# Patient Record
Sex: FEMALE | Race: BLACK OR AFRICAN AMERICAN | NOT HISPANIC OR LATINO | Employment: OTHER | URBAN - METROPOLITAN AREA
[De-identification: names, ages, dates, MRNs, and addresses within clinical notes are randomized per-mention and may not be internally consistent; named-entity substitution may affect disease eponyms.]

---

## 2019-10-14 ENCOUNTER — HOSPITAL ENCOUNTER (OUTPATIENT)
Facility: HOSPITAL | Age: 44
Discharge: HOME | End: 2019-10-16
Attending: EMERGENCY MEDICINE | Admitting: FAMILY MEDICINE
Payer: MEDICAID

## 2019-10-14 DIAGNOSIS — R10.31 RIGHT LOWER QUADRANT ABDOMINAL PAIN: ICD-10-CM

## 2019-10-14 DIAGNOSIS — N39.0 URINARY TRACT INFECTION WITH HEMATURIA, SITE UNSPECIFIED: ICD-10-CM

## 2019-10-14 DIAGNOSIS — R31.9 URINARY TRACT INFECTION WITH HEMATURIA, SITE UNSPECIFIED: ICD-10-CM

## 2019-10-14 DIAGNOSIS — J18.9 PNEUMONIA OF LOWER LOBE DUE TO INFECTIOUS ORGANISM, UNSPECIFIED LATERALITY: ICD-10-CM

## 2019-10-14 DIAGNOSIS — R10.9 ABDOMINAL PAIN, UNSPECIFIED ABDOMINAL LOCATION: Primary | ICD-10-CM

## 2019-10-14 DIAGNOSIS — R00.0 TACHYCARDIA: ICD-10-CM

## 2019-10-14 PROBLEM — R91.8 PULMONARY NODULES: Status: ACTIVE | Noted: 2019-10-14

## 2019-10-14 LAB
ALBUMIN SERPL BCP-MCNC: 2.9 G/DL (ref 3.5–5.2)
ALP SERPL-CCNC: 73 U/L (ref 55–135)
ALT SERPL W/O P-5'-P-CCNC: 25 U/L (ref 10–44)
ANION GAP SERPL CALC-SCNC: 10 MMOL/L (ref 8–16)
AST SERPL-CCNC: 26 U/L (ref 10–40)
B-HCG UR QL: NEGATIVE
BACTERIA #/AREA URNS HPF: NEGATIVE /HPF
BASOPHILS # BLD AUTO: 0.02 K/UL (ref 0–0.2)
BASOPHILS NFR BLD: 0.1 % (ref 0–1.9)
BILIRUB SERPL-MCNC: 0.8 MG/DL (ref 0.1–1)
BILIRUB UR QL STRIP: NEGATIVE
BUN SERPL-MCNC: 10 MG/DL (ref 6–20)
CALCIUM SERPL-MCNC: 8.9 MG/DL (ref 8.7–10.5)
CHLORIDE SERPL-SCNC: 97 MMOL/L (ref 95–110)
CLARITY UR: CLEAR
CO2 SERPL-SCNC: 30 MMOL/L (ref 23–29)
COLOR UR: YELLOW
CREAT SERPL-MCNC: 1 MG/DL (ref 0.5–1.4)
CTP QC/QA: YES
DIFFERENTIAL METHOD: ABNORMAL
EOSINOPHIL # BLD AUTO: 0.1 K/UL (ref 0–0.5)
EOSINOPHIL NFR BLD: 0.7 % (ref 0–8)
ERYTHROCYTE [DISTWIDTH] IN BLOOD BY AUTOMATED COUNT: 18.5 % (ref 11.5–14.5)
EST. GFR  (AFRICAN AMERICAN): >60 ML/MIN/1.73 M^2
EST. GFR  (NON AFRICAN AMERICAN): >60 ML/MIN/1.73 M^2
GLUCOSE SERPL-MCNC: 103 MG/DL (ref 70–110)
GLUCOSE UR QL STRIP: NEGATIVE
HCT VFR BLD AUTO: 36.1 % (ref 37–48.5)
HGB BLD-MCNC: 11.2 G/DL (ref 12–16)
HGB UR QL STRIP: ABNORMAL
HYALINE CASTS #/AREA URNS LPF: 2 /LPF
IMM GRANULOCYTES # BLD AUTO: 0.04 K/UL (ref 0–0.04)
IMM GRANULOCYTES NFR BLD AUTO: 0.3 % (ref 0–0.5)
KETONES UR QL STRIP: NEGATIVE
LEUKOCYTE ESTERASE UR QL STRIP: NEGATIVE
LIPASE SERPL-CCNC: 26 U/L (ref 4–60)
LYMPHOCYTES # BLD AUTO: 1.8 K/UL (ref 1–4.8)
LYMPHOCYTES NFR BLD: 12.8 % (ref 18–48)
MCH RBC QN AUTO: 26.4 PG (ref 27–31)
MCHC RBC AUTO-ENTMCNC: 31 G/DL (ref 32–36)
MCV RBC AUTO: 85 FL (ref 82–98)
MICROSCOPIC COMMENT: ABNORMAL
MONOCYTES # BLD AUTO: 0.9 K/UL (ref 0.3–1)
MONOCYTES NFR BLD: 6.4 % (ref 4–15)
NEUTROPHILS # BLD AUTO: 10.9 K/UL (ref 1.8–7.7)
NEUTROPHILS NFR BLD: 79.7 % (ref 38–73)
NITRITE UR QL STRIP: NEGATIVE
NRBC BLD-RTO: 0 /100 WBC
PH UR STRIP: 6 [PH] (ref 5–8)
PLATELET # BLD AUTO: 319 K/UL (ref 150–350)
PMV BLD AUTO: 9.8 FL (ref 9.2–12.9)
POTASSIUM SERPL-SCNC: 3.7 MMOL/L (ref 3.5–5.1)
PROT SERPL-MCNC: 8 G/DL (ref 6–8.4)
PROT UR QL STRIP: NEGATIVE
RBC # BLD AUTO: 4.25 M/UL (ref 4–5.4)
RBC #/AREA URNS HPF: 9 /HPF (ref 0–4)
SODIUM SERPL-SCNC: 137 MMOL/L (ref 136–145)
SP GR UR STRIP: 1.01 (ref 1–1.03)
SQUAMOUS #/AREA URNS HPF: 5 /HPF
URN SPEC COLLECT METH UR: ABNORMAL
UROBILINOGEN UR STRIP-ACNC: NEGATIVE EU/DL
WBC # BLD AUTO: 13.69 K/UL (ref 3.9–12.7)
WBC #/AREA URNS HPF: 2 /HPF (ref 0–5)

## 2019-10-14 PROCEDURE — 83690 ASSAY OF LIPASE: CPT

## 2019-10-14 PROCEDURE — 93005 ELECTROCARDIOGRAM TRACING: CPT

## 2019-10-14 PROCEDURE — 80053 COMPREHEN METABOLIC PANEL: CPT

## 2019-10-14 PROCEDURE — 25500020 PHARM REV CODE 255: Performed by: EMERGENCY MEDICINE

## 2019-10-14 PROCEDURE — 99285 EMERGENCY DEPT VISIT HI MDM: CPT | Mod: 25

## 2019-10-14 PROCEDURE — G0378 HOSPITAL OBSERVATION PER HR: HCPCS

## 2019-10-14 PROCEDURE — 96367 TX/PROPH/DG ADDL SEQ IV INF: CPT

## 2019-10-14 PROCEDURE — 96365 THER/PROPH/DIAG IV INF INIT: CPT | Mod: 59

## 2019-10-14 PROCEDURE — 81025 URINE PREGNANCY TEST: CPT | Performed by: EMERGENCY MEDICINE

## 2019-10-14 PROCEDURE — 85025 COMPLETE CBC W/AUTO DIFF WBC: CPT

## 2019-10-14 PROCEDURE — 81001 URINALYSIS AUTO W/SCOPE: CPT

## 2019-10-14 PROCEDURE — 96375 TX/PRO/DX INJ NEW DRUG ADDON: CPT

## 2019-10-14 PROCEDURE — 63600175 PHARM REV CODE 636 W HCPCS: Performed by: EMERGENCY MEDICINE

## 2019-10-14 RX ORDER — CARVEDILOL 12.5 MG/1
12.5 TABLET ORAL 2 TIMES DAILY
Status: DISCONTINUED | OUTPATIENT
Start: 2019-10-15 | End: 2019-10-16 | Stop reason: HOSPADM

## 2019-10-14 RX ORDER — SODIUM CHLORIDE 9 MG/ML
INJECTION, SOLUTION INTRAVENOUS CONTINUOUS
Status: DISCONTINUED | OUTPATIENT
Start: 2019-10-15 | End: 2019-10-16 | Stop reason: HOSPADM

## 2019-10-14 RX ORDER — MORPHINE SULFATE 4 MG/ML
4 INJECTION, SOLUTION INTRAMUSCULAR; INTRAVENOUS EVERY 4 HOURS PRN
Status: DISCONTINUED | OUTPATIENT
Start: 2019-10-15 | End: 2019-10-16 | Stop reason: HOSPADM

## 2019-10-14 RX ORDER — ACETAMINOPHEN 10 MG/ML
1000 INJECTION, SOLUTION INTRAVENOUS ONCE
Status: COMPLETED | OUTPATIENT
Start: 2019-10-14 | End: 2019-10-14

## 2019-10-14 RX ORDER — ONDANSETRON 2 MG/ML
4 INJECTION INTRAMUSCULAR; INTRAVENOUS
Status: COMPLETED | OUTPATIENT
Start: 2019-10-14 | End: 2019-10-14

## 2019-10-14 RX ORDER — ALBUTEROL SULFATE 90 UG/1
2 AEROSOL, METERED RESPIRATORY (INHALATION) EVERY 6 HOURS PRN
Status: DISCONTINUED | OUTPATIENT
Start: 2019-10-15 | End: 2019-10-16 | Stop reason: HOSPADM

## 2019-10-14 RX ORDER — LISINOPRIL 20 MG/1
20 TABLET ORAL NIGHTLY
Status: ON HOLD | COMMUNITY
End: 2021-05-04 | Stop reason: SDUPTHER

## 2019-10-14 RX ORDER — SODIUM,POTASSIUM PHOSPHATES 280-250MG
2 POWDER IN PACKET (EA) ORAL
Status: DISCONTINUED | OUTPATIENT
Start: 2019-10-15 | End: 2019-10-16 | Stop reason: HOSPADM

## 2019-10-14 RX ORDER — FLUTICASONE PROPIONATE 50 MCG
2 SPRAY, SUSPENSION (ML) NASAL DAILY PRN
COMMUNITY

## 2019-10-14 RX ORDER — ONDANSETRON 2 MG/ML
4 INJECTION INTRAMUSCULAR; INTRAVENOUS EVERY 8 HOURS PRN
Status: DISCONTINUED | OUTPATIENT
Start: 2019-10-15 | End: 2019-10-16 | Stop reason: HOSPADM

## 2019-10-14 RX ORDER — POTASSIUM CHLORIDE 20 MEQ/15ML
40 SOLUTION ORAL
Status: DISCONTINUED | OUTPATIENT
Start: 2019-10-15 | End: 2019-10-16 | Stop reason: HOSPADM

## 2019-10-14 RX ORDER — SODIUM CHLORIDE 0.9 % (FLUSH) 0.9 %
3 SYRINGE (ML) INJECTION
Status: DISCONTINUED | OUTPATIENT
Start: 2019-10-15 | End: 2019-10-16 | Stop reason: HOSPADM

## 2019-10-14 RX ORDER — METOPROLOL SUCCINATE 25 MG/1
25 TABLET, EXTENDED RELEASE ORAL NIGHTLY
Status: DISCONTINUED | OUTPATIENT
Start: 2019-10-15 | End: 2019-10-16 | Stop reason: HOSPADM

## 2019-10-14 RX ORDER — OMEPRAZOLE 20 MG/1
20 CAPSULE, DELAYED RELEASE ORAL NIGHTLY
COMMUNITY
End: 2020-07-11 | Stop reason: CLARIF

## 2019-10-14 RX ORDER — FERROUS SULFATE 325(65) MG
325 TABLET ORAL NIGHTLY
COMMUNITY
End: 2020-07-11 | Stop reason: CLARIF

## 2019-10-14 RX ORDER — HYDRALAZINE HYDROCHLORIDE 20 MG/ML
10 INJECTION INTRAMUSCULAR; INTRAVENOUS EVERY 6 HOURS PRN
Status: DISCONTINUED | OUTPATIENT
Start: 2019-10-15 | End: 2019-10-16 | Stop reason: HOSPADM

## 2019-10-14 RX ORDER — PANTOPRAZOLE SODIUM 40 MG/1
40 TABLET, DELAYED RELEASE ORAL DAILY
Status: DISCONTINUED | OUTPATIENT
Start: 2019-10-15 | End: 2019-10-16 | Stop reason: HOSPADM

## 2019-10-14 RX ORDER — LANOLIN ALCOHOL/MO/W.PET/CERES
800 CREAM (GRAM) TOPICAL
Status: DISCONTINUED | OUTPATIENT
Start: 2019-10-15 | End: 2019-10-16 | Stop reason: HOSPADM

## 2019-10-14 RX ORDER — FLUTICASONE PROPIONATE 50 MCG
2 SPRAY, SUSPENSION (ML) NASAL DAILY
Status: DISCONTINUED | OUTPATIENT
Start: 2019-10-15 | End: 2019-10-16 | Stop reason: HOSPADM

## 2019-10-14 RX ORDER — ACETAMINOPHEN 10 MG/ML
1000 INJECTION, SOLUTION INTRAVENOUS EVERY 8 HOURS
Status: DISCONTINUED | OUTPATIENT
Start: 2019-10-14 | End: 2019-10-14

## 2019-10-14 RX ORDER — AMOXICILLIN AND CLAVULANATE POTASSIUM 875; 125 MG/1; MG/1
1 TABLET, FILM COATED ORAL 2 TIMES DAILY
Qty: 14 TABLET | Refills: 0 | Status: SHIPPED | OUTPATIENT
Start: 2019-10-14 | End: 2019-10-16 | Stop reason: HOSPADM

## 2019-10-14 RX ORDER — TRAZODONE HYDROCHLORIDE 50 MG/1
100 TABLET ORAL NIGHTLY
Status: DISCONTINUED | OUTPATIENT
Start: 2019-10-15 | End: 2019-10-16 | Stop reason: HOSPADM

## 2019-10-14 RX ORDER — TAMSULOSIN HYDROCHLORIDE 0.4 MG/1
0.4 CAPSULE ORAL NIGHTLY
Status: DISCONTINUED | OUTPATIENT
Start: 2019-10-15 | End: 2019-10-16 | Stop reason: HOSPADM

## 2019-10-14 RX ADMIN — PIPERACILLIN AND TAZOBACTAM 4.5 G: 4; .5 INJECTION, POWDER, LYOPHILIZED, FOR SOLUTION INTRAVENOUS; PARENTERAL at 11:10

## 2019-10-14 RX ADMIN — ONDANSETRON 4 MG: 2 INJECTION INTRAMUSCULAR; INTRAVENOUS at 06:10

## 2019-10-14 RX ADMIN — ACETAMINOPHEN 1000 MG: 10 INJECTION, SOLUTION INTRAVENOUS at 06:10

## 2019-10-14 RX ADMIN — IOHEXOL 100 ML: 350 INJECTION, SOLUTION INTRAVENOUS at 08:10

## 2019-10-14 NOTE — ED NOTES
Bed: [place] [address]  Expected date:   Expected time:   Means of arrival:   Comments:  Alexa MALLOY Abdominal pain

## 2019-10-14 NOTE — ED PROVIDER NOTES
Encounter Date: 10/14/2019       History     Chief Complaint   Patient presents with    Abdominal Pain     Right sided     44-year-old female presents complaining of right upper quadrant abdominal pain patient describes pain as sharp and pleuritic in nature patient rates pain as 6/10 patient has a history of obesity, GERD, cholecystectomy.  Patient denies fever cough or shortness of breath patient has no other acute complaints at this time patient reports pain started today.        Review of patient's allergies indicates:  No Known Allergies  Past Medical History:   Diagnosis Date    Esophageal reflux     Gastroesophageal reflux    Generalized anxiety disorder     Anxiety, Generalized    Herniated disc     Hypertension     Lower extremity weakness     Morbid obesity     Obesity     Upper extremity weakness      Past Surgical History:   Procedure Laterality Date     SECTION      CHOLECYSTECTOMY       No family history on file.  Social History     Tobacco Use    Smoking status: Never Smoker   Substance Use Topics    Alcohol use: Yes     Alcohol/week: 0.8 standard drinks     Types: 1 drink(s) per week     Comment: rarely    Drug use: No     Review of Systems   Constitutional: Negative for fever.   HENT: Negative for congestion, rhinorrhea, sore throat and trouble swallowing.    Eyes: Negative for visual disturbance.   Respiratory: Negative for cough, chest tightness, shortness of breath and wheezing.    Cardiovascular: Negative for chest pain, palpitations and leg swelling.   Gastrointestinal: Positive for abdominal pain. Negative for abdominal distention, constipation, diarrhea, nausea and vomiting.   Genitourinary: Negative for difficulty urinating, dysuria, flank pain and frequency.   Musculoskeletal: Negative for arthralgias, back pain, joint swelling and neck pain.   Skin: Negative for color change and rash.   Neurological: Negative for dizziness, syncope, speech difficulty, weakness,  numbness and headaches.   All other systems reviewed and are negative.      Physical Exam     Initial Vitals [10/14/19 1652]   BP Pulse Resp Temp SpO2   (!) 197/102 (!) 112 18 98.5 °F (36.9 °C) 95 %      MAP       --         Physical Exam    Nursing note and vitals reviewed.  Constitutional: She appears well-developed and well-nourished. She is not diaphoretic. No distress.   HENT:   Head: Normocephalic and atraumatic.   Right Ear: External ear normal.   Left Ear: External ear normal.   Nose: Nose normal.   Mouth/Throat: Oropharynx is clear and moist. No oropharyngeal exudate.   Eyes: Conjunctivae and EOM are normal. Pupils are equal, round, and reactive to light. Right eye exhibits no discharge. Left eye exhibits no discharge. No scleral icterus.   Neck: Normal range of motion. Neck supple. No thyromegaly present. No tracheal deviation present. No JVD present.   Cardiovascular: Normal rate, regular rhythm, normal heart sounds and intact distal pulses. Exam reveals no gallop and no friction rub.    No murmur heard.  Pulmonary/Chest: Breath sounds normal. No stridor. No respiratory distress. She has no wheezes. She has no rhonchi. She has no rales. She exhibits no tenderness.   Abdominal: Soft. Bowel sounds are normal. She exhibits no distension and no mass. There is tenderness. There is no rebound and no guarding.   Patient has tenderness to palpation in the right upper quadrant, no rebound or guarding noted   Musculoskeletal: Normal range of motion. She exhibits no edema or tenderness.   Lymphadenopathy:     She has no cervical adenopathy.   Neurological: She is alert and oriented to person, place, and time. She has normal strength. She displays normal reflexes. No cranial nerve deficit or sensory deficit.   Skin: Skin is warm and dry. No rash and no abscess noted. No erythema. No pallor.         ED Course   Procedures  Labs Reviewed   CBC W/ AUTO DIFFERENTIAL - Abnormal; Notable for the following components:        Result Value    WBC 13.69 (*)     Hemoglobin 11.2 (*)     Hematocrit 36.1 (*)     Mean Corpuscular Hemoglobin 26.4 (*)     Mean Corpuscular Hemoglobin Conc 31.0 (*)     RDW 18.5 (*)     Gran # (ANC) 10.9 (*)     Gran% 79.7 (*)     Lymph% 12.8 (*)     All other components within normal limits   COMPREHENSIVE METABOLIC PANEL - Abnormal; Notable for the following components:    CO2 30 (*)     Albumin 2.9 (*)     All other components within normal limits   URINALYSIS, REFLEX TO URINE CULTURE - Abnormal; Notable for the following components:    Occult Blood UA 2+ (*)     All other components within normal limits    Narrative:     Preferred Collection Type->Urine, Clean Catch  Specimen Source->Urine   URINALYSIS MICROSCOPIC - Abnormal; Notable for the following components:    RBC, UA 9 (*)     Hyaline Casts, UA 2 (*)     All other components within normal limits    Narrative:     Preferred Collection Type->Urine, Clean Catch  Specimen Source->Urine   LIPASE   POCT URINE PREGNANCY        ECG Results          EKG 12-lead (In process)  Result time 10/14/19 18:07:48    In process by Interface, Lab In [facility] (10/14/19 18:07:48)                 Narrative:    Test Reason : R00.0,    Vent. Rate : 109 BPM     Atrial Rate : 109 BPM     P-R Int : 152 ms          QRS Dur : 080 ms      QT Int : 318 ms       P-R-T Axes : 049 022 046 degrees     QTc Int : 428 ms    Sinus tachycardia  Otherwise normal ECG  No previous ECGs available    Referred By: [email or web address]   SELF           Confirmed By:                   In process by Interface, Lab In [facility] (10/14/19 18:07:16)                 Narrative:    Test Reason : R00.0,    Vent. Rate : 109 BPM     Atrial Rate : 109 BPM     P-R Int : 152 ms          QRS Dur : 080 ms      QT Int : 318 ms       P-R-T Axes : 049 022 046 degrees     QTc Int : 428 ms    Sinus tachycardia  Otherwise normal ECG  No previous ECGs available    Referred By: [email or web address]   SELF           Confirmed By:                   In  process by Interface, Lab In [facility] (10/14/19 18:06:44)                 Narrative:    Test Reason : R00.0,    Vent. Rate : 109 BPM     Atrial Rate : 109 BPM     P-R Int : 152 ms          QRS Dur : 080 ms      QT Int : 318 ms       P-R-T Axes : 049 022 046 degrees     QTc Int : 428 ms    Sinus tachycardia  Otherwise normal ECG  When compared with ECG of 14-OCT-2019 17:40,  No significant change was found    Referred By: [email or web address]   SELF           Confirmed By:                             Imaging Results          CTA Chest Non-Coronary - PE Study (Final result)  Result time 10/14/19 20:52:45    Final result by Lg Xavier MD (10/14/19 20:52:45)                 Impression:      1. Significantly limited exam as described, with no evidence of central pulmonary thromboembolism.  2. Scattered opacities in both lungs suggesting subsegmental atelectasis, and perhaps mild small airways inflammation.  3. Multiple bilateral noncalcified pulmonary nodules, nonspecific.  Follow-up noncontrast chest CT in 6 months is recommended.  4. Cardiomegaly.  .      Electronically signed by: Lg Xavier MD  Date:    10/14/2019  Time:    20:52             Narrative:    EXAMINATION:  CTA CHEST NON CORONARY    CLINICAL HISTORY:  Chest pain, acute, PE suspected, high pretest prob; tachycardia.    TECHNIQUE:  CMS MANDATED QUALITY DATA-CT RADIATION DOSE-436    All CT scans at this facility use dose modulation, iterative reconstruction, and or weight-based dosing when appropriate to reduce radiation dose to as low as reasonably achievable.    Thin axial imaging through the chest was performed with 100 mL Omnipaque 350 IV contrast, with sagittal and coronal reformatted images and MIP reconstructions performed, and images stored in the patient's permanent electronic medical record.    FINDINGS:  Comparison to multiple prior exams.  The study is significantly limited by poor timing of the contrast bolus, poor penetration, and motion artifact,  which prevents evaluation of the peripheral pulmonary arterial tree.  There is no large central pulmonary arterial filling defect to suggest pulmonary thromboembolism, with the central pulmonary arteries normal in caliber.    The aorta enhances normally and tapers appropriately, with no aortic dissection or aneurysm.  The heart is enlarged, with no pericardial effusion.  There is no mediastinal or hilar lymph node enlargement.    The lungs are normally expanded, with scattered linear and bandlike opacities in both lungs, with mild mosaic attenuation, suggesting subsegmental atelectasis.  There also multiple noncalcified subcentimeter pulmonary nodules bilaterally, measuring up to 8-9 mm.  No consolidation, pleural effusion, or pneumothorax.  The central airways are patent.  There are no acute osseous abnormalities.                               CT Abdomen Pelvis With Contrast (Final result)  Result time 10/14/19 21:06:41    Final result by Lg Xavier MD (10/14/19 21:06:41)                 Impression:      1. Right perinephric edema, with trace fluid in the right anterior pararenal space and Lees's pouch, nonspecific.  Please correlate with urinalysis, and any clinical suspicion of urinary tract infection and or pyelonephritis.  2. Normal appendix.  3. Additional observations as above.      Electronically signed by: Lg Xavier MD  Date:    10/14/2019  Time:    21:06             Narrative:    EXAMINATION:  CT ABDOMEN PELVIS WITH CONTRAST    CLINICAL HISTORY:  Acute right lower quadrant abdominal pain, appendicitis suspected, tachycardia.    TECHNIQUE:  CMS MANDATED QUALITY DATA-CT RADIATION DOSE-436    All CT scans at this facility use dose modulation, iterative reconstruction, and or weight-based dosing when appropriate to reduce radiation dose to as low as reasonably achievable.    Axial postcontrast imaging was performed with 100 mL Omnipaque 350 IV contrast.  The exam is limited by the patient's large  body habitus and poor penetration.    COMPARISON:  None.    FINDINGS:  CT ABDOMEN: There are cholecystectomy clips, with the liver enhancing normally, with no biliary ductal dilatation.  The spleen is normal in size and enhances normally, with the pancreas, adrenal glands and kidneys enhancing normally.  There is nonspecific right perinephric stranding reflecting edema and fluid, with small amount of fluid in the right anterior pararenal space and in Lees's pouch.  There are no renal or ureteral calculi, with no hydroureteronephrosis.    The abdominal aorta, iliac arteries, and remaining abdominal vasculature enhance normally and are normal in caliber.  No mesenteric or retroperitoneal lymph node enlargement.  There is no bowel wall thickening, inflammation, or bowel obstruction, with no intraperitoneal free air.  The appendix is normal.  No ventral abdominal hernia.    CT PELVIS: Rim calcification in the uterine body posteriorly is nonspecific, potentially calcified leiomyoma.  The uterus, adnexa, sigmoid colon, rectum and urinary bladder enhance normally, with no pelvic free fluid or pelvic mass, and no pelvic or inguinal lymph node enlargement.  No inguinal or femoral hernia.    The extraperitoneal soft tissues enhance normally.  There is degenerative facet arthropathy in the lumbar spine, with no acute osseous abnormality.                                 Medical Decision Making:   History:   Old Medical Records: I decided to obtain old medical records.  Initial Assessment:   Emergent evaluation of a 44-year-old female presenting with abdominal pain which is pleuritic in nature patient noted to be tachycardic, given this I do have a suspicion for possible pulmonary embolus differential also includes biliary duct stone, obstruction, perforation, infection              Attending Attestation:             Attending ED Notes:   Patient's imaging shows pulmonary nodules, patient was informed of these findings and  the need for follow-up, patient also has infiltrate versus inflammation noted on CTA of chest there is no sign of PE at this time.  Patient will be treated for mild pneumonia.  Patient also noted to have inflammation near the bladder, findings concerning for possible UTI.  Patient will be started on a course of antibiotics and is to follow up with PCP in the next 2-3 days for re-evaluation patient cautioned to return immediately to the ER for any worsening or any further concerns.  Patient reports she understands these instructions and will comply she is ambulating without difficulty and tolerating p.o. in the ER.      Addendum:  Although  did be read CT of the abdomen and pelvis as normal appendix, Dr. Black transmitted a radiology read of the same study and feels like acute appendicitis is suggested.  Given this I have called patient to return and will consult General surgery for further evaluation and management.             Clinical Impression:       ICD-10-CM ICD-9-CM   1. Abdominal pain, unspecified abdominal location R10.9 789.00   2. Tachycardia R00.0 785.0   3. Urinary tract infection with hematuria, site unspecified N39.0 599.0    R31.9 599.70   4. Pneumonia of lower lobe due to infectious organism, unspecified laterality J18.1 483.8                                Yoel Patino MD  10/14/19 2200       Yoel Patino MD  10/14/19 [id number]

## 2019-10-14 NOTE — ED TRIAGE NOTES
Admit per Alexa MALLOY, C/o right sided abdominal pain since Friday. Worse today and felt like she had a fever. Took Tylenol before coming to the ER

## 2019-10-15 PROBLEM — K37 APPENDICITIS: Status: RESOLVED | Noted: 2019-10-14 | Resolved: 2019-10-15

## 2019-10-15 PROBLEM — R10.9 RIGHT SIDED ABDOMINAL PAIN: Status: ACTIVE | Noted: 2019-10-15

## 2019-10-15 PROBLEM — K37 APPENDICITIS: Status: ACTIVE | Noted: 2019-10-14

## 2019-10-15 LAB
ANION GAP SERPL CALC-SCNC: 9 MMOL/L (ref 8–16)
BASOPHILS # BLD AUTO: 0.03 K/UL (ref 0–0.2)
BASOPHILS NFR BLD: 0.3 % (ref 0–1.9)
BUN SERPL-MCNC: 8 MG/DL (ref 6–20)
CALCIUM SERPL-MCNC: 8.4 MG/DL (ref 8.7–10.5)
CHLORIDE SERPL-SCNC: 99 MMOL/L (ref 95–110)
CO2 SERPL-SCNC: 29 MMOL/L (ref 23–29)
CREAT SERPL-MCNC: 0.9 MG/DL (ref 0.5–1.4)
DIFFERENTIAL METHOD: ABNORMAL
EOSINOPHIL # BLD AUTO: 0.1 K/UL (ref 0–0.5)
EOSINOPHIL NFR BLD: 1.1 % (ref 0–8)
ERYTHROCYTE [DISTWIDTH] IN BLOOD BY AUTOMATED COUNT: 18.4 % (ref 11.5–14.5)
EST. GFR  (AFRICAN AMERICAN): >60 ML/MIN/1.73 M^2
EST. GFR  (NON AFRICAN AMERICAN): >60 ML/MIN/1.73 M^2
GLUCOSE SERPL-MCNC: 107 MG/DL (ref 70–110)
HCT VFR BLD AUTO: 34 % (ref 37–48.5)
HGB BLD-MCNC: 10.2 G/DL (ref 12–16)
IMM GRANULOCYTES # BLD AUTO: 0.03 K/UL (ref 0–0.04)
IMM GRANULOCYTES NFR BLD AUTO: 0.3 % (ref 0–0.5)
LYMPHOCYTES # BLD AUTO: 2.1 K/UL (ref 1–4.8)
LYMPHOCYTES NFR BLD: 20.9 % (ref 18–48)
MCH RBC QN AUTO: 25.4 PG (ref 27–31)
MCHC RBC AUTO-ENTMCNC: 30 G/DL (ref 32–36)
MCV RBC AUTO: 85 FL (ref 82–98)
MONOCYTES # BLD AUTO: 0.7 K/UL (ref 0.3–1)
MONOCYTES NFR BLD: 7 % (ref 4–15)
NEUTROPHILS # BLD AUTO: 6.9 K/UL (ref 1.8–7.7)
NEUTROPHILS NFR BLD: 70.4 % (ref 38–73)
NRBC BLD-RTO: 0 /100 WBC
PLATELET # BLD AUTO: 298 K/UL (ref 150–350)
PMV BLD AUTO: 10 FL (ref 9.2–12.9)
POTASSIUM SERPL-SCNC: 3.5 MMOL/L (ref 3.5–5.1)
RBC # BLD AUTO: 4.01 M/UL (ref 4–5.4)
SODIUM SERPL-SCNC: 137 MMOL/L (ref 136–145)
WBC # BLD AUTO: 9.83 K/UL (ref 3.9–12.7)

## 2019-10-15 PROCEDURE — 63600175 PHARM REV CODE 636 W HCPCS: Performed by: NURSE PRACTITIONER

## 2019-10-15 PROCEDURE — G0378 HOSPITAL OBSERVATION PER HR: HCPCS

## 2019-10-15 PROCEDURE — 36415 COLL VENOUS BLD VENIPUNCTURE: CPT

## 2019-10-15 PROCEDURE — 25000242 PHARM REV CODE 250 ALT 637 W/ HCPCS: Performed by: NURSE PRACTITIONER

## 2019-10-15 PROCEDURE — 85025 COMPLETE CBC W/AUTO DIFF WBC: CPT

## 2019-10-15 PROCEDURE — 96375 TX/PRO/DX INJ NEW DRUG ADDON: CPT

## 2019-10-15 PROCEDURE — 96361 HYDRATE IV INFUSION ADD-ON: CPT

## 2019-10-15 PROCEDURE — 25000003 PHARM REV CODE 250: Performed by: NURSE PRACTITIONER

## 2019-10-15 PROCEDURE — 99900035 HC TECH TIME PER 15 MIN (STAT)

## 2019-10-15 PROCEDURE — 94761 N-INVAS EAR/PLS OXIMETRY MLT: CPT

## 2019-10-15 PROCEDURE — 96366 THER/PROPH/DIAG IV INF ADDON: CPT

## 2019-10-15 PROCEDURE — 96376 TX/PRO/DX INJ SAME DRUG ADON: CPT

## 2019-10-15 PROCEDURE — 80048 BASIC METABOLIC PNL TOTAL CA: CPT

## 2019-10-15 RX ADMIN — MORPHINE SULFATE 4 MG: 4 INJECTION INTRAVENOUS at 08:10

## 2019-10-15 RX ADMIN — TRAZODONE HYDROCHLORIDE 100 MG: 50 TABLET ORAL at 02:10

## 2019-10-15 RX ADMIN — FLUTICASONE PROPIONATE 100 MCG: 50 SPRAY, METERED NASAL at 08:10

## 2019-10-15 RX ADMIN — PIPERACILLIN SODIUM AND TAZOBACTAM SODIUM 3.38 G: 3; .375 INJECTION, POWDER, LYOPHILIZED, FOR SOLUTION INTRAVENOUS at 08:10

## 2019-10-15 RX ADMIN — PIPERACILLIN SODIUM AND TAZOBACTAM SODIUM 3.38 G: 3; .375 INJECTION, POWDER, LYOPHILIZED, FOR SOLUTION INTRAVENOUS at 04:10

## 2019-10-15 RX ADMIN — CARVEDILOL 12.5 MG: 12.5 TABLET, FILM COATED ORAL at 08:10

## 2019-10-15 RX ADMIN — MORPHINE SULFATE 4 MG: 4 INJECTION INTRAVENOUS at 12:10

## 2019-10-15 RX ADMIN — ONDANSETRON 4 MG: 2 INJECTION INTRAMUSCULAR; INTRAVENOUS at 12:10

## 2019-10-15 RX ADMIN — METOPROLOL SUCCINATE 25 MG: 25 TABLET, FILM COATED, EXTENDED RELEASE ORAL at 08:10

## 2019-10-15 RX ADMIN — METOPROLOL SUCCINATE 25 MG: 25 TABLET, FILM COATED, EXTENDED RELEASE ORAL at 01:10

## 2019-10-15 RX ADMIN — TAMSULOSIN HYDROCHLORIDE 0.4 MG: 0.4 CAPSULE ORAL at 08:10

## 2019-10-15 RX ADMIN — TRAZODONE HYDROCHLORIDE 100 MG: 50 TABLET ORAL at 08:10

## 2019-10-15 RX ADMIN — SODIUM CHLORIDE: 0.9 INJECTION, SOLUTION INTRAVENOUS at 01:10

## 2019-10-15 RX ADMIN — PIPERACILLIN SODIUM AND TAZOBACTAM SODIUM 3.38 G: 3; .375 INJECTION, POWDER, LYOPHILIZED, FOR SOLUTION INTRAVENOUS at 11:10

## 2019-10-15 RX ADMIN — TAMSULOSIN HYDROCHLORIDE 0.4 MG: 0.4 CAPSULE ORAL at 01:10

## 2019-10-15 NOTE — NURSING
Patient admitted with lower right side abdominal pain.  Patient instructed she is NPO, she voiced understanding.  Patient states she is able to control her pain by laying on her left side.

## 2019-10-15 NOTE — ASSESSMENT & PLAN NOTE
Suspect acute appendicitis  Consult surgery  The ED physician had discussed case with Dr. Malloy.  NPO  Empiric antibiotics with IV zosyn  Pain medication and antiemetics

## 2019-10-15 NOTE — H&P
[facility] hospital Medicine  History & Physical  Date of service: 10/14/2019 at 2300    Patient Name: Katiuska Preston  MRN: [id number]  Admission Date: 10/14/2019  Attending Physician: Jena Navarrete MD  Primary Care Provider: Harpreet Rendon MD         Patient information was obtained from patient and ER records.     Subjective:     Principal Problem:Abdominal pain    Chief Complaint:   Chief Complaint   Patient presents with    Abdominal Pain     Right sided        HPI: The patient is a 44-year-old morbidly obese female with history of asthma, hypertension, GERD, cholecystectomy. She presented to the ED with progressive sharp right upper quadrant abdominal pain, worse with deep breathing, associated with nausea and fever for 3-4 days. She states that pain had been severe and persistent today. She had an episode of diarrhea PTA.    She denies shortness of breath, chest pain, or urinary symptoms.    Per ED physician, she is noted to have leukocytosis, 13.69. CT abdomen and pelvis reports are somewhat conflicting, acute appendicitis VS right perinephric edema.        Past Medical History:   Diagnosis Date    Esophageal reflux     Gastroesophageal reflux    Generalized anxiety disorder     Anxiety, Generalized    Herniated disc     Hypertension     Lower extremity weakness     Morbid obesity     Obesity     Upper extremity weakness        Past Surgical History:   Procedure Laterality Date     SECTION      CHOLECYSTECTOMY         Review of patient's allergies indicates:  No Known Allergies    No current facility-administered medications on file prior to encounter.      Current Outpatient Medications on File Prior to Encounter   Medication Sig    albuterol (PROAIR HFA) 90 mcg/actuation inhaler Inhale 2 puffs into the lungs every 6 (six) hours as needed for Wheezing.    alprazolam (XANAX) 1 MG tablet Take 1 tablet (1 mg total) by mouth 3 (three) times daily as needed for Anxiety.     carvedilol (COREG) 12.5 MG tablet TAKE 1 TABLET BY MOUTH TWICE DAILY    cetirizine (ZYRTEC) 10 MG tablet Take 1 tablet (10 mg total) by mouth once daily.    lisinopril 10 MG tablet Take 1 tablet (10 mg total) by mouth every evening.    methocarbamol (ROBAXIN) 500 MG Tab     metoprolol succinate (TOPROL-XL) 25 MG 24 hr tablet     montelukast (SINGULAIR) 10 mg tablet     OMEPRAZOLE ORAL Take 20 mg by mouth once daily.     oxycodone-acetaminophen (PERCOCET)  mg per tablet Take 1 tablet by mouth every 4 to 6 hours as needed for Pain.    tamsulosin (FLOMAX) 0.4 mg Cp24 Take 1 capsule (0.4 mg total) by mouth every evening.    tramadol (ULTRAM) 50 mg tablet     trazodone (DESYREL) 100 MG tablet Take 100 mg by mouth every evening.      triamcinolone acetonide 0.1% (KENALOG) 0.1 % cream Apply to back rash BID until resolved     Family History     None        Tobacco Use    Smoking status: Never Smoker   Substance and Sexual Activity    Alcohol use: Yes     Alcohol/week: 0.8 standard drinks     Types: 1 drink(s) per week     Comment: rarely    Drug use: No    Sexual activity: Never     Birth control/protection: None     Review of Systems   All other systems reviewed and are negative.    Objective:     Vital Signs (Most Recent):  Temp: 98.5 °F (36.9 °C) (10/14/19 2131)  Pulse: 102 (10/14/19 2131)  Resp: 16 (10/14/19 2131)  BP: (!) 178/84 (10/14/19 2131)  SpO2: (!) 94 % (10/14/19 2131) Vital Signs (24h Range):  Temp:  [98.5 °F (36.9 °C)] 98.5 °F (36.9 °C)  Pulse:  [102-112] 102  Resp:  [16-18] 16  SpO2:  [92 %-95 %] 94 %  BP: (144-206)/() 178/84     Weight: (!) 145.2 kg (320 lb)  Body mass index is 56.69 kg/m².    Physical Exam   Constitutional: She is oriented to person, place, and time.   Morbidly obese   Eyes: Conjunctivae and EOM are normal.   Neck: Normal range of motion. Neck supple. No JVD present.   Cardiovascular: Normal rate and regular rhythm.   No murmur heard.  Pulmonary/Chest: Effort  normal and breath sounds normal. She has no wheezes. She has no rales.   Abdominal: Soft. There is tenderness (Right abdomen).   Genitourinary: Vagina normal.   Musculoskeletal: She exhibits no edema or deformity.   Neurological: She is alert and oriented to person, place, and time.   Skin: Skin is warm and dry. Capillary refill takes less than 2 seconds.   Psychiatric: She has a normal mood and affect.   Vitals reviewed.        CRANIAL NERVES     CN III, IV, VI   Extraocular motions are normal.        Significant Labs:   CBC:   Recent Labs   Lab 10/14/19  1817   WBC 13.69*   HGB 11.2*   HCT 36.1*        CMP:   Recent Labs   Lab 10/14/19  1817      K 3.7   CL 97   CO2 30*      BUN 10   CREATININE 1.0   CALCIUM 8.9   PROT 8.0   ALBUMIN 2.9*   BILITOT 0.8   ALKPHOS 73   AST 26   ALT 25   ANIONGAP 10   EGFRNONAA >60.0     Urine Studies:   Recent Labs   Lab 10/14/19  1916   COLORU Yellow   APPEARANCEUA Clear   PHUR 6.0   SPECGRAV 1.010   PROTEINUA Negative   GLUCUA Negative   KETONESU Negative   BILIRUBINUA Negative   OCCULTUA 2+*   NITRITE Negative   UROBILINOGEN Negative   LEUKOCYTESUR Negative   RBCUA 9*   WBCUA 2   BACTERIA Negative   SQUAMEPITHEL 5   HYALINECASTS 2*       Significant Imaging:    Cta Chest Non-coronary - Pe Study    Result Date: 10/14/2019  Exam: CTA OF THE CHEST WITH CONTRAST Clinical data: Acute chest pain. PE suspected. High pretest probability. Technique: Axial CT angiography images through the lungs were acquired with contrast and imaged using soft tissue and lung algorithms. Radiation dose: CTDIvol = 10.60 mGy, DLP = 16.90 mGy x cm. Prior studies: No prior studies submitted. Limitations: suboptimal opacification of pulmonary arteries as images acquired in late arterial phase. Only TWO sequences are made available for interpretation. Findings: Lungs: Mosaic attenuation in bilateral lungs possibly represent air-trapping due to small airway disease. Few thin atelectatic Verses  fibrotic bands in the lower lobe of left lung.No pulmonary infiltrate identified. No pulmonary mass identified. No pleural effusions identified. No pneumothorax. The airway is clear. Soft Tissues: No mediastinal, axillary or supraclavicular adenopathy is identified. Vascular: No filling defect within the pulmonary arteries to the segmental branch level. Unremarkable aorta.  The heart size is upper limits of normal.  No definite abnormality seen on 3D reformatted images. Bony structures: No acute or destructive abnormality.  Mild diffuse osteopenia. Moderate degenerative changes in thoracic spine. Upper Abdomen: Limited visualization of the solid upper abdominal organs is grossly unremarkable. IMPRESSION: 1. Mosaic attenuation in bilateral lungs possibly represent air-trapping due to small airway disease 2.  Within exam limitations, there is no convincing evidence for acute pulmonary embolism. Recommendation:  Follow up as clinically indicated. All CT scans at this facility utilize dose modulation, iterative reconstruction, and/or weight based dosing when appropriate to reduce radiation dose to as low as reasonably achievable. Electronically Signed by GAGAN JOYNER MD at 10/14/2019 11:22:58 PM    1. Significantly limited exam as described, with no evidence of central pulmonary thromboembolism. 2. Scattered opacities in both lungs suggesting subsegmental atelectasis, and perhaps mild small airways inflammation. 3. Multiple bilateral noncalcified pulmonary nodules, nonspecific.  Follow-up noncontrast chest CT in 6 months is recommended. 4. Cardiomegaly. . Electronically signed by: Lg Xavier MD Date:    10/14/2019 Time:    20:52    Ct Abdomen Pelvis With Contrast    Addendum Date: 10/14/2019    CT ABDOMEN PELVIS WITH CONTRAST CLINICAL HISTORY: Acute right lower quadrant abdominal pain, appendicitis suspected, tachycardia. TECHNIQUE: CMS MANDATED QUALITY DATA-CT RADIATION DOSE-436 All CT scans at this facility  use dose modulation, iterative reconstruction, and or weight-based dosing when appropriate to reduce radiation dose to as low as reasonably achievable. Axial postcontrast imaging was performed with 100 mL Omnipaque 350 IV contrast.  The exam is limited by the patient's large body habitus and poor penetration. COMPARISON: None. FINDINGS: CT ABDOMEN: There are cholecystectomy clips, with the liver enhancing normally, with no biliary ductal dilatation.  The spleen is normal in size and enhances normally, with the pancreas, adrenal glands and kidneys enhancing normally. There is nonspecific right perinephric stranding reflecting edema and fluid, with small amount of fluid in the right anterior pararenal space and in Lees's pouch.  There are no renal or ureteral calculi, with no hydroureteronephrosis. The abdominal aorta, iliac arteries, and remaining abdominal vasculature enhance normally and are normal in caliber.  No mesenteric or retroperitoneal lymph node enlargement. There is no bowel wall thickening, inflammation, or bowel obstruction, with no intraperitoneal free air.  The appendix is normal. No ventral abdominal hernia. CT PELVIS: Rim calcification in the uterine body posteriorly is nonspecific, potentially calcified leiomyoma.  The uterus, adnexa, sigmoid colon, rectum and urinary bladder enhance normally, with no pelvic free fluid or pelvic mass, and no pelvic or inguinal lymph node enlargement.  No inguinal or femoral hernia. The extraperitoneal soft tissues enhance normally.  There is degenerative facet arthropathy in the lumbar spine, with no acute osseous abnormality.     Result Date: 10/14/2019  Exam: CT OF THE ABDOMEN/PELVIS WITH IV CONTRAST Clinical data: Right lower quadrant pain, appendicitis suspected. Technique: Direct contiguous axial CT images were acquired through the abdomen and pelvis with intravenous contrast using soft tissue and bone algorithms. Oral contrast was not administered.  Reformatted/MPR images were performed. Radiation dose:  CTDIvol =117.1 mGy, DLP = 2342.8 mGy x cm. Limitations: Lack of oral contrast limits evaluation of the bowel loops. Prior Studies: No prior studies submitted. Findings: Lung bases:  Clear Liver:   Unremarkable size and contour. Normal density. No evidence of mass. No evidence of dilated ducts. Gallbladder: Not visualized, post cholecystectomy status. Spleen:  Grossly unremarkable. Pancreas/adrenal glands:   Grossly unremarkable size, contour and density. Kidneys:   In anatomic position. Grossly unremarkable renal size, contour and density. No renal or ureteral calculi. No evidence of a renal mass or cyst. Perinephric space is unremarkable. Retroperitoneum: No enlarged retroperitoneal lymphadenopathy. The aorta and IVC appear unremarkable. Peritoneal cavity:  No evidence of free air. Gastrointestinal tract: No obstruction. Appendix: Is dilated measuring 6.8 cm in maximum diameter.  Mild free fluid and fat stranding seen in the subhepatic region and along the right lateroconal fascia. Pelvis:  Solid and hollow viscera grossly unremarkable.  Well-defined hypodense lesion with thin calcified wall identified in posterior wall of uterus measuring 2.1 x 1.3 cm in size, suggestive of fibroid. Osseous structures: Degenerative spine disease.No acute or destructive bony process identified. IMPRESSION: Features suggestive of acute appendicitis. Mild free fluid and fat stranding seen in the subhepatic region and along the right lateroconal fascia. Small uterine posterior wall fibroid. Recommendation: Follow up as clinically indicated. All CT scans at this facility utilize dose modulation, iterative reconstruction, and/or weight based dosing when appropriate to reduce radiation dose to as low as reasonably achievable. Electronically Signed by NOEL DEAN MD at 10/14/2019 10:58:34 PM    1. Right perinephric edema, with trace fluid in the right anterior pararenal space  and Lees's pouch, nonspecific. Please correlate with urinalysis, and any clinical suspicion of urinary tract infection and or pyelonephritis. 2. Normal appendix. 3. Additional observations as above. 4. Please note that there was a report for this exam issued by Dr. Black, after my initial report.  This addendum contains my initial report. Electronically signed by: Lg Xavier MD Date:    10/14/2019 Time:    22:58      Assessment/Plan:     * Abdominal pain  Suspect acute appendicitis  Consult surgery  The ED physician had discussed case with Dr. Malloy.  NPO  Empiric antibiotics with IV zosyn  Pain medication and antiemetics      HTN (hypertension)  Monitor  Hydralazine IV PRN for SBP above 170      Pulmonary nodules  Incidental findings on chest CTA  The patient was informed. Instructed to follow up with her PCP.      VTE Risk Mitigation (From admission, onward)         Ordered     Place sequential compression device  Until discontinued      10/14/19 2304     Reason for No Pharmacological VTE Prophylaxis  Once      10/14/19 2304     IP VTE HIGH RISK PATIENT  Once      10/14/19 2304                   RUFINA Terrell  Department of Hospital Medicine   [facility]

## 2019-10-15 NOTE — ASSESSMENT & PLAN NOTE
Incidental findings on chest CTA  The patient was informed. Instructed to follow up with her PCP.

## 2019-10-15 NOTE — SUBJECTIVE & OBJECTIVE
Past Medical History:   Diagnosis Date    Esophageal reflux     Gastroesophageal reflux    Generalized anxiety disorder     Anxiety, Generalized    Herniated disc     Hypertension     Lower extremity weakness     Morbid obesity     Obesity     Upper extremity weakness        Past Surgical History:   Procedure Laterality Date     SECTION      CHOLECYSTECTOMY         Review of patient's allergies indicates:  No Known Allergies    No current facility-administered medications on file prior to encounter.      Current Outpatient Medications on File Prior to Encounter   Medication Sig    albuterol (PROAIR HFA) 90 mcg/actuation inhaler Inhale 2 puffs into the lungs every 6 (six) hours as needed for Wheezing.    alprazolam (XANAX) 1 MG tablet Take 1 tablet (1 mg total) by mouth 3 (three) times daily as needed for Anxiety.    carvedilol (COREG) 12.5 MG tablet TAKE 1 TABLET BY MOUTH TWICE DAILY    cetirizine (ZYRTEC) 10 MG tablet Take 1 tablet (10 mg total) by mouth once daily.    lisinopril 10 MG tablet Take 1 tablet (10 mg total) by mouth every evening.    methocarbamol (ROBAXIN) 500 MG Tab     metoprolol succinate (TOPROL-XL) 25 MG 24 hr tablet     montelukast (SINGULAIR) 10 mg tablet     OMEPRAZOLE ORAL Take 20 mg by mouth once daily.     oxycodone-acetaminophen (PERCOCET)  mg per tablet Take 1 tablet by mouth every 4 to 6 hours as needed for Pain.    tamsulosin (FLOMAX) 0.4 mg Cp24 Take 1 capsule (0.4 mg total) by mouth every evening.    tramadol (ULTRAM) 50 mg tablet     trazodone (DESYREL) 100 MG tablet Take 100 mg by mouth every evening.      triamcinolone acetonide 0.1% (KENALOG) 0.1 % cream Apply to back rash BID until resolved     Family History     None        Tobacco Use    Smoking status: Never Smoker   Substance and Sexual Activity    Alcohol use: Yes     Alcohol/week: 0.8 standard drinks     Types: 1 drink(s) per week     Comment: rarely    Drug use: No    Sexual  activity: Never     Birth control/protection: None     Review of Systems   All other systems reviewed and are negative.    Objective:     Vital Signs (Most Recent):  Temp: 98.5 °F (36.9 °C) (10/14/19 2131)  Pulse: 102 (10/14/19 2131)  Resp: 16 (10/14/19 2131)  BP: (!) 178/84 (10/14/19 2131)  SpO2: (!) 94 % (10/14/19 2131) Vital Signs (24h Range):  Temp:  [98.5 °F (36.9 °C)] 98.5 °F (36.9 °C)  Pulse:  [102-112] 102  Resp:  [16-18] 16  SpO2:  [92 %-95 %] 94 %  BP: (144-206)/() 178/84     Weight: (!) 145.2 kg (320 lb)  Body mass index is 56.69 kg/m².    Physical Exam   Constitutional: She is oriented to person, place, and time.   Morbidly obese   Eyes: Conjunctivae and EOM are normal.   Neck: Normal range of motion. Neck supple. No JVD present.   Cardiovascular: Normal rate and regular rhythm.   No murmur heard.  Pulmonary/Chest: Effort normal and breath sounds normal. She has no wheezes. She has no rales.   Abdominal: Soft. There is tenderness (Right abdomen).   Genitourinary: Vagina normal.   Musculoskeletal: She exhibits no edema or deformity.   Neurological: She is alert and oriented to person, place, and time.   Skin: Skin is warm and dry. Capillary refill takes less than 2 seconds.   Psychiatric: She has a normal mood and affect.   Vitals reviewed.        CRANIAL NERVES     CN III, IV, VI   Extraocular motions are normal.        Significant Labs:   CBC:   Recent Labs   Lab 10/14/19  1817   WBC 13.69*   HGB 11.2*   HCT 36.1*        CMP:   Recent Labs   Lab 10/14/19  1817      K 3.7   CL 97   CO2 30*      BUN 10   CREATININE 1.0   CALCIUM 8.9   PROT 8.0   ALBUMIN 2.9*   BILITOT 0.8   ALKPHOS 73   AST 26   ALT 25   ANIONGAP 10   EGFRNONAA >60.0     Urine Studies:   Recent Labs   Lab 10/14/19  1916   COLORU Yellow   APPEARANCEUA Clear   PHUR 6.0   SPECGRAV 1.010   PROTEINUA Negative   GLUCUA Negative   KETONESU Negative   BILIRUBINUA Negative   OCCULTUA 2+*   NITRITE Negative   UROBILINOGEN  Negative   LEUKOCYTESUR Negative   RBCUA 9*   WBCUA 2   BACTERIA Negative   SQUAMEPITHEL 5   HYALINECASTS 2*       Significant Imaging:    Cta Chest Non-coronary - Pe Study    Result Date: 10/14/2019  Exam: CTA OF THE CHEST WITH CONTRAST Clinical data: Acute chest pain. PE suspected. High pretest probability. Technique: Axial CT angiography images through the lungs were acquired with contrast and imaged using soft tissue and lung algorithms. Radiation dose: CTDIvol = 10.60 mGy, DLP = 16.90 mGy x cm. Prior studies: No prior studies submitted. Limitations: suboptimal opacification of pulmonary arteries as images acquired in late arterial phase. Only TWO sequences are made available for interpretation. Findings: Lungs: Mosaic attenuation in bilateral lungs possibly represent air-trapping due to small airway disease. Few thin atelectatic Verses fibrotic bands in the lower lobe of left lung.No pulmonary infiltrate identified. No pulmonary mass identified. No pleural effusions identified. No pneumothorax. The airway is clear. Soft Tissues: No mediastinal, axillary or supraclavicular adenopathy is identified. Vascular: No filling defect within the pulmonary arteries to the segmental branch level. Unremarkable aorta.  The heart size is upper limits of normal.  No definite abnormality seen on 3D reformatted images. Bony structures: No acute or destructive abnormality.  Mild diffuse osteopenia. Moderate degenerative changes in thoracic spine. Upper Abdomen: Limited visualization of the solid upper abdominal organs is grossly unremarkable. IMPRESSION: 1. Mosaic attenuation in bilateral lungs possibly represent air-trapping due to small airway disease 2.  Within exam limitations, there is no convincing evidence for acute pulmonary embolism. Recommendation:  Follow up as clinically indicated. All CT scans at this facility utilize dose modulation, iterative reconstruction, and/or weight based dosing when appropriate to reduce  radiation dose to as low as reasonably achievable. Electronically Signed by GAGAN JOYNER MD at 10/14/2019 11:22:58 PM    1. Significantly limited exam as described, with no evidence of central pulmonary thromboembolism. 2. Scattered opacities in both lungs suggesting subsegmental atelectasis, and perhaps mild small airways inflammation. 3. Multiple bilateral noncalcified pulmonary nodules, nonspecific.  Follow-up noncontrast chest CT in 6 months is recommended. 4. Cardiomegaly. . Electronically signed by: Lg Xavier MD Date:    10/14/2019 Time:    20:52    Ct Abdomen Pelvis With Contrast    Addendum Date: 10/14/2019    CT ABDOMEN PELVIS WITH CONTRAST CLINICAL HISTORY: Acute right lower quadrant abdominal pain, appendicitis suspected, tachycardia. TECHNIQUE: CMS MANDATED QUALITY DATA-CT RADIATION DOSE-436 All CT scans at this facility use dose modulation, iterative reconstruction, and or weight-based dosing when appropriate to reduce radiation dose to as low as reasonably achievable. Axial postcontrast imaging was performed with 100 mL Omnipaque 350 IV contrast.  The exam is limited by the patient's large body habitus and poor penetration. COMPARISON: None. FINDINGS: CT ABDOMEN: There are cholecystectomy clips, with the liver enhancing normally, with no biliary ductal dilatation.  The spleen is normal in size and enhances normally, with the pancreas, adrenal glands and kidneys enhancing normally. There is nonspecific right perinephric stranding reflecting edema and fluid, with small amount of fluid in the right anterior pararenal space and in Lees's pouch.  There are no renal or ureteral calculi, with no hydroureteronephrosis. The abdominal aorta, iliac arteries, and remaining abdominal vasculature enhance normally and are normal in caliber.  No mesenteric or retroperitoneal lymph node enlargement. There is no bowel wall thickening, inflammation, or bowel obstruction, with no intraperitoneal free air.   The appendix is normal. No ventral abdominal hernia. CT PELVIS: Rim calcification in the uterine body posteriorly is nonspecific, potentially calcified leiomyoma.  The uterus, adnexa, sigmoid colon, rectum and urinary bladder enhance normally, with no pelvic free fluid or pelvic mass, and no pelvic or inguinal lymph node enlargement.  No inguinal or femoral hernia. The extraperitoneal soft tissues enhance normally.  There is degenerative facet arthropathy in the lumbar spine, with no acute osseous abnormality.     Result Date: 10/14/2019  Exam: CT OF THE ABDOMEN/PELVIS WITH IV CONTRAST Clinical data: Right lower quadrant pain, appendicitis suspected. Technique: Direct contiguous axial CT images were acquired through the abdomen and pelvis with intravenous contrast using soft tissue and bone algorithms. Oral contrast was not administered. Reformatted/MPR images were performed. Radiation dose:  CTDIvol =117.1 mGy, DLP = 2342.8 mGy x cm. Limitations: Lack of oral contrast limits evaluation of the bowel loops. Prior Studies: No prior studies submitted. Findings: Lung bases:  Clear Liver:   Unremarkable size and contour. Normal density. No evidence of mass. No evidence of dilated ducts. Gallbladder: Not visualized, post cholecystectomy status. Spleen:  Grossly unremarkable. Pancreas/adrenal glands:   Grossly unremarkable size, contour and density. Kidneys:   In anatomic position. Grossly unremarkable renal size, contour and density. No renal or ureteral calculi. No evidence of a renal mass or cyst. Perinephric space is unremarkable. Retroperitoneum: No enlarged retroperitoneal lymphadenopathy. The aorta and IVC appear unremarkable. Peritoneal cavity:  No evidence of free air. Gastrointestinal tract: No obstruction. Appendix: Is dilated measuring 6.8 cm in maximum diameter.  Mild free fluid and fat stranding seen in the subhepatic region and along the right lateroconal fascia. Pelvis:  Solid and hollow viscera grossly  unremarkable.  Well-defined hypodense lesion with thin calcified wall identified in posterior wall of uterus measuring 2.1 x 1.3 cm in size, suggestive of fibroid. Osseous structures: Degenerative spine disease.No acute or destructive bony process identified. IMPRESSION: Features suggestive of acute appendicitis. Mild free fluid and fat stranding seen in the subhepatic region and along the right lateroconal fascia. Small uterine posterior wall fibroid. Recommendation: Follow up as clinically indicated. All CT scans at this facility utilize dose modulation, iterative reconstruction, and/or weight based dosing when appropriate to reduce radiation dose to as low as reasonably achievable. Electronically Signed by NOEL DEAN MD at 10/14/2019 10:58:34 PM    1. Right perinephric edema, with trace fluid in the right anterior pararenal space and Lees's pouch, nonspecific. Please correlate with urinalysis, and any clinical suspicion of urinary tract infection and or pyelonephritis. 2. Normal appendix. 3. Additional observations as above. 4. Please note that there was a report for this exam issued by Dr. Dean, after my initial report.  This addendum contains my initial report. Electronically signed by: Lg Xavier MD Date:    10/14/2019 Time:    22:58

## 2019-10-15 NOTE — HPI
The patient is a 44-year-old morbidly obese female with history of asthma, hypertension, GERD, cholecystectomy. She presented to the ED with progressive sharp right upper quadrant abdominal pain, worse with deep breathing, associated with nausea and fever for 3-4 days. She states that pain had been severe and persistent today. She had an episode of diarrhea PTA.    She denies shortness of breath, chest pain, or urinary symptoms.    Per ED physician, she is noted to have leukocytosis, 13.69. CT abdomen and pelvis reports are somewhat conflicting, acute appendicitis VS right perinephric edema.

## 2019-10-16 VITALS
WEIGHT: 293 LBS | SYSTOLIC BLOOD PRESSURE: 172 MMHG | HEART RATE: 71 BPM | TEMPERATURE: 98 F | OXYGEN SATURATION: 96 % | BODY MASS INDEX: 51.91 KG/M2 | HEIGHT: 63 IN | RESPIRATION RATE: 19 BRPM | DIASTOLIC BLOOD PRESSURE: 85 MMHG

## 2019-10-16 LAB
ANION GAP SERPL CALC-SCNC: 8 MMOL/L (ref 8–16)
BASOPHILS # BLD AUTO: 0.01 K/UL (ref 0–0.2)
BASOPHILS NFR BLD: 0.1 % (ref 0–1.9)
BUN SERPL-MCNC: 7 MG/DL (ref 6–20)
CALCIUM SERPL-MCNC: 8.4 MG/DL (ref 8.7–10.5)
CHLORIDE SERPL-SCNC: 99 MMOL/L (ref 95–110)
CO2 SERPL-SCNC: 30 MMOL/L (ref 23–29)
CREAT SERPL-MCNC: 1 MG/DL (ref 0.5–1.4)
DIFFERENTIAL METHOD: ABNORMAL
EOSINOPHIL # BLD AUTO: 0.1 K/UL (ref 0–0.5)
EOSINOPHIL NFR BLD: 1.1 % (ref 0–8)
ERYTHROCYTE [DISTWIDTH] IN BLOOD BY AUTOMATED COUNT: 17.9 % (ref 11.5–14.5)
EST. GFR  (AFRICAN AMERICAN): >60 ML/MIN/1.73 M^2
EST. GFR  (NON AFRICAN AMERICAN): >60 ML/MIN/1.73 M^2
GLUCOSE SERPL-MCNC: 163 MG/DL (ref 70–110)
HCT VFR BLD AUTO: 33.4 % (ref 37–48.5)
HGB BLD-MCNC: 10.1 G/DL (ref 12–16)
IMM GRANULOCYTES # BLD AUTO: 0.04 K/UL (ref 0–0.04)
IMM GRANULOCYTES NFR BLD AUTO: 0.5 % (ref 0–0.5)
LYMPHOCYTES # BLD AUTO: 1.5 K/UL (ref 1–4.8)
LYMPHOCYTES NFR BLD: 16.6 % (ref 18–48)
MAGNESIUM SERPL-MCNC: 1.6 MG/DL (ref 1.6–2.6)
MCH RBC QN AUTO: 26 PG (ref 27–31)
MCHC RBC AUTO-ENTMCNC: 30.2 G/DL (ref 32–36)
MCV RBC AUTO: 86 FL (ref 82–98)
MONOCYTES # BLD AUTO: 0.4 K/UL (ref 0.3–1)
MONOCYTES NFR BLD: 4.9 % (ref 4–15)
NEUTROPHILS # BLD AUTO: 6.8 K/UL (ref 1.8–7.7)
NEUTROPHILS NFR BLD: 76.8 % (ref 38–73)
NRBC BLD-RTO: 0 /100 WBC
PLATELET # BLD AUTO: 291 K/UL (ref 150–350)
PMV BLD AUTO: 10.2 FL (ref 9.2–12.9)
POTASSIUM SERPL-SCNC: 3.6 MMOL/L (ref 3.5–5.1)
RBC # BLD AUTO: 3.88 M/UL (ref 4–5.4)
SODIUM SERPL-SCNC: 137 MMOL/L (ref 136–145)
WBC # BLD AUTO: 8.86 K/UL (ref 3.9–12.7)

## 2019-10-16 PROCEDURE — 94761 N-INVAS EAR/PLS OXIMETRY MLT: CPT

## 2019-10-16 PROCEDURE — 25000003 PHARM REV CODE 250: Performed by: NURSE PRACTITIONER

## 2019-10-16 PROCEDURE — G0378 HOSPITAL OBSERVATION PER HR: HCPCS

## 2019-10-16 PROCEDURE — 80048 BASIC METABOLIC PNL TOTAL CA: CPT

## 2019-10-16 PROCEDURE — 83735 ASSAY OF MAGNESIUM: CPT

## 2019-10-16 PROCEDURE — 25000003 PHARM REV CODE 250: Performed by: INTERNAL MEDICINE

## 2019-10-16 PROCEDURE — 96376 TX/PRO/DX INJ SAME DRUG ADON: CPT

## 2019-10-16 PROCEDURE — 85025 COMPLETE CBC W/AUTO DIFF WBC: CPT

## 2019-10-16 PROCEDURE — 63600175 PHARM REV CODE 636 W HCPCS: Performed by: NURSE PRACTITIONER

## 2019-10-16 PROCEDURE — 36415 COLL VENOUS BLD VENIPUNCTURE: CPT

## 2019-10-16 PROCEDURE — 99900035 HC TECH TIME PER 15 MIN (STAT)

## 2019-10-16 RX ORDER — ACETAMINOPHEN 325 MG/1
650 TABLET ORAL EVERY 6 HOURS PRN
Status: DISCONTINUED | OUTPATIENT
Start: 2019-10-16 | End: 2019-10-16 | Stop reason: HOSPADM

## 2019-10-16 RX ORDER — LISINOPRIL 20 MG/1
20 TABLET ORAL DAILY
Status: DISCONTINUED | OUTPATIENT
Start: 2019-10-16 | End: 2019-10-16 | Stop reason: HOSPADM

## 2019-10-16 RX ADMIN — PANTOPRAZOLE SODIUM 40 MG: 40 TABLET, DELAYED RELEASE ORAL at 05:10

## 2019-10-16 RX ADMIN — FLUTICASONE PROPIONATE 100 MCG: 50 SPRAY, METERED NASAL at 09:10

## 2019-10-16 RX ADMIN — PIPERACILLIN SODIUM AND TAZOBACTAM SODIUM 3.38 G: 3; .375 INJECTION, POWDER, LYOPHILIZED, FOR SOLUTION INTRAVENOUS at 08:10

## 2019-10-16 RX ADMIN — PIPERACILLIN SODIUM AND TAZOBACTAM SODIUM 3.38 G: 3; .375 INJECTION, POWDER, LYOPHILIZED, FOR SOLUTION INTRAVENOUS at 04:10

## 2019-10-16 RX ADMIN — ACETAMINOPHEN 650 MG: 325 TABLET ORAL at 08:10

## 2019-10-16 RX ADMIN — CARVEDILOL 12.5 MG: 12.5 TABLET, FILM COATED ORAL at 08:10

## 2019-10-16 RX ADMIN — LISINOPRIL 20 MG: 20 TABLET ORAL at 04:10

## 2019-10-16 NOTE — CONSULTS
[facility]  General Surgery  Consult Note    Patient Name: Katiuska Preston  MRN: [id number]  Code Status: Full Code  Admission Date: 10/14/2019  Hospital Length of Stay: 0 days  Attending Physician: Cecilio Ring MD  Primary Care Provider: Harpreet Rendon MD    Patient information was obtained from patient and ER records.     Consults  Subjective:     Principal Problem: Appendicitis    History of Present Illness: Patient is a 44-year-old female who began having right-sided abdominal pain Friday night.  This was all along her right side radiating around to her back.  She reports associated nausea but no vomiting.  She was able to eat over the weekend.  She reports a fever up to 100.1.  She denies any dysuria symptoms.  Over the weekend her pain continued to escalate and yesterday she had a sharp stabbing pain on her right side and came to the emergency room. Initial CT scan reading was no evidence of appendicitis but some perinephric stranding.  After the patient was sent home a second radiology report was received by the night time reading that said the patient had appendicitis.  She was called back to the emergency room and admitted for observation and surgery consultation.  Overall patient feels better but is still having some abdominal pain.    No current facility-administered medications on file prior to encounter.      Current Outpatient Medications on File Prior to Encounter   Medication Sig    albuterol (PROAIR HFA) 90 mcg/actuation inhaler Inhale 2 puffs into the lungs every 6 (six) hours as needed for Wheezing.    carvedilol (COREG) 12.5 MG tablet TAKE 1 TABLET BY MOUTH TWICE DAILY (Patient taking differently: Take 12.5 mg by mouth 2 (two) times daily. )    ferrous sulfate (FEOSOL) 325 mg (65 mg iron) Tab tablet Take 325 mg by mouth nightly.    fluticasone propionate (FLONASE) 50 mcg/actuation nasal spray 2 sprays by Each Nostril route daily as needed for Rhinitis.    lisinopril  (PRINIVIL,ZESTRIL) 20 MG tablet Take 20 mg by mouth nightly.    metoprolol succinate (TOPROL-XL) 25 MG 24 hr tablet Take 25 mg by mouth nightly.     omeprazole (PRILOSEC) 20 MG capsule Take 20 mg by mouth nightly.    tamsulosin (FLOMAX) 0.4 mg Cp24 Take 1 capsule (0.4 mg total) by mouth every evening.    trazodone (DESYREL) 100 MG tablet Take 100 mg by mouth every evening.         Review of patient's allergies indicates:  No Known Allergies    Past Medical History:   Diagnosis Date    Esophageal reflux     Gastroesophageal reflux    Generalized anxiety disorder     Anxiety, Generalized    Herniated disc     Hypertension     Lower extremity weakness     Morbid obesity     Obesity     Upper extremity weakness      Past Surgical History:   Procedure Laterality Date     SECTION      CHOLECYSTECTOMY       Family History     Family history is unknown by patient.        Tobacco Use    Smoking status: Never Smoker   Substance and Sexual Activity    Alcohol use: Yes     Alcohol/week: 0.8 standard drinks     Types: 1 Standard drinks or equivalent per week     Comment: rarely    Drug use: No    Sexual activity: Never     Birth control/protection: None     Review of Systems   Constitutional: Positive for fever. Negative for appetite change, chills and unexpected weight change.   HENT: Negative for hearing loss, rhinorrhea, sore throat and voice change.    Eyes: Negative for photophobia and visual disturbance.   Respiratory: Negative for cough, choking and shortness of breath.    Cardiovascular: Negative for chest pain, palpitations and leg swelling.   Gastrointestinal: Positive for abdominal pain and nausea. Negative for blood in stool, constipation, diarrhea and vomiting.   Endocrine: Negative for cold intolerance, heat intolerance, polydipsia and polyuria.   Musculoskeletal: Negative for arthralgias, back pain, joint swelling and neck stiffness.   Skin: Negative for color change, pallor and rash.    Neurological: Negative for dizziness, seizures, syncope and headaches.   Hematological: Negative for adenopathy. Does not bruise/bleed easily.   Psychiatric/Behavioral: Negative for agitation, behavioral problems and confusion.     Objective:     Vital Signs (Most Recent):  Temp: 98.1 °F (36.7 °C) (10/15/19 1715)  Pulse: 88 (10/15/19 1715)  Resp: 18 (10/15/19 1715)  BP: (!) 153/84 (10/15/19 1715)  SpO2: (!) 93 % (10/15/19 1715) Vital Signs (24h Range):  Temp:  [97.6 °F (36.4 °C)-98.6 °F (37 °C)] 98.1 °F (36.7 °C)  Pulse:  [] 88  Resp:  [16-20] 18  SpO2:  [8 %-100 %] 93 %  BP: (137-178)/(65-91) 153/84     Weight: (!) 151.6 kg (334 lb 3.5 oz)  Body mass index is 59.2 kg/m².    Physical Exam   Constitutional: She appears well-developed and well-nourished.  Non-toxic appearance. No distress.   HENT:   Head: Normocephalic and atraumatic. Head is without abrasion and without laceration.   Right Ear: External ear normal.   Left Ear: External ear normal.   Nose: Nose normal.   Mouth/Throat: Oropharynx is clear and moist.   Eyes: Pupils are equal, round, and reactive to light. EOM are normal.   Neck: Trachea normal. No tracheal deviation and normal range of motion present. No thyroid mass and no thyromegaly present.   Cardiovascular: Normal rate and regular rhythm.   Pulmonary/Chest: Effort normal. No accessory muscle usage. No tachypnea. No respiratory distress.   Abdominal: Soft. Normal appearance and bowel sounds are normal. She exhibits no distension and no mass. There is no hepatosplenomegaly. There is tenderness in the right upper quadrant and right lower quadrant. There is no rebound, no guarding, no tenderness at McBurney's point and negative Cm's sign. No hernia.       Right sided abd pain and flank pain, NTTP over McBurney's point   Lymphadenopathy:     She has no cervical adenopathy.     She has no axillary adenopathy.        Right: No inguinal adenopathy present.        Left: No inguinal adenopathy  present.   Neurological: She is alert. Coordination and gait normal.   Skin: Skin is warm and intact.   Psychiatric: She has a normal mood and affect. Her speech is normal and behavior is normal.       Significant Labs:  CBC:   Recent Labs   Lab 10/15/19  0448   WBC 9.83   RBC 4.01   HGB 10.2*   HCT 34.0*      MCV 85   MCH 25.4*   MCHC 30.0*     CMP:   Recent Labs   Lab 10/14/19  1817 10/15/19  0448    107   CALCIUM 8.9 8.4*   ALBUMIN 2.9*  --    PROT 8.0  --     137   K 3.7 3.5   CO2 30* 29   CL 97 99   BUN 10 8   CREATININE 1.0 0.9   ALKPHOS 73  --    ALT 25  --    AST 26  --    BILITOT 0.8  --        Significant Diagnostics:  I have reviewed all pertinent imaging results/findings within the past 24 hours.       CT A/P -   Impression       1. Right perinephric edema, with trace fluid in the right anterior pararenal space and Lees's pouch, nonspecific. Please correlate with urinalysis, and any clinical suspicion of urinary tract infection and or pyelonephritis.  2. Normal appendix.  3. Additional observations as above.  4. Please note that there was a report for this exam issued by Dr. Black, after my initial report.  This addendum contains my initial report.       I reviewed the CT scan with a different [facility] radiologist.  He agrees that the appendix is completely normal and there is no evidence of acute appendicitis.    Assessment/Plan:     Right sided abdominal pain  Patient does not have evidence of appendicitis by CT scan or by clinical exam.  I reviewed the CT scan with a different [facility] radiologist.  He agrees that the appendix is completely normal.    Okay to start diet and discharge home when medically cleared.  No surgical plans.        VTE Risk Mitigation (From admission, onward)         Ordered     Place sequential compression device  Until discontinued      10/14/19 2304     Reason for No Pharmacological VTE Prophylaxis  Once      10/14/19 2304     IP VTE HIGH RISK PATIENT  Once       10/14/19 1712                Thank you for your consult. I will sign off. Please contact us if you have any additional questions.    Temitope Ramirez MD  General Surgery  [facility]

## 2019-10-16 NOTE — PROGRESS NOTES
[facility] Medicine  Progress Note    Patient Name: Katiuska Preston  MRN: [id number]  Admission Date: 10/14/2019   Date of service:  10/15/2019  Attending Physician: Dr Ring  Primary Care Provider: Harpreet Rendon MD  Late entry-patient was personally seen and examined on 10/15/2019 approximately 1:30pm     Subjective:     Principal Problem:Appendicitis    Chief Complaint:   Chief Complaint   Patient presents with    Abdominal Pain     Right sided        HPI/hospital course: The patient is a 44-year-old morbidly obese female with history of asthma, hypertension, GERD, cholecystectomy. She presented to the ED with progressive sharp right upper quadrant abdominal pain, worse with deep breathing, associated with nausea and fever for 3-4 days. She states that pain had been severe and persistent today. She had an episode of diarrhea PTA. She denies shortness of breath, chest pain, or urinary symptoms. Per ED physician, she is noted to have leukocytosis, 13.69. CT abdomen and pelvis reports are somewhat conflicting, acute appendicitis VS right perinephric edema. Patient was admitted to the hospital for workup and treatment including surgical evaluation.    Interval history:  Patient reports persistent right-sided lower chest/upper abdominal pain, constant timing, mild to moderate intensity, worse with deep inspiration or cough.  No nausea or vomiting.  No fever or chills.  No shortness of breath.  No diarrhea or bleeding.    Physical exam:  Vital signs reviewed  General:  Comfortable appearing, nontoxic, morbidly obese  ENT:  Moist mucous membranes  Pulmonary:  Comfortable work of breathing  Cardiac:  2+ radial pulses, extremities are warm and well perfused  GI:  Abdomen soft and nontender    Laboratory data:  Hematocrit 34    Assessment/Plan:     Will discuss plan with surgery.  The patient's physical exam and symptoms are not completely consistent with acute appendicitis.  I suspect the patient  has musculoskeletal pain of chest wall.  The patient may be able to be discharged in next 24 hr with pain control regimen.  She will need outpatient follow-up for pulmonary nodules.  This patient is moderate risk secondary to prescription drug management    Pulmonary nodules  Incidental findings on chest CTA  The patient was informed. Instructed to follow up with her PCP.      HTN (hypertension)  Monitor  Hydralazine IV PRN for SBP above 170      VTE Risk Mitigation (From admission, onward)         Ordered     Place sequential compression device  Until discontinued      10/14/19 2304     Reason for No Pharmacological VTE Prophylaxis  Once      10/14/19 2304     IP VTE HIGH RISK PATIENT  Once      10/14/19 2304                Cecilio Ring MD  Department of Hospital Medicine   [facility]

## 2019-10-16 NOTE — HPI
Patient is a 44-year-old female who began having right-sided abdominal pain Friday night.  This was all along her right side radiating around to her back.  She reports associated nausea but no vomiting.  She was able to eat over the weekend.  She reports a fever up to 100.1.  She denies any dysuria symptoms.  Over the weekend her pain continued to escalate and yesterday she had a sharp stabbing pain on her right side and came to the emergency room. Initial CT scan reading was no evidence of appendicitis but some perinephric stranding.  After the patient was sent home a second radiology report was received by the night time reading that said the patient had appendicitis.  She was called back to the emergency room and admitted for observation and surgery consultation.  Overall patient feels better but is still having some abdominal pain.

## 2019-10-16 NOTE — NURSING
Discharged to home as per MD orders. DC orders and meds explained to & given to patient. Patient called [facility] transport for ride home. Ambulated to front of hospital with nurse accompanying and released to vehicle without incident.

## 2019-10-16 NOTE — ASSESSMENT & PLAN NOTE
Patient does not have evidence of appendicitis by CT scan or by clinical exam.  I reviewed the CT scan with a different [facility] radiologist.  He agrees that the appendix is completely normal.    Okay to start diet and discharge home when medically cleared.  No surgical plans.

## 2019-10-16 NOTE — SUBJECTIVE & OBJECTIVE
No current facility-administered medications on file prior to encounter.      Current Outpatient Medications on File Prior to Encounter   Medication Sig    albuterol (PROAIR HFA) 90 mcg/actuation inhaler Inhale 2 puffs into the lungs every 6 (six) hours as needed for Wheezing.    carvedilol (COREG) 12.5 MG tablet TAKE 1 TABLET BY MOUTH TWICE DAILY (Patient taking differently: Take 12.5 mg by mouth 2 (two) times daily. )    ferrous sulfate (FEOSOL) 325 mg (65 mg iron) Tab tablet Take 325 mg by mouth nightly.    fluticasone propionate (FLONASE) 50 mcg/actuation nasal spray 2 sprays by Each Nostril route daily as needed for Rhinitis.    lisinopril (PRINIVIL,ZESTRIL) 20 MG tablet Take 20 mg by mouth nightly.    metoprolol succinate (TOPROL-XL) 25 MG 24 hr tablet Take 25 mg by mouth nightly.     omeprazole (PRILOSEC) 20 MG capsule Take 20 mg by mouth nightly.    tamsulosin (FLOMAX) 0.4 mg Cp24 Take 1 capsule (0.4 mg total) by mouth every evening.    trazodone (DESYREL) 100 MG tablet Take 100 mg by mouth every evening.         Review of patient's allergies indicates:  No Known Allergies    Past Medical History:   Diagnosis Date    Esophageal reflux     Gastroesophageal reflux    Generalized anxiety disorder     Anxiety, Generalized    Herniated disc     Hypertension     Lower extremity weakness     Morbid obesity     Obesity     Upper extremity weakness      Past Surgical History:   Procedure Laterality Date     SECTION      CHOLECYSTECTOMY       Family History     Family history is unknown by patient.        Tobacco Use    Smoking status: Never Smoker   Substance and Sexual Activity    Alcohol use: Yes     Alcohol/week: 0.8 standard drinks     Types: 1 Standard drinks or equivalent per week     Comment: rarely    Drug use: No    Sexual activity: Never     Birth control/protection: None     Review of Systems   Constitutional: Positive for fever. Negative for appetite change, chills and  unexpected weight change.   HENT: Negative for hearing loss, rhinorrhea, sore throat and voice change.    Eyes: Negative for photophobia and visual disturbance.   Respiratory: Negative for cough, choking and shortness of breath.    Cardiovascular: Negative for chest pain, palpitations and leg swelling.   Gastrointestinal: Positive for abdominal pain and nausea. Negative for blood in stool, constipation, diarrhea and vomiting.   Endocrine: Negative for cold intolerance, heat intolerance, polydipsia and polyuria.   Musculoskeletal: Negative for arthralgias, back pain, joint swelling and neck stiffness.   Skin: Negative for color change, pallor and rash.   Neurological: Negative for dizziness, seizures, syncope and headaches.   Hematological: Negative for adenopathy. Does not bruise/bleed easily.   Psychiatric/Behavioral: Negative for agitation, behavioral problems and confusion.     Objective:     Vital Signs (Most Recent):  Temp: 98.1 °F (36.7 °C) (10/15/19 1715)  Pulse: 88 (10/15/19 1715)  Resp: 18 (10/15/19 1715)  BP: (!) 153/84 (10/15/19 1715)  SpO2: (!) 93 % (10/15/19 1715) Vital Signs (24h Range):  Temp:  [97.6 °F (36.4 °C)-98.6 °F (37 °C)] 98.1 °F (36.7 °C)  Pulse:  [] 88  Resp:  [16-20] 18  SpO2:  [8 %-100 %] 93 %  BP: (137-178)/(65-91) 153/84     Weight: (!) 151.6 kg (334 lb 3.5 oz)  Body mass index is 59.2 kg/m².    Physical Exam   Constitutional: She appears well-developed and well-nourished.  Non-toxic appearance. No distress.   HENT:   Head: Normocephalic and atraumatic. Head is without abrasion and without laceration.   Right Ear: External ear normal.   Left Ear: External ear normal.   Nose: Nose normal.   Mouth/Throat: Oropharynx is clear and moist.   Eyes: Pupils are equal, round, and reactive to light. EOM are normal.   Neck: Trachea normal. No tracheal deviation and normal range of motion present. No thyroid mass and no thyromegaly present.   Cardiovascular: Normal rate and regular rhythm.    Pulmonary/Chest: Effort normal. No accessory muscle usage. No tachypnea. No respiratory distress.   Abdominal: Soft. Normal appearance and bowel sounds are normal. She exhibits no distension and no mass. There is no hepatosplenomegaly. There is tenderness in the right upper quadrant and right lower quadrant. There is no rebound, no guarding, no tenderness at McBurney's point and negative Cm's sign. No hernia.       Right sided abd pain and flank pain, NTTP over McBurney's point   Lymphadenopathy:     She has no cervical adenopathy.     She has no axillary adenopathy.        Right: No inguinal adenopathy present.        Left: No inguinal adenopathy present.   Neurological: She is alert. Coordination and gait normal.   Skin: Skin is warm and intact.   Psychiatric: She has a normal mood and affect. Her speech is normal and behavior is normal.       Significant Labs:  CBC:   Recent Labs   Lab 10/15/19  0448   WBC 9.83   RBC 4.01   HGB 10.2*   HCT 34.0*      MCV 85   MCH 25.4*   MCHC 30.0*     CMP:   Recent Labs   Lab 10/14/19  1817 10/15/19  0448    107   CALCIUM 8.9 8.4*   ALBUMIN 2.9*  --    PROT 8.0  --     137   K 3.7 3.5   CO2 30* 29   CL 97 99   BUN 10 8   CREATININE 1.0 0.9   ALKPHOS 73  --    ALT 25  --    AST 26  --    BILITOT 0.8  --        Significant Diagnostics:  I have reviewed all pertinent imaging results/findings within the past 24 hours.       CT A/P -   Impression       1. Right perinephric edema, with trace fluid in the right anterior pararenal space and Lees's pouch, nonspecific. Please correlate with urinalysis, and any clinical suspicion of urinary tract infection and or pyelonephritis.  2. Normal appendix.  3. Additional observations as above.  4. Please note that there was a report for this exam issued by Dr. Black, after my initial report.  This addendum contains my initial report.       I reviewed the CT scan with a different [facility] radiologist.  He agrees that the  appendix is completely normal and there is no evidence of acute appendicitis.

## 2019-10-16 NOTE — PLAN OF CARE
Plan of care discussed with patient, demonstrates understanding. Blood pressure elevated but meds scheduled for hypertension. Voices no complaints. Patient only upset she didn't get to go home like she was told. Abx given as scheduled. Showered.

## 2019-10-16 NOTE — PLAN OF CARE
10/16/19 0751   PRE-TX-O2   O2 Device (Oxygen Therapy) room air   SpO2 95 %   Pulse 88   Resp 20   Temp 98.1 °F (36.7 °C)   BP (!) 161/89

## 2019-10-16 NOTE — PLAN OF CARE
No falls or injuries this hospital stay. Denies any pain or SOB. In good spirits. Ready to be discharged to home.

## 2019-10-16 NOTE — PLAN OF CARE
10/15/19 2130   Patient Assessment/Suction   Level of Consciousness (AVPU) alert   Respiratory Effort Unlabored   Expansion/Accessory Muscles/Retractions no use of accessory muscles   All Lung Fields Breath Sounds clear   Rhythm/Pattern, Respiratory unlabored   Cough Frequency infrequent   Cough Type no productive sputum   PRE-TX-O2   O2 Device (Oxygen Therapy) room air   SpO2 97 %   Pulse 86   Resp 18   Aerosol Therapy   $ Aerosol Therapy Charges PRN treatment not required   Respiratory Treatment Status (SVN) PRN treatment not required

## 2019-10-19 NOTE — DISCHARGE SUMMARY
Counts include 234 beds at the [facility] Medicine  Discharge Summary      Patient Name: Katiuska Preston  MRN: [id number]  Admission Date: 10/14/2019  Hospital Length of Stay: 0 days  Discharge Date and Time: 10/16/2019  5:45 PM  Discharging Provider: Cecilio Ring MD  Primary Care Provider: Harpreet Rendon MD         HPI/Hospital Course: The patient is a 44-year-old morbidly obese female with history of asthma, hypertension, GERD, cholecystectomy. She presented to the ED with progressive sharp right upper quadrant abdominal pain, worse with deep breathing, associated with nausea and fever for 3-4 days. She states that pain had been severe and persistent today. She had an episode of diarrhea PTA. She denies shortness of breath, chest pain, or urinary symptoms. Per ED physician, she is noted to have leukocytosis, 13.69. CT abdomen and pelvis reports are somewhat conflicting, acute appendicitis VS right perinephric edema. Patient was admitted to the hospital for workup and treatment including surgical evaluation.  She received supportive care including antiemetics and pain control.  She is evaluated by General surgery who ruled her out for acute appendicitis.  Therefore the patient was discharged home with short course of treatment for musculoskeletal pain.  The patient was in understanding and agreement with discharge plan. She will follow up with PCP as outpatient in next 1-2 weeks. She will need outpatient follow-up for pulmonary nodules.    Discharge physical exam:  General:  Comfortable appearing, nontoxic, morbidly obese  ENT:  Moist mucous membranes  Pulmonary:  Comfortable work of breathing  Cardiac:  2+ radial pulses, extremities are warm and well perfused  GI:  Abdomen soft and nontender     Discharge diagnoses:  Musculoskeletal chest wall pain  Pulmonary nodules  HTN (hypertension)  Obesity  BPH  GERD  Chronic Allergic rhinitis    Consults: Surgery Dr Ramirez    Final Active Diagnoses:    Diagnosis Date Noted  POA    Right sided abdominal pain [R10.9] 10/15/2019 Yes    Pulmonary nodules [R91.8] 10/14/2019 Yes    HTN (hypertension) [I10] 11/25/2014 Yes     Chronic      Problems Resolved During this Admission:    Diagnosis Date Noted Date Resolved POA    PRINCIPAL PROBLEM:  Appendicitis [K37] 10/14/2019 10/15/2019 Yes       Discharged Condition: good    Disposition: Home or Self Care    Follow Up:  Follow-up Information     Harpreet Rendon MD. Schedule an appointment as soon as possible for a visit in 2 days.    Specialty:  Internal Medicine  Contact information:  [address] [place] [address] [address] [id number]  [phone]             Harpreet Rendon MD In 1 week.    Specialty:  Internal Medicine  Contact information:  [address] [place] [address] [address] [id number]  [phone]                 Patient Instructions:      Diet Cardiac     Notify your health care provider if you experience any of the following:  severe uncontrolled pain     Notify your health care provider if you experience any of the following:  persistent nausea and vomiting or diarrhea     Activity as tolerated       Pending Diagnostic Studies:     None         Medications:  Reconciled Home Medications:      Medication List      CONTINUE taking these medications    carvedilol 12.5 MG tablet  Commonly known as:  COREG  TAKE 1 TABLET BY MOUTH TWICE DAILY     ferrous sulfate 325 mg (65 mg iron) Tab tablet  Commonly known as:  FEOSOL  Take 325 mg by mouth nightly.     fluticasone propionate 50 mcg/actuation nasal spray  Commonly known as:  FLONASE  2 sprays by Each Nostril route daily as needed for Rhinitis.     lisinopril 20 MG tablet  Commonly known as:  PRINIVIL,ZESTRIL  Take 20 mg by mouth nightly.     metoprolol succinate 25 MG 24 hr tablet  Commonly known as:  TOPROL-XL  Take 25 mg by mouth nightly.     omeprazole 20 MG capsule  Commonly known as:  PRILOSEC  Take 20 mg by mouth nightly.     PROAIR HFA 90 mcg/actuation inhaler  Generic drug:   albuterol  Inhale 2 puffs into the lungs every 6 (six) hours as needed for Wheezing.     tamsulosin 0.4 mg Cap  Commonly known as:  FLOMAX  Take 1 capsule (0.4 mg total) by mouth every evening.     traZODone 100 MG tablet  Commonly known as:  DESYREL  Take 100 mg by mouth every evening.            Indwelling Lines/Drains at time of discharge:   Lines/Drains/Airways     None                 Time spent on the discharge of patient: 32 minutes  Patient was seen and examined on the date of discharge and determined to be suitable for discharge.         Cecilio Ring MD  Department of Hospital Medicine  [facility]

## 2020-07-11 ENCOUNTER — HOSPITAL ENCOUNTER (EMERGENCY)
Facility: HOSPITAL | Age: 45
Discharge: HOME | End: 2020-07-11
Attending: EMERGENCY MEDICINE
Payer: MEDICAID

## 2020-07-11 VITALS
HEIGHT: 63 IN | OXYGEN SATURATION: 100 % | DIASTOLIC BLOOD PRESSURE: 78 MMHG | BODY MASS INDEX: 51.91 KG/M2 | HEART RATE: 99 BPM | WEIGHT: 293 LBS | SYSTOLIC BLOOD PRESSURE: 156 MMHG | TEMPERATURE: 98 F | RESPIRATION RATE: 22 BRPM

## 2020-07-11 DIAGNOSIS — R06.00 DYSPNEA: ICD-10-CM

## 2020-07-11 DIAGNOSIS — J45.909 ASTHMA, UNSPECIFIED ASTHMA SEVERITY, UNSPECIFIED WHETHER COMPLICATED, UNSPECIFIED WHETHER PERSISTENT: ICD-10-CM

## 2020-07-11 DIAGNOSIS — R05.9 COUGH: Primary | ICD-10-CM

## 2020-07-11 DIAGNOSIS — I10 UNCONTROLLED HYPERTENSION: ICD-10-CM

## 2020-07-11 LAB
ALBUMIN SERPL BCP-MCNC: 3.1 G/DL (ref 3.5–5.2)
ALP SERPL-CCNC: 93 U/L (ref 55–135)
ALT SERPL W/O P-5'-P-CCNC: 35 U/L (ref 10–44)
ANION GAP SERPL CALC-SCNC: 11 MMOL/L (ref 8–16)
AST SERPL-CCNC: 42 U/L (ref 10–40)
BASOPHILS # BLD AUTO: 0.04 K/UL (ref 0–0.2)
BASOPHILS NFR BLD: 0.4 % (ref 0–1.9)
BILIRUB SERPL-MCNC: 0.4 MG/DL (ref 0.1–1)
BNP SERPL-MCNC: 62 PG/ML (ref 0–99)
BUN SERPL-MCNC: 13 MG/DL (ref 6–20)
CALCIUM SERPL-MCNC: 9.1 MG/DL (ref 8.7–10.5)
CHLORIDE SERPL-SCNC: 98 MMOL/L (ref 95–110)
CO2 SERPL-SCNC: 30 MMOL/L (ref 23–29)
CREAT SERPL-MCNC: 1 MG/DL (ref 0.5–1.4)
DIFFERENTIAL METHOD: ABNORMAL
EOSINOPHIL # BLD AUTO: 0.2 K/UL (ref 0–0.5)
EOSINOPHIL NFR BLD: 1.8 % (ref 0–8)
ERYTHROCYTE [DISTWIDTH] IN BLOOD BY AUTOMATED COUNT: 14.9 % (ref 11.5–14.5)
EST. GFR  (AFRICAN AMERICAN): >60 ML/MIN/1.73 M^2
EST. GFR  (NON AFRICAN AMERICAN): >60 ML/MIN/1.73 M^2
GLUCOSE SERPL-MCNC: 108 MG/DL (ref 70–110)
HCT VFR BLD AUTO: 34.5 % (ref 37–48.5)
HGB BLD-MCNC: 10.3 G/DL (ref 12–16)
IMM GRANULOCYTES # BLD AUTO: 0.04 K/UL (ref 0–0.04)
IMM GRANULOCYTES NFR BLD AUTO: 0.4 % (ref 0–0.5)
LYMPHOCYTES # BLD AUTO: 2.2 K/UL (ref 1–4.8)
LYMPHOCYTES NFR BLD: 20.6 % (ref 18–48)
MCH RBC QN AUTO: 24.5 PG (ref 27–31)
MCHC RBC AUTO-ENTMCNC: 29.9 G/DL (ref 32–36)
MCV RBC AUTO: 82 FL (ref 82–98)
MONOCYTES # BLD AUTO: 0.7 K/UL (ref 0.3–1)
MONOCYTES NFR BLD: 6 % (ref 4–15)
NEUTROPHILS # BLD AUTO: 7.7 K/UL (ref 1.8–7.7)
NEUTROPHILS NFR BLD: 70.8 % (ref 38–73)
NRBC BLD-RTO: 0 /100 WBC
PLATELET # BLD AUTO: 419 K/UL (ref 150–350)
PMV BLD AUTO: 10.2 FL (ref 9.2–12.9)
POTASSIUM SERPL-SCNC: 3.3 MMOL/L (ref 3.5–5.1)
PROT SERPL-MCNC: 8.2 G/DL (ref 6–8.4)
RBC # BLD AUTO: 4.2 M/UL (ref 4–5.4)
SARS-COV-2 RDRP RESP QL NAA+PROBE: NEGATIVE
SODIUM SERPL-SCNC: 139 MMOL/L (ref 136–145)
WBC # BLD AUTO: 10.82 K/UL (ref 3.9–12.7)

## 2020-07-11 PROCEDURE — 83880 ASSAY OF NATRIURETIC PEPTIDE: CPT

## 2020-07-11 PROCEDURE — 96374 THER/PROPH/DIAG INJ IV PUSH: CPT

## 2020-07-11 PROCEDURE — 93005 ELECTROCARDIOGRAM TRACING: CPT | Performed by: INTERNAL MEDICINE

## 2020-07-11 PROCEDURE — 63600175 PHARM REV CODE 636 W HCPCS: Performed by: EMERGENCY MEDICINE

## 2020-07-11 PROCEDURE — 94640 AIRWAY INHALATION TREATMENT: CPT

## 2020-07-11 PROCEDURE — 99900035 HC TECH TIME PER 15 MIN (STAT)

## 2020-07-11 PROCEDURE — 99285 EMERGENCY DEPT VISIT HI MDM: CPT | Mod: 25

## 2020-07-11 PROCEDURE — 80053 COMPREHEN METABOLIC PANEL: CPT

## 2020-07-11 PROCEDURE — U0002 COVID-19 LAB TEST NON-CDC: HCPCS

## 2020-07-11 PROCEDURE — 25000242 PHARM REV CODE 250 ALT 637 W/ HCPCS: Performed by: EMERGENCY MEDICINE

## 2020-07-11 PROCEDURE — 25000003 PHARM REV CODE 250: Performed by: EMERGENCY MEDICINE

## 2020-07-11 PROCEDURE — 85025 COMPLETE CBC W/AUTO DIFF WBC: CPT

## 2020-07-11 RX ORDER — ALBUTEROL SULFATE 90 UG/1
2 AEROSOL, METERED RESPIRATORY (INHALATION) EVERY 6 HOURS PRN
Status: DISCONTINUED | OUTPATIENT
Start: 2020-07-11 | End: 2020-07-11 | Stop reason: HOSPADM

## 2020-07-11 RX ORDER — HYDRALAZINE HYDROCHLORIDE 20 MG/ML
10 INJECTION INTRAMUSCULAR; INTRAVENOUS
Status: COMPLETED | OUTPATIENT
Start: 2020-07-11 | End: 2020-07-11

## 2020-07-11 RX ORDER — CARVEDILOL 12.5 MG/1
12.5 TABLET ORAL 2 TIMES DAILY WITH MEALS
Status: ON HOLD | COMMUNITY
End: 2021-05-04 | Stop reason: SDUPTHER

## 2020-07-11 RX ORDER — METOPROLOL SUCCINATE 25 MG/1
25 TABLET, EXTENDED RELEASE ORAL 2 TIMES DAILY
Status: ON HOLD | COMMUNITY
End: 2021-05-04 | Stop reason: HOSPADM

## 2020-07-11 RX ORDER — DOXYCYCLINE 100 MG/1
100 CAPSULE ORAL 2 TIMES DAILY
Qty: 20 CAPSULE | Refills: 0 | Status: SHIPPED | OUTPATIENT
Start: 2020-07-11 | End: 2020-07-21

## 2020-07-11 RX ORDER — DOXYCYCLINE 100 MG/1
100 CAPSULE ORAL 2 TIMES DAILY
Qty: 20 CAPSULE | Refills: 0 | Status: SHIPPED | OUTPATIENT
Start: 2020-07-11 | End: 2020-07-11 | Stop reason: SDUPTHER

## 2020-07-11 RX ORDER — FAMOTIDINE 20 MG/1
20 TABLET, FILM COATED ORAL 2 TIMES DAILY
COMMUNITY

## 2020-07-11 RX ORDER — POTASSIUM CHLORIDE 20 MEQ/1
40 TABLET, EXTENDED RELEASE ORAL ONCE
Status: COMPLETED | OUTPATIENT
Start: 2020-07-11 | End: 2020-07-11

## 2020-07-11 RX ADMIN — ALBUTEROL SULFATE 2 PUFF: 90 AEROSOL, METERED RESPIRATORY (INHALATION) at 01:07

## 2020-07-11 RX ADMIN — POTASSIUM CHLORIDE 40 MEQ: 1500 TABLET, EXTENDED RELEASE ORAL at 03:07

## 2020-07-11 RX ADMIN — HYDRALAZINE HYDROCHLORIDE 10 MG: 20 INJECTION INTRAMUSCULAR; INTRAVENOUS at 12:07

## 2020-07-11 NOTE — ED NOTES
Pt c/o cough/sob x 2 days.  Cough is non-productive.  States she had pneumonia in October and this feels similar. Denies fever/chills.  HTN noted, pt states she did not take her BP meds this am.  No signs of distress noted.

## 2020-07-11 NOTE — DISCHARGE INSTRUCTIONS
Adhere to a low-sodium diet.  Encourage oral fluids.  Continue blood pressure medication today.  Also continue your asthma medications today.  Take antibiotics as directed.  Follow-up with your primary care physician within the next 2-3 days.  Return to the ER as needed.

## 2020-07-11 NOTE — ED PROVIDER NOTES
Encounter Date: 2020       History     Chief Complaint   Patient presents with    Cough     45-year-old female who has a history of hypertension, obesity, presents emergency room with complaints of having shortness of breath of couple days duration.  The patient had not taken her antihypertensive medication today and presented with a systolic pressure over 200.  She denies any definite fever.  No hemoptysis.  She admits to wheezing.  She does not smoke.  No history of CHF.  No complaints of any back pain or abdominal pain.  Patient is not aware of any COVID exposure.        Review of patient's allergies indicates:  No Known Allergies  Past Medical History:   Diagnosis Date    Esophageal reflux     Gastroesophageal reflux    Generalized anxiety disorder     Anxiety, Generalized    Herniated disc     Hypertension     Lower extremity weakness     Morbid obesity     Obesity     Upper extremity weakness      Past Surgical History:   Procedure Laterality Date     SECTION      CHOLECYSTECTOMY       Family History   Family history unknown: Yes     Social History     Tobacco Use    Smoking status: Never Smoker   Substance Use Topics    Alcohol use: Yes     Alcohol/week: 0.8 standard drinks     Types: 1 Standard drinks or equivalent per week     Comment: rarely    Drug use: No     Review of Systems   Constitutional: Negative for activity change, appetite change, chills and fever.   HENT: Positive for congestion. Negative for sore throat.    Respiratory: Positive for shortness of breath and wheezing.    Gastrointestinal: Negative for abdominal pain, nausea and vomiting.   Genitourinary: Negative for difficulty urinating.   Musculoskeletal: Negative for back pain.   Skin: Negative for rash.   Neurological: Negative for weakness.   Hematological: Does not bruise/bleed easily.   All other systems reviewed and are negative.      Physical Exam     Initial Vitals [20 1143]   BP Pulse Resp Temp SpO2    -- 104 (!) 22 98.3 °F (36.8 °C) 96 %      MAP       --         Physical Exam    Constitutional: She appears well-developed and well-nourished. She is not diaphoretic. No distress.   HENT:   Head: Normocephalic and atraumatic.   Nose: Nose normal.   Mouth/Throat: Oropharynx is clear and moist. No oropharyngeal exudate.   Eyes: Conjunctivae are normal. Pupils are equal, round, and reactive to light. Right eye exhibits no discharge. Left eye exhibits no discharge.   Neck: Normal range of motion. Neck supple. No JVD present.   Cardiovascular: Normal rate, regular rhythm, normal heart sounds and intact distal pulses. Exam reveals no gallop and no friction rub.    No murmur heard.  Pulmonary/Chest: No respiratory distress. She has wheezes. She has no rhonchi. She has no rales.   Abdominal: Soft. Bowel sounds are normal. She exhibits no distension and no mass. There is no abdominal tenderness. There is no rebound and no guarding.   Obese   Musculoskeletal: Normal range of motion. No tenderness or edema.   Lymphadenopathy:     She has no cervical adenopathy.   Neurological: She is alert and oriented to person, place, and time. She has normal strength. GCS score is 15. GCS eye subscore is 4. GCS verbal subscore is 5. GCS motor subscore is 6.   Skin: Skin is warm and dry. Capillary refill takes less than 2 seconds. No rash noted. No erythema. No pallor.   Psychiatric: She has a normal mood and affect. Her behavior is normal. Judgment and thought content normal.         ED Course   Procedures  Labs Reviewed   CBC W/ AUTO DIFFERENTIAL   COMPREHENSIVE METABOLIC PANEL   SARS-COV-2 RNA AMPLIFICATION, QUAL   B-TYPE NATRIURETIC PEPTIDE          Imaging Results    None                       Attending Attestation:             Attending ED Notes:   This 45-year-old female who has a history of asthma who had presented to emergency room with complaints of having some cough and shortness of breath, but no fever.  She has good oxygen  saturation 100% on room air.  The patient was noted to have had a significantly elevated blood pressure with a systolic over 200 for she had not taken her routine medication this morning.  The patient's chest was clear to auscultation.  Chest x-ray was normal.  During the ED course she did receive albuterol and Atrovent and peak flows were 200 prior to treatment and 220 afterwards.  The patient was initially given hydralazine 10 mg IV and later labetalol 10 mg IV with improvement in her blood pressure.  The systolic dropped to 160s and diastolic in the 80s.  COVID screen was negative.    At this time the patient does not warrant hospitalization.  She will be able to be discharged and is to continue her maintenance blood pressure medication.  She is to also continue her asthma medications.  At this point the patient did not appear to be enough respiratory distress to warrant any steroids.  She will though be covered with antibiotics prophylactically.                        Clinical Impression:       ICD-10-CM ICD-9-CM   1. Cough  R05 786.2   2. Dyspnea  R06.00 786.09   3. Asthma, unspecified asthma severity, unspecified whether complicated, unspecified whether persistent  J45.909 493.90   4. Uncontrolled hypertension  I10 401.9                                Dilan Duffy Jr., MD  07/12/20 1514

## 2021-04-16 ENCOUNTER — IMMUNIZATION (OUTPATIENT)
Dept: PRIMARY CARE CLINIC | Facility: CLINIC | Age: 46
End: 2021-04-16
Payer: MEDICAID

## 2021-04-16 ENCOUNTER — TELEPHONE (OUTPATIENT)
Dept: ORTHOPEDICS | Facility: CLINIC | Age: 46
End: 2021-04-16

## 2021-04-16 DIAGNOSIS — Z23 NEED FOR VACCINATION: Primary | ICD-10-CM

## 2021-04-16 DIAGNOSIS — M25.561 RIGHT KNEE PAIN, UNSPECIFIED CHRONICITY: Primary | ICD-10-CM

## 2021-04-16 PROCEDURE — 0001A COVID-19, MRNA, LNP-S, PF, 30 MCG/0.3 ML DOSE VACCINE: CPT | Mod: CV19,S$GLB,, | Performed by: FAMILY MEDICINE

## 2021-04-16 PROCEDURE — 91300 COVID-19, MRNA, LNP-S, PF, 30 MCG/0.3 ML DOSE VACCINE: CPT | Mod: S$GLB,,, | Performed by: FAMILY MEDICINE

## 2021-04-16 PROCEDURE — 0001A COVID-19, MRNA, LNP-S, PF, 30 MCG/0.3 ML DOSE VACCINE: ICD-10-PCS | Mod: CV19,S$GLB,, | Performed by: FAMILY MEDICINE

## 2021-04-16 PROCEDURE — 91300 COVID-19, MRNA, LNP-S, PF, 30 MCG/0.3 ML DOSE VACCINE: ICD-10-PCS | Mod: S$GLB,,, | Performed by: FAMILY MEDICINE

## 2021-04-26 ENCOUNTER — TELEPHONE (OUTPATIENT)
Dept: ORTHOPEDICS | Facility: CLINIC | Age: 46
End: 2021-04-26

## 2021-05-01 ENCOUNTER — HOSPITAL ENCOUNTER (INPATIENT)
Facility: HOSPITAL | Age: 46
LOS: 3 days | Discharge: HOME | DRG: 175 | End: 2021-05-04
Attending: EMERGENCY MEDICINE | Admitting: INTERNAL MEDICINE
Payer: MEDICAID

## 2021-05-01 DIAGNOSIS — I10 ESSENTIAL HYPERTENSION: ICD-10-CM

## 2021-05-01 DIAGNOSIS — R06.00 DYSPNEA: ICD-10-CM

## 2021-05-01 DIAGNOSIS — E66.2 OBESITY HYPOVENTILATION SYNDROME: ICD-10-CM

## 2021-05-01 DIAGNOSIS — J96.02 ACUTE HYPERCAPNIC RESPIRATORY FAILURE: ICD-10-CM

## 2021-05-01 DIAGNOSIS — I26.99 PULMONARY EMBOLISM, UNSPECIFIED CHRONICITY, UNSPECIFIED PULMONARY EMBOLISM TYPE, UNSPECIFIED WHETHER ACUTE COR PULMONALE PRESENT: Primary | ICD-10-CM

## 2021-05-01 DIAGNOSIS — I26.99 ACUTE PULMONARY EMBOLISM, UNSPECIFIED PULMONARY EMBOLISM TYPE, UNSPECIFIED WHETHER ACUTE COR PULMONALE PRESENT: ICD-10-CM

## 2021-05-01 DIAGNOSIS — R06.00 DYSPNEA, UNSPECIFIED TYPE: ICD-10-CM

## 2021-05-01 DIAGNOSIS — R06.02 SOB (SHORTNESS OF BREATH) ON EXERTION: ICD-10-CM

## 2021-05-01 DIAGNOSIS — D64.9 ACUTE ON CHRONIC ANEMIA: ICD-10-CM

## 2021-05-01 DIAGNOSIS — I26.09 ACUTE PULMONARY EMBOLISM WITH ACUTE COR PULMONALE, UNSPECIFIED PULMONARY EMBOLISM TYPE: ICD-10-CM

## 2021-05-01 LAB
ABO + RH BLD: NORMAL
ALBUMIN SERPL BCP-MCNC: 2.9 G/DL (ref 3.5–5.2)
ALLENS TEST: ABNORMAL
ALP SERPL-CCNC: 75 U/L (ref 55–135)
ALT SERPL W/O P-5'-P-CCNC: 28 U/L (ref 10–44)
ANION GAP SERPL CALC-SCNC: 5 MMOL/L (ref 8–16)
AST SERPL-CCNC: 31 U/L (ref 10–40)
BASOPHILS # BLD AUTO: 0.02 K/UL (ref 0–0.2)
BASOPHILS NFR BLD: 0.2 % (ref 0–1.9)
BILIRUB SERPL-MCNC: 0.7 MG/DL (ref 0.1–1)
BLD GP AB SCN CELLS X3 SERPL QL: NORMAL
BLD PROD TYP BPU: NORMAL
BLD PROD TYP BPU: NORMAL
BLOOD UNIT EXPIRATION DATE: NORMAL
BLOOD UNIT EXPIRATION DATE: NORMAL
BLOOD UNIT TYPE CODE: 6200
BLOOD UNIT TYPE CODE: 6200
BLOOD UNIT TYPE: NORMAL
BLOOD UNIT TYPE: NORMAL
BNP SERPL-MCNC: 109 PG/ML (ref 0–99)
BUN SERPL-MCNC: 13 MG/DL (ref 6–20)
CALCIUM SERPL-MCNC: 8.4 MG/DL (ref 8.7–10.5)
CHLORIDE SERPL-SCNC: 100 MMOL/L (ref 95–110)
CO2 SERPL-SCNC: 31 MMOL/L (ref 23–29)
CODING SYSTEM: NORMAL
CODING SYSTEM: NORMAL
CREAT SERPL-MCNC: 1 MG/DL (ref 0.5–1.4)
D DIMER PPP IA.FEU-MCNC: 1.25 UG/ML FEU
DELSYS: ABNORMAL
DIFFERENTIAL METHOD: ABNORMAL
DISPENSE STATUS: NORMAL
DISPENSE STATUS: NORMAL
EOSINOPHIL # BLD AUTO: 0.1 K/UL (ref 0–0.5)
EOSINOPHIL NFR BLD: 1.3 % (ref 0–8)
ERYTHROCYTE [DISTWIDTH] IN BLOOD BY AUTOMATED COUNT: 19.5 % (ref 11.5–14.5)
EST. GFR  (AFRICAN AMERICAN): >60 ML/MIN/1.73 M^2
EST. GFR  (NON AFRICAN AMERICAN): >60 ML/MIN/1.73 M^2
FLOW: 3
GLUCOSE SERPL-MCNC: 126 MG/DL (ref 70–110)
HCO3 UR-SCNC: 34 MMOL/L (ref 24–28)
HCT VFR BLD AUTO: 27.3 % (ref 37–48.5)
HGB BLD-MCNC: 7.2 G/DL (ref 12–16)
IMM GRANULOCYTES # BLD AUTO: 0.06 K/UL (ref 0–0.04)
IMM GRANULOCYTES NFR BLD AUTO: 0.6 % (ref 0–0.5)
LYMPHOCYTES # BLD AUTO: 1.4 K/UL (ref 1–4.8)
LYMPHOCYTES NFR BLD: 14.4 % (ref 18–48)
MCH RBC QN AUTO: 17.9 PG (ref 27–31)
MCHC RBC AUTO-ENTMCNC: 26.4 G/DL (ref 32–36)
MCV RBC AUTO: 68 FL (ref 82–98)
MODE: ABNORMAL
MONOCYTES # BLD AUTO: 0.4 K/UL (ref 0.3–1)
MONOCYTES NFR BLD: 3.8 % (ref 4–15)
NEUTROPHILS # BLD AUTO: 7.7 K/UL (ref 1.8–7.7)
NEUTROPHILS NFR BLD: 79.7 % (ref 38–73)
NRBC BLD-RTO: 1 /100 WBC
NUM UNITS TRANS PACKED RBC: NORMAL
NUM UNITS TRANS PACKED RBC: NORMAL
PCO2 BLDA: 65.4 MMHG (ref 35–45)
PH SMN: 7.32 [PH] (ref 7.35–7.45)
PLATELET # BLD AUTO: 419 K/UL (ref 150–450)
PMV BLD AUTO: 9.7 FL (ref 9.2–12.9)
PO2 BLDA: 215 MMHG (ref 80–100)
POC BE: 8 MMOL/L
POC SATURATED O2: 100 % (ref 95–100)
POC TCO2: 36 MMOL/L (ref 23–27)
POTASSIUM SERPL-SCNC: 4.4 MMOL/L (ref 3.5–5.1)
PROT SERPL-MCNC: 7.8 G/DL (ref 6–8.4)
RBC # BLD AUTO: 4.02 M/UL (ref 4–5.4)
SAMPLE: ABNORMAL
SARS-COV-2 RDRP RESP QL NAA+PROBE: NEGATIVE
SITE: ABNORMAL
SODIUM SERPL-SCNC: 136 MMOL/L (ref 136–145)
TROPONIN I SERPL DL<=0.01 NG/ML-MCNC: <0.03 NG/ML
TSH SERPL DL<=0.005 MIU/L-ACNC: 0.45 UIU/ML (ref 0.34–5.6)
WBC # BLD AUTO: 9.59 K/UL (ref 3.9–12.7)

## 2021-05-01 PROCEDURE — 25000242 PHARM REV CODE 250 ALT 637 W/ HCPCS: Performed by: INTERNAL MEDICINE

## 2021-05-01 PROCEDURE — 36415 COLL VENOUS BLD VENIPUNCTURE: CPT | Performed by: INTERNAL MEDICINE

## 2021-05-01 PROCEDURE — 25000003 PHARM REV CODE 250: Performed by: INTERNAL MEDICINE

## 2021-05-01 PROCEDURE — 85025 COMPLETE CBC W/AUTO DIFF WBC: CPT | Performed by: EMERGENCY MEDICINE

## 2021-05-01 PROCEDURE — 86920 COMPATIBILITY TEST SPIN: CPT | Performed by: INTERNAL MEDICINE

## 2021-05-01 PROCEDURE — 94761 N-INVAS EAR/PLS OXIMETRY MLT: CPT

## 2021-05-01 PROCEDURE — 63600175 PHARM REV CODE 636 W HCPCS: Performed by: INTERNAL MEDICINE

## 2021-05-01 PROCEDURE — 63600175 PHARM REV CODE 636 W HCPCS: Performed by: EMERGENCY MEDICINE

## 2021-05-01 PROCEDURE — 96374 THER/PROPH/DIAG INJ IV PUSH: CPT

## 2021-05-01 PROCEDURE — 25500020 PHARM REV CODE 255: Performed by: EMERGENCY MEDICINE

## 2021-05-01 PROCEDURE — 21400001 HC TELEMETRY ROOM

## 2021-05-01 PROCEDURE — P9016 RBC LEUKOCYTES REDUCED: HCPCS | Performed by: INTERNAL MEDICINE

## 2021-05-01 PROCEDURE — 85379 FIBRIN DEGRADATION QUANT: CPT | Performed by: EMERGENCY MEDICINE

## 2021-05-01 PROCEDURE — 84443 ASSAY THYROID STIM HORMONE: CPT | Performed by: EMERGENCY MEDICINE

## 2021-05-01 PROCEDURE — 94640 AIRWAY INHALATION TREATMENT: CPT

## 2021-05-01 PROCEDURE — 96375 TX/PRO/DX INJ NEW DRUG ADDON: CPT

## 2021-05-01 PROCEDURE — 36600 WITHDRAWAL OF ARTERIAL BLOOD: CPT

## 2021-05-01 PROCEDURE — 27000221 HC OXYGEN, UP TO 24 HOURS

## 2021-05-01 PROCEDURE — 93005 ELECTROCARDIOGRAM TRACING: CPT | Performed by: GENERAL PRACTICE

## 2021-05-01 PROCEDURE — 83880 ASSAY OF NATRIURETIC PEPTIDE: CPT | Performed by: EMERGENCY MEDICINE

## 2021-05-01 PROCEDURE — 99900035 HC TECH TIME PER 15 MIN (STAT)

## 2021-05-01 PROCEDURE — 86900 BLOOD TYPING SEROLOGIC ABO: CPT | Performed by: INTERNAL MEDICINE

## 2021-05-01 PROCEDURE — 99900031 HC PATIENT EDUCATION (STAT)

## 2021-05-01 PROCEDURE — 82803 BLOOD GASES ANY COMBINATION: CPT

## 2021-05-01 PROCEDURE — 99285 EMERGENCY DEPT VISIT HI MDM: CPT | Mod: 25

## 2021-05-01 PROCEDURE — 80053 COMPREHEN METABOLIC PANEL: CPT | Performed by: EMERGENCY MEDICINE

## 2021-05-01 PROCEDURE — 36430 TRANSFUSION BLD/BLD COMPNT: CPT

## 2021-05-01 PROCEDURE — U0002 COVID-19 LAB TEST NON-CDC: HCPCS | Performed by: INTERNAL MEDICINE

## 2021-05-01 PROCEDURE — 84484 ASSAY OF TROPONIN QUANT: CPT | Performed by: EMERGENCY MEDICINE

## 2021-05-01 RX ORDER — HYDRALAZINE HYDROCHLORIDE 20 MG/ML
10 INJECTION INTRAMUSCULAR; INTRAVENOUS
Status: COMPLETED | OUTPATIENT
Start: 2021-05-01 | End: 2021-05-01

## 2021-05-01 RX ORDER — NALBUPHINE HYDROCHLORIDE 10 MG/ML
10 INJECTION, SOLUTION INTRAMUSCULAR; INTRAVENOUS; SUBCUTANEOUS ONCE
Status: COMPLETED | OUTPATIENT
Start: 2021-05-01 | End: 2021-05-01

## 2021-05-01 RX ORDER — LABETALOL HYDROCHLORIDE 5 MG/ML
10 INJECTION, SOLUTION INTRAVENOUS EVERY 4 HOURS PRN
Status: DISCONTINUED | OUTPATIENT
Start: 2021-05-01 | End: 2021-05-04 | Stop reason: HOSPADM

## 2021-05-01 RX ORDER — POTASSIUM CHLORIDE 7.45 MG/ML
20 INJECTION INTRAVENOUS
Status: DISCONTINUED | OUTPATIENT
Start: 2021-05-01 | End: 2021-05-04 | Stop reason: HOSPADM

## 2021-05-01 RX ORDER — ONDANSETRON 2 MG/ML
4 INJECTION INTRAMUSCULAR; INTRAVENOUS EVERY 6 HOURS PRN
Status: DISCONTINUED | OUTPATIENT
Start: 2021-05-01 | End: 2021-05-04 | Stop reason: HOSPADM

## 2021-05-01 RX ORDER — MAGNESIUM SULFATE 1 G/100ML
1 INJECTION INTRAVENOUS
Status: DISCONTINUED | OUTPATIENT
Start: 2021-05-01 | End: 2021-05-04 | Stop reason: HOSPADM

## 2021-05-01 RX ORDER — LANOLIN ALCOHOL/MO/W.PET/CERES
800 CREAM (GRAM) TOPICAL
Status: DISCONTINUED | OUTPATIENT
Start: 2021-05-01 | End: 2021-05-04 | Stop reason: HOSPADM

## 2021-05-01 RX ORDER — FUROSEMIDE 10 MG/ML
40 INJECTION INTRAMUSCULAR; INTRAVENOUS
Status: DISCONTINUED | OUTPATIENT
Start: 2021-05-01 | End: 2021-05-02

## 2021-05-01 RX ORDER — MAGNESIUM SULFATE HEPTAHYDRATE 40 MG/ML
4 INJECTION, SOLUTION INTRAVENOUS
Status: DISCONTINUED | OUTPATIENT
Start: 2021-05-01 | End: 2021-05-04 | Stop reason: HOSPADM

## 2021-05-01 RX ORDER — POTASSIUM CHLORIDE 7.45 MG/ML
40 INJECTION INTRAVENOUS
Status: DISCONTINUED | OUTPATIENT
Start: 2021-05-01 | End: 2021-05-04 | Stop reason: HOSPADM

## 2021-05-01 RX ORDER — ENOXAPARIN SODIUM 300 MG/3ML
1 INJECTION INTRAVENOUS; SUBCUTANEOUS
Status: DISCONTINUED | OUTPATIENT
Start: 2021-05-01 | End: 2021-05-01

## 2021-05-01 RX ORDER — HYDRALAZINE HYDROCHLORIDE 20 MG/ML
10 INJECTION INTRAMUSCULAR; INTRAVENOUS EVERY 6 HOURS PRN
Status: DISCONTINUED | OUTPATIENT
Start: 2021-05-01 | End: 2021-05-04 | Stop reason: HOSPADM

## 2021-05-01 RX ORDER — ENOXAPARIN SODIUM 100 MG/ML
100 INJECTION SUBCUTANEOUS
Status: DISCONTINUED | OUTPATIENT
Start: 2021-05-01 | End: 2021-05-01

## 2021-05-01 RX ORDER — FAMOTIDINE 20 MG/1
20 TABLET, FILM COATED ORAL 2 TIMES DAILY
Status: DISCONTINUED | OUTPATIENT
Start: 2021-05-01 | End: 2021-05-04 | Stop reason: HOSPADM

## 2021-05-01 RX ORDER — POTASSIUM CHLORIDE 20 MEQ/1
20 TABLET, EXTENDED RELEASE ORAL
Status: DISCONTINUED | OUTPATIENT
Start: 2021-05-01 | End: 2021-05-04 | Stop reason: HOSPADM

## 2021-05-01 RX ORDER — IPRATROPIUM BROMIDE 0.5 MG/2.5ML
0.5 SOLUTION RESPIRATORY (INHALATION) EVERY 8 HOURS PRN
Status: DISCONTINUED | OUTPATIENT
Start: 2021-05-01 | End: 2021-05-04 | Stop reason: HOSPADM

## 2021-05-01 RX ORDER — FUROSEMIDE 10 MG/ML
40 INJECTION INTRAMUSCULAR; INTRAVENOUS ONCE
Status: DISCONTINUED | OUTPATIENT
Start: 2021-05-01 | End: 2021-05-01

## 2021-05-01 RX ORDER — ENOXAPARIN SODIUM 100 MG/ML
1 INJECTION SUBCUTANEOUS
Status: DISCONTINUED | OUTPATIENT
Start: 2021-05-01 | End: 2021-05-03

## 2021-05-01 RX ORDER — MAGNESIUM SULFATE HEPTAHYDRATE 40 MG/ML
2 INJECTION, SOLUTION INTRAVENOUS
Status: DISCONTINUED | OUTPATIENT
Start: 2021-05-01 | End: 2021-05-04 | Stop reason: HOSPADM

## 2021-05-01 RX ORDER — ACETAMINOPHEN 325 MG/1
650 TABLET ORAL EVERY 4 HOURS PRN
Status: DISCONTINUED | OUTPATIENT
Start: 2021-05-01 | End: 2021-05-04 | Stop reason: HOSPADM

## 2021-05-01 RX ORDER — POTASSIUM CHLORIDE 20 MEQ/1
40 TABLET, EXTENDED RELEASE ORAL
Status: DISCONTINUED | OUTPATIENT
Start: 2021-05-01 | End: 2021-05-04 | Stop reason: HOSPADM

## 2021-05-01 RX ORDER — AMLODIPINE BESYLATE 5 MG/1
10 TABLET ORAL DAILY
Status: DISCONTINUED | OUTPATIENT
Start: 2021-05-01 | End: 2021-05-02

## 2021-05-01 RX ORDER — ALPRAZOLAM 0.5 MG/1
0.5 TABLET ORAL 2 TIMES DAILY PRN
Status: DISCONTINUED | OUTPATIENT
Start: 2021-05-01 | End: 2021-05-04 | Stop reason: HOSPADM

## 2021-05-01 RX ORDER — CARVEDILOL 12.5 MG/1
12.5 TABLET ORAL 2 TIMES DAILY WITH MEALS
Status: DISCONTINUED | OUTPATIENT
Start: 2021-05-01 | End: 2021-05-04 | Stop reason: HOSPADM

## 2021-05-01 RX ORDER — HYDROCODONE BITARTRATE AND ACETAMINOPHEN 500; 5 MG/1; MG/1
TABLET ORAL
Status: DISCONTINUED | OUTPATIENT
Start: 2021-05-01 | End: 2021-05-02

## 2021-05-01 RX ORDER — LEVALBUTEROL 1.25 MG/.5ML
1.25 SOLUTION, CONCENTRATE RESPIRATORY (INHALATION) 3 TIMES DAILY PRN
Status: DISCONTINUED | OUTPATIENT
Start: 2021-05-01 | End: 2021-05-04 | Stop reason: HOSPADM

## 2021-05-01 RX ADMIN — ENOXAPARIN SODIUM 150 MG: 100 INJECTION SUBCUTANEOUS at 03:05

## 2021-05-01 RX ADMIN — AMLODIPINE BESYLATE 10 MG: 5 TABLET ORAL at 03:05

## 2021-05-01 RX ADMIN — FUROSEMIDE 40 MG: 10 INJECTION, SOLUTION INTRAVENOUS at 08:05

## 2021-05-01 RX ADMIN — NALBUPHINE HYDROCHLORIDE 10 MG: 10 INJECTION, SOLUTION INTRAMUSCULAR; INTRAVENOUS; SUBCUTANEOUS at 12:05

## 2021-05-01 RX ADMIN — IPRATROPIUM BROMIDE 0.5 MG: 0.5 SOLUTION RESPIRATORY (INHALATION) at 05:05

## 2021-05-01 RX ADMIN — LEVALBUTEROL HYDROCHLORIDE 1.25 MG: 1.25 SOLUTION, CONCENTRATE RESPIRATORY (INHALATION) at 05:05

## 2021-05-01 RX ADMIN — FAMOTIDINE 20 MG: 20 TABLET ORAL at 08:05

## 2021-05-01 RX ADMIN — ACETAMINOPHEN 650 MG: 325 TABLET ORAL at 10:05

## 2021-05-01 RX ADMIN — ALPRAZOLAM 0.5 MG: 0.5 TABLET ORAL at 10:05

## 2021-05-01 RX ADMIN — IOHEXOL 100 ML: 350 INJECTION, SOLUTION INTRAVENOUS at 12:05

## 2021-05-01 RX ADMIN — HYDRALAZINE HYDROCHLORIDE 10 MG: 20 INJECTION INTRAMUSCULAR; INTRAVENOUS at 12:05

## 2021-05-02 PROBLEM — J96.12 CHRONIC HYPERCAPNIC RESPIRATORY FAILURE: Status: ACTIVE | Noted: 2021-05-02

## 2021-05-02 PROBLEM — N92.0 MENORRHAGIA: Chronic | Status: ACTIVE | Noted: 2021-05-02

## 2021-05-02 PROBLEM — I50.9 CHF (CONGESTIVE HEART FAILURE): Status: ACTIVE | Noted: 2021-05-02

## 2021-05-02 PROBLEM — E66.2 OBESITY HYPOVENTILATION SYNDROME: Status: ACTIVE | Noted: 2021-05-02

## 2021-05-02 LAB
ALBUMIN SERPL BCP-MCNC: 2.7 G/DL (ref 3.5–5.2)
ALP SERPL-CCNC: 74 U/L (ref 55–135)
ALT SERPL W/O P-5'-P-CCNC: 30 U/L (ref 10–44)
ANION GAP SERPL CALC-SCNC: 8 MMOL/L (ref 8–16)
AST SERPL-CCNC: 34 U/L (ref 10–40)
AT III ACT/NOR PPP CHRO: 54 % (ref 88–132)
BASOPHILS # BLD AUTO: 0.03 K/UL (ref 0–0.2)
BASOPHILS NFR BLD: 0.3 % (ref 0–1.9)
BILIRUB SERPL-MCNC: 0.7 MG/DL (ref 0.1–1)
BUN SERPL-MCNC: 16 MG/DL (ref 6–20)
CALCIUM SERPL-MCNC: 8.4 MG/DL (ref 8.7–10.5)
CHLORIDE SERPL-SCNC: 98 MMOL/L (ref 95–110)
CO2 SERPL-SCNC: 32 MMOL/L (ref 23–29)
CREAT SERPL-MCNC: 1.1 MG/DL (ref 0.5–1.4)
DIFFERENTIAL METHOD: ABNORMAL
EOSINOPHIL # BLD AUTO: 0.1 K/UL (ref 0–0.5)
EOSINOPHIL NFR BLD: 1.2 % (ref 0–8)
ERYTHROCYTE [DISTWIDTH] IN BLOOD BY AUTOMATED COUNT: 21.1 % (ref 11.5–14.5)
EST. GFR  (AFRICAN AMERICAN): >60 ML/MIN/1.73 M^2
EST. GFR  (NON AFRICAN AMERICAN): 59.9 ML/MIN/1.73 M^2
FERRITIN SERPL-MCNC: 10 NG/ML (ref 20–300)
FOLATE SERPL-MCNC: 5.9 NG/ML (ref 4–24)
GLUCOSE SERPL-MCNC: 163 MG/DL (ref 70–110)
HCT VFR BLD AUTO: 30.1 % (ref 37–48.5)
HGB BLD-MCNC: 8.2 G/DL (ref 12–16)
IMM GRANULOCYTES # BLD AUTO: 0.04 K/UL (ref 0–0.04)
IMM GRANULOCYTES NFR BLD AUTO: 0.4 % (ref 0–0.5)
IRON SERPL-MCNC: 16 UG/DL (ref 30–160)
LYMPHOCYTES # BLD AUTO: 1.4 K/UL (ref 1–4.8)
LYMPHOCYTES NFR BLD: 13.7 % (ref 18–48)
MAGNESIUM SERPL-MCNC: 1.9 MG/DL (ref 1.6–2.6)
MCH RBC QN AUTO: 19.7 PG (ref 27–31)
MCHC RBC AUTO-ENTMCNC: 27.2 G/DL (ref 32–36)
MCV RBC AUTO: 72 FL (ref 82–98)
MONOCYTES # BLD AUTO: 0.4 K/UL (ref 0.3–1)
MONOCYTES NFR BLD: 4.3 % (ref 4–15)
NEUTROPHILS # BLD AUTO: 8.1 K/UL (ref 1.8–7.7)
NEUTROPHILS NFR BLD: 80.1 % (ref 38–73)
NRBC BLD-RTO: 1 /100 WBC
PLATELET # BLD AUTO: 384 K/UL (ref 150–450)
PMV BLD AUTO: 9.9 FL (ref 9.2–12.9)
POTASSIUM SERPL-SCNC: 4 MMOL/L (ref 3.5–5.1)
PROCALCITONIN SERPL IA-MCNC: 0.06 NG/ML (ref 0–0.5)
PROT SERPL-MCNC: 7.2 G/DL (ref 6–8.4)
RBC # BLD AUTO: 4.17 M/UL (ref 4–5.4)
RETICS/RBC NFR AUTO: 2 % (ref 0.5–2.5)
SATURATED IRON: 3 % (ref 20–50)
SODIUM SERPL-SCNC: 138 MMOL/L (ref 136–145)
TOTAL IRON BINDING CAPACITY: 472 UG/DL (ref 250–450)
TRANSFERRIN SERPL-MCNC: 337 MG/DL (ref 200–375)
VIT B12 SERPL-MCNC: 452 PG/ML (ref 210–950)
WBC # BLD AUTO: 10.16 K/UL (ref 3.9–12.7)

## 2021-05-02 PROCEDURE — 27000221 HC OXYGEN, UP TO 24 HOURS

## 2021-05-02 PROCEDURE — 21400001 HC TELEMETRY ROOM

## 2021-05-02 PROCEDURE — 86147 CARDIOLIPIN ANTIBODY EA IG: CPT | Mod: 59 | Performed by: INTERNAL MEDICINE

## 2021-05-02 PROCEDURE — 82607 VITAMIN B-12: CPT | Performed by: INTERNAL MEDICINE

## 2021-05-02 PROCEDURE — 36415 COLL VENOUS BLD VENIPUNCTURE: CPT | Performed by: INTERNAL MEDICINE

## 2021-05-02 PROCEDURE — 85045 AUTOMATED RETICULOCYTE COUNT: CPT | Performed by: INTERNAL MEDICINE

## 2021-05-02 PROCEDURE — 82728 ASSAY OF FERRITIN: CPT | Performed by: INTERNAL MEDICINE

## 2021-05-02 PROCEDURE — 25000242 PHARM REV CODE 250 ALT 637 W/ HCPCS: Performed by: INTERNAL MEDICINE

## 2021-05-02 PROCEDURE — 94761 N-INVAS EAR/PLS OXIMETRY MLT: CPT

## 2021-05-02 PROCEDURE — 85025 COMPLETE CBC W/AUTO DIFF WBC: CPT | Performed by: INTERNAL MEDICINE

## 2021-05-02 PROCEDURE — 85303 CLOT INHIBIT PROT C ACTIVITY: CPT | Performed by: INTERNAL MEDICINE

## 2021-05-02 PROCEDURE — 83735 ASSAY OF MAGNESIUM: CPT | Performed by: INTERNAL MEDICINE

## 2021-05-02 PROCEDURE — 85250 CLOT FACTOR IX PTC/CHRSTMAS: CPT | Performed by: INTERNAL MEDICINE

## 2021-05-02 PROCEDURE — 94640 AIRWAY INHALATION TREATMENT: CPT

## 2021-05-02 PROCEDURE — 99900031 HC PATIENT EDUCATION (STAT)

## 2021-05-02 PROCEDURE — 63600175 PHARM REV CODE 636 W HCPCS: Performed by: INTERNAL MEDICINE

## 2021-05-02 PROCEDURE — 86146 BETA-2 GLYCOPROTEIN ANTIBODY: CPT | Performed by: INTERNAL MEDICINE

## 2021-05-02 PROCEDURE — 85230 CLOT FACTOR VII PROCONVERTIN: CPT | Performed by: INTERNAL MEDICINE

## 2021-05-02 PROCEDURE — 84145 PROCALCITONIN (PCT): CPT | Performed by: INTERNAL MEDICINE

## 2021-05-02 PROCEDURE — 25000003 PHARM REV CODE 250: Performed by: INTERNAL MEDICINE

## 2021-05-02 PROCEDURE — 81291 MTHFR GENE: CPT | Performed by: INTERNAL MEDICINE

## 2021-05-02 PROCEDURE — 85280 CLOT FACTOR XII HAGEMAN: CPT | Performed by: INTERNAL MEDICINE

## 2021-05-02 PROCEDURE — 85260 CLOT FACTOR X STUART-POWER: CPT | Performed by: INTERNAL MEDICINE

## 2021-05-02 PROCEDURE — 85240 CLOT FACTOR VIII AHG 1 STAGE: CPT | Performed by: INTERNAL MEDICINE

## 2021-05-02 PROCEDURE — 81240 F2 GENE: CPT | Performed by: INTERNAL MEDICINE

## 2021-05-02 PROCEDURE — 85613 RUSSELL VIPER VENOM DILUTED: CPT | Mod: 91 | Performed by: INTERNAL MEDICINE

## 2021-05-02 PROCEDURE — 94618 PULMONARY STRESS TESTING: CPT

## 2021-05-02 PROCEDURE — 82746 ASSAY OF FOLIC ACID SERUM: CPT | Performed by: INTERNAL MEDICINE

## 2021-05-02 PROCEDURE — 99900035 HC TECH TIME PER 15 MIN (STAT)

## 2021-05-02 PROCEDURE — 85306 CLOT INHIBIT PROT S FREE: CPT | Performed by: INTERNAL MEDICINE

## 2021-05-02 PROCEDURE — 83090 ASSAY OF HOMOCYSTEINE: CPT | Performed by: INTERNAL MEDICINE

## 2021-05-02 PROCEDURE — 85300 ANTITHROMBIN III ACTIVITY: CPT | Performed by: INTERNAL MEDICINE

## 2021-05-02 PROCEDURE — 81241 F5 GENE: CPT | Performed by: INTERNAL MEDICINE

## 2021-05-02 PROCEDURE — 83540 ASSAY OF IRON: CPT | Performed by: INTERNAL MEDICINE

## 2021-05-02 PROCEDURE — 99223 1ST HOSP IP/OBS HIGH 75: CPT | Mod: ,,, | Performed by: INTERNAL MEDICINE

## 2021-05-02 PROCEDURE — 99223 PR INITIAL HOSPITAL CARE,LEVL III: ICD-10-PCS | Mod: ,,, | Performed by: INTERNAL MEDICINE

## 2021-05-02 PROCEDURE — 80053 COMPREHEN METABOLIC PANEL: CPT | Performed by: INTERNAL MEDICINE

## 2021-05-02 RX ORDER — MICONAZOLE NITRATE 2 %
POWDER (GRAM) TOPICAL 2 TIMES DAILY
Status: DISCONTINUED | OUTPATIENT
Start: 2021-05-02 | End: 2021-05-04 | Stop reason: HOSPADM

## 2021-05-02 RX ORDER — LISINOPRIL 20 MG/1
20 TABLET ORAL DAILY
Status: DISCONTINUED | OUTPATIENT
Start: 2021-05-02 | End: 2021-05-03

## 2021-05-02 RX ORDER — CETIRIZINE HYDROCHLORIDE 10 MG/1
10 TABLET ORAL NIGHTLY
Status: DISCONTINUED | OUTPATIENT
Start: 2021-05-02 | End: 2021-05-04 | Stop reason: HOSPADM

## 2021-05-02 RX ORDER — FUROSEMIDE 10 MG/ML
40 INJECTION INTRAMUSCULAR; INTRAVENOUS
Status: DISCONTINUED | OUTPATIENT
Start: 2021-05-02 | End: 2021-05-03

## 2021-05-02 RX ADMIN — FAMOTIDINE 20 MG: 20 TABLET ORAL at 08:05

## 2021-05-02 RX ADMIN — LEVALBUTEROL HYDROCHLORIDE 1.25 MG: 1.25 SOLUTION, CONCENTRATE RESPIRATORY (INHALATION) at 09:05

## 2021-05-02 RX ADMIN — ACETAMINOPHEN 650 MG: 325 TABLET ORAL at 08:05

## 2021-05-02 RX ADMIN — ENOXAPARIN SODIUM 150 MG: 100 INJECTION SUBCUTANEOUS at 05:05

## 2021-05-02 RX ADMIN — FUROSEMIDE 40 MG: 10 INJECTION, SOLUTION INTRAVENOUS at 08:05

## 2021-05-02 RX ADMIN — CARVEDILOL 12.5 MG: 12.5 TABLET, FILM COATED ORAL at 08:05

## 2021-05-02 RX ADMIN — CARVEDILOL 12.5 MG: 12.5 TABLET, FILM COATED ORAL at 04:05

## 2021-05-02 RX ADMIN — LISINOPRIL 20 MG: 20 TABLET ORAL at 11:05

## 2021-05-02 RX ADMIN — CETIRIZINE HYDROCHLORIDE 10 MG: 10 TABLET, FILM COATED ORAL at 08:05

## 2021-05-02 RX ADMIN — ACETAMINOPHEN 650 MG: 325 TABLET ORAL at 03:05

## 2021-05-02 RX ADMIN — MICONAZOLE NITRATE 2 % TOPICAL POWDER: at 11:05

## 2021-05-02 RX ADMIN — AMLODIPINE BESYLATE 10 MG: 5 TABLET ORAL at 08:05

## 2021-05-02 RX ADMIN — IPRATROPIUM BROMIDE 0.5 MG: 0.5 SOLUTION RESPIRATORY (INHALATION) at 08:05

## 2021-05-02 RX ADMIN — MICONAZOLE NITRATE 2 % TOPICAL POWDER: at 08:05

## 2021-05-02 RX ADMIN — LEVALBUTEROL HYDROCHLORIDE 1.25 MG: 1.25 SOLUTION, CONCENTRATE RESPIRATORY (INHALATION) at 08:05

## 2021-05-02 RX ADMIN — ENOXAPARIN SODIUM 150 MG: 100 INJECTION SUBCUTANEOUS at 04:05

## 2021-05-02 RX ADMIN — FUROSEMIDE 40 MG: 10 INJECTION, SOLUTION INTRAMUSCULAR; INTRAVENOUS at 09:05

## 2021-05-02 RX ADMIN — ACETAMINOPHEN 650 MG: 325 TABLET ORAL at 04:05

## 2021-05-02 RX ADMIN — SODIUM CHLORIDE 125 MG: 9 INJECTION, SOLUTION INTRAVENOUS at 06:05

## 2021-05-02 RX ADMIN — ALPRAZOLAM 0.5 MG: 0.5 TABLET ORAL at 08:05

## 2021-05-03 ENCOUNTER — CLINICAL SUPPORT (OUTPATIENT)
Dept: CARDIOLOGY | Facility: HOSPITAL | Age: 46
DRG: 175 | End: 2021-05-03
Attending: INTERNAL MEDICINE
Payer: MEDICAID

## 2021-05-03 VITALS — BODY MASS INDEX: 51.91 KG/M2 | WEIGHT: 293 LBS | HEIGHT: 63 IN

## 2021-05-03 PROBLEM — D50.9 IDA (IRON DEFICIENCY ANEMIA): Chronic | Status: ACTIVE | Noted: 2021-05-03

## 2021-05-03 LAB
ALBUMIN SERPL BCP-MCNC: 2.8 G/DL (ref 3.5–5.2)
ALP SERPL-CCNC: 81 U/L (ref 55–135)
ALT SERPL W/O P-5'-P-CCNC: 27 U/L (ref 10–44)
ANION GAP SERPL CALC-SCNC: 9 MMOL/L (ref 8–16)
AST SERPL-CCNC: 27 U/L (ref 10–40)
AV INDEX (PROSTH): 0.56
AV MEAN GRADIENT: 6 MMHG
AV PEAK GRADIENT: 10 MMHG
AV VELOCITY RATIO: 56.72
BASOPHILS # BLD AUTO: 0.04 K/UL (ref 0–0.2)
BASOPHILS NFR BLD: 0.4 % (ref 0–1.9)
BILIRUB SERPL-MCNC: 0.9 MG/DL (ref 0.1–1)
BSA FOR ECHO PROCEDURE: 2.68 M2
BUN SERPL-MCNC: 15 MG/DL (ref 6–20)
CALCIUM SERPL-MCNC: 8.4 MG/DL (ref 8.7–10.5)
CHLORIDE SERPL-SCNC: 94 MMOL/L (ref 95–110)
CO2 SERPL-SCNC: 34 MMOL/L (ref 23–29)
CREAT SERPL-MCNC: 1 MG/DL (ref 0.5–1.4)
DIFFERENTIAL METHOD: ABNORMAL
DOP CALC AO PEAK VEL: 1.56 M/S
DOP CALC AO VTI: 26.05 CM
DOP CALC LVOT PEAK VEL: 88.49 M/S
DOP CALCLVOT PEAK VEL VTI: 14.66 CM
E WAVE DECELERATION TIME: 207.77 MSEC
E/A RATIO: 1.61
EJECTION FRACTION: 60 %
EOSINOPHIL # BLD AUTO: 0.1 K/UL (ref 0–0.5)
EOSINOPHIL NFR BLD: 1.1 % (ref 0–8)
ERYTHROCYTE [DISTWIDTH] IN BLOOD BY AUTOMATED COUNT: 21.7 % (ref 11.5–14.5)
EST. GFR  (AFRICAN AMERICAN): >60 ML/MIN/1.73 M^2
EST. GFR  (NON AFRICAN AMERICAN): >60 ML/MIN/1.73 M^2
GLUCOSE SERPL-MCNC: 118 MG/DL (ref 70–110)
HCT VFR BLD AUTO: 31.9 % (ref 37–48.5)
HGB BLD-MCNC: 8.6 G/DL (ref 12–16)
IMM GRANULOCYTES # BLD AUTO: 0.06 K/UL (ref 0–0.04)
IMM GRANULOCYTES NFR BLD AUTO: 0.6 % (ref 0–0.5)
LYMPHOCYTES # BLD AUTO: 1.6 K/UL (ref 1–4.8)
LYMPHOCYTES NFR BLD: 14.8 % (ref 18–48)
MAGNESIUM SERPL-MCNC: 1.9 MG/DL (ref 1.6–2.6)
MCH RBC QN AUTO: 19.5 PG (ref 27–31)
MCHC RBC AUTO-ENTMCNC: 27 G/DL (ref 32–36)
MCV RBC AUTO: 73 FL (ref 82–98)
MONOCYTES # BLD AUTO: 0.7 K/UL (ref 0.3–1)
MONOCYTES NFR BLD: 6.2 % (ref 4–15)
MV PEAK A VEL: 0.8 M/S
MV PEAK E VEL: 1.29 M/S
NEUTROPHILS # BLD AUTO: 8.4 K/UL (ref 1.8–7.7)
NEUTROPHILS NFR BLD: 76.9 % (ref 38–73)
NRBC BLD-RTO: 1 /100 WBC
PISA TR MAX VEL: 3.09 M/S
PLATELET # BLD AUTO: 426 K/UL (ref 150–450)
PMV BLD AUTO: 10.2 FL (ref 9.2–12.9)
POTASSIUM SERPL-SCNC: 3.8 MMOL/L (ref 3.5–5.1)
PROT SERPL-MCNC: 7.4 G/DL (ref 6–8.4)
RBC # BLD AUTO: 4.4 M/UL (ref 4–5.4)
SODIUM SERPL-SCNC: 137 MMOL/L (ref 136–145)
TR MAX PG: 38 MMHG
WBC # BLD AUTO: 10.89 K/UL (ref 3.9–12.7)

## 2021-05-03 PROCEDURE — 83735 ASSAY OF MAGNESIUM: CPT | Performed by: INTERNAL MEDICINE

## 2021-05-03 PROCEDURE — 27000221 HC OXYGEN, UP TO 24 HOURS

## 2021-05-03 PROCEDURE — 63700000 PHARM REV CODE 250 ALT 637 W/O HCPCS: Performed by: INTERNAL MEDICINE

## 2021-05-03 PROCEDURE — 93306 ECHO (CUPID ONLY): ICD-10-PCS | Mod: 26,,, | Performed by: INTERNAL MEDICINE

## 2021-05-03 PROCEDURE — 80053 COMPREHEN METABOLIC PANEL: CPT | Performed by: INTERNAL MEDICINE

## 2021-05-03 PROCEDURE — 99900031 HC PATIENT EDUCATION (STAT)

## 2021-05-03 PROCEDURE — 25000003 PHARM REV CODE 250: Performed by: INTERNAL MEDICINE

## 2021-05-03 PROCEDURE — 94761 N-INVAS EAR/PLS OXIMETRY MLT: CPT

## 2021-05-03 PROCEDURE — 63600175 PHARM REV CODE 636 W HCPCS: Performed by: INTERNAL MEDICINE

## 2021-05-03 PROCEDURE — 99900035 HC TECH TIME PER 15 MIN (STAT)

## 2021-05-03 PROCEDURE — 25500020 PHARM REV CODE 255: Performed by: INTERNAL MEDICINE

## 2021-05-03 PROCEDURE — 21400001 HC TELEMETRY ROOM

## 2021-05-03 PROCEDURE — 36415 COLL VENOUS BLD VENIPUNCTURE: CPT | Performed by: INTERNAL MEDICINE

## 2021-05-03 PROCEDURE — 85025 COMPLETE CBC W/AUTO DIFF WBC: CPT | Performed by: INTERNAL MEDICINE

## 2021-05-03 PROCEDURE — 96374 THER/PROPH/DIAG INJ IV PUSH: CPT

## 2021-05-03 PROCEDURE — 93306 TTE W/DOPPLER COMPLETE: CPT | Mod: 26,,, | Performed by: INTERNAL MEDICINE

## 2021-05-03 RX ORDER — AZITHROMYCIN 250 MG/1
500 TABLET, FILM COATED ORAL DAILY
Status: DISCONTINUED | OUTPATIENT
Start: 2021-05-03 | End: 2021-05-04 | Stop reason: HOSPADM

## 2021-05-03 RX ORDER — FUROSEMIDE 10 MG/ML
20 INJECTION INTRAMUSCULAR; INTRAVENOUS
Status: DISCONTINUED | OUTPATIENT
Start: 2021-05-03 | End: 2021-05-04 | Stop reason: HOSPADM

## 2021-05-03 RX ORDER — AMOXICILLIN 250 MG
2 CAPSULE ORAL 2 TIMES DAILY PRN
Status: DISCONTINUED | OUTPATIENT
Start: 2021-05-03 | End: 2021-05-03

## 2021-05-03 RX ORDER — AMOXICILLIN 250 MG
2 CAPSULE ORAL DAILY
Status: DISCONTINUED | OUTPATIENT
Start: 2021-05-03 | End: 2021-05-04 | Stop reason: HOSPADM

## 2021-05-03 RX ORDER — LISINOPRIL 20 MG/1
40 TABLET ORAL DAILY
Status: DISCONTINUED | OUTPATIENT
Start: 2021-05-03 | End: 2021-05-03

## 2021-05-03 RX ORDER — LISINOPRIL 20 MG/1
20 TABLET ORAL ONCE
Status: COMPLETED | OUTPATIENT
Start: 2021-05-03 | End: 2021-05-03

## 2021-05-03 RX ORDER — FERROUS SULFATE 325(65) MG
325 TABLET ORAL 2 TIMES DAILY
Status: DISCONTINUED | OUTPATIENT
Start: 2021-05-03 | End: 2021-05-04 | Stop reason: HOSPADM

## 2021-05-03 RX ORDER — LISINOPRIL 20 MG/1
40 TABLET ORAL DAILY
Status: DISCONTINUED | OUTPATIENT
Start: 2021-05-04 | End: 2021-05-04 | Stop reason: HOSPADM

## 2021-05-03 RX ADMIN — CARVEDILOL 12.5 MG: 12.5 TABLET, FILM COATED ORAL at 05:05

## 2021-05-03 RX ADMIN — ACETAMINOPHEN 650 MG: 325 TABLET ORAL at 05:05

## 2021-05-03 RX ADMIN — PERFLUTREN 0.2 ML: 6.52 INJECTION, SUSPENSION INTRAVENOUS at 01:05

## 2021-05-03 RX ADMIN — LISINOPRIL 20 MG: 20 TABLET ORAL at 10:05

## 2021-05-03 RX ADMIN — AZITHROMYCIN MONOHYDRATE 500 MG: 250 TABLET ORAL at 05:05

## 2021-05-03 RX ADMIN — MICONAZOLE NITRATE 2 % TOPICAL POWDER: at 08:05

## 2021-05-03 RX ADMIN — CETIRIZINE HYDROCHLORIDE 10 MG: 10 TABLET, FILM COATED ORAL at 08:05

## 2021-05-03 RX ADMIN — FERROUS SULFATE TAB 325 MG (65 MG ELEMENTAL FE) 325 MG: 325 (65 FE) TAB at 08:05

## 2021-05-03 RX ADMIN — FUROSEMIDE 20 MG: 10 INJECTION, SOLUTION INTRAMUSCULAR; INTRAVENOUS at 05:05

## 2021-05-03 RX ADMIN — LISINOPRIL 20 MG: 20 TABLET ORAL at 09:05

## 2021-05-03 RX ADMIN — APIXABAN 10 MG: 5 TABLET, FILM COATED ORAL at 08:05

## 2021-05-03 RX ADMIN — SODIUM CHLORIDE 125 MG: 9 INJECTION, SOLUTION INTRAVENOUS at 09:05

## 2021-05-03 RX ADMIN — ACETAMINOPHEN 650 MG: 325 TABLET ORAL at 09:05

## 2021-05-03 RX ADMIN — FAMOTIDINE 20 MG: 20 TABLET ORAL at 09:05

## 2021-05-03 RX ADMIN — ALPRAZOLAM 0.5 MG: 0.5 TABLET ORAL at 10:05

## 2021-05-03 RX ADMIN — ENOXAPARIN SODIUM 150 MG: 100 INJECTION SUBCUTANEOUS at 04:05

## 2021-05-03 RX ADMIN — CARVEDILOL 12.5 MG: 12.5 TABLET, FILM COATED ORAL at 09:05

## 2021-05-03 RX ADMIN — ACETAMINOPHEN 650 MG: 325 TABLET ORAL at 04:05

## 2021-05-03 RX ADMIN — MICONAZOLE NITRATE 2 % TOPICAL POWDER: at 09:05

## 2021-05-03 RX ADMIN — FAMOTIDINE 20 MG: 20 TABLET ORAL at 08:05

## 2021-05-04 VITALS
RESPIRATION RATE: 20 BRPM | HEIGHT: 63 IN | DIASTOLIC BLOOD PRESSURE: 68 MMHG | OXYGEN SATURATION: 94 % | WEIGHT: 293 LBS | HEART RATE: 80 BPM | TEMPERATURE: 99 F | BODY MASS INDEX: 51.91 KG/M2 | SYSTOLIC BLOOD PRESSURE: 149 MMHG

## 2021-05-04 PROBLEM — Z91.89 SEDENTARY LIFESTYLE: Chronic | Status: ACTIVE | Noted: 2021-05-04

## 2021-05-04 PROBLEM — J96.12 CHRONIC HYPERCAPNIC RESPIRATORY FAILURE: Chronic | Status: ACTIVE | Noted: 2021-05-02

## 2021-05-04 LAB
ALBUMIN SERPL BCP-MCNC: 2.9 G/DL (ref 3.5–5.2)
ALP SERPL-CCNC: 76 U/L (ref 55–135)
ALT SERPL W/O P-5'-P-CCNC: 32 U/L (ref 10–44)
ANION GAP SERPL CALC-SCNC: 6 MMOL/L (ref 8–16)
AST SERPL-CCNC: 35 U/L (ref 10–40)
BASOPHILS # BLD AUTO: 0.04 K/UL (ref 0–0.2)
BASOPHILS NFR BLD: 0.4 % (ref 0–1.9)
BILIRUB SERPL-MCNC: 0.5 MG/DL (ref 0.1–1)
BUN SERPL-MCNC: 12 MG/DL (ref 6–20)
CALCIUM SERPL-MCNC: 8.6 MG/DL (ref 8.7–10.5)
CHLORIDE SERPL-SCNC: 96 MMOL/L (ref 95–110)
CO2 SERPL-SCNC: 36 MMOL/L (ref 23–29)
CREAT SERPL-MCNC: 0.9 MG/DL (ref 0.5–1.4)
DIFFERENTIAL METHOD: ABNORMAL
EOSINOPHIL # BLD AUTO: 0.1 K/UL (ref 0–0.5)
EOSINOPHIL NFR BLD: 1.1 % (ref 0–8)
ERYTHROCYTE [DISTWIDTH] IN BLOOD BY AUTOMATED COUNT: 22.4 % (ref 11.5–14.5)
EST. GFR  (AFRICAN AMERICAN): >60 ML/MIN/1.73 M^2
EST. GFR  (NON AFRICAN AMERICAN): >60 ML/MIN/1.73 M^2
GLUCOSE SERPL-MCNC: 145 MG/DL (ref 70–110)
HCT VFR BLD AUTO: 30.7 % (ref 37–48.5)
HCYS SERPL-SCNC: 8.8 UMOL/L (ref 0–14.5)
HGB BLD-MCNC: 8.4 G/DL (ref 12–16)
IMM GRANULOCYTES # BLD AUTO: 0.1 K/UL (ref 0–0.04)
IMM GRANULOCYTES NFR BLD AUTO: 0.9 % (ref 0–0.5)
LYMPHOCYTES # BLD AUTO: 1.8 K/UL (ref 1–4.8)
LYMPHOCYTES NFR BLD: 16.5 % (ref 18–48)
MAGNESIUM SERPL-MCNC: 2 MG/DL (ref 1.6–2.6)
MCH RBC QN AUTO: 19.9 PG (ref 27–31)
MCHC RBC AUTO-ENTMCNC: 27.4 G/DL (ref 32–36)
MCV RBC AUTO: 73 FL (ref 82–98)
MONOCYTES # BLD AUTO: 0.8 K/UL (ref 0.3–1)
MONOCYTES NFR BLD: 7.2 % (ref 4–15)
NEUTROPHILS # BLD AUTO: 8.1 K/UL (ref 1.8–7.7)
NEUTROPHILS NFR BLD: 73.9 % (ref 38–73)
NRBC BLD-RTO: 1 /100 WBC
PLATELET # BLD AUTO: 402 K/UL (ref 150–450)
PMV BLD AUTO: 10 FL (ref 9.2–12.9)
POTASSIUM SERPL-SCNC: 4 MMOL/L (ref 3.5–5.1)
PROT SERPL-MCNC: 7.2 G/DL (ref 6–8.4)
RBC # BLD AUTO: 4.22 M/UL (ref 4–5.4)
SODIUM SERPL-SCNC: 138 MMOL/L (ref 136–145)
WBC # BLD AUTO: 11 K/UL (ref 3.9–12.7)

## 2021-05-04 PROCEDURE — 85025 COMPLETE CBC W/AUTO DIFF WBC: CPT | Performed by: INTERNAL MEDICINE

## 2021-05-04 PROCEDURE — 63600175 PHARM REV CODE 636 W HCPCS: Performed by: INTERNAL MEDICINE

## 2021-05-04 PROCEDURE — 36415 COLL VENOUS BLD VENIPUNCTURE: CPT | Performed by: INTERNAL MEDICINE

## 2021-05-04 PROCEDURE — 25000003 PHARM REV CODE 250: Performed by: INTERNAL MEDICINE

## 2021-05-04 PROCEDURE — 99900035 HC TECH TIME PER 15 MIN (STAT)

## 2021-05-04 PROCEDURE — 27000221 HC OXYGEN, UP TO 24 HOURS

## 2021-05-04 PROCEDURE — 63700000 PHARM REV CODE 250 ALT 637 W/O HCPCS: Performed by: INTERNAL MEDICINE

## 2021-05-04 PROCEDURE — 83735 ASSAY OF MAGNESIUM: CPT | Performed by: INTERNAL MEDICINE

## 2021-05-04 PROCEDURE — 94761 N-INVAS EAR/PLS OXIMETRY MLT: CPT

## 2021-05-04 PROCEDURE — 80053 COMPREHEN METABOLIC PANEL: CPT | Performed by: INTERNAL MEDICINE

## 2021-05-04 RX ORDER — FERROUS SULFATE 325(65) MG
325 TABLET ORAL DAILY
Qty: 30 TABLET | Refills: 0 | Status: SHIPPED | OUTPATIENT
Start: 2021-05-04 | End: 2021-05-31 | Stop reason: SDUPTHER

## 2021-05-04 RX ORDER — SENNOSIDES 8.6 MG/1
2 CAPSULE, GELATIN COATED ORAL DAILY PRN
Qty: 30 EACH | Refills: 0 | Status: SHIPPED | OUTPATIENT
Start: 2021-05-04 | End: 2021-06-03

## 2021-05-04 RX ORDER — CARVEDILOL 12.5 MG/1
12.5 TABLET ORAL 2 TIMES DAILY WITH MEALS
Qty: 60 TABLET | Refills: 0 | Status: SHIPPED | OUTPATIENT
Start: 2021-05-04 | End: 2022-11-08

## 2021-05-04 RX ORDER — LISINOPRIL 20 MG/1
20 TABLET ORAL DAILY
Qty: 40 TABLET | Refills: 0 | Status: ON HOLD | OUTPATIENT
Start: 2021-05-04 | End: 2022-12-28 | Stop reason: HOSPADM

## 2021-05-04 RX ORDER — FUROSEMIDE 20 MG/1
20 TABLET ORAL DAILY
Qty: 30 TABLET | Refills: 0 | Status: SHIPPED | OUTPATIENT
Start: 2021-05-04 | End: 2021-05-31 | Stop reason: SDUPTHER

## 2021-05-04 RX ADMIN — MICONAZOLE NITRATE 2 % TOPICAL POWDER: at 09:05

## 2021-05-04 RX ADMIN — FERROUS SULFATE TAB 325 MG (65 MG ELEMENTAL FE) 325 MG: 325 (65 FE) TAB at 08:05

## 2021-05-04 RX ADMIN — FUROSEMIDE 20 MG: 10 INJECTION, SOLUTION INTRAMUSCULAR; INTRAVENOUS at 04:05

## 2021-05-04 RX ADMIN — CARVEDILOL 12.5 MG: 12.5 TABLET, FILM COATED ORAL at 08:05

## 2021-05-04 RX ADMIN — AZITHROMYCIN MONOHYDRATE 500 MG: 250 TABLET ORAL at 08:05

## 2021-05-04 RX ADMIN — ACETAMINOPHEN 650 MG: 325 TABLET ORAL at 09:05

## 2021-05-04 RX ADMIN — LISINOPRIL 40 MG: 20 TABLET ORAL at 08:05

## 2021-05-04 RX ADMIN — ACETAMINOPHEN 650 MG: 325 TABLET ORAL at 04:05

## 2021-05-04 RX ADMIN — FAMOTIDINE 20 MG: 20 TABLET ORAL at 08:05

## 2021-05-04 RX ADMIN — APIXABAN 10 MG: 5 TABLET, FILM COATED ORAL at 08:05

## 2021-05-05 LAB
CARDIOLIPIN IGA SER IA-ACNC: <9 MPL U/ML (ref 0–12)
CARDIOLIPIN IGG SER IA-ACNC: <9 GPL U/ML (ref 0–14)
CARDIOLIPIN IGM SER IA-ACNC: <9 APL U/ML (ref 0–11)

## 2021-05-06 LAB
DRVVT CONFIRM: 1.1 RATIO (ref 0.8–1.2)
F2 GENE MUT ANL BLD/T: NORMAL
FACT IX ACT/NOR PPP: 133 % (ref 60–177)
FACT VII ACT/NOR PPP: 100 % (ref 51–186)
FACT VIII ACT/NOR PPP: 327 % (ref 56–140)
FACT X ACT/NOR PPP: 96 % (ref 76–183)
FACT XII ACT/NOR PPP: 118 % (ref 50–150)
LA 2 SCREEN W REFLEX-IMP: ABNORMAL
PROT C ACT/NOR PPP: 94 % (ref 73–180)
PROT S ACT/NOR PPP: 52 % (ref 63–140)
SCREEN APTT: 45.2 SEC (ref 0–51.9)
SCREEN DRVVT: 42.8 SEC (ref 0–40.4)
SCREEN DRVVT: 53.8 SEC (ref 0–47)

## 2021-05-07 LAB — F5 GENE MUT ANL BLD/T: NORMAL

## 2021-05-10 ENCOUNTER — TELEPHONE (OUTPATIENT)
Dept: HEMATOLOGY/ONCOLOGY | Facility: CLINIC | Age: 46
End: 2021-05-10

## 2021-05-10 LAB — MTHFR GENE MUT ANL BLD/T: NORMAL

## 2021-05-14 ENCOUNTER — IMMUNIZATION (OUTPATIENT)
Dept: PRIMARY CARE CLINIC | Facility: CLINIC | Age: 46
End: 2021-05-14
Payer: MEDICAID

## 2021-05-14 DIAGNOSIS — Z23 NEED FOR VACCINATION: Primary | ICD-10-CM

## 2021-05-14 PROCEDURE — 91300 COVID-19, MRNA, LNP-S, PF, 30 MCG/0.3 ML DOSE VACCINE: CPT | Mod: S$GLB,,, | Performed by: FAMILY MEDICINE

## 2021-05-14 PROCEDURE — 0002A COVID-19, MRNA, LNP-S, PF, 30 MCG/0.3 ML DOSE VACCINE: ICD-10-PCS | Mod: CV19,S$GLB,, | Performed by: FAMILY MEDICINE

## 2021-05-14 PROCEDURE — 91300 COVID-19, MRNA, LNP-S, PF, 30 MCG/0.3 ML DOSE VACCINE: ICD-10-PCS | Mod: S$GLB,,, | Performed by: FAMILY MEDICINE

## 2021-05-14 PROCEDURE — 0002A COVID-19, MRNA, LNP-S, PF, 30 MCG/0.3 ML DOSE VACCINE: CPT | Mod: CV19,S$GLB,, | Performed by: FAMILY MEDICINE

## 2021-05-15 LAB
APTT HEX PL PPP: 0 SEC
APTT IMM NP PPP: ABNORMAL SEC
APTT PPP 1:1 SALINE: ABNORMAL SEC
APTT PPP: 30.3 SEC
B2 GLYCOPROT1 IGA SER-ACNC: <10 SAU
B2 GLYCOPROT1 IGG SER-ACNC: 11 SMU
B2 GLYCOPROT1 IGG SER-ACNC: <10 SGU
CARDIOLIPIN IGA SER IA-ACNC: <10 APL
CARDIOLIPIN IGG SER IA-ACNC: <10 GPL
CARDIOLIPIN IGM SER IA-ACNC: <10 MPL
CONFIRM APTT: 1 SEC
CONFIRM DRVVT: ABNORMAL SEC
DRVVT SCREEN TO CONFIRM RATIO: ABNORMAL RATIO
LABORATORY COMMENT REPORT: ABNORMAL
PROTHROM IGG SERPL-ACNC: 11 G UNITS
PS IGG SER IA-ACNC: 3 GPS
PS IGM SER IA-ACNC: 0 MPS
SCREEN DRVVT: 44.7 SEC

## 2021-05-31 ENCOUNTER — LAB VISIT (OUTPATIENT)
Dept: LAB | Facility: HOSPITAL | Age: 46
End: 2021-05-31
Attending: INTERNAL MEDICINE
Payer: MEDICAID

## 2021-05-31 ENCOUNTER — OFFICE VISIT (OUTPATIENT)
Dept: HEMATOLOGY/ONCOLOGY | Facility: CLINIC | Age: 46
End: 2021-05-31
Payer: MEDICAID

## 2021-05-31 ENCOUNTER — TELEPHONE (OUTPATIENT)
Dept: PULMONOLOGY | Facility: CLINIC | Age: 46
End: 2021-05-31

## 2021-05-31 VITALS
TEMPERATURE: 99 F | RESPIRATION RATE: 20 BRPM | DIASTOLIC BLOOD PRESSURE: 133 MMHG | WEIGHT: 293 LBS | SYSTOLIC BLOOD PRESSURE: 211 MMHG | BODY MASS INDEX: 61.96 KG/M2 | HEART RATE: 98 BPM

## 2021-05-31 DIAGNOSIS — D68.59 ANTITHROMBIN III DEFICIENCY: ICD-10-CM

## 2021-05-31 DIAGNOSIS — D50.9 IRON DEFICIENCY ANEMIA, UNSPECIFIED IRON DEFICIENCY ANEMIA TYPE: Chronic | ICD-10-CM

## 2021-05-31 DIAGNOSIS — D50.9 IRON DEFICIENCY ANEMIA, UNSPECIFIED IRON DEFICIENCY ANEMIA TYPE: Primary | Chronic | ICD-10-CM

## 2021-05-31 DIAGNOSIS — D68.59 PROTEIN S DEFICIENCY: ICD-10-CM

## 2021-05-31 DIAGNOSIS — I26.99 ACUTE PULMONARY EMBOLISM, UNSPECIFIED PULMONARY EMBOLISM TYPE, UNSPECIFIED WHETHER ACUTE COR PULMONALE PRESENT: ICD-10-CM

## 2021-05-31 DIAGNOSIS — R79.1 ELEVATED FACTOR VIII LEVEL: ICD-10-CM

## 2021-05-31 LAB
ALBUMIN SERPL BCP-MCNC: 3.1 G/DL (ref 3.5–5.2)
ALP SERPL-CCNC: 79 U/L (ref 55–135)
ALT SERPL W/O P-5'-P-CCNC: 27 U/L (ref 10–44)
ANION GAP SERPL CALC-SCNC: 6 MMOL/L (ref 8–16)
ANISOCYTOSIS BLD QL SMEAR: ABNORMAL
AST SERPL-CCNC: 29 U/L (ref 10–40)
BASOPHILS # BLD AUTO: 0.03 K/UL (ref 0–0.2)
BASOPHILS NFR BLD: 0.4 % (ref 0–1.9)
BILIRUB SERPL-MCNC: 0.5 MG/DL (ref 0.1–1)
BUN SERPL-MCNC: 12 MG/DL (ref 6–20)
CALCIUM SERPL-MCNC: 9.1 MG/DL (ref 8.7–10.5)
CHLORIDE SERPL-SCNC: 96 MMOL/L (ref 95–110)
CO2 SERPL-SCNC: 35 MMOL/L (ref 23–29)
CREAT SERPL-MCNC: 0.9 MG/DL (ref 0.5–1.4)
DACRYOCYTES BLD QL SMEAR: ABNORMAL
DIFFERENTIAL METHOD: ABNORMAL
EOSINOPHIL # BLD AUTO: 0.4 K/UL (ref 0–0.5)
EOSINOPHIL NFR BLD: 4.5 % (ref 0–8)
ERYTHROCYTE [DISTWIDTH] IN BLOOD BY AUTOMATED COUNT: ABNORMAL % (ref 11.5–14.5)
EST. GFR  (AFRICAN AMERICAN): >60 ML/MIN/1.73 M^2
EST. GFR  (NON AFRICAN AMERICAN): >60 ML/MIN/1.73 M^2
FERRITIN SERPL-MCNC: 38 NG/ML (ref 20–300)
GLUCOSE SERPL-MCNC: 161 MG/DL (ref 70–110)
HCT VFR BLD AUTO: 39.8 % (ref 37–48.5)
HGB BLD-MCNC: 11.7 G/DL (ref 12–16)
IMM GRANULOCYTES # BLD AUTO: 0.02 K/UL (ref 0–0.04)
IMM GRANULOCYTES NFR BLD AUTO: 0.3 % (ref 0–0.5)
IRON SERPL-MCNC: 79 UG/DL (ref 30–160)
LYMPHOCYTES # BLD AUTO: 1.3 K/UL (ref 1–4.8)
LYMPHOCYTES NFR BLD: 16 % (ref 18–48)
MCH RBC QN AUTO: 23.5 PG (ref 27–31)
MCHC RBC AUTO-ENTMCNC: 29.4 G/DL (ref 32–36)
MCV RBC AUTO: 80 FL (ref 82–98)
MONOCYTES # BLD AUTO: 0.4 K/UL (ref 0.3–1)
MONOCYTES NFR BLD: 5.1 % (ref 4–15)
NEUTROPHILS # BLD AUTO: 5.8 K/UL (ref 1.8–7.7)
NEUTROPHILS NFR BLD: 73.7 % (ref 38–73)
NRBC BLD-RTO: 0 /100 WBC
OVALOCYTES BLD QL SMEAR: ABNORMAL
PLATELET # BLD AUTO: 341 K/UL (ref 150–450)
PLATELET BLD QL SMEAR: ABNORMAL
PMV BLD AUTO: 9.6 FL (ref 9.2–12.9)
POIKILOCYTOSIS BLD QL SMEAR: ABNORMAL
POTASSIUM SERPL-SCNC: 3.7 MMOL/L (ref 3.5–5.1)
PROT SERPL-MCNC: 8.4 G/DL (ref 6–8.4)
RBC # BLD AUTO: 4.97 M/UL (ref 4–5.4)
SATURATED IRON: 18 % (ref 20–50)
SODIUM SERPL-SCNC: 137 MMOL/L (ref 136–145)
STOMATOCYTES BLD QL SMEAR: PRESENT
TARGETS BLD QL SMEAR: ABNORMAL
TOTAL IRON BINDING CAPACITY: 442 UG/DL (ref 250–450)
TRANSFERRIN SERPL-MCNC: 316 MG/DL (ref 200–375)
WBC # BLD AUTO: 7.8 K/UL (ref 3.9–12.7)

## 2021-05-31 PROCEDURE — 80053 COMPREHEN METABOLIC PANEL: CPT | Performed by: INTERNAL MEDICINE

## 2021-05-31 PROCEDURE — 36415 COLL VENOUS BLD VENIPUNCTURE: CPT | Performed by: INTERNAL MEDICINE

## 2021-05-31 PROCEDURE — 99215 PR OFFICE/OUTPT VISIT, EST, LEVL V, 40-54 MIN: ICD-10-PCS | Mod: S$GLB,,, | Performed by: INTERNAL MEDICINE

## 2021-05-31 PROCEDURE — 85025 COMPLETE CBC W/AUTO DIFF WBC: CPT | Performed by: INTERNAL MEDICINE

## 2021-05-31 PROCEDURE — 99215 OFFICE O/P EST HI 40 MIN: CPT | Mod: S$GLB,,, | Performed by: INTERNAL MEDICINE

## 2021-05-31 PROCEDURE — 82728 ASSAY OF FERRITIN: CPT | Performed by: INTERNAL MEDICINE

## 2021-05-31 PROCEDURE — 83540 ASSAY OF IRON: CPT | Performed by: INTERNAL MEDICINE

## 2021-05-31 RX ORDER — FUROSEMIDE 20 MG/1
20 TABLET ORAL DAILY
Qty: 30 TABLET | Refills: 0 | Status: SHIPPED | OUTPATIENT
Start: 2021-05-31 | End: 2023-07-15

## 2021-05-31 RX ORDER — FERROUS SULFATE 325(65) MG
325 TABLET ORAL DAILY
Qty: 30 TABLET | Refills: 6 | Status: SHIPPED | OUTPATIENT
Start: 2021-05-31 | End: 2021-06-30

## 2021-06-09 RX ORDER — LOSARTAN POTASSIUM 25 MG/1
25 TABLET ORAL DAILY
Status: ON HOLD | COMMUNITY
Start: 2021-02-11 | End: 2022-12-28 | Stop reason: SDUPTHER

## 2021-06-09 RX ORDER — AZITHROMYCIN 250 MG/1
250 TABLET, FILM COATED ORAL
COMMUNITY
Start: 2021-03-26 | End: 2021-10-27

## 2021-06-09 RX ORDER — OMEPRAZOLE 20 MG/1
20 CAPSULE, DELAYED RELEASE ORAL DAILY
COMMUNITY
Start: 2021-02-22

## 2021-06-09 RX ORDER — ESOMEPRAZOLE MAGNESIUM 40 MG/1
40 CAPSULE, DELAYED RELEASE ORAL DAILY
COMMUNITY
Start: 2020-12-23

## 2021-06-09 RX ORDER — METOPROLOL SUCCINATE 25 MG/1
25 TABLET, EXTENDED RELEASE ORAL DAILY
Status: ON HOLD | COMMUNITY
Start: 2021-05-13 | End: 2022-12-28 | Stop reason: HOSPADM

## 2021-06-09 RX ORDER — METHYLPREDNISOLONE 4 MG/1
TABLET ORAL
COMMUNITY
Start: 2021-03-26 | End: 2021-06-10

## 2021-06-10 ENCOUNTER — OFFICE VISIT (OUTPATIENT)
Dept: PULMONOLOGY | Facility: CLINIC | Age: 46
End: 2021-06-10
Payer: MEDICAID

## 2021-06-10 VITALS
SYSTOLIC BLOOD PRESSURE: 135 MMHG | OXYGEN SATURATION: 97 % | BODY MASS INDEX: 61.29 KG/M2 | WEIGHT: 293 LBS | HEART RATE: 56 BPM | DIASTOLIC BLOOD PRESSURE: 83 MMHG

## 2021-06-10 DIAGNOSIS — D68.59 ANTITHROMBIN III DEFICIENCY: ICD-10-CM

## 2021-06-10 DIAGNOSIS — I50.32 CHRONIC DIASTOLIC CONGESTIVE HEART FAILURE: ICD-10-CM

## 2021-06-10 DIAGNOSIS — E66.01 MORBID OBESITY: ICD-10-CM

## 2021-06-10 DIAGNOSIS — E66.2 OBESITY HYPOVENTILATION SYNDROME: Primary | ICD-10-CM

## 2021-06-10 DIAGNOSIS — I27.82 CHRONIC PULMONARY EMBOLISM WITHOUT ACUTE COR PULMONALE, UNSPECIFIED PULMONARY EMBOLISM TYPE: ICD-10-CM

## 2021-06-10 DIAGNOSIS — Z79.01 ON ANTICOAGULANT THERAPY: ICD-10-CM

## 2021-06-10 DIAGNOSIS — Z99.81 ON HOME OXYGEN THERAPY: ICD-10-CM

## 2021-06-10 PROCEDURE — 99214 OFFICE O/P EST MOD 30 MIN: CPT | Mod: S$GLB,,, | Performed by: INTERNAL MEDICINE

## 2021-06-10 PROCEDURE — 99214 PR OFFICE/OUTPT VISIT, EST, LEVL IV, 30-39 MIN: ICD-10-PCS | Mod: S$GLB,,, | Performed by: INTERNAL MEDICINE

## 2021-06-10 RX ORDER — FLUTICASONE PROPIONATE 110 UG/1
2 AEROSOL, METERED RESPIRATORY (INHALATION) 2 TIMES DAILY
Qty: 12 G | Refills: 3 | Status: SHIPPED | OUTPATIENT
Start: 2021-06-10 | End: 2021-11-02 | Stop reason: SDUPTHER

## 2021-06-10 RX ORDER — ALBUTEROL SULFATE 90 UG/1
2 AEROSOL, METERED RESPIRATORY (INHALATION) EVERY 6 HOURS PRN
Qty: 18 G | Refills: 3 | Status: SHIPPED | OUTPATIENT
Start: 2021-06-10 | End: 2021-10-19

## 2021-06-28 ENCOUNTER — OFFICE VISIT (OUTPATIENT)
Dept: HEMATOLOGY/ONCOLOGY | Facility: CLINIC | Age: 46
End: 2021-06-28
Payer: MEDICAID

## 2021-06-28 ENCOUNTER — LAB VISIT (OUTPATIENT)
Dept: LAB | Facility: HOSPITAL | Age: 46
End: 2021-06-28
Attending: INTERNAL MEDICINE
Payer: MEDICAID

## 2021-06-28 VITALS — BODY MASS INDEX: 51.91 KG/M2 | RESPIRATION RATE: 16 BRPM | HEIGHT: 63 IN | WEIGHT: 293 LBS

## 2021-06-28 DIAGNOSIS — I26.99 ACUTE PULMONARY EMBOLISM, UNSPECIFIED PULMONARY EMBOLISM TYPE, UNSPECIFIED WHETHER ACUTE COR PULMONALE PRESENT: ICD-10-CM

## 2021-06-28 DIAGNOSIS — D68.59 ANTITHROMBIN III DEFICIENCY: ICD-10-CM

## 2021-06-28 DIAGNOSIS — R79.1 ELEVATED FACTOR VIII LEVEL: ICD-10-CM

## 2021-06-28 DIAGNOSIS — D50.9 IRON DEFICIENCY ANEMIA, UNSPECIFIED IRON DEFICIENCY ANEMIA TYPE: Primary | Chronic | ICD-10-CM

## 2021-06-28 DIAGNOSIS — D68.59 PROTEIN S DEFICIENCY: ICD-10-CM

## 2021-06-28 DIAGNOSIS — D50.9 IRON DEFICIENCY ANEMIA, UNSPECIFIED IRON DEFICIENCY ANEMIA TYPE: Chronic | ICD-10-CM

## 2021-06-28 LAB
ALBUMIN SERPL BCP-MCNC: 3.1 G/DL (ref 3.5–5.2)
ALP SERPL-CCNC: 78 U/L (ref 55–135)
ALT SERPL W/O P-5'-P-CCNC: 23 U/L (ref 10–44)
ANION GAP SERPL CALC-SCNC: 10 MMOL/L (ref 8–16)
ANISOCYTOSIS BLD QL SMEAR: ABNORMAL
AST SERPL-CCNC: 25 U/L (ref 10–40)
BASOPHILS # BLD AUTO: 0.03 K/UL (ref 0–0.2)
BASOPHILS NFR BLD: 0.3 % (ref 0–1.9)
BILIRUB SERPL-MCNC: 0.6 MG/DL (ref 0.1–1)
BUN SERPL-MCNC: 12 MG/DL (ref 6–20)
CALCIUM SERPL-MCNC: 9.3 MG/DL (ref 8.7–10.5)
CHLORIDE SERPL-SCNC: 95 MMOL/L (ref 95–110)
CO2 SERPL-SCNC: 30 MMOL/L (ref 23–29)
CREAT SERPL-MCNC: 1 MG/DL (ref 0.5–1.4)
DACRYOCYTES BLD QL SMEAR: ABNORMAL
DIFFERENTIAL METHOD: ABNORMAL
EOSINOPHIL # BLD AUTO: 0.4 K/UL (ref 0–0.5)
EOSINOPHIL NFR BLD: 4.2 % (ref 0–8)
ERYTHROCYTE [DISTWIDTH] IN BLOOD BY AUTOMATED COUNT: 22.2 % (ref 11.5–14.5)
EST. GFR  (AFRICAN AMERICAN): >60 ML/MIN/1.73 M^2
EST. GFR  (NON AFRICAN AMERICAN): >60 ML/MIN/1.73 M^2
FERRITIN SERPL-MCNC: 30 NG/ML (ref 20–300)
GLUCOSE SERPL-MCNC: 121 MG/DL (ref 70–110)
HCT VFR BLD AUTO: 38.7 % (ref 37–48.5)
HGB BLD-MCNC: 11.8 G/DL (ref 12–16)
IMM GRANULOCYTES # BLD AUTO: 0.03 K/UL (ref 0–0.04)
IMM GRANULOCYTES NFR BLD AUTO: 0.3 % (ref 0–0.5)
IRON SERPL-MCNC: 76 UG/DL (ref 30–160)
LYMPHOCYTES # BLD AUTO: 1.6 K/UL (ref 1–4.8)
LYMPHOCYTES NFR BLD: 16.8 % (ref 18–48)
MCH RBC QN AUTO: 25.5 PG (ref 27–31)
MCHC RBC AUTO-ENTMCNC: 30.5 G/DL (ref 32–36)
MCV RBC AUTO: 84 FL (ref 82–98)
MONOCYTES # BLD AUTO: 0.4 K/UL (ref 0.3–1)
MONOCYTES NFR BLD: 3.8 % (ref 4–15)
NEUTROPHILS # BLD AUTO: 6.9 K/UL (ref 1.8–7.7)
NEUTROPHILS NFR BLD: 74.6 % (ref 38–73)
NRBC BLD-RTO: 0 /100 WBC
OVALOCYTES BLD QL SMEAR: ABNORMAL
PLATELET # BLD AUTO: 341 K/UL (ref 150–450)
PLATELET BLD QL SMEAR: ABNORMAL
PMV BLD AUTO: 9.2 FL (ref 9.2–12.9)
POIKILOCYTOSIS BLD QL SMEAR: ABNORMAL
POTASSIUM SERPL-SCNC: 3.6 MMOL/L (ref 3.5–5.1)
PROT SERPL-MCNC: 8.4 G/DL (ref 6–8.4)
RBC # BLD AUTO: 4.62 M/UL (ref 4–5.4)
SATURATED IRON: 18 % (ref 20–50)
SODIUM SERPL-SCNC: 135 MMOL/L (ref 136–145)
TOTAL IRON BINDING CAPACITY: 417 UG/DL (ref 250–450)
TRANSFERRIN SERPL-MCNC: 298 MG/DL (ref 200–375)
WBC # BLD AUTO: 9.22 K/UL (ref 3.9–12.7)

## 2021-06-28 PROCEDURE — 82728 ASSAY OF FERRITIN: CPT | Performed by: INTERNAL MEDICINE

## 2021-06-28 PROCEDURE — 80053 COMPREHEN METABOLIC PANEL: CPT | Performed by: INTERNAL MEDICINE

## 2021-06-28 PROCEDURE — 99213 OFFICE O/P EST LOW 20 MIN: CPT | Mod: S$GLB,,, | Performed by: INTERNAL MEDICINE

## 2021-06-28 PROCEDURE — 36415 COLL VENOUS BLD VENIPUNCTURE: CPT | Performed by: INTERNAL MEDICINE

## 2021-06-28 PROCEDURE — 85025 COMPLETE CBC W/AUTO DIFF WBC: CPT | Performed by: INTERNAL MEDICINE

## 2021-06-28 PROCEDURE — 99213 PR OFFICE/OUTPT VISIT, EST, LEVL III, 20-29 MIN: ICD-10-PCS | Mod: S$GLB,,, | Performed by: INTERNAL MEDICINE

## 2021-06-28 PROCEDURE — 83540 ASSAY OF IRON: CPT | Performed by: INTERNAL MEDICINE

## 2021-07-01 ENCOUNTER — TELEPHONE (OUTPATIENT)
Dept: PULMONOLOGY | Facility: HOSPITAL | Age: 46
End: 2021-07-01

## 2021-07-01 ENCOUNTER — HOSPITAL ENCOUNTER (OUTPATIENT)
Dept: PULMONOLOGY | Facility: HOSPITAL | Age: 46
Discharge: HOME | End: 2021-07-01
Attending: INTERNAL MEDICINE
Payer: MEDICAID

## 2021-07-01 VITALS — BODY MASS INDEX: 51.91 KG/M2 | WEIGHT: 293 LBS | HEIGHT: 63 IN

## 2021-07-01 VITALS — OXYGEN SATURATION: 96 % | HEART RATE: 121 BPM | RESPIRATION RATE: 21 BRPM

## 2021-07-01 DIAGNOSIS — E66.2 OBESITY HYPOVENTILATION SYNDROME: ICD-10-CM

## 2021-07-01 DIAGNOSIS — I50.32 CHRONIC DIASTOLIC CONGESTIVE HEART FAILURE: Primary | ICD-10-CM

## 2021-07-01 PROCEDURE — 94010 BREATHING CAPACITY TEST: CPT | Mod: 59

## 2021-07-01 PROCEDURE — 94618 PULMONARY STRESS TESTING: CPT | Mod: 59

## 2021-07-01 PROCEDURE — 94070 EVALUATION OF WHEEZING: CPT

## 2021-07-01 RX ORDER — METHACHOLINE CHLORIDE 0.75/3ML
3 VIAL, NEBULIZER (ML) INHALATION ONCE
Status: DISCONTINUED | OUTPATIENT
Start: 2021-07-01 | End: 2021-07-02 | Stop reason: HOSPADM

## 2021-07-01 RX ORDER — METHACHOLINE CHLORIDE 0.1875/3ML
3 VIAL, NEBULIZER (ML) INHALATION ONCE
Status: COMPLETED | OUTPATIENT
Start: 2021-07-01 | End: 2021-07-01

## 2021-07-01 RX ORDER — METHACHOLINE CHLORIDE 0 MG/3 ML
3 VIAL, NEBULIZER (ML) INHALATION ONCE
Status: DISCONTINUED | OUTPATIENT
Start: 2021-07-01 | End: 2021-07-02 | Stop reason: HOSPADM

## 2021-07-01 RX ORDER — METHACHOLINE CHLORIDE 3 MG/3 ML
3 VIAL, NEBULIZER (ML) INHALATION ONCE
Status: DISCONTINUED | OUTPATIENT
Start: 2021-07-01 | End: 2021-07-02 | Stop reason: HOSPADM

## 2021-07-01 RX ORDER — METHACHOLINE CHLORIDE 12 MG/3 ML
3 VIAL, NEBULIZER (ML) INHALATION ONCE
Status: DISCONTINUED | OUTPATIENT
Start: 2021-07-01 | End: 2021-07-02 | Stop reason: HOSPADM

## 2021-07-01 RX ORDER — METHACHOLINE CHLORIDE 48 MG/3 ML
3 VIAL, NEBULIZER (ML) INHALATION ONCE
Status: DISCONTINUED | OUTPATIENT
Start: 2021-07-01 | End: 2021-07-02 | Stop reason: HOSPADM

## 2021-07-01 RX ADMIN — Medication 0.19 MG: at 08:07

## 2021-07-15 ENCOUNTER — TELEPHONE (OUTPATIENT)
Dept: PULMONOLOGY | Facility: CLINIC | Age: 46
End: 2021-07-15

## 2021-07-19 ENCOUNTER — OFFICE VISIT (OUTPATIENT)
Dept: PULMONOLOGY | Facility: CLINIC | Age: 46
End: 2021-07-19
Payer: MEDICAID

## 2021-07-19 VITALS
HEART RATE: 87 BPM | SYSTOLIC BLOOD PRESSURE: 143 MMHG | BODY MASS INDEX: 61.29 KG/M2 | WEIGHT: 293 LBS | DIASTOLIC BLOOD PRESSURE: 82 MMHG | OXYGEN SATURATION: 98 %

## 2021-07-19 DIAGNOSIS — E66.2 OBESITY HYPOVENTILATION SYNDROME: ICD-10-CM

## 2021-07-19 DIAGNOSIS — E66.01 MORBID OBESITY: ICD-10-CM

## 2021-07-19 DIAGNOSIS — I27.82 CHRONIC PULMONARY EMBOLISM WITHOUT ACUTE COR PULMONALE, UNSPECIFIED PULMONARY EMBOLISM TYPE: ICD-10-CM

## 2021-07-19 DIAGNOSIS — J45.40 MODERATE PERSISTENT ASTHMA WITHOUT COMPLICATION: ICD-10-CM

## 2021-07-19 DIAGNOSIS — Z79.01 ON ANTICOAGULANT THERAPY: ICD-10-CM

## 2021-07-19 DIAGNOSIS — Z99.81 ON HOME OXYGEN THERAPY: ICD-10-CM

## 2021-07-19 DIAGNOSIS — I50.32 CHRONIC DIASTOLIC CONGESTIVE HEART FAILURE: Primary | ICD-10-CM

## 2021-07-19 PROCEDURE — 99214 OFFICE O/P EST MOD 30 MIN: CPT | Mod: S$GLB,,, | Performed by: INTERNAL MEDICINE

## 2021-07-19 PROCEDURE — 99214 PR OFFICE/OUTPT VISIT, EST, LEVL IV, 30-39 MIN: ICD-10-PCS | Mod: S$GLB,,, | Performed by: INTERNAL MEDICINE

## 2021-07-19 RX ORDER — MELOXICAM 7.5 MG/1
7.5 TABLET ORAL DAILY
COMMUNITY
Start: 2021-06-30 | End: 2024-03-14

## 2021-07-24 DIAGNOSIS — M25.561 RIGHT KNEE PAIN, UNSPECIFIED CHRONICITY: Primary | ICD-10-CM

## 2021-07-26 ENCOUNTER — LAB VISIT (OUTPATIENT)
Dept: LAB | Facility: HOSPITAL | Age: 46
End: 2021-07-26
Attending: INTERNAL MEDICINE
Payer: MEDICAID

## 2021-07-26 DIAGNOSIS — D50.9 IRON DEFICIENCY ANEMIA, UNSPECIFIED IRON DEFICIENCY ANEMIA TYPE: Chronic | ICD-10-CM

## 2021-07-26 LAB
ALBUMIN SERPL BCP-MCNC: 3.1 G/DL (ref 3.5–5.2)
ALP SERPL-CCNC: 71 U/L (ref 55–135)
ALT SERPL W/O P-5'-P-CCNC: 34 U/L (ref 10–44)
ANION GAP SERPL CALC-SCNC: 9 MMOL/L (ref 8–16)
AST SERPL-CCNC: 44 U/L (ref 10–40)
BASOPHILS # BLD AUTO: 0.02 K/UL (ref 0–0.2)
BASOPHILS NFR BLD: 0.2 % (ref 0–1.9)
BILIRUB SERPL-MCNC: 0.5 MG/DL (ref 0.1–1)
BUN SERPL-MCNC: 10 MG/DL (ref 6–20)
CALCIUM SERPL-MCNC: 9.3 MG/DL (ref 8.7–10.5)
CHLORIDE SERPL-SCNC: 99 MMOL/L (ref 95–110)
CO2 SERPL-SCNC: 29 MMOL/L (ref 23–29)
CREAT SERPL-MCNC: 1 MG/DL (ref 0.5–1.4)
DIFFERENTIAL METHOD: ABNORMAL
EOSINOPHIL # BLD AUTO: 0.3 K/UL (ref 0–0.5)
EOSINOPHIL NFR BLD: 3.1 % (ref 0–8)
ERYTHROCYTE [DISTWIDTH] IN BLOOD BY AUTOMATED COUNT: 15.6 % (ref 11.5–14.5)
EST. GFR  (AFRICAN AMERICAN): >60 ML/MIN/1.73 M^2
EST. GFR  (NON AFRICAN AMERICAN): >60 ML/MIN/1.73 M^2
FERRITIN SERPL-MCNC: 35 NG/ML (ref 20–300)
GLUCOSE SERPL-MCNC: 140 MG/DL (ref 70–110)
HCT VFR BLD AUTO: 36.8 % (ref 37–48.5)
HGB BLD-MCNC: 11.4 G/DL (ref 12–16)
IMM GRANULOCYTES # BLD AUTO: 0.04 K/UL (ref 0–0.04)
IMM GRANULOCYTES NFR BLD AUTO: 0.4 % (ref 0–0.5)
IRON SERPL-MCNC: 29 UG/DL (ref 30–160)
LYMPHOCYTES # BLD AUTO: 1.3 K/UL (ref 1–4.8)
LYMPHOCYTES NFR BLD: 13.6 % (ref 18–48)
MCH RBC QN AUTO: 27.5 PG (ref 27–31)
MCHC RBC AUTO-ENTMCNC: 31 G/DL (ref 32–36)
MCV RBC AUTO: 89 FL (ref 82–98)
MONOCYTES # BLD AUTO: 0.4 K/UL (ref 0.3–1)
MONOCYTES NFR BLD: 4.1 % (ref 4–15)
NEUTROPHILS # BLD AUTO: 7.4 K/UL (ref 1.8–7.7)
NEUTROPHILS NFR BLD: 78.6 % (ref 38–73)
NRBC BLD-RTO: 0 /100 WBC
PLATELET # BLD AUTO: 327 K/UL (ref 150–450)
PMV BLD AUTO: 9.3 FL (ref 9.2–12.9)
POTASSIUM SERPL-SCNC: 4.1 MMOL/L (ref 3.5–5.1)
PROT SERPL-MCNC: 8.1 G/DL (ref 6–8.4)
RBC # BLD AUTO: 4.15 M/UL (ref 4–5.4)
SATURATED IRON: 7 % (ref 20–50)
SODIUM SERPL-SCNC: 137 MMOL/L (ref 136–145)
TOTAL IRON BINDING CAPACITY: 433 UG/DL (ref 250–450)
TRANSFERRIN SERPL-MCNC: 309 MG/DL (ref 200–375)
WBC # BLD AUTO: 9.44 K/UL (ref 3.9–12.7)

## 2021-07-26 PROCEDURE — 82728 ASSAY OF FERRITIN: CPT | Performed by: INTERNAL MEDICINE

## 2021-07-26 PROCEDURE — 83540 ASSAY OF IRON: CPT | Performed by: INTERNAL MEDICINE

## 2021-07-26 PROCEDURE — 85025 COMPLETE CBC W/AUTO DIFF WBC: CPT | Performed by: INTERNAL MEDICINE

## 2021-07-26 PROCEDURE — 80053 COMPREHEN METABOLIC PANEL: CPT | Performed by: INTERNAL MEDICINE

## 2021-07-26 PROCEDURE — 36415 COLL VENOUS BLD VENIPUNCTURE: CPT | Performed by: INTERNAL MEDICINE

## 2021-07-27 ENCOUNTER — OFFICE VISIT (OUTPATIENT)
Dept: ORTHOPEDICS | Facility: CLINIC | Age: 46
End: 2021-07-27
Payer: MEDICAID

## 2021-07-27 ENCOUNTER — HOSPITAL ENCOUNTER (OUTPATIENT)
Dept: RADIOLOGY | Facility: HOSPITAL | Age: 46
Discharge: HOME | End: 2021-07-27
Attending: ORTHOPAEDIC SURGERY
Payer: MEDICAID

## 2021-07-27 VITALS — HEIGHT: 63 IN | BODY MASS INDEX: 51.91 KG/M2 | WEIGHT: 293 LBS

## 2021-07-27 DIAGNOSIS — M17.12 PRIMARY OSTEOARTHRITIS OF LEFT KNEE: ICD-10-CM

## 2021-07-27 DIAGNOSIS — M17.11 PRIMARY OSTEOARTHRITIS OF RIGHT KNEE: Primary | ICD-10-CM

## 2021-07-27 DIAGNOSIS — M25.561 RIGHT KNEE PAIN, UNSPECIFIED CHRONICITY: ICD-10-CM

## 2021-07-27 PROCEDURE — 73564 X-RAY EXAM KNEE 4 OR MORE: CPT | Mod: TC,PN,RT

## 2021-07-27 PROCEDURE — 99999 PR PBB SHADOW E&M-EST. PATIENT-LVL III: ICD-10-PCS | Mod: PBBFAC,,, | Performed by: ORTHOPAEDIC SURGERY

## 2021-07-27 PROCEDURE — 99999 PR PBB SHADOW E&M-EST. PATIENT-LVL III: CPT | Mod: PBBFAC,,, | Performed by: ORTHOPAEDIC SURGERY

## 2021-07-27 PROCEDURE — 73562 XR KNEE ORTHO RIGHT WITH FLEXION: ICD-10-PCS | Mod: 26,LT,, | Performed by: RADIOLOGY

## 2021-07-27 PROCEDURE — 73562 X-RAY EXAM OF KNEE 3: CPT | Mod: 26,LT,, | Performed by: RADIOLOGY

## 2021-07-27 PROCEDURE — 73564 XR KNEE ORTHO RIGHT WITH FLEXION: ICD-10-PCS | Mod: 26,RT,, | Performed by: RADIOLOGY

## 2021-07-27 PROCEDURE — 99213 OFFICE O/P EST LOW 20 MIN: CPT | Mod: PBBFAC,PN | Performed by: ORTHOPAEDIC SURGERY

## 2021-07-27 PROCEDURE — 99203 OFFICE O/P NEW LOW 30 MIN: CPT | Mod: S$PBB,,, | Performed by: ORTHOPAEDIC SURGERY

## 2021-07-27 PROCEDURE — 99203 PR OFFICE/OUTPT VISIT, NEW, LEVL III, 30-44 MIN: ICD-10-PCS | Mod: S$PBB,,, | Performed by: ORTHOPAEDIC SURGERY

## 2021-07-27 PROCEDURE — 73564 X-RAY EXAM KNEE 4 OR MORE: CPT | Mod: 26,RT,, | Performed by: RADIOLOGY

## 2021-08-23 ENCOUNTER — LAB VISIT (OUTPATIENT)
Dept: LAB | Facility: HOSPITAL | Age: 46
End: 2021-08-23
Attending: INTERNAL MEDICINE
Payer: MEDICAID

## 2021-08-23 DIAGNOSIS — D50.9 IRON DEFICIENCY ANEMIA, UNSPECIFIED IRON DEFICIENCY ANEMIA TYPE: Chronic | ICD-10-CM

## 2021-08-23 LAB
ALBUMIN SERPL BCP-MCNC: 3 G/DL (ref 3.5–5.2)
ALP SERPL-CCNC: 69 U/L (ref 55–135)
ALT SERPL W/O P-5'-P-CCNC: 27 U/L (ref 10–44)
ANION GAP SERPL CALC-SCNC: 7 MMOL/L (ref 8–16)
AST SERPL-CCNC: 27 U/L (ref 10–40)
BASOPHILS # BLD AUTO: 0.02 K/UL (ref 0–0.2)
BASOPHILS NFR BLD: 0.2 % (ref 0–1.9)
BILIRUB SERPL-MCNC: 0.5 MG/DL (ref 0.1–1)
BUN SERPL-MCNC: 10 MG/DL (ref 6–20)
CALCIUM SERPL-MCNC: 8.9 MG/DL (ref 8.7–10.5)
CHLORIDE SERPL-SCNC: 101 MMOL/L (ref 95–110)
CO2 SERPL-SCNC: 30 MMOL/L (ref 23–29)
CREAT SERPL-MCNC: 1.1 MG/DL (ref 0.5–1.4)
DIFFERENTIAL METHOD: ABNORMAL
EOSINOPHIL # BLD AUTO: 0.5 K/UL (ref 0–0.5)
EOSINOPHIL NFR BLD: 5.5 % (ref 0–8)
ERYTHROCYTE [DISTWIDTH] IN BLOOD BY AUTOMATED COUNT: 14.4 % (ref 11.5–14.5)
EST. GFR  (AFRICAN AMERICAN): >60 ML/MIN/1.73 M^2
EST. GFR  (NON AFRICAN AMERICAN): 59.9 ML/MIN/1.73 M^2
FERRITIN SERPL-MCNC: 38 NG/ML (ref 20–300)
GLUCOSE SERPL-MCNC: 134 MG/DL (ref 70–110)
HCT VFR BLD AUTO: 28.6 % (ref 37–48.5)
HGB BLD-MCNC: 8.8 G/DL (ref 12–16)
IMM GRANULOCYTES # BLD AUTO: 0.03 K/UL (ref 0–0.04)
IMM GRANULOCYTES NFR BLD AUTO: 0.4 % (ref 0–0.5)
IRON SERPL-MCNC: 26 UG/DL (ref 30–160)
LYMPHOCYTES # BLD AUTO: 1.6 K/UL (ref 1–4.8)
LYMPHOCYTES NFR BLD: 18.5 % (ref 18–48)
MCH RBC QN AUTO: 28.4 PG (ref 27–31)
MCHC RBC AUTO-ENTMCNC: 30.8 G/DL (ref 32–36)
MCV RBC AUTO: 92 FL (ref 82–98)
MONOCYTES # BLD AUTO: 0.5 K/UL (ref 0.3–1)
MONOCYTES NFR BLD: 5.6 % (ref 4–15)
NEUTROPHILS # BLD AUTO: 5.9 K/UL (ref 1.8–7.7)
NEUTROPHILS NFR BLD: 69.8 % (ref 38–73)
NRBC BLD-RTO: 0 /100 WBC
PLATELET # BLD AUTO: 365 K/UL (ref 150–450)
PMV BLD AUTO: 9.2 FL (ref 9.2–12.9)
POTASSIUM SERPL-SCNC: 4 MMOL/L (ref 3.5–5.1)
PROT SERPL-MCNC: 7.6 G/DL (ref 6–8.4)
RBC # BLD AUTO: 3.1 M/UL (ref 4–5.4)
SATURATED IRON: 6 % (ref 20–50)
SODIUM SERPL-SCNC: 138 MMOL/L (ref 136–145)
TOTAL IRON BINDING CAPACITY: 416 UG/DL (ref 250–450)
TRANSFERRIN SERPL-MCNC: 297 MG/DL (ref 200–375)
WBC # BLD AUTO: 8.43 K/UL (ref 3.9–12.7)

## 2021-08-23 PROCEDURE — 85025 COMPLETE CBC W/AUTO DIFF WBC: CPT | Performed by: INTERNAL MEDICINE

## 2021-08-23 PROCEDURE — 36415 COLL VENOUS BLD VENIPUNCTURE: CPT | Performed by: INTERNAL MEDICINE

## 2021-08-23 PROCEDURE — 82728 ASSAY OF FERRITIN: CPT | Performed by: INTERNAL MEDICINE

## 2021-08-23 PROCEDURE — 80053 COMPREHEN METABOLIC PANEL: CPT | Performed by: INTERNAL MEDICINE

## 2021-08-23 PROCEDURE — 83540 ASSAY OF IRON: CPT | Performed by: INTERNAL MEDICINE

## 2021-09-23 ENCOUNTER — LAB VISIT (OUTPATIENT)
Dept: LAB | Facility: HOSPITAL | Age: 46
End: 2021-09-23
Attending: INTERNAL MEDICINE
Payer: MEDICAID

## 2021-09-23 DIAGNOSIS — D50.9 IRON DEFICIENCY ANEMIA, UNSPECIFIED IRON DEFICIENCY ANEMIA TYPE: Chronic | ICD-10-CM

## 2021-09-23 LAB
ALBUMIN SERPL BCP-MCNC: 3.2 G/DL (ref 3.5–5.2)
ALP SERPL-CCNC: 83 U/L (ref 55–135)
ALT SERPL W/O P-5'-P-CCNC: 31 U/L (ref 10–44)
ANION GAP SERPL CALC-SCNC: 9 MMOL/L (ref 8–16)
AST SERPL-CCNC: 33 U/L (ref 10–40)
BASOPHILS # BLD AUTO: 0.02 K/UL (ref 0–0.2)
BASOPHILS NFR BLD: 0.3 % (ref 0–1.9)
BILIRUB SERPL-MCNC: 0.6 MG/DL (ref 0.1–1)
BUN SERPL-MCNC: 11 MG/DL (ref 6–20)
CALCIUM SERPL-MCNC: 9.4 MG/DL (ref 8.7–10.5)
CHLORIDE SERPL-SCNC: 102 MMOL/L (ref 95–110)
CO2 SERPL-SCNC: 30 MMOL/L (ref 23–29)
CREAT SERPL-MCNC: 1 MG/DL (ref 0.5–1.4)
DIFFERENTIAL METHOD: ABNORMAL
EOSINOPHIL # BLD AUTO: 0.4 K/UL (ref 0–0.5)
EOSINOPHIL NFR BLD: 6 % (ref 0–8)
ERYTHROCYTE [DISTWIDTH] IN BLOOD BY AUTOMATED COUNT: 18.3 % (ref 11.5–14.5)
EST. GFR  (AFRICAN AMERICAN): >60 ML/MIN/1.73 M^2
EST. GFR  (NON AFRICAN AMERICAN): >60 ML/MIN/1.73 M^2
FERRITIN SERPL-MCNC: 69 NG/ML (ref 20–300)
GLUCOSE SERPL-MCNC: 218 MG/DL (ref 70–110)
HCT VFR BLD AUTO: 36.4 % (ref 37–48.5)
HGB BLD-MCNC: 10.2 G/DL (ref 12–16)
IMM GRANULOCYTES # BLD AUTO: 0.02 K/UL (ref 0–0.04)
IMM GRANULOCYTES NFR BLD AUTO: 0.3 % (ref 0–0.5)
IRON SERPL-MCNC: 52 UG/DL (ref 30–160)
LYMPHOCYTES # BLD AUTO: 1.6 K/UL (ref 1–4.8)
LYMPHOCYTES NFR BLD: 22 % (ref 18–48)
MCH RBC QN AUTO: 24.5 PG (ref 27–31)
MCHC RBC AUTO-ENTMCNC: 28 G/DL (ref 32–36)
MCV RBC AUTO: 88 FL (ref 82–98)
MONOCYTES # BLD AUTO: 0.3 K/UL (ref 0.3–1)
MONOCYTES NFR BLD: 4.1 % (ref 4–15)
NEUTROPHILS # BLD AUTO: 5 K/UL (ref 1.8–7.7)
NEUTROPHILS NFR BLD: 67.3 % (ref 38–73)
NRBC BLD-RTO: 0 /100 WBC
PLATELET # BLD AUTO: 397 K/UL (ref 150–450)
PMV BLD AUTO: 9.8 FL (ref 9.2–12.9)
POTASSIUM SERPL-SCNC: 4 MMOL/L (ref 3.5–5.1)
PROT SERPL-MCNC: 8.3 G/DL (ref 6–8.4)
RBC # BLD AUTO: 4.16 M/UL (ref 4–5.4)
SATURATED IRON: 11 % (ref 20–50)
SODIUM SERPL-SCNC: 141 MMOL/L (ref 136–145)
TOTAL IRON BINDING CAPACITY: 473 UG/DL (ref 250–450)
TRANSFERRIN SERPL-MCNC: 338 MG/DL (ref 200–375)
WBC # BLD AUTO: 7.35 K/UL (ref 3.9–12.7)

## 2021-09-23 PROCEDURE — 84466 ASSAY OF TRANSFERRIN: CPT | Performed by: INTERNAL MEDICINE

## 2021-09-23 PROCEDURE — 85025 COMPLETE CBC W/AUTO DIFF WBC: CPT | Performed by: INTERNAL MEDICINE

## 2021-09-23 PROCEDURE — 36415 COLL VENOUS BLD VENIPUNCTURE: CPT | Performed by: INTERNAL MEDICINE

## 2021-09-23 PROCEDURE — 80053 COMPREHEN METABOLIC PANEL: CPT | Performed by: INTERNAL MEDICINE

## 2021-09-23 PROCEDURE — 82728 ASSAY OF FERRITIN: CPT | Performed by: INTERNAL MEDICINE

## 2021-10-21 ENCOUNTER — LAB VISIT (OUTPATIENT)
Dept: LAB | Facility: HOSPITAL | Age: 46
End: 2021-10-21
Attending: INTERNAL MEDICINE
Payer: MEDICAID

## 2021-10-21 ENCOUNTER — TELEPHONE (OUTPATIENT)
Dept: HEMATOLOGY/ONCOLOGY | Facility: CLINIC | Age: 46
End: 2021-10-21

## 2021-10-21 DIAGNOSIS — D50.9 IRON DEFICIENCY ANEMIA, UNSPECIFIED IRON DEFICIENCY ANEMIA TYPE: Chronic | ICD-10-CM

## 2021-10-21 DIAGNOSIS — D68.59 ANTITHROMBIN III DEFICIENCY: ICD-10-CM

## 2021-10-21 DIAGNOSIS — D50.9 IRON DEFICIENCY ANEMIA, UNSPECIFIED IRON DEFICIENCY ANEMIA TYPE: Primary | ICD-10-CM

## 2021-10-21 LAB
ALBUMIN SERPL BCP-MCNC: 3.3 G/DL (ref 3.5–5.2)
ALP SERPL-CCNC: 71 U/L (ref 55–135)
ALT SERPL W/O P-5'-P-CCNC: 33 U/L (ref 10–44)
ANION GAP SERPL CALC-SCNC: 12 MMOL/L (ref 8–16)
AST SERPL-CCNC: 30 U/L (ref 10–40)
BASOPHILS # BLD AUTO: 0.04 K/UL (ref 0–0.2)
BASOPHILS NFR BLD: 0.4 % (ref 0–1.9)
BILIRUB SERPL-MCNC: 0.5 MG/DL (ref 0.1–1)
BUN SERPL-MCNC: 12 MG/DL (ref 6–20)
CALCIUM SERPL-MCNC: 9.6 MG/DL (ref 8.7–10.5)
CHLORIDE SERPL-SCNC: 106 MMOL/L (ref 95–110)
CO2 SERPL-SCNC: 26 MMOL/L (ref 23–29)
CREAT SERPL-MCNC: 0.8 MG/DL (ref 0.5–1.4)
DIFFERENTIAL METHOD: ABNORMAL
EOSINOPHIL # BLD AUTO: 0.4 K/UL (ref 0–0.5)
EOSINOPHIL NFR BLD: 4 % (ref 0–8)
ERYTHROCYTE [DISTWIDTH] IN BLOOD BY AUTOMATED COUNT: 18.6 % (ref 11.5–14.5)
EST. GFR  (AFRICAN AMERICAN): >60 ML/MIN/1.73 M^2
EST. GFR  (NON AFRICAN AMERICAN): >60 ML/MIN/1.73 M^2
FERRITIN SERPL-MCNC: 30 NG/ML (ref 20–300)
GLUCOSE SERPL-MCNC: 138 MG/DL (ref 70–110)
HCT VFR BLD AUTO: 35.1 % (ref 37–48.5)
HGB BLD-MCNC: 10.6 G/DL (ref 12–16)
IMM GRANULOCYTES # BLD AUTO: 0.03 K/UL (ref 0–0.04)
IMM GRANULOCYTES NFR BLD AUTO: 0.3 % (ref 0–0.5)
IRON SERPL-MCNC: 29 UG/DL (ref 30–160)
LYMPHOCYTES # BLD AUTO: 1.7 K/UL (ref 1–4.8)
LYMPHOCYTES NFR BLD: 17 % (ref 18–48)
MCH RBC QN AUTO: 25.1 PG (ref 27–31)
MCHC RBC AUTO-ENTMCNC: 30.2 G/DL (ref 32–36)
MCV RBC AUTO: 83 FL (ref 82–98)
MONOCYTES # BLD AUTO: 0.4 K/UL (ref 0.3–1)
MONOCYTES NFR BLD: 3.9 % (ref 4–15)
NEUTROPHILS # BLD AUTO: 7.4 K/UL (ref 1.8–7.7)
NEUTROPHILS NFR BLD: 74.4 % (ref 38–73)
NRBC BLD-RTO: 0 /100 WBC
PLATELET # BLD AUTO: 415 K/UL (ref 150–450)
PMV BLD AUTO: 9.6 FL (ref 9.2–12.9)
POTASSIUM SERPL-SCNC: 3.9 MMOL/L (ref 3.5–5.1)
PROT SERPL-MCNC: 8 G/DL (ref 6–8.4)
RBC # BLD AUTO: 4.23 M/UL (ref 4–5.4)
SATURATED IRON: 7 % (ref 20–50)
SODIUM SERPL-SCNC: 144 MMOL/L (ref 136–145)
TOTAL IRON BINDING CAPACITY: 427 UG/DL (ref 250–450)
TRANSFERRIN SERPL-MCNC: 305 MG/DL (ref 200–375)
WBC # BLD AUTO: 9.98 K/UL (ref 3.9–12.7)

## 2021-10-21 PROCEDURE — 36415 COLL VENOUS BLD VENIPUNCTURE: CPT | Performed by: INTERNAL MEDICINE

## 2021-10-21 PROCEDURE — 80053 COMPREHEN METABOLIC PANEL: CPT | Performed by: INTERNAL MEDICINE

## 2021-10-21 PROCEDURE — 84466 ASSAY OF TRANSFERRIN: CPT | Performed by: INTERNAL MEDICINE

## 2021-10-21 PROCEDURE — 85025 COMPLETE CBC W/AUTO DIFF WBC: CPT | Performed by: INTERNAL MEDICINE

## 2021-10-21 PROCEDURE — 82728 ASSAY OF FERRITIN: CPT | Performed by: INTERNAL MEDICINE

## 2021-10-27 ENCOUNTER — OFFICE VISIT (OUTPATIENT)
Dept: HEMATOLOGY/ONCOLOGY | Facility: CLINIC | Age: 46
End: 2021-10-27
Payer: MEDICAID

## 2021-10-27 VITALS
SYSTOLIC BLOOD PRESSURE: 177 MMHG | DIASTOLIC BLOOD PRESSURE: 115 MMHG | HEIGHT: 63 IN | RESPIRATION RATE: 20 BRPM | BODY MASS INDEX: 51.91 KG/M2 | WEIGHT: 293 LBS | HEART RATE: 112 BPM

## 2021-10-27 DIAGNOSIS — D68.59 ANTITHROMBIN III DEFICIENCY: Primary | ICD-10-CM

## 2021-10-27 DIAGNOSIS — I26.99 ACUTE PULMONARY EMBOLISM, UNSPECIFIED PULMONARY EMBOLISM TYPE, UNSPECIFIED WHETHER ACUTE COR PULMONALE PRESENT: ICD-10-CM

## 2021-10-27 DIAGNOSIS — D50.0 IRON DEFICIENCY ANEMIA DUE TO CHRONIC BLOOD LOSS: ICD-10-CM

## 2021-10-27 DIAGNOSIS — D68.59 PROTEIN S DEFICIENCY: ICD-10-CM

## 2021-10-27 DIAGNOSIS — D50.9 IRON DEFICIENCY ANEMIA, UNSPECIFIED IRON DEFICIENCY ANEMIA TYPE: Chronic | ICD-10-CM

## 2021-10-27 DIAGNOSIS — R79.1 ELEVATED FACTOR VIII LEVEL: ICD-10-CM

## 2021-10-27 PROCEDURE — 99213 OFFICE O/P EST LOW 20 MIN: CPT | Mod: S$GLB,,, | Performed by: INTERNAL MEDICINE

## 2021-10-27 PROCEDURE — 99213 PR OFFICE/OUTPT VISIT, EST, LEVL III, 20-29 MIN: ICD-10-PCS | Mod: S$GLB,,, | Performed by: INTERNAL MEDICINE

## 2021-10-27 RX ORDER — EPINEPHRINE 0.3 MG/.3ML
0.3 INJECTION SUBCUTANEOUS ONCE AS NEEDED
Status: CANCELLED | OUTPATIENT
Start: 2021-11-03

## 2021-10-27 RX ORDER — DIPHENHYDRAMINE HYDROCHLORIDE 50 MG/ML
25 INJECTION INTRAMUSCULAR; INTRAVENOUS
Status: CANCELLED
Start: 2021-11-03

## 2021-10-27 RX ORDER — SODIUM CHLORIDE 9 MG/ML
INJECTION, SOLUTION INTRAVENOUS CONTINUOUS
Status: CANCELLED | OUTPATIENT
Start: 2021-11-03

## 2021-10-27 RX ORDER — METHYLPREDNISOLONE SOD SUCC 125 MG
125 VIAL (EA) INJECTION ONCE AS NEEDED
Status: CANCELLED | OUTPATIENT
Start: 2021-11-03

## 2021-10-27 RX ORDER — HEPARIN 100 UNIT/ML
5 SYRINGE INTRAVENOUS
Status: CANCELLED | OUTPATIENT
Start: 2021-11-03

## 2021-10-27 RX ORDER — DIPHENHYDRAMINE HYDROCHLORIDE 50 MG/ML
50 INJECTION INTRAMUSCULAR; INTRAVENOUS ONCE AS NEEDED
Status: CANCELLED | OUTPATIENT
Start: 2021-11-03

## 2021-10-27 RX ORDER — SODIUM CHLORIDE 0.9 % (FLUSH) 0.9 %
10 SYRINGE (ML) INJECTION
Status: CANCELLED | OUTPATIENT
Start: 2021-11-03

## 2021-11-02 ENCOUNTER — OFFICE VISIT (OUTPATIENT)
Dept: PULMONOLOGY | Facility: CLINIC | Age: 46
End: 2021-11-02
Payer: MEDICAID

## 2021-11-02 VITALS
DIASTOLIC BLOOD PRESSURE: 80 MMHG | SYSTOLIC BLOOD PRESSURE: 132 MMHG | BODY MASS INDEX: 61.11 KG/M2 | HEART RATE: 91 BPM | WEIGHT: 293 LBS | OXYGEN SATURATION: 99 %

## 2021-11-02 DIAGNOSIS — E66.2 OBESITY HYPOVENTILATION SYNDROME: ICD-10-CM

## 2021-11-02 DIAGNOSIS — D68.59 ANTITHROMBIN III DEFICIENCY: ICD-10-CM

## 2021-11-02 DIAGNOSIS — E66.01 MORBID OBESITY: ICD-10-CM

## 2021-11-02 DIAGNOSIS — Z99.81 ON HOME OXYGEN THERAPY: ICD-10-CM

## 2021-11-02 DIAGNOSIS — Z79.01 ON ANTICOAGULANT THERAPY: ICD-10-CM

## 2021-11-02 DIAGNOSIS — I27.82 CHRONIC PULMONARY EMBOLISM WITHOUT ACUTE COR PULMONALE, UNSPECIFIED PULMONARY EMBOLISM TYPE: ICD-10-CM

## 2021-11-02 DIAGNOSIS — I50.32 CHRONIC DIASTOLIC CONGESTIVE HEART FAILURE: Primary | ICD-10-CM

## 2021-11-02 DIAGNOSIS — J45.40 MODERATE PERSISTENT ASTHMA WITHOUT COMPLICATION: ICD-10-CM

## 2021-11-02 DIAGNOSIS — G47.33 OSA (OBSTRUCTIVE SLEEP APNEA): ICD-10-CM

## 2021-11-02 PROCEDURE — 99214 OFFICE O/P EST MOD 30 MIN: CPT | Mod: S$GLB,,, | Performed by: INTERNAL MEDICINE

## 2021-11-02 PROCEDURE — 99214 PR OFFICE/OUTPT VISIT, EST, LEVL IV, 30-39 MIN: ICD-10-PCS | Mod: S$GLB,,, | Performed by: INTERNAL MEDICINE

## 2021-11-02 RX ORDER — FLUTICASONE PROPIONATE 110 UG/1
2 AEROSOL, METERED RESPIRATORY (INHALATION) 2 TIMES DAILY
Qty: 12 G | Refills: 5 | Status: SHIPPED | OUTPATIENT
Start: 2021-11-02 | End: 2022-05-02 | Stop reason: SDUPTHER

## 2021-11-02 RX ORDER — ALBUTEROL SULFATE 90 UG/1
2 AEROSOL, METERED RESPIRATORY (INHALATION) EVERY 6 HOURS PRN
Qty: 18 G | Refills: 5 | Status: SHIPPED | OUTPATIENT
Start: 2021-11-02 | End: 2022-08-18 | Stop reason: SDUPTHER

## 2021-11-02 RX ORDER — FERROUS SULFATE TAB 325 MG (65 MG ELEMENTAL FE) 325 (65 FE) MG
1 TAB ORAL DAILY
COMMUNITY
Start: 2021-10-18 | End: 2021-12-29

## 2021-11-06 ENCOUNTER — HOSPITAL ENCOUNTER (EMERGENCY)
Facility: HOSPITAL | Age: 46
Discharge: HOME | End: 2021-11-06
Attending: EMERGENCY MEDICINE | Admitting: HOSPITALIST
Payer: MEDICAID

## 2021-11-06 VITALS
OXYGEN SATURATION: 97 % | HEIGHT: 63 IN | BODY MASS INDEX: 51.91 KG/M2 | RESPIRATION RATE: 18 BRPM | SYSTOLIC BLOOD PRESSURE: 151 MMHG | TEMPERATURE: 98 F | HEART RATE: 99 BPM | WEIGHT: 293 LBS | DIASTOLIC BLOOD PRESSURE: 77 MMHG

## 2021-11-06 DIAGNOSIS — M54.12 CERVICAL RADICULOPATHY: Primary | ICD-10-CM

## 2021-11-06 DIAGNOSIS — I10 POORLY-CONTROLLED HYPERTENSION: ICD-10-CM

## 2021-11-06 DIAGNOSIS — M79.601 RIGHT ARM PAIN: ICD-10-CM

## 2021-11-06 LAB
ALBUMIN SERPL BCP-MCNC: 3.1 G/DL (ref 3.5–5.2)
ALP SERPL-CCNC: 71 U/L (ref 55–135)
ALT SERPL W/O P-5'-P-CCNC: 34 U/L (ref 10–44)
ANION GAP SERPL CALC-SCNC: 10 MMOL/L (ref 8–16)
AST SERPL-CCNC: 35 U/L (ref 10–40)
BASOPHILS # BLD AUTO: 0.04 K/UL (ref 0–0.2)
BASOPHILS NFR BLD: 0.4 % (ref 0–1.9)
BILIRUB SERPL-MCNC: 0.6 MG/DL (ref 0.1–1)
BNP SERPL-MCNC: 59 PG/ML (ref 0–99)
BUN SERPL-MCNC: 13 MG/DL (ref 6–20)
CALCIUM SERPL-MCNC: 9 MG/DL (ref 8.7–10.5)
CHLORIDE SERPL-SCNC: 100 MMOL/L (ref 95–110)
CO2 SERPL-SCNC: 26 MMOL/L (ref 23–29)
CREAT SERPL-MCNC: 1 MG/DL (ref 0.5–1.4)
DIFFERENTIAL METHOD: ABNORMAL
EOSINOPHIL # BLD AUTO: 0.4 K/UL (ref 0–0.5)
EOSINOPHIL NFR BLD: 4.3 % (ref 0–8)
ERYTHROCYTE [DISTWIDTH] IN BLOOD BY AUTOMATED COUNT: 20.4 % (ref 11.5–14.5)
EST. GFR  (AFRICAN AMERICAN): >60 ML/MIN/1.73 M^2
EST. GFR  (NON AFRICAN AMERICAN): >60 ML/MIN/1.73 M^2
GLUCOSE SERPL-MCNC: 160 MG/DL (ref 70–110)
HCT VFR BLD AUTO: 36.2 % (ref 37–48.5)
HGB BLD-MCNC: 10.9 G/DL (ref 12–16)
IMM GRANULOCYTES # BLD AUTO: 0.02 K/UL (ref 0–0.04)
IMM GRANULOCYTES NFR BLD AUTO: 0.2 % (ref 0–0.5)
LYMPHOCYTES # BLD AUTO: 1.7 K/UL (ref 1–4.8)
LYMPHOCYTES NFR BLD: 18.8 % (ref 18–48)
MCH RBC QN AUTO: 25.7 PG (ref 27–31)
MCHC RBC AUTO-ENTMCNC: 30.1 G/DL (ref 32–36)
MCV RBC AUTO: 85 FL (ref 82–98)
MONOCYTES # BLD AUTO: 0.5 K/UL (ref 0.3–1)
MONOCYTES NFR BLD: 5.6 % (ref 4–15)
NEUTROPHILS # BLD AUTO: 6.4 K/UL (ref 1.8–7.7)
NEUTROPHILS NFR BLD: 70.7 % (ref 38–73)
NRBC BLD-RTO: 0 /100 WBC
PLATELET # BLD AUTO: 359 K/UL (ref 150–450)
PMV BLD AUTO: 10.1 FL (ref 9.2–12.9)
POTASSIUM SERPL-SCNC: 3.7 MMOL/L (ref 3.5–5.1)
PROT SERPL-MCNC: 8 G/DL (ref 6–8.4)
RBC # BLD AUTO: 4.24 M/UL (ref 4–5.4)
SODIUM SERPL-SCNC: 136 MMOL/L (ref 136–145)
TROPONIN I SERPL DL<=0.01 NG/ML-MCNC: <0.03 NG/ML
WBC # BLD AUTO: 9.06 K/UL (ref 3.9–12.7)

## 2021-11-06 PROCEDURE — 93010 EKG 12-LEAD: ICD-10-PCS | Mod: ,,, | Performed by: INTERNAL MEDICINE

## 2021-11-06 PROCEDURE — 93010 ELECTROCARDIOGRAM REPORT: CPT | Mod: ,,, | Performed by: INTERNAL MEDICINE

## 2021-11-06 PROCEDURE — 93005 ELECTROCARDIOGRAM TRACING: CPT | Performed by: INTERNAL MEDICINE

## 2021-11-06 PROCEDURE — 96374 THER/PROPH/DIAG INJ IV PUSH: CPT

## 2021-11-06 PROCEDURE — 80053 COMPREHEN METABOLIC PANEL: CPT | Performed by: EMERGENCY MEDICINE

## 2021-11-06 PROCEDURE — 85025 COMPLETE CBC W/AUTO DIFF WBC: CPT | Performed by: EMERGENCY MEDICINE

## 2021-11-06 PROCEDURE — 83880 ASSAY OF NATRIURETIC PEPTIDE: CPT | Performed by: EMERGENCY MEDICINE

## 2021-11-06 PROCEDURE — 99284 EMERGENCY DEPT VISIT MOD MDM: CPT | Mod: 25

## 2021-11-06 PROCEDURE — 84484 ASSAY OF TROPONIN QUANT: CPT | Performed by: EMERGENCY MEDICINE

## 2021-11-06 PROCEDURE — 63600175 PHARM REV CODE 636 W HCPCS: Performed by: EMERGENCY MEDICINE

## 2021-11-06 PROCEDURE — 96375 TX/PRO/DX INJ NEW DRUG ADDON: CPT

## 2021-11-06 RX ORDER — HYDROCODONE BITARTRATE AND ACETAMINOPHEN 5; 325 MG/1; MG/1
1 TABLET ORAL EVERY 4 HOURS PRN
Qty: 12 TABLET | Refills: 0 | Status: SHIPPED | OUTPATIENT
Start: 2021-11-06 | End: 2021-11-09

## 2021-11-06 RX ORDER — HYDRALAZINE HYDROCHLORIDE 20 MG/ML
10 INJECTION INTRAMUSCULAR; INTRAVENOUS
Status: DISCONTINUED | OUTPATIENT
Start: 2021-11-06 | End: 2021-11-06 | Stop reason: HOSPADM

## 2021-11-06 RX ORDER — ONDANSETRON 2 MG/ML
4 INJECTION INTRAMUSCULAR; INTRAVENOUS
Status: COMPLETED | OUTPATIENT
Start: 2021-11-06 | End: 2021-11-06

## 2021-11-06 RX ORDER — MORPHINE SULFATE 2 MG/ML
6 INJECTION, SOLUTION INTRAMUSCULAR; INTRAVENOUS
Status: COMPLETED | OUTPATIENT
Start: 2021-11-06 | End: 2021-11-06

## 2021-11-06 RX ADMIN — MORPHINE SULFATE 6 MG: 2 INJECTION, SOLUTION INTRAMUSCULAR; INTRAVENOUS at 12:11

## 2021-11-06 RX ADMIN — ONDANSETRON 4 MG: 2 INJECTION INTRAMUSCULAR; INTRAVENOUS at 12:11

## 2021-11-09 ENCOUNTER — TELEPHONE (OUTPATIENT)
Dept: HEMATOLOGY/ONCOLOGY | Facility: CLINIC | Age: 46
End: 2021-11-09
Payer: MEDICAID

## 2021-11-16 ENCOUNTER — INFUSION (OUTPATIENT)
Dept: INFUSION THERAPY | Facility: HOSPITAL | Age: 46
End: 2021-11-16
Attending: INTERNAL MEDICINE
Payer: MEDICAID

## 2021-11-16 VITALS
BODY MASS INDEX: 51.91 KG/M2 | HEIGHT: 63 IN | SYSTOLIC BLOOD PRESSURE: 216 MMHG | WEIGHT: 293 LBS | DIASTOLIC BLOOD PRESSURE: 102 MMHG | HEART RATE: 106 BPM | TEMPERATURE: 97 F | RESPIRATION RATE: 18 BRPM | OXYGEN SATURATION: 96 %

## 2021-11-16 DIAGNOSIS — D50.0 IRON DEFICIENCY ANEMIA DUE TO CHRONIC BLOOD LOSS: Primary | ICD-10-CM

## 2021-11-16 DIAGNOSIS — D50.9 IRON DEFICIENCY ANEMIA, UNSPECIFIED IRON DEFICIENCY ANEMIA TYPE: ICD-10-CM

## 2021-11-16 PROCEDURE — G0463 HOSPITAL OUTPT CLINIC VISIT: HCPCS

## 2021-11-16 RX ORDER — SODIUM CHLORIDE 0.9 % (FLUSH) 0.9 %
10 SYRINGE (ML) INJECTION
Status: CANCELLED | OUTPATIENT
Start: 2021-11-23

## 2021-11-16 RX ORDER — SODIUM CHLORIDE 0.9 % (FLUSH) 0.9 %
10 SYRINGE (ML) INJECTION
Status: DISCONTINUED | OUTPATIENT
Start: 2021-11-16 | End: 2021-11-16

## 2021-11-16 RX ORDER — HEPARIN 100 UNIT/ML
5 SYRINGE INTRAVENOUS
Status: CANCELLED | OUTPATIENT
Start: 2021-11-23

## 2021-11-16 RX ORDER — EPINEPHRINE 0.3 MG/.3ML
0.3 INJECTION SUBCUTANEOUS ONCE AS NEEDED
Status: CANCELLED | OUTPATIENT
Start: 2021-11-23

## 2021-11-16 RX ORDER — DIPHENHYDRAMINE HYDROCHLORIDE 50 MG/ML
50 INJECTION INTRAMUSCULAR; INTRAVENOUS ONCE AS NEEDED
Status: CANCELLED | OUTPATIENT
Start: 2021-11-23

## 2021-11-16 RX ORDER — METHYLPREDNISOLONE SOD SUCC 125 MG
125 VIAL (EA) INJECTION ONCE AS NEEDED
Status: CANCELLED | OUTPATIENT
Start: 2021-11-23

## 2021-11-16 RX ORDER — SODIUM CHLORIDE 9 MG/ML
INJECTION, SOLUTION INTRAVENOUS CONTINUOUS
Status: DISCONTINUED | OUTPATIENT
Start: 2021-11-16 | End: 2021-11-16

## 2021-11-16 RX ORDER — SODIUM CHLORIDE 9 MG/ML
INJECTION, SOLUTION INTRAVENOUS CONTINUOUS
Status: CANCELLED | OUTPATIENT
Start: 2021-11-23

## 2021-11-16 RX ORDER — DIPHENHYDRAMINE HYDROCHLORIDE 50 MG/ML
25 INJECTION INTRAMUSCULAR; INTRAVENOUS
Status: CANCELLED
Start: 2021-11-23

## 2021-11-18 ENCOUNTER — LAB VISIT (OUTPATIENT)
Dept: LAB | Facility: HOSPITAL | Age: 46
End: 2021-11-18
Attending: INTERNAL MEDICINE
Payer: MEDICAID

## 2021-11-18 DIAGNOSIS — R79.1 ELEVATED FACTOR VIII LEVEL: ICD-10-CM

## 2021-11-18 DIAGNOSIS — I26.99 ACUTE PULMONARY EMBOLISM, UNSPECIFIED PULMONARY EMBOLISM TYPE, UNSPECIFIED WHETHER ACUTE COR PULMONALE PRESENT: ICD-10-CM

## 2021-11-18 DIAGNOSIS — D68.59 ANTITHROMBIN III DEFICIENCY: ICD-10-CM

## 2021-11-18 DIAGNOSIS — D50.9 IRON DEFICIENCY ANEMIA, UNSPECIFIED IRON DEFICIENCY ANEMIA TYPE: ICD-10-CM

## 2021-11-18 DIAGNOSIS — D68.59 PROTEIN S DEFICIENCY: ICD-10-CM

## 2021-11-18 LAB
ALBUMIN SERPL BCP-MCNC: 3 G/DL (ref 3.5–5.2)
ALP SERPL-CCNC: 68 U/L (ref 55–135)
ALT SERPL W/O P-5'-P-CCNC: 37 U/L (ref 10–44)
ANION GAP SERPL CALC-SCNC: 6 MMOL/L (ref 8–16)
AST SERPL-CCNC: 39 U/L (ref 10–40)
BASOPHILS # BLD AUTO: 0.04 K/UL (ref 0–0.2)
BASOPHILS NFR BLD: 0.6 % (ref 0–1.9)
BILIRUB SERPL-MCNC: 0.2 MG/DL (ref 0.1–1)
BUN SERPL-MCNC: 13 MG/DL (ref 6–20)
CALCIUM SERPL-MCNC: 9 MG/DL (ref 8.7–10.5)
CHLORIDE SERPL-SCNC: 103 MMOL/L (ref 95–110)
CO2 SERPL-SCNC: 30 MMOL/L (ref 23–29)
CREAT SERPL-MCNC: 1 MG/DL (ref 0.5–1.4)
DIFFERENTIAL METHOD: ABNORMAL
EOSINOPHIL # BLD AUTO: 0.3 K/UL (ref 0–0.5)
EOSINOPHIL NFR BLD: 5 % (ref 0–8)
ERYTHROCYTE [DISTWIDTH] IN BLOOD BY AUTOMATED COUNT: 18.9 % (ref 11.5–14.5)
EST. GFR  (AFRICAN AMERICAN): >60 ML/MIN/1.73 M^2
EST. GFR  (NON AFRICAN AMERICAN): >60 ML/MIN/1.73 M^2
FERRITIN SERPL-MCNC: 29 NG/ML (ref 20–300)
GLUCOSE SERPL-MCNC: 163 MG/DL (ref 70–110)
HCT VFR BLD AUTO: 31.9 % (ref 37–48.5)
HGB BLD-MCNC: 9.4 G/DL (ref 12–16)
IMM GRANULOCYTES # BLD AUTO: 0.02 K/UL (ref 0–0.04)
IMM GRANULOCYTES NFR BLD AUTO: 0.3 % (ref 0–0.5)
IRON SERPL-MCNC: 17 UG/DL (ref 30–160)
LYMPHOCYTES # BLD AUTO: 1.9 K/UL (ref 1–4.8)
LYMPHOCYTES NFR BLD: 28.5 % (ref 18–48)
MCH RBC QN AUTO: 25.3 PG (ref 27–31)
MCHC RBC AUTO-ENTMCNC: 29.5 G/DL (ref 32–36)
MCV RBC AUTO: 86 FL (ref 82–98)
MONOCYTES # BLD AUTO: 0.5 K/UL (ref 0.3–1)
MONOCYTES NFR BLD: 7.3 % (ref 4–15)
NEUTROPHILS # BLD AUTO: 3.8 K/UL (ref 1.8–7.7)
NEUTROPHILS NFR BLD: 58.3 % (ref 38–73)
NRBC BLD-RTO: 0 /100 WBC
PLATELET # BLD AUTO: 432 K/UL (ref 150–450)
PMV BLD AUTO: 9.5 FL (ref 9.2–12.9)
POTASSIUM SERPL-SCNC: 4.4 MMOL/L (ref 3.5–5.1)
PROT SERPL-MCNC: 8.1 G/DL (ref 6–8.4)
RBC # BLD AUTO: 3.71 M/UL (ref 4–5.4)
SATURATED IRON: 4 % (ref 20–50)
SODIUM SERPL-SCNC: 139 MMOL/L (ref 136–145)
TOTAL IRON BINDING CAPACITY: 456 UG/DL (ref 250–450)
TRANSFERRIN SERPL-MCNC: 326 MG/DL (ref 200–375)
WBC # BLD AUTO: 6.59 K/UL (ref 3.9–12.7)

## 2021-11-18 PROCEDURE — 85306 CLOT INHIBIT PROT S FREE: CPT | Performed by: INTERNAL MEDICINE

## 2021-11-18 PROCEDURE — 84466 ASSAY OF TRANSFERRIN: CPT | Performed by: INTERNAL MEDICINE

## 2021-11-18 PROCEDURE — 80053 COMPREHEN METABOLIC PANEL: CPT | Performed by: INTERNAL MEDICINE

## 2021-11-18 PROCEDURE — 85300 ANTITHROMBIN III ACTIVITY: CPT | Performed by: INTERNAL MEDICINE

## 2021-11-18 PROCEDURE — 82728 ASSAY OF FERRITIN: CPT | Performed by: INTERNAL MEDICINE

## 2021-11-18 PROCEDURE — 85025 COMPLETE CBC W/AUTO DIFF WBC: CPT | Performed by: INTERNAL MEDICINE

## 2021-11-18 PROCEDURE — 36415 COLL VENOUS BLD VENIPUNCTURE: CPT | Performed by: INTERNAL MEDICINE

## 2021-11-18 PROCEDURE — 85240 CLOT FACTOR VIII AHG 1 STAGE: CPT | Performed by: INTERNAL MEDICINE

## 2021-11-21 LAB
AT III ACT/NOR PPP CHRO: 109 % (ref 75–135)
FACT VIII ACT/NOR PPP: 281 % (ref 56–140)
PROT S ACT/NOR PPP: 63 % (ref 63–140)

## 2021-12-08 ENCOUNTER — OFFICE VISIT (OUTPATIENT)
Dept: HEMATOLOGY/ONCOLOGY | Facility: CLINIC | Age: 46
End: 2021-12-08
Payer: MEDICAID

## 2021-12-08 VITALS
DIASTOLIC BLOOD PRESSURE: 97 MMHG | SYSTOLIC BLOOD PRESSURE: 178 MMHG | BODY MASS INDEX: 51.91 KG/M2 | RESPIRATION RATE: 20 BRPM | HEART RATE: 86 BPM | HEIGHT: 63 IN | WEIGHT: 293 LBS

## 2021-12-08 DIAGNOSIS — D50.0 IRON DEFICIENCY ANEMIA DUE TO CHRONIC BLOOD LOSS: ICD-10-CM

## 2021-12-08 DIAGNOSIS — D68.59 ANTITHROMBIN III DEFICIENCY: ICD-10-CM

## 2021-12-08 DIAGNOSIS — D68.59 PROTEIN S DEFICIENCY: ICD-10-CM

## 2021-12-08 DIAGNOSIS — R79.1 ELEVATED FACTOR VIII LEVEL: ICD-10-CM

## 2021-12-08 DIAGNOSIS — D50.9 IRON DEFICIENCY ANEMIA, UNSPECIFIED IRON DEFICIENCY ANEMIA TYPE: Chronic | ICD-10-CM

## 2021-12-08 DIAGNOSIS — I26.99 ACUTE PULMONARY EMBOLISM, UNSPECIFIED PULMONARY EMBOLISM TYPE, UNSPECIFIED WHETHER ACUTE COR PULMONALE PRESENT: Primary | ICD-10-CM

## 2021-12-08 DIAGNOSIS — N92.0 MENORRHAGIA WITH REGULAR CYCLE: Chronic | ICD-10-CM

## 2021-12-08 PROCEDURE — 4010F ACE/ARB THERAPY RXD/TAKEN: CPT | Mod: S$GLB,,, | Performed by: INTERNAL MEDICINE

## 2021-12-08 PROCEDURE — 4010F PR ACE/ARB THEARPY RXD/TAKEN: ICD-10-PCS | Mod: S$GLB,,, | Performed by: INTERNAL MEDICINE

## 2021-12-08 PROCEDURE — 99213 OFFICE O/P EST LOW 20 MIN: CPT | Mod: S$GLB,,, | Performed by: INTERNAL MEDICINE

## 2021-12-08 PROCEDURE — 99213 PR OFFICE/OUTPT VISIT, EST, LEVL III, 20-29 MIN: ICD-10-PCS | Mod: S$GLB,,, | Performed by: INTERNAL MEDICINE

## 2021-12-16 ENCOUNTER — LAB VISIT (OUTPATIENT)
Dept: LAB | Facility: HOSPITAL | Age: 46
End: 2021-12-16
Attending: INTERNAL MEDICINE
Payer: MEDICAID

## 2021-12-16 ENCOUNTER — TELEPHONE (OUTPATIENT)
Dept: HEMATOLOGY/ONCOLOGY | Facility: CLINIC | Age: 46
End: 2021-12-16
Payer: MEDICAID

## 2021-12-16 DIAGNOSIS — R79.1 ELEVATED FACTOR VIII LEVEL: ICD-10-CM

## 2021-12-16 DIAGNOSIS — I26.99 ACUTE PULMONARY EMBOLISM, UNSPECIFIED PULMONARY EMBOLISM TYPE, UNSPECIFIED WHETHER ACUTE COR PULMONALE PRESENT: ICD-10-CM

## 2021-12-16 DIAGNOSIS — D68.59 PROTEIN S DEFICIENCY: ICD-10-CM

## 2021-12-16 DIAGNOSIS — D50.9 IRON DEFICIENCY ANEMIA, UNSPECIFIED IRON DEFICIENCY ANEMIA TYPE: Chronic | ICD-10-CM

## 2021-12-16 DIAGNOSIS — D68.59 ANTITHROMBIN III DEFICIENCY: ICD-10-CM

## 2021-12-16 LAB
ALBUMIN SERPL BCP-MCNC: 3.4 G/DL (ref 3.5–5.2)
ALP SERPL-CCNC: 92 U/L (ref 55–135)
ALT SERPL W/O P-5'-P-CCNC: 40 U/L (ref 10–44)
ANION GAP SERPL CALC-SCNC: 8 MMOL/L (ref 8–16)
AST SERPL-CCNC: 42 U/L (ref 10–40)
BASOPHILS # BLD AUTO: 0.03 K/UL (ref 0–0.2)
BASOPHILS NFR BLD: 0.4 % (ref 0–1.9)
BILIRUB SERPL-MCNC: 0.5 MG/DL (ref 0.1–1)
BUN SERPL-MCNC: 9 MG/DL (ref 6–20)
CALCIUM SERPL-MCNC: 9.1 MG/DL (ref 8.7–10.5)
CHLORIDE SERPL-SCNC: 103 MMOL/L (ref 95–110)
CO2 SERPL-SCNC: 26 MMOL/L (ref 23–29)
CREAT SERPL-MCNC: 0.9 MG/DL (ref 0.5–1.4)
DIFFERENTIAL METHOD: ABNORMAL
EOSINOPHIL # BLD AUTO: 0.2 K/UL (ref 0–0.5)
EOSINOPHIL NFR BLD: 2.5 % (ref 0–8)
ERYTHROCYTE [DISTWIDTH] IN BLOOD BY AUTOMATED COUNT: 17.3 % (ref 11.5–14.5)
EST. GFR  (AFRICAN AMERICAN): >60 ML/MIN/1.73 M^2
EST. GFR  (NON AFRICAN AMERICAN): >60 ML/MIN/1.73 M^2
FERRITIN SERPL-MCNC: 45 NG/ML (ref 20–300)
GLUCOSE SERPL-MCNC: 138 MG/DL (ref 70–110)
HCT VFR BLD AUTO: 39.2 % (ref 37–48.5)
HGB BLD-MCNC: 11.6 G/DL (ref 12–16)
IMM GRANULOCYTES # BLD AUTO: 0.01 K/UL (ref 0–0.04)
IMM GRANULOCYTES NFR BLD AUTO: 0.1 % (ref 0–0.5)
IRON SERPL-MCNC: 71 UG/DL (ref 30–160)
LYMPHOCYTES # BLD AUTO: 1.5 K/UL (ref 1–4.8)
LYMPHOCYTES NFR BLD: 19.2 % (ref 18–48)
MCH RBC QN AUTO: 26 PG (ref 27–31)
MCHC RBC AUTO-ENTMCNC: 29.6 G/DL (ref 32–36)
MCV RBC AUTO: 88 FL (ref 82–98)
MONOCYTES # BLD AUTO: 0.5 K/UL (ref 0.3–1)
MONOCYTES NFR BLD: 6.8 % (ref 4–15)
NEUTROPHILS # BLD AUTO: 5.4 K/UL (ref 1.8–7.7)
NEUTROPHILS NFR BLD: 71 % (ref 38–73)
NRBC BLD-RTO: 0 /100 WBC
PLATELET # BLD AUTO: 443 K/UL (ref 150–450)
PMV BLD AUTO: 10.2 FL (ref 9.2–12.9)
POTASSIUM SERPL-SCNC: 3.7 MMOL/L (ref 3.5–5.1)
PROT SERPL-MCNC: 8.7 G/DL (ref 6–8.4)
RBC # BLD AUTO: 4.46 M/UL (ref 4–5.4)
SATURATED IRON: 16 % (ref 20–50)
SODIUM SERPL-SCNC: 137 MMOL/L (ref 136–145)
TOTAL IRON BINDING CAPACITY: 458 UG/DL (ref 250–450)
TRANSFERRIN SERPL-MCNC: 327 MG/DL (ref 200–375)
WBC # BLD AUTO: 7.54 K/UL (ref 3.9–12.7)

## 2021-12-16 PROCEDURE — 84466 ASSAY OF TRANSFERRIN: CPT | Performed by: INTERNAL MEDICINE

## 2021-12-16 PROCEDURE — 80053 COMPREHEN METABOLIC PANEL: CPT | Performed by: INTERNAL MEDICINE

## 2021-12-16 PROCEDURE — 36415 COLL VENOUS BLD VENIPUNCTURE: CPT | Performed by: INTERNAL MEDICINE

## 2021-12-16 PROCEDURE — 85025 COMPLETE CBC W/AUTO DIFF WBC: CPT | Performed by: INTERNAL MEDICINE

## 2021-12-16 PROCEDURE — 82728 ASSAY OF FERRITIN: CPT | Performed by: INTERNAL MEDICINE

## 2021-12-17 ENCOUNTER — INFUSION (OUTPATIENT)
Dept: INFUSION THERAPY | Facility: HOSPITAL | Age: 46
End: 2021-12-17
Attending: INTERNAL MEDICINE
Payer: MEDICAID

## 2021-12-17 VITALS
SYSTOLIC BLOOD PRESSURE: 141 MMHG | HEART RATE: 81 BPM | WEIGHT: 293 LBS | OXYGEN SATURATION: 98 % | TEMPERATURE: 98 F | DIASTOLIC BLOOD PRESSURE: 68 MMHG | HEIGHT: 63 IN | RESPIRATION RATE: 18 BRPM | BODY MASS INDEX: 51.91 KG/M2

## 2021-12-17 DIAGNOSIS — D50.9 IRON DEFICIENCY ANEMIA, UNSPECIFIED IRON DEFICIENCY ANEMIA TYPE: ICD-10-CM

## 2021-12-17 DIAGNOSIS — D50.0 IRON DEFICIENCY ANEMIA DUE TO CHRONIC BLOOD LOSS: Primary | ICD-10-CM

## 2021-12-17 PROCEDURE — 25000003 PHARM REV CODE 250: Performed by: INTERNAL MEDICINE

## 2021-12-17 PROCEDURE — 96365 THER/PROPH/DIAG IV INF INIT: CPT

## 2021-12-17 PROCEDURE — 96367 TX/PROPH/DG ADDL SEQ IV INF: CPT

## 2021-12-17 PROCEDURE — 63600175 PHARM REV CODE 636 W HCPCS: Mod: JG | Performed by: INTERNAL MEDICINE

## 2021-12-17 RX ORDER — SODIUM CHLORIDE 9 MG/ML
INJECTION, SOLUTION INTRAVENOUS CONTINUOUS
Status: CANCELLED | OUTPATIENT
Start: 2021-12-24

## 2021-12-17 RX ORDER — HEPARIN 100 UNIT/ML
5 SYRINGE INTRAVENOUS
Status: DISCONTINUED | OUTPATIENT
Start: 2021-12-17 | End: 2021-12-17

## 2021-12-17 RX ORDER — EPINEPHRINE 0.3 MG/.3ML
0.3 INJECTION SUBCUTANEOUS ONCE AS NEEDED
Status: CANCELLED | OUTPATIENT
Start: 2021-12-24

## 2021-12-17 RX ORDER — SODIUM CHLORIDE 0.9 % (FLUSH) 0.9 %
10 SYRINGE (ML) INJECTION
Status: CANCELLED | OUTPATIENT
Start: 2021-12-24

## 2021-12-17 RX ORDER — DIPHENHYDRAMINE HYDROCHLORIDE 50 MG/ML
50 INJECTION INTRAMUSCULAR; INTRAVENOUS ONCE AS NEEDED
Status: CANCELLED | OUTPATIENT
Start: 2021-12-24

## 2021-12-17 RX ORDER — METHYLPREDNISOLONE SOD SUCC 125 MG
125 VIAL (EA) INJECTION ONCE AS NEEDED
Status: CANCELLED | OUTPATIENT
Start: 2021-12-24

## 2021-12-17 RX ORDER — SODIUM CHLORIDE 9 MG/ML
INJECTION, SOLUTION INTRAVENOUS CONTINUOUS
Status: DISCONTINUED | OUTPATIENT
Start: 2021-12-17 | End: 2021-12-17

## 2021-12-17 RX ORDER — SODIUM CHLORIDE 0.9 % (FLUSH) 0.9 %
10 SYRINGE (ML) INJECTION
Status: DISCONTINUED | OUTPATIENT
Start: 2021-12-17 | End: 2021-12-17

## 2021-12-17 RX ORDER — HEPARIN 100 UNIT/ML
5 SYRINGE INTRAVENOUS
Status: CANCELLED | OUTPATIENT
Start: 2021-12-24

## 2021-12-17 RX ORDER — DIPHENHYDRAMINE HYDROCHLORIDE 50 MG/ML
25 INJECTION INTRAMUSCULAR; INTRAVENOUS
Status: CANCELLED
Start: 2021-12-24

## 2021-12-17 RX ADMIN — FERRIC CARBOXYMALTOSE INJECTION 750 MG: 50 INJECTION, SOLUTION INTRAVENOUS at 03:12

## 2021-12-17 RX ADMIN — SODIUM CHLORIDE: 9 INJECTION, SOLUTION INTRAVENOUS at 02:12

## 2021-12-17 RX ADMIN — DIPHENHYDRAMINE HYDROCHLORIDE 25 MG: 50 INJECTION INTRAMUSCULAR; INTRAVENOUS at 02:12

## 2021-12-27 ENCOUNTER — INFUSION (OUTPATIENT)
Dept: INFUSION THERAPY | Facility: HOSPITAL | Age: 46
End: 2021-12-27
Attending: INTERNAL MEDICINE
Payer: MEDICAID

## 2021-12-27 VITALS
OXYGEN SATURATION: 98 % | SYSTOLIC BLOOD PRESSURE: 185 MMHG | TEMPERATURE: 98 F | HEART RATE: 87 BPM | BODY MASS INDEX: 51.91 KG/M2 | RESPIRATION RATE: 17 BRPM | HEIGHT: 63 IN | DIASTOLIC BLOOD PRESSURE: 93 MMHG | WEIGHT: 293 LBS

## 2021-12-27 PROCEDURE — G0463 HOSPITAL OUTPT CLINIC VISIT: HCPCS

## 2022-01-14 NOTE — PROGRESS NOTES
Patient Iron infusion held on 12/27 due to hypertension. The risks of hypertension crisis with iron infusions and uncontrolled blood pressure remain elevated.

## 2022-01-20 ENCOUNTER — LAB VISIT (OUTPATIENT)
Dept: LAB | Facility: HOSPITAL | Age: 47
End: 2022-01-20
Attending: INTERNAL MEDICINE
Payer: MEDICAID

## 2022-01-20 DIAGNOSIS — D50.9 IRON DEFICIENCY ANEMIA, UNSPECIFIED IRON DEFICIENCY ANEMIA TYPE: Chronic | ICD-10-CM

## 2022-01-20 DIAGNOSIS — D68.59 ANTITHROMBIN III DEFICIENCY: ICD-10-CM

## 2022-01-20 DIAGNOSIS — R79.1 ELEVATED FACTOR VIII LEVEL: ICD-10-CM

## 2022-01-20 DIAGNOSIS — I26.99 ACUTE PULMONARY EMBOLISM, UNSPECIFIED PULMONARY EMBOLISM TYPE, UNSPECIFIED WHETHER ACUTE COR PULMONALE PRESENT: ICD-10-CM

## 2022-01-20 DIAGNOSIS — D68.59 PROTEIN S DEFICIENCY: ICD-10-CM

## 2022-01-20 LAB
ALBUMIN SERPL BCP-MCNC: 3.5 G/DL (ref 3.5–5.2)
ALP SERPL-CCNC: 95 U/L (ref 55–135)
ALT SERPL W/O P-5'-P-CCNC: 44 U/L (ref 10–44)
ANION GAP SERPL CALC-SCNC: 7 MMOL/L (ref 8–16)
AST SERPL-CCNC: 38 U/L (ref 10–40)
BASOPHILS # BLD AUTO: 0.02 K/UL (ref 0–0.2)
BASOPHILS NFR BLD: 0.2 % (ref 0–1.9)
BILIRUB SERPL-MCNC: 0.3 MG/DL (ref 0.1–1)
BUN SERPL-MCNC: 16 MG/DL (ref 6–20)
CALCIUM SERPL-MCNC: 9.1 MG/DL (ref 8.7–10.5)
CHLORIDE SERPL-SCNC: 101 MMOL/L (ref 95–110)
CO2 SERPL-SCNC: 29 MMOL/L (ref 23–29)
CREAT SERPL-MCNC: 1 MG/DL (ref 0.5–1.4)
DIFFERENTIAL METHOD: ABNORMAL
EOSINOPHIL # BLD AUTO: 0.3 K/UL (ref 0–0.5)
EOSINOPHIL NFR BLD: 4.2 % (ref 0–8)
ERYTHROCYTE [DISTWIDTH] IN BLOOD BY AUTOMATED COUNT: 17.2 % (ref 11.5–14.5)
EST. GFR  (AFRICAN AMERICAN): >60 ML/MIN/1.73 M^2
EST. GFR  (NON AFRICAN AMERICAN): >60 ML/MIN/1.73 M^2
FERRITIN SERPL-MCNC: 152 NG/ML (ref 20–300)
GLUCOSE SERPL-MCNC: 161 MG/DL (ref 70–110)
HCT VFR BLD AUTO: 42.5 % (ref 37–48.5)
HGB BLD-MCNC: 13.1 G/DL (ref 12–16)
IMM GRANULOCYTES # BLD AUTO: 0.02 K/UL (ref 0–0.04)
IMM GRANULOCYTES NFR BLD AUTO: 0.2 % (ref 0–0.5)
IRON SERPL-MCNC: 48 UG/DL (ref 30–160)
LYMPHOCYTES # BLD AUTO: 1.4 K/UL (ref 1–4.8)
LYMPHOCYTES NFR BLD: 17 % (ref 18–48)
MCH RBC QN AUTO: 28 PG (ref 27–31)
MCHC RBC AUTO-ENTMCNC: 30.8 G/DL (ref 32–36)
MCV RBC AUTO: 91 FL (ref 82–98)
MONOCYTES # BLD AUTO: 0.4 K/UL (ref 0.3–1)
MONOCYTES NFR BLD: 5.3 % (ref 4–15)
NEUTROPHILS # BLD AUTO: 6 K/UL (ref 1.8–7.7)
NEUTROPHILS NFR BLD: 73.1 % (ref 38–73)
NRBC BLD-RTO: 0 /100 WBC
PLATELET # BLD AUTO: 379 K/UL (ref 150–450)
PMV BLD AUTO: 10.1 FL (ref 9.2–12.9)
POTASSIUM SERPL-SCNC: 3.8 MMOL/L (ref 3.5–5.1)
PROT SERPL-MCNC: 8.9 G/DL (ref 6–8.4)
RBC # BLD AUTO: 4.68 M/UL (ref 4–5.4)
SATURATED IRON: 13 % (ref 20–50)
SODIUM SERPL-SCNC: 137 MMOL/L (ref 136–145)
TOTAL IRON BINDING CAPACITY: 378 UG/DL (ref 250–450)
TRANSFERRIN SERPL-MCNC: 270 MG/DL (ref 200–375)
WBC # BLD AUTO: 8.14 K/UL (ref 3.9–12.7)

## 2022-01-20 PROCEDURE — 84466 ASSAY OF TRANSFERRIN: CPT | Performed by: INTERNAL MEDICINE

## 2022-01-20 PROCEDURE — 82728 ASSAY OF FERRITIN: CPT | Performed by: INTERNAL MEDICINE

## 2022-01-20 PROCEDURE — 36415 COLL VENOUS BLD VENIPUNCTURE: CPT | Performed by: INTERNAL MEDICINE

## 2022-01-20 PROCEDURE — 85025 COMPLETE CBC W/AUTO DIFF WBC: CPT | Performed by: INTERNAL MEDICINE

## 2022-01-20 PROCEDURE — 80053 COMPREHEN METABOLIC PANEL: CPT | Performed by: INTERNAL MEDICINE

## 2022-02-14 NOTE — PROGRESS NOTES
[facility] Hematology/Oncology  Progress Note -   Follow-up Visit    Subjective:      Patient ID:   NAME: Katiuska Preston : 1975     47 y.o. female    Referring Doc: Justice  Other Physicians: Eduardo    Chief Complaint: pulm emboli f/u      HPI:    The patient returns for a regularly scheduled follow-up visit today to go over results of recently ordered tests, studies and/or labs. She is here by herself.     She has O2 at home and inhalers  but is not on her portable today.   Breathing has been stable but does have WINN and some wheezing. She is seeing pulmonary again next month    She is on blood thinners and denies any excessive bleeding or bruising      No CP, HA's or N/V.       She is currently on Eliquis. No excessive bleeding or brusiing but has continued heavy periods. She saw GYN and is planning to see someone for a hysterectomy    She has some chronic right knee issues and pain. She is not seeing orthopedics. She reports that she hit her knee with a door couple of times.      Discussed covid19 precautions - she had her vaccinations             ROS:   GEN: normal without any fever, night sweats or weight loss  HEENT: normal with no HA's, sore throat, stiff neck, changes in vision  CV: normal with no CP, some WINN/Wheeze  PULM: normal with no SOB, cough, hemoptysis, sputum or pleuritic pain  GI: normal with no abdominal pain, nausea, vomiting, constipation, diarrhea, melanotic stools, BRBPR, or hematemesis  : normal with no hematuria, dysuria  BREAST: normal with no mass, discharge, pain  SKIN: normal with no rash, erythema, bruising, or swelling     Past Medical/Surgical History:  Past Medical History:   Diagnosis Date    Antithrombin III deficiency 2021    Asthma     Claustrophobia     Elevated factor VIII level 2021    Esophageal reflux     Gastroesophageal reflux    Generalized anxiety disorder     Anxiety, Generalized    Herniated disc     Hypertension     Iron deficiency anemia due to  chronic blood loss 10/27/2021    Lower extremity weakness     Morbid obesity     Obesity     Protein S deficiency 2021    Pulmonary embolism     Upper extremity weakness      Past Surgical History:   Procedure Laterality Date     SECTION      CHOLECYSTECTOMY      TONSILLECTOMY           Allergies:  Review of patient's allergies indicates:  No Known Allergies    Social/Family History:  Social History     Socioeconomic History    Marital status:    Tobacco Use    Smoking status: Former Smoker     Packs/day: 0.50     Types: Cigarettes     Quit date: 2019     Years since quitting: 3.1   Substance and Sexual Activity    Alcohol use: Yes     Alcohol/week: 0.8 standard drinks     Types: 1 Standard drinks or equivalent per week     Comment: rarely    Drug use: No    Sexual activity: Never     Birth control/protection: None     History reviewed. No pertinent family history.      Medications:    Current Outpatient Medications:     albuterol (PROVENTIL/VENTOLIN HFA) 90 mcg/actuation inhaler, Inhale 2 puffs into the lungs every 6 (six) hours as needed for Wheezing. Rescue, Disp: 18 g, Rfl: 5    carvediloL (COREG) 12.5 MG tablet, Take 1 tablet (12.5 mg total) by mouth 2 (two) times daily with meals., Disp: 60 tablet, Rfl: 0    ELIQUIS 5 mg Tab, TAKE 1 TABLET(5 MG) BY MOUTH TWICE DAILY, Disp: 60 tablet, Rfl: 3    famotidine (PEPCID) 20 MG tablet, Take 20 mg by mouth 2 (two) times daily., Disp: , Rfl:     FEROSUL 325 mg (65 mg iron) Tab tablet, TAKE 1 TABLET BY MOUTH ONCE DAILY, Disp: 30 tablet, Rfl: 3    fluticasone propionate (FLONASE) 50 mcg/actuation nasal spray, 2 sprays by Each Nostril route daily as needed for Rhinitis., Disp: , Rfl:     fluticasone propionate (FLOVENT HFA) 110 mcg/actuation inhaler, Inhale 2 puffs into the lungs 2 (two) times daily. Controller Rinse after you use it, Disp: 12 g, Rfl: 5    furosemide (LASIX) 20 MG tablet, Take 1 tablet (20 mg total) by mouth once  daily., Disp: 30 tablet, Rfl: 0    lisinopriL (PRINIVIL,ZESTRIL) 20 MG tablet, Take 1 tablet (20 mg total) by mouth once daily., Disp: 40 tablet, Rfl: 0    losartan (COZAAR) 25 MG tablet, Take 25 mg by mouth once daily., Disp: , Rfl:     meloxicam (MOBIC) 7.5 MG tablet, Take 7.5 mg by mouth once daily., Disp: , Rfl:     metoprolol succinate (TOPROL-XL) 25 MG 24 hr tablet, Take 25 mg by mouth once daily., Disp: , Rfl:     NEXIUM 40 mg capsule, Take 40 mg by mouth once daily., Disp: , Rfl:     omeprazole (PRILOSEC) 20 MG capsule, Take 20 mg by mouth once daily., Disp: , Rfl:     trazodone (DESYREL) 100 MG tablet, Take 100 mg by mouth every evening., Disp: , Rfl:       Pathology:  Cancer Staging  No matching staging information was found for the patient.        Objective:   Vitals:  Blood pressure (!) 176/102, temperature 97.5 °F (36.4 °C), weight (!) 154.7 kg (341 lb).    Physical Examination:   GEN: no apparent distress, comfortable; AAOx3; morbidly overweight   HEAD: atraumatic and normocephalic  EYES: no pallor, no icterus, PERRLA  ENT: OMM, no pharyngeal erythema, external ears WNL; no nasal discharge; no thrush  NECK: no masses, thyroid normal, trachea midline, no LAD/LN's, supple  CV: RRR with no murmur; normal pulse; normal S1 and S2; no pedal edema  CHEST: Normal respiratory effort; CTAB; normal breath sounds; no wheeze or crackles; not currently portable O2  ABDOM: nontender and nondistended; soft; normal bowel sounds; no rebound/guarding  MUSC/Skeletal: LROM and crepitus right knee; no deformities or arthropathy  EXTREM: no clubbing, cyanosis, inflammation or swelling  SKIN: no rashes, lesions, ulcers, petechiae or subcutaneous nodules  : no diaz  NEURO: grossly intact; motor/sensory WNL; AAOx3; no tremors  PSYCH: normal mood, affect and behavior  LYMPH: normal cervical, supraclavicular, and groin LN's; axillary LN's since resolved    Labs:   Lab Results   Component Value Date    WBC 8.14  01/20/2022    HGB 13.1 01/20/2022    HCT 42.5 01/20/2022    MCV 91 01/20/2022     01/20/2022    CMP  Sodium   Date Value Ref Range Status   01/20/2022 137 136 - 145 mmol/L Final     Potassium   Date Value Ref Range Status   01/20/2022 3.8 3.5 - 5.1 mmol/L Final     Chloride   Date Value Ref Range Status   01/20/2022 101 95 - 110 mmol/L Final     CO2   Date Value Ref Range Status   01/20/2022 29 23 - 29 mmol/L Final     Glucose   Date Value Ref Range Status   01/20/2022 161 (H) 70 - 110 mg/dL Final     BUN   Date Value Ref Range Status   01/20/2022 16 6 - 20 mg/dL Final     Creatinine   Date Value Ref Range Status   01/20/2022 1.0 0.5 - 1.4 mg/dL Final     Calcium   Date Value Ref Range Status   01/20/2022 9.1 8.7 - 10.5 mg/dL Final     Total Protein   Date Value Ref Range Status   01/20/2022 8.9 (H) 6.0 - 8.4 g/dL Final     Albumin   Date Value Ref Range Status   01/20/2022 3.5 3.5 - 5.2 g/dL Final     Total Bilirubin   Date Value Ref Range Status   01/20/2022 0.3 0.1 - 1.0 mg/dL Final     Comment:     For infants and newborns, interpretation of results should be based  on gestational age, weight and in agreement with clinical  observations.    Premature Infant recommended reference ranges:  Up to 24 hours.............<8.0 mg/dL  Up to 48 hours............<12.0 mg/dL  3-5 days..................<15.0 mg/dL  6-29 days.................<15.0 mg/dL       Alkaline Phosphatase   Date Value Ref Range Status   01/20/2022 95 55 - 135 U/L Final     AST   Date Value Ref Range Status   01/20/2022 38 10 - 40 U/L Final     ALT   Date Value Ref Range Status   01/20/2022 44 10 - 44 U/L Final     Anion Gap   Date Value Ref Range Status   01/20/2022 7 (L) 8 - 16 mmol/L Final     eGFR if    Date Value Ref Range Status   01/20/2022 >60.0 >60 mL/min/1.73 m^2 Final     eGFR if non    Date Value Ref Range Status   01/20/2022 >60.0 >60 mL/min/1.73 m^2 Final     Comment:     Calculation used to obtain  the estimated glomerular filtration  rate (eGFR) is the CKD-EPI equation.                  Lab Results   Component Value Date    IRON 48 01/20/2022    TIBC 378 01/20/2022    FERRITIN 152 01/20/2022     Lab Results   Component Value Date    KONNHLLK30 452 05/02/2021     Lab Results   Component Value Date    FOLATE 5.9 05/02/2021          Factor VIII Activity 56 - 140 % 281      Antithrombin III 75 - 135 % 109      Protein S Activity 63 - 140 % 63            I have reviewed all available lab results and radiology reports.    Radiology/Diagnostic Studies:    US Lower Extremity Veins Bilateral [[id number]] Collected: 05/01/21 1348   Order Status: Completed Updated: 05/01/21 1432   Narrative:     REASON: DVT     FINDINGS:     Grayscale, color and spectral Doppler analysis of the bilateral lower   extremity deep venous system was performed.     There is normal compressibility, color and spectral Doppler analysis,   and augmentation in the bilateral lower extremity deep venous system.     IMPRESSION:     1.  No DVT of the bilateral lower extremity veins.   2.  Bilateral distal superficial femoral veins were unable to be   assessed due to body habitus       CTA Chest Non-Coronary - PE Study [[id number]] Collected: 05/01/21 1143   Order Status: Completed Updated: 05/01/21 1225   Narrative:     CMS MANDATED QUALITY DATA - CT RADIATION - 436     All CT scans at this facility utilize dose modulation, iterative   reconstruction, and/or weight based dosing when appropriate to reduce   radiation dose to as low as reasonably achievable.         REASON: PE suspected, intermediate prob, positive D-dimer     TECHNIQUE: CT angiography of thorax with 100 mL Omnipaque 350.   Maximum intensity projection coronal reformations were created at a   separate workstation and stored in the patient's permanent medical   record.     COMPARISON: CTA chest October 14, 2019.     FINDINGS:     Limited evaluation due to body habitus and inadequate bolus  timing.   Filling defects identified in segmental and subsegmental artery   supplying the left lower lobe, consistent with pulmonary emboli.   Questionable filling defects versus artifact in the right mainstem   pulmonary artery and a few segmental arteries supplying the right   lower lobe may reflect pulmonary emboli. Heart size is at the upper   limits of normal. No mediastinal lymphadenopathy or mass.     The central tracheobronchial tree is patent. There is diffuse   groundglass lung opacities in the bilateral lungs with peripheral   wedge-shaped opacities. No pleural effusions.     The visualized abdominal viscera are unremarkable. No acute osseous   abnormality..     IMPRESSION:     1.  Limited evaluation due to body habitus and inadequate bolus   timing. Filling defects identified in segmental and subsegmental   artery supplying the left lower lobe, consistent with pulmonary   emboli. There are questionable pulmonary emboli in the right mainstem   pulmonary artery and segmental artery supplying the right lower lobe.   Findings reported to Dr.Lloyd Duffy at 5/1/2021.   2.  Bilateral diffuse groundglass lung opacities with peripheral   wedge-shaped opacities may reflect changes of poor inspiration and   atelectasis. Atypical infectious process could also have a similar   appearance the proper clinical setting.                 All lab results and imaging results have been reviewed and discussed with the patient    Assessment:   (1) 47 y.o. female with diagnosis of pulmonary emboli who was seen as a consult at [facility]  - patient with diagnosis of asthma who presented to the ED at [facility] on 5/1 with progressive SOB, postnasal drip and acute hypercapnia. CTA was a limited study due to body habitus but radiology reported presence of filling defects identified in segmental and subsegmental artery supplying the left lower lobe, consistent with pulmonary emboli. They also reported questionable pulmonary emboli in the  right mainstem pulmonary artery and segmental artery supplying the right lower lobe. Patient has been admitted to hospitalist service and is on Lovenox. She has no prior personal or family history of clots.      - doppler studies are negative     5/3/2021:  - patient was seen by Dr Clark with pulm yesterday; appreciate his evaluation  - options of anticoagulation include: Coumadin, Eliquis , Xarelto, or Pradaxa  - in patients with morbid obesity, one can have difficulties with therapeutic dosing with practically any type of oral anticoagulant     5/31/2021:  - She has O2 at home and is on portable today.   - She has not followed up with pulmonary since discharge.   - She is breathing fair but does have WINN.   - She has some sinus issues. She is currently on Eliquis.   - She saw Dr Rendon since discharge  - low ATIII and protein S levels which could be the etiology, but they will need to be repeated at later date to confirm  - Elevated factor VIII which also will need to be repeated to confirm    6/28/2021:  - she saw Dr Clark with pulmonary since last visit and has PFT scheduled for this coming July 1st  - she remains on portable O2    12/8/2021:  - she saw Dr Clark again on 11/2/2021  - she remains on O2 at home  - she remains on eliquis  - Repeat AT3 was normal, repeat Protein S was WNL  - repeat factor VIII was still a little elevated at 281 but better    2/15/2022:  - she sees pulmonary NP again next month       (2) Anemia with microcytic indices with current history of chronic menorrhagia - most likely has underlying iron deficiency due to chronic heavy menstrual cycles  - s/p two units of blood  - prior hgb at 8.2  - total bilirubin is WNL, so I do not suspect any hemolysis at this time     5/3/2021:  - hgb at 8.6 today  - iron and ferritin are both low with elevated TIBC  - she received dose of IV iron today    5/31/2021:  - she needs repeat labs incl iron panel  - will consider additional IV iron as needed  -  she is on oral iron  - she needs repeat labs incl. Iron panel    6/28/2021:  - hgb currently 11.7  - iron panel is adequate at this time    10/26/2021:  - latest hgb at 10.6  - iron at 29  - she is on oral iron since May 2021  - she has heavy menstrual cycles  - discussed consideration for IV iron and she is agreeable; discussed general side-effects of iron infusions    12/8/2021:  - she has been on oral iron with little improvement in the labs  - she is starting IV iron this coming Friday    2/15/2022:  - latest labs from Jan 2022 with no anemia and iron panel was adequate  - she had IV iron x 1 only    (3) Overweight/Obesity     (4) HTN     (5) GERD     (6) WBC and platelets are WNL     (7) Hx/of covid vaccination on 4/16 - she received Pfizer vaccine (not the J&J one)     (8) General anxiety disorder     (9) Nonsmoker    VISIT DIAGNOSES:      1. Iron deficiency anemia, unspecified iron deficiency anemia type    2. Iron deficiency anemia due to chronic blood loss    3. Acute pulmonary embolism, unspecified pulmonary embolism type, unspecified whether acute cor pulmonale present    4. Antithrombin III deficiency    5. Elevated factor VIII level    6. Protein S deficiency    7. Menorrhagia with regular cycle            Plan:     PLAN:  1. F/u with pulmonary as directed  2. F/u with PCP  3. Check labs monthly as ordered incl iron panel - due this coming Thursday  4. F/u with GYN  6. Refill eliquis as needed  7. Set up referral to ortho  8. Proceed with GYN plans and evaluation for hysterectomy - she sees them next month    RTC in 3 months    Fax note to Eduardo Rendon Hixson              Iron deficiency anemia, unspecified iron deficiency anemia type    Iron deficiency anemia due to chronic blood loss    Acute pulmonary embolism, unspecified pulmonary embolism type, unspecified whether acute cor pulmonale present    Antithrombin III deficiency    Elevated factor VIII level    Protein S deficiency    Menorrhagia with  regular cycle      No follow-ups on file.    COVID-19 Discussion:    I had long discussion with patient and any applicable family about the COVID-19 coronavirus epidemic and the recommended precautions with regard to cancer and/or hematology patients. I have re-iterated the CDC recommendations for adequate hand washing, use of hand -like products, and coughing into elbow, etc. In addition, especially for our patients who are on chemotherapy and/or our otherwise immunocompromised patients, I have recommended avoidance of crowds, including movie theaters, restaurants, churches, etc. I have recommended avoidance of any sick or symptomatic family members and/or friends. I have also recommended avoidance of any raw and unwashed food products, and general avoidance of food items that have not been prepared by themselves. The patient has been asked to call us immediately with any symptom developments, issues, questions or other general concerns.       Anticoagulation Discussion:    Discussed with patient and any applicable family members about the benefit and/or need for anticoagulation. I communicated about the risks of bleeding while on any anticoagulation, which could be serious and/or life-threatening, and which can occur at any time, regardless of degree of the level of anticoagulation. I expressed the need for compliance with any anticoagulation regimen and that failure to do so could potential lead to excessive bleeding, and risk to health and/or life. In particular, with patients on coumadin therapy, compliance with requested blood work is absolutely essential, as coumadin levels can vary from time to time, and failure to do so could potentially place the patient at risk for bleeding and/or clotting events which could be fatal. Patients on coumadin are encouraged to call the day after they have their levels drawn, as to obtain the appropriate instructions from my staff. Patients are aware that  self-regulating or self-dosing of their medications is strictly prohibited.       I have explained and the patient understands all of  the current recommendation(s). I have answered all of their questions to the best of my ability and to their complete satisfaction.             Thank you for allowing me to participate in this pleasant patient's care. Please call with any questions or concerns.      Electronically signed Cisco Boston MD

## 2022-02-15 ENCOUNTER — OFFICE VISIT (OUTPATIENT)
Dept: HEMATOLOGY/ONCOLOGY | Facility: CLINIC | Age: 47
End: 2022-02-15
Payer: MEDICAID

## 2022-02-15 VITALS
TEMPERATURE: 98 F | SYSTOLIC BLOOD PRESSURE: 176 MMHG | DIASTOLIC BLOOD PRESSURE: 102 MMHG | WEIGHT: 293 LBS | BODY MASS INDEX: 60.41 KG/M2

## 2022-02-15 DIAGNOSIS — M25.569 KNEE PAIN, UNSPECIFIED CHRONICITY, UNSPECIFIED LATERALITY: ICD-10-CM

## 2022-02-15 DIAGNOSIS — D68.59 PROTEIN S DEFICIENCY: ICD-10-CM

## 2022-02-15 DIAGNOSIS — D50.0 IRON DEFICIENCY ANEMIA DUE TO CHRONIC BLOOD LOSS: ICD-10-CM

## 2022-02-15 DIAGNOSIS — R79.1 ELEVATED FACTOR VIII LEVEL: ICD-10-CM

## 2022-02-15 DIAGNOSIS — D50.9 IRON DEFICIENCY ANEMIA, UNSPECIFIED IRON DEFICIENCY ANEMIA TYPE: Primary | Chronic | ICD-10-CM

## 2022-02-15 DIAGNOSIS — D68.59 ANTITHROMBIN III DEFICIENCY: ICD-10-CM

## 2022-02-15 DIAGNOSIS — I26.99 ACUTE PULMONARY EMBOLISM, UNSPECIFIED PULMONARY EMBOLISM TYPE, UNSPECIFIED WHETHER ACUTE COR PULMONALE PRESENT: ICD-10-CM

## 2022-02-15 DIAGNOSIS — N92.0 MENORRHAGIA WITH REGULAR CYCLE: Chronic | ICD-10-CM

## 2022-02-15 PROCEDURE — 3008F PR BODY MASS INDEX (BMI) DOCUMENTED: ICD-10-PCS | Mod: S$GLB,,, | Performed by: INTERNAL MEDICINE

## 2022-02-15 PROCEDURE — 1160F RVW MEDS BY RX/DR IN RCRD: CPT | Mod: S$GLB,,, | Performed by: INTERNAL MEDICINE

## 2022-02-15 PROCEDURE — 99213 PR OFFICE/OUTPT VISIT, EST, LEVL III, 20-29 MIN: ICD-10-PCS | Mod: S$GLB,,, | Performed by: INTERNAL MEDICINE

## 2022-02-15 PROCEDURE — 3080F DIAST BP >= 90 MM HG: CPT | Mod: S$GLB,,, | Performed by: INTERNAL MEDICINE

## 2022-02-15 PROCEDURE — 99213 OFFICE O/P EST LOW 20 MIN: CPT | Mod: S$GLB,,, | Performed by: INTERNAL MEDICINE

## 2022-02-15 PROCEDURE — 3008F BODY MASS INDEX DOCD: CPT | Mod: S$GLB,,, | Performed by: INTERNAL MEDICINE

## 2022-02-15 PROCEDURE — 4010F ACE/ARB THERAPY RXD/TAKEN: CPT | Mod: S$GLB,,, | Performed by: INTERNAL MEDICINE

## 2022-02-15 PROCEDURE — 1160F PR REVIEW ALL MEDS BY PRESCRIBER/CLIN PHARMACIST DOCUMENTED: ICD-10-PCS | Mod: S$GLB,,, | Performed by: INTERNAL MEDICINE

## 2022-02-15 PROCEDURE — 3077F SYST BP >= 140 MM HG: CPT | Mod: S$GLB,,, | Performed by: INTERNAL MEDICINE

## 2022-02-15 PROCEDURE — 4010F PR ACE/ARB THEARPY RXD/TAKEN: ICD-10-PCS | Mod: S$GLB,,, | Performed by: INTERNAL MEDICINE

## 2022-02-15 PROCEDURE — 3077F PR MOST RECENT SYSTOLIC BLOOD PRESSURE >= 140 MM HG: ICD-10-PCS | Mod: S$GLB,,, | Performed by: INTERNAL MEDICINE

## 2022-02-15 PROCEDURE — 3080F PR MOST RECENT DIASTOLIC BLOOD PRESSURE >= 90 MM HG: ICD-10-PCS | Mod: S$GLB,,, | Performed by: INTERNAL MEDICINE

## 2022-02-21 ENCOUNTER — LAB VISIT (OUTPATIENT)
Dept: LAB | Facility: HOSPITAL | Age: 47
End: 2022-02-21
Attending: INTERNAL MEDICINE
Payer: MEDICAID

## 2022-02-21 DIAGNOSIS — I26.99 ACUTE PULMONARY EMBOLISM, UNSPECIFIED PULMONARY EMBOLISM TYPE, UNSPECIFIED WHETHER ACUTE COR PULMONALE PRESENT: ICD-10-CM

## 2022-02-21 DIAGNOSIS — D68.59 ANTITHROMBIN III DEFICIENCY: ICD-10-CM

## 2022-02-21 DIAGNOSIS — R79.1 ELEVATED FACTOR VIII LEVEL: ICD-10-CM

## 2022-02-21 DIAGNOSIS — D68.59 PROTEIN S DEFICIENCY: ICD-10-CM

## 2022-02-21 DIAGNOSIS — D50.9 IRON DEFICIENCY ANEMIA, UNSPECIFIED IRON DEFICIENCY ANEMIA TYPE: Chronic | ICD-10-CM

## 2022-02-21 LAB
ALBUMIN SERPL BCP-MCNC: 3.2 G/DL (ref 3.5–5.2)
ALP SERPL-CCNC: 85 U/L (ref 55–135)
ALT SERPL W/O P-5'-P-CCNC: 35 U/L (ref 10–44)
ANION GAP SERPL CALC-SCNC: 9 MMOL/L (ref 8–16)
AST SERPL-CCNC: 36 U/L (ref 10–40)
BASOPHILS # BLD AUTO: 0.03 K/UL (ref 0–0.2)
BASOPHILS NFR BLD: 0.4 % (ref 0–1.9)
BILIRUB SERPL-MCNC: 0.4 MG/DL (ref 0.1–1)
BUN SERPL-MCNC: 11 MG/DL (ref 6–20)
CALCIUM SERPL-MCNC: 8.9 MG/DL (ref 8.7–10.5)
CHLORIDE SERPL-SCNC: 95 MMOL/L (ref 95–110)
CO2 SERPL-SCNC: 29 MMOL/L (ref 23–29)
CREAT SERPL-MCNC: 0.9 MG/DL (ref 0.5–1.4)
DIFFERENTIAL METHOD: ABNORMAL
EOSINOPHIL # BLD AUTO: 0.3 K/UL (ref 0–0.5)
EOSINOPHIL NFR BLD: 3.4 % (ref 0–8)
ERYTHROCYTE [DISTWIDTH] IN BLOOD BY AUTOMATED COUNT: 15.7 % (ref 11.5–14.5)
EST. GFR  (AFRICAN AMERICAN): >60 ML/MIN/1.73 M^2
EST. GFR  (NON AFRICAN AMERICAN): >60 ML/MIN/1.73 M^2
FERRITIN SERPL-MCNC: 73 NG/ML (ref 20–300)
GLUCOSE SERPL-MCNC: 120 MG/DL (ref 70–110)
HCT VFR BLD AUTO: 40.7 % (ref 37–48.5)
HGB BLD-MCNC: 12.8 G/DL (ref 12–16)
IMM GRANULOCYTES # BLD AUTO: 0.02 K/UL (ref 0–0.04)
IMM GRANULOCYTES NFR BLD AUTO: 0.2 % (ref 0–0.5)
IRON SERPL-MCNC: 48 UG/DL (ref 30–160)
LYMPHOCYTES # BLD AUTO: 1.7 K/UL (ref 1–4.8)
LYMPHOCYTES NFR BLD: 20 % (ref 18–48)
MCH RBC QN AUTO: 28.6 PG (ref 27–31)
MCHC RBC AUTO-ENTMCNC: 31.4 G/DL (ref 32–36)
MCV RBC AUTO: 91 FL (ref 82–98)
MONOCYTES # BLD AUTO: 0.4 K/UL (ref 0.3–1)
MONOCYTES NFR BLD: 5 % (ref 4–15)
NEUTROPHILS # BLD AUTO: 6 K/UL (ref 1.8–7.7)
NEUTROPHILS NFR BLD: 71 % (ref 38–73)
NRBC BLD-RTO: 0 /100 WBC
PLATELET # BLD AUTO: 357 K/UL (ref 150–450)
PMV BLD AUTO: 9.9 FL (ref 9.2–12.9)
POTASSIUM SERPL-SCNC: 3.9 MMOL/L (ref 3.5–5.1)
PROT SERPL-MCNC: 8.2 G/DL (ref 6–8.4)
RBC # BLD AUTO: 4.47 M/UL (ref 4–5.4)
SATURATED IRON: 13 % (ref 20–50)
SODIUM SERPL-SCNC: 133 MMOL/L (ref 136–145)
TOTAL IRON BINDING CAPACITY: 371 UG/DL (ref 250–450)
TRANSFERRIN SERPL-MCNC: 265 MG/DL (ref 200–375)
WBC # BLD AUTO: 8.48 K/UL (ref 3.9–12.7)

## 2022-02-21 PROCEDURE — 85025 COMPLETE CBC W/AUTO DIFF WBC: CPT | Performed by: INTERNAL MEDICINE

## 2022-02-21 PROCEDURE — 80053 COMPREHEN METABOLIC PANEL: CPT | Performed by: INTERNAL MEDICINE

## 2022-02-21 PROCEDURE — 82728 ASSAY OF FERRITIN: CPT | Performed by: INTERNAL MEDICINE

## 2022-02-21 PROCEDURE — 36415 COLL VENOUS BLD VENIPUNCTURE: CPT | Performed by: INTERNAL MEDICINE

## 2022-02-21 PROCEDURE — 84466 ASSAY OF TRANSFERRIN: CPT | Performed by: INTERNAL MEDICINE

## 2022-02-22 ENCOUNTER — OFFICE VISIT (OUTPATIENT)
Dept: ORTHOPEDICS | Facility: CLINIC | Age: 47
End: 2022-02-22
Payer: MEDICAID

## 2022-02-22 VITALS — WEIGHT: 293 LBS | BODY MASS INDEX: 51.91 KG/M2 | HEIGHT: 63 IN

## 2022-02-22 DIAGNOSIS — M17.12 PRIMARY OSTEOARTHRITIS OF LEFT KNEE: ICD-10-CM

## 2022-02-22 DIAGNOSIS — M17.11 PRIMARY OSTEOARTHRITIS OF RIGHT KNEE: Primary | ICD-10-CM

## 2022-02-22 PROCEDURE — 1160F PR REVIEW ALL MEDS BY PRESCRIBER/CLIN PHARMACIST DOCUMENTED: ICD-10-PCS | Mod: S$GLB,,, | Performed by: ORTHOPAEDIC SURGERY

## 2022-02-22 PROCEDURE — 3008F PR BODY MASS INDEX (BMI) DOCUMENTED: ICD-10-PCS | Mod: S$GLB,,, | Performed by: ORTHOPAEDIC SURGERY

## 2022-02-22 PROCEDURE — 99203 PR OFFICE/OUTPT VISIT, NEW, LEVL III, 30-44 MIN: ICD-10-PCS | Mod: 25,S$GLB,, | Performed by: ORTHOPAEDIC SURGERY

## 2022-02-22 PROCEDURE — 4010F PR ACE/ARB THEARPY RXD/TAKEN: ICD-10-PCS | Mod: S$GLB,,, | Performed by: ORTHOPAEDIC SURGERY

## 2022-02-22 PROCEDURE — 20610 LARGE JOINT ASPIRATION/INJECTION: BILATERAL KNEE: ICD-10-PCS | Mod: 50,S$GLB,, | Performed by: ORTHOPAEDIC SURGERY

## 2022-02-22 PROCEDURE — 3008F BODY MASS INDEX DOCD: CPT | Mod: S$GLB,,, | Performed by: ORTHOPAEDIC SURGERY

## 2022-02-22 PROCEDURE — 1160F RVW MEDS BY RX/DR IN RCRD: CPT | Mod: S$GLB,,, | Performed by: ORTHOPAEDIC SURGERY

## 2022-02-22 PROCEDURE — 20610 DRAIN/INJ JOINT/BURSA W/O US: CPT | Mod: 50,S$GLB,, | Performed by: ORTHOPAEDIC SURGERY

## 2022-02-22 PROCEDURE — 99203 OFFICE O/P NEW LOW 30 MIN: CPT | Mod: 25,S$GLB,, | Performed by: ORTHOPAEDIC SURGERY

## 2022-02-22 PROCEDURE — 4010F ACE/ARB THERAPY RXD/TAKEN: CPT | Mod: S$GLB,,, | Performed by: ORTHOPAEDIC SURGERY

## 2022-02-22 RX ORDER — TRIAMCINOLONE ACETONIDE 40 MG/ML
40 INJECTION, SUSPENSION INTRA-ARTICULAR; INTRAMUSCULAR
Status: DISCONTINUED | OUTPATIENT
Start: 2022-02-22 | End: 2022-02-22 | Stop reason: HOSPADM

## 2022-02-22 RX ADMIN — TRIAMCINOLONE ACETONIDE 40 MG: 40 INJECTION, SUSPENSION INTRA-ARTICULAR; INTRAMUSCULAR at 08:02

## 2022-02-22 NOTE — PROCEDURES
Large Joint Aspiration/Injection: bilateral knee    Date/Time: 2/22/2022 8:45 AM  Performed by: Brayan Valerio MD  Authorized by: Brayan Valerio MD     Consent Done?:  Yes (Verbal)  Indications:  Pain  Site marked: the procedure site was marked    Timeout: prior to procedure the correct patient, procedure, and site was verified    Prep: patient was prepped and draped in usual sterile fashion      Local anesthesia used?: Yes    Local anesthetic:  Lidocaine 1% without epinephrine    Details:  Needle Size:  25 G  Ultrasonic Guidance for needle placement?: No    Location:  Knee  Laterality:  Bilateral  Site:  Bilateral knee  Medications (Right):  40 mg triamcinolone acetonide 40 mg/mL  Medications (Left):  40 mg triamcinolone acetonide 40 mg/mL  Patient tolerance:  Patient tolerated the procedure well with no immediate complications

## 2022-02-22 NOTE — PROGRESS NOTES
[facility]    Subjective:     Chief Complaint:   Chief Complaint   Patient presents with    Right Knee - Pain     Patient is having b/l knee pain, the pain started about 5 years, the right knee is worse than the left, constant swelling, trouble walking and bending.    Left Knee - Pain       Past Medical History:   Diagnosis Date    Antithrombin III deficiency 2021    Asthma     Claustrophobia     Elevated factor VIII level 2021    Esophageal reflux     Gastroesophageal reflux    Generalized anxiety disorder     Anxiety, Generalized    Herniated disc     Hypertension     Iron deficiency anemia due to chronic blood loss 10/27/2021    Lower extremity weakness     Morbid obesity     Obesity     Protein S deficiency 2021    Pulmonary embolism     Upper extremity weakness        Past Surgical History:   Procedure Laterality Date     SECTION      CHOLECYSTECTOMY      TONSILLECTOMY         Current Outpatient Medications   Medication Sig    albuterol (PROVENTIL/VENTOLIN HFA) 90 mcg/actuation inhaler Inhale 2 puffs into the lungs every 6 (six) hours as needed for Wheezing. Rescue    carvediloL (COREG) 12.5 MG tablet Take 1 tablet (12.5 mg total) by mouth 2 (two) times daily with meals.    ELIQUIS 5 mg Tab TAKE 1 TABLET(5 MG) BY MOUTH TWICE DAILY    famotidine (PEPCID) 20 MG tablet Take 20 mg by mouth 2 (two) times daily.    FEROSUL 325 mg (65 mg iron) Tab tablet TAKE 1 TABLET BY MOUTH ONCE DAILY    fluticasone propionate (FLONASE) 50 mcg/actuation nasal spray 2 sprays by Each Nostril route daily as needed for Rhinitis.    fluticasone propionate (FLOVENT HFA) 110 mcg/actuation inhaler Inhale 2 puffs into the lungs 2 (two) times daily. Controller  Rinse after you use it    furosemide (LASIX) 20 MG tablet Take 1 tablet (20 mg total) by mouth once daily.    lisinopriL (PRINIVIL,ZESTRIL) 20 MG tablet Take 1 tablet (20 mg total) by mouth once daily.    losartan  (COZAAR) 25 MG tablet Take 25 mg by mouth once daily.    meloxicam (MOBIC) 7.5 MG tablet Take 7.5 mg by mouth once daily.    metoprolol succinate (TOPROL-XL) 25 MG 24 hr tablet Take 25 mg by mouth once daily.    NEXIUM 40 mg capsule Take 40 mg by mouth once daily.    omeprazole (PRILOSEC) 20 MG capsule Take 20 mg by mouth once daily.    trazodone (DESYREL) 100 MG tablet Take 100 mg by mouth every evening.     No current facility-administered medications for this visit.       Review of patient's allergies indicates:  No Known Allergies    No family history on file.    Social History     Socioeconomic History    Marital status:    Tobacco Use    Smoking status: Never Smoker    Smokeless tobacco: Never Used   Substance and Sexual Activity    Alcohol use: Yes     Alcohol/week: 0.8 standard drinks     Types: 1 Standard drinks or equivalent per week     Comment: rarely    Drug use: No    Sexual activity: Not Currently       History of present illness:  Patient comes in today for her bilateral knees.  The right is worse than the left but both bother her.  This has been going on for many years.  She denies any trauma.  She is morbidly obese.      Review of Systems:    Constitution: Negative for chills, fever, and sweats.  Negative for unexplained weight loss.    HENT:  Negative for headaches and blurry vision.    Cardiovascular:Negative for chest pain or irregular heart beat. Negative for hypertension.    Respiratory:  Negative for cough and shortness of breath.    Gastrointestinal: Negative for abdominal pain, heartburn, melena, nausea, and vomitting.    Genitourinary:  Negative bladder incontinence and dysuria.    Musculoskeletal:  See HPI for details.     Neurological: Negative for numbness.    Psychiatric/Behavioral: Negative for depression.  The patient is not nervous/anxious.      Endocrine: Negative for polyuria    Hematologic/Lymphatic: Negative for bleeding problem.  Does not bruise/bleed  easily.    Skin: Negative for poor would healing and rash    Objective:      Physical Examination:    Vital Signs:  There were no vitals filed for this visit.    Body mass index is 60.41 kg/m².    This a well-developed, well nourished patient in no acute distress.  They are alert and oriented and cooperative to examination.        Patient appears to be stated age.  She is morbidly obese.  She has range of motion 0-90 degrees bilaterally she has crepitus bilaterally.  She has varus deformities bilaterally.  Negative straight leg raises.  EHL is intact.  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  Because the patient is morbidly obese I spent a lot of time discussing weight loss with her.  She had some point was actually looking for bariatric surgery but she had transportation issues.  I encouraged her to continue to pursue that route.  We also spoke about diet and exercise.  I injected both knees today with Kenalog and lidocaine.  She will follow-up p.r.n.      This note was created using Dragon voice recognition software that occasionally misinterpreted phrases or words.

## 2022-02-24 ENCOUNTER — TELEPHONE (OUTPATIENT)
Dept: HEMATOLOGY/ONCOLOGY | Facility: CLINIC | Age: 47
End: 2022-02-24
Payer: MEDICAID

## 2022-02-24 NOTE — TELEPHONE ENCOUNTER
Spoke to patient and informed her that Dr. Boston was able to review her recent labs and states there is nothing acute, and that her iron looks good at this time, cont oral iron. Verbalized understanding.

## 2022-02-24 NOTE — TELEPHONE ENCOUNTER
----- Message from Katiuska Saini sent at 2/23/2022  2:52 PM CST -----  The patient would like a call back with the results of her labs done on Monday. # [phone]

## 2022-03-17 ENCOUNTER — LAB VISIT (OUTPATIENT)
Dept: LAB | Facility: HOSPITAL | Age: 47
End: 2022-03-17
Attending: INTERNAL MEDICINE
Payer: MEDICAID

## 2022-03-17 DIAGNOSIS — D68.59 ANTITHROMBIN III DEFICIENCY: ICD-10-CM

## 2022-03-17 DIAGNOSIS — D68.59 PROTEIN S DEFICIENCY: ICD-10-CM

## 2022-03-17 DIAGNOSIS — D50.9 IRON DEFICIENCY ANEMIA, UNSPECIFIED IRON DEFICIENCY ANEMIA TYPE: Chronic | ICD-10-CM

## 2022-03-17 DIAGNOSIS — R79.1 ELEVATED FACTOR VIII LEVEL: ICD-10-CM

## 2022-03-17 DIAGNOSIS — I26.99 ACUTE PULMONARY EMBOLISM, UNSPECIFIED PULMONARY EMBOLISM TYPE, UNSPECIFIED WHETHER ACUTE COR PULMONALE PRESENT: ICD-10-CM

## 2022-03-17 LAB
ALBUMIN SERPL BCP-MCNC: 2.9 G/DL (ref 3.5–5.2)
ALP SERPL-CCNC: 74 U/L (ref 55–135)
ALT SERPL W/O P-5'-P-CCNC: 27 U/L (ref 10–44)
ANION GAP SERPL CALC-SCNC: 8 MMOL/L (ref 8–16)
AST SERPL-CCNC: 24 U/L (ref 10–40)
BASOPHILS # BLD AUTO: 0.03 K/UL (ref 0–0.2)
BASOPHILS NFR BLD: 0.3 % (ref 0–1.9)
BILIRUB SERPL-MCNC: 0.4 MG/DL (ref 0.1–1)
BUN SERPL-MCNC: 18 MG/DL (ref 6–20)
CALCIUM SERPL-MCNC: 8.8 MG/DL (ref 8.7–10.5)
CHLORIDE SERPL-SCNC: 99 MMOL/L (ref 95–110)
CO2 SERPL-SCNC: 30 MMOL/L (ref 23–29)
CREAT SERPL-MCNC: 1.2 MG/DL (ref 0.5–1.4)
DIFFERENTIAL METHOD: ABNORMAL
EOSINOPHIL # BLD AUTO: 0.1 K/UL (ref 0–0.5)
EOSINOPHIL NFR BLD: 1.2 % (ref 0–8)
ERYTHROCYTE [DISTWIDTH] IN BLOOD BY AUTOMATED COUNT: 14.7 % (ref 11.5–14.5)
EST. GFR  (AFRICAN AMERICAN): >60 ML/MIN/1.73 M^2
EST. GFR  (NON AFRICAN AMERICAN): 53.6 ML/MIN/1.73 M^2
FERRITIN SERPL-MCNC: 66 NG/ML (ref 20–300)
GLUCOSE SERPL-MCNC: 140 MG/DL (ref 70–110)
HCT VFR BLD AUTO: 36.4 % (ref 37–48.5)
HGB BLD-MCNC: 11.4 G/DL (ref 12–16)
IMM GRANULOCYTES # BLD AUTO: 0.03 K/UL (ref 0–0.04)
IMM GRANULOCYTES NFR BLD AUTO: 0.3 % (ref 0–0.5)
IRON SERPL-MCNC: 31 UG/DL (ref 30–160)
LYMPHOCYTES # BLD AUTO: 1.5 K/UL (ref 1–4.8)
LYMPHOCYTES NFR BLD: 15.5 % (ref 18–48)
MCH RBC QN AUTO: 30.1 PG (ref 27–31)
MCHC RBC AUTO-ENTMCNC: 31.3 G/DL (ref 32–36)
MCV RBC AUTO: 96 FL (ref 82–98)
MONOCYTES # BLD AUTO: 0.6 K/UL (ref 0.3–1)
MONOCYTES NFR BLD: 6.1 % (ref 4–15)
NEUTROPHILS # BLD AUTO: 7.5 K/UL (ref 1.8–7.7)
NEUTROPHILS NFR BLD: 76.6 % (ref 38–73)
NRBC BLD-RTO: 0 /100 WBC
PLATELET # BLD AUTO: 278 K/UL (ref 150–450)
PMV BLD AUTO: 9.8 FL (ref 9.2–12.9)
POTASSIUM SERPL-SCNC: 3.8 MMOL/L (ref 3.5–5.1)
PROT SERPL-MCNC: 7.3 G/DL (ref 6–8.4)
RBC # BLD AUTO: 3.79 M/UL (ref 4–5.4)
SATURATED IRON: 10 % (ref 20–50)
SODIUM SERPL-SCNC: 137 MMOL/L (ref 136–145)
TOTAL IRON BINDING CAPACITY: 319 UG/DL (ref 250–450)
TRANSFERRIN SERPL-MCNC: 228 MG/DL (ref 200–375)
WBC # BLD AUTO: 9.79 K/UL (ref 3.9–12.7)

## 2022-03-17 PROCEDURE — 85025 COMPLETE CBC W/AUTO DIFF WBC: CPT | Performed by: INTERNAL MEDICINE

## 2022-03-17 PROCEDURE — 36415 COLL VENOUS BLD VENIPUNCTURE: CPT | Performed by: INTERNAL MEDICINE

## 2022-03-17 PROCEDURE — 84466 ASSAY OF TRANSFERRIN: CPT | Performed by: INTERNAL MEDICINE

## 2022-03-17 PROCEDURE — 80053 COMPREHEN METABOLIC PANEL: CPT | Performed by: INTERNAL MEDICINE

## 2022-03-17 PROCEDURE — 82728 ASSAY OF FERRITIN: CPT | Performed by: INTERNAL MEDICINE

## 2022-03-24 ENCOUNTER — HOSPITAL ENCOUNTER (EMERGENCY)
Facility: HOSPITAL | Age: 47
Discharge: HOME | End: 2022-03-24
Attending: EMERGENCY MEDICINE
Payer: MEDICAID

## 2022-03-24 VITALS
WEIGHT: 293 LBS | SYSTOLIC BLOOD PRESSURE: 135 MMHG | TEMPERATURE: 99 F | DIASTOLIC BLOOD PRESSURE: 80 MMHG | HEIGHT: 63 IN | BODY MASS INDEX: 51.91 KG/M2 | OXYGEN SATURATION: 99 % | RESPIRATION RATE: 18 BRPM | HEART RATE: 99 BPM

## 2022-03-24 DIAGNOSIS — N88.8 CERVICAL MASS: ICD-10-CM

## 2022-03-24 DIAGNOSIS — N93.9 VAGINAL BLEEDING: Primary | ICD-10-CM

## 2022-03-24 LAB
ALBUMIN SERPL BCP-MCNC: 3 G/DL (ref 3.5–5.2)
ALP SERPL-CCNC: 72 U/L (ref 55–135)
ALT SERPL W/O P-5'-P-CCNC: 28 U/L (ref 10–44)
ANION GAP SERPL CALC-SCNC: 6 MMOL/L (ref 8–16)
AST SERPL-CCNC: 23 U/L (ref 10–40)
BASOPHILS # BLD AUTO: 0.03 K/UL (ref 0–0.2)
BASOPHILS NFR BLD: 0.3 % (ref 0–1.9)
BILIRUB SERPL-MCNC: 0.6 MG/DL (ref 0.1–1)
BILIRUB UR QL STRIP: NEGATIVE
BUN SERPL-MCNC: 12 MG/DL (ref 6–20)
CALCIUM SERPL-MCNC: 8.8 MG/DL (ref 8.7–10.5)
CHLORIDE SERPL-SCNC: 98 MMOL/L (ref 95–110)
CLARITY UR: CLEAR
CO2 SERPL-SCNC: 32 MMOL/L (ref 23–29)
COLOR UR: YELLOW
CREAT SERPL-MCNC: 1 MG/DL (ref 0.5–1.4)
DIFFERENTIAL METHOD: ABNORMAL
EOSINOPHIL # BLD AUTO: 0.1 K/UL (ref 0–0.5)
EOSINOPHIL NFR BLD: 1.2 % (ref 0–8)
ERYTHROCYTE [DISTWIDTH] IN BLOOD BY AUTOMATED COUNT: 14.6 % (ref 11.5–14.5)
EST. GFR  (AFRICAN AMERICAN): >60 ML/MIN/1.73 M^2
EST. GFR  (NON AFRICAN AMERICAN): >60 ML/MIN/1.73 M^2
GLUCOSE SERPL-MCNC: 192 MG/DL (ref 70–110)
GLUCOSE UR QL STRIP: NEGATIVE
HCT VFR BLD AUTO: 35.4 % (ref 37–48.5)
HGB BLD-MCNC: 11.2 G/DL (ref 12–16)
HGB UR QL STRIP: ABNORMAL
IMM GRANULOCYTES # BLD AUTO: 0.05 K/UL (ref 0–0.04)
IMM GRANULOCYTES NFR BLD AUTO: 0.5 % (ref 0–0.5)
KETONES UR QL STRIP: NEGATIVE
LEUKOCYTE ESTERASE UR QL STRIP: NEGATIVE
LIPASE SERPL-CCNC: 39 U/L (ref 4–60)
LYMPHOCYTES # BLD AUTO: 1.3 K/UL (ref 1–4.8)
LYMPHOCYTES NFR BLD: 12.2 % (ref 18–48)
MCH RBC QN AUTO: 30 PG (ref 27–31)
MCHC RBC AUTO-ENTMCNC: 31.6 G/DL (ref 32–36)
MCV RBC AUTO: 95 FL (ref 82–98)
MONOCYTES # BLD AUTO: 0.5 K/UL (ref 0.3–1)
MONOCYTES NFR BLD: 5.1 % (ref 4–15)
NEUTROPHILS # BLD AUTO: 8.4 K/UL (ref 1.8–7.7)
NEUTROPHILS NFR BLD: 80.7 % (ref 38–73)
NITRITE UR QL STRIP: NEGATIVE
NRBC BLD-RTO: 0 /100 WBC
PH UR STRIP: 6 [PH] (ref 5–8)
PLATELET # BLD AUTO: 359 K/UL (ref 150–450)
PMV BLD AUTO: 9.7 FL (ref 9.2–12.9)
POTASSIUM SERPL-SCNC: 3.6 MMOL/L (ref 3.5–5.1)
PROT SERPL-MCNC: 7.5 G/DL (ref 6–8.4)
PROT UR QL STRIP: ABNORMAL
RBC # BLD AUTO: 3.73 M/UL (ref 4–5.4)
SODIUM SERPL-SCNC: 136 MMOL/L (ref 136–145)
SP GR UR STRIP: 1.02 (ref 1–1.03)
URN SPEC COLLECT METH UR: ABNORMAL
UROBILINOGEN UR STRIP-ACNC: NEGATIVE EU/DL
WBC # BLD AUTO: 10.35 K/UL (ref 3.9–12.7)

## 2022-03-24 PROCEDURE — 96374 THER/PROPH/DIAG INJ IV PUSH: CPT | Mod: 59

## 2022-03-24 PROCEDURE — 96375 TX/PRO/DX INJ NEW DRUG ADDON: CPT

## 2022-03-24 PROCEDURE — 85025 COMPLETE CBC W/AUTO DIFF WBC: CPT | Performed by: EMERGENCY MEDICINE

## 2022-03-24 PROCEDURE — 25500020 PHARM REV CODE 255: Performed by: EMERGENCY MEDICINE

## 2022-03-24 PROCEDURE — 83690 ASSAY OF LIPASE: CPT | Performed by: EMERGENCY MEDICINE

## 2022-03-24 PROCEDURE — 63600175 PHARM REV CODE 636 W HCPCS: Performed by: EMERGENCY MEDICINE

## 2022-03-24 PROCEDURE — 99285 EMERGENCY DEPT VISIT HI MDM: CPT | Mod: 25

## 2022-03-24 PROCEDURE — 80053 COMPREHEN METABOLIC PANEL: CPT | Performed by: EMERGENCY MEDICINE

## 2022-03-24 PROCEDURE — 81003 URINALYSIS AUTO W/O SCOPE: CPT | Performed by: EMERGENCY MEDICINE

## 2022-03-24 RX ORDER — ONDANSETRON 2 MG/ML
4 INJECTION INTRAMUSCULAR; INTRAVENOUS
Status: COMPLETED | OUTPATIENT
Start: 2022-03-24 | End: 2022-03-24

## 2022-03-24 RX ORDER — FLUCONAZOLE 200 MG/1
200 TABLET ORAL DAILY
Qty: 7 TABLET | Refills: 0 | Status: SHIPPED | OUTPATIENT
Start: 2022-03-24 | End: 2022-03-26

## 2022-03-24 RX ORDER — MORPHINE SULFATE 4 MG/ML
4 INJECTION, SOLUTION INTRAMUSCULAR; INTRAVENOUS
Status: COMPLETED | OUTPATIENT
Start: 2022-03-24 | End: 2022-03-24

## 2022-03-24 RX ORDER — HYDROCODONE BITARTRATE AND ACETAMINOPHEN 5; 325 MG/1; MG/1
1 TABLET ORAL EVERY 4 HOURS PRN
Qty: 12 TABLET | Refills: 0 | Status: SHIPPED | OUTPATIENT
Start: 2022-03-24 | End: 2022-06-20

## 2022-03-24 RX ORDER — DOXYCYCLINE 100 MG/1
100 CAPSULE ORAL 2 TIMES DAILY
Qty: 20 CAPSULE | Refills: 0 | Status: SHIPPED | OUTPATIENT
Start: 2022-03-24 | End: 2022-04-03

## 2022-03-24 RX ADMIN — IOHEXOL 100 ML: 350 INJECTION, SOLUTION INTRAVENOUS at 12:03

## 2022-03-24 RX ADMIN — MORPHINE SULFATE 4 MG: 4 INJECTION, SOLUTION INTRAMUSCULAR; INTRAVENOUS at 11:03

## 2022-03-24 RX ADMIN — ONDANSETRON 4 MG: 2 INJECTION INTRAMUSCULAR; INTRAVENOUS at 10:03

## 2022-03-24 NOTE — ED PROVIDER NOTES
Encounter Date: 3/24/2022       History     Chief Complaint   Patient presents with    Vaginal Bleeding     Pt stated started period 3/8 still bleeding past few days increased pain inside vagina and heavier flow x 2 days.      Patient presents complaining of vaginal bleeding.  Patient has been having bleeding for about 2 and half weeks.  Patient additionally has had heavy periods that last about 2 weeks at a time it this cycle has been lasting longer with associated additional cramping.  At the worst symptoms are moderate.  She has not had this before.  Nothing really makes it better or worse.  Patient does take Eliquis therapy.        Review of patient's allergies indicates:  No Known Allergies  Past Medical History:   Diagnosis Date    Antithrombin III deficiency 2021    Asthma     Claustrophobia     Elevated factor VIII level 2021    Esophageal reflux     Gastroesophageal reflux    Generalized anxiety disorder     Anxiety, Generalized    Herniated disc     Hypertension     Iron deficiency anemia due to chronic blood loss 10/27/2021    Lower extremity weakness     Morbid obesity     Obesity     Protein S deficiency 2021    Pulmonary embolism     Upper extremity weakness      Past Surgical History:   Procedure Laterality Date     SECTION      CHOLECYSTECTOMY      TONSILLECTOMY       No family history on file.  Social History     Tobacco Use    Smoking status: Never Smoker    Smokeless tobacco: Never Used   Substance Use Topics    Alcohol use: Yes     Alcohol/week: 0.8 standard drinks     Types: 1 Standard drinks or equivalent per week     Comment: rarely    Drug use: No     Review of Systems   All other systems reviewed and are negative.      Physical Exam     Initial Vitals   BP Pulse Resp Temp SpO2   22 0935 22 0935 22 0935 22 0945 22 0935   132/85 103 (!) 21 98.2 °F (36.8 °C) 96 %      MAP       --                Physical Exam    Nursing  note and vitals reviewed.  Constitutional: She appears well-developed and well-nourished.   Pleasant, polite   HENT:   Head: Normocephalic and atraumatic.   Eyes: EOM are normal.   Neck: Neck supple.   Normal range of motion.  Pulmonary/Chest: No respiratory distress.   Genitourinary:    Genitourinary Comments: Pelvic exam reveals a small slight oozing of blood.  There is no heavy bleeding or clots.  The cervix is partially visualized but is somewhat obscured by patient's body habitus and blood but there is no definitive abnormality found however the CT scan does reveal concern for mass in this area.     Musculoskeletal:      Cervical back: Normal range of motion and neck supple.     Neurological: She is alert and oriented to person, place, and time.   Skin: Skin is warm and dry. Capillary refill takes less than 2 seconds.   Psychiatric: She has a normal mood and affect. Her behavior is normal. Judgment and thought content normal.         ED Course   Procedures  Labs Reviewed   CBC W/ AUTO DIFFERENTIAL - Abnormal; Notable for the following components:       Result Value    RBC 3.73 (*)     Hemoglobin 11.2 (*)     Hematocrit 35.4 (*)     MCHC 31.6 (*)     RDW 14.6 (*)     Gran # (ANC) 8.4 (*)     Immature Grans (Abs) 0.05 (*)     Gran % 80.7 (*)     Lymph % 12.2 (*)     All other components within normal limits   COMPREHENSIVE METABOLIC PANEL - Abnormal; Notable for the following components:    CO2 32 (*)     Glucose 192 (*)     Albumin 3.0 (*)     Anion Gap 6 (*)     All other components within normal limits   URINALYSIS, REFLEX TO URINE CULTURE - Abnormal; Notable for the following components:    Protein, UA Trace (*)     Occult Blood UA Trace (*)     All other components within normal limits    Narrative:     Specimen Source->Urine   LIPASE          Imaging Results          CT Abdomen Pelvis With Contrast (Final result)  Result time 03/24/22 12:21:16    Final result by Gabriel Bennett MD (03/24/22 12:21:16)                  Narrative:    CMS MANDATED QUALITY DATA - CT RADIATION  436    All CT scans at this facility utilize dose modulation, iterative reconstruction, and/or weight based dosing when appropriate to reduce radiation dose to as low as reasonably achievable.    CLINICAL HISTORY:  47 years (1975) Female Abdominal abscess/infection suspected; lower abd pain and vag bleed    TECHNIQUE:  CT ABDOMEN PELVIS WITH IV CONTRAST. 297 images obtained. Axial CT images of the abdomen and pelvis were obtained from the dome of the diaphragm to the proximal thigh.    CONTRAST:  100 mL of IV Omni 350 was administered uneventfully by IV    COMPARISON:  CT most recently from October 14, 2019.    FINDINGS:.  Lower Thorax:  The heart is mildly enlarged in size. There are scattered pulmonary nodules at the lung bases as outlined below:  -6 mm juxtapleural pulmonary nodule in the left lower lobe (image 17)  -2 mm juxtapleural pulmonary nodule in the left lower lobe (image 22)  -4 mm and adjacent 3 mm pulmonary nodule in the posterior right lower lobe (image 30)  -2 mm pulmonary nodule in the juxtapleural left lower lobe (image 31)  -6 monitor pulmonary nodule in the posterior right lower lobe (image 41)  -5 mm pulmonary nodule in the posterior right lower lobe (image 33)  -3 mm pulmonary nodule in the right lower lobe (image 22)  -Additional smaller nodules may be present.    CT Abdomen:  Liver: Relative diffuse low-attenuation liver consistent with hepatic steatosis. The liver is enlarged measuring 19 cm sagittal right lobe.  Gallbladder: The gallbladder is surgically absent.  Biliary Tree: No intra or extrahepatic ductal dilation.  Spleen: Within normal limits.  Pancreas: The pancreas is normal.  Adrenal Glands: The adrenal glands appear within normal limits.  Kidneys: The kidneys are normal in imaging appearance without hydronephrosis or hydroureter.  Vasculature: The aorta is normal in course and caliber.  Lymph nodes: No  abdominal lymphadenopathy is seen.  Intraperitoneal structures: There is no ascites.  Bowel: The partially filled bowel is within normal limits with a nonobstructive bowel gas pattern. The appendix is normal (image 153)  Abdominal wall: The abdominal wall and musculature are normal.  Musculoskeletal: No acute osseous abnormality is identified .    CT Pelvis:  Bladder: The urinary bladder is within normal limits.  Reproductive Organs: Heterogeneous appearance of the uterus with ill-defined masslike thickening of the cervix. There is a 3 cm cyst in the right ovary there is a 12 mm diameter rim calcified likely fibroid in uterine fundus.  Pelvic Lymph nodes: No pelvic lymphadenopathy or pelvic mass is identified.    IMPRESSION:  1. Heterogeneous appearing uterus with ill-defined masslike thickening of the cervix, new from the previous exam. Consider correlation with physical exam as underlying malignancy is not excluded.  2. Simple cyst in the right ovary.  3. Hepatomegaly and findings of diffuse steatosis.  4. Scattered soft tissue attenuation pulmonary nodules the lung bases.                  CMS MANDATED QUALITY DATA - FOLLOW-UP IMAGING FOR INCIDENTALLY DETECTED PULMONARY NODULES ACCORDING TO RECOMMENDED GUIDELINES - MEASURE [facility]        [facility] Best Practice Guidelines for Incidental Lung Nodules on CT    SINGLE SOLID  Low Risk  < 6 mm: No follow up  > or = 6 mm: 6-12 months, then optional 18-24 months  > or = 8 mm: 3 months vs PET vs Bx    High Risk  < 6 mm: 12 months if upper lobe or suspicious morphology, otherwise no follow up  > or = 6 mm: 6-12 months, then 18-24 months  > or = 8 mm: 3 months vs PET vs Bx    MULTIPLE SOLID  Low Risk  < 6 mm: No follow up  > or = 6 mm: 3-6 months, then optional 18-24 months    High Risk  < 6 mm: 12 months if upper lobe or suspicious morphology, otherwise no follow up  > or = 6 mm: 3-6 months, then 18-24 months    GROUND GLASS  < 6 mm: 2 and 4 years if suspicious  morphology, otherwise no follow up  > or = 6 mm: 6-12 months, then Q 2 years for 5 years if stable. If it grows, resect it.    MIXED  < 6 mm: 2 and 4 years if suspicious morphology, otherwise no follow up  > or = 6 mm: 3-6 months, then Q 1 year for 5 years if stable AND not suspicious. If suspicious (solid component > or = 6 mm, spiculated, cystic, lobulated, interval growth), PET vs Bx vs resection.    MULTIPLE GG OR MIXED  < 6 mm: 3-6 months, then 2 and 4 years  > or = 6 mm: 3-6 months, then manage per the most suspicious nodule as above    NODULES DETECTED ON ABD/PELVIS CT  < 6 mm: No follow up  > or = 6 mm: Follow up chest CT per recommendations above  > or = 8 mm: Prompt chest CT      NOTE:    These criteria do not apply to pts < 35 years old, immunocompromised, or known primary malignancy.    Low risk patients include individuals with minimal or absent history of smoking and w/o other known risk factors.    High risk patients include those with history of smoking, emphysema, pulmonary fibrosis, family history of lung cancer, and/or other know risk factors.    Reference:  Radiology. 2017 Jul; 241(1):228-243, [place] Guidelines for Management of Incidental Pulmonary Nodules on CT        [facility] Best Practice Guidelines:  Benign-appearing simple adnexal cysts on CT with IV contrast and MR: (1)(2)      Pre-menopause(3) (<= 50 years if LMP unknown):  <=5 cm: No follow-up imaging recommended  >5 cm - <=7 cm:  US f/u 6-12 weeks  >7 cm: Consider MR w/IVC or surgical evaluation    Early post-menopause (<=5 years from LMP; > 50 years to <= 55 years if LMP unknown):  <=3 cm: No follow-up imaging recommended (4)  >3 cm - <=5 cm: US f/u 6-12 months (4)  >5 cm - <=7 cm: US f/u promptly  >7cm: Consider MR w/IVC or surgical evaluation    Late post-menopause (>5 years from LMP; > 55 years if LMP unknown):  <=3 cm: No follow-up imaging recommended  >3 cm - <=7 cm: US f/u promptly  >7 cm: Consider MR w/IVC or  surgical evaluation      Recommendations for Probably benign adnexal cysts  on CT and MR:(1)(2)  (benign-appearing cysts on non IV-contrast  CT or with one or more of the following complicating factors:  angulated margins, not round or oval, poorly visualized such  as obscured by artifact or low S/N.)    Pre-menopause (<= 50 years if LMP unknown):  <=3 cm: No follow-up imaging recommended  >3 cm - <=5 cm: US f/u 6-12 weeks  >5 cm - <=7 cm: US f/u promptly  >7 cm: Consider MR w/IVC or surgical evaluation    Early post-menopause (<=5 years from LMP; > 50 years to <= 55 years if LMP unknown):  <=3 cm: No follow-up imaging recommended  >3 cm - <=7 cm: US f/u promptly  >7 cm: Consider MR w/IVC or surgical evaluation    Late post-menopause (>5 years from LMP; > 55 years if LMP unknown):  <=1 cm: No follow-up imaging recommended  >1 cm - <=7 cm: US f/u promptly  >7 cm: Consider MR w/IVC or surgical evaluation    ____________________________________________________________________________________  (1)Recommendations based on the 2013 ACR White Paper for Managing Incidental Adnexal Findings on Abdominal and Pelvic CT and MRI: J Am Osmar Radiol 2013;10:675-681  (2)Excludes normal/benign findings such as ovarian calcifications w/o associated non-calcified mass, corpus luteum cyst, previously characterized cyst and cyst with documented stability in size and appearance for >2 years  (3)Includes cysts with layering hemorrhage  (4)If internal hemorrhage suspected in cyst, US f/u 6-12 weeks      RESULT NOTIFICATION: These observations were flagged for review, to be communicated to the ordering provider RACHEL GORDON at 3/24/2022 12:21 PM CDT.    Electronically signed by:  Gabriel Bennett MD  3/24/2022 12:21 PM CDT Workstation: [id number]                               Medications   morphine injection 4 mg (4 mg Intravenous Given 3/24/22 1106)   ondansetron injection 4 mg (4 mg Intravenous Given 3/24/22 1045)   iohexoL (OMNIPAQUE  350) injection 100 mL (100 mLs Intravenous Given 3/24/22 1201)     Medical Decision Making:   Initial Assessment:   Patient appears in pain  Differential Diagnosis:   Considerations include urinary tract infection, endometritis, cervicitis, pelvic inflammatory disease, cervical mass, fibroid, uterine mass  Clinical Tests:   Lab Tests: Reviewed and Ordered  Radiological Study: Ordered and Reviewed  Medical Tests: Reviewed and Ordered  Patient's blood work shows no evidence of any severe anemia.  Blood work otherwise within normal limits.  CT scan shows concern for cervical mass.  Patient was advised of this finding and the need for urgent follow-up.  She was given referral from the emergency department gynecology Oncology and also was revised to follow-up with her primary gynecologist Dr. Mroris.  She was also given prescription for pain medicine as well as prescription for antibiotic as her exam was limited.  Patient was advised detailed return precautions including increasing pain, nausea, vomiting, fever, bleeding she should return.  She was also advised to hold Eliquis today and tomorrow to try to help the bleeding to subside.  Patient be discharged in stable condition.  Detailed return precautions discussed.                      Clinical Impression:   Final diagnoses:  [N93.9] Vaginal bleeding (Primary)  [N88.8] Cervical mass          ED Disposition Condition    Discharge Stable        ED Prescriptions     Medication Sig Dispense Start Date End Date Auth. Provider    HYDROcodone-acetaminophen (NORCO) 5-325 mg per tablet Take 1 tablet by mouth every 4 (four) hours as needed. 12 tablet 3/24/2022  Yoel Tran MD    fluconazole (DIFLUCAN) 200 MG Tab Take 1 tablet (200 mg total) by mouth once daily. for 2 days 7 tablet 3/24/2022 3/26/2022 Yoel Tran MD    doxycycline (VIBRAMYCIN) 100 MG Cap Take 1 capsule (100 mg total) by mouth 2 (two) times daily. for 10 days 20 capsule 3/24/2022 4/3/2022 Yoel Tran  MD        Follow-up Information     Follow up With Specialties Details Why Contact Info    Ivelisse Pro MD Gynecologic Oncology In 1 week  [address] [place] [address] [phone]             Yoel Tran MD  03/24/22 0338

## 2022-03-28 ENCOUNTER — TELEPHONE (OUTPATIENT)
Dept: HEMATOLOGY/ONCOLOGY | Facility: CLINIC | Age: 47
End: 2022-03-28
Payer: MEDICAID

## 2022-03-30 NOTE — PROGRESS NOTES
Called Dr. Bal to discuss patient.   D and C under anesthesia   Nothing externally seen on Pap Smear.   She has been off of the Eliquis since March 24th.   Orders to stay off of Eliquis and go for cervical and uterine biopsy.

## 2022-03-31 ENCOUNTER — OFFICE VISIT (OUTPATIENT)
Dept: HEMATOLOGY/ONCOLOGY | Facility: CLINIC | Age: 47
End: 2022-03-31
Payer: MEDICAID

## 2022-03-31 ENCOUNTER — TELEPHONE (OUTPATIENT)
Dept: HEMATOLOGY/ONCOLOGY | Facility: CLINIC | Age: 47
End: 2022-03-31
Payer: MEDICAID

## 2022-03-31 ENCOUNTER — PATIENT MESSAGE (OUTPATIENT)
Dept: HEMATOLOGY/ONCOLOGY | Facility: CLINIC | Age: 47
End: 2022-03-31
Payer: MEDICAID

## 2022-03-31 ENCOUNTER — LAB VISIT (OUTPATIENT)
Dept: LAB | Facility: HOSPITAL | Age: 47
End: 2022-03-31
Attending: NURSE PRACTITIONER
Payer: MEDICAID

## 2022-03-31 DIAGNOSIS — N93.9 VAGINAL BLEEDING: ICD-10-CM

## 2022-03-31 DIAGNOSIS — D68.59 ANTITHROMBIN III DEFICIENCY: Primary | ICD-10-CM

## 2022-03-31 DIAGNOSIS — D50.0 IRON DEFICIENCY ANEMIA DUE TO CHRONIC BLOOD LOSS: ICD-10-CM

## 2022-03-31 DIAGNOSIS — I26.99 ACUTE PULMONARY EMBOLISM, UNSPECIFIED PULMONARY EMBOLISM TYPE, UNSPECIFIED WHETHER ACUTE COR PULMONALE PRESENT: ICD-10-CM

## 2022-03-31 LAB
ALBUMIN SERPL BCP-MCNC: 3.2 G/DL (ref 3.5–5.2)
ALP SERPL-CCNC: 74 U/L (ref 55–135)
ALT SERPL W/O P-5'-P-CCNC: 27 U/L (ref 10–44)
ANION GAP SERPL CALC-SCNC: 9 MMOL/L (ref 8–16)
AST SERPL-CCNC: 29 U/L (ref 10–40)
BASOPHILS # BLD AUTO: 0.04 K/UL (ref 0–0.2)
BASOPHILS NFR BLD: 0.4 % (ref 0–1.9)
BILIRUB SERPL-MCNC: 0.4 MG/DL (ref 0.1–1)
BUN SERPL-MCNC: 13 MG/DL (ref 6–20)
CALCIUM SERPL-MCNC: 9.1 MG/DL (ref 8.7–10.5)
CHLORIDE SERPL-SCNC: 100 MMOL/L (ref 95–110)
CO2 SERPL-SCNC: 31 MMOL/L (ref 23–29)
CREAT SERPL-MCNC: 1.3 MG/DL (ref 0.5–1.4)
DIFFERENTIAL METHOD: ABNORMAL
EOSINOPHIL # BLD AUTO: 0.1 K/UL (ref 0–0.5)
EOSINOPHIL NFR BLD: 1 % (ref 0–8)
ERYTHROCYTE [DISTWIDTH] IN BLOOD BY AUTOMATED COUNT: 14.6 % (ref 11.5–14.5)
EST. GFR  (AFRICAN AMERICAN): 56.1 ML/MIN/1.73 M^2
EST. GFR  (NON AFRICAN AMERICAN): 48.6 ML/MIN/1.73 M^2
FERRITIN SERPL-MCNC: 24 NG/ML (ref 20–300)
GLUCOSE SERPL-MCNC: 143 MG/DL (ref 70–110)
HCT VFR BLD AUTO: 36.1 % (ref 37–48.5)
HGB BLD-MCNC: 11 G/DL (ref 12–16)
IMM GRANULOCYTES # BLD AUTO: 0.03 K/UL (ref 0–0.04)
IMM GRANULOCYTES NFR BLD AUTO: 0.3 % (ref 0–0.5)
IRON SERPL-MCNC: 24 UG/DL (ref 30–160)
LYMPHOCYTES # BLD AUTO: 1.6 K/UL (ref 1–4.8)
LYMPHOCYTES NFR BLD: 17.2 % (ref 18–48)
MCH RBC QN AUTO: 30 PG (ref 27–31)
MCHC RBC AUTO-ENTMCNC: 30.5 G/DL (ref 32–36)
MCV RBC AUTO: 98 FL (ref 82–98)
MONOCYTES # BLD AUTO: 0.6 K/UL (ref 0.3–1)
MONOCYTES NFR BLD: 5.9 % (ref 4–15)
NEUTROPHILS # BLD AUTO: 7.1 K/UL (ref 1.8–7.7)
NEUTROPHILS NFR BLD: 75.2 % (ref 38–73)
NRBC BLD-RTO: 0 /100 WBC
PLATELET # BLD AUTO: 390 K/UL (ref 150–450)
PMV BLD AUTO: 9 FL (ref 9.2–12.9)
POTASSIUM SERPL-SCNC: 3.8 MMOL/L (ref 3.5–5.1)
PROT SERPL-MCNC: 7.7 G/DL (ref 6–8.4)
RBC # BLD AUTO: 3.67 M/UL (ref 4–5.4)
SATURATED IRON: 6 % (ref 20–50)
SODIUM SERPL-SCNC: 140 MMOL/L (ref 136–145)
TOTAL IRON BINDING CAPACITY: 420 UG/DL (ref 250–450)
TRANSFERRIN SERPL-MCNC: 300 MG/DL (ref 200–375)
WBC # BLD AUTO: 9.4 K/UL (ref 3.9–12.7)

## 2022-03-31 PROCEDURE — 80053 COMPREHEN METABOLIC PANEL: CPT | Performed by: NURSE PRACTITIONER

## 2022-03-31 PROCEDURE — 3077F PR MOST RECENT SYSTOLIC BLOOD PRESSURE >= 140 MM HG: ICD-10-PCS | Mod: S$GLB,,, | Performed by: NURSE PRACTITIONER

## 2022-03-31 PROCEDURE — 3008F PR BODY MASS INDEX (BMI) DOCUMENTED: ICD-10-PCS | Mod: S$GLB,,, | Performed by: NURSE PRACTITIONER

## 2022-03-31 PROCEDURE — 99214 PR OFFICE/OUTPT VISIT, EST, LEVL IV, 30-39 MIN: ICD-10-PCS | Mod: S$GLB,,, | Performed by: NURSE PRACTITIONER

## 2022-03-31 PROCEDURE — 3080F PR MOST RECENT DIASTOLIC BLOOD PRESSURE >= 90 MM HG: ICD-10-PCS | Mod: S$GLB,,, | Performed by: NURSE PRACTITIONER

## 2022-03-31 PROCEDURE — 3077F SYST BP >= 140 MM HG: CPT | Mod: S$GLB,,, | Performed by: NURSE PRACTITIONER

## 2022-03-31 PROCEDURE — 4010F ACE/ARB THERAPY RXD/TAKEN: CPT | Mod: S$GLB,,, | Performed by: NURSE PRACTITIONER

## 2022-03-31 PROCEDURE — 82728 ASSAY OF FERRITIN: CPT | Performed by: NURSE PRACTITIONER

## 2022-03-31 PROCEDURE — 3080F DIAST BP >= 90 MM HG: CPT | Mod: S$GLB,,, | Performed by: NURSE PRACTITIONER

## 2022-03-31 PROCEDURE — 84466 ASSAY OF TRANSFERRIN: CPT | Performed by: NURSE PRACTITIONER

## 2022-03-31 PROCEDURE — 99214 OFFICE O/P EST MOD 30 MIN: CPT | Mod: S$GLB,,, | Performed by: NURSE PRACTITIONER

## 2022-03-31 PROCEDURE — 85025 COMPLETE CBC W/AUTO DIFF WBC: CPT | Performed by: NURSE PRACTITIONER

## 2022-03-31 PROCEDURE — 36415 COLL VENOUS BLD VENIPUNCTURE: CPT | Performed by: NURSE PRACTITIONER

## 2022-03-31 PROCEDURE — 4010F PR ACE/ARB THEARPY RXD/TAKEN: ICD-10-PCS | Mod: S$GLB,,, | Performed by: NURSE PRACTITIONER

## 2022-03-31 PROCEDURE — 3008F BODY MASS INDEX DOCD: CPT | Mod: S$GLB,,, | Performed by: NURSE PRACTITIONER

## 2022-03-31 NOTE — PROGRESS NOTES
[facility] Hematology/Oncology  Progress Note -   Follow-up Visit    Subjective:      Patient ID:   NAME: Katiuska Preston : 1975     47 y.o. female    Referring Doc: Justice  Other Physicians: Eduardo    Chief Complaint: pulm emboli f/u - vaginal bleeding       HPI:   The patient returns for an add on visit. She has been on Eliquis since May of 2021 for pulmonary emboli. She went to the ER on 3/24/2022 for vaginal bleeding and pain. Per CT was found to have a cervical abnormality.     She has been off of eliquis since 3/24/22 and states the bleeding has improved but is still present. She saw GYN - Joanne and she is due to see Dr. Pro next week for potential hysterectomy and internal biopsy.       Discussed covid19 precautions - she had her vaccinations       ROS:   GEN: normal without any fever, night sweats or weight loss   HEENT: normal with no HA's, sore throat, stiff neck, changes in vision  CV: normal with no CP, some WINN/Wheeze  PULM: normal with no SOB, cough, hemoptysis, sputum or pleuritic pain  GI: normal with no abdominal pain, nausea, vomiting, constipation, diarrhea, melanotic stools, BRBPR, or hematemesis  GYN: Vaginal bleeding and pain   BREAST: normal with no mass, discharge, pain  SKIN: normal with no rash, erythema, bruising, or swelling     Past Medical/Surgical History:  Past Medical History:   Diagnosis Date    Antithrombin III deficiency 2021    Asthma     Claustrophobia     Elevated factor VIII level 2021    Esophageal reflux     Gastroesophageal reflux    Generalized anxiety disorder     Anxiety, Generalized    Herniated disc     Hypertension     Iron deficiency anemia due to chronic blood loss 10/27/2021    Lower extremity weakness     Morbid obesity     Obesity     Protein S deficiency 2021    Pulmonary embolism     Upper extremity weakness      Past Surgical History:   Procedure Laterality Date     SECTION      CHOLECYSTECTOMY      TONSILLECTOMY            Allergies:  Review of patient's allergies indicates:  No Known Allergies    Social/Family History:  Social History     Socioeconomic History    Marital status:    Tobacco Use    Smoking status: Never Smoker    Smokeless tobacco: Never Used   Substance and Sexual Activity    Alcohol use: Yes     Alcohol/week: 0.8 standard drinks     Types: 1 Standard drinks or equivalent per week     Comment: rarely    Drug use: No    Sexual activity: Not Currently     No family history on file.      Medications:    Current Outpatient Medications:     albuterol (PROVENTIL/VENTOLIN HFA) 90 mcg/actuation inhaler, Inhale 2 puffs into the lungs every 6 (six) hours as needed for Wheezing. Rescue, Disp: 18 g, Rfl: 5    doxycycline (VIBRAMYCIN) 100 MG Cap, Take 1 capsule (100 mg total) by mouth 2 (two) times daily. for 10 days, Disp: 20 capsule, Rfl: 0    famotidine (PEPCID) 20 MG tablet, Take 20 mg by mouth 2 (two) times daily., Disp: , Rfl:     FEROSUL 325 mg (65 mg iron) Tab tablet, TAKE 1 TABLET BY MOUTH ONCE DAILY, Disp: 30 tablet, Rfl: 3    fluticasone propionate (FLONASE) 50 mcg/actuation nasal spray, 2 sprays by Each Nostril route daily as needed for Rhinitis., Disp: , Rfl:     fluticasone propionate (FLOVENT HFA) 110 mcg/actuation inhaler, Inhale 2 puffs into the lungs 2 (two) times daily. Controller Rinse after you use it, Disp: 12 g, Rfl: 5    HYDROcodone-acetaminophen (NORCO) 5-325 mg per tablet, Take 1 tablet by mouth every 4 (four) hours as needed., Disp: 12 tablet, Rfl: 0    losartan (COZAAR) 25 MG tablet, Take 25 mg by mouth once daily., Disp: , Rfl:     meloxicam (MOBIC) 7.5 MG tablet, Take 7.5 mg by mouth once daily., Disp: , Rfl:     metoprolol succinate (TOPROL-XL) 25 MG 24 hr tablet, Take 25 mg by mouth once daily., Disp: , Rfl:     NEXIUM 40 mg capsule, Take 40 mg by mouth once daily., Disp: , Rfl:     omeprazole (PRILOSEC) 20 MG capsule, Take 20 mg by mouth once daily., Disp: , Rfl:  "    trazodone (DESYREL) 100 MG tablet, Take 100 mg by mouth every evening., Disp: , Rfl:     carvediloL (COREG) 12.5 MG tablet, Take 1 tablet (12.5 mg total) by mouth 2 (two) times daily with meals., Disp: 60 tablet, Rfl: 0    ELIQUIS 5 mg Tab, TAKE 1 TABLET(5 MG) BY MOUTH TWICE DAILY (Patient not taking: Reported on 3/31/2022), Disp: 60 tablet, Rfl: 3    furosemide (LASIX) 20 MG tablet, Take 1 tablet (20 mg total) by mouth once daily., Disp: 30 tablet, Rfl: 0    lisinopriL (PRINIVIL,ZESTRIL) 20 MG tablet, Take 1 tablet (20 mg total) by mouth once daily., Disp: 40 tablet, Rfl: 0      Pathology:  Cancer Staging  No matching staging information was found for the patient.        Objective:   Vitals:  Blood pressure (Abnormal) 190/96, pulse 89, temperature 97.9 °F (36.6 °C), resp. rate 18, height 5' 3" (1.6 m), weight (Abnormal) 151 kg (333 lb).  Patient states she has not taken her BP today   Physical Examination:   GEN: no apparent distress, comfortable; AAOx3; morbidly overweight   HEAD: atraumatic and normocephalic  EYES: no pallor, no icterus, PERRLA  ENT: OMM, no pharyngeal erythema, external ears WNL; no nasal discharge; no thrush  NECK: no masses, thyroid normal, trachea midline, no LAD/LN's, supple  CV: RRR with no murmur; normal pulse; normal S1 and S2; no pedal edema  CHEST: Normal respiratory effort; CTAB; normal breath sounds; no wheeze or crackles;   ABDOM: nontender and nondistended; soft; normal bowel sounds; no rebound/guarding  MUSC/Skeletal: LROM and crepitus right knee; no deformities or arthropathy  EXTREM: no clubbing, cyanosis, inflammation or swelling  SKIN: no rashes, lesions, ulcers, petechiae or subcutaneous nodules  : no diaz  NEURO: grossly intact; motor/sensory WNL; AAOx3; no tremors  PSYCH: normal mood, affect and behavior  LYMPH: normal cervical, supraclavicular, and groin LN's;    Labs:   Lab Results   Component Value Date    WBC 9.40 03/31/2022    HGB 11.0 (L) 03/31/2022    HCT " 36.1 (L) 03/31/2022    MCV 98 03/31/2022     03/31/2022    CMP  Sodium   Date Value Ref Range Status   03/31/2022 140 136 - 145 mmol/L Final     Potassium   Date Value Ref Range Status   03/31/2022 3.8 3.5 - 5.1 mmol/L Final     Chloride   Date Value Ref Range Status   03/31/2022 100 95 - 110 mmol/L Final     CO2   Date Value Ref Range Status   03/31/2022 31 (H) 23 - 29 mmol/L Final     Glucose   Date Value Ref Range Status   03/31/2022 143 (H) 70 - 110 mg/dL Final     BUN   Date Value Ref Range Status   03/31/2022 13 6 - 20 mg/dL Final     Creatinine   Date Value Ref Range Status   03/31/2022 1.3 0.5 - 1.4 mg/dL Final     Calcium   Date Value Ref Range Status   03/31/2022 9.1 8.7 - 10.5 mg/dL Final     Total Protein   Date Value Ref Range Status   03/31/2022 7.7 6.0 - 8.4 g/dL Final     Albumin   Date Value Ref Range Status   03/31/2022 3.2 (L) 3.5 - 5.2 g/dL Final     Total Bilirubin   Date Value Ref Range Status   03/31/2022 0.4 0.1 - 1.0 mg/dL Final     Comment:     For infants and newborns, interpretation of results should be based  on gestational age, weight and in agreement with clinical  observations.    Premature Infant recommended reference ranges:  Up to 24 hours.............<8.0 mg/dL  Up to 48 hours............<12.0 mg/dL  3-5 days..................<15.0 mg/dL  6-29 days.................<15.0 mg/dL       Alkaline Phosphatase   Date Value Ref Range Status   03/31/2022 74 55 - 135 U/L Final     AST   Date Value Ref Range Status   03/31/2022 29 10 - 40 U/L Final     ALT   Date Value Ref Range Status   03/31/2022 27 10 - 44 U/L Final     Anion Gap   Date Value Ref Range Status   03/31/2022 9 8 - 16 mmol/L Final     eGFR if    Date Value Ref Range Status   03/31/2022 56.1 (A) >60 mL/min/1.73 m^2 Final     eGFR if non    Date Value Ref Range Status   03/31/2022 48.6 (A) >60 mL/min/1.73 m^2 Final     Comment:     Calculation used to obtain the estimated glomerular  filtration  rate (eGFR) is the CKD-EPI equation.                  Lab Results   Component Value Date    IRON 24 (L) 03/31/2022    TIBC 420 03/31/2022    FERRITIN 24 03/31/2022     Lab Results   Component Value Date    LERQKJPW98 452 05/02/2021     Lab Results   Component Value Date    FOLATE 5.9 05/02/2021          Factor VIII Activity 56 - 140 % 281      Antithrombin III 75 - 135 % 109      Protein S Activity 63 - 140 % 63            I have reviewed all available lab results and radiology reports.    Radiology/Diagnostic Studies:    US Lower Extremity Veins Bilateral [[id number]] Collected: 05/01/21 1348   Order Status: Completed Updated: 05/01/21 1432   Narrative:     REASON: DVT     FINDINGS:     Grayscale, color and spectral Doppler analysis of the bilateral lower   extremity deep venous system was performed.     There is normal compressibility, color and spectral Doppler analysis,   and augmentation in the bilateral lower extremity deep venous system.     IMPRESSION:     1.  No DVT of the bilateral lower extremity veins.   2.  Bilateral distal superficial femoral veins were unable to be   assessed due to body habitus       CTA Chest Non-Coronary - PE Study [[id number]] Collected: 05/01/21 1143   Order Status: Completed Updated: 05/01/21 1225   Narrative:     CMS MANDATED QUALITY DATA - CT RADIATION - 436     All CT scans at this facility utilize dose modulation, iterative   reconstruction, and/or weight based dosing when appropriate to reduce   radiation dose to as low as reasonably achievable.         REASON: PE suspected, intermediate prob, positive D-dimer     TECHNIQUE: CT angiography of thorax with 100 mL Omnipaque 350.   Maximum intensity projection coronal reformations were created at a   separate workstation and stored in the patient's permanent medical   record.     COMPARISON: CTA chest October 14, 2019.     FINDINGS:     Limited evaluation due to body habitus and inadequate bolus timing.   Filling  defects identified in segmental and subsegmental artery   supplying the left lower lobe, consistent with pulmonary emboli.   Questionable filling defects versus artifact in the right mainstem   pulmonary artery and a few segmental arteries supplying the right   lower lobe may reflect pulmonary emboli. Heart size is at the upper   limits of normal. No mediastinal lymphadenopathy or mass.     The central tracheobronchial tree is patent. There is diffuse   groundglass lung opacities in the bilateral lungs with peripheral   wedge-shaped opacities. No pleural effusions.     The visualized abdominal viscera are unremarkable. No acute osseous   abnormality..     IMPRESSION:     1.  Limited evaluation due to body habitus and inadequate bolus   timing. Filling defects identified in segmental and subsegmental   artery supplying the left lower lobe, consistent with pulmonary   emboli. There are questionable pulmonary emboli in the right mainstem   pulmonary artery and segmental artery supplying the right lower lobe.   Findings reported to Dr.Lloyd Duffy at 5/1/2021.   2.  Bilateral diffuse groundglass lung opacities with peripheral   wedge-shaped opacities may reflect changes of poor inspiration and   atelectasis. Atypical infectious process could also have a similar   appearance the proper clinical setting.                 All lab results and imaging results have been reviewed and discussed with the patient    Assessment:   (1) 47 y.o. female with diagnosis of pulmonary emboli who was seen as a consult at [facility]  - patient with diagnosis of asthma who presented to the ED at [facility] on 5/1 with progressive SOB, postnasal drip and acute hypercapnia. CTA was a limited study due to body habitus but radiology reported presence of filling defects identified in segmental and subsegmental artery supplying the left lower lobe, consistent with pulmonary emboli. They also reported questionable pulmonary emboli in the right mainstem  pulmonary artery and segmental artery supplying the right lower lobe. Patient has been admitted to hospitalist service and is on Lovenox. She has no prior personal or family history of clots.      - doppler studies are negative     5/3/2021:  - patient was seen by Dr Clark with pulm yesterday; appreciate his evaluation  - options of anticoagulation include: Coumadin, Eliquis , Xarelto, or Pradaxa  - in patients with morbid obesity, one can have difficulties with therapeutic dosing with practically any type of oral anticoagulant     5/31/2021:  - She has O2 at home and is on portable today.   - She has not followed up with pulmonary since discharge.   - She is breathing fair but does have WINN.   - She has some sinus issues. She is currently on Eliquis.   - She saw Dr Rendon since discharge  - low ATIII and protein S levels which could be the etiology, but they will need to be repeated at later date to confirm  - Elevated factor VIII which also will need to be repeated to confirm    6/28/2021:  - she saw Dr Clark with pulmonary since last visit and has PFT scheduled for this coming July 1st  - she remains on portable O2    12/8/2021:  - she saw Dr Clark again on 11/2/2021  - she remains on O2 at home  - she remains on eliquis  - Repeat AT3 was normal, repeat Protein S was WNL  - repeat factor VIII was still a little elevated at 281 but better    2/15/2022:  - she sees pulmonary NP again next month       (2) Anemia with microcytic indices with current history of chronic menorrhagia - most likely has underlying iron deficiency due to chronic heavy menstrual cycles  - s/p two units of blood  - prior hgb at 8.2  - total bilirubin is WNL, so I do not suspect any hemolysis at this time     5/3/2021:  - hgb at 8.6 today  - iron and ferritin are both low with elevated TIBC  - she received dose of IV iron today    5/31/2021:  - she needs repeat labs incl iron panel  - will consider additional IV iron as needed  - she is on oral  iron  - she needs repeat labs incl. Iron panel    6/28/2021:  - hgb currently 11.7  - iron panel is adequate at this time    10/26/2021:  - latest hgb at 10.6  - iron at 29  - she is on oral iron since May 2021  - she has heavy menstrual cycles  - discussed consideration for IV iron and she is agreeable; discussed general side-effects of iron infusions    12/8/2021:  - she has been on oral iron with little improvement in the labs  - she is starting IV iron this coming Friday    2/15/2022:  - latest labs from Jan 2022 with no anemia and iron panel was adequate  - she had IV iron x 1 only    3/31/22:   - labs today to ensure no need for blood transfusion   - continue oral iron for now   - may need IV iron, however surgery to stop the cause of the bleeding is of the utmost importance.   - will arrange for IV iron infusions after surgery if planned.     (3) Overweight/Obesity     (4) HTN     (5) GERD     (6) WBC and platelets are WNL     (7) Hx/of covid vaccination on 4/16 - she received Pfizer vaccine (not the J&J one)     (8) General anxiety disorder     (9) Vaginal Bleeding with cervical abnormality     3/31/22:   CT scans show cervical abnormality   -follow up with GYN, showed no obvious outer abnormalities   -appt with [facility] set for 4/6/22   - labs stable no need for blood transfusion at this time   -continue oral iron     VISIT DIAGNOSES:      1. Antithrombin III deficiency    2. Vaginal bleeding    3. Iron deficiency anemia due to chronic blood loss    4. Acute pulmonary embolism, unspecified pulmonary embolism type, unspecified whether acute cor pulmonale present            Plan:     PLAN:  1. Labs today CBC - Iron   -stop eliquis and remain off while bleeding  2. GYN sent patient yesterday to a physician at [facility]   3. She has an appt with Dr. Pro  Wednesday 4/6 at 1pm.   4. Patient is having a lot of uterine and vaginal pain - taking pain medication, patient needs a procedure to stop the bleeding.   - high  risk for clotting so progesterone or lysteda are not recommended   5. Get notes from Dr. Rubio   6. Will need biopsy     RTC 3 weeks with me and 6 with Dr. Boston     Fax note to [facility] Rubio Calabresi               Antithrombin III deficiency    Vaginal bleeding  -     CBC Auto Differential; Future; Expected date: 03/31/2022  -     CMP; Future; Expected date: 03/31/2022  -     Iron and TIBC; Future; Expected date: 03/31/2022  -     Ferritin; Future; Expected date: 03/31/2022    Iron deficiency anemia due to chronic blood loss  -     CBC Auto Differential; Future; Expected date: 03/31/2022  -     CMP; Future; Expected date: 03/31/2022  -     Iron and TIBC; Future; Expected date: 03/31/2022  -     Ferritin; Future; Expected date: 03/31/2022    Acute pulmonary embolism, unspecified pulmonary embolism type, unspecified whether acute cor pulmonale present      No follow-ups on file.    COVID-19 Discussion:    I had long discussion with patient and any applicable family about the COVID-19 coronavirus epidemic and the recommended precautions with regard to cancer and/or hematology patients. I have re-iterated the CDC recommendations for adequate hand washing, use of hand -like products, and coughing into elbow, etc. In addition, especially for our patients who are on chemotherapy and/or our otherwise immunocompromised patients, I have recommended avoidance of crowds, including movie theaters, restaurants, churches, etc. I have recommended avoidance of any sick or symptomatic family members and/or friends. I have also recommended avoidance of any raw and unwashed food products, and general avoidance of food items that have not been prepared by themselves. The patient has been asked to call us immediately with any symptom developments, issues, questions or other general concerns.       Anticoagulation Discussion:    Discussed with patient and any applicable family members about the benefit and/or need for  anticoagulation. I communicated about the risks of bleeding while on any anticoagulation, which could be serious and/or life-threatening, and which can occur at any time, regardless of degree of the level of anticoagulation. I expressed the need for compliance with any anticoagulation regimen and that failure to do so could potential lead to excessive bleeding, and risk to health and/or life. In particular, with patients on coumadin therapy, compliance with requested blood work is absolutely essential, as coumadin levels can vary from time to time, and failure to do so could potentially place the patient at risk for bleeding and/or clotting events which could be fatal. Patients on coumadin are encouraged to call the day after they have their levels drawn, as to obtain the appropriate instructions from my staff. Patients are aware that self-regulating or self-dosing of their medications is strictly prohibited.       I have explained and the patient understands all of  the current recommendation(s). I have answered all of their questions to the best of my ability and to their complete satisfaction.             Thank you for allowing me to participate in this pleasant patient's care. Please call with any questions or concerns.      Electronically signed Samantha Stauffer NP            Answers for HPI/ROS submitted by the patient on 3/31/2022  appetite change : No  unexpected weight change: No  mouth sores: No  visual disturbance: No  cough: No  shortness of breath: No  chest pain: No  abdominal pain: Yes  diarrhea: No  frequency: No  back pain: No  rash: No  headaches: No  adenopathy: No  nervous/ anxious: No

## 2022-03-31 NOTE — NURSING
Spoke to pt.  Scheduled appt for 4/6/22 at 1pm.  Location and contact information given via portal and phone.

## 2022-04-01 VITALS
HEIGHT: 63 IN | SYSTOLIC BLOOD PRESSURE: 190 MMHG | WEIGHT: 293 LBS | RESPIRATION RATE: 18 BRPM | TEMPERATURE: 98 F | HEART RATE: 89 BPM | DIASTOLIC BLOOD PRESSURE: 96 MMHG | BODY MASS INDEX: 51.91 KG/M2

## 2022-04-20 ENCOUNTER — OFFICE VISIT (OUTPATIENT)
Dept: GYNECOLOGIC ONCOLOGY | Facility: CLINIC | Age: 47
End: 2022-04-20
Payer: MEDICAID

## 2022-04-20 ENCOUNTER — TELEPHONE (OUTPATIENT)
Dept: HEMATOLOGY/ONCOLOGY | Facility: CLINIC | Age: 47
End: 2022-04-20
Payer: MEDICAID

## 2022-04-20 ENCOUNTER — DOCUMENTATION ONLY (OUTPATIENT)
Dept: HEMATOLOGY/ONCOLOGY | Facility: CLINIC | Age: 47
End: 2022-04-20
Payer: MEDICAID

## 2022-04-20 VITALS
SYSTOLIC BLOOD PRESSURE: 208 MMHG | OXYGEN SATURATION: 96 % | DIASTOLIC BLOOD PRESSURE: 100 MMHG | RESPIRATION RATE: 17 BRPM | BODY MASS INDEX: 51.91 KG/M2 | HEART RATE: 82 BPM | WEIGHT: 293 LBS | HEIGHT: 63 IN

## 2022-04-20 DIAGNOSIS — N88.8 CERVICAL MASS: ICD-10-CM

## 2022-04-20 DIAGNOSIS — N93.9 VAGINAL BLEEDING: ICD-10-CM

## 2022-04-20 PROCEDURE — 3080F PR MOST RECENT DIASTOLIC BLOOD PRESSURE >= 90 MM HG: ICD-10-PCS | Mod: CPTII,,, | Performed by: OBSTETRICS & GYNECOLOGY

## 2022-04-20 PROCEDURE — 99204 PR OFFICE/OUTPT VISIT, NEW, LEVL IV, 45-59 MIN: ICD-10-PCS | Mod: S$PBB,,, | Performed by: OBSTETRICS & GYNECOLOGY

## 2022-04-20 PROCEDURE — 99215 OFFICE O/P EST HI 40 MIN: CPT | Mod: PBBFAC,PN | Performed by: OBSTETRICS & GYNECOLOGY

## 2022-04-20 PROCEDURE — 3077F PR MOST RECENT SYSTOLIC BLOOD PRESSURE >= 140 MM HG: ICD-10-PCS | Mod: CPTII,,, | Performed by: OBSTETRICS & GYNECOLOGY

## 2022-04-20 PROCEDURE — 4010F PR ACE/ARB THEARPY RXD/TAKEN: ICD-10-PCS | Mod: CPTII,,, | Performed by: OBSTETRICS & GYNECOLOGY

## 2022-04-20 PROCEDURE — 3008F BODY MASS INDEX DOCD: CPT | Mod: CPTII,,, | Performed by: OBSTETRICS & GYNECOLOGY

## 2022-04-20 PROCEDURE — 99204 OFFICE O/P NEW MOD 45 MIN: CPT | Mod: S$PBB,,, | Performed by: OBSTETRICS & GYNECOLOGY

## 2022-04-20 PROCEDURE — 3080F DIAST BP >= 90 MM HG: CPT | Mod: CPTII,,, | Performed by: OBSTETRICS & GYNECOLOGY

## 2022-04-20 PROCEDURE — 99999 PR PBB SHADOW E&M-EST. PATIENT-LVL V: CPT | Mod: PBBFAC,,, | Performed by: OBSTETRICS & GYNECOLOGY

## 2022-04-20 PROCEDURE — 1159F PR MEDICATION LIST DOCUMENTED IN MEDICAL RECORD: ICD-10-PCS | Mod: CPTII,,, | Performed by: OBSTETRICS & GYNECOLOGY

## 2022-04-20 PROCEDURE — 1159F MED LIST DOCD IN RCRD: CPT | Mod: CPTII,,, | Performed by: OBSTETRICS & GYNECOLOGY

## 2022-04-20 PROCEDURE — 3008F PR BODY MASS INDEX (BMI) DOCUMENTED: ICD-10-PCS | Mod: CPTII,,, | Performed by: OBSTETRICS & GYNECOLOGY

## 2022-04-20 PROCEDURE — 4010F ACE/ARB THERAPY RXD/TAKEN: CPT | Mod: CPTII,,, | Performed by: OBSTETRICS & GYNECOLOGY

## 2022-04-20 PROCEDURE — 99999 PR PBB SHADOW E&M-EST. PATIENT-LVL V: ICD-10-PCS | Mod: PBBFAC,,, | Performed by: OBSTETRICS & GYNECOLOGY

## 2022-04-20 PROCEDURE — 3077F SYST BP >= 140 MM HG: CPT | Mod: CPTII,,, | Performed by: OBSTETRICS & GYNECOLOGY

## 2022-04-20 NOTE — PROGRESS NOTES
Subjective:      Chief Complaint: Consult (Cervical mass///// heavy bleeding starting last month with stabbing pains, pain today like last month.)       Patient ID: Katiuska Prestno is a 47 y.o. female [id number] referred for evaluation of abnormal bleeding and a possible cervical mass on a recent CT scan.  The patient states that she has a long history of irregular menses and menorrhagia with anemia but her recent Hgb is 11.2.  She states that she had a recent exam with Dr. Rubio and a pelvic sono was done at [facility].  She denies other gyn problems, including abnormal Pap smears.  She states that she is here because she is tired of bleeding and wants a hysterectomy.  We discussed that a hysterectomy would be technically difficult and risky because of her recent PE, severe asthma and BMI of 60.        Past Medical History:   Diagnosis Date    Antithrombin III deficiency 2021    Asthma     Claustrophobia     Elevated factor VIII level 2021    Esophageal reflux     Gastroesophageal reflux    Generalized anxiety disorder     Anxiety, Generalized    Herniated disc     Hypertension     Iron deficiency anemia due to chronic blood loss 10/27/2021    Lower extremity weakness     Morbid obesity     Obesity     Protein S deficiency 2021    Pulmonary embolism     Upper extremity weakness       Past Surgical History:   Procedure Laterality Date     SECTION      CHOLECYSTECTOMY      TONSILLECTOMY        No family history on file.   Social History     Tobacco Use    Smoking status: Never Smoker    Smokeless tobacco: Never Used   Substance Use Topics    Alcohol use: Yes     Alcohol/week: 0.8 standard drinks     Types: 1 Standard drinks or equivalent per week     Comment: rarely    Drug use: No              Objective:        1. General: Well appearing, no apparent distress, alert and oriented.  2. Lymph: Neck symmetric without cervical or supraclavicular adenopathy or mass.  3. Heart:  4.  Lungs: Normal respiratory rate, no accessory muscle use.  5. Psych: Normal affect.  6. Abdomen:    7. Skin: Warm, dry, no rashes or lesions.   8. Extremities: Bilateral lower extremities without edema or tenderness.  9. Genitourinary               Pelvic Examination including:                   Assessment/Plan     Menorrhagia and pelvic pain.  No evidence of cervical mass or significant gyn pathology on a limited pelvic exam.  The patient is very high risk for elective surgery because of her recent pulmonary embolus, significant asthma and BMI of 60.    The results of her recent pelvic ultrasound at [facility] will be obtained and a pelvic MRI will be ordered.  A Pap with HPV was done today.  The patient will be asked to return for discussion regarding her options pending these results.  If there is no evidence of malignancy, the patient will be referred back to General Gynecology for possible endometrial ablation or other medical management of her menorrhagia.            Vaginal bleeding  -     Ambulatory referral/consult to Gynecologic Oncology    Cervical mass  -     Ambulatory referral/consult to Gynecologic Oncology  -     Ambulatory referral/consult to Oncology Social Work; Future; Expected date: 04/27/2022

## 2022-04-20 NOTE — NURSING
Met with patient in multi disciplinary clinic today.  Explained my role as navigator.  Reviewed plan of care and answered questions.  Will obtain her last U/S from [facility] and schedule her for a MRI.     Pt's /114 upon arrival.  Denies symptoms.  Pt said she was upset with her transportation and anxious about her appt today.   /100 (manual) upon discharge.  She said she took her medicine (Toprol) before she left her house.  She said she usually takes it at night because it makes her sleepy, but she knew it would be high today.  Asked if she had a machine to check it at home and she said no.  Dr. Pro notified.  Ok to d/c with instructions to call 911 or go to ED for unusual dizziness or weakness, blurred vision, slurred speech, headache, etc.  Also encouraged her to order a wrist cuff soon and to notify her PCP if BP continues to stay high.  Pt verbalized an understanding.     My contact information was provided.  Instructed pt I would call her tomorrow with the MRI appt.  Pt verbalized an understanding.

## 2022-04-21 ENCOUNTER — OFFICE VISIT (OUTPATIENT)
Dept: HEMATOLOGY/ONCOLOGY | Facility: CLINIC | Age: 47
End: 2022-04-21
Payer: MEDICAID

## 2022-04-21 ENCOUNTER — LAB VISIT (OUTPATIENT)
Dept: LAB | Facility: HOSPITAL | Age: 47
End: 2022-04-21
Attending: NURSE PRACTITIONER
Payer: MEDICAID

## 2022-04-21 ENCOUNTER — TELEPHONE (OUTPATIENT)
Dept: HEMATOLOGY/ONCOLOGY | Facility: CLINIC | Age: 47
End: 2022-04-21
Payer: MEDICAID

## 2022-04-21 VITALS
HEART RATE: 81 BPM | RESPIRATION RATE: 18 BRPM | HEIGHT: 63 IN | BODY MASS INDEX: 51.91 KG/M2 | WEIGHT: 293 LBS | TEMPERATURE: 98 F | SYSTOLIC BLOOD PRESSURE: 178 MMHG | DIASTOLIC BLOOD PRESSURE: 106 MMHG

## 2022-04-21 DIAGNOSIS — I26.99 ACUTE PULMONARY EMBOLISM, UNSPECIFIED PULMONARY EMBOLISM TYPE, UNSPECIFIED WHETHER ACUTE COR PULMONALE PRESENT: ICD-10-CM

## 2022-04-21 DIAGNOSIS — N93.9 VAGINAL BLEEDING: Primary | ICD-10-CM

## 2022-04-21 DIAGNOSIS — D50.0 IRON DEFICIENCY ANEMIA DUE TO CHRONIC BLOOD LOSS: Primary | ICD-10-CM

## 2022-04-21 DIAGNOSIS — N92.0 MENORRHAGIA WITH REGULAR CYCLE: ICD-10-CM

## 2022-04-21 DIAGNOSIS — D50.0 IRON DEFICIENCY ANEMIA DUE TO CHRONIC BLOOD LOSS: ICD-10-CM

## 2022-04-21 DIAGNOSIS — N93.9 VAGINAL BLEEDING: ICD-10-CM

## 2022-04-21 DIAGNOSIS — D68.59 ANTITHROMBIN III DEFICIENCY: ICD-10-CM

## 2022-04-21 LAB
BASOPHILS # BLD AUTO: 0.02 K/UL (ref 0–0.2)
BASOPHILS NFR BLD: 0.2 % (ref 0–1.9)
DIFFERENTIAL METHOD: ABNORMAL
EOSINOPHIL # BLD AUTO: 0.1 K/UL (ref 0–0.5)
EOSINOPHIL NFR BLD: 1.7 % (ref 0–8)
ERYTHROCYTE [DISTWIDTH] IN BLOOD BY AUTOMATED COUNT: 12.9 % (ref 11.5–14.5)
FERRITIN SERPL-MCNC: 28 NG/ML (ref 20–300)
HCT VFR BLD AUTO: 37.9 % (ref 37–48.5)
HGB BLD-MCNC: 11.7 G/DL (ref 12–16)
IMM GRANULOCYTES # BLD AUTO: 0.02 K/UL (ref 0–0.04)
IMM GRANULOCYTES NFR BLD AUTO: 0.2 % (ref 0–0.5)
IRON SERPL-MCNC: 24 UG/DL (ref 30–160)
LYMPHOCYTES # BLD AUTO: 1.6 K/UL (ref 1–4.8)
LYMPHOCYTES NFR BLD: 18.6 % (ref 18–48)
MCH RBC QN AUTO: 29.8 PG (ref 27–31)
MCHC RBC AUTO-ENTMCNC: 30.9 G/DL (ref 32–36)
MCV RBC AUTO: 97 FL (ref 82–98)
MONOCYTES # BLD AUTO: 0.4 K/UL (ref 0.3–1)
MONOCYTES NFR BLD: 4.9 % (ref 4–15)
NEUTROPHILS # BLD AUTO: 6.3 K/UL (ref 1.8–7.7)
NEUTROPHILS NFR BLD: 74.4 % (ref 38–73)
NRBC BLD-RTO: 0 /100 WBC
PLATELET # BLD AUTO: 359 K/UL (ref 150–450)
PMV BLD AUTO: 9.3 FL (ref 9.2–12.9)
RBC # BLD AUTO: 3.92 M/UL (ref 4–5.4)
SATURATED IRON: 6 % (ref 20–50)
TOTAL IRON BINDING CAPACITY: 388 UG/DL (ref 250–450)
TRANSFERRIN SERPL-MCNC: 277 MG/DL (ref 200–375)
WBC # BLD AUTO: 8.45 K/UL (ref 3.9–12.7)

## 2022-04-21 PROCEDURE — 3080F DIAST BP >= 90 MM HG: CPT | Mod: S$GLB,,, | Performed by: NURSE PRACTITIONER

## 2022-04-21 PROCEDURE — 4010F PR ACE/ARB THEARPY RXD/TAKEN: ICD-10-PCS | Mod: S$GLB,,, | Performed by: NURSE PRACTITIONER

## 2022-04-21 PROCEDURE — 4010F ACE/ARB THERAPY RXD/TAKEN: CPT | Mod: S$GLB,,, | Performed by: NURSE PRACTITIONER

## 2022-04-21 PROCEDURE — 82728 ASSAY OF FERRITIN: CPT | Performed by: NURSE PRACTITIONER

## 2022-04-21 PROCEDURE — 3077F PR MOST RECENT SYSTOLIC BLOOD PRESSURE >= 140 MM HG: ICD-10-PCS | Mod: S$GLB,,, | Performed by: NURSE PRACTITIONER

## 2022-04-21 PROCEDURE — 3008F BODY MASS INDEX DOCD: CPT | Mod: S$GLB,,, | Performed by: NURSE PRACTITIONER

## 2022-04-21 PROCEDURE — 99214 PR OFFICE/OUTPT VISIT, EST, LEVL IV, 30-39 MIN: ICD-10-PCS | Mod: S$GLB,,, | Performed by: NURSE PRACTITIONER

## 2022-04-21 PROCEDURE — 3008F PR BODY MASS INDEX (BMI) DOCUMENTED: ICD-10-PCS | Mod: S$GLB,,, | Performed by: NURSE PRACTITIONER

## 2022-04-21 PROCEDURE — 99214 OFFICE O/P EST MOD 30 MIN: CPT | Mod: S$GLB,,, | Performed by: NURSE PRACTITIONER

## 2022-04-21 PROCEDURE — 84466 ASSAY OF TRANSFERRIN: CPT | Performed by: NURSE PRACTITIONER

## 2022-04-21 PROCEDURE — 3077F SYST BP >= 140 MM HG: CPT | Mod: S$GLB,,, | Performed by: NURSE PRACTITIONER

## 2022-04-21 PROCEDURE — 36415 COLL VENOUS BLD VENIPUNCTURE: CPT | Performed by: NURSE PRACTITIONER

## 2022-04-21 PROCEDURE — 3080F PR MOST RECENT DIASTOLIC BLOOD PRESSURE >= 90 MM HG: ICD-10-PCS | Mod: S$GLB,,, | Performed by: NURSE PRACTITIONER

## 2022-04-21 PROCEDURE — 85025 COMPLETE CBC W/AUTO DIFF WBC: CPT | Performed by: NURSE PRACTITIONER

## 2022-04-21 NOTE — NURSING
"There is some concern about whether or not they can do the MRI due to pt's weight.  I spoke with Sindhu and she said they would try and do the best they can.  When I called the pt back she informed me she gets claustrophobic in machines.   She said they usually give her "something" by mouth and it helps "calm" her down.   I told her I would send a message to Dr. Pro regarding some type of anti anxiety med and let her know. Pt verbalized an understanding.     "

## 2022-04-21 NOTE — PROGRESS NOTES
[facility] Hematology/Oncology  Progress Note -   Follow-up Visit    Subjective:      Patient ID:   NAME: Katiuska Preston : 1975     47 y.o. female    Referring Doc: Justice  Other Physicians: Eduardo    Chief Complaint: pulm emboli f/u - vaginal bleeding       HPI:   The patient returns for a regularly scheduled follow up visit. She has been on Eliquis since May of 2021 for pulmonary emboli. She went to the ER on 3/24/2022 for vaginal bleeding and pain. Per CT was found to have a cervical abnormality.     She has been off of eliquis since 3/24/22 and states the bleeding has improved but is still present. She saw GYN - Joanne and saw Dr. Pro yesterday. Per both MDs, there was no obvious mass on each examination. She is scheduled to have a pelvic MRI with Dr. Pro. She is at high risk for complications if she opts for a hysterectomy, however if no cancer is found, Dr. Pro recommends an ablation with her primary GYN.     The patient states she stopped bleeding around .   She has been taking oral iron since our last visit, she states she does not want IV iron at this time.     Discussed covid19 precautions - she had her vaccinations       ROS:   GEN: normal without any fever, night sweats or weight loss, + fatigue   HEENT: normal with no HA's, sore throat, stiff neck, changes in vision  CV: normal with no CP, some WINN  PULM: normal with no SOB, cough, hemoptysis, sputum or pleuritic pain  GI: normal with no abdominal pain, nausea, vomiting, constipation, diarrhea, melanotic stools, BRBPR, or hematemesis  GYN: Vaginal pain, no bleeding right now  BREAST: normal with no mass, discharge, pain  SKIN: normal with no rash, erythema, bruising, or swelling     Past Medical/Surgical History:  Past Medical History:   Diagnosis Date    Antithrombin III deficiency 2021    Asthma     Claustrophobia     Elevated factor VIII level 2021    Esophageal reflux     Gastroesophageal reflux    Generalized  anxiety disorder     Anxiety, Generalized    Herniated disc     Hypertension     Iron deficiency anemia due to chronic blood loss 10/27/2021    Lower extremity weakness     Morbid obesity     Obesity     Protein S deficiency 2021    Pulmonary embolism     Upper extremity weakness      Past Surgical History:   Procedure Laterality Date     SECTION      CHOLECYSTECTOMY      TONSILLECTOMY           Allergies:  Review of patient's allergies indicates:  No Known Allergies    Social/Family History:  Social History     Socioeconomic History    Marital status:    Tobacco Use    Smoking status: Never Smoker    Smokeless tobacco: Never Used   Substance and Sexual Activity    Alcohol use: Yes     Alcohol/week: 0.8 standard drinks     Types: 1 Standard drinks or equivalent per week     Comment: rarely    Drug use: No    Sexual activity: Not Currently     No family history on file.      Medications:    Current Outpatient Medications:     albuterol (PROVENTIL/VENTOLIN HFA) 90 mcg/actuation inhaler, Inhale 2 puffs into the lungs every 6 (six) hours as needed for Wheezing. Rescue, Disp: 18 g, Rfl: 5    carvediloL (COREG) 12.5 MG tablet, Take 1 tablet (12.5 mg total) by mouth 2 (two) times daily with meals., Disp: 60 tablet, Rfl: 0    ELIQUIS 5 mg Tab, TAKE 1 TABLET(5 MG) BY MOUTH TWICE DAILY, Disp: 60 tablet, Rfl: 3    famotidine (PEPCID) 20 MG tablet, Take 20 mg by mouth 2 (two) times daily., Disp: , Rfl:     FEROSUL 325 mg (65 mg iron) Tab tablet, TAKE 1 TABLET BY MOUTH ONCE DAILY, Disp: 30 tablet, Rfl: 3    fluticasone propionate (FLONASE) 50 mcg/actuation nasal spray, 2 sprays by Each Nostril route daily as needed for Rhinitis., Disp: , Rfl:     fluticasone propionate (FLOVENT HFA) 110 mcg/actuation inhaler, Inhale 2 puffs into the lungs 2 (two) times daily. Controller Rinse after you use it, Disp: 12 g, Rfl: 5    furosemide (LASIX) 20 MG tablet, Take 1 tablet (20 mg total) by  "mouth once daily., Disp: 30 tablet, Rfl: 0    HYDROcodone-acetaminophen (NORCO) 5-325 mg per tablet, Take 1 tablet by mouth every 4 (four) hours as needed., Disp: 12 tablet, Rfl: 0    lisinopriL (PRINIVIL,ZESTRIL) 20 MG tablet, Take 1 tablet (20 mg total) by mouth once daily., Disp: 40 tablet, Rfl: 0    losartan (COZAAR) 25 MG tablet, Take 25 mg by mouth once daily., Disp: , Rfl:     meloxicam (MOBIC) 7.5 MG tablet, Take 7.5 mg by mouth once daily., Disp: , Rfl:     metoprolol succinate (TOPROL-XL) 25 MG 24 hr tablet, Take 25 mg by mouth once daily., Disp: , Rfl:     NEXIUM 40 mg capsule, Take 40 mg by mouth once daily., Disp: , Rfl:     omeprazole (PRILOSEC) 20 MG capsule, Take 20 mg by mouth once daily., Disp: , Rfl:     trazodone (DESYREL) 100 MG tablet, Take 100 mg by mouth every evening., Disp: , Rfl:       Pathology:  Cancer Staging  No matching staging information was found for the patient.        Objective:   Vitals:  Blood pressure (Abnormal) 178/106, pulse 81, temperature 98.3 °F (36.8 °C), resp. rate 18, height 5' 3" (1.6 m), weight (Abnormal) 155.1 kg (342 lb).  Patient states she has not taken her BP today   Physical Examination:   GEN: no apparent distress, comfortable; AAOx3; morbidly obese   HEAD: atraumatic and normocephalic  EYES: no pallor, no icterus, PERRLA  ENT: OMM, no pharyngeal erythema, external ears WNL; no nasal discharge; no thrush  NECK: no masses, thyroid normal, trachea midline, no LAD/LN's, supple  CV: RRR with no murmur; normal pulse; normal S1 and S2; no pedal edema  CHEST: Normal respiratory effort; CTAB; normal breath sounds; no wheeze or crackles;   ABDOM: nontender and nondistended; soft; normal bowel sounds; no rebound/guarding  MUSC/Skeletal: LROM and crepitus right knee; no deformities or arthropathy  EXTREM: no clubbing, cyanosis, inflammation or swelling  SKIN: no rashes, lesions, ulcers, petechiae or subcutaneous nodules  : no diaz  NEURO: grossly intact; " motor/sensory WNL; AAOx3; no tremors  PSYCH: normal mood, affect and behavior  LYMPH: normal cervical, supraclavicular, and groin LN's;    Labs:   Lab Results   Component Value Date    WBC 9.40 03/31/2022    HGB 11.0 (L) 03/31/2022    HCT 36.1 (L) 03/31/2022    MCV 98 03/31/2022     03/31/2022    CMP  Sodium   Date Value Ref Range Status   03/31/2022 140 136 - 145 mmol/L Final     Potassium   Date Value Ref Range Status   03/31/2022 3.8 3.5 - 5.1 mmol/L Final     Chloride   Date Value Ref Range Status   03/31/2022 100 95 - 110 mmol/L Final     CO2   Date Value Ref Range Status   03/31/2022 31 (H) 23 - 29 mmol/L Final     Glucose   Date Value Ref Range Status   03/31/2022 143 (H) 70 - 110 mg/dL Final     BUN   Date Value Ref Range Status   03/31/2022 13 6 - 20 mg/dL Final     Creatinine   Date Value Ref Range Status   03/31/2022 1.3 0.5 - 1.4 mg/dL Final     Calcium   Date Value Ref Range Status   03/31/2022 9.1 8.7 - 10.5 mg/dL Final     Total Protein   Date Value Ref Range Status   03/31/2022 7.7 6.0 - 8.4 g/dL Final     Albumin   Date Value Ref Range Status   03/31/2022 3.2 (L) 3.5 - 5.2 g/dL Final     Total Bilirubin   Date Value Ref Range Status   03/31/2022 0.4 0.1 - 1.0 mg/dL Final     Comment:     For infants and newborns, interpretation of results should be based  on gestational age, weight and in agreement with clinical  observations.    Premature Infant recommended reference ranges:  Up to 24 hours.............<8.0 mg/dL  Up to 48 hours............<12.0 mg/dL  3-5 days..................<15.0 mg/dL  6-29 days.................<15.0 mg/dL       Alkaline Phosphatase   Date Value Ref Range Status   03/31/2022 74 55 - 135 U/L Final     AST   Date Value Ref Range Status   03/31/2022 29 10 - 40 U/L Final     ALT   Date Value Ref Range Status   03/31/2022 27 10 - 44 U/L Final     Anion Gap   Date Value Ref Range Status   03/31/2022 9 8 - 16 mmol/L Final     eGFR if    Date Value Ref Range  Status   03/31/2022 56.1 (A) >60 mL/min/1.73 m^2 Final     eGFR if non    Date Value Ref Range Status   03/31/2022 48.6 (A) >60 mL/min/1.73 m^2 Final     Comment:     Calculation used to obtain the estimated glomerular filtration  rate (eGFR) is the CKD-EPI equation.                  Lab Results   Component Value Date    IRON 24 (L) 03/31/2022    TIBC 420 03/31/2022    FERRITIN 24 03/31/2022     Lab Results   Component Value Date    NUASMFDR85 452 05/02/2021     Lab Results   Component Value Date    FOLATE 5.9 05/02/2021          Factor VIII Activity 56 - 140 % 281      Antithrombin III 75 - 135 % 109      Protein S Activity 63 - 140 % 63            I have reviewed all available lab results and radiology reports.    Radiology/Diagnostic Studies:    US Lower Extremity Veins Bilateral [[id number]] Collected: 05/01/21 1348   Order Status: Completed Updated: 05/01/21 1432   Narrative:     REASON: DVT     FINDINGS:     Grayscale, color and spectral Doppler analysis of the bilateral lower   extremity deep venous system was performed.     There is normal compressibility, color and spectral Doppler analysis,   and augmentation in the bilateral lower extremity deep venous system.     IMPRESSION:     1.  No DVT of the bilateral lower extremity veins.   2.  Bilateral distal superficial femoral veins were unable to be   assessed due to body habitus       CTA Chest Non-Coronary - PE Study [[id number]] Collected: 05/01/21 1143   Order Status: Completed Updated: 05/01/21 1225   Narrative:     CMS MANDATED QUALITY DATA - CT RADIATION - 436     All CT scans at this facility utilize dose modulation, iterative   reconstruction, and/or weight based dosing when appropriate to reduce   radiation dose to as low as reasonably achievable.         REASON: PE suspected, intermediate prob, positive D-dimer     TECHNIQUE: CT angiography of thorax with 100 mL Omnipaque 350.   Maximum intensity projection coronal reformations were  created at a   separate workstation and stored in the patient's permanent medical   record.     COMPARISON: CTA chest October 14, 2019.     FINDINGS:     Limited evaluation due to body habitus and inadequate bolus timing.   Filling defects identified in segmental and subsegmental artery   supplying the left lower lobe, consistent with pulmonary emboli.   Questionable filling defects versus artifact in the right mainstem   pulmonary artery and a few segmental arteries supplying the right   lower lobe may reflect pulmonary emboli. Heart size is at the upper   limits of normal. No mediastinal lymphadenopathy or mass.     The central tracheobronchial tree is patent. There is diffuse   groundglass lung opacities in the bilateral lungs with peripheral   wedge-shaped opacities. No pleural effusions.     The visualized abdominal viscera are unremarkable. No acute osseous   abnormality..     IMPRESSION:     1.  Limited evaluation due to body habitus and inadequate bolus   timing. Filling defects identified in segmental and subsegmental   artery supplying the left lower lobe, consistent with pulmonary   emboli. There are questionable pulmonary emboli in the right mainstem   pulmonary artery and segmental artery supplying the right lower lobe.   Findings reported to Dr.Lloyd Duffy at 5/1/2021.   2.  Bilateral diffuse groundglass lung opacities with peripheral   wedge-shaped opacities may reflect changes of poor inspiration and   atelectasis. Atypical infectious process could also have a similar   appearance the proper clinical setting.                 All lab results and imaging results have been reviewed and discussed with the patient    Assessment:   (1) 47 y.o. female with diagnosis of pulmonary emboli who was seen as a consult at [facility]  - patient with diagnosis of asthma who presented to the ED at [facility] on 5/1 with progressive SOB, postnasal drip and acute hypercapnia. CTA was a limited study due to body habitus but  radiology reported presence of filling defects identified in segmental and subsegmental artery supplying the left lower lobe, consistent with pulmonary emboli. They also reported questionable pulmonary emboli in the right mainstem pulmonary artery and segmental artery supplying the right lower lobe. Patient has been admitted to hospitalist service and is on Lovenox. She has no prior personal or family history of clots.      - doppler studies are negative     5/3/2021:  - patient was seen by Dr Clark with pulm yesterday; appreciate his evaluation  - options of anticoagulation include: Coumadin, Eliquis , Xarelto, or Pradaxa  - in patients with morbid obesity, one can have difficulties with therapeutic dosing with practically any type of oral anticoagulant     5/31/2021:  - She has O2 at home and is on portable today.   - She has not followed up with pulmonary since discharge.   - She is breathing fair but does have WINN.   - She has some sinus issues. She is currently on Eliquis.   - She saw Dr Rendon since discharge  - low ATIII and protein S levels which could be the etiology, but they will need to be repeated at later date to confirm  - Elevated factor VIII which also will need to be repeated to confirm    6/28/2021:  - she saw Dr Clark with pulmonary since last visit and has PFT scheduled for this coming July 1st  - she remains on portable O2    12/8/2021:  - she saw Dr Clark again on 11/2/2021  - she remains on O2 at home  - she remains on eliquis  - Repeat AT3 was normal, repeat Protein S was WNL  - repeat factor VIII was still a little elevated at 281 but better    2/15/2022:  - she sees pulmonary NP again next month    4/21/22:   - off Eliquis due to increased Vaginal bleeding   - she is doing okay, breathing better today       (2) Anemia with microcytic indices with current history of chronic menorrhagia - most likely has underlying iron deficiency due to chronic heavy menstrual cycles  - s/p two units of  blood  - prior hgb at 8.2  - total bilirubin is WNL, so I do not suspect any hemolysis at this time     5/3/2021:  - hgb at 8.6 today  - iron and ferritin are both low with elevated TIBC  - she received dose of IV iron today    5/31/2021:  - she needs repeat labs incl iron panel  - will consider additional IV iron as needed  - she is on oral iron  - she needs repeat labs incl. Iron panel    6/28/2021:  - hgb currently 11.7  - iron panel is adequate at this time    10/26/2021:  - latest hgb at 10.6  - iron at 29  - she is on oral iron since May 2021  - she has heavy menstrual cycles  - discussed consideration for IV iron and she is agreeable; discussed general side-effects of iron infusions    12/8/2021:  - she has been on oral iron with little improvement in the labs  - she is starting IV iron this coming Friday    2/15/2022:  - latest labs from Jan 2022 with no anemia and iron panel was adequate  - she had IV iron x 1 only    3/31/22:   - labs today to ensure no need for blood transfusion   - continue oral iron for now   - may need IV iron, however surgery to stop the cause of the bleeding is of the utmost importance.   - will arrange for IV iron infusions after surgery if planned.     4/21/22:   - last hemoglobin from 3/31/22 was 11.0  - on oral iron, she does not want IV iron at this time.   - vaginal bleeding has subsided     (3) Overweight/Obesity     (4) HTN     (5) GERD     (6) WBC and platelets are WNL     (7) Hx/of covid vaccination on 4/16 - she received Pfizer vaccine (not the J&J one)     (8) General anxiety disorder     (9) Vaginal Bleeding with cervical abnormality     3/31/22:   CT scans show cervical abnormality   -follow up with GYN, showed no obvious outer abnormalities   -appt with [facility] set for 4/6/22   - labs stable no need for blood transfusion at this time   -continue oral iron     4/21/22:   - had pelvic exam from Dr. Pro, who states there were no obvious abnormalities   - pelvic MRI  ordered from Dr. Pro   - patient is not a candidate for a hysterectomy, she may require ablation if no cancer is seen on scan       VISIT DIAGNOSES:      1. Iron deficiency anemia due to chronic blood loss    2. Antithrombin III deficiency    3. Vaginal bleeding    4. Acute pulmonary embolism, unspecified pulmonary embolism type, unspecified whether acute cor pulmonale present    5. Menorrhagia with regular cycle            Plan:     PLAN:  1. Labs today CBC - Iron/ repeat   -stop eliquis due to vaginal bleeding   2. Patient saw Dr. Pro ysterday, she has a Pelvic MRI scheduled   4. Patient is having a lot of uterine and vaginal pain - taking pain medication, potential need for ablation  - high risk for clotting so progesterone or lysteda are not recommended   5. Oral iron for now, patient declines IV iron at this time     RTC 3-4 weeks with Dr. Boston     Fax note to Dr. Pro and Ludy          COVID-19 Discussion:    I had long discussion with patient and any applicable family about the COVID-19 coronavirus epidemic and the recommended precautions with regard to cancer and/or hematology patients. I have re-iterated the CDC recommendations for adequate hand washing, use of hand -like products, and coughing into elbow, etc. In addition, especially for our patients who are on chemotherapy and/or our otherwise immunocompromised patients, I have recommended avoidance of crowds, including movie theaters, restaurants, churches, etc. I have recommended avoidance of any sick or symptomatic family members and/or friends. I have also recommended avoidance of any raw and unwashed food products, and general avoidance of food items that have not been prepared by themselves. The patient has been asked to call us immediately with any symptom developments, issues, questions or other general concerns.       Anticoagulation Discussion:    Discussed with patient and any applicable family members about the benefit  and/or need for anticoagulation. I communicated about the risks of bleeding while on any anticoagulation, which could be serious and/or life-threatening, and which can occur at any time, regardless of degree of the level of anticoagulation. I expressed the need for compliance with any anticoagulation regimen and that failure to do so could potential lead to excessive bleeding, and risk to health and/or life. In particular, with patients on coumadin therapy, compliance with requested blood work is absolutely essential, as coumadin levels can vary from time to time, and failure to do so could potentially place the patient at risk for bleeding and/or clotting events which could be fatal. Patients on coumadin are encouraged to call the day after they have their levels drawn, as to obtain the appropriate instructions from my staff. Patients are aware that self-regulating or self-dosing of their medications is strictly prohibited.       I have explained and the patient understands all of  the current recommendation(s). I have answered all of their questions to the best of my ability and to their complete satisfaction.             Thank you for allowing me to participate in this pleasant patient's care. Please call with any questions or concerns.      Electronically signed Samantha Stauffer, MSN,APRN,AGNP-C

## 2022-04-27 ENCOUNTER — PATIENT OUTREACH (OUTPATIENT)
Dept: EMERGENCY MEDICINE | Facility: HOSPITAL | Age: 47
End: 2022-04-27

## 2022-04-27 ENCOUNTER — HOSPITAL ENCOUNTER (EMERGENCY)
Facility: HOSPITAL | Age: 47
Discharge: HOME | End: 2022-04-27
Attending: EMERGENCY MEDICINE
Payer: MEDICAID

## 2022-04-27 VITALS
OXYGEN SATURATION: 99 % | TEMPERATURE: 98 F | BODY MASS INDEX: 51.91 KG/M2 | WEIGHT: 293 LBS | HEART RATE: 83 BPM | RESPIRATION RATE: 20 BRPM | DIASTOLIC BLOOD PRESSURE: 92 MMHG | SYSTOLIC BLOOD PRESSURE: 168 MMHG | HEIGHT: 63 IN

## 2022-04-27 DIAGNOSIS — D25.9 UTERINE LEIOMYOMA, UNSPECIFIED LOCATION: ICD-10-CM

## 2022-04-27 DIAGNOSIS — R31.9 URINARY TRACT INFECTION WITH HEMATURIA, SITE UNSPECIFIED: Primary | ICD-10-CM

## 2022-04-27 DIAGNOSIS — R10.9 ABDOMINAL PAIN, UNSPECIFIED ABDOMINAL LOCATION: ICD-10-CM

## 2022-04-27 DIAGNOSIS — N39.0 URINARY TRACT INFECTION WITH HEMATURIA, SITE UNSPECIFIED: Primary | ICD-10-CM

## 2022-04-27 LAB
ALBUMIN SERPL BCP-MCNC: 2.9 G/DL (ref 3.5–5.2)
ALP SERPL-CCNC: 70 U/L (ref 55–135)
ALT SERPL W/O P-5'-P-CCNC: 26 U/L (ref 10–44)
ANION GAP SERPL CALC-SCNC: 6 MMOL/L (ref 8–16)
AST SERPL-CCNC: 23 U/L (ref 10–40)
B-HCG UR QL: NEGATIVE
BACTERIA #/AREA URNS HPF: NEGATIVE /HPF
BASOPHILS # BLD AUTO: 0.02 K/UL (ref 0–0.2)
BASOPHILS NFR BLD: 0.2 % (ref 0–1.9)
BILIRUB SERPL-MCNC: 0.4 MG/DL (ref 0.1–1)
BILIRUB UR QL STRIP: ABNORMAL
BUN SERPL-MCNC: 12 MG/DL (ref 6–20)
CALCIUM SERPL-MCNC: 8.8 MG/DL (ref 8.7–10.5)
CHLORIDE SERPL-SCNC: 101 MMOL/L (ref 95–110)
CLARITY UR: ABNORMAL
CO2 SERPL-SCNC: 32 MMOL/L (ref 23–29)
COLOR UR: ABNORMAL
CREAT SERPL-MCNC: 1 MG/DL (ref 0.5–1.4)
CTP QC/QA: YES
DIFFERENTIAL METHOD: ABNORMAL
EOSINOPHIL # BLD AUTO: 0.1 K/UL (ref 0–0.5)
EOSINOPHIL NFR BLD: 1.5 % (ref 0–8)
ERYTHROCYTE [DISTWIDTH] IN BLOOD BY AUTOMATED COUNT: 13.1 % (ref 11.5–14.5)
EST. GFR  (AFRICAN AMERICAN): >60 ML/MIN/1.73 M^2
EST. GFR  (NON AFRICAN AMERICAN): >60 ML/MIN/1.73 M^2
GLUCOSE SERPL-MCNC: 135 MG/DL (ref 70–110)
GLUCOSE UR QL STRIP: NEGATIVE
HCT VFR BLD AUTO: 36.9 % (ref 37–48.5)
HGB BLD-MCNC: 11.2 G/DL (ref 12–16)
HGB UR QL STRIP: ABNORMAL
HYALINE CASTS #/AREA URNS LPF: 23 /LPF
IMM GRANULOCYTES # BLD AUTO: 0.01 K/UL (ref 0–0.04)
IMM GRANULOCYTES NFR BLD AUTO: 0.1 % (ref 0–0.5)
INR PPP: 1.1
KETONES UR QL STRIP: ABNORMAL
LEUKOCYTE ESTERASE UR QL STRIP: NEGATIVE
LYMPHOCYTES # BLD AUTO: 1.3 K/UL (ref 1–4.8)
LYMPHOCYTES NFR BLD: 14.9 % (ref 18–48)
MCH RBC QN AUTO: 28.7 PG (ref 27–31)
MCHC RBC AUTO-ENTMCNC: 30.4 G/DL (ref 32–36)
MCV RBC AUTO: 95 FL (ref 82–98)
MICROSCOPIC COMMENT: ABNORMAL
MONOCYTES # BLD AUTO: 0.4 K/UL (ref 0.3–1)
MONOCYTES NFR BLD: 4.1 % (ref 4–15)
NEUTROPHILS # BLD AUTO: 6.9 K/UL (ref 1.8–7.7)
NEUTROPHILS NFR BLD: 79.2 % (ref 38–73)
NITRITE UR QL STRIP: POSITIVE
NRBC BLD-RTO: 0 /100 WBC
PH UR STRIP: 6 [PH] (ref 5–8)
PLATELET # BLD AUTO: 362 K/UL (ref 150–450)
PMV BLD AUTO: 9.9 FL (ref 9.2–12.9)
POTASSIUM SERPL-SCNC: 3.9 MMOL/L (ref 3.5–5.1)
PROT SERPL-MCNC: 7.7 G/DL (ref 6–8.4)
PROT UR QL STRIP: ABNORMAL
PROTHROMBIN TIME: 13.6 SEC (ref 11.4–13.7)
RBC # BLD AUTO: 3.9 M/UL (ref 4–5.4)
RBC #/AREA URNS HPF: >100 /HPF (ref 0–4)
SODIUM SERPL-SCNC: 139 MMOL/L (ref 136–145)
SP GR UR STRIP: 1.02 (ref 1–1.03)
SQUAMOUS #/AREA URNS HPF: 5 /HPF
URN SPEC COLLECT METH UR: ABNORMAL
UROBILINOGEN UR STRIP-ACNC: 1 EU/DL
WBC # BLD AUTO: 8.7 K/UL (ref 3.9–12.7)
WBC #/AREA URNS HPF: 15 /HPF (ref 0–5)

## 2022-04-27 PROCEDURE — 85610 PROTHROMBIN TIME: CPT | Performed by: NURSE PRACTITIONER

## 2022-04-27 PROCEDURE — 96375 TX/PRO/DX INJ NEW DRUG ADDON: CPT

## 2022-04-27 PROCEDURE — 85025 COMPLETE CBC W/AUTO DIFF WBC: CPT | Performed by: EMERGENCY MEDICINE

## 2022-04-27 PROCEDURE — 25000003 PHARM REV CODE 250: Performed by: NURSE PRACTITIONER

## 2022-04-27 PROCEDURE — 81025 URINE PREGNANCY TEST: CPT | Performed by: EMERGENCY MEDICINE

## 2022-04-27 PROCEDURE — 96365 THER/PROPH/DIAG IV INF INIT: CPT

## 2022-04-27 PROCEDURE — 87086 URINE CULTURE/COLONY COUNT: CPT | Performed by: EMERGENCY MEDICINE

## 2022-04-27 PROCEDURE — 99284 EMERGENCY DEPT VISIT MOD MDM: CPT | Mod: 25

## 2022-04-27 PROCEDURE — 63600175 PHARM REV CODE 636 W HCPCS: Performed by: NURSE PRACTITIONER

## 2022-04-27 PROCEDURE — 81001 URINALYSIS AUTO W/SCOPE: CPT | Performed by: EMERGENCY MEDICINE

## 2022-04-27 PROCEDURE — 80053 COMPREHEN METABOLIC PANEL: CPT | Performed by: EMERGENCY MEDICINE

## 2022-04-27 RX ORDER — ONDANSETRON 2 MG/ML
4 INJECTION INTRAMUSCULAR; INTRAVENOUS
Status: COMPLETED | OUTPATIENT
Start: 2022-04-27 | End: 2022-04-27

## 2022-04-27 RX ORDER — HYDROMORPHONE HYDROCHLORIDE 1 MG/ML
1 INJECTION, SOLUTION INTRAMUSCULAR; INTRAVENOUS; SUBCUTANEOUS
Status: COMPLETED | OUTPATIENT
Start: 2022-04-27 | End: 2022-04-27

## 2022-04-27 RX ORDER — CLONIDINE HYDROCHLORIDE 0.1 MG/1
0.1 TABLET ORAL
Status: COMPLETED | OUTPATIENT
Start: 2022-04-27 | End: 2022-04-27

## 2022-04-27 RX ORDER — CEFUROXIME AXETIL 500 MG/1
500 TABLET ORAL EVERY 12 HOURS
Qty: 20 TABLET | Refills: 0 | Status: SHIPPED | OUTPATIENT
Start: 2022-04-27 | End: 2022-05-07

## 2022-04-27 RX ORDER — DICLOFENAC SODIUM 50 MG/1
50 TABLET, DELAYED RELEASE ORAL 3 TIMES DAILY PRN
Qty: 20 TABLET | Refills: 2 | Status: SHIPPED | OUTPATIENT
Start: 2022-04-27 | End: 2022-06-20

## 2022-04-27 RX ADMIN — HYDROMORPHONE HYDROCHLORIDE 1 MG: 1 INJECTION, SOLUTION INTRAMUSCULAR; INTRAVENOUS; SUBCUTANEOUS at 10:04

## 2022-04-27 RX ADMIN — ONDANSETRON 4 MG: 2 INJECTION INTRAMUSCULAR; INTRAVENOUS at 10:04

## 2022-04-27 RX ADMIN — CLONIDINE HYDROCHLORIDE 0.1 MG: 0.1 TABLET ORAL at 12:04

## 2022-04-27 RX ADMIN — CEFTRIAXONE 1 G: 1 INJECTION, SOLUTION INTRAVENOUS at 12:04

## 2022-04-27 NOTE — ED PROVIDER NOTES
"Encounter Date: 4/27/2022       History     Chief Complaint   Patient presents with    Abdominal Pain     Seen at her OB for a period that lasted longer than her usual and they did a scan on the lower abd and found a spot on one of her ovaries and was told she needed to be off her cycle for the follow up to the incidental finding. Per EMS the pt plans to call the primary and inform them she is in the ED for the pain to facilitate the process.     Presents with request for pain management pt reports abdominal pain  Onset Friday  Pt reports she is seeing an OBGYN and they need to do a procedure on her She is cannot tell me what procedure they are going to do but was told she had to have 2 OBGYN's agree that she needed the procedure before they could do the procedure  If you see the nurses note she told EMS that she came here so that she could call the OBGYN's office and let them know she was in the ED and that way they would do the procedure " sooner"  According to her records she had a " shadow" on one of her ovaries and they wanted her to see and OBGYN oncologist She has done so. 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  Social History     Tobacco Use    Smoking status: Never Smoker    Smokeless tobacco: Never Used   Substance Use Topics    Alcohol use: Yes     Alcohol/week: 0.8 standard drinks     Types: 1 Standard drinks or equivalent per week     Comment: rarely    Drug use: No     Review of Systems   Constitutional: Negative for appetite change, chills and fever.   Respiratory: Negative for cough, shortness of breath and wheezing.    Cardiovascular: Negative for chest pain, palpitations and leg swelling.   Gastrointestinal: Positive for abdominal pain. Negative for diarrhea, nausea and vomiting.   Genitourinary: Negative for difficulty urinating, dysuria, frequency, hematuria and pelvic pain.   Musculoskeletal: Negative for back pain.   Skin: Negative for rash.   Neurological: Negative for dizziness and weakness.       Physical Exam     Initial Vitals   BP Pulse Resp Temp SpO2   22 0918 22 0915 22 0915 22 0915 22 0915   (!) 249/124 84 19 99 °F (37.2 °C) (!) 91 %      MAP       --                Physical Exam    Constitutional: She appears well-developed and well-nourished.   HENT:   Head: Normocephalic and atraumatic.   Mouth/Throat: Oropharynx is clear and moist.   Eyes: Conjunctivae are normal.   Neck: Neck supple.   Normal range of motion.  Cardiovascular: Normal rate, regular rhythm and normal heart sounds.   Pulmonary/Chest: Breath sounds normal. No respiratory distress. She has no wheezes. She has no rhonchi.   Abdominal: Abdomen is soft. Bowel sounds are normal. She exhibits no distension. There is abdominal tenderness.   Tenderness to the lower abdomen with palpation  There is no rebound and no guarding.   Musculoskeletal:         General: Normal range of motion.      Cervical back: Normal range of motion and neck supple.      Comments: Pt is morbidly obese      Neurological: She is  alert and oriented to person, place, and time. GCS score is 15. GCS eye subscore is 4. GCS verbal subscore is 5. GCS motor subscore is 6.   Skin: Skin is warm and dry. Capillary refill takes less than 2 seconds.   Psychiatric: She has a normal mood and affect. Thought content normal.         ED Course   Procedures  Labs Reviewed   URINALYSIS, REFLEX TO URINE CULTURE - Abnormal; Notable for the following components:       Result Value    Color, UA Red (*)     Appearance, UA Cloudy (*)     Protein, UA 2+ (*)     Ketones, UA Trace (*)     Bilirubin (UA) 1+ (*)     Occult Blood UA 3+ (*)     Nitrite, UA Positive (*)     All other components within normal limits    Narrative:     Specimen Source->Urine   CBC W/ AUTO DIFFERENTIAL - Abnormal; Notable for the following components:    RBC 3.90 (*)     Hemoglobin 11.2 (*)     Hematocrit 36.9 (*)     MCHC 30.4 (*)     Gran % 79.2 (*)     Lymph % 14.9 (*)     All other components within normal limits   COMPREHENSIVE METABOLIC PANEL - Abnormal; Notable for the following components:    CO2 32 (*)     Glucose 135 (*)     Albumin 2.9 (*)     Anion Gap 6 (*)     All other components within normal limits   URINALYSIS MICROSCOPIC - Abnormal; Notable for the following components:    RBC, UA >100 (*)     WBC, UA 15 (*)     Hyaline Casts, UA 23 (*)     All other components within normal limits    Narrative:     Specimen Source->Urine   POCT URINE PREGNANCY - Normal   CULTURE, URINE   PROTIME-INR          Imaging Results          US Transvaginal Non OB (Final result)  Result time 04/27/22 11:28:59   Procedure changed from US Pelvis Complete w/ Transvag for IUD (xpd)     Final result by Yaneth Thomason MD (04/27/22 11:28:59)                 Narrative:    Pelvic ultrasound    Clinical history is left lower quadrant pain    COMPARISON: CT dated 3/24/2022    Transvaginal ultrasound was performed. The uterus measures 10.7 x 6.3 x 5.9 cm. There is a 16mm calcified uterine fibroid. The  "endometrium measures 9 mm.    Neither ovary is visualized. There is no free fluid.    IMPRESSION: 16mm calcified uterine fibroid    Nonvisualization of the ovaries    Tiny nabothian cyst    Electronically signed by:  Yaneth Thomason MD  4/27/2022 11:28 AM CDT Workstation: [id number]                               Medications   HYDROmorphone injection 1 mg (1 mg Intravenous Given 4/27/22 1044)   ondansetron injection 4 mg (4 mg Intravenous Given 4/27/22 1043)   cefTRIAXone (ROCEPHIN) 1 g/50 mL D5W IVPB (0 g Intravenous Stopped 4/27/22 1300)   cloNIDine tablet 0.1 mg (0.1 mg Oral Given 4/27/22 1218)     Medical Decision Making:   Initial Assessment:   Lower abdominal pain She reports pain as a pressure  Denies urinary symptoms Denies NVD or fever pt also reports she has been on her menses on and off for one month She is being see by a OBGYN oncologist as she had a " shadow" on one of her ovaries on an US.    Clinical Tests:   Lab Tests: Reviewed       <> Summary of Lab: H&H was low but in the same range as her previous labs.    She has a UTI with positive Nitrites   Radiological Study: Reviewed  ED Management:  US results a large fiborid.  Her UA reports a UTI she was given Rocephin IV while here and Ceftin 500 mg BID for 10 days for home use.  She was given one mg of Dilaudid IV and zofran 4 mg IV for pain with good results.  Pt was given Diclofenac for pain for home use and instructed to return to the ED if symptoms worsen and to follow up with her OBGN oncologist At discharge this pt appeared comfortable and in NAD   Have discussed this pt with Dr. White                       Clinical Impression:   Final diagnoses:  [N39.0, R31.9] Urinary tract infection with hematuria, site unspecified (Primary)  [D25.9] Uterine leiomyoma, unspecified location  [R10.9] Abdominal pain, unspecified abdominal location          ED Disposition Condition    Discharge Stable        ED Prescriptions     Medication Sig Dispense Start Date " End Date Auth. Provider    cefUROXime (CEFTIN) 500 MG tablet Take 1 tablet (500 mg total) by mouth every 12 (twelve) hours. for 10 days 20 tablet 4/27/2022 5/7/2022 Stacia Herrera NP    diclofenac (VOLTAREN) 50 MG EC tablet Take 1 tablet (50 mg total) by mouth 3 (three) times daily as needed. 20 tablet 4/27/2022  Stacia Herrera NP        Follow-up Information     Follow up With Specialties Details Why Contact Info    Phoenix Mayo III, MD Hematology and Oncology In 2 days  [address] [facility] [address]  [phone]             Stacia Herrera NP  04/27/22 6133

## 2022-04-28 ENCOUNTER — DOCUMENTATION ONLY (OUTPATIENT)
Dept: INFUSION THERAPY | Facility: HOSPITAL | Age: 47
End: 2022-04-28
Payer: MEDICAID

## 2022-04-28 ENCOUNTER — TELEPHONE (OUTPATIENT)
Dept: INFUSION THERAPY | Facility: HOSPITAL | Age: 47
End: 2022-04-28
Payer: MEDICAID

## 2022-04-28 ENCOUNTER — TELEPHONE (OUTPATIENT)
Dept: HEMATOLOGY/ONCOLOGY | Facility: CLINIC | Age: 47
End: 2022-04-28

## 2022-04-28 DIAGNOSIS — M79.89 LEFT ARM SWELLING: Primary | ICD-10-CM

## 2022-04-28 NOTE — TELEPHONE ENCOUNTER
Patient called stating that she went to the ER, had IV that was removed at DC but when she got home she noticed a lump on her left arm that she states is purple in color, sensitive, warm to touch. Denied swelling or pain while IV was inserted with fluids infusing in ER. Informed Samantha and per her verbal order, I placed orders for STAT US left arm and instructed patient that orders were placed and she should go have US done at imaging center, appt not required. Verbalized understanding.

## 2022-04-28 NOTE — TELEPHONE ENCOUNTER
SW called pt to follow up on referral from Dr. Pro in workqueue. To assess needs and provide support. Pt reports being a single mom and expressed some facial concerns. This LCSW discussed options for potential financial resources. SW instructed pt to contact this [facility] to [facility] process. Pt is using medicaid transportation to come to her appointments.     SW to follow.     Zoila Thomson, [facility]

## 2022-04-29 LAB
BACTERIA UR CULT: NORMAL
BACTERIA UR CULT: NORMAL

## 2022-05-02 ENCOUNTER — OFFICE VISIT (OUTPATIENT)
Dept: PULMONOLOGY | Facility: CLINIC | Age: 47
End: 2022-05-02
Payer: MEDICAID

## 2022-05-02 DIAGNOSIS — E66.01 MORBIDLY OBESE: ICD-10-CM

## 2022-05-02 DIAGNOSIS — E66.2 OBESITY HYPOVENTILATION SYNDROME: Primary | ICD-10-CM

## 2022-05-02 DIAGNOSIS — G47.33 OSA (OBSTRUCTIVE SLEEP APNEA): ICD-10-CM

## 2022-05-02 DIAGNOSIS — J45.909 ASTHMA, UNSPECIFIED ASTHMA SEVERITY, UNSPECIFIED WHETHER COMPLICATED, UNSPECIFIED WHETHER PERSISTENT: ICD-10-CM

## 2022-05-02 DIAGNOSIS — J96.11 CHRONIC HYPOXEMIC RESPIRATORY FAILURE: ICD-10-CM

## 2022-05-02 PROCEDURE — 1160F PR REVIEW ALL MEDS BY PRESCRIBER/CLIN PHARMACIST DOCUMENTED: ICD-10-PCS | Mod: S$GLB,,, | Performed by: NURSE PRACTITIONER

## 2022-05-02 PROCEDURE — 3077F SYST BP >= 140 MM HG: CPT | Mod: S$GLB,,, | Performed by: NURSE PRACTITIONER

## 2022-05-02 PROCEDURE — 1160F RVW MEDS BY RX/DR IN RCRD: CPT | Mod: S$GLB,,, | Performed by: NURSE PRACTITIONER

## 2022-05-02 PROCEDURE — 4010F PR ACE/ARB THEARPY RXD/TAKEN: ICD-10-PCS | Mod: S$GLB,,, | Performed by: NURSE PRACTITIONER

## 2022-05-02 PROCEDURE — 3077F PR MOST RECENT SYSTOLIC BLOOD PRESSURE >= 140 MM HG: ICD-10-PCS | Mod: S$GLB,,, | Performed by: NURSE PRACTITIONER

## 2022-05-02 PROCEDURE — 3080F PR MOST RECENT DIASTOLIC BLOOD PRESSURE >= 90 MM HG: ICD-10-PCS | Mod: S$GLB,,, | Performed by: NURSE PRACTITIONER

## 2022-05-02 PROCEDURE — 4010F ACE/ARB THERAPY RXD/TAKEN: CPT | Mod: S$GLB,,, | Performed by: NURSE PRACTITIONER

## 2022-05-02 PROCEDURE — 3080F DIAST BP >= 90 MM HG: CPT | Mod: S$GLB,,, | Performed by: NURSE PRACTITIONER

## 2022-05-02 PROCEDURE — 3008F BODY MASS INDEX DOCD: CPT | Mod: S$GLB,,, | Performed by: NURSE PRACTITIONER

## 2022-05-02 PROCEDURE — 99214 PR OFFICE/OUTPT VISIT, EST, LEVL IV, 30-39 MIN: ICD-10-PCS | Mod: S$GLB,,, | Performed by: NURSE PRACTITIONER

## 2022-05-02 PROCEDURE — 99214 OFFICE O/P EST MOD 30 MIN: CPT | Mod: S$GLB,,, | Performed by: NURSE PRACTITIONER

## 2022-05-02 PROCEDURE — 3008F PR BODY MASS INDEX (BMI) DOCUMENTED: ICD-10-PCS | Mod: S$GLB,,, | Performed by: NURSE PRACTITIONER

## 2022-05-02 RX ORDER — AMOXICILLIN AND CLAVULANATE POTASSIUM 875; 125 MG/1; MG/1
1 TABLET, FILM COATED ORAL 2 TIMES DAILY
COMMUNITY
Start: 2022-03-07 | End: 2022-07-25

## 2022-05-02 RX ORDER — SULFAMETHOXAZOLE AND TRIMETHOPRIM 800; 160 MG/1; MG/1
1 TABLET ORAL 2 TIMES DAILY
COMMUNITY
Start: 2022-03-29 | End: 2022-06-20

## 2022-05-02 RX ORDER — ERGOCALCIFEROL 1.25 MG/1
50000 CAPSULE ORAL
COMMUNITY
Start: 2022-04-23

## 2022-05-02 RX ORDER — OXYCODONE AND ACETAMINOPHEN 5; 325 MG/1; MG/1
1 TABLET ORAL
COMMUNITY
Start: 2022-03-28 | End: 2022-06-20

## 2022-05-02 RX ORDER — FLUTICASONE PROPIONATE 110 UG/1
2 AEROSOL, METERED RESPIRATORY (INHALATION) 2 TIMES DAILY
Qty: 12 G | Refills: 5 | Status: SHIPPED | OUTPATIENT
Start: 2022-05-02 | End: 2022-09-08

## 2022-05-02 RX ORDER — ATORVASTATIN CALCIUM 10 MG/1
10 TABLET, FILM COATED ORAL NIGHTLY
COMMUNITY
Start: 2022-03-29

## 2022-05-02 NOTE — PATIENT INSTRUCTIONS
You have to lose weight  Continue the Flovent 2 puffs twice a day, rinse after you use it  6 minute walk to see how much oxygen you need  Wear your oxygen   In lab sleep study   Refill flovent   Continue to follow with Dr. Boston blood thinners.

## 2022-05-02 NOTE — PROGRESS NOTES
SUBJECTIVE:    Patient ID: Katiuska Preston is a 47 y.o. female.    Chief Complaint: Establish Care, Shortness of Breath, and Congestive Heart Failure    HPI   Patient here today to establish care. She was a patient of Dr. Loya. She has a history of asthma (positive methacholine in ).  She is using Flovent twice a day for her asthma.  She uses her rescue inhaler 1-2 times a week. She is here without oxygen on and sats in the 80's. She states she has oxygen at home but was rushing out her home this morning a did not have time to put it on. The patient is morbidly obese. She has obesity hypoventilation syndrome.  She sleeps on oxygen, has never had sleep study. She had an unprovoked PE last year and is on chronic anticoagulation, managed by hematology.  She is being followed by GYN for menometrorrhagia and plans to have hysterectomy.    She has history of HTN and CHF. She snores, wakes up frequently to urinate.    Past Medical History:   Diagnosis Date    Antithrombin III deficiency 2021    Asthma     Claustrophobia     Elevated factor VIII level 2021    Esophageal reflux     Gastroesophageal reflux    Generalized anxiety disorder     Anxiety, Generalized    Herniated disc     Hypertension     Iron deficiency anemia due to chronic blood loss 10/27/2021    Lower extremity weakness     Morbid obesity     Obesity     Protein S deficiency 2021    Pulmonary embolism     Upper extremity weakness      Past Surgical History:   Procedure Laterality Date     SECTION      CHOLECYSTECTOMY      TONSILLECTOMY       No family history on file.     Social History:   Marital Status:   Occupation: Data Unavailable  Alcohol History:  reports current alcohol use of about 0.8 standard drinks of alcohol per week.  Tobacco History:  reports that she quit smoking about 3 years ago. Her smoking use included cigarettes and cigars. She has never used smokeless tobacco.  Drug History:   reports no history of drug use.    Review of patient's allergies indicates:  No Known Allergies    Current Outpatient Medications   Medication Sig Dispense Refill    albuterol (PROVENTIL/VENTOLIN HFA) 90 mcg/actuation inhaler Inhale 2 puffs into the lungs every 6 (six) hours as needed for Wheezing. Rescue 18 g 5    atorvastatin (LIPITOR) 10 MG tablet TAKE 1 TABLET BY MOUTH EVERY DAY TO LOWER LDL CHOLESTEROL      cefUROXime (CEFTIN) 500 MG tablet Take 1 tablet (500 mg total) by mouth every 12 (twelve) hours. for 10 days 20 tablet 0    ELIQUIS 5 mg Tab TAKE 1 TABLET(5 MG) BY MOUTH TWICE DAILY 60 tablet 3    ergocalciferol (ERGOCALCIFEROL) 50,000 unit Cap Take 50,000 Units by mouth twice a week.      FEROSUL 325 mg (65 mg iron) Tab tablet TAKE 1 TABLET BY MOUTH ONCE DAILY 30 tablet 3    fluticasone propionate (FLONASE) 50 mcg/actuation nasal spray 2 sprays by Each Nostril route daily as needed for Rhinitis.      fluticasone propionate (FLOVENT HFA) 110 mcg/actuation inhaler Inhale 2 puffs into the lungs 2 (two) times daily. Controller  Rinse after you use it 12 g 5    furosemide (LASIX) 20 MG tablet Take 1 tablet (20 mg total) by mouth once daily. 30 tablet 0    lisinopriL (PRINIVIL,ZESTRIL) 20 MG tablet Take 1 tablet (20 mg total) by mouth once daily. 40 tablet 0    losartan (COZAAR) 25 MG tablet Take 25 mg by mouth once daily.      meloxicam (MOBIC) 7.5 MG tablet Take 7.5 mg by mouth once daily.      metoprolol succinate (TOPROL-XL) 25 MG 24 hr tablet Take 25 mg by mouth once daily.      omeprazole (PRILOSEC) 20 MG capsule Take 20 mg by mouth once daily.      oxyCODONE-acetaminophen (PERCOCET) 5-325 mg per tablet Take 1 tablet by mouth every 4 to 6 hours as needed.      sulfamethoxazole-trimethoprim 800-160mg (BACTRIM DS) 800-160 mg Tab Take 1 tablet by mouth 2 (two) times daily.      trazodone (DESYREL) 100 MG tablet Take 100 mg by mouth every evening.      amoxicillin-clavulanate 875-125mg  (AUGMENTIN) 875-125 mg per tablet Take 1 tablet by mouth 2 (two) times daily.      carvediloL (COREG) 12.5 MG tablet Take 1 tablet (12.5 mg total) by mouth 2 (two) times daily with meals. 60 tablet 0    diclofenac (VOLTAREN) 50 MG EC tablet Take 1 tablet (50 mg total) by mouth 3 (three) times daily as needed. (Patient not taking: No sig reported) 20 tablet 2    famotidine (PEPCID) 20 MG tablet Take 20 mg by mouth 2 (two) times daily.      HYDROcodone-acetaminophen (NORCO) 5-325 mg per tablet Take 1 tablet by mouth every 4 (four) hours as needed. 12 tablet 0    NEXIUM 40 mg capsule Take 40 mg by mouth once daily.       No current facility-administered medications for this visit.       Last CTA:05/2021  IMPRESSION:     1.  Limited evaluation due to body habitus and inadequate bolus  timing. Filling defects identified in segmental and subsegmental  artery supplying the left lower lobe, consistent with pulmonary  emboli. There are questionable pulmonary emboli in the right mainstem  pulmonary artery and segmental artery supplying the right lower lobe.  Findings reported to Dr.Lloyd Duffy at 5/1/2021.  2.  Bilateral diffuse groundglass lung opacities with peripheral  wedge-shaped opacities may reflect changes of poor inspiration and  atelectasis. Atypical infectious process could also have a similar  appearance the proper clinical setting    Review of Systems  General: Feeling Well. Snores, chronic fatigue   Eyes: Vision is good.  ENT:  No sinusitis or pharyngitis.   Heart:: No chest pain or palpitation.  Lungs: breathing is stable   GI: No Nausea, vomiting, constipation, diarrhea, or reflux.  : frequent night time urination, recent UTI   Musculoskeletal: No joint pain or myalgias.  Skin: No lesions or rashes.  Neuro: No headaches or neuropathy.  Lymph: swelling to legs   Psych: No anxiety or depression.  Endo: No weight change.    OBJECTIVE:      BP (!) 170/90 (BP Location: Left arm, Patient Position:  "Sitting, BP Method: Large (Manual))   Pulse 89   Ht 5' 3" (1.6 m)   Wt (!) 159.2 kg (351 lb)   SpO2 (!) 85% Comment: room air, placed on 2 liters of oxygen up to 94%  BMI 62.18 kg/m²     Physical Exam  GENERAL: middle aged patient in no distress.  HEENT: Pupils equal and reactive. Extraocular movements intact. Nose intact.      Pharynx moist. malampatti 3  NECK: Supple. 18 inches  HEART: Regular rate and rhythm. No murmur or gallop auscultated.  LUNGS: decreased breath sounds secondary to body habitus  ABDOMEN:morbidly obese  Bowel sounds present. Non-tender, no masses palpated.  EXTREMITIES: Normal muscle tone and joint movement, no cyanosis or clubbing.   LYMPHATICS: No adenopathy palpated, 1-2+ edema to legs   SKIN: Dry, intact, no lesions.   NEURO: Cranial nerves II-XII intact. Motor strength 5/5 bilaterally, upper and lower extremities.  PSYCH: Appropriate affect.    Assessment:       1. Obesity hypoventilation syndrome    2. Asthma, unspecified asthma severity, unspecified whether complicated, unspecified whether persistent    3. Chronic hypoxemic respiratory failure    4. Morbidly obese    5. SINGH (obstructive sleep apnea)          Plan:       Obesity hypoventilation syndrome  -     Polysomnogram (CPAP will be added if patient meets diagnostic criteria.); Future; Expected date: 05/02/2022    Asthma, unspecified asthma severity, unspecified whether complicated, unspecified whether persistent  -     Cancel: Complete PFT with bronchodilator; Future    Chronic hypoxemic respiratory failure  -     Polysomnogram (CPAP will be added if patient meets diagnostic criteria.); Future; Expected date: 05/02/2022  -     Stress test, pulmonary; Future    Morbidly obese  -     Polysomnogram (CPAP will be added if patient meets diagnostic criteria.); Future; Expected date: 05/02/2022    SINGH (obstructive sleep apnea)  -     Polysomnogram (CPAP will be added if patient meets diagnostic criteria.); Future; Expected date: " 05/02/2022    Other orders  -     fluticasone propionate (FLOVENT HFA) 110 mcg/actuation inhaler; Inhale 2 puffs into the lungs 2 (two) times daily. Controller  Rinse after you use it  Dispense: 12 g; Refill: 5         Follow up in about 3 months (around 8/2/2022).    You have to lose weight  Continue the Flovent 2 puffs twice a day, rinse after you use it  6 minute walk to see how much oxygen you need  Wear your oxygen   In lab sleep study   Refill flovent   Continue to follow with Dr. Boston blood thinners.

## 2022-05-03 ENCOUNTER — HOSPITAL ENCOUNTER (OUTPATIENT)
Dept: RADIOLOGY | Facility: HOSPITAL | Age: 47
Discharge: HOME | End: 2022-05-03
Attending: NURSE PRACTITIONER
Payer: MEDICAID

## 2022-05-03 VITALS
BODY MASS INDEX: 51.91 KG/M2 | HEART RATE: 89 BPM | WEIGHT: 293 LBS | HEIGHT: 63 IN | DIASTOLIC BLOOD PRESSURE: 90 MMHG | SYSTOLIC BLOOD PRESSURE: 170 MMHG | OXYGEN SATURATION: 85 %

## 2022-05-03 DIAGNOSIS — M79.89 LEFT ARM SWELLING: ICD-10-CM

## 2022-05-03 PROCEDURE — 93971 EXTREMITY STUDY: CPT | Mod: TC,LT

## 2022-05-12 DIAGNOSIS — N93.9 VAGINAL BLEEDING: Primary | ICD-10-CM

## 2022-05-18 ENCOUNTER — PATIENT MESSAGE (OUTPATIENT)
Dept: PULMONOLOGY | Facility: CLINIC | Age: 47
End: 2022-05-18

## 2022-05-18 ENCOUNTER — TELEPHONE (OUTPATIENT)
Dept: PULMONOLOGY | Facility: CLINIC | Age: 47
End: 2022-05-18

## 2022-05-18 ENCOUNTER — HOSPITAL ENCOUNTER (OUTPATIENT)
Dept: PULMONOLOGY | Facility: HOSPITAL | Age: 47
Discharge: HOME | End: 2022-05-18
Attending: NURSE PRACTITIONER
Payer: MEDICAID

## 2022-05-18 VITALS — WEIGHT: 293 LBS | HEIGHT: 63 IN | BODY MASS INDEX: 51.91 KG/M2

## 2022-05-18 DIAGNOSIS — J96.11 CHRONIC HYPOXEMIC RESPIRATORY FAILURE: ICD-10-CM

## 2022-05-18 PROCEDURE — 94618 PULMONARY STRESS TESTING: CPT

## 2022-05-18 NOTE — CARE UPDATE
"   05/18/22 1149   6MW Test   Ordering Provider MARY ELLEN Cooper   Diagnosis Shortness of Breath;Qualify for Oxygen   Height 5' 3" (1.6 m)   Weight (!) 159.2 kg (351 lb)   BMI (Calculated) 62.2   Predicted Distance 210.03   Patient Race    6MWT Status completed without stopping   Patient Reported Leg pain   Was O2 used? Yes   Delivery Method Cannula;Pull Tank;Continuous Flow   6MW Distance walked (feet) 700 feet   Distance walked (meters) 213.36 meters   Did patient stop? No   Type of assistive device(s) used? no assistive devices   Is extra documentation required for this patient? Yes   Pre-Exercise   Oxygen Saturation 86 %   Supplemental Oxygen Room Air   Heart Rate 79 bpm   Alf Dyspnea Rating  moderate   Exercise 1 Minute   Oxygen Saturation 83 %   Supplemental Oxygen Room Air   Heart Rate 78 bpm   Exercise 2 Minutes   Oxygen Saturation 88 %   Supplemental Oxygen 2 L/M   Heart Rate 79 bpm   Exercise 3 Minutes   Oxygen Saturation 92 %   Supplemental Oxygen 2 L/M   Heart Rate 78 bpm   Exercise 4 Minutes   Oxygen Saturation 95 %   Supplemental Oxygen 2 L/M   Heart Rate 74 bpm   Exercise 5 Minutes   Oxygen Saturation 96 %   Heart Rate 77 bpm   Post Exercise   Oxygen Saturation 95 %   Supplemental Oxygen 2 L/M   Heart Rate 72 bpm   Alf Dyspnea Rating  somewhat heavy   Recovery   Oxygen Saturation 96 %   Supplemental Oxygen 2 L/M   Heart Rate 77 bpm   Alf Dyspnea Rating  somewhat heavy   Interpretation   Is procedure ready for interpretation? Yes   Did the patient stop or pause? No   Total Laps Walked 3.5   Final Partial Lap Distance (feet) 0 feet   Total Distance Feet (Calculated) 700 feet   Total Distance Meters (Calculated) 213.36 meters   Predicted Distance Meters (Calculated) 362.56 meters   Percentage of Predicted (Calculated) 58.85   Peak VO2 (Calculated) 10.38   Mets 2.97   Comments This is a hypoxemic 6 min. walk. Patient requires 2 liters of oxygen to adequately oxygenate with " ambulation.   Oxygen Qualification   Oxygen Qualification? Yes

## 2022-05-18 NOTE — PROGRESS NOTES
[facility] Hematology/Oncology  Progress Note -   Follow-up Visit    Subjective:      Patient ID:   NAME: Katiuska Preston : 1975     47 y.o. female    Referring Doc: Justice  Other Physicians: Eduardo    Chief Complaint: pulm emboli f/u      HPI:    The patient returns for a regularly scheduled follow-up visit today to go over results of recently ordered tests, studies and/or labs. She is here by herself.     She has O2 at home and inhalers and has her portable today.   Breathing has been stable but does have WINN and some wheezing. She has been seeing pulmonary      No CP, HA's or N/V.       She is currently on Eliquis. No excessive bleeding or brusiing but has continued heavy periods. She saw GYN and is planning to see someone for a hysterectomy    She has some chronic right knee issues and pain.    She had recent abnormal mammogram 2022 with cluster of calcifications 1 0'clock posittion on the left breast.       Discussed covid19 precautions - she had her vaccinations             ROS:   GEN: normal without any fever, night sweats or weight loss  HEENT: normal with no HA's, sore throat, stiff neck, changes in vision  CV: normal with no CP, some WINN/Wheeze  PULM: normal with no SOB, cough, hemoptysis, sputum or pleuritic pain  GI: normal with no abdominal pain, nausea, vomiting, constipation, diarrhea, melanotic stools, BRBPR, or hematemesis  : normal with no hematuria, dysuria  BREAST: normal with no mass, discharge, pain  SKIN: normal with no rash, erythema, bruising, or swelling     Past Medical/Surgical History:  Past Medical History:   Diagnosis Date    Antithrombin III deficiency 2021    Asthma     Claustrophobia     Elevated factor VIII level 2021    Esophageal reflux     Gastroesophageal reflux    Generalized anxiety disorder     Anxiety, Generalized    Herniated disc     Hypertension     Iron deficiency anemia due to chronic blood loss 10/27/2021    Lower extremity weakness      Morbid obesity     Obesity     Protein S deficiency 2021    Pulmonary embolism     Upper extremity weakness      Past Surgical History:   Procedure Laterality Date     SECTION      CHOLECYSTECTOMY      TONSILLECTOMY           Allergies:  Review of patient's allergies indicates:  No Known Allergies    Social/Family History:  Social History     Socioeconomic History    Marital status:    Tobacco Use    Smoking status: Former Smoker     Types: Cigarettes, Cigars     Quit date: 2019     Years since quitting: 3.3    Smokeless tobacco: Never Used   Substance and Sexual Activity    Alcohol use: Yes     Alcohol/week: 0.8 standard drinks     Types: 1 Standard drinks or equivalent per week     Comment: rarely    Drug use: No    Sexual activity: Not Currently     History reviewed. No pertinent family history.      Medications:    Current Outpatient Medications:     albuterol (PROVENTIL/VENTOLIN HFA) 90 mcg/actuation inhaler, Inhale 2 puffs into the lungs every 6 (six) hours as needed for Wheezing. Rescue, Disp: 18 g, Rfl: 5    amoxicillin-clavulanate 875-125mg (AUGMENTIN) 875-125 mg per tablet, Take 1 tablet by mouth 2 (two) times daily., Disp: , Rfl:     atorvastatin (LIPITOR) 10 MG tablet, TAKE 1 TABLET BY MOUTH EVERY DAY TO LOWER LDL CHOLESTEROL, Disp: , Rfl:     carvediloL (COREG) 12.5 MG tablet, Take 1 tablet (12.5 mg total) by mouth 2 (two) times daily with meals., Disp: 60 tablet, Rfl: 0    diclofenac (VOLTAREN) 50 MG EC tablet, Take 1 tablet (50 mg total) by mouth 3 (three) times daily as needed. (Patient not taking: No sig reported), Disp: 20 tablet, Rfl: 2    ELIQUIS 5 mg Tab, TAKE 1 TABLET(5 MG) BY MOUTH TWICE DAILY, Disp: 60 tablet, Rfl: 3    ergocalciferol (ERGOCALCIFEROL) 50,000 unit Cap, Take 50,000 Units by mouth twice a week., Disp: , Rfl:     famotidine (PEPCID) 20 MG tablet, Take 20 mg by mouth 2 (two) times daily., Disp: , Rfl:     FEROSUL 325 mg (65 mg iron) Tab  "tablet, TAKE 1 TABLET BY MOUTH EVERY DAY, Disp: 30 tablet, Rfl: 3    fluticasone propionate (FLONASE) 50 mcg/actuation nasal spray, 2 sprays by Each Nostril route daily as needed for Rhinitis., Disp: , Rfl:     fluticasone propionate (FLOVENT HFA) 110 mcg/actuation inhaler, Inhale 2 puffs into the lungs 2 (two) times daily. Controller Rinse after you use it, Disp: 12 g, Rfl: 5    furosemide (LASIX) 20 MG tablet, Take 1 tablet (20 mg total) by mouth once daily., Disp: 30 tablet, Rfl: 0    HYDROcodone-acetaminophen (NORCO) 5-325 mg per tablet, Take 1 tablet by mouth every 4 (four) hours as needed., Disp: 12 tablet, Rfl: 0    lisinopriL (PRINIVIL,ZESTRIL) 20 MG tablet, Take 1 tablet (20 mg total) by mouth once daily., Disp: 40 tablet, Rfl: 0    losartan (COZAAR) 25 MG tablet, Take 25 mg by mouth once daily., Disp: , Rfl:     meloxicam (MOBIC) 7.5 MG tablet, Take 7.5 mg by mouth once daily., Disp: , Rfl:     metoprolol succinate (TOPROL-XL) 25 MG 24 hr tablet, Take 25 mg by mouth once daily., Disp: , Rfl:     NEXIUM 40 mg capsule, Take 40 mg by mouth once daily., Disp: , Rfl:     omeprazole (PRILOSEC) 20 MG capsule, Take 20 mg by mouth once daily., Disp: , Rfl:     oxyCODONE-acetaminophen (PERCOCET) 5-325 mg per tablet, Take 1 tablet by mouth every 4 to 6 hours as needed., Disp: , Rfl:     sulfamethoxazole-trimethoprim 800-160mg (BACTRIM DS) 800-160 mg Tab, Take 1 tablet by mouth 2 (two) times daily., Disp: , Rfl:     trazodone (DESYREL) 100 MG tablet, Take 100 mg by mouth every evening., Disp: , Rfl:       Pathology:  Cancer Staging  No matching staging information was found for the patient.        Objective:   Vitals:  Pulse 101, resp. rate (!) 22, height 5' 8" (1.727 m), weight (!) 156.5 kg (345 lb), SpO2 (!) 89 %.    Physical Examination:   GEN: no apparent distress, comfortable; AAOx3; morbidly overweight   HEAD: atraumatic and normocephalic  EYES: no pallor, no icterus, PERRLA  ENT: OMM, no pharyngeal " erythema, external ears WNL; no nasal discharge; no thrush  NECK: no masses, thyroid normal, trachea midline, no LAD/LN's, supple  CV: RRR with no murmur; normal pulse; normal S1 and S2; no pedal edema  CHEST: Normal respiratory effort; CTAB; normal breath sounds; no wheeze or crackles; currently on portable O2  ABDOM: nontender and nondistended; soft; normal bowel sounds; no rebound/guarding  MUSC/Skeletal: LROM and crepitus right knee; no deformities or arthropathy  EXTREM: no clubbing, cyanosis, inflammation or swelling  SKIN: no rashes, lesions, ulcers, petechiae or subcutaneous nodules  : no diaz  NEURO: grossly intact; motor/sensory WNL; AAOx3; no tremors  PSYCH: normal mood, affect and behavior  LYMPH: normal cervical, supraclavicular, and groin LN's;       Labs:   Lab Results   Component Value Date    WBC 8.70 04/27/2022    HGB 11.2 (L) 04/27/2022    HCT 36.9 (L) 04/27/2022    MCV 95 04/27/2022     04/27/2022    CMP  Sodium   Date Value Ref Range Status   04/27/2022 139 136 - 145 mmol/L Final     Potassium   Date Value Ref Range Status   04/27/2022 3.9 3.5 - 5.1 mmol/L Final     Chloride   Date Value Ref Range Status   04/27/2022 101 95 - 110 mmol/L Final     CO2   Date Value Ref Range Status   04/27/2022 32 (H) 23 - 29 mmol/L Final     Glucose   Date Value Ref Range Status   04/27/2022 135 (H) 70 - 110 mg/dL Final     BUN   Date Value Ref Range Status   04/27/2022 12 6 - 20 mg/dL Final     Creatinine   Date Value Ref Range Status   04/27/2022 1.0 0.5 - 1.4 mg/dL Final     Calcium   Date Value Ref Range Status   04/27/2022 8.8 8.7 - 10.5 mg/dL Final     Total Protein   Date Value Ref Range Status   04/27/2022 7.7 6.0 - 8.4 g/dL Final     Albumin   Date Value Ref Range Status   04/27/2022 2.9 (L) 3.5 - 5.2 g/dL Final     Total Bilirubin   Date Value Ref Range Status   04/27/2022 0.4 0.1 - 1.0 mg/dL Final     Comment:     For infants and newborns, interpretation of results should be based  on  gestational age, weight and in agreement with clinical  observations.    Premature Infant recommended reference ranges:  Up to 24 hours.............<8.0 mg/dL  Up to 48 hours............<12.0 mg/dL  3-5 days..................<15.0 mg/dL  6-29 days.................<15.0 mg/dL       Alkaline Phosphatase   Date Value Ref Range Status   04/27/2022 70 55 - 135 U/L Final     AST   Date Value Ref Range Status   04/27/2022 23 10 - 40 U/L Final     ALT   Date Value Ref Range Status   04/27/2022 26 10 - 44 U/L Final     Anion Gap   Date Value Ref Range Status   04/27/2022 6 (L) 8 - 16 mmol/L Final     eGFR if    Date Value Ref Range Status   04/27/2022 >60.0 >60 mL/min/1.73 m^2 Final     eGFR if non    Date Value Ref Range Status   04/27/2022 >60.0 >60 mL/min/1.73 m^2 Final     Comment:     Calculation used to obtain the estimated glomerular filtration  rate (eGFR) is the CKD-EPI equation.                  Lab Results   Component Value Date    IRON 24 (L) 04/21/2022    TIBC 388 04/21/2022    FERRITIN 28 04/21/2022     Lab Results   Component Value Date    HZTFXMUY07 452 05/02/2021     Lab Results   Component Value Date    FOLATE 5.9 05/02/2021          Factor VIII Activity 56 - 140 % 281      Antithrombin III 75 - 135 % 109      Protein S Activity 63 - 140 % 63            I have reviewed all available lab results and radiology reports.    Radiology/Diagnostic Studies:    US Lower Extremity Veins Bilateral [[id number]] Collected: 05/01/21 1348   Order Status: Completed Updated: 05/01/21 1432   Narrative:     REASON: DVT     FINDINGS:     Grayscale, color and spectral Doppler analysis of the bilateral lower   extremity deep venous system was performed.     There is normal compressibility, color and spectral Doppler analysis,   and augmentation in the bilateral lower extremity deep venous system.     IMPRESSION:     1.  No DVT of the bilateral lower extremity veins.   2.  Bilateral distal  superficial femoral veins were unable to be   assessed due to body habitus       CTA Chest Non-Coronary - PE Study [[id number]] Collected: 05/01/21 1143   Order Status: Completed Updated: 05/01/21 1225   Narrative:     CMS MANDATED QUALITY DATA - CT RADIATION - 436     All CT scans at this facility utilize dose modulation, iterative   reconstruction, and/or weight based dosing when appropriate to reduce   radiation dose to as low as reasonably achievable.         REASON: PE suspected, intermediate prob, positive D-dimer     TECHNIQUE: CT angiography of thorax with 100 mL Omnipaque 350.   Maximum intensity projection coronal reformations were created at a   separate workstation and stored in the patient's permanent medical   record.     COMPARISON: CTA chest October 14, 2019.     FINDINGS:     Limited evaluation due to body habitus and inadequate bolus timing.   Filling defects identified in segmental and subsegmental artery   supplying the left lower lobe, consistent with pulmonary emboli.   Questionable filling defects versus artifact in the right mainstem   pulmonary artery and a few segmental arteries supplying the right   lower lobe may reflect pulmonary emboli. Heart size is at the upper   limits of normal. No mediastinal lymphadenopathy or mass.     The central tracheobronchial tree is patent. There is diffuse   groundglass lung opacities in the bilateral lungs with peripheral   wedge-shaped opacities. No pleural effusions.     The visualized abdominal viscera are unremarkable. No acute osseous   abnormality..     IMPRESSION:     1.  Limited evaluation due to body habitus and inadequate bolus   timing. Filling defects identified in segmental and subsegmental   artery supplying the left lower lobe, consistent with pulmonary   emboli. There are questionable pulmonary emboli in the right mainstem   pulmonary artery and segmental artery supplying the right lower lobe.   Findings reported to Dr.Lloyd Duffy at  5/1/2021.   2.  Bilateral diffuse groundglass lung opacities with peripheral   wedge-shaped opacities may reflect changes of poor inspiration and   atelectasis. Atypical infectious process could also have a similar   appearance the proper clinical setting.                 All lab results and imaging results have been reviewed and discussed with the patient    Assessment:   (1) 47 y.o. female with diagnosis of pulmonary emboli who was seen as a consult at [facility]  - patient with diagnosis of asthma who presented to the ED at [facility] on 5/1 with progressive SOB, postnasal drip and acute hypercapnia. CTA was a limited study due to body habitus but radiology reported presence of filling defects identified in segmental and subsegmental artery supplying the left lower lobe, consistent with pulmonary emboli. They also reported questionable pulmonary emboli in the right mainstem pulmonary artery and segmental artery supplying the right lower lobe. Patient has been admitted to hospitalist service and is on Lovenox. She has no prior personal or family history of clots.      - doppler studies are negative     5/3/2021:  - patient was seen by Dr Clark with pulm yesterday; appreciate his evaluation  - options of anticoagulation include: Coumadin, Eliquis , Xarelto, or Pradaxa  - in patients with morbid obesity, one can have difficulties with therapeutic dosing with practically any type of oral anticoagulant     5/31/2021:  - She has O2 at home and is on portable today.   - She has not followed up with pulmonary since discharge.   - She is breathing fair but does have WINN.   - She has some sinus issues. She is currently on Eliquis.   - She saw Dr Rendon since discharge  - low ATIII and protein S levels which could be the etiology, but they will need to be repeated at later date to confirm  - Elevated factor VIII which also will need to be repeated to confirm    6/28/2021:  - she saw Dr Clark with pulmonary since last visit and has PFT  scheduled for this coming July 1st  - she remains on portable O2    12/8/2021:  - she saw Dr Clark again on 11/2/2021  - she remains on O2 at home  - she remains on eliquis  - Repeat AT3 was normal, repeat Protein S was WNL  - repeat factor VIII was still a little elevated at 281 but better    2/15/2022:  - she sees pulmonary NP again next month    5/19/2022:  - she saw Idania Cason NP on 5/2/2022 with pulmonary       (2) Anemia with microcytic indices with current history of chronic menorrhagia - most likely has underlying iron deficiency due to chronic heavy menstrual cycles  - s/p two units of blood  - prior hgb at 8.2  - total bilirubin is WNL, so I do not suspect any hemolysis at this time     5/3/2021:  - hgb at 8.6 today  - iron and ferritin are both low with elevated TIBC  - she received dose of IV iron today    5/31/2021:  - she needs repeat labs incl iron panel  - will consider additional IV iron as needed  - she is on oral iron  - she needs repeat labs incl. Iron panel    6/28/2021:  - hgb currently 11.7  - iron panel is adequate at this time    10/26/2021:  - latest hgb at 10.6  - iron at 29  - she is on oral iron since May 2021  - she has heavy menstrual cycles  - discussed consideration for IV iron and she is agreeable; discussed general side-effects of iron infusions    12/8/2021:  - she has been on oral iron with little improvement in the labs  - she is starting IV iron this coming Friday    2/15/2022:  - latest labs from Jan 2022 with no anemia and iron panel was adequate  - she had IV iron x 1 only    5/19/2022:  - latest hgb at 11.2  - ferritin was 28  - iron back down to 24  - continued GYN bleeding issues  - seen by Dr Pro with GYn-Onc on 4/20/2022      (3) Abnormal mammogram:    5/19/2022:  - She had recent abnormal mammogram 5/6/2022 with cluster of calcifications 1 0'clock posittion on the left breast.   - she is scheduling US guided biopsy in near future  - discussed referral to [facility]  for evaluation      (3) Overweight/Obesity     (4) HTN     (5) GERD     (6) WBC and platelets are WNL     (7) Hx/of covid vaccination on 4/16 - she received Pfizer vaccine (not the J&J one)     (8) General anxiety disorder     (9) Nonsmoker    VISIT DIAGNOSES:      1. Antithrombin III deficiency    2. Acute pulmonary embolism, unspecified pulmonary embolism type, unspecified whether acute cor pulmonale present    3. Elevated factor VIII level    4. Protein S deficiency    5. Iron deficiency anemia due to chronic blood loss    6. Iron deficiency anemia, unspecified iron deficiency anemia type    7. Abnormal mammogram            Plan:     PLAN:  1. F/u with pulmonary as directed  2. F/u with PCP  3. Check labs monthly as ordered incl iron panel - set up up IV iron x2  4. F/u with GYN  6. Refill eliquis as needed  7. Set up referral to Dr Puentes with GenSurg and proceed with US guided breast biopsy     RTC in 3-4 weeks    Fax note to Yoav Rendon [facility]; [place]; [place]              Antithrombin III deficiency    Acute pulmonary embolism, unspecified pulmonary embolism type, unspecified whether acute cor pulmonale present    Elevated factor VIII level    Protein S deficiency    Iron deficiency anemia due to chronic blood loss    Iron deficiency anemia, unspecified iron deficiency anemia type    Abnormal mammogram  -     Ambulatory referral/consult to General Surgery; Future; Expected date: 05/26/2022      Follow up in about 4 weeks (around 6/16/2022).    COVID-19 Discussion:    I had long discussion with patient and any applicable family about the COVID-19 coronavirus epidemic and the recommended precautions with regard to cancer and/or hematology patients. I have re-iterated the CDC recommendations for adequate hand washing, use of hand -like products, and coughing into elbow, etc. In addition, especially for our patients who are on chemotherapy and/or our otherwise immunocompromised patients, I have  recommended avoidance of crowds, including movie theaters, restaurants, churches, etc. I have recommended avoidance of any sick or symptomatic family members and/or friends. I have also recommended avoidance of any raw and unwashed food products, and general avoidance of food items that have not been prepared by themselves. The patient has been asked to call us immediately with any symptom developments, issues, questions or other general concerns.       Anticoagulation Discussion:    Discussed with patient and any applicable family members about the benefit and/or need for anticoagulation. I communicated about the risks of bleeding while on any anticoagulation, which could be serious and/or life-threatening, and which can occur at any time, regardless of degree of the level of anticoagulation. I expressed the need for compliance with any anticoagulation regimen and that failure to do so could potential lead to excessive bleeding, and risk to health and/or life. In particular, with patients on coumadin therapy, compliance with requested blood work is absolutely essential, as coumadin levels can vary from time to time, and failure to do so could potentially place the patient at risk for bleeding and/or clotting events which could be fatal. Patients on coumadin are encouraged to call the day after they have their levels drawn, as to obtain the appropriate instructions from my staff. Patients are aware that self-regulating or self-dosing of their medications is strictly prohibited.       I have explained and the patient understands all of  the current recommendation(s). I have answered all of their questions to the best of my ability and to their complete satisfaction.             Thank you for allowing me to participate in this pleasant patient's care. Please call with any questions or concerns.      Electronically signed Cisco Boston MD              Answers for HPI/ROS submitted by the patient on  5/18/2022  appetite change : No  unexpected weight change: No  mouth sores: No  visual disturbance: No  cough: No  shortness of breath: No  chest pain: No  abdominal pain: No  diarrhea: No  frequency: No  back pain: Yes  rash: No  headaches: Yes  adenopathy: No  nervous/ anxious: No

## 2022-05-19 ENCOUNTER — LAB VISIT (OUTPATIENT)
Dept: LAB | Facility: HOSPITAL | Age: 47
End: 2022-05-19
Attending: INTERNAL MEDICINE
Payer: MEDICAID

## 2022-05-19 ENCOUNTER — OFFICE VISIT (OUTPATIENT)
Dept: HEMATOLOGY/ONCOLOGY | Facility: CLINIC | Age: 47
End: 2022-05-19
Payer: MEDICAID

## 2022-05-19 ENCOUNTER — TELEPHONE (OUTPATIENT)
Dept: HEMATOLOGY/ONCOLOGY | Facility: CLINIC | Age: 47
End: 2022-05-19

## 2022-05-19 ENCOUNTER — TELEPHONE (OUTPATIENT)
Dept: INFUSION THERAPY | Facility: HOSPITAL | Age: 47
End: 2022-05-19
Payer: MEDICAID

## 2022-05-19 ENCOUNTER — TELEPHONE (OUTPATIENT)
Dept: HEMATOLOGY/ONCOLOGY | Facility: CLINIC | Age: 47
End: 2022-05-19
Payer: MEDICAID

## 2022-05-19 VITALS
HEART RATE: 101 BPM | RESPIRATION RATE: 22 BRPM | HEIGHT: 68 IN | OXYGEN SATURATION: 89 % | WEIGHT: 293 LBS | BODY MASS INDEX: 44.41 KG/M2

## 2022-05-19 DIAGNOSIS — D68.59 PROTEIN S DEFICIENCY: ICD-10-CM

## 2022-05-19 DIAGNOSIS — I26.99 ACUTE PULMONARY EMBOLISM, UNSPECIFIED PULMONARY EMBOLISM TYPE, UNSPECIFIED WHETHER ACUTE COR PULMONALE PRESENT: ICD-10-CM

## 2022-05-19 DIAGNOSIS — R92.8 ABNORMAL MAMMOGRAM: ICD-10-CM

## 2022-05-19 DIAGNOSIS — R92.8 ABNORMAL MAMMOGRAM: Primary | ICD-10-CM

## 2022-05-19 DIAGNOSIS — R79.1 ELEVATED FACTOR VIII LEVEL: ICD-10-CM

## 2022-05-19 DIAGNOSIS — D50.9 IRON DEFICIENCY ANEMIA, UNSPECIFIED IRON DEFICIENCY ANEMIA TYPE: Chronic | ICD-10-CM

## 2022-05-19 DIAGNOSIS — D68.59 ANTITHROMBIN III DEFICIENCY: ICD-10-CM

## 2022-05-19 DIAGNOSIS — D68.59 ANTITHROMBIN III DEFICIENCY: Primary | ICD-10-CM

## 2022-05-19 DIAGNOSIS — D50.0 IRON DEFICIENCY ANEMIA DUE TO CHRONIC BLOOD LOSS: ICD-10-CM

## 2022-05-19 LAB
ALBUMIN SERPL BCP-MCNC: 3.3 G/DL (ref 3.5–5.2)
ALP SERPL-CCNC: 76 U/L (ref 55–135)
ALT SERPL W/O P-5'-P-CCNC: 31 U/L (ref 10–44)
ANION GAP SERPL CALC-SCNC: 12 MMOL/L (ref 8–16)
AST SERPL-CCNC: 35 U/L (ref 10–40)
BASOPHILS # BLD AUTO: 0.04 K/UL (ref 0–0.2)
BASOPHILS NFR BLD: 0.4 % (ref 0–1.9)
BILIRUB SERPL-MCNC: 0.4 MG/DL (ref 0.1–1)
BUN SERPL-MCNC: 14 MG/DL (ref 6–20)
CALCIUM SERPL-MCNC: 9.2 MG/DL (ref 8.7–10.5)
CHLORIDE SERPL-SCNC: 96 MMOL/L (ref 95–110)
CO2 SERPL-SCNC: 29 MMOL/L (ref 23–29)
CREAT SERPL-MCNC: 1 MG/DL (ref 0.5–1.4)
DIFFERENTIAL METHOD: ABNORMAL
EOSINOPHIL # BLD AUTO: 0.2 K/UL (ref 0–0.5)
EOSINOPHIL NFR BLD: 2.3 % (ref 0–8)
ERYTHROCYTE [DISTWIDTH] IN BLOOD BY AUTOMATED COUNT: 13.7 % (ref 11.5–14.5)
EST. GFR  (AFRICAN AMERICAN): >60 ML/MIN/1.73 M^2
EST. GFR  (NON AFRICAN AMERICAN): >60 ML/MIN/1.73 M^2
FERRITIN SERPL-MCNC: 39 NG/ML (ref 20–300)
GLUCOSE SERPL-MCNC: 142 MG/DL (ref 70–110)
HCT VFR BLD AUTO: 37.4 % (ref 37–48.5)
HGB BLD-MCNC: 11.3 G/DL (ref 12–16)
IMM GRANULOCYTES # BLD AUTO: 0.03 K/UL (ref 0–0.04)
IMM GRANULOCYTES NFR BLD AUTO: 0.3 % (ref 0–0.5)
IRON SERPL-MCNC: 26 UG/DL (ref 30–160)
LYMPHOCYTES # BLD AUTO: 1.5 K/UL (ref 1–4.8)
LYMPHOCYTES NFR BLD: 15.4 % (ref 18–48)
MCH RBC QN AUTO: 27.5 PG (ref 27–31)
MCHC RBC AUTO-ENTMCNC: 30.2 G/DL (ref 32–36)
MCV RBC AUTO: 91 FL (ref 82–98)
MONOCYTES # BLD AUTO: 0.6 K/UL (ref 0.3–1)
MONOCYTES NFR BLD: 6 % (ref 4–15)
NEUTROPHILS # BLD AUTO: 7.3 K/UL (ref 1.8–7.7)
NEUTROPHILS NFR BLD: 75.6 % (ref 38–73)
NRBC BLD-RTO: 0 /100 WBC
PLATELET # BLD AUTO: 368 K/UL (ref 150–450)
PMV BLD AUTO: 9.7 FL (ref 9.2–12.9)
POTASSIUM SERPL-SCNC: 3.7 MMOL/L (ref 3.5–5.1)
PROT SERPL-MCNC: 8.3 G/DL (ref 6–8.4)
RBC # BLD AUTO: 4.11 M/UL (ref 4–5.4)
SATURATED IRON: 6 % (ref 20–50)
SODIUM SERPL-SCNC: 137 MMOL/L (ref 136–145)
TOTAL IRON BINDING CAPACITY: 462 UG/DL (ref 250–450)
TRANSFERRIN SERPL-MCNC: 330 MG/DL (ref 200–375)
WBC # BLD AUTO: 9.7 K/UL (ref 3.9–12.7)

## 2022-05-19 PROCEDURE — 1159F MED LIST DOCD IN RCRD: CPT | Mod: CPTII,S$GLB,, | Performed by: INTERNAL MEDICINE

## 2022-05-19 PROCEDURE — 85025 COMPLETE CBC W/AUTO DIFF WBC: CPT | Performed by: INTERNAL MEDICINE

## 2022-05-19 PROCEDURE — 80053 COMPREHEN METABOLIC PANEL: CPT | Performed by: INTERNAL MEDICINE

## 2022-05-19 PROCEDURE — 36415 COLL VENOUS BLD VENIPUNCTURE: CPT | Performed by: INTERNAL MEDICINE

## 2022-05-19 PROCEDURE — 4010F ACE/ARB THERAPY RXD/TAKEN: CPT | Mod: CPTII,S$GLB,, | Performed by: INTERNAL MEDICINE

## 2022-05-19 PROCEDURE — 99213 OFFICE O/P EST LOW 20 MIN: CPT | Mod: S$GLB,,, | Performed by: INTERNAL MEDICINE

## 2022-05-19 PROCEDURE — 99213 PR OFFICE/OUTPT VISIT, EST, LEVL III, 20-29 MIN: ICD-10-PCS | Mod: S$GLB,,, | Performed by: INTERNAL MEDICINE

## 2022-05-19 PROCEDURE — 82728 ASSAY OF FERRITIN: CPT | Performed by: INTERNAL MEDICINE

## 2022-05-19 PROCEDURE — 4010F PR ACE/ARB THEARPY RXD/TAKEN: ICD-10-PCS | Mod: CPTII,S$GLB,, | Performed by: INTERNAL MEDICINE

## 2022-05-19 PROCEDURE — 84466 ASSAY OF TRANSFERRIN: CPT | Performed by: INTERNAL MEDICINE

## 2022-05-19 PROCEDURE — 1160F RVW MEDS BY RX/DR IN RCRD: CPT | Mod: CPTII,S$GLB,, | Performed by: INTERNAL MEDICINE

## 2022-05-19 PROCEDURE — 1160F PR REVIEW ALL MEDS BY PRESCRIBER/CLIN PHARMACIST DOCUMENTED: ICD-10-PCS | Mod: CPTII,S$GLB,, | Performed by: INTERNAL MEDICINE

## 2022-05-19 PROCEDURE — 1159F PR MEDICATION LIST DOCUMENTED IN MEDICAL RECORD: ICD-10-PCS | Mod: CPTII,S$GLB,, | Performed by: INTERNAL MEDICINE

## 2022-05-19 PROCEDURE — 3008F BODY MASS INDEX DOCD: CPT | Mod: CPTII,S$GLB,, | Performed by: INTERNAL MEDICINE

## 2022-05-19 PROCEDURE — 3008F PR BODY MASS INDEX (BMI) DOCUMENTED: ICD-10-PCS | Mod: CPTII,S$GLB,, | Performed by: INTERNAL MEDICINE

## 2022-05-19 RX ORDER — DIPHENHYDRAMINE HYDROCHLORIDE 50 MG/ML
50 INJECTION INTRAMUSCULAR; INTRAVENOUS ONCE AS NEEDED
Status: CANCELLED | OUTPATIENT
Start: 2022-05-19

## 2022-05-19 RX ORDER — SODIUM CHLORIDE 0.9 % (FLUSH) 0.9 %
10 SYRINGE (ML) INJECTION
Status: CANCELLED | OUTPATIENT
Start: 2022-05-19

## 2022-05-19 RX ORDER — METHYLPREDNISOLONE SOD SUCC 125 MG
125 VIAL (EA) INJECTION ONCE AS NEEDED
Status: CANCELLED | OUTPATIENT
Start: 2022-05-19

## 2022-05-19 RX ORDER — DIPHENHYDRAMINE HYDROCHLORIDE 50 MG/ML
25 INJECTION INTRAMUSCULAR; INTRAVENOUS
Status: CANCELLED
Start: 2022-05-19

## 2022-05-19 RX ORDER — SODIUM CHLORIDE 9 MG/ML
INJECTION, SOLUTION INTRAVENOUS CONTINUOUS
Status: CANCELLED | OUTPATIENT
Start: 2022-05-19

## 2022-05-19 RX ORDER — HEPARIN 100 UNIT/ML
5 SYRINGE INTRAVENOUS
Status: CANCELLED | OUTPATIENT
Start: 2022-05-19

## 2022-05-19 RX ORDER — EPINEPHRINE 0.3 MG/.3ML
0.3 INJECTION SUBCUTANEOUS ONCE AS NEEDED
Status: CANCELLED | OUTPATIENT
Start: 2022-05-19

## 2022-05-19 NOTE — NURSING
"Spoke to pt.  She is set up for a MRI on June 1st.  She says she is a "hard stick", so MRI has asked if I could get Infusion to start her IV for the contrast.  I will get that set up.  Instructed pt she has to be here early to make sure we have time to get the IV.  She will set up transportation.  I will talk to Dr. Pro's office about a rx for Xanax (she is claustrophobic) and get back to her.  Pt lost my card, gave her location and contact information again and encouraged her to call with any questions or concerns.  Pt verbalized an understanding.  "

## 2022-05-19 NOTE — TELEPHONE ENCOUNTER
SW received a message to call pt. When SW called pt she had questions about her care plan. She requested SW have someone call her regarding treatment plan.     SW called [facility] nurse navigation and she will call pt to discuss plans. SW informed pt will get a return call.     SW to follow.    Zoila Thomson, ELIW

## 2022-05-31 ENCOUNTER — TELEPHONE (OUTPATIENT)
Dept: HEMATOLOGY/ONCOLOGY | Facility: CLINIC | Age: 47
End: 2022-05-31
Payer: MEDICAID

## 2022-05-31 NOTE — NURSING
MRI is not authorized for tomorrow.  Spoke to pt.  May have to reschedule her.  Also she will need anxiety medicine called in at least 3 days ahead of time so they can mail it to her.  She does not drive.   Will get back to pt.

## 2022-05-31 NOTE — NURSING
MRI still not authorized.  Pt rescheduled to 6/15/22.  Will get her rx called in so it can be delivered to her before then.  Encouraged pt to call with any questions or concerns.  Pt verbalized an understanding.

## 2022-06-08 ENCOUNTER — INFUSION (OUTPATIENT)
Dept: INFUSION THERAPY | Facility: HOSPITAL | Age: 47
End: 2022-06-08
Attending: INTERNAL MEDICINE
Payer: MEDICAID

## 2022-06-08 VITALS
OXYGEN SATURATION: 96 % | RESPIRATION RATE: 22 BRPM | SYSTOLIC BLOOD PRESSURE: 190 MMHG | DIASTOLIC BLOOD PRESSURE: 110 MMHG | BODY MASS INDEX: 51.91 KG/M2 | HEIGHT: 63 IN | TEMPERATURE: 98 F | HEART RATE: 86 BPM | WEIGHT: 293 LBS

## 2022-06-08 NOTE — PLAN OF CARE
Notifed Samantha NP of holding iron 2/2 to increased bp-  pt denies any s&s of increased  bp-  no ha no dizziness - pt and daughter instructed call PCP today to see about getting appointment asap for bp mgt.  Also instructed to call ems or go to ED if has any symptoms such as headache dizziess numbness tingling or weakness in extremities.  Verb understanding

## 2022-06-14 ENCOUNTER — TELEPHONE (OUTPATIENT)
Dept: HEMATOLOGY/ONCOLOGY | Facility: CLINIC | Age: 47
End: 2022-06-14
Payer: MEDICAID

## 2022-06-14 NOTE — NURSING
Spoke to pt to confirm appt.  She said she won't know for sure about her transportation until tomorrow morning.  Instructed her to be here at the cancer center for 9am to get her IV and then we would take her over to [address] for the MRI.  Pt verbalized an understanding. She received and will bring the Xanax.

## 2022-06-14 NOTE — NURSING
Patient called to confirm if MRI was authorized.  MRI authorized and patient informed.  Patient verbalized understanding.

## 2022-06-15 ENCOUNTER — HOSPITAL ENCOUNTER (OUTPATIENT)
Dept: RADIOLOGY | Facility: HOSPITAL | Age: 47
Discharge: HOME | End: 2022-06-15
Attending: OBSTETRICS & GYNECOLOGY
Payer: MEDICAID

## 2022-06-15 ENCOUNTER — TELEPHONE (OUTPATIENT)
Dept: INFUSION THERAPY | Facility: HOSPITAL | Age: 47
End: 2022-06-15

## 2022-06-15 DIAGNOSIS — N93.9 VAGINAL BLEEDING: ICD-10-CM

## 2022-06-15 PROCEDURE — 25500020 PHARM REV CODE 255: Mod: PO | Performed by: OBSTETRICS & GYNECOLOGY

## 2022-06-15 PROCEDURE — 72197 MRI PELVIS W/O & W/DYE: CPT | Mod: 26,,, | Performed by: RADIOLOGY

## 2022-06-15 PROCEDURE — 72197 MRI PELVIS W/O & W/DYE: CPT | Mod: TC,PO

## 2022-06-15 PROCEDURE — A9585 GADOBUTROL INJECTION: HCPCS | Mod: PO | Performed by: OBSTETRICS & GYNECOLOGY

## 2022-06-15 PROCEDURE — 72197 MRI FEMALE PELVIS W/WO CONTRAST GYN ONC CERVIX: ICD-10-PCS | Mod: 26,,, | Performed by: RADIOLOGY

## 2022-06-15 RX ORDER — GADOBUTROL 604.72 MG/ML
10 INJECTION INTRAVENOUS
Status: COMPLETED | OUTPATIENT
Start: 2022-06-15 | End: 2022-06-15

## 2022-06-15 RX ADMIN — GADOBUTROL 10 ML: 604.72 INJECTION INTRAVENOUS at 11:06

## 2022-06-15 NOTE — TELEPHONE ENCOUNTER
"Spoke with patient regarding missed infusion appointment. Patient states she called and left a message on 6/14/2022 to reschedule. Patient states she "had an MRI today and is worn out." Patient requests to reschedule. Scheduling notified.   "

## 2022-06-17 ENCOUNTER — PROCEDURE VISIT (OUTPATIENT)
Dept: SLEEP MEDICINE | Facility: HOSPITAL | Age: 47
End: 2022-06-17
Attending: NURSE PRACTITIONER
Payer: MEDICAID

## 2022-06-17 DIAGNOSIS — G47.33 OSA (OBSTRUCTIVE SLEEP APNEA): ICD-10-CM

## 2022-06-17 DIAGNOSIS — J96.11 CHRONIC HYPOXEMIC RESPIRATORY FAILURE: ICD-10-CM

## 2022-06-17 DIAGNOSIS — E66.01 MORBIDLY OBESE: ICD-10-CM

## 2022-06-17 DIAGNOSIS — E66.2 OBESITY HYPOVENTILATION SYNDROME: ICD-10-CM

## 2022-06-17 PROCEDURE — 95811 POLYSOM 6/>YRS CPAP 4/> PARM: CPT

## 2022-06-19 NOTE — PROGRESS NOTES
[facility] Hematology/Oncology  Progress Note -   Follow-up Visit    Subjective:      Patient ID:   NAME: Katiuska Preston : 1975     47 y.o. female    Referring Doc: Justice  Other Physicians: Eduardo    Chief Complaint: pulm emboli f/u      HPI:    The patient returns for a regularly scheduled follow-up visit today to go over results of recently ordered tests, studies and/or labs. She is here by herself.     She has been scheduled to resume IV iron, but her BP is too high. She is seeing Dr Rendon today    No CP, HA's or N/V.       She is currently on Eliquis. No excessive bleeding or brusiing but has continued heavy periods.     She had recent pelvic MRI and is seeing Dr Pro      She has some chronic right knee issues and pain.    She had recent abnormal mammogram 2022 with cluster of calcifications 1 0'clock posittion on the left breast. She had biopsy on 2022      Discussed covid19 precautions - she had her vaccinations             ROS:   GEN: normal without any fever, night sweats or weight loss  HEENT: normal with no HA's, sore throat, stiff neck, changes in vision  CV: normal with no CP, some WINN   PULM: normal with no SOB, cough, hemoptysis, sputum or pleuritic pain  GI: normal with no abdominal pain, nausea, vomiting, constipation, diarrhea, melanotic stools, BRBPR, or hematemesis  : normal with no hematuria, dysuria  BREAST: normal with no mass, discharge, pain  SKIN: normal with no rash, erythema, bruising, or swelling     Past Medical/Surgical History:  Past Medical History:   Diagnosis Date    Antithrombin III deficiency 2021    Asthma     Claustrophobia     Elevated factor VIII level 2021    Esophageal reflux     Gastroesophageal reflux    Generalized anxiety disorder     Anxiety, Generalized    Herniated disc     Hypertension     Iron deficiency anemia due to chronic blood loss 10/27/2021    Lower extremity weakness     Morbid obesity     Obesity     Protein S  deficiency 2021    Pulmonary embolism     Upper extremity weakness      Past Surgical History:   Procedure Laterality Date     SECTION      CHOLECYSTECTOMY      TONSILLECTOMY           Allergies:  Review of patient's allergies indicates:  No Known Allergies    Social/Family History:  Social History     Socioeconomic History    Marital status:    Tobacco Use    Smoking status: Former Smoker     Types: Cigarettes, Cigars     Quit date: 2019     Years since quitting: 3.4    Smokeless tobacco: Never Used   Substance and Sexual Activity    Alcohol use: Yes     Alcohol/week: 0.8 standard drinks     Types: 1 Standard drinks or equivalent per week     Comment: rarely    Drug use: No    Sexual activity: Not Currently     History reviewed. No pertinent family history.      Medications:    Current Outpatient Medications:     albuterol (PROVENTIL/VENTOLIN HFA) 90 mcg/actuation inhaler, Inhale 2 puffs into the lungs every 6 (six) hours as needed for Wheezing. Rescue, Disp: 18 g, Rfl: 5    amoxicillin-clavulanate 875-125mg (AUGMENTIN) 875-125 mg per tablet, Take 1 tablet by mouth 2 (two) times daily., Disp: , Rfl:     atorvastatin (LIPITOR) 10 MG tablet, TAKE 1 TABLET BY MOUTH EVERY DAY TO LOWER LDL CHOLESTEROL, Disp: , Rfl:     carvediloL (COREG) 12.5 MG tablet, Take 1 tablet (12.5 mg total) by mouth 2 (two) times daily with meals., Disp: 60 tablet, Rfl: 0    ergocalciferol (ERGOCALCIFEROL) 50,000 unit Cap, Take 50,000 Units by mouth twice a week., Disp: , Rfl:     famotidine (PEPCID) 20 MG tablet, Take 20 mg by mouth 2 (two) times daily., Disp: , Rfl:     FEROSUL 325 mg (65 mg iron) Tab tablet, TAKE 1 TABLET BY MOUTH EVERY DAY, Disp: 30 tablet, Rfl: 3    fluticasone propionate (FLONASE) 50 mcg/actuation nasal spray, 2 sprays by Each Nostril route daily as needed for Rhinitis., Disp: , Rfl:     fluticasone propionate (FLOVENT HFA) 110 mcg/actuation inhaler, Inhale 2 puffs into the lungs 2  "(two) times daily. Controller Rinse after you use it, Disp: 12 g, Rfl: 5    furosemide (LASIX) 20 MG tablet, Take 1 tablet (20 mg total) by mouth once daily., Disp: 30 tablet, Rfl: 0    lisinopriL (PRINIVIL,ZESTRIL) 20 MG tablet, Take 1 tablet (20 mg total) by mouth once daily., Disp: 40 tablet, Rfl: 0    losartan (COZAAR) 25 MG tablet, Take 25 mg by mouth once daily., Disp: , Rfl:     meloxicam (MOBIC) 7.5 MG tablet, Take 7.5 mg by mouth once daily., Disp: , Rfl:     metoprolol succinate (TOPROL-XL) 25 MG 24 hr tablet, Take 25 mg by mouth once daily., Disp: , Rfl:     NEXIUM 40 mg capsule, Take 40 mg by mouth once daily., Disp: , Rfl:     omeprazole (PRILOSEC) 20 MG capsule, Take 20 mg by mouth once daily., Disp: , Rfl:     trazodone (DESYREL) 100 MG tablet, Take 100 mg by mouth every evening., Disp: , Rfl:       Pathology:  Cancer Staging  No matching staging information was found for the patient.      Breast biopsy 5/30/2022:    1. BREAST, LEFT, UPPER OUTER MID CALCIFICATIONS, STEREOTACTIC GUIDED    CORE BIOPSY    - BENIGN BREAST PARENCHYMA AND BLOOD VESSELS     2. BREAST, LEFT, UPPER OUTER MID CALCIFICATIONS, STEREOTACTIC GUIDED    CORE BIOPSY   - MICROCALCIFICATIONS ASSOCIATED WITH FIBROADENOMATOID CHANGE AND    BENIGN BREAST PARENCHYMA    - USUAL DUCTAL HYPERPLASIA AND COLUMNAR CELL CHANGE      Objective:   Vitals:  Blood pressure (!) 167/125, pulse 73, resp. rate 20, height 5' 8" (1.727 m), weight (!) 155.1 kg (342 lb).    Physical Examination:   GEN: no apparent distress, comfortable; AAOx3; morbidly overweight   HEAD: atraumatic and normocephalic  EYES: no pallor, no icterus, PERRLA  ENT: OMM, no pharyngeal erythema, external ears WNL; no nasal discharge; no thrush  NECK: no masses, thyroid normal, trachea midline, no LAD/LN's, supple  CV: RRR with no murmur; normal pulse; normal S1 and S2; no pedal edema  CHEST: Normal respiratory effort; CTAB; normal breath sounds; no wheeze or crackles;    ABDOM: " nontender and nondistended; soft; normal bowel sounds; no rebound/guarding  MUSC/Skeletal: LROM and crepitus right knee; no deformities or arthropathy  EXTREM: no clubbing, cyanosis, inflammation or swelling  SKIN: no rashes, lesions, ulcers, petechiae or subcutaneous nodules  : no diaz  NEURO: grossly intact; motor/sensory WNL; AAOx3; no tremors  PSYCH: normal mood, affect and behavior  LYMPH: normal cervical, supraclavicular, and groin LN's;       Labs:   Lab Results   Component Value Date    WBC 9.70 05/19/2022    HGB 11.3 (L) 05/19/2022    HCT 37.4 05/19/2022    MCV 91 05/19/2022     05/19/2022    CMP  Sodium   Date Value Ref Range Status   05/19/2022 137 136 - 145 mmol/L Final     Potassium   Date Value Ref Range Status   05/19/2022 3.7 3.5 - 5.1 mmol/L Final     Chloride   Date Value Ref Range Status   05/19/2022 96 95 - 110 mmol/L Final     CO2   Date Value Ref Range Status   05/19/2022 29 23 - 29 mmol/L Final     Glucose   Date Value Ref Range Status   05/19/2022 142 (H) 70 - 110 mg/dL Final     BUN   Date Value Ref Range Status   05/19/2022 14 6 - 20 mg/dL Final     Creatinine   Date Value Ref Range Status   05/19/2022 1.0 0.5 - 1.4 mg/dL Final     Calcium   Date Value Ref Range Status   05/19/2022 9.2 8.7 - 10.5 mg/dL Final     Total Protein   Date Value Ref Range Status   05/19/2022 8.3 6.0 - 8.4 g/dL Final     Albumin   Date Value Ref Range Status   05/19/2022 3.3 (L) 3.5 - 5.2 g/dL Final     Total Bilirubin   Date Value Ref Range Status   05/19/2022 0.4 0.1 - 1.0 mg/dL Final     Comment:     For infants and newborns, interpretation of results should be based  on gestational age, weight and in agreement with clinical  observations.    Premature Infant recommended reference ranges:  Up to 24 hours.............<8.0 mg/dL  Up to 48 hours............<12.0 mg/dL  3-5 days..................<15.0 mg/dL  6-29 days.................<15.0 mg/dL       Alkaline Phosphatase   Date Value Ref Range Status    05/19/2022 76 55 - 135 U/L Final     AST   Date Value Ref Range Status   05/19/2022 35 10 - 40 U/L Final     ALT   Date Value Ref Range Status   05/19/2022 31 10 - 44 U/L Final     Anion Gap   Date Value Ref Range Status   05/19/2022 12 8 - 16 mmol/L Final     eGFR if    Date Value Ref Range Status   05/19/2022 >60.0 >60 mL/min/1.73 m^2 Final     eGFR if non    Date Value Ref Range Status   05/19/2022 >60.0 >60 mL/min/1.73 m^2 Final     Comment:     Calculation used to obtain the estimated glomerular filtration  rate (eGFR) is the CKD-EPI equation.                  Lab Results   Component Value Date    IRON 26 (L) 05/19/2022    TIBC 462 (H) 05/19/2022    FERRITIN 39 05/19/2022     Lab Results   Component Value Date    TSGVOGDP94 452 05/02/2021     Lab Results   Component Value Date    FOLATE 5.9 05/02/2021          Factor VIII Activity 56 - 140 % 281      Antithrombin III 75 - 135 % 109      Protein S Activity 63 - 140 % 63            I have reviewed all available lab results and radiology reports.    Radiology/Diagnostic Studies:      MRI Pelvis 6/15/2022:    Impression:     1. Normal thickness of the endometrium.  2. Query adenomyosis.  3. Nonviable uterine fibroid.  4. Involuting follicle or cyst in the left ovary.              US Lower Extremity Veins Bilateral [[id number]] Collected: 05/01/21 1348   Order Status: Completed Updated: 05/01/21 1432   Narrative:     REASON: DVT     FINDINGS:     Grayscale, color and spectral Doppler analysis of the bilateral lower   extremity deep venous system was performed.     There is normal compressibility, color and spectral Doppler analysis,   and augmentation in the bilateral lower extremity deep venous system.     IMPRESSION:     1.  No DVT of the bilateral lower extremity veins.   2.  Bilateral distal superficial femoral veins were unable to be   assessed due to body habitus       CTA Chest Non-Coronary - PE Study [[id number]] Collected:  05/01/21 1143   Order Status: Completed Updated: 05/01/21 1225   Narrative:     CMS MANDATED QUALITY DATA - CT RADIATION - 436     All CT scans at this facility utilize dose modulation, iterative   reconstruction, and/or weight based dosing when appropriate to reduce   radiation dose to as low as reasonably achievable.         REASON: PE suspected, intermediate prob, positive D-dimer     TECHNIQUE: CT angiography of thorax with 100 mL Omnipaque 350.   Maximum intensity projection coronal reformations were created at a   separate workstation and stored in the patient's permanent medical   record.     COMPARISON: CTA chest October 14, 2019.     FINDINGS:     Limited evaluation due to body habitus and inadequate bolus timing.   Filling defects identified in segmental and subsegmental artery   supplying the left lower lobe, consistent with pulmonary emboli.   Questionable filling defects versus artifact in the right mainstem   pulmonary artery and a few segmental arteries supplying the right   lower lobe may reflect pulmonary emboli. Heart size is at the upper   limits of normal. No mediastinal lymphadenopathy or mass.     The central tracheobronchial tree is patent. There is diffuse   groundglass lung opacities in the bilateral lungs with peripheral   wedge-shaped opacities. No pleural effusions.     The visualized abdominal viscera are unremarkable. No acute osseous   abnormality..     IMPRESSION:     1.  Limited evaluation due to body habitus and inadequate bolus   timing. Filling defects identified in segmental and subsegmental   artery supplying the left lower lobe, consistent with pulmonary   emboli. There are questionable pulmonary emboli in the right mainstem   pulmonary artery and segmental artery supplying the right lower lobe.   Findings reported to Dr.Lloyd Duffy at 5/1/2021.   2.  Bilateral diffuse groundglass lung opacities with peripheral   wedge-shaped opacities may reflect changes of poor  inspiration and   atelectasis. Atypical infectious process could also have a similar   appearance the proper clinical setting.                 All lab results and imaging results have been reviewed and discussed with the patient    Assessment:   (1) 47 y.o. female with diagnosis of pulmonary emboli who was seen as a consult at [facility]  - patient with diagnosis of asthma who presented to the ED at [facility] on 5/1 with progressive SOB, postnasal drip and acute hypercapnia. CTA was a limited study due to body habitus but radiology reported presence of filling defects identified in segmental and subsegmental artery supplying the left lower lobe, consistent with pulmonary emboli. They also reported questionable pulmonary emboli in the right mainstem pulmonary artery and segmental artery supplying the right lower lobe. Patient has been admitted to hospitalist service and is on Lovenox. She has no prior personal or family history of clots.      - doppler studies are negative     5/3/2021:  - patient was seen by Dr Clark with pulm yesterday; appreciate his evaluation  - options of anticoagulation include: Coumadin, Eliquis , Xarelto, or Pradaxa  - in patients with morbid obesity, one can have difficulties with therapeutic dosing with practically any type of oral anticoagulant     5/31/2021:  - She has O2 at home and is on portable today.   - She has not followed up with pulmonary since discharge.   - She is breathing fair but does have WINN.   - She has some sinus issues. She is currently on Eliquis.   - She saw Dr Rendon since discharge  - low ATIII and protein S levels which could be the etiology, but they will need to be repeated at later date to confirm  - Elevated factor VIII which also will need to be repeated to confirm    6/28/2021:  - she saw Dr Clark with pulmonary since last visit and has PFT scheduled for this coming July 1st  - she remains on portable O2    12/8/2021:  - she saw Dr Clark again on 11/2/2021  - she remains  on O2 at home  - she remains on eliquis  - Repeat AT3 was normal, repeat Protein S was WNL  - repeat factor VIII was still a little elevated at 281 but better    2/15/2022:  - she sees pulmonary NP again next month    5/19/2022:  - she saw Idania Cason NP on 5/2/2022 with pulmonary    6/20/2022:  - remains on eliquis  - currently with no excessive bleeding or bruising       (2) Anemia with microcytic indices with current history of chronic menorrhagia - most likely has underlying iron deficiency due to chronic heavy menstrual cycles  - s/p two units of blood  - prior hgb at 8.2  - total bilirubin is WNL, so I do not suspect any hemolysis at this time     5/3/2021:  - hgb at 8.6 today  - iron and ferritin are both low with elevated TIBC  - she received dose of IV iron today    5/31/2021:  - she needs repeat labs incl iron panel  - will consider additional IV iron as needed  - she is on oral iron  - she needs repeat labs incl. Iron panel    6/28/2021:  - hgb currently 11.7  - iron panel is adequate at this time    10/26/2021:  - latest hgb at 10.6  - iron at 29  - she is on oral iron since May 2021  - she has heavy menstrual cycles  - discussed consideration for IV iron and she is agreeable; discussed general side-effects of iron infusions    12/8/2021:  - she has been on oral iron with little improvement in the labs  - she is starting IV iron this coming Friday    2/15/2022:  - latest labs from Jan 2022 with no anemia and iron panel was adequate  - she had IV iron x 1 only    5/19/2022:  - latest hgb at 11.2  - ferritin was 28  - iron back down to 24  - continued GYN bleeding issues  - seen by Dr Pro with [facility] on 4/20/2022 6/20/2022:  - awaiting better BP control inorder to resume IV iron  - seeing Dr Rendon later today for her HBP  - labs pending for today      (3) Abnormal mammogram:    5/19/2022:  - She had recent abnormal mammogram 5/6/2022 with cluster of calcifications 1 0'clock posittion on the  left breast.   - she is scheduling US guided biopsy in near future  - discussed referral to [facility] for evaluation    6/20/2022:  - s/p breast biopsy on 5/30/2022 with pathology report coming back benign  - recommend repeat mammogram in at least 3 months      (3) Overweight/Obesity     (4) HTN     (5) GERD     (6) WBC and platelets are WNL     (7) Hx/of covid vaccination on 4/16 - she received Pfizer vaccine (not the J&J one)     (8) General anxiety disorder     (9) Nonsmoker    VISIT DIAGNOSES:      1. Protein S deficiency    2. Elevated factor VIII level    3. Antithrombin III deficiency    4. Acute pulmonary embolism, unspecified pulmonary embolism type, unspecified whether acute cor pulmonale present    5. Iron deficiency anemia, unspecified iron deficiency anemia type    6. Iron deficiency anemia due to chronic blood loss    7. Menorrhagia with regular cycle            Plan:     PLAN:  1. F/u with pulmonary as directed  2. F/u with PCP - seeing Dr Rendon today about the HBP  3. Check labs monthly as ordered incl iron panel - set up up IV iron x2 (awaiting for HBP to be addressed)  4. F/u with GYN  6. Refill eliquis as needed  7. Recommend that she proceed with evaluation with Dr Puentes     RTC in 4 weeks    Fax note to Yoav Rendon Braly; Vipul Puentes              Protein S deficiency    Elevated factor VIII level    Antithrombin III deficiency    Acute pulmonary embolism, unspecified pulmonary embolism type, unspecified whether acute cor pulmonale present    Iron deficiency anemia, unspecified iron deficiency anemia type    Iron deficiency anemia due to chronic blood loss    Menorrhagia with regular cycle      No follow-ups on file.    COVID-19 Discussion:    I had long discussion with patient and any applicable family about the COVID-19 coronavirus epidemic and the recommended precautions with regard to cancer and/or hematology patients. I have re-iterated the CDC recommendations for adequate hand  washing, use of hand -like products, and coughing into elbow, etc. In addition, especially for our patients who are on chemotherapy and/or our otherwise immunocompromised patients, I have recommended avoidance of crowds, including movie theaters, restaurants, churches, etc. I have recommended avoidance of any sick or symptomatic family members and/or friends. I have also recommended avoidance of any raw and unwashed food products, and general avoidance of food items that have not been prepared by themselves. The patient has been asked to call us immediately with any symptom developments, issues, questions or other general concerns.       Anticoagulation Discussion:    Discussed with patient and any applicable family members about the benefit and/or need for anticoagulation. I communicated about the risks of bleeding while on any anticoagulation, which could be serious and/or life-threatening, and which can occur at any time, regardless of degree of the level of anticoagulation. I expressed the need for compliance with any anticoagulation regimen and that failure to do so could potential lead to excessive bleeding, and risk to health and/or life. In particular, with patients on coumadin therapy, compliance with requested blood work is absolutely essential, as coumadin levels can vary from time to time, and failure to do so could potentially place the patient at risk for bleeding and/or clotting events which could be fatal. Patients on coumadin are encouraged to call the day after they have their levels drawn, as to obtain the appropriate instructions from my staff. Patients are aware that self-regulating or self-dosing of their medications is strictly prohibited.       I have explained and the patient understands all of  the current recommendation(s). I have answered all of their questions to the best of my ability and to their complete satisfaction.             Thank you for allowing me to participate in  this pleasant patient's care. Please call with any questions or concerns.      Electronically signed Cisco Boston MD

## 2022-06-20 ENCOUNTER — OFFICE VISIT (OUTPATIENT)
Dept: HEMATOLOGY/ONCOLOGY | Facility: CLINIC | Age: 47
End: 2022-06-20
Payer: MEDICAID

## 2022-06-20 ENCOUNTER — LAB VISIT (OUTPATIENT)
Dept: LAB | Facility: HOSPITAL | Age: 47
End: 2022-06-20
Attending: INTERNAL MEDICINE
Payer: MEDICAID

## 2022-06-20 VITALS
SYSTOLIC BLOOD PRESSURE: 167 MMHG | BODY MASS INDEX: 44.41 KG/M2 | HEIGHT: 68 IN | WEIGHT: 293 LBS | RESPIRATION RATE: 20 BRPM | DIASTOLIC BLOOD PRESSURE: 125 MMHG | HEART RATE: 73 BPM

## 2022-06-20 DIAGNOSIS — R79.1 ELEVATED FACTOR VIII LEVEL: ICD-10-CM

## 2022-06-20 DIAGNOSIS — D68.59 PROTEIN S DEFICIENCY: Primary | ICD-10-CM

## 2022-06-20 DIAGNOSIS — I26.99 ACUTE PULMONARY EMBOLISM, UNSPECIFIED PULMONARY EMBOLISM TYPE, UNSPECIFIED WHETHER ACUTE COR PULMONALE PRESENT: ICD-10-CM

## 2022-06-20 DIAGNOSIS — D68.59 ANTITHROMBIN III DEFICIENCY: ICD-10-CM

## 2022-06-20 DIAGNOSIS — D68.59 PROTEIN S DEFICIENCY: ICD-10-CM

## 2022-06-20 DIAGNOSIS — N92.0 MENORRHAGIA WITH REGULAR CYCLE: Chronic | ICD-10-CM

## 2022-06-20 DIAGNOSIS — D50.0 IRON DEFICIENCY ANEMIA DUE TO CHRONIC BLOOD LOSS: ICD-10-CM

## 2022-06-20 DIAGNOSIS — D50.9 IRON DEFICIENCY ANEMIA, UNSPECIFIED IRON DEFICIENCY ANEMIA TYPE: Chronic | ICD-10-CM

## 2022-06-20 LAB
ALBUMIN SERPL BCP-MCNC: 3.2 G/DL (ref 3.5–5.2)
ALP SERPL-CCNC: 79 U/L (ref 55–135)
ALT SERPL W/O P-5'-P-CCNC: 33 U/L (ref 10–44)
ANION GAP SERPL CALC-SCNC: 5 MMOL/L (ref 8–16)
AST SERPL-CCNC: 32 U/L (ref 10–40)
BASOPHILS # BLD AUTO: 0.02 K/UL (ref 0–0.2)
BASOPHILS NFR BLD: 0.3 % (ref 0–1.9)
BILIRUB SERPL-MCNC: 0.2 MG/DL (ref 0.1–1)
BUN SERPL-MCNC: 11 MG/DL (ref 6–20)
CALCIUM SERPL-MCNC: 9 MG/DL (ref 8.7–10.5)
CHLORIDE SERPL-SCNC: 99 MMOL/L (ref 95–110)
CO2 SERPL-SCNC: 34 MMOL/L (ref 23–29)
CREAT SERPL-MCNC: 0.9 MG/DL (ref 0.5–1.4)
DIFFERENTIAL METHOD: ABNORMAL
EOSINOPHIL # BLD AUTO: 0.2 K/UL (ref 0–0.5)
EOSINOPHIL NFR BLD: 3.1 % (ref 0–8)
ERYTHROCYTE [DISTWIDTH] IN BLOOD BY AUTOMATED COUNT: 15.3 % (ref 11.5–14.5)
EST. GFR  (AFRICAN AMERICAN): >60 ML/MIN/1.73 M^2
EST. GFR  (NON AFRICAN AMERICAN): >60 ML/MIN/1.73 M^2
FERRITIN SERPL-MCNC: 39 NG/ML (ref 20–300)
GLUCOSE SERPL-MCNC: 154 MG/DL (ref 70–110)
HCT VFR BLD AUTO: 41.9 % (ref 37–48.5)
HGB BLD-MCNC: 12.4 G/DL (ref 12–16)
IMM GRANULOCYTES # BLD AUTO: 0.03 K/UL (ref 0–0.04)
IMM GRANULOCYTES NFR BLD AUTO: 0.4 % (ref 0–0.5)
IRON SERPL-MCNC: 135 UG/DL (ref 30–160)
LYMPHOCYTES # BLD AUTO: 1.4 K/UL (ref 1–4.8)
LYMPHOCYTES NFR BLD: 18.2 % (ref 18–48)
MCH RBC QN AUTO: 26.2 PG (ref 27–31)
MCHC RBC AUTO-ENTMCNC: 29.6 G/DL (ref 32–36)
MCV RBC AUTO: 89 FL (ref 82–98)
MONOCYTES # BLD AUTO: 0.5 K/UL (ref 0.3–1)
MONOCYTES NFR BLD: 6.7 % (ref 4–15)
NEUTROPHILS # BLD AUTO: 5.4 K/UL (ref 1.8–7.7)
NEUTROPHILS NFR BLD: 71.3 % (ref 38–73)
NRBC BLD-RTO: 0 /100 WBC
PLATELET # BLD AUTO: 349 K/UL (ref 150–450)
PMV BLD AUTO: 10.1 FL (ref 9.2–12.9)
POTASSIUM SERPL-SCNC: 4 MMOL/L (ref 3.5–5.1)
PROT SERPL-MCNC: 7.9 G/DL (ref 6–8.4)
RBC # BLD AUTO: 4.73 M/UL (ref 4–5.4)
SATURATED IRON: 32 % (ref 20–50)
SODIUM SERPL-SCNC: 138 MMOL/L (ref 136–145)
TOTAL IRON BINDING CAPACITY: 416 UG/DL (ref 250–450)
TRANSFERRIN SERPL-MCNC: 297 MG/DL (ref 200–375)
WBC # BLD AUTO: 7.51 K/UL (ref 3.9–12.7)

## 2022-06-20 PROCEDURE — 3077F SYST BP >= 140 MM HG: CPT | Mod: CPTII,S$GLB,, | Performed by: INTERNAL MEDICINE

## 2022-06-20 PROCEDURE — 99213 OFFICE O/P EST LOW 20 MIN: CPT | Mod: S$GLB,,, | Performed by: INTERNAL MEDICINE

## 2022-06-20 PROCEDURE — 3080F PR MOST RECENT DIASTOLIC BLOOD PRESSURE >= 90 MM HG: ICD-10-PCS | Mod: CPTII,S$GLB,, | Performed by: INTERNAL MEDICINE

## 2022-06-20 PROCEDURE — 82728 ASSAY OF FERRITIN: CPT | Performed by: INTERNAL MEDICINE

## 2022-06-20 PROCEDURE — 99213 PR OFFICE/OUTPT VISIT, EST, LEVL III, 20-29 MIN: ICD-10-PCS | Mod: S$GLB,,, | Performed by: INTERNAL MEDICINE

## 2022-06-20 PROCEDURE — 3008F BODY MASS INDEX DOCD: CPT | Mod: CPTII,S$GLB,, | Performed by: INTERNAL MEDICINE

## 2022-06-20 PROCEDURE — 84466 ASSAY OF TRANSFERRIN: CPT | Performed by: INTERNAL MEDICINE

## 2022-06-20 PROCEDURE — 1159F PR MEDICATION LIST DOCUMENTED IN MEDICAL RECORD: ICD-10-PCS | Mod: CPTII,S$GLB,, | Performed by: INTERNAL MEDICINE

## 2022-06-20 PROCEDURE — 3080F DIAST BP >= 90 MM HG: CPT | Mod: CPTII,S$GLB,, | Performed by: INTERNAL MEDICINE

## 2022-06-20 PROCEDURE — 36415 COLL VENOUS BLD VENIPUNCTURE: CPT | Performed by: INTERNAL MEDICINE

## 2022-06-20 PROCEDURE — 4010F PR ACE/ARB THEARPY RXD/TAKEN: ICD-10-PCS | Mod: CPTII,S$GLB,, | Performed by: INTERNAL MEDICINE

## 2022-06-20 PROCEDURE — 1159F MED LIST DOCD IN RCRD: CPT | Mod: CPTII,S$GLB,, | Performed by: INTERNAL MEDICINE

## 2022-06-20 PROCEDURE — 85025 COMPLETE CBC W/AUTO DIFF WBC: CPT | Performed by: INTERNAL MEDICINE

## 2022-06-20 PROCEDURE — 3077F PR MOST RECENT SYSTOLIC BLOOD PRESSURE >= 140 MM HG: ICD-10-PCS | Mod: CPTII,S$GLB,, | Performed by: INTERNAL MEDICINE

## 2022-06-20 PROCEDURE — 1160F PR REVIEW ALL MEDS BY PRESCRIBER/CLIN PHARMACIST DOCUMENTED: ICD-10-PCS | Mod: CPTII,S$GLB,, | Performed by: INTERNAL MEDICINE

## 2022-06-20 PROCEDURE — 80053 COMPREHEN METABOLIC PANEL: CPT | Performed by: INTERNAL MEDICINE

## 2022-06-20 PROCEDURE — 4010F ACE/ARB THERAPY RXD/TAKEN: CPT | Mod: CPTII,S$GLB,, | Performed by: INTERNAL MEDICINE

## 2022-06-20 PROCEDURE — 3008F PR BODY MASS INDEX (BMI) DOCUMENTED: ICD-10-PCS | Mod: CPTII,S$GLB,, | Performed by: INTERNAL MEDICINE

## 2022-06-20 PROCEDURE — 1160F RVW MEDS BY RX/DR IN RCRD: CPT | Mod: CPTII,S$GLB,, | Performed by: INTERNAL MEDICINE

## 2022-06-22 ENCOUNTER — DOCUMENTATION ONLY (OUTPATIENT)
Dept: INFUSION THERAPY | Facility: HOSPITAL | Age: 47
End: 2022-06-22

## 2022-06-22 ENCOUNTER — INFUSION (OUTPATIENT)
Dept: INFUSION THERAPY | Facility: HOSPITAL | Age: 47
End: 2022-06-22
Attending: INTERNAL MEDICINE
Payer: MEDICAID

## 2022-06-22 ENCOUNTER — TELEPHONE (OUTPATIENT)
Dept: PULMONOLOGY | Facility: CLINIC | Age: 47
End: 2022-06-22

## 2022-06-22 VITALS
HEART RATE: 84 BPM | WEIGHT: 293 LBS | HEIGHT: 63 IN | RESPIRATION RATE: 20 BRPM | TEMPERATURE: 98 F | OXYGEN SATURATION: 94 % | SYSTOLIC BLOOD PRESSURE: 192 MMHG | DIASTOLIC BLOOD PRESSURE: 108 MMHG | BODY MASS INDEX: 51.91 KG/M2

## 2022-06-22 DIAGNOSIS — G47.33 OSA (OBSTRUCTIVE SLEEP APNEA): Primary | ICD-10-CM

## 2022-06-22 PROCEDURE — G0463 HOSPITAL OUTPT CLINIC VISIT: HCPCS

## 2022-06-22 NOTE — NURSING
Per Samantha NP- Hold injectafer for today.  R/S in 2 weeks.  Explained to pt that it may take some time for bp med changes to take affect.  Pt reports that she does not have a bp cff at home.  Her insurance  is trying to obtain that and portable o2 for her.

## 2022-06-22 NOTE — NURSING
Spoke with MARCELINO Stauffer NP regarding patient's /111 today with injectafer scheduled. Informed that patient has been here for about 45minutes resting to see if the BP would come down, but it has not. Blood pressure medications were changed per Dr. Rendon recently. Instructed to hold off on iron infusion for 2 weeks until BP becomes more stable and for patient to check her BP daily and call her PCP if not improved. Following instructions given to pt's nurse LO Hanson RN

## 2022-06-22 NOTE — TELEPHONE ENCOUNTER
The patient's split night sleep study shows very severe obstructive sleep apnea with an AHI of a 112.  Her CPAP titration was very helpful bring her AHI down to 1.7.  She was titrated to 17 cm of pressure using a small ResMed med F 20 mask.  Told her.

## 2022-06-22 NOTE — PLAN OF CARE
Problem: Hypertension Comorbidity  Goal: Blood Pressure in Desired Range  Outcome: Ongoing, Not Progressing  Intervention: Maintain Blood Pressure Management  Flowsheets (Taken 6/22/2022 125)  Medication Review/Management: (injectafer held 2/2 to high bp)   medications reviewed   provider consulted   infusion held   other (see comments)

## 2022-07-08 ENCOUNTER — INFUSION (OUTPATIENT)
Dept: INFUSION THERAPY | Facility: HOSPITAL | Age: 47
End: 2022-07-08
Attending: INTERNAL MEDICINE
Payer: MEDICAID

## 2022-07-08 VITALS
TEMPERATURE: 98 F | HEIGHT: 63 IN | RESPIRATION RATE: 18 BRPM | DIASTOLIC BLOOD PRESSURE: 88 MMHG | BODY MASS INDEX: 51.91 KG/M2 | OXYGEN SATURATION: 93 % | WEIGHT: 293 LBS | HEART RATE: 81 BPM | SYSTOLIC BLOOD PRESSURE: 156 MMHG

## 2022-07-08 DIAGNOSIS — D50.0 IRON DEFICIENCY ANEMIA DUE TO CHRONIC BLOOD LOSS: ICD-10-CM

## 2022-07-08 DIAGNOSIS — I10 PRIMARY HYPERTENSION: Primary | ICD-10-CM

## 2022-07-08 DIAGNOSIS — D50.9 IRON DEFICIENCY ANEMIA, UNSPECIFIED IRON DEFICIENCY ANEMIA TYPE: ICD-10-CM

## 2022-07-08 PROCEDURE — 25000003 PHARM REV CODE 250: Performed by: NURSE PRACTITIONER

## 2022-07-08 PROCEDURE — 96367 TX/PROPH/DG ADDL SEQ IV INF: CPT

## 2022-07-08 PROCEDURE — 63600175 PHARM REV CODE 636 W HCPCS: Mod: JG | Performed by: NURSE PRACTITIONER

## 2022-07-08 PROCEDURE — 96365 THER/PROPH/DIAG IV INF INIT: CPT

## 2022-07-08 RX ORDER — EPINEPHRINE 0.3 MG/.3ML
0.3 INJECTION SUBCUTANEOUS ONCE AS NEEDED
Status: DISCONTINUED | OUTPATIENT
Start: 2022-07-08 | End: 2022-07-08 | Stop reason: HOSPADM

## 2022-07-08 RX ORDER — HEPARIN 100 UNIT/ML
5 SYRINGE INTRAVENOUS
Status: DISCONTINUED | OUTPATIENT
Start: 2022-07-08 | End: 2022-07-08 | Stop reason: HOSPADM

## 2022-07-08 RX ORDER — EPINEPHRINE 0.3 MG/.3ML
0.3 INJECTION SUBCUTANEOUS ONCE AS NEEDED
Status: CANCELLED | OUTPATIENT
Start: 2022-07-15

## 2022-07-08 RX ORDER — METHYLPREDNISOLONE SOD SUCC 125 MG
125 VIAL (EA) INJECTION ONCE AS NEEDED
Status: DISCONTINUED | OUTPATIENT
Start: 2022-07-08 | End: 2022-07-08 | Stop reason: HOSPADM

## 2022-07-08 RX ORDER — CLONIDINE HYDROCHLORIDE 0.1 MG/1
0.2 TABLET ORAL ONCE
Status: CANCELLED
Start: 2022-07-15 | End: 2022-07-15

## 2022-07-08 RX ORDER — CLONIDINE HYDROCHLORIDE 0.1 MG/1
0.2 TABLET ORAL ONCE
Status: CANCELLED
Start: 2022-07-08 | End: 2022-07-08

## 2022-07-08 RX ORDER — DIPHENHYDRAMINE HYDROCHLORIDE 50 MG/ML
50 INJECTION INTRAMUSCULAR; INTRAVENOUS ONCE AS NEEDED
Status: CANCELLED | OUTPATIENT
Start: 2022-07-15

## 2022-07-08 RX ORDER — CLONIDINE HYDROCHLORIDE 0.1 MG/1
0.2 TABLET ORAL ONCE
Status: COMPLETED | OUTPATIENT
Start: 2022-07-08 | End: 2022-07-08

## 2022-07-08 RX ORDER — SODIUM CHLORIDE 9 MG/ML
INJECTION, SOLUTION INTRAVENOUS CONTINUOUS
Status: CANCELLED | OUTPATIENT
Start: 2022-07-15

## 2022-07-08 RX ORDER — SODIUM CHLORIDE 9 MG/ML
INJECTION, SOLUTION INTRAVENOUS CONTINUOUS
Status: DISCONTINUED | OUTPATIENT
Start: 2022-07-08 | End: 2022-07-08 | Stop reason: HOSPADM

## 2022-07-08 RX ORDER — HEPARIN 100 UNIT/ML
5 SYRINGE INTRAVENOUS
Status: CANCELLED | OUTPATIENT
Start: 2022-07-15

## 2022-07-08 RX ORDER — METHYLPREDNISOLONE SOD SUCC 125 MG
125 VIAL (EA) INJECTION ONCE AS NEEDED
Status: CANCELLED | OUTPATIENT
Start: 2022-07-15

## 2022-07-08 RX ORDER — SODIUM CHLORIDE 0.9 % (FLUSH) 0.9 %
10 SYRINGE (ML) INJECTION
Status: CANCELLED | OUTPATIENT
Start: 2022-07-15

## 2022-07-08 RX ORDER — SODIUM CHLORIDE 0.9 % (FLUSH) 0.9 %
10 SYRINGE (ML) INJECTION
Status: DISCONTINUED | OUTPATIENT
Start: 2022-07-08 | End: 2022-07-08 | Stop reason: HOSPADM

## 2022-07-08 RX ORDER — DIPHENHYDRAMINE HYDROCHLORIDE 50 MG/ML
25 INJECTION INTRAMUSCULAR; INTRAVENOUS
Status: CANCELLED
Start: 2022-07-15

## 2022-07-08 RX ADMIN — CLONIDINE HYDROCHLORIDE 0.2 MG: 0.1 TABLET ORAL at 11:07

## 2022-07-08 RX ADMIN — DIPHENHYDRAMINE HYDROCHLORIDE 25 MG: 50 INJECTION, SOLUTION INTRAMUSCULAR; INTRAVENOUS at 12:07

## 2022-07-08 RX ADMIN — SODIUM CHLORIDE: 0.9 INJECTION, SOLUTION INTRAVENOUS at 12:07

## 2022-07-08 RX ADMIN — FERRIC CARBOXYMALTOSE INJECTION 750 MG: 50 INJECTION, SOLUTION INTRAVENOUS at 01:07

## 2022-07-08 NOTE — PLAN OF CARE
Problem: Anemia  Goal: Anemia Symptom Improvement  Intervention: Monitor and Manage Anemia  Flowsheets (Taken 7/8/2022 6870)  Fatigue Management:   activity schedule adjusted   frequent rest breaks encouraged   fatigue-related activity identified

## 2022-07-14 ENCOUNTER — TELEPHONE (OUTPATIENT)
Dept: HEMATOLOGY/ONCOLOGY | Facility: CLINIC | Age: 47
End: 2022-07-14

## 2022-07-14 NOTE — TELEPHONE ENCOUNTER
----- Message from Shana Robins sent at 7/14/2022 10:22 AM CDT -----  Morning, Patient's injectafer referral went back to pending status from her ordered in October. I just wanted to clarify if she will be starting this again or if I need to close this referral?

## 2022-07-14 NOTE — TELEPHONE ENCOUNTER
Printer message for Samantha to address when she return Monday. I sent Shana a message relaying.  Damian

## 2022-07-15 ENCOUNTER — INFUSION (OUTPATIENT)
Dept: INFUSION THERAPY | Facility: HOSPITAL | Age: 47
End: 2022-07-15
Attending: INTERNAL MEDICINE
Payer: MEDICAID

## 2022-07-15 VITALS
DIASTOLIC BLOOD PRESSURE: 82 MMHG | HEART RATE: 78 BPM | BODY MASS INDEX: 51.91 KG/M2 | TEMPERATURE: 98 F | WEIGHT: 293 LBS | RESPIRATION RATE: 18 BRPM | HEIGHT: 63 IN | SYSTOLIC BLOOD PRESSURE: 144 MMHG | OXYGEN SATURATION: 94 %

## 2022-07-15 DIAGNOSIS — D50.0 IRON DEFICIENCY ANEMIA DUE TO CHRONIC BLOOD LOSS: Primary | ICD-10-CM

## 2022-07-15 PROCEDURE — 96365 THER/PROPH/DIAG IV INF INIT: CPT

## 2022-07-15 PROCEDURE — 25000003 PHARM REV CODE 250: Performed by: INTERNAL MEDICINE

## 2022-07-15 PROCEDURE — 96367 TX/PROPH/DG ADDL SEQ IV INF: CPT

## 2022-07-15 PROCEDURE — 63600175 PHARM REV CODE 636 W HCPCS: Performed by: INTERNAL MEDICINE

## 2022-07-15 RX ORDER — SODIUM CHLORIDE 0.9 % (FLUSH) 0.9 %
10 SYRINGE (ML) INJECTION
Status: DISCONTINUED | OUTPATIENT
Start: 2022-07-15 | End: 2022-07-15 | Stop reason: HOSPADM

## 2022-07-15 RX ORDER — EPINEPHRINE 0.3 MG/.3ML
0.3 INJECTION SUBCUTANEOUS ONCE AS NEEDED
OUTPATIENT
Start: 2022-07-15

## 2022-07-15 RX ORDER — EPINEPHRINE 0.3 MG/.3ML
0.3 INJECTION SUBCUTANEOUS ONCE AS NEEDED
OUTPATIENT
Start: 2022-07-22

## 2022-07-15 RX ORDER — HEPARIN 100 UNIT/ML
5 SYRINGE INTRAVENOUS
OUTPATIENT
Start: 2022-07-15

## 2022-07-15 RX ORDER — SODIUM CHLORIDE 9 MG/ML
INJECTION, SOLUTION INTRAVENOUS CONTINUOUS
Status: DISCONTINUED | OUTPATIENT
Start: 2022-07-15 | End: 2022-07-15 | Stop reason: HOSPADM

## 2022-07-15 RX ORDER — ONDANSETRON HCL IN 0.9 % NACL 8 MG/50 ML
8 INTRAVENOUS SOLUTION, PIGGYBACK (ML) INTRAVENOUS
Start: 2022-07-22

## 2022-07-15 RX ORDER — SODIUM CHLORIDE 0.9 % (FLUSH) 0.9 %
10 SYRINGE (ML) INJECTION
OUTPATIENT
Start: 2022-07-22

## 2022-07-15 RX ORDER — SODIUM CHLORIDE 0.9 % (FLUSH) 0.9 %
10 SYRINGE (ML) INJECTION
OUTPATIENT
Start: 2022-07-15

## 2022-07-15 RX ORDER — METHYLPREDNISOLONE SOD SUCC 125 MG
125 VIAL (EA) INJECTION ONCE AS NEEDED
OUTPATIENT
Start: 2022-07-15

## 2022-07-15 RX ORDER — SODIUM CHLORIDE 9 MG/ML
INJECTION, SOLUTION INTRAVENOUS CONTINUOUS
OUTPATIENT
Start: 2022-07-22

## 2022-07-15 RX ORDER — ONDANSETRON HCL IN 0.9 % NACL 8 MG/50 ML
8 INTRAVENOUS SOLUTION, PIGGYBACK (ML) INTRAVENOUS
Status: COMPLETED | OUTPATIENT
Start: 2022-07-15 | End: 2022-07-15

## 2022-07-15 RX ORDER — METHYLPREDNISOLONE SOD SUCC 125 MG
125 VIAL (EA) INJECTION ONCE AS NEEDED
OUTPATIENT
Start: 2022-07-22

## 2022-07-15 RX ORDER — ONDANSETRON HCL IN 0.9 % NACL 8 MG/50 ML
8 INTRAVENOUS SOLUTION, PIGGYBACK (ML) INTRAVENOUS
Status: CANCELLED
Start: 2022-07-15

## 2022-07-15 RX ORDER — DIPHENHYDRAMINE HYDROCHLORIDE 50 MG/ML
50 INJECTION INTRAMUSCULAR; INTRAVENOUS ONCE AS NEEDED
OUTPATIENT
Start: 2022-07-15

## 2022-07-15 RX ORDER — HEPARIN 100 UNIT/ML
5 SYRINGE INTRAVENOUS
Status: DISCONTINUED | OUTPATIENT
Start: 2022-07-15 | End: 2022-07-15 | Stop reason: HOSPADM

## 2022-07-15 RX ORDER — DIPHENHYDRAMINE HYDROCHLORIDE 50 MG/ML
25 INJECTION INTRAMUSCULAR; INTRAVENOUS
Start: 2022-07-22

## 2022-07-15 RX ORDER — HEPARIN 100 UNIT/ML
5 SYRINGE INTRAVENOUS
OUTPATIENT
Start: 2022-07-22

## 2022-07-15 RX ORDER — SODIUM CHLORIDE 9 MG/ML
INJECTION, SOLUTION INTRAVENOUS CONTINUOUS
OUTPATIENT
Start: 2022-07-15

## 2022-07-15 RX ORDER — DIPHENHYDRAMINE HYDROCHLORIDE 50 MG/ML
25 INJECTION INTRAMUSCULAR; INTRAVENOUS
Start: 2022-07-15

## 2022-07-15 RX ORDER — DIPHENHYDRAMINE HYDROCHLORIDE 50 MG/ML
50 INJECTION INTRAMUSCULAR; INTRAVENOUS ONCE AS NEEDED
OUTPATIENT
Start: 2022-07-22

## 2022-07-15 RX ADMIN — ONDANSETRON 8 MG: 2 INJECTION INTRAMUSCULAR; INTRAVENOUS at 02:07

## 2022-07-15 RX ADMIN — DIPHENHYDRAMINE HYDROCHLORIDE 25 MG: 50 INJECTION, SOLUTION INTRAMUSCULAR; INTRAVENOUS at 12:07

## 2022-07-15 RX ADMIN — SODIUM CHLORIDE: 0.9 INJECTION, SOLUTION INTRAVENOUS at 12:07

## 2022-07-15 RX ADMIN — FERRIC CARBOXYMALTOSE INJECTION 750 MG: 50 INJECTION, SOLUTION INTRAVENOUS at 01:07

## 2022-07-15 NOTE — NURSING
1419 Patient completed Injectafer infusion as well as 30 min wait period. Patient states she is nauseated. Vital signs obtained and WNL. Charge nurse DONNA Garza RN notified and notified MD Boston. New orders received to administer Zofran 8 mg IVPB.    1436 Zofran IVPB infusing.       1500 Zofran infusion complete. Patient states nausea resolved. New vital signs obtained, PIV removed and patient discharged. Patient instructed to follow up with MD Boston.

## 2022-07-15 NOTE — PLAN OF CARE
Problem: Fatigue  Goal: Improved Activity Tolerance  Outcome: Ongoing, Progressing  Intervention: Promote Improved Energy  Flowsheets (Taken 7/15/2022 1220)  Fatigue Management: frequent rest breaks encouraged  Sleep/Rest Enhancement:   regular sleep/rest pattern promoted   relaxation techniques promoted  Activity Management: Ambulated -L4

## 2022-07-18 ENCOUNTER — LAB VISIT (OUTPATIENT)
Dept: LAB | Facility: HOSPITAL | Age: 47
End: 2022-07-18
Attending: INTERNAL MEDICINE
Payer: MEDICAID

## 2022-07-18 DIAGNOSIS — D68.59 ANTITHROMBIN III DEFICIENCY: ICD-10-CM

## 2022-07-18 DIAGNOSIS — D50.9 IRON DEFICIENCY ANEMIA, UNSPECIFIED IRON DEFICIENCY ANEMIA TYPE: ICD-10-CM

## 2022-07-18 LAB
ALBUMIN SERPL BCP-MCNC: 3.3 G/DL (ref 3.5–5.2)
ALP SERPL-CCNC: 78 U/L (ref 55–135)
ALT SERPL W/O P-5'-P-CCNC: 31 U/L (ref 10–44)
ANION GAP SERPL CALC-SCNC: 8 MMOL/L (ref 8–16)
AST SERPL-CCNC: 34 U/L (ref 10–40)
BASOPHILS # BLD AUTO: 0.03 K/UL (ref 0–0.2)
BASOPHILS NFR BLD: 0.3 % (ref 0–1.9)
BILIRUB SERPL-MCNC: 0.5 MG/DL (ref 0.1–1)
BUN SERPL-MCNC: 10 MG/DL (ref 6–20)
CALCIUM SERPL-MCNC: 9.1 MG/DL (ref 8.7–10.5)
CHLORIDE SERPL-SCNC: 97 MMOL/L (ref 95–110)
CO2 SERPL-SCNC: 30 MMOL/L (ref 23–29)
CREAT SERPL-MCNC: 1 MG/DL (ref 0.5–1.4)
DIFFERENTIAL METHOD: ABNORMAL
EOSINOPHIL # BLD AUTO: 0.2 K/UL (ref 0–0.5)
EOSINOPHIL NFR BLD: 2.3 % (ref 0–8)
ERYTHROCYTE [DISTWIDTH] IN BLOOD BY AUTOMATED COUNT: 18.3 % (ref 11.5–14.5)
EST. GFR  (AFRICAN AMERICAN): >60 ML/MIN/1.73 M^2
EST. GFR  (NON AFRICAN AMERICAN): >60 ML/MIN/1.73 M^2
FERRITIN SERPL-MCNC: 832 NG/ML (ref 20–300)
GLUCOSE SERPL-MCNC: 128 MG/DL (ref 70–110)
HCT VFR BLD AUTO: 41.8 % (ref 37–48.5)
HGB BLD-MCNC: 12.9 G/DL (ref 12–16)
IMM GRANULOCYTES # BLD AUTO: 0.02 K/UL (ref 0–0.04)
IMM GRANULOCYTES NFR BLD AUTO: 0.2 % (ref 0–0.5)
IRON SERPL-MCNC: 125 UG/DL (ref 30–160)
LYMPHOCYTES # BLD AUTO: 1.5 K/UL (ref 1–4.8)
LYMPHOCYTES NFR BLD: 17.6 % (ref 18–48)
MCH RBC QN AUTO: 27.3 PG (ref 27–31)
MCHC RBC AUTO-ENTMCNC: 30.9 G/DL (ref 32–36)
MCV RBC AUTO: 88 FL (ref 82–98)
MONOCYTES # BLD AUTO: 0.4 K/UL (ref 0.3–1)
MONOCYTES NFR BLD: 4.7 % (ref 4–15)
NEUTROPHILS # BLD AUTO: 6.5 K/UL (ref 1.8–7.7)
NEUTROPHILS NFR BLD: 74.9 % (ref 38–73)
NRBC BLD-RTO: 0 /100 WBC
PLATELET # BLD AUTO: 307 K/UL (ref 150–450)
PMV BLD AUTO: 9.7 FL (ref 9.2–12.9)
POTASSIUM SERPL-SCNC: 3.8 MMOL/L (ref 3.5–5.1)
PROT SERPL-MCNC: 8.1 G/DL (ref 6–8.4)
RBC # BLD AUTO: 4.73 M/UL (ref 4–5.4)
SATURATED IRON: 38 % (ref 20–50)
SODIUM SERPL-SCNC: 135 MMOL/L (ref 136–145)
TOTAL IRON BINDING CAPACITY: 328 UG/DL (ref 250–450)
TRANSFERRIN SERPL-MCNC: 234 MG/DL (ref 200–375)
WBC # BLD AUTO: 8.74 K/UL (ref 3.9–12.7)

## 2022-07-18 PROCEDURE — 85025 COMPLETE CBC W/AUTO DIFF WBC: CPT | Performed by: INTERNAL MEDICINE

## 2022-07-18 PROCEDURE — 36415 COLL VENOUS BLD VENIPUNCTURE: CPT | Performed by: INTERNAL MEDICINE

## 2022-07-18 PROCEDURE — 82728 ASSAY OF FERRITIN: CPT | Performed by: INTERNAL MEDICINE

## 2022-07-18 PROCEDURE — 80053 COMPREHEN METABOLIC PANEL: CPT | Performed by: INTERNAL MEDICINE

## 2022-07-18 PROCEDURE — 84466 ASSAY OF TRANSFERRIN: CPT | Performed by: INTERNAL MEDICINE

## 2022-07-20 ENCOUNTER — TELEPHONE (OUTPATIENT)
Dept: HEMATOLOGY/ONCOLOGY | Facility: CLINIC | Age: 47
End: 2022-07-20

## 2022-07-20 DIAGNOSIS — M25.569 KNEE PAIN, UNSPECIFIED CHRONICITY, UNSPECIFIED LATERALITY: Primary | ICD-10-CM

## 2022-07-20 NOTE — TELEPHONE ENCOUNTER
----- Message from Mahogany Hardy sent at 7/20/2022  3:56 PM CDT -----  PT. CALLED SAID DR. GRAY REFERRED HER TO DR. JORGE AND NEEDS NEW REFERRAL SENT B/C ONLY LAST 90 DAYS AND IS SUPPOSED TO GO BACK AND SEE HIM.     #[phone]

## 2022-07-21 NOTE — PROGRESS NOTES
[facility] Hematology/Oncology  Progress Note -   Follow-up Visit    Subjective:      Patient ID:   NAME: Katiuska Preston : 1975     47 y.o. female    Referring Doc: Justice  Other Physicians: Eduardo    Chief Complaint: pulm emboli f/u      HPI:    The patient returns for a regularly scheduled follow-up visit today to go over results of recently ordered tests, studies and/or labs. She is here by herself.     She was scheduled to resume IV iron and she has received two more cycles.     No CP, HA's or N/V.       She is currently on Eliquis. No excessive bleeding or brusiing . No periods since 2022. She is followed by Dr Pro with Gyn-Onc      She has some chronic right knee issues and pain.    She had recent abnormal mammogram 2022 with cluster of calcifications 1 0'clock posittion on the left breast. She had biopsy on 2022 with no carcinoma per pathology report. Dr Rendon has ordered a repeat mammogram for Aug 2022    Discussed covid19 precautions - she had her vaccinations             ROS:   GEN: normal without any fever, night sweats or weight loss  HEENT: normal with no HA's, sore throat, stiff neck, changes in vision  CV: normal with no CP, some WINN   PULM: normal with no SOB, cough, hemoptysis, sputum or pleuritic pain  GI: normal with no abdominal pain, nausea, vomiting, constipation, diarrhea, melanotic stools, BRBPR, or hematemesis  : normal with no hematuria, dysuria  BREAST: normal with no mass, discharge, pain  SKIN: normal with no rash, erythema, bruising, or swelling     Past Medical/Surgical History:  Past Medical History:   Diagnosis Date    Antithrombin III deficiency 2021    Asthma     Claustrophobia     Elevated factor VIII level 2021    Esophageal reflux     Gastroesophageal reflux    Generalized anxiety disorder     Anxiety, Generalized    Herniated disc     Hypertension     Iron deficiency anemia due to chronic blood loss 10/27/2021    Lower extremity weakness      Morbid obesity     Obesity     Protein S deficiency 2021    Pulmonary embolism     Upper extremity weakness      Past Surgical History:   Procedure Laterality Date     SECTION      CHOLECYSTECTOMY      TONSILLECTOMY           Allergies:  Review of patient's allergies indicates:  No Known Allergies    Social/Family History:  Social History     Socioeconomic History    Marital status:    Tobacco Use    Smoking status: Former Smoker     Types: Cigarettes, Cigars     Quit date: 2019     Years since quitting: 3.5    Smokeless tobacco: Never Used   Substance and Sexual Activity    Alcohol use: Yes     Alcohol/week: 0.8 standard drinks     Types: 1 Standard drinks or equivalent per week     Comment: rarely    Drug use: No    Sexual activity: Not Currently     History reviewed. No pertinent family history.      Medications:    Current Outpatient Medications:     albuterol (PROVENTIL/VENTOLIN HFA) 90 mcg/actuation inhaler, Inhale 2 puffs into the lungs every 6 (six) hours as needed for Wheezing. Rescue, Disp: 18 g, Rfl: 5    atorvastatin (LIPITOR) 10 MG tablet, TAKE 1 TABLET BY MOUTH EVERY DAY TO LOWER LDL CHOLESTEROL, Disp: , Rfl:     ergocalciferol (ERGOCALCIFEROL) 50,000 unit Cap, Take 50,000 Units by mouth twice a week., Disp: , Rfl:     famotidine (PEPCID) 20 MG tablet, Take 20 mg by mouth 2 (two) times daily., Disp: , Rfl:     FEROSUL 325 mg (65 mg iron) Tab tablet, TAKE 1 TABLET BY MOUTH EVERY DAY, Disp: 30 tablet, Rfl: 3    fluticasone propionate (FLONASE) 50 mcg/actuation nasal spray, 2 sprays by Each Nostril route daily as needed for Rhinitis., Disp: , Rfl:     fluticasone propionate (FLOVENT HFA) 110 mcg/actuation inhaler, Inhale 2 puffs into the lungs 2 (two) times daily. Controller Rinse after you use it, Disp: 12 g, Rfl: 5    losartan (COZAAR) 25 MG tablet, Take 25 mg by mouth once daily., Disp: , Rfl:     meloxicam (MOBIC) 7.5 MG tablet, Take 7.5 mg by mouth once  "daily., Disp: , Rfl:     metoprolol succinate (TOPROL-XL) 25 MG 24 hr tablet, Take 25 mg by mouth once daily., Disp: , Rfl:     NEXIUM 40 mg capsule, Take 40 mg by mouth once daily., Disp: , Rfl:     omeprazole (PRILOSEC) 20 MG capsule, Take 20 mg by mouth once daily., Disp: , Rfl:     trazodone (DESYREL) 100 MG tablet, Take 100 mg by mouth every evening., Disp: , Rfl:     carvediloL (COREG) 12.5 MG tablet, Take 1 tablet (12.5 mg total) by mouth 2 (two) times daily with meals., Disp: 60 tablet, Rfl: 0    furosemide (LASIX) 20 MG tablet, Take 1 tablet (20 mg total) by mouth once daily., Disp: 30 tablet, Rfl: 0    lisinopriL (PRINIVIL,ZESTRIL) 20 MG tablet, Take 1 tablet (20 mg total) by mouth once daily., Disp: 40 tablet, Rfl: 0      Pathology:  Cancer Staging  No matching staging information was found for the patient.      Breast biopsy 5/30/2022:    1. BREAST, LEFT, UPPER OUTER MID CALCIFICATIONS, STEREOTACTIC GUIDED    CORE BIOPSY    - BENIGN BREAST PARENCHYMA AND BLOOD VESSELS     2. BREAST, LEFT, UPPER OUTER MID CALCIFICATIONS, STEREOTACTIC GUIDED    CORE BIOPSY   - MICROCALCIFICATIONS ASSOCIATED WITH FIBROADENOMATOID CHANGE AND    BENIGN BREAST PARENCHYMA    - USUAL DUCTAL HYPERPLASIA AND COLUMNAR CELL CHANGE      Objective:   Vitals:  Blood pressure (!) 189/122, pulse 97, resp. rate 20, height 5' 3" (1.6 m), weight (!) 152.4 kg (336 lb).    Physical Examination:   GEN: no apparent distress, comfortable; AAOx3; morbidly overweight   HEAD: atraumatic and normocephalic  EYES: no pallor, no icterus, PERRLA  ENT: OMM, no pharyngeal erythema, external ears WNL; no nasal discharge; no thrush  NECK: no masses, thyroid normal, trachea midline, no LAD/LN's, supple  CV: RRR with no murmur; normal pulse; normal S1 and S2; no pedal edema  CHEST: Normal respiratory effort; CTAB; normal breath sounds; no wheeze or crackles;    ABDOM: nontender and nondistended; soft; normal bowel sounds; no " rebound/guarding  MUSC/Skeletal: LROM and crepitus right knee; no deformities or arthropathy  EXTREM: no clubbing, cyanosis, inflammation or swelling  SKIN: no rashes, lesions, ulcers, petechiae or subcutaneous nodules  : no diaz  NEURO: grossly intact; motor/sensory WNL; AAOx3; no tremors  PSYCH: normal mood, affect and behavior  LYMPH: normal cervical, supraclavicular, and groin LN's;       Labs:   Lab Results   Component Value Date    WBC 8.74 07/18/2022    HGB 12.9 07/18/2022    HCT 41.8 07/18/2022    MCV 88 07/18/2022     07/18/2022    CMP  Sodium   Date Value Ref Range Status   07/18/2022 135 (L) 136 - 145 mmol/L Final     Potassium   Date Value Ref Range Status   07/18/2022 3.8 3.5 - 5.1 mmol/L Final     Chloride   Date Value Ref Range Status   07/18/2022 97 95 - 110 mmol/L Final     CO2   Date Value Ref Range Status   07/18/2022 30 (H) 23 - 29 mmol/L Final     Glucose   Date Value Ref Range Status   07/18/2022 128 (H) 70 - 110 mg/dL Final     BUN   Date Value Ref Range Status   07/18/2022 10 6 - 20 mg/dL Final     Creatinine   Date Value Ref Range Status   07/18/2022 1.0 0.5 - 1.4 mg/dL Final     Calcium   Date Value Ref Range Status   07/18/2022 9.1 8.7 - 10.5 mg/dL Final     Total Protein   Date Value Ref Range Status   07/18/2022 8.1 6.0 - 8.4 g/dL Final     Albumin   Date Value Ref Range Status   07/18/2022 3.3 (L) 3.5 - 5.2 g/dL Final     Total Bilirubin   Date Value Ref Range Status   07/18/2022 0.5 0.1 - 1.0 mg/dL Final     Comment:     For infants and newborns, interpretation of results should be based  on gestational age, weight and in agreement with clinical  observations.    Premature Infant recommended reference ranges:  Up to 24 hours.............<8.0 mg/dL  Up to 48 hours............<12.0 mg/dL  3-5 days..................<15.0 mg/dL  6-29 days.................<15.0 mg/dL       Alkaline Phosphatase   Date Value Ref Range Status   07/18/2022 78 55 - 135 U/L Final     AST   Date Value  Ref Range Status   07/18/2022 34 10 - 40 U/L Final     ALT   Date Value Ref Range Status   07/18/2022 31 10 - 44 U/L Final     Anion Gap   Date Value Ref Range Status   07/18/2022 8 8 - 16 mmol/L Final     eGFR if    Date Value Ref Range Status   07/18/2022 >60.0 >60 mL/min/1.73 m^2 Final     eGFR if non    Date Value Ref Range Status   07/18/2022 >60.0 >60 mL/min/1.73 m^2 Final     Comment:     Calculation used to obtain the estimated glomerular filtration  rate (eGFR) is the CKD-EPI equation.                  Lab Results   Component Value Date    IRON 125 07/18/2022    TIBC 328 07/18/2022    FERRITIN 832 (H) 07/18/2022     Lab Results   Component Value Date    [id number] 452 05/02/2021     Lab Results   Component Value Date    FOLATE 5.9 05/02/2021          Factor VIII Activity 56 - 140 % 281      Antithrombin III 75 - 135 % 109      Protein S Activity 63 - 140 % 63            I have reviewed all available lab results and radiology reports.    Radiology/Diagnostic Studies:      MRI Pelvis 6/15/2022:    Impression:     1. Normal thickness of the endometrium.  2. Query adenomyosis.  3. Nonviable uterine fibroid.  4. Involuting follicle or cyst in the left ovary.              US Lower Extremity Veins Bilateral [[id number]] Collected: 05/01/21 1348   Order Status: Completed Updated: 05/01/21 1432   Narrative:     REASON: DVT     FINDINGS:     Grayscale, color and spectral Doppler analysis of the bilateral lower   extremity deep venous system was performed.     There is normal compressibility, color and spectral Doppler analysis,   and augmentation in the bilateral lower extremity deep venous system.     IMPRESSION:     1.  No DVT of the bilateral lower extremity veins.   2.  Bilateral distal superficial femoral veins were unable to be   assessed due to body habitus       CTA Chest Non-Coronary - PE Study [[id number]] Collected: 05/01/21 1143   Order Status: Completed Updated: 05/01/21  1225   Narrative:     CMS MANDATED QUALITY DATA - CT RADIATION - 436     All CT scans at this facility utilize dose modulation, iterative   reconstruction, and/or weight based dosing when appropriate to reduce   radiation dose to as low as reasonably achievable.         REASON: PE suspected, intermediate prob, positive D-dimer     TECHNIQUE: CT angiography of thorax with 100 mL Omnipaque 350.   Maximum intensity projection coronal reformations were created at a   separate workstation and stored in the patient's permanent medical   record.     COMPARISON: CTA chest October 14, 2019.     FINDINGS:     Limited evaluation due to body habitus and inadequate bolus timing.   Filling defects identified in segmental and subsegmental artery   supplying the left lower lobe, consistent with pulmonary emboli.   Questionable filling defects versus artifact in the right mainstem   pulmonary artery and a few segmental arteries supplying the right   lower lobe may reflect pulmonary emboli. Heart size is at the upper   limits of normal. No mediastinal lymphadenopathy or mass.     The central tracheobronchial tree is patent. There is diffuse   groundglass lung opacities in the bilateral lungs with peripheral   wedge-shaped opacities. No pleural effusions.     The visualized abdominal viscera are unremarkable. No acute osseous   abnormality..     IMPRESSION:     1.  Limited evaluation due to body habitus and inadequate bolus   timing. Filling defects identified in segmental and subsegmental   artery supplying the left lower lobe, consistent with pulmonary   emboli. There are questionable pulmonary emboli in the right mainstem   pulmonary artery and segmental artery supplying the right lower lobe.   Findings reported to Dr.Lloyd Duffy at 5/1/2021.   2.  Bilateral diffuse groundglass lung opacities with peripheral   wedge-shaped opacities may reflect changes of poor inspiration and   atelectasis. Atypical infectious process could  also have a similar   appearance the proper clinical setting.                 All lab results and imaging results have been reviewed and discussed with the patient    Assessment:   (1) 47 y.o. female with diagnosis of pulmonary emboli who was seen as a consult at [facility]  - patient with diagnosis of asthma who presented to the ED at [facility] on 5/1 with progressive SOB, postnasal drip and acute hypercapnia. CTA was a limited study due to body habitus but radiology reported presence of filling defects identified in segmental and subsegmental artery supplying the left lower lobe, consistent with pulmonary emboli. They also reported questionable pulmonary emboli in the right mainstem pulmonary artery and segmental artery supplying the right lower lobe. Patient has been admitted to hospitalist service and is on Lovenox. She has no prior personal or family history of clots.      - doppler studies are negative     5/3/2021:  - patient was seen by Dr Clark with pulm yesterday; appreciate his evaluation  - options of anticoagulation include: Coumadin, Eliquis , Xarelto, or Pradaxa  - in patients with morbid obesity, one can have difficulties with therapeutic dosing with practically any type of oral anticoagulant     5/31/2021:  - She has O2 at home and is on portable today.   - She has not followed up with pulmonary since discharge.   - She is breathing fair but does have WINN.   - She has some sinus issues. She is currently on Eliquis.   - She saw Dr Rendon since discharge  - low ATIII and protein S levels which could be the etiology, but they will need to be repeated at later date to confirm  - Elevated factor VIII which also will need to be repeated to confirm    6/28/2021:  - she saw Dr Clark with pulmonary since last visit and has PFT scheduled for this coming July 1st  - she remains on portable O2    12/8/2021:  - she saw Dr Clark again on 11/2/2021  - she remains on O2 at home  - she remains on eliquis  - Repeat AT3 was normal,  repeat Protein S was WNL  - repeat factor VIII was still a little elevated at 281 but better    2/15/2022:  - she sees pulmonary NP again next month    5/19/2022:  - she saw Idania Cason NP on 5/2/2022 with pulmonary    6/20/2022:  - remains on eliquis  - currently with no excessive bleeding or bruising       (2) Anemia with microcytic indices with current history of chronic menorrhagia - most likely has underlying iron deficiency due to chronic heavy menstrual cycles  - s/p two units of blood  - prior hgb at 8.2  - total bilirubin is WNL, so I do not suspect any hemolysis at this time     5/3/2021:  - hgb at 8.6 today  - iron and ferritin are both low with elevated TIBC  - she received dose of IV iron today    5/31/2021:  - she needs repeat labs incl iron panel  - will consider additional IV iron as needed  - she is on oral iron  - she needs repeat labs incl. Iron panel    6/28/2021:  - hgb currently 11.7  - iron panel is adequate at this time    10/26/2021:  - latest hgb at 10.6  - iron at 29  - she is on oral iron since May 2021  - she has heavy menstrual cycles  - discussed consideration for IV iron and she is agreeable; discussed general side-effects of iron infusions    12/8/2021:  - she has been on oral iron with little improvement in the labs  - she is starting IV iron this coming Friday    2/15/2022:  - latest labs from Jan 2022 with no anemia and iron panel was adequate  - she had IV iron x 1 only    5/19/2022:  - latest hgb at 11.2  - ferritin was 28  - iron back down to 24  - continued GYN bleeding issues  - seen by Dr Pro with GYn-Onc on 4/20/2022 6/20/2022:  - awaiting better BP control inorder to resume IV iron  - seeing Dr Rendon later today for her HBP  - labs pending for today    7/25/2022:  - s/p two cycles of IV iron  - latest iron panel adequate and hgb WNL      (3) Abnormal mammogram:    5/19/2022:  - She had recent abnormal mammogram 5/6/2022 with cluster of calcifications 1 0'clock  posittion on the left breast.   - she is scheduling US guided biopsy in near future  - discussed referral to [facility] for evaluation    6/20/2022:  - s/p breast biopsy on 5/30/2022 with pathology report coming back benign  - recommend repeat mammogram in at least 3 months    7/25/2022:  - refer to Dr Puentes for her breast issues      (3) Overweight/Obesity     (4) HTN     (5) GERD     (6) WBC and platelets are WNL     (7) Hx/of covid vaccination on 4/16 - she received Pfizer vaccine (not the J&J one)     (8) General anxiety disorder     (9) Nonsmoker    VISIT DIAGNOSES:      1. Iron deficiency anemia, unspecified iron deficiency anemia type    2. Elevated factor VIII level    3. Antithrombin III deficiency    4. Acute pulmonary embolism, unspecified pulmonary embolism type, unspecified whether acute cor pulmonale present    5. Iron deficiency anemia due to chronic blood loss    6. Protein S deficiency            Plan:     PLAN:  1. F/u with pulmonary as directed  2. F/u with PCP    3. Check labs monthly as ordered incl iron panel - set up up IV iron as needed    4. F/u with GYN and Dr Muir  6. Refill eliquis as needed  7. F/u with Dr Puentes     RTC in 3 months    Fax note to Yoav Rendon [facility]; [place]; [place]              Iron deficiency anemia, unspecified iron deficiency anemia type    Elevated factor VIII level    Antithrombin III deficiency    Acute pulmonary embolism, unspecified pulmonary embolism type, unspecified whether acute cor pulmonale present    Iron deficiency anemia due to chronic blood loss    Protein S deficiency      No follow-ups on file.    COVID-19 Discussion:    I had long discussion with patient and any applicable family about the COVID-19 coronavirus epidemic and the recommended precautions with regard to cancer and/or hematology patients. I have re-iterated the CDC recommendations for adequate hand washing, use of hand -like products, and coughing into elbow, etc. In  addition, especially for our patients who are on chemotherapy and/or our otherwise immunocompromised patients, I have recommended avoidance of crowds, including movie theaters, restaurants, churches, etc. I have recommended avoidance of any sick or symptomatic family members and/or friends. I have also recommended avoidance of any raw and unwashed food products, and general avoidance of food items that have not been prepared by themselves. The patient has been asked to call us immediately with any symptom developments, issues, questions or other general concerns.       Anticoagulation Discussion:    Discussed with patient and any applicable family members about the benefit and/or need for anticoagulation. I communicated about the risks of bleeding while on any anticoagulation, which could be serious and/or life-threatening, and which can occur at any time, regardless of degree of the level of anticoagulation. I expressed the need for compliance with any anticoagulation regimen and that failure to do so could potential lead to excessive bleeding, and risk to health and/or life. In particular, with patients on coumadin therapy, compliance with requested blood work is absolutely essential, as coumadin levels can vary from time to time, and failure to do so could potentially place the patient at risk for bleeding and/or clotting events which could be fatal. Patients on coumadin are encouraged to call the day after they have their levels drawn, as to obtain the appropriate instructions from my staff. Patients are aware that self-regulating or self-dosing of their medications is strictly prohibited.       I have explained and the patient understands all of  the current recommendation(s). I have answered all of their questions to the best of my ability and to their complete satisfaction.             Thank you for allowing me to participate in this pleasant patient's care. Please call with any questions or  concerns.      Electronically signed Cisco Boston MD

## 2022-07-25 ENCOUNTER — OFFICE VISIT (OUTPATIENT)
Dept: HEMATOLOGY/ONCOLOGY | Facility: CLINIC | Age: 47
End: 2022-07-25
Payer: MEDICAID

## 2022-07-25 VITALS
HEART RATE: 97 BPM | BODY MASS INDEX: 51.91 KG/M2 | SYSTOLIC BLOOD PRESSURE: 189 MMHG | WEIGHT: 293 LBS | DIASTOLIC BLOOD PRESSURE: 122 MMHG | HEIGHT: 63 IN | RESPIRATION RATE: 20 BRPM

## 2022-07-25 DIAGNOSIS — I26.99 ACUTE PULMONARY EMBOLISM, UNSPECIFIED PULMONARY EMBOLISM TYPE, UNSPECIFIED WHETHER ACUTE COR PULMONALE PRESENT: ICD-10-CM

## 2022-07-25 DIAGNOSIS — D68.59 PROTEIN S DEFICIENCY: ICD-10-CM

## 2022-07-25 DIAGNOSIS — D50.9 IRON DEFICIENCY ANEMIA, UNSPECIFIED IRON DEFICIENCY ANEMIA TYPE: Primary | Chronic | ICD-10-CM

## 2022-07-25 DIAGNOSIS — D68.59 ANTITHROMBIN III DEFICIENCY: ICD-10-CM

## 2022-07-25 DIAGNOSIS — D50.0 IRON DEFICIENCY ANEMIA DUE TO CHRONIC BLOOD LOSS: ICD-10-CM

## 2022-07-25 DIAGNOSIS — R79.1 ELEVATED FACTOR VIII LEVEL: ICD-10-CM

## 2022-07-25 PROCEDURE — 3080F DIAST BP >= 90 MM HG: CPT | Mod: CPTII,S$GLB,, | Performed by: INTERNAL MEDICINE

## 2022-07-25 PROCEDURE — 99213 OFFICE O/P EST LOW 20 MIN: CPT | Mod: S$GLB,,, | Performed by: INTERNAL MEDICINE

## 2022-07-25 PROCEDURE — 4010F PR ACE/ARB THEARPY RXD/TAKEN: ICD-10-PCS | Mod: CPTII,S$GLB,, | Performed by: INTERNAL MEDICINE

## 2022-07-25 PROCEDURE — 1159F PR MEDICATION LIST DOCUMENTED IN MEDICAL RECORD: ICD-10-PCS | Mod: CPTII,S$GLB,, | Performed by: INTERNAL MEDICINE

## 2022-07-25 PROCEDURE — 4010F ACE/ARB THERAPY RXD/TAKEN: CPT | Mod: CPTII,S$GLB,, | Performed by: INTERNAL MEDICINE

## 2022-07-25 PROCEDURE — 3077F PR MOST RECENT SYSTOLIC BLOOD PRESSURE >= 140 MM HG: ICD-10-PCS | Mod: CPTII,S$GLB,, | Performed by: INTERNAL MEDICINE

## 2022-07-25 PROCEDURE — 1160F RVW MEDS BY RX/DR IN RCRD: CPT | Mod: CPTII,S$GLB,, | Performed by: INTERNAL MEDICINE

## 2022-07-25 PROCEDURE — 3080F PR MOST RECENT DIASTOLIC BLOOD PRESSURE >= 90 MM HG: ICD-10-PCS | Mod: CPTII,S$GLB,, | Performed by: INTERNAL MEDICINE

## 2022-07-25 PROCEDURE — 1160F PR REVIEW ALL MEDS BY PRESCRIBER/CLIN PHARMACIST DOCUMENTED: ICD-10-PCS | Mod: CPTII,S$GLB,, | Performed by: INTERNAL MEDICINE

## 2022-07-25 PROCEDURE — 99213 PR OFFICE/OUTPT VISIT, EST, LEVL III, 20-29 MIN: ICD-10-PCS | Mod: S$GLB,,, | Performed by: INTERNAL MEDICINE

## 2022-07-25 PROCEDURE — 3008F PR BODY MASS INDEX (BMI) DOCUMENTED: ICD-10-PCS | Mod: CPTII,S$GLB,, | Performed by: INTERNAL MEDICINE

## 2022-07-25 PROCEDURE — 1159F MED LIST DOCD IN RCRD: CPT | Mod: CPTII,S$GLB,, | Performed by: INTERNAL MEDICINE

## 2022-07-25 PROCEDURE — 3008F BODY MASS INDEX DOCD: CPT | Mod: CPTII,S$GLB,, | Performed by: INTERNAL MEDICINE

## 2022-07-25 PROCEDURE — 3077F SYST BP >= 140 MM HG: CPT | Mod: CPTII,S$GLB,, | Performed by: INTERNAL MEDICINE

## 2022-08-04 ENCOUNTER — OFFICE VISIT (OUTPATIENT)
Dept: ORTHOPEDICS | Facility: CLINIC | Age: 47
End: 2022-08-04
Payer: MEDICAID

## 2022-08-04 VITALS — BODY MASS INDEX: 51.91 KG/M2 | WEIGHT: 293 LBS | HEIGHT: 63 IN

## 2022-08-04 DIAGNOSIS — M17.11 PRIMARY OSTEOARTHRITIS OF RIGHT KNEE: ICD-10-CM

## 2022-08-04 DIAGNOSIS — M17.12 PRIMARY OSTEOARTHRITIS OF LEFT KNEE: Primary | ICD-10-CM

## 2022-08-04 PROCEDURE — 1159F PR MEDICATION LIST DOCUMENTED IN MEDICAL RECORD: ICD-10-PCS | Mod: CPTII,S$GLB,, | Performed by: ORTHOPAEDIC SURGERY

## 2022-08-04 PROCEDURE — 1159F MED LIST DOCD IN RCRD: CPT | Mod: CPTII,S$GLB,, | Performed by: ORTHOPAEDIC SURGERY

## 2022-08-04 PROCEDURE — 3008F BODY MASS INDEX DOCD: CPT | Mod: CPTII,S$GLB,, | Performed by: ORTHOPAEDIC SURGERY

## 2022-08-04 PROCEDURE — 4010F PR ACE/ARB THEARPY RXD/TAKEN: ICD-10-PCS | Mod: CPTII,S$GLB,, | Performed by: ORTHOPAEDIC SURGERY

## 2022-08-04 PROCEDURE — 20610 LARGE JOINT ASPIRATION/INJECTION: R KNEE: ICD-10-PCS | Mod: 50,S$GLB,, | Performed by: ORTHOPAEDIC SURGERY

## 2022-08-04 PROCEDURE — 99213 OFFICE O/P EST LOW 20 MIN: CPT | Mod: 25,S$GLB,, | Performed by: ORTHOPAEDIC SURGERY

## 2022-08-04 PROCEDURE — 99213 PR OFFICE/OUTPT VISIT, EST, LEVL III, 20-29 MIN: ICD-10-PCS | Mod: 25,S$GLB,, | Performed by: ORTHOPAEDIC SURGERY

## 2022-08-04 PROCEDURE — 1160F PR REVIEW ALL MEDS BY PRESCRIBER/CLIN PHARMACIST DOCUMENTED: ICD-10-PCS | Mod: CPTII,S$GLB,, | Performed by: ORTHOPAEDIC SURGERY

## 2022-08-04 PROCEDURE — 3008F PR BODY MASS INDEX (BMI) DOCUMENTED: ICD-10-PCS | Mod: CPTII,S$GLB,, | Performed by: ORTHOPAEDIC SURGERY

## 2022-08-04 PROCEDURE — 20610 DRAIN/INJ JOINT/BURSA W/O US: CPT | Mod: 50,S$GLB,, | Performed by: ORTHOPAEDIC SURGERY

## 2022-08-04 PROCEDURE — 4010F ACE/ARB THERAPY RXD/TAKEN: CPT | Mod: CPTII,S$GLB,, | Performed by: ORTHOPAEDIC SURGERY

## 2022-08-04 PROCEDURE — 1160F RVW MEDS BY RX/DR IN RCRD: CPT | Mod: CPTII,S$GLB,, | Performed by: ORTHOPAEDIC SURGERY

## 2022-08-04 RX ORDER — TRIAMCINOLONE ACETONIDE 40 MG/ML
40 INJECTION, SUSPENSION INTRA-ARTICULAR; INTRAMUSCULAR
Status: DISCONTINUED | OUTPATIENT
Start: 2022-08-04 | End: 2022-08-04 | Stop reason: HOSPADM

## 2022-08-04 RX ADMIN — TRIAMCINOLONE ACETONIDE 40 MG: 40 INJECTION, SUSPENSION INTRA-ARTICULAR; INTRAMUSCULAR at 10:08

## 2022-08-04 NOTE — PROCEDURES
Large Joint Aspiration/Injection: R knee    Date/Time: 8/4/2022 10:45 AM  Performed by: Brayan Valerio MD  Authorized by: Brayan Valerio MD     Consent Done?:  Yes (Verbal)  Indications:  Pain  Site marked: the procedure site was marked    Timeout: prior to procedure the correct patient, procedure, and site was verified    Prep: patient was prepped and draped in usual sterile fashion      Local anesthesia used?: Yes    Local anesthetic:  Lidocaine 1% without epinephrine  Ultrasonic Guidance for needle placement?: No    Location:  Knee  Site:  R knee  Medications:  40 mg triamcinolone acetonide 40 mg/mL  Patient tolerance:  Patient tolerated the procedure well with no immediate complications  Large Joint Aspiration/Injection: L knee    Date/Time: 8/4/2022 10:45 AM  Performed by: Brayan Valerio MD  Authorized by: Brayan Valerio MD     Consent Done?:  Yes (Verbal)  Indications:  Pain  Site marked: the procedure site was marked    Timeout: prior to procedure the correct patient, procedure, and site was verified    Prep: patient was prepped and draped in usual sterile fashion      Local anesthesia used?: Yes    Local anesthetic:  Lidocaine 1% without epinephrine  Ultrasonic Guidance for needle placement?: No    Location:  Knee  Site:  L knee  Medications:  40 mg triamcinolone acetonide 40 mg/mL  Patient tolerance:  Patient tolerated the procedure well with no immediate complications

## 2022-08-04 NOTE — PROGRESS NOTES
[facility]    Subjective:     Chief Complaint:   Chief Complaint   Patient presents with    Left Knee - Pain, Swelling     Pt is here with complaints of bilateral knee pain last injection 22, Would like injections today. Left worse than right       Past Medical History:   Diagnosis Date    Antithrombin III deficiency 2021    Asthma     Claustrophobia     Elevated factor VIII level 2021    Esophageal reflux     Gastroesophageal reflux    Generalized anxiety disorder     Anxiety, Generalized    Herniated disc     Hypertension     Iron deficiency anemia due to chronic blood loss 10/27/2021    Lower extremity weakness     Morbid obesity     Obesity     Protein S deficiency 2021    Pulmonary embolism     Upper extremity weakness        Past Surgical History:   Procedure Laterality Date     SECTION      CHOLECYSTECTOMY      TONSILLECTOMY         Current Outpatient Medications   Medication Sig    albuterol (PROVENTIL/VENTOLIN HFA) 90 mcg/actuation inhaler Inhale 2 puffs into the lungs every 6 (six) hours as needed for Wheezing. Rescue    atorvastatin (LIPITOR) 10 MG tablet TAKE 1 TABLET BY MOUTH EVERY DAY TO LOWER LDL CHOLESTEROL    carvediloL (COREG) 12.5 MG tablet Take 1 tablet (12.5 mg total) by mouth 2 (two) times daily with meals.    ergocalciferol (ERGOCALCIFEROL) 50,000 unit Cap Take 50,000 Units by mouth twice a week.    famotidine (PEPCID) 20 MG tablet Take 20 mg by mouth 2 (two) times daily.    FEROSUL 325 mg (65 mg iron) Tab tablet TAKE 1 TABLET BY MOUTH EVERY DAY    fluticasone propionate (FLONASE) 50 mcg/actuation nasal spray 2 sprays by Each Nostril route daily as needed for Rhinitis.    fluticasone propionate (FLOVENT HFA) 110 mcg/actuation inhaler Inhale 2 puffs into the lungs 2 (two) times daily. Controller  Rinse after you use it    furosemide (LASIX) 20 MG tablet Take 1 tablet (20 mg total) by mouth once daily.    lisinopriL  (PRINIVIL,ZESTRIL) 20 MG tablet Take 1 tablet (20 mg total) by mouth once daily.    losartan (COZAAR) 25 MG tablet Take 25 mg by mouth once daily.    meloxicam (MOBIC) 7.5 MG tablet Take 7.5 mg by mouth once daily.    metoprolol succinate (TOPROL-XL) 25 MG 24 hr tablet Take 25 mg by mouth once daily.    NEXIUM 40 mg capsule Take 40 mg by mouth once daily.    omeprazole (PRILOSEC) 20 MG capsule Take 20 mg by mouth once daily.    trazodone (DESYREL) 100 MG tablet Take 100 mg by mouth every evening.     No current facility-administered medications for this visit.       Review of patient's allergies indicates:  No Known Allergies    No family history on file.    Social History     Socioeconomic History    Marital status:    Tobacco Use    Smoking status: Former Smoker     Types: Cigarettes, Cigars     Quit date: 2019     Years since quitting: 3.5    Smokeless tobacco: Never Used   Substance and Sexual Activity    Alcohol use: Yes     Alcohol/week: 0.8 standard drinks     Types: 1 Standard drinks or equivalent per week     Comment: rarely    Drug use: No    Sexual activity: Not Currently       History of present illness:  Katiuska comes in today for follow-up for her bilateral knee osteoarthritis.  She was last seen and injected approximately 5 months ago with good relief of her symptoms.  Unfortunately her pain has returned over the past several weeks.  She denies any new injury or trauma.  She is here today requesting repeat corticosteroid injections bilaterally.  She has known osteoarthritis in her bilateral knees, medial compartment.    Review of Systems:    Constitution: Negative for chills, fever, and sweats.  Negative for unexplained weight loss.    HENT:  Negative for headaches and blurry vision.    Cardiovascular:Negative for chest pain or irregular heart beat. Negative for hypertension.    Respiratory:  Negative for cough and shortness of breath.    Gastrointestinal: Negative for abdominal  pain, heartburn, melena, nausea, and vomitting.    Genitourinary:  Negative bladder incontinence and dysuria.    Musculoskeletal:  See HPI for details.     Neurological: Negative for numbness.    Psychiatric/Behavioral: Negative for depression.  The patient is not nervous/anxious.      Endocrine: Negative for polyuria    Hematologic/Lymphatic: Negative for bleeding problem.  Does not bruise/bleed easily.    Skin: Negative for poor would healing and rash    Objective:      Physical Examination:    Vital Signs:  There were no vitals filed for this visit.    Body mass index is 59.52 kg/m².    This a well-developed, well nourished patient in no acute distress.  They are alert and oriented and cooperative to examination.        Bilateral knee exam:  Skin to bilateral knees is clean dry and intact.  There is no erythema or ecchymosis.  There are no signs or symptoms of infection.  Patient is neurovascularly intact throughout bilateral lower extremities.  Bilateral calves are soft and nontender.  Bilateral knee active range of motion is approximately 0-90 degrees.  Both knees are stable to varus and valgus stresses while held in extension.  She has a varus deformity to her bilateral lower extremities.  She has a negative straight leg raise bilaterally.  She is morbidly obese.    Pertinent New Results:    XRAY Report / Interpretation:   No new radiographs were taken on today's clinic visit.    Assessment/Plan:      1.  Bilateral knee osteoarthritis.    I injected her bilateral knees today via an anterior lateral approach with 40 mg of Kenalog and lidocaine respectively.  She tolerated this well.  She can follow up with us in 3 months for repeat injections if she has recurrence of symptoms.  Otherwise if she continues to do well, she can simply cancel and follow up whenever she has a recurrence of symptoms.  More likely than not, she will need total knee arthroplasty at some point in the future.  However, would be encouraged  "to attempt to work towards losing weight between now and then to help improve her postoperative outcome and course.    Franklin Kevin, Physician Assistant, served in the capacity as a "scribe" for this patient encounter.  A "face-to-face" encounter occurred with Dr. Brayan Valerio on this date.  The treatment plan and medical decision-making is outlined above. Patient was seen and examined with a chaperone.       This note was created using Dragon voice recognition software that occasionally misinterpreted phrases or words.          "

## 2022-08-07 ENCOUNTER — NURSE TRIAGE (OUTPATIENT)
Dept: ADMINISTRATIVE | Facility: CLINIC | Age: 47
End: 2022-08-07
Payer: MEDICAID

## 2022-08-07 ENCOUNTER — PATIENT MESSAGE (OUTPATIENT)
Dept: ADMINISTRATIVE | Facility: CLINIC | Age: 47
End: 2022-08-07
Payer: MEDICAID

## 2022-08-07 ENCOUNTER — HOSPITAL ENCOUNTER (EMERGENCY)
Facility: HOSPITAL | Age: 47
Discharge: HOME | End: 2022-08-07
Attending: EMERGENCY MEDICINE
Payer: MEDICAID

## 2022-08-07 VITALS
DIASTOLIC BLOOD PRESSURE: 93 MMHG | SYSTOLIC BLOOD PRESSURE: 136 MMHG | HEART RATE: 102 BPM | OXYGEN SATURATION: 99 % | TEMPERATURE: 99 F | BODY MASS INDEX: 59.44 KG/M2 | WEIGHT: 293 LBS | RESPIRATION RATE: 22 BRPM

## 2022-08-07 DIAGNOSIS — U07.1 COVID: Primary | ICD-10-CM

## 2022-08-07 DIAGNOSIS — R06.02 SOB (SHORTNESS OF BREATH): ICD-10-CM

## 2022-08-07 LAB
ALBUMIN SERPL BCP-MCNC: 3.1 G/DL (ref 3.5–5.2)
ALP SERPL-CCNC: 95 U/L (ref 55–135)
ALT SERPL W/O P-5'-P-CCNC: 84 U/L (ref 10–44)
ANION GAP SERPL CALC-SCNC: 8 MMOL/L (ref 8–16)
AST SERPL-CCNC: 87 U/L (ref 10–40)
BASOPHILS # BLD AUTO: 0.01 K/UL (ref 0–0.2)
BASOPHILS NFR BLD: 0.2 % (ref 0–1.9)
BILIRUB SERPL-MCNC: 0.2 MG/DL (ref 0.1–1)
BNP SERPL-MCNC: 41 PG/ML (ref 0–99)
BUN SERPL-MCNC: 10 MG/DL (ref 6–20)
CALCIUM SERPL-MCNC: 8.9 MG/DL (ref 8.7–10.5)
CHLORIDE SERPL-SCNC: 105 MMOL/L (ref 95–110)
CO2 SERPL-SCNC: 28 MMOL/L (ref 23–29)
CREAT SERPL-MCNC: 1.1 MG/DL (ref 0.5–1.4)
D DIMER PPP IA.FEU-MCNC: 1.61 MG/L FEU
DIFFERENTIAL METHOD: ABNORMAL
EOSINOPHIL # BLD AUTO: 0 K/UL (ref 0–0.5)
EOSINOPHIL NFR BLD: 0.2 % (ref 0–8)
ERYTHROCYTE [DISTWIDTH] IN BLOOD BY AUTOMATED COUNT: 20.1 % (ref 11.5–14.5)
EST. GFR  (NO RACE VARIABLE): >60 ML/MIN/1.73 M^2
GLUCOSE SERPL-MCNC: 172 MG/DL (ref 70–110)
HCT VFR BLD AUTO: 44.2 % (ref 37–48.5)
HGB BLD-MCNC: 14.2 G/DL (ref 12–16)
IMM GRANULOCYTES # BLD AUTO: 0.01 K/UL (ref 0–0.04)
IMM GRANULOCYTES NFR BLD AUTO: 0.2 % (ref 0–0.5)
LYMPHOCYTES # BLD AUTO: 0.6 K/UL (ref 1–4.8)
LYMPHOCYTES NFR BLD: 12 % (ref 18–48)
MCH RBC QN AUTO: 29.5 PG (ref 27–31)
MCHC RBC AUTO-ENTMCNC: 32.1 G/DL (ref 32–36)
MCV RBC AUTO: 92 FL (ref 82–98)
MONOCYTES # BLD AUTO: 0.5 K/UL (ref 0.3–1)
MONOCYTES NFR BLD: 10.8 % (ref 4–15)
NEUTROPHILS # BLD AUTO: 3.8 K/UL (ref 1.8–7.7)
NEUTROPHILS NFR BLD: 76.6 % (ref 38–73)
NRBC BLD-RTO: 0 /100 WBC
PLATELET # BLD AUTO: 216 K/UL (ref 150–450)
PMV BLD AUTO: 11 FL (ref 9.2–12.9)
POTASSIUM SERPL-SCNC: 4.1 MMOL/L (ref 3.5–5.1)
PROT SERPL-MCNC: 8.6 G/DL (ref 6–8.4)
RBC # BLD AUTO: 4.82 M/UL (ref 4–5.4)
SARS-COV-2 RDRP RESP QL NAA+PROBE: POSITIVE
SODIUM SERPL-SCNC: 141 MMOL/L (ref 136–145)
WBC # BLD AUTO: 4.98 K/UL (ref 3.9–12.7)

## 2022-08-07 PROCEDURE — 93010 ELECTROCARDIOGRAM REPORT: CPT | Mod: ,,, | Performed by: INTERNAL MEDICINE

## 2022-08-07 PROCEDURE — 25000242 PHARM REV CODE 250 ALT 637 W/ HCPCS: Performed by: EMERGENCY MEDICINE

## 2022-08-07 PROCEDURE — 83880 ASSAY OF NATRIURETIC PEPTIDE: CPT | Performed by: EMERGENCY MEDICINE

## 2022-08-07 PROCEDURE — 80053 COMPREHEN METABOLIC PANEL: CPT | Performed by: EMERGENCY MEDICINE

## 2022-08-07 PROCEDURE — 85025 COMPLETE CBC W/AUTO DIFF WBC: CPT | Performed by: EMERGENCY MEDICINE

## 2022-08-07 PROCEDURE — 93005 ELECTROCARDIOGRAM TRACING: CPT

## 2022-08-07 PROCEDURE — 36415 COLL VENOUS BLD VENIPUNCTURE: CPT | Performed by: EMERGENCY MEDICINE

## 2022-08-07 PROCEDURE — 27000221 HC OXYGEN, UP TO 24 HOURS

## 2022-08-07 PROCEDURE — 25500020 PHARM REV CODE 255

## 2022-08-07 PROCEDURE — 94640 AIRWAY INHALATION TREATMENT: CPT

## 2022-08-07 PROCEDURE — 93010 EKG 12-LEAD: ICD-10-PCS | Mod: ,,, | Performed by: INTERNAL MEDICINE

## 2022-08-07 PROCEDURE — 85379 FIBRIN DEGRADATION QUANT: CPT | Performed by: EMERGENCY MEDICINE

## 2022-08-07 PROCEDURE — U0002 COVID-19 LAB TEST NON-CDC: HCPCS | Performed by: EMERGENCY MEDICINE

## 2022-08-07 PROCEDURE — 99285 EMERGENCY DEPT VISIT HI MDM: CPT | Mod: 25

## 2022-08-07 RX ORDER — IPRATROPIUM BROMIDE AND ALBUTEROL SULFATE 2.5; .5 MG/3ML; MG/3ML
3 SOLUTION RESPIRATORY (INHALATION)
Status: COMPLETED | OUTPATIENT
Start: 2022-08-07 | End: 2022-08-07

## 2022-08-07 RX ADMIN — IOHEXOL 100 ML: 350 INJECTION, SOLUTION INTRAVENOUS at 01:08

## 2022-08-07 RX ADMIN — IPRATROPIUM BROMIDE AND ALBUTEROL SULFATE 3 ML: .5; 3 SOLUTION RESPIRATORY (INHALATION) at 12:08

## 2022-08-07 NOTE — CARE UPDATE
Pt states she wears 3NC at home, pt currently on 3NC. Pt is SOB pre and post aero tx while resting in the bed.      08/07/22 0044   Patient Assessment/Suction   Level of Consciousness (AVPU) alert   Respiratory Effort Labored;Short of breath   Expansion/Accessory Muscles/Retractions abdominal muscle use;accessory muscle use   All Lung Fields Breath Sounds diminished   CARLIN Breath Sounds diminished;wheezes, expiratory   LLL Breath Sounds diminished   RUL Breath Sounds diminished   RML Breath Sounds diminished   RLL Breath Sounds diminished   Rhythm/Pattern, Respiratory shortness of breath   PRE-TX-O2   O2 Device (Oxygen Therapy) nasal cannula   $ Is the patient on Low Flow Oxygen? Yes   Flow (L/min) 3   SpO2 97 %   Pulse Oximetry Type Continuous   Pulse 109   Resp (!) 24   Aerosol Therapy   $ Aerosol Therapy Charges Aerosol Treatment   Respiratory Treatment Status (SVN) given   Treatment Route (SVN) mask;oxygen   Patient Position (SVN) HOB elevated   Post Treatment Assessment (SVN) breath sounds unchanged   Signs of Intolerance (SVN) none   Breath Sounds Post-Respiratory Treatment   Throughout All Fields Post-Treatment All Fields   Throughout All Fields Post-Treatment aeration increased;diminished   Post-treatment Heart Rate (beats/min) 103   Post-treatment Resp Rate (breaths/min) 24

## 2022-08-07 NOTE — TELEPHONE ENCOUNTER
1st enrollment call attempted with contact made. Pt declined to participate in COVID-19 Surveillance Program. Tasks will be removed. Will enroll into [facility] program    Reason for Disposition   Health Information question, no triage required and triager able to answer question    Protocols used: INFORMATION ONLY CALL - NO TRIAGE-A-

## 2022-08-07 NOTE — ED PROVIDER NOTES
Encounter Date: 2022       History     Chief Complaint   Patient presents with    Shortness of Breath     History of PE and feels like may have again     Chief complaint:  Shortness of breath    HPI:  47-year-old female who is oxygen dependent presents via ambulance with complaints of worsening shortness of breath over the past 1 day.  She admits to an infrequent nonproductive cough.  She has had no fever.  She describes retrosternal chest tightness with breathing.  She has a history of pulmonary emboli and is currently not on anticoagulants.  She has had no lower extremity swelling.  Past medical history is significant for obesity, antithrombin 3 deficiency, anxiety disorder, disc disease, hypertension, protein S deficiency and iron deficiency anemia.        Review of patient's allergies indicates:  No Known Allergies  Past Medical History:   Diagnosis Date    Antithrombin III deficiency 2021    Asthma     Claustrophobia     Elevated factor VIII level 2021    Esophageal reflux     Gastroesophageal reflux    Generalized anxiety disorder     Anxiety, Generalized    Herniated disc     Hypertension     Iron deficiency anemia due to chronic blood loss 10/27/2021    Lower extremity weakness     Morbid obesity     Obesity     Protein S deficiency 2021    Pulmonary embolism     Upper extremity weakness      Past Surgical History:   Procedure Laterality Date     SECTION      CHOLECYSTECTOMY      TONSILLECTOMY       History reviewed. No pertinent family history.  Social History     Tobacco Use    Smoking status: Former Smoker     Types: Cigarettes, Cigars     Quit date: 2019     Years since quitting: 3.6    Smokeless tobacco: Never Used   Substance Use Topics    Alcohol use: Yes     Alcohol/week: 0.8 standard drinks     Types: 1 Standard drinks or equivalent per week     Comment: rarely    Drug use: No     Review of Systems   Constitutional: Negative for activity change,  appetite change, chills, fatigue and fever.   Eyes: Negative for visual disturbance.   Respiratory: Positive for cough and shortness of breath. Negative for apnea.    Cardiovascular: Negative for chest pain, palpitations and leg swelling.   Gastrointestinal: Negative for abdominal distention and abdominal pain.   Genitourinary: Negative for difficulty urinating.   Musculoskeletal: Negative for neck pain.   Skin: Negative for pallor and rash.   Neurological: Negative for headaches.   Hematological: Does not bruise/bleed easily.   Psychiatric/Behavioral: Negative for agitation.       Physical Exam     Initial Vitals [08/07/22 0004]   BP Pulse Resp Temp SpO2   (!) 136/93 104 (!) 28 98.9 °F (37.2 °C) (!) 93 %      MAP       --         Physical Exam    Nursing note and vitals reviewed.  Constitutional: She appears well-developed and well-nourished.   HENT:   Head: Normocephalic and atraumatic.   Eyes: Conjunctivae are normal.   Neck: Neck supple.   Normal range of motion.  Cardiovascular: Regular rhythm and normal heart sounds. Exam reveals no gallop and no friction rub.    No murmur heard.  Tachycardic rate   Pulmonary/Chest: Effort normal and breath sounds normal. No respiratory distress. She has no wheezes. She has no rhonchi. She has no rales.   Abdominal: Abdomen is soft. She exhibits no distension. There is no abdominal tenderness.   Musculoskeletal:         General: Normal range of motion.      Cervical back: Normal range of motion and neck supple.     Neurological: She is alert and oriented to person, place, and time. GCS score is 15. GCS eye subscore is 4. GCS verbal subscore is 5. GCS motor subscore is 6.   Skin: Skin is warm and dry. No erythema.   Psychiatric: She has a normal mood and affect.         ED Course   Procedures  Labs Reviewed   CBC W/ AUTO DIFFERENTIAL - Abnormal; Notable for the following components:       Result Value    RDW 20.1 (*)     Lymph # 0.6 (*)     Gran % 76.6 (*)     Lymph % 12.0 (*)      All other components within normal limits   COMPREHENSIVE METABOLIC PANEL - Abnormal; Notable for the following components:    Glucose 172 (*)     Total Protein 8.6 (*)     Albumin 3.1 (*)     AST 87 (*)     ALT 84 (*)     All other components within normal limits   SARS-COV-2 RNA AMPLIFICATION, QUAL - Abnormal; Notable for the following components:    SARS-CoV-2 RNA, Amplification, Qual Positive (*)     All other components within normal limits   D DIMER, QUANTITATIVE - Abnormal; Notable for the following components:    D-Dimer 1.61 (*)     All other components within normal limits   B-TYPE NATRIURETIC PEPTIDE     EKG Readings: (Independently Interpreted)   Rhythm: Sinus Arrhythmia. Heart Rate: 98. Ectopy: No Ectopy. Conduction: Normal. ST Segments: Normal ST Segments. T Waves: Normal. Axis: Normal. Clinical Impression: Sinus Arrhythmia       Imaging Results          CTA Chest Non-Coronary(PE Studies) (In process)                X-Ray Chest PA And Lateral (In process)  Result time 08/07/22 00:56:44                 Medications   albuterol-ipratropium 2.5 mg-0.5 mg/3 mL nebulizer solution 3 mL (3 mLs Nebulization Given 8/7/22 0044)   iohexoL (OMNIPAQUE 350) 350 mg iodine/mL injection (100 mLs Intravenous Given 8/7/22 0130)     Medical Decision Making:   ED Management:  47-year-old female with a history of oxygen-dependent asthma presents via ambulance with fever.  She is accompanied by her daughter who is COVID positive and is found to have COVID as well.  She has a history of pulmonary embolism; therefore, CT is obtained to exclude same with no evidence of PE.                      Clinical Impression:   Final diagnoses:  [R06.02] SOB (shortness of breath)  [U07.1] COVID (Primary)          ED Disposition Condition    Discharge Stable        ED Prescriptions     None        Follow-up Information     Follow up With Specialties Details Why Contact Info    Harpreet Rendon MD Internal Medicine In 1 week  [address]  [facility] [address]  [phone]             Carrillo Cartagena III, MD  08/07/22 0623

## 2022-08-09 ENCOUNTER — TELEPHONE (OUTPATIENT)
Dept: INFUSION THERAPY | Facility: HOSPITAL | Age: 47
End: 2022-08-09
Payer: MEDICAID

## 2022-08-09 NOTE — TELEPHONE ENCOUNTER
----- Message from Josy Rod sent at 8/9/2022  2:13 PM CDT -----  Type:  Patient Returning Call    Who Called:  Patient   Who Left Message for Patient:  Michaela  Does the patient know what this is regarding?:  yes  Best Call Back Number:  [phone]  Additional Information:  Patient is returning missed call

## 2022-08-09 NOTE — TELEPHONE ENCOUNTER
Pt does not have transportation, setting up transport for Friday at 130 for EUA infusion however it was thoroughly discussed with her that I could not guarantee that this would be available due to staffing and drug supply, but this was likely the best option if she needed the 2 days to set up transportation.    Will be in touch in the coming days to determine if we are able to treat her on Friday.

## 2022-08-11 RX ORDER — ONDANSETRON 4 MG/1
4 TABLET, ORALLY DISINTEGRATING ORAL
Status: ACTIVE | OUTPATIENT
Start: 2022-08-11 | End: 2022-08-12

## 2022-08-11 RX ORDER — ALBUTEROL SULFATE 90 UG/1
2 AEROSOL, METERED RESPIRATORY (INHALATION)
Status: ACTIVE | OUTPATIENT
Start: 2022-08-11 | End: 2022-08-14

## 2022-08-11 RX ORDER — DIPHENHYDRAMINE HYDROCHLORIDE 50 MG/ML
25 INJECTION INTRAMUSCULAR; INTRAVENOUS
Status: ACTIVE | OUTPATIENT
Start: 2022-08-11 | End: 2022-08-12

## 2022-08-11 RX ORDER — BEBTELOVIMAB 87.5 MG/ML
175 INJECTION, SOLUTION INTRAVENOUS
Status: ACTIVE | OUTPATIENT
Start: 2022-08-11

## 2022-08-11 RX ORDER — EPINEPHRINE 0.3 MG/.3ML
0.3 INJECTION SUBCUTANEOUS
Status: ACTIVE | OUTPATIENT
Start: 2022-08-11 | End: 2022-08-14

## 2022-08-11 RX ORDER — ACETAMINOPHEN 325 MG/1
650 TABLET ORAL
Status: ACTIVE | OUTPATIENT
Start: 2022-08-11 | End: 2022-08-12

## 2022-08-12 ENCOUNTER — INFUSION (OUTPATIENT)
Dept: INFECTIOUS DISEASES | Facility: HOSPITAL | Age: 47
End: 2022-08-12
Attending: EMERGENCY MEDICINE
Payer: MEDICAID

## 2022-08-12 VITALS — OXYGEN SATURATION: 93 % | HEART RATE: 100 BPM | RESPIRATION RATE: 16 BRPM

## 2022-08-12 DIAGNOSIS — U07.1 COVID: ICD-10-CM

## 2022-08-12 NOTE — PROGRESS NOTES
Pt arrived at [facility] infusion site. Pt sat in chair x 10 min, /96. Pt sat for another 30 min, manual /100. Informed pt we are not able to treat pt d/t being hypertensive. Instructed pt she need to go the ER for evaluation. Pt d/c via transportation.

## 2022-08-18 DIAGNOSIS — J96.11 CHRONIC HYPOXEMIC RESPIRATORY FAILURE: ICD-10-CM

## 2022-08-18 DIAGNOSIS — J45.909 ASTHMA, UNSPECIFIED ASTHMA SEVERITY, UNSPECIFIED WHETHER COMPLICATED, UNSPECIFIED WHETHER PERSISTENT: Primary | ICD-10-CM

## 2022-08-18 RX ORDER — ALBUTEROL SULFATE 90 UG/1
2 AEROSOL, METERED RESPIRATORY (INHALATION) EVERY 6 HOURS PRN
Qty: 18 G | Refills: 5 | Status: SHIPPED | OUTPATIENT
Start: 2022-08-18 | End: 2023-03-21 | Stop reason: SDUPTHER

## 2022-08-19 ENCOUNTER — TELEPHONE (OUTPATIENT)
Dept: INFUSION THERAPY | Facility: HOSPITAL | Age: 47
End: 2022-08-19
Payer: MEDICAID

## 2022-08-19 ENCOUNTER — TELEPHONE (OUTPATIENT)
Dept: HEMATOLOGY/ONCOLOGY | Facility: CLINIC | Age: 47
End: 2022-08-19
Payer: MEDICAID

## 2022-08-19 NOTE — TELEPHONE ENCOUNTER
Katiuska called and requested number to [facility] [employer]. SW provided number and days it is opened. Pt asked to speak to someone regarding results of her scan. MAXIME contacted Cecilia LONDONO nurse navigator who will call pt to answer questions for pt.     Pt good with plan.    Zoila Thomson, ELIW

## 2022-08-19 NOTE — NURSING
Received message from MAXIME Cummings, that pt was inquiring about her MRI results and next steps.  Spoke to pt.  Per Dr. Pro - results were normal.  She is not a surgical candidate and there is no evidence of cancer.  She is to f/u with Dr. Rubio.  Pt said she has not had any bleeding or pain in several weeks no.  Reviewed contact information again and encouraged her to call with any questions or concerns.  Pt thanked me and verbalized understanding.

## 2022-08-22 ENCOUNTER — DOCUMENTATION ONLY (OUTPATIENT)
Dept: INFUSION THERAPY | Facility: HOSPITAL | Age: 47
End: 2022-08-22
Payer: MEDICAID

## 2022-08-22 NOTE — PROGRESS NOTES
4:31 PM    This LCSW called this pt after receiving a message to call this pt due to a personal matter.  The pt was inquiring about getting food delivered to her home.  This pt expressed understanding that she is not a cancer patient, but wanted to know if we know of someone that can deliver food to her home since she has no transportation.    This LCSW informed the pt of how the [employer] works and provided her with the [facility] [employer] contact information and informed the pt about [employer] [facility] in [place].    The pt expressed understanding and thanked me for the information.    The pt reported no other needs at this time.

## 2022-09-08 ENCOUNTER — OFFICE VISIT (OUTPATIENT)
Dept: PULMONOLOGY | Facility: CLINIC | Age: 47
End: 2022-09-08
Payer: MEDICAID

## 2022-09-08 VITALS
HEIGHT: 63 IN | BODY MASS INDEX: 51.91 KG/M2 | DIASTOLIC BLOOD PRESSURE: 99 MMHG | SYSTOLIC BLOOD PRESSURE: 170 MMHG | WEIGHT: 293 LBS

## 2022-09-08 DIAGNOSIS — E66.2 OBESITY HYPOVENTILATION SYNDROME: ICD-10-CM

## 2022-09-08 DIAGNOSIS — J45.40 MODERATE PERSISTENT ASTHMA, UNSPECIFIED WHETHER COMPLICATED: ICD-10-CM

## 2022-09-08 DIAGNOSIS — J96.11 CHRONIC HYPOXEMIC RESPIRATORY FAILURE: ICD-10-CM

## 2022-09-08 DIAGNOSIS — G47.33 OSA (OBSTRUCTIVE SLEEP APNEA): Primary | ICD-10-CM

## 2022-09-08 PROCEDURE — 4010F ACE/ARB THERAPY RXD/TAKEN: CPT | Mod: CPTII,S$GLB,, | Performed by: NURSE PRACTITIONER

## 2022-09-08 PROCEDURE — 3077F PR MOST RECENT SYSTOLIC BLOOD PRESSURE >= 140 MM HG: ICD-10-PCS | Mod: CPTII,S$GLB,, | Performed by: NURSE PRACTITIONER

## 2022-09-08 PROCEDURE — 3008F BODY MASS INDEX DOCD: CPT | Mod: CPTII,S$GLB,, | Performed by: NURSE PRACTITIONER

## 2022-09-08 PROCEDURE — 3080F PR MOST RECENT DIASTOLIC BLOOD PRESSURE >= 90 MM HG: ICD-10-PCS | Mod: CPTII,S$GLB,, | Performed by: NURSE PRACTITIONER

## 2022-09-08 PROCEDURE — 4010F PR ACE/ARB THEARPY RXD/TAKEN: ICD-10-PCS | Mod: CPTII,S$GLB,, | Performed by: NURSE PRACTITIONER

## 2022-09-08 PROCEDURE — 1159F PR MEDICATION LIST DOCUMENTED IN MEDICAL RECORD: ICD-10-PCS | Mod: CPTII,S$GLB,, | Performed by: NURSE PRACTITIONER

## 2022-09-08 PROCEDURE — 3077F SYST BP >= 140 MM HG: CPT | Mod: CPTII,S$GLB,, | Performed by: NURSE PRACTITIONER

## 2022-09-08 PROCEDURE — 3080F DIAST BP >= 90 MM HG: CPT | Mod: CPTII,S$GLB,, | Performed by: NURSE PRACTITIONER

## 2022-09-08 PROCEDURE — 99214 OFFICE O/P EST MOD 30 MIN: CPT | Mod: S$GLB,,, | Performed by: NURSE PRACTITIONER

## 2022-09-08 PROCEDURE — 99214 PR OFFICE/OUTPT VISIT, EST, LEVL IV, 30-39 MIN: ICD-10-PCS | Mod: S$GLB,,, | Performed by: NURSE PRACTITIONER

## 2022-09-08 PROCEDURE — 3008F PR BODY MASS INDEX (BMI) DOCUMENTED: ICD-10-PCS | Mod: CPTII,S$GLB,, | Performed by: NURSE PRACTITIONER

## 2022-09-08 PROCEDURE — 1159F MED LIST DOCD IN RCRD: CPT | Mod: CPTII,S$GLB,, | Performed by: NURSE PRACTITIONER

## 2022-09-08 RX ORDER — FLUTICASONE PROPIONATE AND SALMETEROL XINAFOATE 230; 21 UG/1; UG/1
2 AEROSOL, METERED RESPIRATORY (INHALATION) 2 TIMES DAILY
Qty: 12 G | Refills: 6 | Status: SHIPPED | OUTPATIENT
Start: 2022-09-08 | End: 2023-03-21 | Stop reason: SDUPTHER

## 2022-09-08 NOTE — PATIENT INSTRUCTIONS
You need to sleep on CPAP every night  Stop the Flovent  Start Advair HFA 2 puffs twice a day   You need to see ENT about chronic sinus infections   Wear your oxygen

## 2022-09-08 NOTE — PROGRESS NOTES
SUBJECTIVE:    Patient ID: Katiuska Preston is a 47 y.o. female.    Chief Complaint: No chief complaint on file.    HPI     Patient here today feeling alright.  She had Covid in August and has been suffering with sinus congestion since. She states she has always suffered with her sinuses since she was 9.  She is using the Flovent twice a day but has been feeling like she needs to use her rescue inhaler more.  She did get her CPAP and has been having issues getting used to it due to Sinus infection and Covid, but she is trying. She is aware of how severe her SINGH is. Her walk was hypoxemic showed that she needs to be on 2 liters of oxygen.      Past Medical History:   Diagnosis Date    Antithrombin III deficiency 2021    Asthma     Claustrophobia     Elevated factor VIII level 2021    Esophageal reflux     Gastroesophageal reflux    Generalized anxiety disorder     Anxiety, Generalized    Herniated disc     Hypertension     Iron deficiency anemia due to chronic blood loss 10/27/2021    Lower extremity weakness     Morbid obesity     Obesity     Protein S deficiency 2021    Pulmonary embolism     Upper extremity weakness      Past Surgical History:   Procedure Laterality Date     SECTION      CHOLECYSTECTOMY      TONSILLECTOMY       History reviewed. No pertinent family history.     Social History:   Marital Status:   Occupation: Data Unavailable  Alcohol History:  reports current alcohol use of about 0.8 standard drinks per week.  Tobacco History:  reports that she quit smoking about 3 years ago. Her smoking use included cigarettes and cigars. She has never used smokeless tobacco.  Drug History:  reports no history of drug use.    Review of patient's allergies indicates:  No Known Allergies    Current Outpatient Medications   Medication Sig Dispense Refill    albuterol (PROVENTIL/VENTOLIN HFA) 90 mcg/actuation inhaler Inhale 2 puffs into the lungs every 6 (six) hours as needed for  Wheezing. Rescue 18 g 5    atorvastatin (LIPITOR) 10 MG tablet TAKE 1 TABLET BY MOUTH EVERY DAY TO LOWER LDL CHOLESTEROL      ELIQUIS 5 mg Tab TAKE 1 TABLET(5 MG) BY MOUTH TWICE DAILY 60 tablet 3    ergocalciferol (ERGOCALCIFEROL) 50,000 unit Cap Take 50,000 Units by mouth twice a week.      famotidine (PEPCID) 20 MG tablet Take 20 mg by mouth 2 (two) times daily.      FEROSUL 325 mg (65 mg iron) Tab tablet TAKE 1 TABLET BY MOUTH EVERY DAY 30 tablet 3    fluticasone propionate (FLONASE) 50 mcg/actuation nasal spray 2 sprays by Each Nostril route daily as needed for Rhinitis.      losartan (COZAAR) 25 MG tablet Take 25 mg by mouth once daily.      meloxicam (MOBIC) 7.5 MG tablet Take 7.5 mg by mouth once daily.      metoprolol succinate (TOPROL-XL) 25 MG 24 hr tablet Take 25 mg by mouth once daily.      NEXIUM 40 mg capsule Take 40 mg by mouth once daily.      omeprazole (PRILOSEC) 20 MG capsule Take 20 mg by mouth once daily.      trazodone (DESYREL) 100 MG tablet Take 100 mg by mouth every evening.      carvediloL (COREG) 12.5 MG tablet Take 1 tablet (12.5 mg total) by mouth 2 (two) times daily with meals. 60 tablet 0    fluticasone-salmeterol 230-21 mcg/dose (ADVAIR HFA) 230-21 mcg/actuation HFAA inhaler Inhale 2 puffs into the lungs 2 (two) times daily. Controller 12 g 6    furosemide (LASIX) 20 MG tablet Take 1 tablet (20 mg total) by mouth once daily. 30 tablet 0    lisinopriL (PRINIVIL,ZESTRIL) 20 MG tablet Take 1 tablet (20 mg total) by mouth once daily. 40 tablet 0     Current Facility-Administered Medications   Medication Dose Route Frequency Provider Last Rate Last Admin    bebtelovimab (EUA) 175 mg/2 mL (87.5 mg/mL) injection 175 mg  175 mg Intravenous 1 time in Clinic/HOD Sherrie Parker MD         CTA 08/2022  Impression:     No evidence of central pulmonary embolus.     Cardiomegaly.     Diffuse mosaic attenuation as may be seen in the setting pulmonary hypertension, small airways disease or  "pulmonary edema.    Last CTA:05/2021  IMPRESSION:     1.  Limited evaluation due to body habitus and inadequate bolus  timing. Filling defects identified in segmental and subsegmental  artery supplying the left lower lobe, consistent with pulmonary  emboli. There are questionable pulmonary emboli in the right mainstem  pulmonary artery and segmental artery supplying the right lower lobe.  Findings reported to Dr.Lloyd Duffy at 5/1/2021.  2.  Bilateral diffuse groundglass lung opacities with peripheral  wedge-shaped opacities may reflect changes of poor inspiration and  atelectasis. Atypical infectious process could also have a similar  appearance the proper clinical setting    Review of Systems  General: Feeling Well. Snores, chronic fatigue   Eyes: Vision is good.  ENT:  recurrent sinus infections   Heart:: No chest pain or palpitation.  Lungs: using her rescue inhaler more   GI: No Nausea, vomiting, constipation, diarrhea, or reflux.  : frequent night time urination,   Musculoskeletal: No joint pain or myalgias.  Skin: No lesions or rashes.  Neuro: No headaches or neuropathy.  Lymph: swelling to legs   Psych: anxiety .  Endo: weight loss    OBJECTIVE:      BP (!) 170/99   Ht 5' 3" (1.6 m)   Wt (!) 152.2 kg (335 lb 8.6 oz)   BMI 59.44 kg/m²   Heart rate 78   pulse ox 93%  Physical Exam  GENERAL: middle aged patient in no distress.  HEENT: Pupils equal and reactive. Extraocular movements intact. Nose intact.      Pharynx moist.   NECK: Supple.   HEART: Regular rate and rhythm. No murmur or gallop auscultated.  LUNGS: decreased breath sounds secondary to body habitus  ABDOMEN:morbidly obese  Bowel sounds present. Non-tender, no masses palpated.  EXTREMITIES: Normal muscle tone and joint movement, no cyanosis or clubbing.   LYMPHATICS: No adenopathy palpated, 1-2+ edema to legs   SKIN: Dry, intact, no lesions.   NEURO: Cranial nerves II-XII intact. Motor strength 5/5 bilaterally, upper and lower " extremities.  PSYCH: Appropriate affect.    Assessment:       1. SINGH (obstructive sleep apnea)    2. Obesity hypoventilation syndrome    3. Chronic hypoxemic respiratory failure    4. Moderate persistent asthma, unspecified whether complicated            Plan:       SINGH (obstructive sleep apnea)    Obesity hypoventilation syndrome    Chronic hypoxemic respiratory failure    Moderate persistent asthma, unspecified whether complicated    Other orders  -     fluticasone-salmeterol 230-21 mcg/dose (ADVAIR HFA) 230-21 mcg/actuation HFAA inhaler; Inhale 2 puffs into the lungs 2 (two) times daily. Controller  Dispense: 12 g; Refill: 6        You need to sleep on CPAP every night  Stop the Flovent  Start Advair HFA 2 puffs twice a day   You need to see ENT about chronic sinus infections   Wear your oxygen     Follow up in about 6 months (around 3/8/2023).

## 2022-10-15 ENCOUNTER — HOSPITAL ENCOUNTER (EMERGENCY)
Facility: HOSPITAL | Age: 47
Discharge: LEFT BEFORE DISCHARGE | End: 2022-10-15
Attending: EMERGENCY MEDICINE
Payer: MEDICAID

## 2022-10-15 VITALS
HEART RATE: 95 BPM | RESPIRATION RATE: 29 BRPM | OXYGEN SATURATION: 94 % | DIASTOLIC BLOOD PRESSURE: 122 MMHG | WEIGHT: 293 LBS | BODY MASS INDEX: 51.91 KG/M2 | SYSTOLIC BLOOD PRESSURE: 184 MMHG | TEMPERATURE: 98 F | HEIGHT: 63 IN

## 2022-10-15 DIAGNOSIS — R06.02 SHORTNESS OF BREATH: ICD-10-CM

## 2022-10-15 DIAGNOSIS — I10 HYPERTENSION, UNSPECIFIED TYPE: Primary | ICD-10-CM

## 2022-10-15 DIAGNOSIS — J45.901 EXACERBATION OF ASTHMA, UNSPECIFIED ASTHMA SEVERITY, UNSPECIFIED WHETHER PERSISTENT: ICD-10-CM

## 2022-10-15 LAB
ALBUMIN SERPL BCP-MCNC: 3.2 G/DL (ref 3.5–5.2)
ALP SERPL-CCNC: 85 U/L (ref 55–135)
ALT SERPL W/O P-5'-P-CCNC: 41 U/L (ref 10–44)
ANION GAP SERPL CALC-SCNC: 11 MMOL/L (ref 8–16)
AST SERPL-CCNC: 44 U/L (ref 10–40)
B-HCG UR QL: NEGATIVE
BACTERIA #/AREA URNS HPF: NEGATIVE /HPF
BASOPHILS # BLD AUTO: 0.02 K/UL (ref 0–0.2)
BASOPHILS NFR BLD: 0.3 % (ref 0–1.9)
BILIRUB SERPL-MCNC: 0.5 MG/DL (ref 0.1–1)
BILIRUB UR QL STRIP: NEGATIVE
BNP SERPL-MCNC: 19 PG/ML (ref 0–99)
BUN SERPL-MCNC: 11 MG/DL (ref 6–20)
CALCIUM SERPL-MCNC: 8.9 MG/DL (ref 8.7–10.5)
CHLORIDE SERPL-SCNC: 102 MMOL/L (ref 95–110)
CLARITY UR: CLEAR
CO2 SERPL-SCNC: 26 MMOL/L (ref 23–29)
COLOR UR: YELLOW
CREAT SERPL-MCNC: 1.2 MG/DL (ref 0.5–1.4)
CTP QC/QA: YES
D DIMER PPP IA.FEU-MCNC: 0.37 UG/ML FEU
DIFFERENTIAL METHOD: ABNORMAL
EOSINOPHIL # BLD AUTO: 0.1 K/UL (ref 0–0.5)
EOSINOPHIL NFR BLD: 0.8 % (ref 0–8)
ERYTHROCYTE [DISTWIDTH] IN BLOOD BY AUTOMATED COUNT: 13.5 % (ref 11.5–14.5)
EST. GFR  (NO RACE VARIABLE): 55.8 ML/MIN/1.73 M^2
GLUCOSE SERPL-MCNC: 150 MG/DL (ref 70–110)
GLUCOSE UR QL STRIP: NEGATIVE
HCT VFR BLD AUTO: 39 % (ref 37–48.5)
HGB BLD-MCNC: 12.6 G/DL (ref 12–16)
HGB UR QL STRIP: ABNORMAL
HYALINE CASTS #/AREA URNS LPF: 8 /LPF
IMM GRANULOCYTES # BLD AUTO: 0.01 K/UL (ref 0–0.04)
IMM GRANULOCYTES NFR BLD AUTO: 0.1 % (ref 0–0.5)
INFLUENZA A, MOLECULAR: NEGATIVE
INFLUENZA B, MOLECULAR: NEGATIVE
KETONES UR QL STRIP: NEGATIVE
LEUKOCYTE ESTERASE UR QL STRIP: NEGATIVE
LYMPHOCYTES # BLD AUTO: 0.7 K/UL (ref 1–4.8)
LYMPHOCYTES NFR BLD: 9.3 % (ref 18–48)
MCH RBC QN AUTO: 32.5 PG (ref 27–31)
MCHC RBC AUTO-ENTMCNC: 32.3 G/DL (ref 32–36)
MCV RBC AUTO: 101 FL (ref 82–98)
MICROSCOPIC COMMENT: ABNORMAL
MONOCYTES # BLD AUTO: 0.5 K/UL (ref 0.3–1)
MONOCYTES NFR BLD: 6.2 % (ref 4–15)
NEUTROPHILS # BLD AUTO: 6.6 K/UL (ref 1.8–7.7)
NEUTROPHILS NFR BLD: 83.3 % (ref 38–73)
NITRITE UR QL STRIP: NEGATIVE
NRBC BLD-RTO: 0 /100 WBC
PH UR STRIP: 6 [PH] (ref 5–8)
PLATELET # BLD AUTO: 333 K/UL (ref 150–450)
PMV BLD AUTO: 10 FL (ref 9.2–12.9)
POTASSIUM SERPL-SCNC: 3.5 MMOL/L (ref 3.5–5.1)
PROT SERPL-MCNC: 7.8 G/DL (ref 6–8.4)
PROT UR QL STRIP: ABNORMAL
RBC # BLD AUTO: 3.88 M/UL (ref 4–5.4)
RBC #/AREA URNS HPF: 4 /HPF (ref 0–4)
SARS-COV-2 RDRP RESP QL NAA+PROBE: NEGATIVE
SODIUM SERPL-SCNC: 139 MMOL/L (ref 136–145)
SP GR UR STRIP: 1.02 (ref 1–1.03)
SPECIMEN SOURCE: NORMAL
SQUAMOUS #/AREA URNS HPF: 3 /HPF
TROPONIN I SERPL DL<=0.01 NG/ML-MCNC: <0.03 NG/ML
URN SPEC COLLECT METH UR: ABNORMAL
UROBILINOGEN UR STRIP-ACNC: NEGATIVE EU/DL
WBC # BLD AUTO: 7.88 K/UL (ref 3.9–12.7)
WBC #/AREA URNS HPF: 2 /HPF (ref 0–5)

## 2022-10-15 PROCEDURE — 94644 CONT INHLJ TX 1ST HOUR: CPT

## 2022-10-15 PROCEDURE — 81001 URINALYSIS AUTO W/SCOPE: CPT | Performed by: EMERGENCY MEDICINE

## 2022-10-15 PROCEDURE — 87502 INFLUENZA DNA AMP PROBE: CPT | Performed by: EMERGENCY MEDICINE

## 2022-10-15 PROCEDURE — 93010 ELECTROCARDIOGRAM REPORT: CPT | Mod: ,,, | Performed by: INTERNAL MEDICINE

## 2022-10-15 PROCEDURE — 81025 URINE PREGNANCY TEST: CPT | Performed by: EMERGENCY MEDICINE

## 2022-10-15 PROCEDURE — 84484 ASSAY OF TROPONIN QUANT: CPT | Performed by: EMERGENCY MEDICINE

## 2022-10-15 PROCEDURE — 93005 ELECTROCARDIOGRAM TRACING: CPT | Performed by: INTERNAL MEDICINE

## 2022-10-15 PROCEDURE — 25000003 PHARM REV CODE 250: Performed by: EMERGENCY MEDICINE

## 2022-10-15 PROCEDURE — 85379 FIBRIN DEGRADATION QUANT: CPT | Performed by: EMERGENCY MEDICINE

## 2022-10-15 PROCEDURE — 96375 TX/PRO/DX INJ NEW DRUG ADDON: CPT | Mod: 59

## 2022-10-15 PROCEDURE — 25500020 PHARM REV CODE 255: Performed by: EMERGENCY MEDICINE

## 2022-10-15 PROCEDURE — 99900035 HC TECH TIME PER 15 MIN (STAT)

## 2022-10-15 PROCEDURE — 99900031 HC PATIENT EDUCATION (STAT)

## 2022-10-15 PROCEDURE — U0002 COVID-19 LAB TEST NON-CDC: HCPCS | Performed by: EMERGENCY MEDICINE

## 2022-10-15 PROCEDURE — 83880 ASSAY OF NATRIURETIC PEPTIDE: CPT | Performed by: EMERGENCY MEDICINE

## 2022-10-15 PROCEDURE — 63600175 PHARM REV CODE 636 W HCPCS: Performed by: EMERGENCY MEDICINE

## 2022-10-15 PROCEDURE — 96374 THER/PROPH/DIAG INJ IV PUSH: CPT | Mod: 59

## 2022-10-15 PROCEDURE — 99285 EMERGENCY DEPT VISIT HI MDM: CPT | Mod: 25

## 2022-10-15 PROCEDURE — 25000242 PHARM REV CODE 250 ALT 637 W/ HCPCS: Performed by: EMERGENCY MEDICINE

## 2022-10-15 PROCEDURE — 80053 COMPREHEN METABOLIC PANEL: CPT | Performed by: EMERGENCY MEDICINE

## 2022-10-15 PROCEDURE — 96376 TX/PRO/DX INJ SAME DRUG ADON: CPT

## 2022-10-15 PROCEDURE — 93010 EKG 12-LEAD: ICD-10-PCS | Mod: ,,, | Performed by: INTERNAL MEDICINE

## 2022-10-15 PROCEDURE — 85025 COMPLETE CBC W/AUTO DIFF WBC: CPT | Performed by: EMERGENCY MEDICINE

## 2022-10-15 RX ORDER — MINOCYCLINE HYDROCHLORIDE 100 MG/1
100 CAPSULE ORAL 2 TIMES DAILY
COMMUNITY
Start: 2022-09-26

## 2022-10-15 RX ORDER — TRAMADOL HYDROCHLORIDE 50 MG/1
50 TABLET ORAL
COMMUNITY
Start: 2022-10-03 | End: 2023-02-07

## 2022-10-15 RX ORDER — LABETALOL HYDROCHLORIDE 5 MG/ML
10 INJECTION, SOLUTION INTRAVENOUS
Status: COMPLETED | OUTPATIENT
Start: 2022-10-15 | End: 2022-10-15

## 2022-10-15 RX ORDER — LABETALOL HYDROCHLORIDE 5 MG/ML
20 INJECTION, SOLUTION INTRAVENOUS
Status: COMPLETED | OUTPATIENT
Start: 2022-10-15 | End: 2022-10-15

## 2022-10-15 RX ORDER — MISOPROSTOL 200 UG/1
TABLET ORAL
COMMUNITY
Start: 2022-10-11

## 2022-10-15 RX ORDER — BUTALBITAL, ACETAMINOPHEN AND CAFFEINE 50; 325; 40 MG/1; MG/1; MG/1
1 TABLET ORAL EVERY 4 HOURS PRN
COMMUNITY
Start: 2022-10-13

## 2022-10-15 RX ORDER — IPRATROPIUM BROMIDE AND ALBUTEROL SULFATE 2.5; .5 MG/3ML; MG/3ML
3 SOLUTION RESPIRATORY (INHALATION)
Status: DISPENSED | OUTPATIENT
Start: 2022-10-15 | End: 2022-10-15

## 2022-10-15 RX ORDER — LEVONORGESTREL 52 MG/1
INTRAUTERINE DEVICE INTRAUTERINE
COMMUNITY
Start: 2022-10-04

## 2022-10-15 RX ORDER — PREDNISONE 20 MG/1
40 TABLET ORAL DAILY
Qty: 10 TABLET | Refills: 0 | Status: SHIPPED | OUTPATIENT
Start: 2022-10-15 | End: 2022-10-20

## 2022-10-15 RX ORDER — METHYLPREDNISOLONE SOD SUCC 125 MG
125 VIAL (EA) INJECTION
Status: COMPLETED | OUTPATIENT
Start: 2022-10-15 | End: 2022-10-15

## 2022-10-15 RX ORDER — OLMESARTAN MEDOXOMIL 20 MG/1
20 TABLET ORAL DAILY
Status: ON HOLD | COMMUNITY
Start: 2022-10-10 | End: 2022-12-28 | Stop reason: HOSPADM

## 2022-10-15 RX ADMIN — IPRATROPIUM BROMIDE AND ALBUTEROL SULFATE 9 ML: .5; 3 SOLUTION RESPIRATORY (INHALATION) at 05:10

## 2022-10-15 RX ADMIN — IOHEXOL 100 ML: 350 INJECTION, SOLUTION INTRAVENOUS at 07:10

## 2022-10-15 RX ADMIN — LABETALOL HYDROCHLORIDE 10 MG: 5 INJECTION, SOLUTION INTRAVENOUS at 08:10

## 2022-10-15 RX ADMIN — METHYLPREDNISOLONE SODIUM SUCCINATE 125 MG: 125 INJECTION, POWDER, FOR SOLUTION INTRAMUSCULAR; INTRAVENOUS at 06:10

## 2022-10-15 RX ADMIN — LABETALOL HYDROCHLORIDE 20 MG: 5 INJECTION, SOLUTION INTRAVENOUS at 09:10

## 2022-10-15 NOTE — ED NOTES
LOC: The patient is awake, alert and aware of environment with an appropriate affect, the patient is oriented x 3 and speaking appropriately.  APPEARANCE: Patient resting comfortably and in no acute distress, patient is clean and well groomed, patient's clothing properly fastened.  SKIN: The skin is warm and dry, color consistent with ethnicity, patient has normal skin turgor and moist mucus membranes, skin intact, no breakdown or brusing noted.  MUSKULOSKELETAL: Patient moving all extremities well, no obvious swelling or deformities noted.  RESPIRATORY: Airway is open and patent, respirations are spontaneous, patient has a normal effort and rate. Pt reports shortness of breath that started a few hours prior to arrival.  CARDIAC: Patient has a normal rate and rhythm, no peripheral edema noted, capillary refill < 3 seconds.  ABDOMEN: Soft and non tender to palpation, no distention noted.  NEUROLOGIC: PERRL, 3mm bilaterally, eyes open spontaneously, behavior appropriate to situation, follows commands, facial expression symmetrical, bilateral hand grasp equal and even, purposeful motor response noted, normal sensation in all extremities when touched with a finger.

## 2022-10-15 NOTE — ED PROVIDER NOTES
Encounter Date: 10/15/2022       History     Chief Complaint   Patient presents with    Shortness of Breath    Fever    Weakness     Pt states sx started Monday     [age] year old female past medical history of PE, hypertension, asthma brought in by EMS secondary to shortness of breath episode.  Patient states she began feeling short of breath since Monday but has progressively gotten worse but no relief.  She states she is for a because of her PE history.  She states he also recently started taking her Eliquis again 5 days prior but with without taking Eliquis since April of this year before restarting it.  She reports chest heaviness associated with wheezing and chills.  She denies any fevers, nausea, vomiting, cough.  Patient is otherwise stable and has no other complaints.    Review of patient's allergies indicates:  No Known Allergies  Past Medical History:   Diagnosis Date    Antithrombin III deficiency 2021    Asthma     Claustrophobia     Elevated factor VIII level 2021    Esophageal reflux     Gastroesophageal reflux    Generalized anxiety disorder     Anxiety, Generalized    Herniated disc     Hypertension     Iron deficiency anemia due to chronic blood loss 10/27/2021    Lower extremity weakness     Morbid obesity     Obesity     Protein S deficiency 2021    Pulmonary embolism     Upper extremity weakness      Past Surgical History:   Procedure Laterality Date     SECTION      CHOLECYSTECTOMY      TONSILLECTOMY       No family history on file.  Social History     Tobacco Use    Smoking status: Former     Types: Cigarettes, Cigars     Quit date: 2019     Years since quitting: 3.7    Smokeless tobacco: Never   Substance Use Topics    Alcohol use: Yes     Alcohol/week: 0.8 standard drinks     Types: 1 Standard drinks or equivalent per week     Comment: rarely    Drug use: No     Review of Systems   Constitutional:  Positive for chills.   Respiratory:  Positive for shortness of  breath.    Cardiovascular:  Positive for chest pain.   All other systems reviewed and are negative.    Physical Exam     Initial Vitals [10/15/22 0509]   BP Pulse Resp Temp SpO2   (!) 215/111 (!) 122 18 98.2 °F (36.8 °C) 96 %      MAP       --         Physical Exam    Nursing note and vitals reviewed.  Constitutional: She appears well-developed and well-nourished. She appears distressed.   HENT:   Head: Normocephalic and atraumatic.   Mouth/Throat: Oropharynx is clear and moist.   Eyes: Conjunctivae and EOM are normal. Pupils are equal, round, and reactive to light.   Neck: No tracheal deviation present. No JVD present.   Normal range of motion.  Cardiovascular:  Regular rhythm, normal heart sounds and intact distal pulses.   Tachycardia present.   Exam reveals no gallop and no friction rub.       No murmur heard.  Pulmonary/Chest: No respiratory distress. She has wheezes. She exhibits no tenderness.   Abdominal: Abdomen is soft. Bowel sounds are normal. She exhibits no distension. There is no abdominal tenderness. There is no rebound and no guarding.   Musculoskeletal:         General: No tenderness or edema. Normal range of motion.      Cervical back: Normal range of motion.     Neurological: She is alert and oriented to person, place, and time. She has normal strength. No cranial nerve deficit or sensory deficit.   Skin: Skin is warm and dry. Capillary refill takes less than 2 seconds. No erythema.   Psychiatric: She has a normal mood and affect.       ED Course   Procedures  Labs Reviewed   URINALYSIS, REFLEX TO URINE CULTURE - Abnormal; Notable for the following components:       Result Value    Protein, UA 1+ (*)     Occult Blood UA 1+ (*)     All other components within normal limits    Narrative:     Specimen Source->Urine   CBC W/ AUTO DIFFERENTIAL - Abnormal; Notable for the following components:    RBC 3.88 (*)      (*)     MCH 32.5 (*)     Lymph # 0.7 (*)     Gran % 83.3 (*)     Lymph % 9.3 (*)      All other components within normal limits   COMPREHENSIVE METABOLIC PANEL - Abnormal; Notable for the following components:    Glucose 150 (*)     Albumin 3.2 (*)     AST 44 (*)     eGFR 55.8 (*)     All other components within normal limits   URINALYSIS MICROSCOPIC - Abnormal; Notable for the following components:    Hyaline Casts, UA 8 (*)     All other components within normal limits    Narrative:     Specimen Source->Urine   INFLUENZA A AND B ANTIGEN    Narrative:     Specimen Source->Nasopharyngeal Swab   SARS-COV-2 RNA AMPLIFICATION, QUAL   D DIMER, QUANTITATIVE   TROPONIN I   B-TYPE NATRIURETIC PEPTIDE   POCT URINE PREGNANCY        ECG Results              EKG 12-lead (In process)  Result time 10/15/22 08:57:54      In process by Interface, Lab In [facility] (10/15/22 08:57:54)                   Narrative:    Test Reason : R06.02,    Vent. Rate : 119 BPM     Atrial Rate : 119 BPM     P-R Int : 136 ms          QRS Dur : 082 ms      QT Int : 314 ms       P-R-T Axes : 048 -15 064 degrees     QTc Int : 441 ms    Sinus tachycardia  Minimal voltage criteria for LVH, may be normal variant  Borderline Abnormal ECG  When compared with ECG of 07-AUG-2022 00:39,  No significant change was found    Referred By: [email or web address]   SELF           Confirmed By:                                   Imaging Results              CTA Chest Non-Coronary (PE Studies) (Final result)  Result time 10/15/22 07:53:45      Final result by Alli Madrid MD (10/15/22 07:53:45)                   Narrative:    CMS MANDATED QUALITY DATA - CT RADIATION - 436    All CT scans at this facility utilize dose modulation, iterative reconstruction, and/or weight based dosing when appropriate to reduce radiation dose to as low as reasonably achievable.        Reason: Pulmonary embolism (PE) suspected, unknown D-dimer    TECHNIQUE: CT angiography of thorax with 100 mL Omnipaque 350.  Maximum intensity projection coronal reformations were created at a separate  workstation and stored in the patient's permanent medical record.    COMPARISON: 5/1/2021, 8/7/2022    CTA THORAX:  Patient habitus, motion, and suboptimal pulmonary arterial contrast opacification significantly limiting evaluation. No definitive pulmonary arterial filling defect is identified to suggest pulmonary embolus.    Heart remains enlarged. Thoracic aorta is of normal caliber and without dissection.    Scattered linear opacities affect bilateral lungs, primarily in mid lung zones, suggesting atelectasis or scarring. Right lower lobe calcified granuloma is evident. Few scattered small bilateral pulmonary nodules have not significantly changed. Trachea and main bronchi are patent. No pleural or pericardial effusion. No enlarged hilar or mediastinal lymph nodes are    Partially visualized upper abdomen shows surgical clips in gallbladder fossa indicating cholecystectomy.    Degenerative changes affect the spine. No acute osseous abnormality.    IMPRESSION:    1. Significantly limited exam with no definitive pulmonary embolus identified. If continued clinical concern for pulmonary embolus, consider further evaluation with bilateral lower extremity venous Doppler ultrasound.  2. Unchanged cardiomegaly.  3. Scattered linear pulmonary opacities most suggestive of atelectasis rather than minimal patchy consolidation.  4. Unchanged few scattered small bilateral pulmonary nodules.    Electronically signed by:  Alli Madrid MD  10/15/2022 7:53 AM CDT Workstation: [id number]                                     X-Ray Chest AP Portable (Final result)  Result time 10/15/22 07:12:07      Final result by Alli Madrid MD (10/15/22 07:12:07)                   Impression:      Left mid lung linear atelectasis or scarring with unchanged central pulmonary vascular prominence and perihilar interstitial opacities.      Electronically signed by: Alli Madrid MD  Date:    10/15/2022  Time:    07:12               Narrative:     EXAMINATION:  XR CHEST AP PORTABLE    CLINICAL HISTORY:  Asthma;    FINDINGS:  Portable chest at 620 compared with 08/07/2022 shows normal cardiomediastinal silhouette.    Lung volumes remain low.  Linear left midlung opacity is slightly more conspicuous suggesting atelectasis or scarring.  Perihilar interstitial prominence has not significantly changed, and central pulmonary vascular prominence is unchanged.  No other new confluent alveolar consolidation, pleural effusion, or pneumothorax.  Patient is rotated.  No acute osseous abnormality.                                       Medications   albuterol-ipratropium 2.5 mg-0.5 mg/3 mL nebulizer solution 3 mL ( Nebulization Canceled Entry 10/15/22 0540)   methylPREDNISolone sodium succinate injection 125 mg (125 mg Intravenous Given 10/15/22 0603)   labetaloL injection 10 mg (10 mg Intravenous Given 10/15/22 0837)   iohexoL (OMNIPAQUE 350) injection 100 mL (100 mLs Intravenous Given 10/15/22 0727)   labetaloL injection 20 mg (20 mg Intravenous Given 10/15/22 0922)     Medical Decision Making:   Initial Assessment:   47-year-old female initial assessment in moderate distress secondary to shortness of breath and chest tightness.  Patient is alert oriented x3, neurologically and neurovascular intact no focal deficits.  She is hypertensive and tachycardic but otherwise nontoxic appearing vitals stable at this time.  Differential Diagnosis:   Asthma versus CHF versus COPD exacerbation, pneumonia, PE, COVID  Independently Interpreted Test(s):   I have ordered and independently interpreted X-rays - see prior notes.  I have ordered and independently interpreted EKG Reading(s) - see prior notes  Clinical Tests:   Lab Tests: Ordered and Reviewed  The following lab test(s) were unremarkable: CBC, CMP, Urinalysis, UPT, D-Dimer, Troponin and BNP  Radiological Study: Ordered and Reviewed  Medical Tests: Ordered and Reviewed          Attending Attestation:             Attending ED  Notes:   This 39-year-old female has a history of asthma and prior PE and whose care was initially managed by Dr. Garrido, in no I assumed at 7:00 a.m., presented with shortness of breath.  The patient is noted to have had diffuse wheezing.  She was given 3 DuoNeb treatments and 125 mg Solu-Medrol.  Chest x-ray did not show any infiltrate or pulmonary vascular congestion.  The patient's labs showed a normal UA and a negative UPT.  BNP was 19.  Blood sugars 150.  D-dimer was normal.  Patient was sent though for a CT for PE protocol which was negative for any PE.  Re-evaluation the patient still reveals some residual wheezing.  When taken off oxygen to go to CT her sats dropped to 90% on room air.  Because of the continued wheezing, hospital Medicine was consulted Dr. Terrazas will be admitting the patient.    Differential diagnosis includes asthma exacerbation, PE, CHF, pneumonia    After the decision was made to admit the patient and she initially had no objections, later decided to refuse hospitalization reportedly because of childcare issues.  Patient will therefore sign out against medical advice.  She is capable of making an informed decision.  The patient has her asthma medicines at home but will also be given a prescription for steroids.  She is to follow up with her primary physician next week.    ED Course as of 10/15/22 0943   Sat Oct 15, 2022   0859 The patient's chest x-ray did not show any evidence of infiltrate.  CTA of the chest is negative for PE.  Screening labs were reviewed.  Final impression:  Acute asthmatic exacerbation, dyspnea, uncontrolled hypertension, obesity [LG]      ED Course User Index  [LG] Dilan Duffy Jr., MD                 Clinical Impression:   Final diagnoses:  [I10] Hypertension, unspecified type (Primary)  [J45.901] Exacerbation of asthma, unspecified asthma severity, unspecified whether persistent  [R06.02] Shortness of breath      ED Disposition Condition    AMA Stable                 Dilan Duffy Jr., MD  10/15/22 0940

## 2022-10-15 NOTE — DISCHARGE INSTRUCTIONS
Encourage oral fluids.  Watch for any fever.  Take your asthma medication today and regularly.  Start prednisone as directed.  Follow-up with your primary care provider early next week.  Return to the ER if needed.

## 2022-10-15 NOTE — ED NOTES
Patient choosing to leave AMA due to no childcare. States she doesn't have any family to help. Advised to take BP meds when she gets home per Dr. WILBUR BAKER removed. AAO x 4. Ambulatory. AMA form signed

## 2022-10-18 ENCOUNTER — PATIENT OUTREACH (OUTPATIENT)
Dept: EMERGENCY MEDICINE | Facility: HOSPITAL | Age: 47
End: 2022-10-18

## 2022-10-18 ENCOUNTER — LAB VISIT (OUTPATIENT)
Dept: LAB | Facility: HOSPITAL | Age: 47
End: 2022-10-18
Attending: INTERNAL MEDICINE
Payer: MEDICAID

## 2022-10-18 DIAGNOSIS — D50.9 IRON DEFICIENCY ANEMIA, UNSPECIFIED IRON DEFICIENCY ANEMIA TYPE: ICD-10-CM

## 2022-10-18 DIAGNOSIS — D68.59 ANTITHROMBIN III DEFICIENCY: ICD-10-CM

## 2022-10-18 LAB
ALBUMIN SERPL BCP-MCNC: 3.4 G/DL (ref 3.5–5.2)
ALP SERPL-CCNC: 83 U/L (ref 55–135)
ALT SERPL W/O P-5'-P-CCNC: 40 U/L (ref 10–44)
ANION GAP SERPL CALC-SCNC: 8 MMOL/L (ref 8–16)
AST SERPL-CCNC: 35 U/L (ref 10–40)
BASOPHILS # BLD AUTO: 0.02 K/UL (ref 0–0.2)
BASOPHILS NFR BLD: 0.2 % (ref 0–1.9)
BILIRUB SERPL-MCNC: 0.6 MG/DL (ref 0.1–1)
BUN SERPL-MCNC: 16 MG/DL (ref 6–20)
CALCIUM SERPL-MCNC: 9.2 MG/DL (ref 8.7–10.5)
CHLORIDE SERPL-SCNC: 100 MMOL/L (ref 95–110)
CO2 SERPL-SCNC: 33 MMOL/L (ref 23–29)
CREAT SERPL-MCNC: 1.2 MG/DL (ref 0.5–1.4)
DIFFERENTIAL METHOD: ABNORMAL
EOSINOPHIL # BLD AUTO: 0 K/UL (ref 0–0.5)
EOSINOPHIL NFR BLD: 0 % (ref 0–8)
ERYTHROCYTE [DISTWIDTH] IN BLOOD BY AUTOMATED COUNT: 13.6 % (ref 11.5–14.5)
EST. GFR  (NO RACE VARIABLE): 55.8 ML/MIN/1.73 M^2
FERRITIN SERPL-MCNC: 262 NG/ML (ref 20–300)
GLUCOSE SERPL-MCNC: 140 MG/DL (ref 70–110)
HCT VFR BLD AUTO: 40.8 % (ref 37–48.5)
HGB BLD-MCNC: 13.1 G/DL (ref 12–16)
IMM GRANULOCYTES # BLD AUTO: 0.05 K/UL (ref 0–0.04)
IMM GRANULOCYTES NFR BLD AUTO: 0.5 % (ref 0–0.5)
IRON SERPL-MCNC: 84 UG/DL (ref 30–160)
LYMPHOCYTES # BLD AUTO: 2.5 K/UL (ref 1–4.8)
LYMPHOCYTES NFR BLD: 24 % (ref 18–48)
MCH RBC QN AUTO: 32.4 PG (ref 27–31)
MCHC RBC AUTO-ENTMCNC: 32.1 G/DL (ref 32–36)
MCV RBC AUTO: 101 FL (ref 82–98)
MONOCYTES # BLD AUTO: 0.6 K/UL (ref 0.3–1)
MONOCYTES NFR BLD: 6.2 % (ref 4–15)
NEUTROPHILS # BLD AUTO: 7.2 K/UL (ref 1.8–7.7)
NEUTROPHILS NFR BLD: 69.1 % (ref 38–73)
NRBC BLD-RTO: 0 /100 WBC
PLATELET # BLD AUTO: 357 K/UL (ref 150–450)
PMV BLD AUTO: 9.8 FL (ref 9.2–12.9)
POTASSIUM SERPL-SCNC: 3 MMOL/L (ref 3.5–5.1)
PROT SERPL-MCNC: 8.4 G/DL (ref 6–8.4)
RBC # BLD AUTO: 4.04 M/UL (ref 4–5.4)
SATURATED IRON: 27 % (ref 20–50)
SODIUM SERPL-SCNC: 141 MMOL/L (ref 136–145)
TOTAL IRON BINDING CAPACITY: 315 UG/DL (ref 250–450)
TRANSFERRIN SERPL-MCNC: 225 MG/DL (ref 200–375)
WBC # BLD AUTO: 10.34 K/UL (ref 3.9–12.7)

## 2022-10-18 PROCEDURE — 80053 COMPREHEN METABOLIC PANEL: CPT | Performed by: INTERNAL MEDICINE

## 2022-10-18 PROCEDURE — 82728 ASSAY OF FERRITIN: CPT | Performed by: INTERNAL MEDICINE

## 2022-10-18 PROCEDURE — 36415 COLL VENOUS BLD VENIPUNCTURE: CPT | Performed by: INTERNAL MEDICINE

## 2022-10-18 PROCEDURE — 84466 ASSAY OF TRANSFERRIN: CPT | Performed by: INTERNAL MEDICINE

## 2022-10-18 PROCEDURE — 85025 COMPLETE CBC W/AUTO DIFF WBC: CPT | Performed by: INTERNAL MEDICINE

## 2022-10-18 NOTE — PROGRESS NOTES
Melissa Timmons  ED Navigator  Emergency Department    Project: [facility] ED Navigator  Role: Community Health Worker    Date: 10/18/2022  Patient Name: Katiuska Preston  MRN: [id number]  PCP: Harpreet Rendon MD    Assessment:     Katiuska Preston is a 47 y.o. female who has presented to ED for shortness of breath/fever/weakness. Patient has visited the ED 2 times in the past 3 months. Patient did not contact PCP.     ED Navigator Initial Assessment    ED Navigator Enrollment Documentation  Consent to Services  Does patient consent to completing the assessment?: Yes  Contact  Method of Initial Contact: Phone  Transportation  Does the patient have issues with Transportation?: Yes  Does the patient have transportation to and from healthcare appointments?: Yes  Can family, friends, or others help?: No  Lack of transportation to appts, pharmacy, etc.?: No  What type of assistance is needed?: Standard (RTA/MITS)  What is available in their region?: Medicaid Medical  Insurance Coverage  Do you have coverage/adequate coverage?: Yes  Type/kind of coverage: [place] [employer] [facility]  Is patient able to afford co-pays/deductibles?: Yes  Is patient able to afford HME or supplies?: Yes  Does patient have an established [facility] PCP?: Yes  Able to access?: Yes  Does the patient have a lack of adequate coverage?: No  Specialist Appointment  Did the patient come to the ED to see a specialist?: No  Does the patient have a pending specialist referral?: No  Does the patient have a specialist appointment made?: No  PCP Follow Up Appointment  Has the patient had an appointment with a primary care provider in the past year?: No  Does the patient have a follow up appontment with a PCP?: No  When was the last time you saw your PCP?: 10/18/21  Why does the patient not have a follow up scheduled?: Other (see comments)  Medications  Is patient able to afford medication?: Yes  Is patient unable to get medication due to lack of transportation?:  No  Psychological  Does the patient have psycho-social concerns?: No  Food  Does the patient have concerns about food?: Yes  What concerns does the patient have regarding food?: Lack of food  Communication/Education  Does the patient have limited English proficiency/English not primary language?: No  Does patient have low literacy and/or low health literacy?: Yes  Does patient have concerns with care?: No  Does patient have dissatisfaction with care?: No  Other Financial Concerns  Does the patient have immediate financial distress?: No  Does the patient have general financial concerns?: Yes  Other Social Barriers/Concerns  Does the patient have any additional barriers or concerns?: Unable to afford utilities, Other (see comments)  Primary Barrier  Barriers identified: Cognitive barrier (health literacy, language and communication, etc.)  Root Cause of ED Utilization: Patient Knowledge/Low Health Literacy  Plan to address Patient Knowledge/Low Health Literacy: Provided information for [facility] On [facility] 24/7 Nurse triage line [phone] or 1-866-[facility] ([phone])  Next steps: Provided Education  Was education/educational materials provided surrounding PCP services/creating a medical home?: Yes Was education verbal or written?: Written     Was education/educational materials provided surrounding low cost, healthy foods?: Yes Was education verbal or written?: Written     Was education/educational materials provided surrounding other items? If so, use comment to explain.: No    Plan: Utility payment assistance resource given for their region  Expected Date of Follow Up 1: 11/1/22         Social History     Socioeconomic History    Marital status:    Tobacco Use    Smoking status: Former     Types: Cigarettes, Cigars     Quit date: 2019     Years since quitting: 3.7    Smokeless tobacco: Never   Substance and Sexual Activity    Alcohol use: Yes     Alcohol/week: 0.8 standard drinks     Types: 1 Standard  drinks or equivalent per week     Comment: rarely    Drug use: No    Sexual activity: Not Currently     Social Determinants of Health     Financial Resource Strain: Medium Risk    Difficulty of Paying Living Expenses: Somewhat hard   Food Insecurity: Food Insecurity Present    Worried About Running Out of Food in the Last Year: Sometimes true    Ran Out of Food in the Last Year: Sometimes true   Transportation Needs: Unmet Transportation Needs    Lack of Transportation (Medical): Yes    Lack of Transportation (Non-Medical): Yes   Stress: No Stress Concern Present    Feeling of Stress : Only a little   Social Connections: Socially Isolated    Frequency of Communication with Friends and Family: Once a week    Frequency of Social Gatherings with Friends and Family: Once a week    Attends Yarsanism Services: Never    Active Member of Clubs or Organizations: No    Attends Club or Organization Meetings: Never    Marital Status:    Housing Stability: High Risk    Unable to Pay for Housing in the Last Year: Yes    Unstable Housing in the Last Year: No       Plan:   Spoke with patient on the telephone and completed initial enrollment.  Patient denied any concerns with housing but said paying bills is hard.  Patient lives with her daughter and they receive food stamps, but patient said it is hard to stretch the food to the end of the month.  Patient does not have personal transportation and uses Medicaid Medical Transportation for Doctor appointments.  Patient reported her PCP to be Dr. Rendon, and she said she does not like him.  I offered to help her establish a different provider, but patient declined saying he knows all about her.  Patient is under the care of [facility] and is closely followed by them (at least twice a month) for treatment of pulmonary embolism and vitamin deficiencies.  Patient said she has a gynelocologist (Dr. Bishop) who she also doesn't want to see again, but again denied an  offer to help her find another one.  Patient stated she would like to have a breast reduction, but because she is on blood thinners, she doesn't think she is a candidate.  Patient reported she does not drink or smoke due to health problems, and when asked if she needs to see someone for depression/anxiety, patient jokingly said she just needs a vacation.  Patient does not have much outside contact with family and friends and stated she is the black sheep of her family and only talks to one of her siblings once a month.  Patient said she used to go to [facility], but they can no longer provide transportation.  I discussed with patient several options for transportation/daily living assistance, and provided these via email.   Patient was given education on ([facility] Right [facility] at the Right Level information, [facility] Virtual Visit information, and Heart healthy diet tips), and [facility] Nurse Triage line.    Melissa Timmons  ED Navigator

## 2022-10-23 NOTE — PROGRESS NOTES
[facility] Hematology/Oncology  Progress Note -   Follow-up Visit    Subjective:      Patient ID:   NAME: Katiuska Preston : 1975     47 y.o. female    Referring Doc: Justice  Other Physicians: Eduardo    Chief Complaint: pulm emboli f/u      HPI:    The patient returns for a regularly scheduled follow-up visit today to go over results of recently ordered tests, studies and/or labs. She is here by herself.    She has chronic seasonal sinus issues with recent flare-up with the change in the weather    No CP, HA's or N/V.       She is currently on Eliquis. No excessive bleeding or brusiing .    She is followed by Dr Pro with Gyn-Onc      She has some chronic right knee issues and pain.    She had recent abnormal mammogram 2022 with cluster of calcifications 1 0'clock posittion on the left breast. She had biopsy on 2022 with no carcinoma per pathology report. Dr Rendon ordered a repeat mammogram for Aug 2022 which was negative per patient (but I do not see a report)    Discussed covid19 precautions - she had her vaccinations             ROS:   GEN: normal without any fever, night sweats or weight loss  HEENT: normal with no HA's, sore throat, stiff neck, changes in vision; sinus issues with nasal drip  CV: normal with no CP, some WINN   PULM: normal with no SOB, cough, hemoptysis, sputum or pleuritic pain  GI: normal with no abdominal pain, nausea, vomiting, constipation, diarrhea, melanotic stools, BRBPR, or hematemesis  : normal with no hematuria, dysuria  BREAST: normal with no mass, discharge, pain  SKIN: normal with no rash, erythema, bruising, or swelling     Past Medical/Surgical History:  Past Medical History:   Diagnosis Date    Antithrombin III deficiency 2021    Asthma     Claustrophobia     Elevated factor VIII level 2021    Esophageal reflux     Gastroesophageal reflux    Generalized anxiety disorder     Anxiety, Generalized    Herniated disc     Hypertension     Iron deficiency anemia  due to chronic blood loss 10/27/2021    Lower extremity weakness     Morbid obesity     Obesity     Protein S deficiency 2021    Pulmonary embolism     Upper extremity weakness      Past Surgical History:   Procedure Laterality Date     SECTION      CHOLECYSTECTOMY      TONSILLECTOMY           Allergies:  Review of patient's allergies indicates:  No Known Allergies    Social/Family History:  Social History     Socioeconomic History    Marital status:    Tobacco Use    Smoking status: Former     Types: Cigarettes, Cigars     Quit date: 2019     Years since quitting: 3.8    Smokeless tobacco: Never   Substance and Sexual Activity    Alcohol use: Yes     Alcohol/week: 0.8 standard drinks     Types: 1 Standard drinks or equivalent per week     Comment: rarely    Drug use: No    Sexual activity: Not Currently     Social Determinants of Health     Financial Resource Strain: Medium Risk    Difficulty of Paying Living Expenses: Somewhat hard   Food Insecurity: Food Insecurity Present    Worried About Running Out of Food in the Last Year: Sometimes true    Ran Out of Food in the Last Year: Sometimes true   Transportation Needs: Unmet Transportation Needs    Lack of Transportation (Medical): Yes    Lack of Transportation (Non-Medical): Yes   Stress: No Stress Concern Present    Feeling of Stress : Only a little   Social Connections: Socially Isolated    Frequency of Communication with Friends and Family: Once a week    Frequency of Social Gatherings with Friends and Family: Once a week    Attends Holiness Services: Never    Active Member of Clubs or Organizations: No    Attends Club or Organization Meetings: Never    Marital Status:    Housing Stability: High Risk    Unable to Pay for Housing in the Last Year: Yes    Unstable Housing in the Last Year: No     History reviewed. No pertinent family history.      Medications:    Current Outpatient Medications:     albuterol (PROVENTIL/VENTOLIN HFA)  90 mcg/actuation inhaler, Inhale 2 puffs into the lungs every 6 (six) hours as needed for Wheezing. Rescue, Disp: 18 g, Rfl: 5    atorvastatin (LIPITOR) 10 MG tablet, Take 10 mg by mouth every evening., Disp: , Rfl:     butalbital-acetaminophen-caffeine -40 mg (FIORICET, ESGIC) -40 mg per tablet, Take 1 tablet by mouth every 4 (four) hours as needed., Disp: , Rfl:     ELIQUIS 5 mg Tab, TAKE 1 TABLET(5 MG) BY MOUTH TWICE DAILY, Disp: 60 tablet, Rfl: 3    ergocalciferol (ERGOCALCIFEROL) 50,000 unit Cap, Take 50,000 Units by mouth twice a week., Disp: , Rfl:     famotidine (PEPCID) 20 MG tablet, Take 20 mg by mouth 2 (two) times daily., Disp: , Rfl:     FEROSUL 325 mg (65 mg iron) Tab tablet, TAKE 1 TABLET BY MOUTH EVERY DAY, Disp: 30 tablet, Rfl: 3    fluticasone propionate (FLONASE) 50 mcg/actuation nasal spray, 2 sprays by Each Nostril route daily as needed for Rhinitis., Disp: , Rfl:     fluticasone-salmeterol 230-21 mcg/dose (ADVAIR HFA) 230-21 mcg/actuation HFAA inhaler, Inhale 2 puffs into the lungs 2 (two) times daily. Controller, Disp: 12 g, Rfl: 6    losartan (COZAAR) 25 MG tablet, Take 25 mg by mouth once daily., Disp: , Rfl:     meloxicam (MOBIC) 7.5 MG tablet, Take 7.5 mg by mouth once daily., Disp: , Rfl:     metoprolol succinate (TOPROL-XL) 25 MG 24 hr tablet, Take 25 mg by mouth once daily., Disp: , Rfl:     minocycline (MINOCIN,DYNACIN) 100 MG capsule, Take 100 mg by mouth 2 (two) times daily., Disp: , Rfl:     MIRENA 20 mcg/24 hours (8 yrs) 52 mg IUD, SMARTSIG:Intrauterine Once, Disp: , Rfl:     miSOPROStoL (CYTOTEC) 200 MCG Tab, Take by mouth., Disp: , Rfl:     NEXIUM 40 mg capsule, Take 40 mg by mouth once daily., Disp: , Rfl:     olmesartan (BENICAR) 20 MG tablet, Take 20 mg by mouth once daily., Disp: , Rfl:     omeprazole (PRILOSEC) 20 MG capsule, Take 20 mg by mouth once daily., Disp: , Rfl:     traMADoL (ULTRAM) 50 mg tablet, Take 50 mg by mouth every 4 to 6 hours as needed., Disp:  ", Rfl:     trazodone (DESYREL) 100 MG tablet, Take 100 mg by mouth every evening., Disp: , Rfl:     carvediloL (COREG) 12.5 MG tablet, Take 1 tablet (12.5 mg total) by mouth 2 (two) times daily with meals., Disp: 60 tablet, Rfl: 0    furosemide (LASIX) 20 MG tablet, Take 1 tablet (20 mg total) by mouth once daily., Disp: 30 tablet, Rfl: 0    lisinopriL (PRINIVIL,ZESTRIL) 20 MG tablet, Take 1 tablet (20 mg total) by mouth once daily., Disp: 40 tablet, Rfl: 0    Current Facility-Administered Medications:     bebtelovimab (EUA) 175 mg/2 mL (87.5 mg/mL) injection 175 mg, 175 mg, Intravenous, 1 time in Clinic/HOD, Sherrie Parker MD      Pathology:   Cancer Staging   No matching staging information was found for the patient.      Breast biopsy 5/30/2022:    1. BREAST, LEFT, UPPER OUTER MID CALCIFICATIONS, STEREOTACTIC GUIDED    CORE BIOPSY    - BENIGN BREAST PARENCHYMA AND BLOOD VESSELS     2. BREAST, LEFT, UPPER OUTER MID CALCIFICATIONS, STEREOTACTIC GUIDED    CORE BIOPSY   - MICROCALCIFICATIONS ASSOCIATED WITH FIBROADENOMATOID CHANGE AND    BENIGN BREAST PARENCHYMA    - USUAL DUCTAL HYPERPLASIA AND COLUMNAR CELL CHANGE      Objective:   Vitals:  Blood pressure (!) 205/101, pulse 65, temperature 97.5 °F (36.4 °C), resp. rate 18, height 5' 3" (1.6 m), weight (!) 149.6 kg (329 lb 12.8 oz).    Physical Examination:   GEN: no apparent distress, comfortable; AAOx3; morbidly overweight   HEAD: atraumatic and normocephalic  EYES: no pallor, no icterus, PERRLA  ENT: OMM, no pharyngeal erythema, external ears WNL; no nasal discharge; no thrush  NECK: no masses, thyroid normal, trachea midline, no LAD/LN's, supple  CV: RRR with no murmur; normal pulse; normal S1 and S2; no pedal edema  CHEST: Normal respiratory effort; CTAB; normal breath sounds; no wheeze or crackles;    ABDOM: nontender and nondistended; soft; normal bowel sounds; no rebound/guarding  MUSC/Skeletal: LROM and crepitus right knee; no deformities or " arthropathy  EXTREM: no clubbing, cyanosis, inflammation or swelling  SKIN: no rashes, lesions, ulcers, petechiae or subcutaneous nodules  : no diaz  NEURO: grossly intact; motor/sensory WNL; AAOx3; no tremors  PSYCH: normal mood, affect and behavior  LYMPH: normal cervical, supraclavicular, and groin LN's;       Labs:   Lab Results   Component Value Date    WBC 10.34 10/18/2022    HGB 13.1 10/18/2022    HCT 40.8 10/18/2022     (H) 10/18/2022     10/18/2022    CMP  Sodium   Date Value Ref Range Status   10/18/2022 141 136 - 145 mmol/L Final     Potassium   Date Value Ref Range Status   10/18/2022 3.0 (L) 3.5 - 5.1 mmol/L Final     Chloride   Date Value Ref Range Status   10/18/2022 100 95 - 110 mmol/L Final     CO2   Date Value Ref Range Status   10/18/2022 33 (H) 23 - 29 mmol/L Final     Glucose   Date Value Ref Range Status   10/18/2022 140 (H) 70 - 110 mg/dL Final     BUN   Date Value Ref Range Status   10/18/2022 16 6 - 20 mg/dL Final     Creatinine   Date Value Ref Range Status   10/18/2022 1.2 0.5 - 1.4 mg/dL Final     Calcium   Date Value Ref Range Status   10/18/2022 9.2 8.7 - 10.5 mg/dL Final     Total Protein   Date Value Ref Range Status   10/18/2022 8.4 6.0 - 8.4 g/dL Final     Albumin   Date Value Ref Range Status   10/18/2022 3.4 (L) 3.5 - 5.2 g/dL Final     Total Bilirubin   Date Value Ref Range Status   10/18/2022 0.6 0.1 - 1.0 mg/dL Final     Comment:     For infants and newborns, interpretation of results should be based  on gestational age, weight and in agreement with clinical  observations.    Premature Infant recommended reference ranges:  Up to 24 hours.............<8.0 mg/dL  Up to 48 hours............<12.0 mg/dL  3-5 days..................<15.0 mg/dL  6-29 days.................<15.0 mg/dL       Alkaline Phosphatase   Date Value Ref Range Status   10/18/2022 83 55 - 135 U/L Final     AST   Date Value Ref Range Status   10/18/2022 35 10 - 40 U/L Final     ALT   Date Value Ref  Range Status   10/18/2022 40 10 - 44 U/L Final     Anion Gap   Date Value Ref Range Status   10/18/2022 8 8 - 16 mmol/L Final     eGFR if    Date Value Ref Range Status   07/18/2022 >60.0 >60 mL/min/1.73 m^2 Final     eGFR if non    Date Value Ref Range Status   07/18/2022 >60.0 >60 mL/min/1.73 m^2 Final     Comment:     Calculation used to obtain the estimated glomerular filtration  rate (eGFR) is the CKD-EPI equation.                  Lab Results   Component Value Date    IRON 84 10/18/2022    TIBC 315 10/18/2022    FERRITIN 262 10/18/2022     Lab Results   Component Value Date    GKVMBXFJ27 452 05/02/2021     Lab Results   Component Value Date    FOLATE 5.9 05/02/2021          Factor VIII Activity 56 - 140 % 281      Antithrombin III 75 - 135 % 109      Protein S Activity 63 - 140 % 63            I have reviewed all available lab results and radiology reports.    Radiology/Diagnostic Studies:      MRI Pelvis 6/15/2022:    Impression:     1. Normal thickness of the endometrium.  2. Query adenomyosis.  3. Nonviable uterine fibroid.  4. Involuting follicle or cyst in the left ovary.              US Lower Extremity Veins Bilateral [[id number]] Collected: 05/01/21 1348   Order Status: Completed Updated: 05/01/21 1432   Narrative:     REASON: DVT     FINDINGS:     Grayscale, color and spectral Doppler analysis of the bilateral lower   extremity deep venous system was performed.     There is normal compressibility, color and spectral Doppler analysis,   and augmentation in the bilateral lower extremity deep venous system.     IMPRESSION:     1.  No DVT of the bilateral lower extremity veins.   2.  Bilateral distal superficial femoral veins were unable to be   assessed due to body habitus       CTA Chest Non-Coronary - PE Study [[id number]] Collected: 05/01/21 1143   Order Status: Completed Updated: 05/01/21 1225   Narrative:     CMS MANDATED QUALITY DATA - CT RADIATION - 436     All CT  scans at this facility utilize dose modulation, iterative   reconstruction, and/or weight based dosing when appropriate to reduce   radiation dose to as low as reasonably achievable.         REASON: PE suspected, intermediate prob, positive D-dimer     TECHNIQUE: CT angiography of thorax with 100 mL Omnipaque 350.   Maximum intensity projection coronal reformations were created at a   separate workstation and stored in the patient's permanent medical   record.     COMPARISON: CTA chest October 14, 2019.     FINDINGS:     Limited evaluation due to body habitus and inadequate bolus timing.   Filling defects identified in segmental and subsegmental artery   supplying the left lower lobe, consistent with pulmonary emboli.   Questionable filling defects versus artifact in the right mainstem   pulmonary artery and a few segmental arteries supplying the right   lower lobe may reflect pulmonary emboli. Heart size is at the upper   limits of normal. No mediastinal lymphadenopathy or mass.     The central tracheobronchial tree is patent. There is diffuse   groundglass lung opacities in the bilateral lungs with peripheral   wedge-shaped opacities. No pleural effusions.     The visualized abdominal viscera are unremarkable. No acute osseous   abnormality..     IMPRESSION:     1.  Limited evaluation due to body habitus and inadequate bolus   timing. Filling defects identified in segmental and subsegmental   artery supplying the left lower lobe, consistent with pulmonary   emboli. There are questionable pulmonary emboli in the right mainstem   pulmonary artery and segmental artery supplying the right lower lobe.   Findings reported to Dr.Lloyd Duffy at 5/1/2021.   2.  Bilateral diffuse groundglass lung opacities with peripheral   wedge-shaped opacities may reflect changes of poor inspiration and   atelectasis. Atypical infectious process could also have a similar   appearance the proper clinical setting.                 All  lab results and imaging results have been reviewed and discussed with the patient    Assessment:   (1) 47 y.o. female with diagnosis of pulmonary emboli who was seen as a consult at [facility]  - patient with diagnosis of asthma who presented to the ED at [facility] on 5/1 with progressive SOB, postnasal drip and acute hypercapnia. CTA was a limited study due to body habitus but radiology reported presence of filling defects identified in segmental and subsegmental artery supplying the left lower lobe, consistent with pulmonary emboli. They also reported questionable pulmonary emboli in the right mainstem pulmonary artery and segmental artery supplying the right lower lobe. Patient has been admitted to hospitalist service and is on Lovenox. She has no prior personal or family history of clots.      - doppler studies are negative     5/3/2021:  - patient was seen by Dr Clark with pulm yesterday; appreciate his evaluation  - options of anticoagulation include: Coumadin, Eliquis , Xarelto, or Pradaxa  - in patients with morbid obesity, one can have difficulties with therapeutic dosing with practically any type of oral anticoagulant     5/31/2021:  - She has O2 at home and is on portable today.   - She has not followed up with pulmonary since discharge.   - She is breathing fair but does have WINN.   - She has some sinus issues. She is currently on Eliquis.   - She saw Dr Rendon since discharge  - low ATIII and protein S levels which could be the etiology, but they will need to be repeated at later date to confirm  - Elevated factor VIII which also will need to be repeated to confirm    6/28/2021:  - she saw Dr Clark with pulmonary since last visit and has PFT scheduled for this coming July 1st  - she remains on portable O2    12/8/2021:  - she saw Dr Clark again on 11/2/2021  - she remains on O2 at home  - she remains on eliquis  - Repeat AT3 was normal, repeat Protein S was WNL  - repeat factor VIII was still a little elevated at 281  but better    2/15/2022:  - she sees pulmonary NP again next month    5/19/2022:  - she saw Idania Cason NP on 5/2/2022 with pulmonary    6/20/2022:  - remains on eliquis  - currently with no excessive bleeding or bruising    10/24/2022:  - continued on eliquis   - no excessive bleeding or bruising       (2) Anemia with microcytic indices with current history of chronic menorrhagia - most likely has underlying iron deficiency due to chronic heavy menstrual cycles  - s/p two units of blood  - prior hgb at 8.2  - total bilirubin is WNL, so I do not suspect any hemolysis at this time     5/3/2021:  - hgb at 8.6 today  - iron and ferritin are both low with elevated TIBC  - she received dose of IV iron today    5/31/2021:  - she needs repeat labs incl iron panel  - will consider additional IV iron as needed  - she is on oral iron  - she needs repeat labs incl. Iron panel    6/28/2021:  - hgb currently 11.7  - iron panel is adequate at this time    10/26/2021:  - latest hgb at 10.6  - iron at 29  - she is on oral iron since May 2021  - she has heavy menstrual cycles  - discussed consideration for IV iron and she is agreeable; discussed general side-effects of iron infusions    12/8/2021:  - she has been on oral iron with little improvement in the labs  - she is starting IV iron this coming Friday    2/15/2022:  - latest labs from Jan 2022 with no anemia and iron panel was adequate  - she had IV iron x 1 only    5/19/2022:  - latest hgb at 11.2  - ferritin was 28  - iron back down to 24  - continued GYN bleeding issues  - seen by Dr Pro with GYn-Onc on 4/20/2022 6/20/2022:  - awaiting better BP control inorder to resume IV iron  - seeing Dr Rendno later today for her HBP  - labs pending for today    7/25/2022:  - s/p two cycles of IV iron  - latest iron panel adequate and hgb WNL    10/24/2022:  - hgb currently WNL at 13.1  - iron at 84 and ferritin 262    (3) Abnormal mammogram:    5/19/2022:  - She had recent  abnormal mammogram 5/6/2022 with cluster of calcifications 1 0'clock posittion on the left breast.   - she is scheduling US guided biopsy in near future  - discussed referral to [facility] for evaluation    6/20/2022:  - s/p breast biopsy on 5/30/2022 with pathology report coming back benign  - recommend repeat mammogram in at least 3 months    7/25/2022:  - refer to Dr Puentes for her breast issues    10/24/2022:  - she said she had repeat mammo in Aug 2022 but I do not see a report  - she is scheduled Dr Puentes in couple weeks      (3) Overweight/Obesity     (4) HTN     (5) GERD     (6) WBC and platelets are WNL     (7) Hx/of covid vaccination on 4/16 - she received Pfizer vaccine (not the J&J one)     (8) General anxiety disorder     (9) Nonsmoker    VISIT DIAGNOSES:      1. Acute pulmonary embolism, unspecified pulmonary embolism type, unspecified whether acute cor pulmonale present    2. Antithrombin III deficiency    3. Elevated factor VIII level    4. Protein S deficiency    5. Iron deficiency anemia due to chronic blood loss    6. Iron deficiency anemia, unspecified iron deficiency anemia type            Plan:     PLAN:  1. F/u with pulmonary as directed  2. F/u with PCP    3. Check labs monthly as ordered incl iron panel - set up up IV iron as needed    4. F/u with GYN and Dr Muir  6. Refill eliquis as needed  7. F/u with Dr Puentes     RTC in 4 months    Fax note to Yoav Rendon [place]; [place]; [place]              Acute pulmonary embolism, unspecified pulmonary embolism type, unspecified whether acute cor pulmonale present    Antithrombin III deficiency    Elevated factor VIII level    Protein S deficiency    Iron deficiency anemia due to chronic blood loss    Iron deficiency anemia, unspecified iron deficiency anemia type    No follow-ups on file.    COVID-19 Discussion:    I had long discussion with patient and any applicable family about the COVID-19 coronavirus epidemic and the recommended  precautions with regard to cancer and/or hematology patients. I have re-iterated the CDC recommendations for adequate hand washing, use of hand -like products, and coughing into elbow, etc. In addition, especially for our patients who are on chemotherapy and/or our otherwise immunocompromised patients, I have recommended avoidance of crowds, including movie theaters, restaurants, churches, etc. I have recommended avoidance of any sick or symptomatic family members and/or friends. I have also recommended avoidance of any raw and unwashed food products, and general avoidance of food items that have not been prepared by themselves. The patient has been asked to call us immediately with any symptom developments, issues, questions or other general concerns.       Anticoagulation Discussion:    Discussed with patient and any applicable family members about the benefit and/or need for anticoagulation. I communicated about the risks of bleeding while on any anticoagulation, which could be serious and/or life-threatening, and which can occur at any time, regardless of degree of the level of anticoagulation. I expressed the need for compliance with any anticoagulation regimen and that failure to do so could potential lead to excessive bleeding, and risk to health and/or life. In particular, with patients on coumadin therapy, compliance with requested blood work is absolutely essential, as coumadin levels can vary from time to time, and failure to do so could potentially place the patient at risk for bleeding and/or clotting events which could be fatal. Patients on coumadin are encouraged to call the day after they have their levels drawn, as to obtain the appropriate instructions from my staff. Patients are aware that self-regulating or self-dosing of their medications is strictly prohibited.       I have explained and the patient understands all of  the current recommendation(s). I have answered all of their  questions to the best of my ability and to their complete satisfaction.             Thank you for allowing me to participate in this pleasant patient's care. Please call with any questions or concerns.      Electronically signed Cisco Boston MD

## 2022-10-24 ENCOUNTER — OFFICE VISIT (OUTPATIENT)
Dept: HEMATOLOGY/ONCOLOGY | Facility: CLINIC | Age: 47
End: 2022-10-24
Payer: MEDICAID

## 2022-10-24 VITALS
BODY MASS INDEX: 51.91 KG/M2 | SYSTOLIC BLOOD PRESSURE: 205 MMHG | DIASTOLIC BLOOD PRESSURE: 101 MMHG | WEIGHT: 293 LBS | HEART RATE: 65 BPM | HEIGHT: 63 IN | RESPIRATION RATE: 18 BRPM | TEMPERATURE: 98 F

## 2022-10-24 DIAGNOSIS — D50.0 IRON DEFICIENCY ANEMIA DUE TO CHRONIC BLOOD LOSS: ICD-10-CM

## 2022-10-24 DIAGNOSIS — D50.9 IRON DEFICIENCY ANEMIA, UNSPECIFIED IRON DEFICIENCY ANEMIA TYPE: Chronic | ICD-10-CM

## 2022-10-24 DIAGNOSIS — D68.59 ANTITHROMBIN III DEFICIENCY: ICD-10-CM

## 2022-10-24 DIAGNOSIS — D68.59 PROTEIN S DEFICIENCY: ICD-10-CM

## 2022-10-24 DIAGNOSIS — I26.99 ACUTE PULMONARY EMBOLISM, UNSPECIFIED PULMONARY EMBOLISM TYPE, UNSPECIFIED WHETHER ACUTE COR PULMONALE PRESENT: Primary | ICD-10-CM

## 2022-10-24 DIAGNOSIS — R79.1 ELEVATED FACTOR VIII LEVEL: ICD-10-CM

## 2022-10-24 PROCEDURE — 4010F PR ACE/ARB THEARPY RXD/TAKEN: ICD-10-PCS | Mod: CPTII,S$GLB,, | Performed by: INTERNAL MEDICINE

## 2022-10-24 PROCEDURE — 3077F SYST BP >= 140 MM HG: CPT | Mod: CPTII,S$GLB,, | Performed by: INTERNAL MEDICINE

## 2022-10-24 PROCEDURE — 99213 OFFICE O/P EST LOW 20 MIN: CPT | Mod: S$GLB,,, | Performed by: INTERNAL MEDICINE

## 2022-10-24 PROCEDURE — 3077F PR MOST RECENT SYSTOLIC BLOOD PRESSURE >= 140 MM HG: ICD-10-PCS | Mod: CPTII,S$GLB,, | Performed by: INTERNAL MEDICINE

## 2022-10-24 PROCEDURE — 1160F PR REVIEW ALL MEDS BY PRESCRIBER/CLIN PHARMACIST DOCUMENTED: ICD-10-PCS | Mod: CPTII,S$GLB,, | Performed by: INTERNAL MEDICINE

## 2022-10-24 PROCEDURE — 1160F RVW MEDS BY RX/DR IN RCRD: CPT | Mod: CPTII,S$GLB,, | Performed by: INTERNAL MEDICINE

## 2022-10-24 PROCEDURE — 4010F ACE/ARB THERAPY RXD/TAKEN: CPT | Mod: CPTII,S$GLB,, | Performed by: INTERNAL MEDICINE

## 2022-10-24 PROCEDURE — 3080F PR MOST RECENT DIASTOLIC BLOOD PRESSURE >= 90 MM HG: ICD-10-PCS | Mod: CPTII,S$GLB,, | Performed by: INTERNAL MEDICINE

## 2022-10-24 PROCEDURE — 3080F DIAST BP >= 90 MM HG: CPT | Mod: CPTII,S$GLB,, | Performed by: INTERNAL MEDICINE

## 2022-10-24 PROCEDURE — 99213 PR OFFICE/OUTPT VISIT, EST, LEVL III, 20-29 MIN: ICD-10-PCS | Mod: S$GLB,,, | Performed by: INTERNAL MEDICINE

## 2022-10-24 PROCEDURE — 1159F MED LIST DOCD IN RCRD: CPT | Mod: CPTII,S$GLB,, | Performed by: INTERNAL MEDICINE

## 2022-10-24 PROCEDURE — 1159F PR MEDICATION LIST DOCUMENTED IN MEDICAL RECORD: ICD-10-PCS | Mod: CPTII,S$GLB,, | Performed by: INTERNAL MEDICINE

## 2022-11-01 ENCOUNTER — PATIENT OUTREACH (OUTPATIENT)
Dept: EMERGENCY MEDICINE | Facility: HOSPITAL | Age: 47
End: 2022-11-01

## 2022-11-08 ENCOUNTER — OFFICE VISIT (OUTPATIENT)
Dept: ORTHOPEDICS | Facility: CLINIC | Age: 47
End: 2022-11-08
Payer: MEDICAID

## 2022-11-08 VITALS — WEIGHT: 293 LBS | BODY MASS INDEX: 51.91 KG/M2 | HEIGHT: 63 IN

## 2022-11-08 DIAGNOSIS — M17.11 PRIMARY OSTEOARTHRITIS OF RIGHT KNEE: Primary | ICD-10-CM

## 2022-11-08 DIAGNOSIS — M17.12 PRIMARY OSTEOARTHRITIS OF LEFT KNEE: ICD-10-CM

## 2022-11-08 PROCEDURE — 1159F MED LIST DOCD IN RCRD: CPT | Mod: CPTII,S$GLB,, | Performed by: ORTHOPAEDIC SURGERY

## 2022-11-08 PROCEDURE — 1160F PR REVIEW ALL MEDS BY PRESCRIBER/CLIN PHARMACIST DOCUMENTED: ICD-10-PCS | Mod: CPTII,S$GLB,, | Performed by: ORTHOPAEDIC SURGERY

## 2022-11-08 PROCEDURE — 1160F RVW MEDS BY RX/DR IN RCRD: CPT | Mod: CPTII,S$GLB,, | Performed by: ORTHOPAEDIC SURGERY

## 2022-11-08 PROCEDURE — 99213 PR OFFICE/OUTPT VISIT, EST, LEVL III, 20-29 MIN: ICD-10-PCS | Mod: 25,S$GLB,, | Performed by: ORTHOPAEDIC SURGERY

## 2022-11-08 PROCEDURE — 20610 LARGE JOINT ASPIRATION/INJECTION: R KNEE: ICD-10-PCS | Mod: 50,S$GLB,, | Performed by: ORTHOPAEDIC SURGERY

## 2022-11-08 PROCEDURE — 3008F BODY MASS INDEX DOCD: CPT | Mod: CPTII,S$GLB,, | Performed by: ORTHOPAEDIC SURGERY

## 2022-11-08 PROCEDURE — 1159F PR MEDICATION LIST DOCUMENTED IN MEDICAL RECORD: ICD-10-PCS | Mod: CPTII,S$GLB,, | Performed by: ORTHOPAEDIC SURGERY

## 2022-11-08 PROCEDURE — 3008F PR BODY MASS INDEX (BMI) DOCUMENTED: ICD-10-PCS | Mod: CPTII,S$GLB,, | Performed by: ORTHOPAEDIC SURGERY

## 2022-11-08 PROCEDURE — 20610 DRAIN/INJ JOINT/BURSA W/O US: CPT | Mod: 50,S$GLB,, | Performed by: ORTHOPAEDIC SURGERY

## 2022-11-08 PROCEDURE — 4010F PR ACE/ARB THEARPY RXD/TAKEN: ICD-10-PCS | Mod: CPTII,S$GLB,, | Performed by: ORTHOPAEDIC SURGERY

## 2022-11-08 PROCEDURE — 99213 OFFICE O/P EST LOW 20 MIN: CPT | Mod: 25,S$GLB,, | Performed by: ORTHOPAEDIC SURGERY

## 2022-11-08 PROCEDURE — 4010F ACE/ARB THERAPY RXD/TAKEN: CPT | Mod: CPTII,S$GLB,, | Performed by: ORTHOPAEDIC SURGERY

## 2022-11-08 RX ORDER — TRIAMCINOLONE ACETONIDE 40 MG/ML
40 INJECTION, SUSPENSION INTRA-ARTICULAR; INTRAMUSCULAR
Status: DISCONTINUED | OUTPATIENT
Start: 2022-11-08 | End: 2022-11-08 | Stop reason: HOSPADM

## 2022-11-08 RX ADMIN — TRIAMCINOLONE ACETONIDE 40 MG: 40 INJECTION, SUSPENSION INTRA-ARTICULAR; INTRAMUSCULAR at 08:11

## 2022-11-08 NOTE — PROCEDURES
Large Joint Aspiration/Injection: R knee    Date/Time: 11/8/2022 8:45 AM  Performed by: Brayan Valerio MD  Authorized by: Brayan Valerio MD     Consent Done?:  Yes (Verbal)  Indications:  Pain and arthritis  Site marked: the procedure site was marked    Timeout: prior to procedure the correct patient, procedure, and site was verified    Prep: patient was prepped and draped in usual sterile fashion      Local anesthesia used?: Yes    Local anesthetic:  Lidocaine 1% without epinephrine    Details:  Needle Size:  25 G  Ultrasonic Guidance for needle placement?: No    Approach:  Anterolateral  Location:  Knee  Site:  R knee  Medications:  40 mg triamcinolone acetonide 40 mg/mL  Patient tolerance:  Patient tolerated the procedure well with no immediate complications

## 2022-11-08 NOTE — PROCEDURES
Large Joint Aspiration/Injection: L knee    Date/Time: 11/8/2022 8:45 AM  Performed by: Brayan Valerio MD  Authorized by: Brayan Valerio MD     Consent Done?:  Yes (Verbal)  Indications:  Arthritis and pain  Site marked: the procedure site was marked    Timeout: prior to procedure the correct patient, procedure, and site was verified    Prep: patient was prepped and draped in usual sterile fashion      Local anesthesia used?: Yes    Local anesthetic:  Lidocaine 1% without epinephrine    Details:  Needle Size:  25 G  Ultrasonic Guidance for needle placement?: No    Approach:  Anterolateral  Location:  Knee  Site:  L knee  Medications:  40 mg triamcinolone acetonide 40 mg/mL  Patient tolerance:  Patient tolerated the procedure well with no immediate complications

## 2022-11-08 NOTE — PROGRESS NOTES
[facility]    Subjective:     Chief Complaint:   Chief Complaint   Patient presents with    Left Knee - Pain     Patient is here for a f/up on Bilateral knee pain wants injections today, Medial Pain, Pops, grinds, gives away, Left is worse than right.      Right Knee - Pain       Past Medical History:   Diagnosis Date    Antithrombin III deficiency 2021    Asthma     Claustrophobia     Elevated factor VIII level 2021    Esophageal reflux     Gastroesophageal reflux    Generalized anxiety disorder     Anxiety, Generalized    Herniated disc     Hypertension     Iron deficiency anemia due to chronic blood loss 10/27/2021    Lower extremity weakness     Morbid obesity     Obesity     Protein S deficiency 2021    Pulmonary embolism     Upper extremity weakness        Past Surgical History:   Procedure Laterality Date     SECTION      CHOLECYSTECTOMY      TONSILLECTOMY         Current Outpatient Medications   Medication Sig    albuterol (PROVENTIL/VENTOLIN HFA) 90 mcg/actuation inhaler Inhale 2 puffs into the lungs every 6 (six) hours as needed for Wheezing. Rescue    atorvastatin (LIPITOR) 10 MG tablet Take 10 mg by mouth every evening.    butalbital-acetaminophen-caffeine -40 mg (FIORICET, ESGIC) -40 mg per tablet Take 1 tablet by mouth every 4 (four) hours as needed.    carvediloL (COREG) 12.5 MG tablet Take 1 tablet (12.5 mg total) by mouth 2 (two) times daily with meals.    ELIQUIS 5 mg Tab TAKE 1 TABLET(5 MG) BY MOUTH TWICE DAILY    ergocalciferol (ERGOCALCIFEROL) 50,000 unit Cap Take 50,000 Units by mouth twice a week.    famotidine (PEPCID) 20 MG tablet Take 20 mg by mouth 2 (two) times daily.    FEROSUL 325 mg (65 mg iron) Tab tablet TAKE 1 TABLET BY MOUTH EVERY DAY    fluticasone propionate (FLONASE) 50 mcg/actuation nasal spray 2 sprays by Each Nostril route daily as needed for Rhinitis.    fluticasone-salmeterol 230-21 mcg/dose (ADVAIR HFA) 230-21 mcg/actuation  HFAA inhaler Inhale 2 puffs into the lungs 2 (two) times daily. Controller    furosemide (LASIX) 20 MG tablet Take 1 tablet (20 mg total) by mouth once daily.    lisinopriL (PRINIVIL,ZESTRIL) 20 MG tablet Take 1 tablet (20 mg total) by mouth once daily.    losartan (COZAAR) 25 MG tablet Take 25 mg by mouth once daily.    meloxicam (MOBIC) 7.5 MG tablet Take 7.5 mg by mouth once daily.    metoprolol succinate (TOPROL-XL) 25 MG 24 hr tablet Take 25 mg by mouth once daily.    minocycline (MINOCIN,DYNACIN) 100 MG capsule Take 100 mg by mouth 2 (two) times daily.    MIRENA 20 mcg/24 hours (8 yrs) 52 mg IUD SMARTSIG:Intrauterine Once    miSOPROStoL (CYTOTEC) 200 MCG Tab Take by mouth.    NEXIUM 40 mg capsule Take 40 mg by mouth once daily.    olmesartan (BENICAR) 20 MG tablet Take 20 mg by mouth once daily.    omeprazole (PRILOSEC) 20 MG capsule Take 20 mg by mouth once daily.    traMADoL (ULTRAM) 50 mg tablet Take 50 mg by mouth every 4 to 6 hours as needed.    trazodone (DESYREL) 100 MG tablet Take 100 mg by mouth every evening.     Current Facility-Administered Medications   Medication    bebtelovimab (EUA) 175 mg/2 mL (87.5 mg/mL) injection 175 mg       Review of patient's allergies indicates:  No Known Allergies    No family history on file.    Social History     Socioeconomic History    Marital status:    Tobacco Use    Smoking status: Former     Types: Cigarettes, Cigars     Quit date: 2019     Years since quitting: 3.8    Smokeless tobacco: Never   Substance and Sexual Activity    Alcohol use: Yes     Alcohol/week: 0.8 standard drinks     Types: 1 Standard drinks or equivalent per week     Comment: rarely    Drug use: No    Sexual activity: Not Currently     Social Determinants of Health     Financial Resource Strain: Medium Risk    Difficulty of Paying Living Expenses: Somewhat hard   Food Insecurity: Food Insecurity Present    Worried About Running Out of Food in the Last Year: Sometimes true    Ran Out  of Food in the Last Year: Sometimes true   Transportation Needs: Unmet Transportation Needs    Lack of Transportation (Medical): Yes    Lack of Transportation (Non-Medical): Yes   Stress: No Stress Concern Present    Feeling of Stress : Only a little   Social Connections: Socially Isolated    Frequency of Communication with Friends and Family: Once a week    Frequency of Social Gatherings with Friends and Family: Once a week    Attends Alevism Services: Never    Active Member of Clubs or Organizations: No    Attends Club or Organization Meetings: Never    Marital Status:    Housing Stability: High Risk    Unable to Pay for Housing in the Last Year: Yes    Unstable Housing in the Last Year: No       History of present illness:  Katiuska comes in today for follow-up for her bilateral knee osteoarthritis.  She was last seen and injected in her bilateral knees 3 months ago with good relief of her symptoms.  Unfortunately, the pain has returned here recently.  She denies any new injury or trauma.  She comes in today requesting repeat corticosteroid injections.    Review of Systems:    Constitution: Negative for chills, fever, and sweats.  Negative for unexplained weight loss.    HENT:  Negative for headaches and blurry vision.    Cardiovascular:Negative for chest pain or irregular heart beat. Negative for hypertension.    Respiratory:  Negative for cough and shortness of breath.    Gastrointestinal: Negative for abdominal pain, heartburn, melena, nausea, and vomitting.    Genitourinary:  Negative bladder incontinence and dysuria.    Musculoskeletal:  See HPI for details.     Neurological: Negative for numbness.    Psychiatric/Behavioral: Negative for depression.  The patient is not nervous/anxious.      Endocrine: Negative for polyuria    Hematologic/Lymphatic: Negative for bleeding problem.  Does not bruise/bleed easily.    Skin: Negative for poor would healing and rash    Objective:      Physical  "Examination:    Vital Signs:  There were no vitals filed for this visit.    Body mass index is 58.28 kg/m².    This a well-developed, well nourished patient in no acute distress.  They are alert and oriented and cooperative to examination.        Bilateral knee exam:  Her bilateral knee exam is unchanged where she was at her last visit 3 months ago. Skin to her bilateral knees is clean dry and intact.  There is no erythema or ecchymosis.  There are no signs or symptoms of infection.  Patient is neurovascularly intact throughout bilateral lower extremities.  Bilateral calves are soft and nontender.  Bilateral knee active range of motion is approximately 0-90 degrees.  Both knees are stable to varus and valgus stresses while held in extension.  She has a varus deformity to her bilateral lower extremities.  She has a negative straight leg raise bilaterally.  She is morbidly obese.     Pertinent New Results:    XRAY Report / Interpretation:   No new radiographs were taken on today's clinic visit.    Assessment/Plan:      1.  Bilateral knee osteoarthritis, severe, subsequent encounter.    I injected her bilateral knees today via an anterior lateral approach with 40 mg of Kenalog and lidocaine respectively.  She tolerated this well.  Will have her follow-up in 3 months to see how she is responding to these injections and we will be able to repeat them at that time if she is symptomatic again.  Ultimately, she will need total knee arthroplasty at some point in the future due to the severity of the osteoarthritis in her knees and her young age.  Unfortunately, she is morbidly obese and would need to lose some weight before surgery would be of recommendation.  Her BMI is currently 58.    Franklin Kevin, Physician Assistant, served in the capacity as a "scribe" for this patient encounter.  A "face-to-face" encounter occurred with Dr. Brayan Valerio on this date.  The treatment plan and medical decision-making is " outlined above. Patient was seen and examined with a chaperone.       This note was created using Dragon voice recognition software that occasionally misinterpreted phrases or words.

## 2022-11-15 ENCOUNTER — PATIENT OUTREACH (OUTPATIENT)
Dept: EMERGENCY MEDICINE | Facility: HOSPITAL | Age: 47
End: 2022-11-15

## 2022-11-22 ENCOUNTER — PATIENT OUTREACH (OUTPATIENT)
Dept: EMERGENCY MEDICINE | Facility: HOSPITAL | Age: 47
End: 2022-11-22

## 2022-11-29 ENCOUNTER — TELEPHONE (OUTPATIENT)
Dept: PULMONOLOGY | Facility: CLINIC | Age: 47
End: 2022-11-29

## 2022-11-29 NOTE — TELEPHONE ENCOUNTER
----- Message from Criss Saldana RRT sent at 11/28/2022  9:31 AM CST -----  Regarding: Apap machine  Pt. Returned her machine today AMA due to non compliance.

## 2022-12-13 ENCOUNTER — PATIENT OUTREACH (OUTPATIENT)
Dept: EMERGENCY MEDICINE | Facility: HOSPITAL | Age: 47
End: 2022-12-13

## 2022-12-26 ENCOUNTER — HOSPITAL ENCOUNTER (INPATIENT)
Facility: HOSPITAL | Age: 47
LOS: 2 days | Discharge: HOME | DRG: 305 | End: 2022-12-28
Attending: EMERGENCY MEDICINE
Payer: MEDICAID

## 2022-12-26 DIAGNOSIS — R07.9 CHEST PAIN: ICD-10-CM

## 2022-12-26 DIAGNOSIS — I16.0 HYPERTENSIVE URGENCY: Primary | ICD-10-CM

## 2022-12-26 DIAGNOSIS — R51.9 INTRACTABLE HEADACHE, UNSPECIFIED CHRONICITY PATTERN, UNSPECIFIED HEADACHE TYPE: ICD-10-CM

## 2022-12-26 DIAGNOSIS — I10 UNCONTROLLED HYPERTENSION: ICD-10-CM

## 2022-12-26 PROBLEM — J01.90 ACUTE SINUSITIS: Status: ACTIVE | Noted: 2022-12-26

## 2022-12-26 PROBLEM — E87.6 HYPOKALEMIA: Status: ACTIVE | Noted: 2022-12-26

## 2022-12-26 LAB
ALBUMIN SERPL BCP-MCNC: 3.5 G/DL (ref 3.5–5.2)
ALP SERPL-CCNC: 85 U/L (ref 55–135)
ALT SERPL W/O P-5'-P-CCNC: 23 U/L (ref 10–44)
ANION GAP SERPL CALC-SCNC: 9 MMOL/L (ref 8–16)
AST SERPL-CCNC: 21 U/L (ref 10–40)
BASOPHILS # BLD AUTO: 0.02 K/UL (ref 0–0.2)
BASOPHILS NFR BLD: 0.2 % (ref 0–1.9)
BILIRUB SERPL-MCNC: 0.5 MG/DL (ref 0.1–1)
BNP SERPL-MCNC: 29 PG/ML (ref 0–99)
BUN SERPL-MCNC: 12 MG/DL (ref 6–20)
CALCIUM SERPL-MCNC: 9 MG/DL (ref 8.7–10.5)
CHLORIDE SERPL-SCNC: 96 MMOL/L (ref 95–110)
CO2 SERPL-SCNC: 32 MMOL/L (ref 23–29)
CREAT SERPL-MCNC: 0.9 MG/DL (ref 0.5–1.4)
DIFFERENTIAL METHOD: ABNORMAL
EOSINOPHIL # BLD AUTO: 0 K/UL (ref 0–0.5)
EOSINOPHIL NFR BLD: 0.2 % (ref 0–8)
ERYTHROCYTE [DISTWIDTH] IN BLOOD BY AUTOMATED COUNT: 12.5 % (ref 11.5–14.5)
EST. GFR  (NO RACE VARIABLE): >60 ML/MIN/1.73 M^2
GLUCOSE SERPL-MCNC: 157 MG/DL (ref 70–110)
HCT VFR BLD AUTO: 44.4 % (ref 37–48.5)
HGB BLD-MCNC: 14.4 G/DL (ref 12–16)
IMM GRANULOCYTES # BLD AUTO: 0.04 K/UL (ref 0–0.04)
IMM GRANULOCYTES NFR BLD AUTO: 0.3 % (ref 0–0.5)
LYMPHOCYTES # BLD AUTO: 1.1 K/UL (ref 1–4.8)
LYMPHOCYTES NFR BLD: 9.8 % (ref 18–48)
MCH RBC QN AUTO: 32 PG (ref 27–31)
MCHC RBC AUTO-ENTMCNC: 32.4 G/DL (ref 32–36)
MCV RBC AUTO: 99 FL (ref 82–98)
MONOCYTES # BLD AUTO: 0.5 K/UL (ref 0.3–1)
MONOCYTES NFR BLD: 4.2 % (ref 4–15)
NEUTROPHILS # BLD AUTO: 9.9 K/UL (ref 1.8–7.7)
NEUTROPHILS NFR BLD: 85.3 % (ref 38–73)
NRBC BLD-RTO: 0 /100 WBC
PLATELET # BLD AUTO: 338 K/UL (ref 150–450)
PMV BLD AUTO: 10.6 FL (ref 9.2–12.9)
POTASSIUM SERPL-SCNC: 3.4 MMOL/L (ref 3.5–5.1)
PROT SERPL-MCNC: 8.4 G/DL (ref 6–8.4)
RBC # BLD AUTO: 4.5 M/UL (ref 4–5.4)
SODIUM SERPL-SCNC: 137 MMOL/L (ref 136–145)
TROPONIN I SERPL HS-MCNC: 13.6 PG/ML (ref 0–14.9)
TROPONIN I SERPL HS-MCNC: 18.3 PG/ML (ref 0–14.9)
WBC # BLD AUTO: 11.54 K/UL (ref 3.9–12.7)

## 2022-12-26 PROCEDURE — 63600175 PHARM REV CODE 636 W HCPCS: Performed by: EMERGENCY MEDICINE

## 2022-12-26 PROCEDURE — 96366 THER/PROPH/DIAG IV INF ADDON: CPT

## 2022-12-26 PROCEDURE — G0378 HOSPITAL OBSERVATION PER HR: HCPCS

## 2022-12-26 PROCEDURE — 25000003 PHARM REV CODE 250: Performed by: EMERGENCY MEDICINE

## 2022-12-26 PROCEDURE — 96375 TX/PRO/DX INJ NEW DRUG ADDON: CPT

## 2022-12-26 PROCEDURE — 25000003 PHARM REV CODE 250: Performed by: NURSE PRACTITIONER

## 2022-12-26 PROCEDURE — 80053 COMPREHEN METABOLIC PANEL: CPT | Performed by: EMERGENCY MEDICINE

## 2022-12-26 PROCEDURE — 63600175 PHARM REV CODE 636 W HCPCS: Performed by: NURSE PRACTITIONER

## 2022-12-26 PROCEDURE — 83880 ASSAY OF NATRIURETIC PEPTIDE: CPT | Performed by: EMERGENCY MEDICINE

## 2022-12-26 PROCEDURE — 96365 THER/PROPH/DIAG IV INF INIT: CPT

## 2022-12-26 PROCEDURE — 93005 ELECTROCARDIOGRAM TRACING: CPT | Performed by: INTERNAL MEDICINE

## 2022-12-26 PROCEDURE — 85025 COMPLETE CBC W/AUTO DIFF WBC: CPT | Performed by: EMERGENCY MEDICINE

## 2022-12-26 PROCEDURE — 93010 ELECTROCARDIOGRAM REPORT: CPT | Mod: ,,, | Performed by: INTERNAL MEDICINE

## 2022-12-26 PROCEDURE — 96376 TX/PRO/DX INJ SAME DRUG ADON: CPT

## 2022-12-26 PROCEDURE — 99285 EMERGENCY DEPT VISIT HI MDM: CPT | Mod: 25

## 2022-12-26 PROCEDURE — 84484 ASSAY OF TROPONIN QUANT: CPT | Performed by: NURSE PRACTITIONER

## 2022-12-26 PROCEDURE — 84484 ASSAY OF TROPONIN QUANT: CPT | Mod: 91 | Performed by: EMERGENCY MEDICINE

## 2022-12-26 PROCEDURE — 93010 EKG 12-LEAD: ICD-10-PCS | Mod: ,,, | Performed by: INTERNAL MEDICINE

## 2022-12-26 PROCEDURE — 21000000 HC CCU ICU ROOM CHARGE

## 2022-12-26 RX ORDER — LABETALOL HYDROCHLORIDE 5 MG/ML
40 INJECTION, SOLUTION INTRAVENOUS
Status: COMPLETED | OUTPATIENT
Start: 2022-12-26 | End: 2022-12-26

## 2022-12-26 RX ORDER — TRAZODONE HYDROCHLORIDE 50 MG/1
100 TABLET ORAL NIGHTLY
Status: DISCONTINUED | OUTPATIENT
Start: 2022-12-26 | End: 2022-12-28 | Stop reason: HOSPADM

## 2022-12-26 RX ORDER — CETIRIZINE HYDROCHLORIDE 10 MG/1
10 TABLET ORAL DAILY
Status: DISCONTINUED | OUTPATIENT
Start: 2022-12-26 | End: 2022-12-28 | Stop reason: HOSPADM

## 2022-12-26 RX ORDER — LANOLIN ALCOHOL/MO/W.PET/CERES
800 CREAM (GRAM) TOPICAL
Status: DISCONTINUED | OUTPATIENT
Start: 2022-12-26 | End: 2022-12-28 | Stop reason: HOSPADM

## 2022-12-26 RX ORDER — HYDROMORPHONE HYDROCHLORIDE 1 MG/ML
1 INJECTION, SOLUTION INTRAMUSCULAR; INTRAVENOUS; SUBCUTANEOUS
Status: COMPLETED | OUTPATIENT
Start: 2022-12-26 | End: 2022-12-26

## 2022-12-26 RX ORDER — SODIUM CHLORIDE 0.9 % (FLUSH) 0.9 %
10 SYRINGE (ML) INJECTION
Status: DISCONTINUED | OUTPATIENT
Start: 2022-12-26 | End: 2022-12-28 | Stop reason: HOSPADM

## 2022-12-26 RX ORDER — ONDANSETRON 2 MG/ML
4 INJECTION INTRAMUSCULAR; INTRAVENOUS EVERY 8 HOURS PRN
Status: DISCONTINUED | OUTPATIENT
Start: 2022-12-26 | End: 2022-12-28 | Stop reason: HOSPADM

## 2022-12-26 RX ORDER — ONDANSETRON 2 MG/ML
8 INJECTION INTRAMUSCULAR; INTRAVENOUS
Status: COMPLETED | OUTPATIENT
Start: 2022-12-26 | End: 2022-12-26

## 2022-12-26 RX ORDER — LABETALOL HYDROCHLORIDE 5 MG/ML
20 INJECTION, SOLUTION INTRAVENOUS
Status: COMPLETED | OUTPATIENT
Start: 2022-12-26 | End: 2022-12-26

## 2022-12-26 RX ORDER — HYDROMORPHONE HYDROCHLORIDE 1 MG/ML
0.5 INJECTION, SOLUTION INTRAMUSCULAR; INTRAVENOUS; SUBCUTANEOUS
Status: COMPLETED | OUTPATIENT
Start: 2022-12-26 | End: 2022-12-26

## 2022-12-26 RX ORDER — ARFORMOTEROL TARTRATE 15 UG/2ML
15 SOLUTION RESPIRATORY (INHALATION) 2 TIMES DAILY
Status: DISCONTINUED | OUTPATIENT
Start: 2022-12-27 | End: 2022-12-28 | Stop reason: HOSPADM

## 2022-12-26 RX ORDER — AMOXICILLIN AND CLAVULANATE POTASSIUM 875; 125 MG/1; MG/1
1 TABLET, FILM COATED ORAL 2 TIMES DAILY
COMMUNITY
Start: 2022-11-18 | End: 2022-12-26

## 2022-12-26 RX ORDER — FUROSEMIDE 20 MG/1
20 TABLET ORAL DAILY
Status: DISCONTINUED | OUTPATIENT
Start: 2022-12-27 | End: 2022-12-28 | Stop reason: HOSPADM

## 2022-12-26 RX ORDER — BISACODYL 10 MG
10 SUPPOSITORY, RECTAL RECTAL DAILY PRN
Status: DISCONTINUED | OUTPATIENT
Start: 2022-12-26 | End: 2022-12-28 | Stop reason: HOSPADM

## 2022-12-26 RX ORDER — ATORVASTATIN CALCIUM 10 MG/1
10 TABLET, FILM COATED ORAL NIGHTLY
Status: DISCONTINUED | OUTPATIENT
Start: 2022-12-26 | End: 2022-12-28 | Stop reason: HOSPADM

## 2022-12-26 RX ORDER — ALBUTEROL SULFATE 0.83 MG/ML
2.5 SOLUTION RESPIRATORY (INHALATION) EVERY 6 HOURS PRN
Status: DISCONTINUED | OUTPATIENT
Start: 2022-12-26 | End: 2022-12-28 | Stop reason: HOSPADM

## 2022-12-26 RX ORDER — LOSARTAN POTASSIUM 25 MG/1
50 TABLET ORAL DAILY
Status: DISCONTINUED | OUTPATIENT
Start: 2022-12-26 | End: 2022-12-26

## 2022-12-26 RX ORDER — SODIUM,POTASSIUM PHOSPHATES 280-250MG
2 POWDER IN PACKET (EA) ORAL
Status: DISCONTINUED | OUTPATIENT
Start: 2022-12-26 | End: 2022-12-28 | Stop reason: HOSPADM

## 2022-12-26 RX ORDER — BUTALBITAL, ACETAMINOPHEN AND CAFFEINE 50; 325; 40 MG/1; MG/1; MG/1
1 TABLET ORAL EVERY 4 HOURS PRN
Status: DISCONTINUED | OUTPATIENT
Start: 2022-12-26 | End: 2022-12-28 | Stop reason: HOSPADM

## 2022-12-26 RX ORDER — NICARDIPINE HYDROCHLORIDE 0.2 MG/ML
0-15 INJECTION INTRAVENOUS CONTINUOUS
Status: DISCONTINUED | OUTPATIENT
Start: 2022-12-26 | End: 2022-12-28 | Stop reason: HOSPADM

## 2022-12-26 RX ORDER — NICARDIPINE HYDROCHLORIDE 0.2 MG/ML
0-15 INJECTION INTRAVENOUS CONTINUOUS
Status: DISCONTINUED | OUTPATIENT
Start: 2022-12-26 | End: 2022-12-26

## 2022-12-26 RX ORDER — FLUTICASONE PROPIONATE 50 MCG
2 SPRAY, SUSPENSION (ML) NASAL DAILY PRN
Status: DISCONTINUED | OUTPATIENT
Start: 2022-12-26 | End: 2022-12-28 | Stop reason: HOSPADM

## 2022-12-26 RX ORDER — ACETAMINOPHEN 325 MG/1
650 TABLET ORAL EVERY 4 HOURS PRN
Status: DISCONTINUED | OUTPATIENT
Start: 2022-12-26 | End: 2022-12-28 | Stop reason: HOSPADM

## 2022-12-26 RX ORDER — FAMOTIDINE 20 MG/1
20 TABLET, FILM COATED ORAL 2 TIMES DAILY
Status: DISCONTINUED | OUTPATIENT
Start: 2022-12-26 | End: 2022-12-26

## 2022-12-26 RX ORDER — TALC
6 POWDER (GRAM) TOPICAL NIGHTLY PRN
Status: DISCONTINUED | OUTPATIENT
Start: 2022-12-26 | End: 2022-12-28 | Stop reason: HOSPADM

## 2022-12-26 RX ORDER — BUDESONIDE 0.5 MG/2ML
1 INHALANT ORAL EVERY 12 HOURS
Status: DISCONTINUED | OUTPATIENT
Start: 2022-12-27 | End: 2022-12-28 | Stop reason: HOSPADM

## 2022-12-26 RX ORDER — PANTOPRAZOLE SODIUM 40 MG/1
40 TABLET, DELAYED RELEASE ORAL
Status: DISCONTINUED | OUTPATIENT
Start: 2022-12-27 | End: 2022-12-28 | Stop reason: HOSPADM

## 2022-12-26 RX ORDER — DOXYCYCLINE 100 MG/1
100 CAPSULE ORAL EVERY 12 HOURS
Status: DISCONTINUED | OUTPATIENT
Start: 2022-12-26 | End: 2022-12-28 | Stop reason: HOSPADM

## 2022-12-26 RX ORDER — PROCHLORPERAZINE EDISYLATE 5 MG/ML
5 INJECTION INTRAMUSCULAR; INTRAVENOUS EVERY 6 HOURS PRN
Status: DISCONTINUED | OUTPATIENT
Start: 2022-12-26 | End: 2022-12-28 | Stop reason: HOSPADM

## 2022-12-26 RX ORDER — FLUTICASONE PROPIONATE AND SALMETEROL XINAFOATE 230; 21 UG/1; UG/1
2 AEROSOL, METERED RESPIRATORY (INHALATION) 2 TIMES DAILY
Status: DISCONTINUED | OUTPATIENT
Start: 2022-12-26 | End: 2022-12-26

## 2022-12-26 RX ORDER — LOSARTAN POTASSIUM 50 MG/1
100 TABLET ORAL DAILY
Status: DISCONTINUED | OUTPATIENT
Start: 2022-12-26 | End: 2022-12-28 | Stop reason: HOSPADM

## 2022-12-26 RX ORDER — ALBUTEROL SULFATE 90 UG/1
2 AEROSOL, METERED RESPIRATORY (INHALATION) EVERY 6 HOURS PRN
Status: DISCONTINUED | OUTPATIENT
Start: 2022-12-26 | End: 2022-12-26

## 2022-12-26 RX ORDER — ONDANSETRON 2 MG/ML
4 INJECTION INTRAMUSCULAR; INTRAVENOUS
Status: COMPLETED | OUTPATIENT
Start: 2022-12-26 | End: 2022-12-26

## 2022-12-26 RX ORDER — ACETAMINOPHEN 500 MG
1000 TABLET ORAL
Status: ACTIVE | OUTPATIENT
Start: 2022-12-26 | End: 2022-12-26

## 2022-12-26 RX ORDER — ENOXAPARIN SODIUM 100 MG/ML
40 INJECTION SUBCUTANEOUS EVERY 12 HOURS
Status: DISCONTINUED | OUTPATIENT
Start: 2022-12-26 | End: 2022-12-26

## 2022-12-26 RX ADMIN — NICARDIPINE HYDROCHLORIDE 5 MG/HR: 0.2 INJECTION INTRAVENOUS at 04:12

## 2022-12-26 RX ADMIN — LABETALOL HYDROCHLORIDE 40 MG: 5 INJECTION, SOLUTION INTRAVENOUS at 07:12

## 2022-12-26 RX ADMIN — NICARDIPINE HYDROCHLORIDE 5 MG/HR: 0.2 INJECTION INTRAVENOUS at 11:12

## 2022-12-26 RX ADMIN — LABETALOL HYDROCHLORIDE 20 MG: 5 INJECTION, SOLUTION INTRAVENOUS at 06:12

## 2022-12-26 RX ADMIN — HYDROMORPHONE HYDROCHLORIDE 0.5 MG: 0.5 INJECTION, SOLUTION INTRAMUSCULAR; INTRAVENOUS; SUBCUTANEOUS at 11:12

## 2022-12-26 RX ADMIN — HYDROMORPHONE HYDROCHLORIDE 1 MG: 1 INJECTION, SOLUTION INTRAMUSCULAR; INTRAVENOUS; SUBCUTANEOUS at 06:12

## 2022-12-26 RX ADMIN — CETIRIZINE HYDROCHLORIDE 10 MG: 10 TABLET, FILM COATED ORAL at 04:12

## 2022-12-26 RX ADMIN — PROCHLORPERAZINE EDISYLATE 5 MG: 5 INJECTION INTRAMUSCULAR; INTRAVENOUS at 05:12

## 2022-12-26 RX ADMIN — LOSARTAN POTASSIUM 100 MG: 50 TABLET, FILM COATED ORAL at 04:12

## 2022-12-26 RX ADMIN — ONDANSETRON HYDROCHLORIDE 8 MG: 2 SOLUTION INTRAMUSCULAR; INTRAVENOUS at 06:12

## 2022-12-26 RX ADMIN — ONDANSETRON HYDROCHLORIDE 4 MG: 2 SOLUTION INTRAMUSCULAR; INTRAVENOUS at 11:12

## 2022-12-26 RX ADMIN — APIXABAN 5 MG: 5 TABLET, FILM COATED ORAL at 11:12

## 2022-12-26 RX ADMIN — TRAZODONE HYDROCHLORIDE 100 MG: 50 TABLET ORAL at 11:12

## 2022-12-26 RX ADMIN — ATORVASTATIN CALCIUM 10 MG: 10 TABLET, FILM COATED ORAL at 11:12

## 2022-12-26 RX ADMIN — DOXYCYCLINE HYCLATE 100 MG: 100 CAPSULE ORAL at 11:12

## 2022-12-26 NOTE — FIRST PROVIDER EVALUATION
"Medical screening examination initiated.  I have conducted a focused provider triage encounter, findings are as follows:    Brief history of present illness:  Headache for the past 2-3 days consistent with migraine headache    Vitals:    12/26/22 0458   BP: (!) 228/139   BP Location: Left arm   Patient Position: Lying   Pulse: 98   Resp: (!) 26   Temp: 98 °F (36.7 °C)   TempSrc: Oral   SpO2: 95%   Weight: (!) 154.2 kg (340 lb)   Height: 5' 3" (1.6 m)       Pertinent physical exam:  No focal neurologic deficits.  No meningeal signs.    Brief workup plan:  Head CT, labs, pain control, blood pressure control re-evaluation    Preliminary workup initiated; this workup will be continued and followed by the physician or advanced practice provider that is assigned to the patient when roomed.  "

## 2022-12-26 NOTE — ED PROVIDER NOTES
Encounter Date: 2022       History     Chief Complaint   Patient presents with    Headache     Since 11 PM     39-year-old female who has a history of hypertension, obesity, prior pulmonary emboli, GERD, asthma, presents emergency room with complaints of having a frontal headache since yesterday.  The patient states she is been compliant with her blood pressure medications.  She admits she is had some similar headaches previously.  No fever.  She admits to some nausea and vomiting.  No complaint of any sore throat.  She denies any complaints of chest pain or shortness of breath.  No palpitations.  No history underlying renal disease and has had no complaints of any dysuria or hematuria.  Bowel habits have been normal.  The patient's care was initially managed by Dr. White who had given her Tylenol, Dilaudid, labetalol.    Review of patient's allergies indicates:  No Known Allergies  Past Medical History:   Diagnosis Date    Antithrombin III deficiency 2021    Asthma     Claustrophobia     Elevated factor VIII level 2021    Esophageal reflux     Gastroesophageal reflux    Generalized anxiety disorder     Anxiety, Generalized    Herniated disc     Hypertension     Iron deficiency anemia due to chronic blood loss 10/27/2021    Lower extremity weakness     Morbid obesity     Obesity     Protein S deficiency 2021    Pulmonary embolism     Upper extremity weakness      Past Surgical History:   Procedure Laterality Date     SECTION      CHOLECYSTECTOMY      TONSILLECTOMY       No family history on file.  Social History     Tobacco Use    Smoking status: Former     Types: Cigarettes, Cigars     Quit date: 2019     Years since quitting: 3.9    Smokeless tobacco: Never   Substance Use Topics    Alcohol use: Yes     Alcohol/week: 0.8 standard drinks     Types: 1 Standard drinks or equivalent per week     Comment: rarely    Drug use: No     Review of Systems   Constitutional:  Negative for chills,  diaphoresis and fever.   HENT:  Negative for congestion, sore throat and trouble swallowing.    Respiratory:  Negative for cough and shortness of breath.    Cardiovascular:  Negative for chest pain.   Gastrointestinal:  Positive for nausea and vomiting. Negative for abdominal pain.   Genitourinary:  Negative for difficulty urinating and dysuria.   Musculoskeletal:  Negative for back pain.   Skin:  Negative for pallor and rash.   Neurological:  Positive for headaches. Negative for weakness.   Hematological:  Does not bruise/bleed easily.   All other systems reviewed and are negative.    Physical Exam     Initial Vitals [12/26/22 0458]   BP Pulse Resp Temp SpO2   (!) 228/139 98 (!) 26 98 °F (36.7 °C) 95 %      MAP       --         Physical Exam    Vitals reviewed.  Constitutional: She appears well-developed and well-nourished. She is not diaphoretic. No distress.   Morbidly obese who appears uncomfortable   HENT:   Head: Normocephalic and atraumatic.   Nose: Nose normal.   Mouth/Throat: Oropharynx is clear and moist. No oropharyngeal exudate.   Eyes: Conjunctivae are normal. Pupils are equal, round, and reactive to light.   Neck: Neck supple. No JVD present.   Normal range of motion.  Cardiovascular:  Normal rate, regular rhythm, normal heart sounds and intact distal pulses.     Exam reveals no gallop and no friction rub.       No murmur heard.  Pulmonary/Chest: Breath sounds normal. No respiratory distress. She has no wheezes. She has no rhonchi. She has no rales. She exhibits no tenderness.   Abdominal: Abdomen is soft. Bowel sounds are normal. She exhibits no distension. There is no abdominal tenderness. There is no rebound and no guarding.   Musculoskeletal:         General: No tenderness or edema. Normal range of motion.      Cervical back: Normal range of motion and neck supple.     Lymphadenopathy:     She has no cervical adenopathy.   Neurological: She is alert and oriented to person, place, and time. She has  normal strength. No cranial nerve deficit or sensory deficit. GCS score is 15. GCS eye subscore is 4. GCS verbal subscore is 5. GCS motor subscore is 6.   Skin: Skin is warm and dry. Capillary refill takes less than 2 seconds. Rash noted. No erythema. No pallor.   Erythematous inframammary rash   Psychiatric: She has a normal mood and affect. Her behavior is normal. Judgment and thought content normal.       ED Course   Critical Care    Date/Time: 12/26/2022 1:18 PM  Performed by: Dilan Duffy Jr., MD  Authorized by: Dilan Duffy Jr., MD   Direct patient critical care time: 45 minutes  Ordering / reviewing critical care time: 10 minutes  Documentation critical care time: 10 minutes  Consulting other physicians critical care time: 5 minutes  Total critical care time (exclusive of procedural time) : 70 minutes  Critical care was time spent personally by me on the following activities: discussions with consultants, evaluation of patient's response to treatment, examination of patient, obtaining history from patient or surrogate, ordering and performing treatments and interventions, ordering and review of laboratory studies, ordering and review of radiographic studies, pulse oximetry and re-evaluation of patient's condition.      Labs Reviewed   CBC W/ AUTO DIFFERENTIAL - Abnormal; Notable for the following components:       Result Value    MCV 99 (*)     MCH 32.0 (*)     Gran # (ANC) 9.9 (*)     Gran % 85.3 (*)     Lymph % 9.8 (*)     All other components within normal limits   COMPREHENSIVE METABOLIC PANEL - Abnormal; Notable for the following components:    Potassium 3.4 (*)     CO2 32 (*)     Glucose 157 (*)     All other components within normal limits   TROPONIN I HIGH SENSITIVITY - Abnormal; Notable for the following components:    Troponin I High Sensitivity 18.3 (*)     All other components within normal limits   B-TYPE NATRIURETIC PEPTIDE          Imaging Results              X-Ray Chest AP  Portable (Final result)  Result time 12/26/22 11:26:22      Final result by Eladio Wilkins MD (12/26/22 11:26:22)                   Narrative:    Chest single view    CLINICAL DATA: Hypertensive urgency    FINDINGS: Comparison to October 15. Cardiomegaly is stable. Pulmonary vascular congestion is noted, without definite pulmonary edema. There is minimal atelectasis or infiltrate in the left midlung. No pleural effusions are identified. Osseous structures are unremarkable.    IMPRESSION:  1. Cardiomegaly with pulmonary vascular congestion but no evidence of ran congestive failure.  2. Mild atelectasis or infiltrate in the left midlung.    Electronically signed by:  Eladio Wilkins MD  12/26/2022 11:26 AM [facility] Workstation: [id number]                                     CT Head Without Contrast (Final result)  Result time 12/26/22 06:55:11      Final result by Eladio Wilkins MD (12/26/22 06:55:11)                   Narrative:    CT HEAD WITHOUT CONTRAST    CMS MANDATED QUALITY DATA - CT RADIATION  436    All CT scans at this facility utilize dose modulation, iterative reconstruction, and/or weight based dosing when appropriate to reduce radiation dose to as low as reasonably achievable    Clinical data: Headache    FINDINGS: Noninfusion images were obtained from the skull base to the vertex. There is no intracranial mass, hemorrhage, or midline shift. Ventricles and sulci are normal. There are no pathologic extra-axial fluid collections. There is no evidence of ischemic change or edema. Cerebellum and brainstem are normal.    The calvarium is intact.    Fluid accumulation and mucoperiosteal thickening in the left maxillary sinus result in near complete opacification. There is a smaller amount of fluid in the right maxillary sinus, with mild anterior mucoperiosteal thickening.    IMPRESSION:    1. Normal CT appearance of the brain.  2. Acute and chronic bilateral maxillary sinusitis.    Electronically  signed by:  Eladio Wilkins MD  12/26/2022 6:55 AM CST Workstation: [id number]                                     Medications   acetaminophen tablet 1,000 mg (1,000 mg Oral Not Given 12/26/22 0658)   niCARdipine 40 mg/200 mL (0.2 mg/mL) infusion (5 mg/hr Intravenous New Bag 12/26/22 1141)   doxycycline capsule 100 mg (has no administration in time range)   HYDROmorphone injection 1 mg (1 mg Intravenous Given 12/26/22 0609)   ondansetron injection 8 mg (8 mg Intravenous Given 12/26/22 0605)   labetaloL injection 20 mg (20 mg Intravenous Given 12/26/22 0606)   labetaloL injection 40 mg (40 mg Intravenous Given 12/26/22 0751)   HYDROmorphone injection 0.5 mg (0.5 mg Intravenous Given 12/26/22 1138)   ondansetron injection 4 mg (4 mg Intravenous Given 12/26/22 1132)                Attending Attestation:             Attending ED Notes:   ED course & MDM:  This 39-year-old morbidly obese female who has severe hypertension, presented with complaints of frontal headache.  Neurologically she is nonfocal.  The patient was initially given labetalol doses of 10 than 20 then 40 mg without much benefit and little improvement in her frontal headache.  CT scan of the head did not show any acute abnormalities with the exception of sinus disease.  Screening labs obtained were reviewed.  The patient will be started on Cardene and ultimately a consult hospital Medicine will be made for admission.  Patient will also be started on doxycycline.    After placing the patient on Cardene 5 mg an hour blood pressure is significantly improved when last recorded 140/70.  She admits to the headache is better but not resolved.                 Clinical Impression:   Final diagnoses:  [I10] Uncontrolled hypertension  [I16.0] Hypertensive urgency (Primary)  [R51.9] Intractable headache, unspecified chronicity pattern, unspecified headache type        ED Disposition Condition    Observation Stable                Dilan Duffy Jr.,  MD  12/26/22 9149

## 2022-12-26 NOTE — H&P
[facility] - Emergency Dept  Hospital Medicine  History & Physical  Face to face encounter: 12/26/2022      Patient Name: Katiuska Preston  MRN: [id number]  Patient Class: OP- Observation  Admission Date: 12/26/2022  Attending Physician: Enoch Terrazas MD  Primary Care Provider: Harpreet Rendon MD         Patient information was obtained from patient, past medical records and ER records.     Subjective:     Principal Problem:Hypertensive urgency    Chief Complaint:   Chief Complaint   Patient presents with    Headache     Since 11 PM        HPI: 47-year-old morbidly obese (BMI 60) female with history of hypertension, migraine (since age 14), sinusitis, asthma, pulmonary emboli (on eliquis), GERD  Presented to the emergency department with a severe persistent headache, across both eyes, since last night  Denies aggravating/alleviating factors.  Denies fever or chills  Denies shortness of breath, chest pain, abdominal pain, urinary symptoms, weight gain, visual disturbances  Had severe nausea and vomited on arrival to the ED  Reports compliant with BP medication, taking olmesartan 20 mg daily    Case discussed with the ED physician and her nurse.  Noted to be hypertensive, 228/139. Given IV labetalol 20 mg x1, IV labetalol 40 mg x 1 with no improvements.   Started on nicardipine infusion at 5 mg /h    Laboratory studies were reviewed:  CBC unremarkable  CMP unremarkable except potassium 3.4, glucose 157  High sensitivity troponin 18.3  BNP 29  EKG, personally reviewed, sinus tachycardia, rate 119, no ST elevation  CT Head: Normal CT appearance of the brain, acute and chronic bilateral maxillary sinusitis.  Chest imaging: Cardiomegaly with pulmonary vascular congestion but no evidence of ran congestive failure, mild atelectasis or infiltrate in the left midlung.         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    Asthma     Claustrophobia     Elevated  factor VIII level 2021    Esophageal reflux     Gastroesophageal reflux    Generalized anxiety disorder     Anxiety, Generalized    Herniated disc     Hypertension     Iron deficiency anemia due to chronic blood loss 10/27/2021    Lower extremity weakness     Morbid obesity     Obesity     Protein S deficiency 2021    Pulmonary embolism     Upper extremity weakness        Past Surgical History:   Procedure Laterality Date     SECTION      CHOLECYSTECTOMY      TONSILLECTOMY         Review of patient's allergies indicates:  No Known Allergies    Current Facility-Administered Medications on File Prior to Encounter   Medication    bebtelovimab (EUA) 175 mg/2 mL (87.5 mg/mL) injection 175 mg     Current Outpatient Medications on File Prior to Encounter   Medication Sig    albuterol (PROVENTIL/VENTOLIN HFA) 90 mcg/actuation inhaler Inhale 2 puffs into the lungs every 6 (six) hours as needed for Wheezing. Rescue    atorvastatin (LIPITOR) 10 MG tablet Take 10 mg by mouth every evening.    butalbital-acetaminophen-caffeine -40 mg (FIORICET, ESGIC) -40 mg per tablet Take 1 tablet by mouth every 4 (four) hours as needed.    ELIQUIS 5 mg Tab TAKE 1 TABLET BY MOUTH TWICE DAILY    famotidine (PEPCID) 20 MG tablet Take 20 mg by mouth 2 (two) times daily.    FEROSUL 325 mg (65 mg iron) Tab tablet TAKE 1 TABLET BY MOUTH EVERY DAY    fluticasone propionate (FLONASE) 50 mcg/actuation nasal spray 2 sprays by Each Nostril route daily as needed for Rhinitis.    fluticasone-salmeterol 230-21 mcg/dose (ADVAIR HFA) 230-21 mcg/actuation HFAA inhaler Inhale 2 puffs into the lungs 2 (two) times daily. Controller    furosemide (LASIX) 20 MG tablet Take 1 tablet (20 mg total) by mouth once daily.    meloxicam (MOBIC) 7.5 MG tablet Take 7.5 mg by mouth once daily.    minocycline (MINOCIN,DYNACIN) 100 MG capsule Take 100 mg by mouth 2 (two) times daily.    olmesartan (BENICAR) 20 MG tablet  Take 20 mg by mouth once daily.    omeprazole (PRILOSEC) 20 MG capsule Take 20 mg by mouth once daily.    trazodone (DESYREL) 100 MG tablet Take 100 mg by mouth every evening.    carvediloL (COREG) 12.5 MG tablet Take 1 tablet (12.5 mg total) by mouth 2 (two) times daily with meals.    ergocalciferol (ERGOCALCIFEROL) 50,000 unit Cap Take 50,000 Units by mouth twice a week.    lisinopriL (PRINIVIL,ZESTRIL) 20 MG tablet Take 1 tablet (20 mg total) by mouth once daily.    losartan (COZAAR) 25 MG tablet Take 25 mg by mouth once daily.    metoprolol succinate (TOPROL-XL) 25 MG 24 hr tablet Take 25 mg by mouth once daily.    MIRENA 20 mcg/24 hours (8 yrs) 52 mg IUD SMARTSIG:Intrauterine Once    miSOPROStoL (CYTOTEC) 200 MCG Tab Take by mouth.    NEXIUM 40 mg capsule Take 40 mg by mouth once daily.    traMADoL (ULTRAM) 50 mg tablet Take 50 mg by mouth every 4 to 6 hours as needed.    [DISCONTINUED] amoxicillin-clavulanate 875-125mg (AUGMENTIN) 875-125 mg per tablet Take 1 tablet by mouth 2 (two) times daily.     Family History    None       Tobacco Use    Smoking status: Former     Types: Cigarettes, Cigars     Quit date: 2019     Years since quitting: 3.9    Smokeless tobacco: Never   Substance and Sexual Activity    Alcohol use: Yes     Alcohol/week: 0.8 standard drinks     Types: 1 Standard drinks or equivalent per week     Comment: rarely    Drug use: No    Sexual activity: Not Currently     Review of Systems   All other systems reviewed and are negative.  Objective:     Vital Signs (Most Recent):  Temp: 98 °F (36.7 °C) (12/26/22 0458)  Pulse: 98 (12/26/22 1404)  Resp: 20 (12/26/22 1404)  BP: 137/62 (12/26/22 1404)  SpO2: 100 % (12/26/22 1404) Vital Signs (24h Range):  Temp:  [98 °F (36.7 °C)] 98 °F (36.7 °C)  Pulse:  [] 98  Resp:  [18-26] 20  SpO2:  [88 %-100 %] 100 %  BP: (137-228)/() 137/62     Weight: (!) 154.2 kg (340 lb)  Body mass index is 60.23 kg/m².    Physical Exam  Vitals  reviewed.   Constitutional:       Appearance: She is obese. She is not toxic-appearing or diaphoretic.   HENT:      Head: Normocephalic and atraumatic.      Right Ear: External ear normal.      Left Ear: External ear normal.      Nose: Nose normal.      Mouth/Throat:      Mouth: Mucous membranes are moist.   Eyes:      Conjunctiva/sclera: Conjunctivae normal.   Neck:      Comments: Short thick neck  Cardiovascular:      Rate and Rhythm: Normal rate and regular rhythm.      Heart sounds: Normal heart sounds.   Pulmonary:      Effort: Pulmonary effort is normal.      Breath sounds: Normal breath sounds.   Abdominal:      General: Bowel sounds are normal.      Palpations: Abdomen is soft.   Genitourinary:     General: Normal vulva.   Musculoskeletal:      Right lower leg: No edema.      Left lower leg: No edema.   Skin:     General: Skin is warm and dry.      Capillary Refill: Capillary refill takes less than 2 seconds.   Neurological:      General: No focal deficit present.      Mental Status: She is alert and oriented to person, place, and time.   Psychiatric:         Mood and Affect: Mood normal.         Behavior: Behavior normal.           Significant Labs: All pertinent labs within the past 24 hours have been reviewed.  CBC:   Recent Labs   Lab 12/26/22  0603   WBC 11.54   HGB 14.4   HCT 44.4        CMP:   Recent Labs   Lab 12/26/22  0603      K 3.4*   CL 96   CO2 32*   *   BUN 12   CREATININE 0.9   CALCIUM 9.0   PROT 8.4   ALBUMIN 3.5   BILITOT 0.5   ALKPHOS 85   AST 21   ALT 23   ANIONGAP 9     Cardiac Markers:   Recent Labs   Lab 12/26/22  0603   BNP 29       Significant Imaging: I have reviewed all pertinent imaging results/findings within the past 24 hours.  CT Head Without Contrast    Result Date: 12/26/2022  CT HEAD WITHOUT CONTRAST CMS MANDATED QUALITY DATA - CT RADIATION  436 All CT scans at this facility utilize dose modulation, iterative reconstruction, and/or weight based dosing  when appropriate to reduce radiation dose to as low as reasonably achievable Clinical data: Headache FINDINGS: Noninfusion images were obtained from the skull base to the vertex. There is no intracranial mass, hemorrhage, or midline shift. Ventricles and sulci are normal. There are no pathologic extra-axial fluid collections. There is no evidence of ischemic change or edema. Cerebellum and brainstem are normal. The calvarium is intact. Fluid accumulation and mucoperiosteal thickening in the left maxillary sinus result in near complete opacification. There is a smaller amount of fluid in the right maxillary sinus, with mild anterior mucoperiosteal thickening. IMPRESSION: 1. Normal CT appearance of the brain. 2. Acute and chronic bilateral maxillary sinusitis. Electronically signed by:  Eladio Wilkins MD  12/26/2022 6:55 AM CST Workstation: [id number]    X-Ray Chest AP Portable    Result Date: 12/26/2022  Chest single view CLINICAL DATA: Hypertensive urgency FINDINGS: Comparison to October 15. Cardiomegaly is stable. Pulmonary vascular congestion is noted, without definite pulmonary edema. There is minimal atelectasis or infiltrate in the left midlung. No pleural effusions are identified. Osseous structures are unremarkable. IMPRESSION: 1. Cardiomegaly with pulmonary vascular congestion but no evidence of ran congestive failure. 2. Mild atelectasis or infiltrate in the left midlung. Electronically signed by:  Eladio Wilkins MD  12/26/2022 11:26 AM CST Workstation: [id number]       Assessment/Plan:     * Hypertensive urgency  No evidence of end organ damage  Reports compliant with medication  On olmesartan 20 mg daily and furosemide 20 mg daily (treated by PCP - Dr. Rendon)  No improvements with IV labetalol 20 mg x1, and 40 mg x 1 given in the ED  Currently on nicardipine infusion at 5 mg/hr, blood pressure trending down quickly with systolic blood pressure in the 130s.   Hold nicardipine infusion for now.  Resume if SBP above 180.  Increase olmesartan to 40 mg daily (substituted with losartan 100 mg daily).  Continue furosemide      Headache  Improved with IV hydromorphone given in the ED  Fioricet p.r.n.      Acute sinusitis  Acute and chronic bilateral maxillary sinusitis  Oral doxycycline started in the ED, continue      Hypokalemia  Replete electrolyte per protocol  Potassium level in a.m.      Morbid obesity with BMI of 60, adult  Weight loss encouraged   Reports recent gradual weight loss        VTE Risk Mitigation (From admission, onward)         Ordered     IP VTE HIGH RISK PATIENT  Once         12/26/22 1412                   RUFINA Terrell  Department of Hospital Medicine   Formerly [facility] - Emergency Dept

## 2022-12-26 NOTE — ASSESSMENT & PLAN NOTE
No evidence of end organ damage  Reports compliant with medication  On olmesartan 20 mg daily and furosemide 20 mg daily (treated by PCP - Dr. Rendon)  No improvements with IV labetalol 20 mg x1, and 40 mg x 1 given in the ED  Currently on nicardipine infusion at 5 mg/hr, blood pressure trending down quickly with systolic blood pressure in the 130s.   Hold nicardipine infusion for now. Resume if SBP above 180.  Increase olmesartan to 40 mg daily (substituted with losartan 100 mg daily).  Continue furosemide

## 2022-12-26 NOTE — SUBJECTIVE & OBJECTIVE
Past Medical History:   Diagnosis Date    Antithrombin III deficiency 2021    Asthma     Claustrophobia     Elevated factor VIII level 2021    Esophageal reflux     Gastroesophageal reflux    Generalized anxiety disorder     Anxiety, Generalized    Herniated disc     Hypertension     Iron deficiency anemia due to chronic blood loss 10/27/2021    Lower extremity weakness     Morbid obesity     Obesity     Protein S deficiency 2021    Pulmonary embolism     Upper extremity weakness        Past Surgical History:   Procedure Laterality Date     SECTION      CHOLECYSTECTOMY      TONSILLECTOMY         Review of patient's allergies indicates:  No Known Allergies    Current Facility-Administered Medications on File Prior to Encounter   Medication    bebtelovimab (EUA) 175 mg/2 mL (87.5 mg/mL) injection 175 mg     Current Outpatient Medications on File Prior to Encounter   Medication Sig    albuterol (PROVENTIL/VENTOLIN HFA) 90 mcg/actuation inhaler Inhale 2 puffs into the lungs every 6 (six) hours as needed for Wheezing. Rescue    atorvastatin (LIPITOR) 10 MG tablet Take 10 mg by mouth every evening.    butalbital-acetaminophen-caffeine -40 mg (FIORICET, ESGIC) -40 mg per tablet Take 1 tablet by mouth every 4 (four) hours as needed.    ELIQUIS 5 mg Tab TAKE 1 TABLET BY MOUTH TWICE DAILY    famotidine (PEPCID) 20 MG tablet Take 20 mg by mouth 2 (two) times daily.    FEROSUL 325 mg (65 mg iron) Tab tablet TAKE 1 TABLET BY MOUTH EVERY DAY    fluticasone propionate (FLONASE) 50 mcg/actuation nasal spray 2 sprays by Each Nostril route daily as needed for Rhinitis.    fluticasone-salmeterol 230-21 mcg/dose (ADVAIR HFA) 230-21 mcg/actuation HFAA inhaler Inhale 2 puffs into the lungs 2 (two) times daily. Controller    furosemide (LASIX) 20 MG tablet Take 1 tablet (20 mg total) by mouth once daily.    meloxicam (MOBIC) 7.5 MG tablet Take 7.5 mg by mouth once daily.    minocycline (MINOCIN,DYNACIN)  100 MG capsule Take 100 mg by mouth 2 (two) times daily.    olmesartan (BENICAR) 20 MG tablet Take 20 mg by mouth once daily.    omeprazole (PRILOSEC) 20 MG capsule Take 20 mg by mouth once daily.    trazodone (DESYREL) 100 MG tablet Take 100 mg by mouth every evening.    carvediloL (COREG) 12.5 MG tablet Take 1 tablet (12.5 mg total) by mouth 2 (two) times daily with meals.    ergocalciferol (ERGOCALCIFEROL) 50,000 unit Cap Take 50,000 Units by mouth twice a week.    lisinopriL (PRINIVIL,ZESTRIL) 20 MG tablet Take 1 tablet (20 mg total) by mouth once daily.    losartan (COZAAR) 25 MG tablet Take 25 mg by mouth once daily.    metoprolol succinate (TOPROL-XL) 25 MG 24 hr tablet Take 25 mg by mouth once daily.    MIRENA 20 mcg/24 hours (8 yrs) 52 mg IUD SMARTSIG:Intrauterine Once    miSOPROStoL (CYTOTEC) 200 MCG Tab Take by mouth.    NEXIUM 40 mg capsule Take 40 mg by mouth once daily.    traMADoL (ULTRAM) 50 mg tablet Take 50 mg by mouth every 4 to 6 hours as needed.    [DISCONTINUED] amoxicillin-clavulanate 875-125mg (AUGMENTIN) 875-125 mg per tablet Take 1 tablet by mouth 2 (two) times daily.     Family History    None       Tobacco Use    Smoking status: Former     Types: Cigarettes, Cigars     Quit date: 2019     Years since quitting: 3.9    Smokeless tobacco: Never   Substance and Sexual Activity    Alcohol use: Yes     Alcohol/week: 0.8 standard drinks     Types: 1 Standard drinks or equivalent per week     Comment: rarely    Drug use: No    Sexual activity: Not Currently     Review of Systems   All other systems reviewed and are negative.  Objective:     Vital Signs (Most Recent):  Temp: 98 °F (36.7 °C) (12/26/22 0458)  Pulse: 98 (12/26/22 1404)  Resp: 20 (12/26/22 1404)  BP: 137/62 (12/26/22 1404)  SpO2: 100 % (12/26/22 1404) Vital Signs (24h Range):  Temp:  [98 °F (36.7 °C)] 98 °F (36.7 °C)  Pulse:  [] 98  Resp:  [18-26] 20  SpO2:  [88 %-100 %] 100 %  BP: (137-228)/() 137/62     Weight: (!) 154.2  kg (340 lb)  Body mass index is 60.23 kg/m².    Physical Exam  Vitals reviewed.   Constitutional:       Appearance: She is obese. She is not toxic-appearing or diaphoretic.   HENT:      Head: Normocephalic and atraumatic.      Right Ear: External ear normal.      Left Ear: External ear normal.      Nose: Nose normal.      Mouth/Throat:      Mouth: Mucous membranes are moist.   Eyes:      Conjunctiva/sclera: Conjunctivae normal.   Neck:      Comments: Short thick neck  Cardiovascular:      Rate and Rhythm: Normal rate and regular rhythm.      Heart sounds: Normal heart sounds.   Pulmonary:      Effort: Pulmonary effort is normal.      Breath sounds: Normal breath sounds.   Abdominal:      General: Bowel sounds are normal.      Palpations: Abdomen is soft.   Genitourinary:     General: Normal vulva.   Musculoskeletal:      Right lower leg: No edema.      Left lower leg: No edema.   Skin:     General: Skin is warm and dry.      Capillary Refill: Capillary refill takes less than 2 seconds.   Neurological:      General: No focal deficit present.      Mental Status: She is alert and oriented to person, place, and time.   Psychiatric:         Mood and Affect: Mood normal.         Behavior: Behavior normal.           Significant Labs: All pertinent labs within the past 24 hours have been reviewed.  CBC:   Recent Labs   Lab 12/26/22  0603   WBC 11.54   HGB 14.4   HCT 44.4        CMP:   Recent Labs   Lab 12/26/22  0603      K 3.4*   CL 96   CO2 32*   *   BUN 12   CREATININE 0.9   CALCIUM 9.0   PROT 8.4   ALBUMIN 3.5   BILITOT 0.5   ALKPHOS 85   AST 21   ALT 23   ANIONGAP 9     Cardiac Markers:   Recent Labs   Lab 12/26/22  0603   BNP 29       Significant Imaging: I have reviewed all pertinent imaging results/findings within the past 24 hours.  CT Head Without Contrast    Result Date: 12/26/2022  CT HEAD WITHOUT CONTRAST CMS MANDATED QUALITY DATA - CT RADIATION  436 All CT scans at this facility utilize  dose modulation, iterative reconstruction, and/or weight based dosing when appropriate to reduce radiation dose to as low as reasonably achievable Clinical data: Headache FINDINGS: Noninfusion images were obtained from the skull base to the vertex. There is no intracranial mass, hemorrhage, or midline shift. Ventricles and sulci are normal. There are no pathologic extra-axial fluid collections. There is no evidence of ischemic change or edema. Cerebellum and brainstem are normal. The calvarium is intact. Fluid accumulation and mucoperiosteal thickening in the left maxillary sinus result in near complete opacification. There is a smaller amount of fluid in the right maxillary sinus, with mild anterior mucoperiosteal thickening. IMPRESSION: 1. Normal CT appearance of the brain. 2. Acute and chronic bilateral maxillary sinusitis. Electronically signed by:  Eladio Wilkins MD  12/26/2022 6:55 AM CST Workstation: [id number]    X-Ray Chest AP Portable    Result Date: 12/26/2022  Chest single view CLINICAL DATA: Hypertensive urgency FINDINGS: Comparison to October 15. Cardiomegaly is stable. Pulmonary vascular congestion is noted, without definite pulmonary edema. There is minimal atelectasis or infiltrate in the left midlung. No pleural effusions are identified. Osseous structures are unremarkable. IMPRESSION: 1. Cardiomegaly with pulmonary vascular congestion but no evidence of ran congestive failure. 2. Mild atelectasis or infiltrate in the left midlung. Electronically signed by:  Eladio Wilkins MD  12/26/2022 11:26 AM CST Workstation: [id number]

## 2022-12-26 NOTE — HPI
47-year-old morbidly obese (BMI 60) female with history of hypertension, migraine (since age 14), sinusitis, asthma, pulmonary emboli (on eliquis), GERD  Presented to the emergency department with a severe persistent headache, across both eyes, since last night  Denies aggravating/alleviating factors.  Denies fever or chills  Denies shortness of breath, chest pain, abdominal pain, urinary symptoms, weight gain, visual disturbances  Had severe nausea and vomited on arrival to the ED  Reports compliant with BP medication, taking olmesartan 20 mg daily    Case discussed with the ED physician and her nurse.  Noted to be hypertensive, 228/139. Given IV labetalol 20 mg x1, IV labetalol 40 mg x 1 with no improvements.   Started on nicardipine infusion at 5 mg /h    Laboratory studies were reviewed:  CBC unremarkable  CMP unremarkable except potassium 3.4, glucose 157  High sensitivity troponin 18.3  BNP 29  EKG, personally reviewed, sinus tachycardia, rate 119, no ST elevation  CT Head: Normal CT appearance of the brain, acute and chronic bilateral maxillary sinusitis.  Chest imaging: Cardiomegaly with pulmonary vascular congestion but no evidence of ran congestive failure, mild atelectasis or infiltrate in the left midlung.

## 2022-12-27 LAB
ANION GAP SERPL CALC-SCNC: 9 MMOL/L (ref 8–16)
BASOPHILS # BLD AUTO: 0.02 K/UL (ref 0–0.2)
BASOPHILS NFR BLD: 0.2 % (ref 0–1.9)
BUN SERPL-MCNC: 11 MG/DL (ref 6–20)
CALCIUM SERPL-MCNC: 8.9 MG/DL (ref 8.7–10.5)
CHLORIDE SERPL-SCNC: 99 MMOL/L (ref 95–110)
CO2 SERPL-SCNC: 32 MMOL/L (ref 23–29)
CREAT SERPL-MCNC: 1 MG/DL (ref 0.5–1.4)
DIFFERENTIAL METHOD: ABNORMAL
EOSINOPHIL # BLD AUTO: 0 K/UL (ref 0–0.5)
EOSINOPHIL NFR BLD: 0.1 % (ref 0–8)
ERYTHROCYTE [DISTWIDTH] IN BLOOD BY AUTOMATED COUNT: 12.7 % (ref 11.5–14.5)
EST. GFR  (NO RACE VARIABLE): >60 ML/MIN/1.73 M^2
GLUCOSE SERPL-MCNC: 111 MG/DL (ref 70–110)
HCT VFR BLD AUTO: 40.2 % (ref 37–48.5)
HGB BLD-MCNC: 12.7 G/DL (ref 12–16)
IMM GRANULOCYTES # BLD AUTO: 0.03 K/UL (ref 0–0.04)
IMM GRANULOCYTES NFR BLD AUTO: 0.3 % (ref 0–0.5)
LYMPHOCYTES # BLD AUTO: 1.5 K/UL (ref 1–4.8)
LYMPHOCYTES NFR BLD: 14.3 % (ref 18–48)
MCH RBC QN AUTO: 31.8 PG (ref 27–31)
MCHC RBC AUTO-ENTMCNC: 31.6 G/DL (ref 32–36)
MCV RBC AUTO: 101 FL (ref 82–98)
MONOCYTES # BLD AUTO: 0.5 K/UL (ref 0.3–1)
MONOCYTES NFR BLD: 4.9 % (ref 4–15)
NEUTROPHILS # BLD AUTO: 8.6 K/UL (ref 1.8–7.7)
NEUTROPHILS NFR BLD: 80.2 % (ref 38–73)
NRBC BLD-RTO: 0 /100 WBC
PLATELET # BLD AUTO: 333 K/UL (ref 150–450)
PMV BLD AUTO: 10.3 FL (ref 9.2–12.9)
POTASSIUM SERPL-SCNC: 3.8 MMOL/L (ref 3.5–5.1)
RBC # BLD AUTO: 4 M/UL (ref 4–5.4)
SODIUM SERPL-SCNC: 140 MMOL/L (ref 136–145)
WBC # BLD AUTO: 10.74 K/UL (ref 3.9–12.7)

## 2022-12-27 PROCEDURE — 21000000 HC CCU ICU ROOM CHARGE

## 2022-12-27 PROCEDURE — 36415 COLL VENOUS BLD VENIPUNCTURE: CPT | Performed by: NURSE PRACTITIONER

## 2022-12-27 PROCEDURE — 85025 COMPLETE CBC W/AUTO DIFF WBC: CPT | Performed by: NURSE PRACTITIONER

## 2022-12-27 PROCEDURE — 25000003 PHARM REV CODE 250: Performed by: EMERGENCY MEDICINE

## 2022-12-27 PROCEDURE — 27000221 HC OXYGEN, UP TO 24 HOURS

## 2022-12-27 PROCEDURE — 25000003 PHARM REV CODE 250: Performed by: NURSE PRACTITIONER

## 2022-12-27 PROCEDURE — 25000242 PHARM REV CODE 250 ALT 637 W/ HCPCS: Performed by: NURSE PRACTITIONER

## 2022-12-27 PROCEDURE — 94640 AIRWAY INHALATION TREATMENT: CPT

## 2022-12-27 PROCEDURE — 25000242 PHARM REV CODE 250 ALT 637 W/ HCPCS: Performed by: STUDENT IN AN ORGANIZED HEALTH CARE EDUCATION/TRAINING PROGRAM

## 2022-12-27 PROCEDURE — 25000003 PHARM REV CODE 250: Performed by: STUDENT IN AN ORGANIZED HEALTH CARE EDUCATION/TRAINING PROGRAM

## 2022-12-27 PROCEDURE — 94761 N-INVAS EAR/PLS OXIMETRY MLT: CPT

## 2022-12-27 PROCEDURE — 99900035 HC TECH TIME PER 15 MIN (STAT)

## 2022-12-27 PROCEDURE — 96366 THER/PROPH/DIAG IV INF ADDON: CPT

## 2022-12-27 PROCEDURE — 80048 BASIC METABOLIC PNL TOTAL CA: CPT | Performed by: NURSE PRACTITIONER

## 2022-12-27 RX ORDER — CARVEDILOL 12.5 MG/1
12.5 TABLET ORAL 2 TIMES DAILY
Status: DISCONTINUED | OUTPATIENT
Start: 2022-12-27 | End: 2022-12-28

## 2022-12-27 RX ADMIN — CARVEDILOL 12.5 MG: 12.5 TABLET, FILM COATED ORAL at 08:12

## 2022-12-27 RX ADMIN — ACETAMINOPHEN 650 MG: 325 TABLET ORAL at 08:12

## 2022-12-27 RX ADMIN — CETIRIZINE HYDROCHLORIDE 10 MG: 10 TABLET, FILM COATED ORAL at 08:12

## 2022-12-27 RX ADMIN — ARFORMOTEROL TARTRATE 15 MCG: 15 SOLUTION RESPIRATORY (INHALATION) at 07:12

## 2022-12-27 RX ADMIN — BUDESONIDE 1 MG: 0.5 INHALANT RESPIRATORY (INHALATION) at 08:12

## 2022-12-27 RX ADMIN — ARFORMOTEROL TARTRATE 15 MCG: 15 SOLUTION RESPIRATORY (INHALATION) at 08:12

## 2022-12-27 RX ADMIN — NICARDIPINE HYDROCHLORIDE 10 MG/HR: 0.2 INJECTION INTRAVENOUS at 03:12

## 2022-12-27 RX ADMIN — PANTOPRAZOLE SODIUM 40 MG: 40 TABLET, DELAYED RELEASE ORAL at 05:12

## 2022-12-27 RX ADMIN — DOXYCYCLINE HYCLATE 100 MG: 100 CAPSULE ORAL at 08:12

## 2022-12-27 RX ADMIN — CARVEDILOL 12.5 MG: 12.5 TABLET, FILM COATED ORAL at 09:12

## 2022-12-27 RX ADMIN — APIXABAN 5 MG: 5 TABLET, FILM COATED ORAL at 08:12

## 2022-12-27 RX ADMIN — NICARDIPINE HYDROCHLORIDE 5 MG/HR: 0.2 INJECTION INTRAVENOUS at 01:12

## 2022-12-27 RX ADMIN — BUTALBITAL, ACETAMINOPHEN, AND CAFFEINE 1 TABLET: 50; 325; 40 TABLET, COATED ORAL at 03:12

## 2022-12-27 RX ADMIN — LOSARTAN POTASSIUM 100 MG: 50 TABLET, FILM COATED ORAL at 08:12

## 2022-12-27 RX ADMIN — BUDESONIDE 1 MG: 0.5 INHALANT RESPIRATORY (INHALATION) at 07:12

## 2022-12-27 RX ADMIN — ALBUTEROL SULFATE 2.5 MG: 2.5 SOLUTION RESPIRATORY (INHALATION) at 08:12

## 2022-12-27 RX ADMIN — FUROSEMIDE 20 MG: 20 TABLET ORAL at 08:12

## 2022-12-27 RX ADMIN — TRAZODONE HYDROCHLORIDE 100 MG: 50 TABLET ORAL at 09:12

## 2022-12-27 RX ADMIN — ATORVASTATIN CALCIUM 10 MG: 10 TABLET, FILM COATED ORAL at 08:12

## 2022-12-27 NOTE — ED NOTES
Patient did not use call light and got out of bed to use the restroom. Patient also pulled out the IV that had the cardene running. The patient was again notified of all the risks that associate with her walking and getting out of bed. Patient back into bed safely.

## 2022-12-27 NOTE — ASSESSMENT & PLAN NOTE
No evidence of end organ damage  Reports compliant with medication  On olmesartan 20 mg daily and furosemide 20 mg daily (treated by PCP - Dr. Rendon)  Add carvedilol  Additional oral hydralazine  No improvements with IV labetalol 20 mg x1, and 40 mg x 1 given in the ED  Continue Cardene

## 2022-12-27 NOTE — ED NOTES
Patient consistently pressing the call light to use the restroom. Purewick was offered again and patient refused.  Patient again was notified of the risks that are associated with getting up out of the bed.

## 2022-12-27 NOTE — PROGRESS NOTES
Automatic Inhaler to Nebulizer Interchange    albuterol (Ventolin, ProAir, or Proventil)  mcg given multiple times per day changed to albuterol 2.5 mg Q6H PRN Wheezing  per [facility] Automatic Therapeutic Substitutions Protocol.    Please contact pharmacy at extension [phone] with any questions.     Thank you,   Reynaldo Turpin

## 2022-12-27 NOTE — ED NOTES
Patient needed to use to restroom. RN offered the patient a purewick. Patient denied the use of a purewick and consistently tried to get up out of bed. Patient was informed several times on the risk of falling due to the medication she is on (cardene) and how high the patient's blood pressure (195/103) is. The patient still wanted to walk to the bathroom. After multiple attempts to get her to use the purewick, she denied use. The patient decided to walk to the bathroom. She was safely put back into bed and hooked up to the monitor.

## 2022-12-27 NOTE — SUBJECTIVE & OBJECTIVE
Interval History:  Patient with significantly elevated blood pressure.  She is chronically on oxygen.  Remains very hypotensive today.  Adjusting her oral regimen to get better control.    Review of Systems   All other systems reviewed and are negative.  Objective:     Vital Signs (Most Recent):  Temp: 99.6 °F (37.6 °C) (12/27/22 1100)  Pulse: 95 (12/27/22 1200)  Resp: 17 (12/27/22 1200)  BP: 137/62 (12/27/22 1200)  SpO2: (!) 91 % (12/27/22 1200)   Vital Signs (24h Range):  Temp:  [98.6 °F (37 °C)-99.9 °F (37.7 °C)] 99.6 °F (37.6 °C)  Pulse:  [] 95  Resp:  [10-29] 17  SpO2:  [91 %-100 %] 91 %  BP: (121-216)/(57-95) 137/62     Weight: (!) 149.5 kg (329 lb 9.4 oz)  Body mass index is 58.38 kg/m².    Intake/Output Summary (Last 24 hours) at 12/27/2022 1226  Last data filed at 12/27/2022 0543  Gross per 24 hour   Intake 1000 ml   Output 1050 ml   Net -50 ml      Physical Exam  Constitutional:       Appearance: Normal appearance. She is obese.   HENT:      Head: Normocephalic and atraumatic.      Nose: Nose normal.      Mouth/Throat:      Mouth: Mucous membranes are moist.   Eyes:      Conjunctiva/sclera: Conjunctivae normal.      Pupils: Pupils are equal, round, and reactive to light.   Cardiovascular:      Rate and Rhythm: Normal rate and regular rhythm.      Pulses: Normal pulses.      Heart sounds: Normal heart sounds. No murmur heard.    No friction rub. No gallop.   Pulmonary:      Effort: Pulmonary effort is normal.      Breath sounds: Normal breath sounds. No wheezing or rales.   Abdominal:      General: Abdomen is flat. Bowel sounds are normal. There is no distension.      Palpations: Abdomen is soft.      Tenderness: There is no abdominal tenderness. There is no guarding.   Musculoskeletal:         General: No swelling. Normal range of motion.      Cervical back: Normal range of motion and neck supple.   Skin:     General: Skin is warm and dry.   Neurological:      General: No focal deficit present.       Mental Status: She is alert.   Psychiatric:         Mood and Affect: Mood normal.         Thought Content: Thought content normal.         Judgment: Judgment normal.       Significant Labs: All pertinent labs within the past 24 hours have been reviewed.    Significant Imaging: I have reviewed all pertinent imaging results/findings within the past 24 hours.

## 2022-12-27 NOTE — PROGRESS NOTES
Automatic Inhaler to Nebulizer Interchange    fluticasone/salmeterol (Advair HFA) 460 mcg/84 mcg changed to budesonide 1 mg twice daily AND arformoterol 15 mcg twice daily per [facility] Automatic Therapeutic Substitutions Protocol.    Please contact pharmacy at extension [phone] with any questions.     Thank you,   Reynaldo Turpin

## 2022-12-27 NOTE — CARE UPDATE
12/27/22 0750   Patient Assessment/Suction   Level of Consciousness (AVPU) alert   Respiratory Effort Normal;Unlabored   Expansion/Accessory Muscles/Retractions no use of accessory muscles   All Lung Fields Breath Sounds diminished   PRE-TX-O2   Device (Oxygen Therapy) nasal cannula   $ Is the patient on Low Flow Oxygen? Yes   Flow (L/min) 2   SpO2 96 %   Pulse Oximetry Type Continuous   $ Pulse Oximetry - Multiple Charge Pulse Oximetry - Multiple   Pulse 97   Resp 18   Aerosol Therapy   $ Aerosol Therapy Charges Aerosol Treatment   Daily Review of Necessity (SVN) completed   Respiratory Treatment Status (SVN) given   Treatment Route (SVN) mask;oxygen   Patient Position (SVN) HOB elevated   Post Treatment Assessment (SVN) increased aeration   Signs of Intolerance (SVN) none   Respiratory Evaluation   $ Care Plan Tech Time 15 min

## 2022-12-27 NOTE — PLAN OF CARE
[facility]  Initial Discharge Assessment       Primary Care Provider: Harpreet Rendon MD    Admission Diagnosis: Hypertensive urgency [I16.0]    Admission Date: 12/26/2022  Expected Discharge Date: 12/27/2022    Discharge Barriers Identified: (P) None    Payor: MEDICAID / Plan: [place] [employer] / Product Type: Managed Medicaid /     Extended Emergency Contact Information  Primary Emergency Contact: GoldAnalisa  Address: [address] [address] [facility]  Home Phone: [phone]  Mobile Phone: [phone]  Relation: Friend    Discharge Plan A: (P) Home  Discharge Plan B: (P) Home      [id number] - [place] - [address] AT [facility] [address]  [address]  [place] [address]  Phone: [phone] Fax: [phone]    [facility]  [place] - [address]  [address]  [place] [address]  Phone: [phone] Fax: [phone]      Initial Assessment (most recent)       Adult Discharge Assessment - 12/27/22 1428          Discharge Assessment    Assessment Type Discharge Planning Assessment     Confirmed/corrected address, phone number and insurance Yes     Confirmed Demographics Correct on Facesheet     Source of Information patient     Does patient/caregiver understand observation status Yes     People in Home child(justin), dependent;friend(s) (P)      Do you expect to return to your current living situation? Yes     Do you have help at home or someone to help you manage your care at home? Yes     Who are your caregiver(s) and their phone number(s)? Friend/Crystal [phone] (P)      Prior to hospitilization cognitive status: Alert/Oriented     Current cognitive status: Alert/Oriented     Walking or Climbing Stairs ambulation difficulty, requires equipment     Mobility Management Walker     Dressing/Bathing bathing difficulty, requires equipment     Dressing/Bathing Management shower chair     Home  Accessibility stairs to enter home (P)      Surface of Stairs, Main Entrance concrete (P)      Home Layout Able to live on 1st floor (P)      Equipment Currently Used at Home walker, standard;shower chair (P)      Readmission within 30 days? No (P)      Patient currently being followed by outpatient case management? No (P)      Do you currently have service(s) that help you manage your care at home? No (P)      Do you take prescription medications? Yes (P)      Do you have prescription coverage? Yes (P)      Coverage [place] Medicaid (P)      Do you have any problems affording any of your prescribed medications? No (P)      Is the patient taking medications as prescribed? yes (P)      Who is going to help you get home at discharge? Friend/Analisa [phone] (P)      How do you get to doctors appointments? car, drives self;family or friend will provide (P)      Are you on dialysis? No (P)      Do you take coumadin? No (P)      Discharge Plan A Home (P)      Discharge Plan B Home (P)      DME Needed Upon Discharge  none (P)      Discharge Plan discussed with: Patient (P)      Discharge Barriers Identified None (P)         OTHER    Name(s) of People in Home Friend/Analisa [phone] (P)

## 2022-12-27 NOTE — PROGRESS NOTES
[facility] Medicine  Progress Note    Patient Name: Katiuska rPeston  MRN: [id number]  Patient Class: OP- Observation   Admission Date: 12/26/2022  Length of Stay: 0 days  Attending Physician: Rebel Esposito MD  Primary Care Provider: Harpreet Rendon MD        Subjective:     Principal Problem:Hypertensive urgency        HPI:  47-year-old morbidly obese (BMI 60) female with history of hypertension, migraine (since age 14), sinusitis, asthma, pulmonary emboli (on eliquis), GERD  Presented to the emergency department with a severe persistent headache, across both eyes, since last night  Denies aggravating/alleviating factors.  Denies fever or chills  Denies shortness of breath, chest pain, abdominal pain, urinary symptoms, weight gain, visual disturbances  Had severe nausea and vomited on arrival to the ED  Reports compliant with BP medication, taking olmesartan 20 mg daily    Case discussed with the ED physician and her nurse.  Noted to be hypertensive, 228/139. Given IV labetalol 20 mg x1, IV labetalol 40 mg x 1 with no improvements.   Started on nicardipine infusion at 5 mg /h    Laboratory studies were reviewed:  CBC unremarkable  CMP unremarkable except potassium 3.4, glucose 157  High sensitivity troponin 18.3  BNP 29  EKG, personally reviewed, sinus tachycardia, rate 119, no ST elevation  CT Head: Normal CT appearance of the brain, acute and chronic bilateral maxillary sinusitis.  Chest imaging: Cardiomegaly with pulmonary vascular congestion but no evidence of ran congestive failure, mild atelectasis or infiltrate in the left midlung.         Overview/Hospital Course:  No notes on file    Interval History:  Patient with significantly elevated blood pressure.  She is chronically on oxygen.  Remains very hypotensive today.  Adjusting her oral regimen to get better control.    Review of Systems   All other systems reviewed and are negative.  Objective:     Vital Signs (Most  Recent):  Temp: 99.6 °F (37.6 °C) (12/27/22 1100)  Pulse: 95 (12/27/22 1200)  Resp: 17 (12/27/22 1200)  BP: 137/62 (12/27/22 1200)  SpO2: (!) 91 % (12/27/22 1200)   Vital Signs (24h Range):  Temp:  [98.6 °F (37 °C)-99.9 °F (37.7 °C)] 99.6 °F (37.6 °C)  Pulse:  [] 95  Resp:  [10-29] 17  SpO2:  [91 %-100 %] 91 %  BP: (121-216)/(57-95) 137/62     Weight: (!) 149.5 kg (329 lb 9.4 oz)  Body mass index is 58.38 kg/m².    Intake/Output Summary (Last 24 hours) at 12/27/2022 1226  Last data filed at 12/27/2022 0543  Gross per 24 hour   Intake 1000 ml   Output 1050 ml   Net -50 ml      Physical Exam  Constitutional:       Appearance: Normal appearance. She is obese.   HENT:      Head: Normocephalic and atraumatic.      Nose: Nose normal.      Mouth/Throat:      Mouth: Mucous membranes are moist.   Eyes:      Conjunctiva/sclera: Conjunctivae normal.      Pupils: Pupils are equal, round, and reactive to light.   Cardiovascular:      Rate and Rhythm: Normal rate and regular rhythm.      Pulses: Normal pulses.      Heart sounds: Normal heart sounds. No murmur heard.    No friction rub. No gallop.   Pulmonary:      Effort: Pulmonary effort is normal.      Breath sounds: Normal breath sounds. No wheezing or rales.   Abdominal:      General: Abdomen is flat. Bowel sounds are normal. There is no distension.      Palpations: Abdomen is soft.      Tenderness: There is no abdominal tenderness. There is no guarding.   Musculoskeletal:         General: No swelling. Normal range of motion.      Cervical back: Normal range of motion and neck supple.   Skin:     General: Skin is warm and dry.   Neurological:      General: No focal deficit present.      Mental Status: She is alert.   Psychiatric:         Mood and Affect: Mood normal.         Thought Content: Thought content normal.         Judgment: Judgment normal.       Significant Labs: All pertinent labs within the past 24 hours have been reviewed.    Significant Imaging: I have  reviewed all pertinent imaging results/findings within the past 24 hours.      Assessment/Plan:      * Hypertensive urgency  No evidence of end organ damage  Reports compliant with medication  On olmesartan 20 mg daily and furosemide 20 mg daily (treated by PCP - Dr. Rendon)  Add carvedilol  Additional oral hydralazine  No improvements with IV labetalol 20 mg x1, and 40 mg x 1 given in the ED  Continue Cardene        Hypokalemia  Replete electrolyte per protocol  Potassium level in a.m.      Acute sinusitis  Acute and chronic bilateral maxillary sinusitis  Oral doxycycline started in the ED, continue      Headache  Improved with IV hydromorphone given in the ED  Fioricet p.r.n.  Blood pressure control      Morbid obesity with BMI of 60, adult  Weight loss encouraged   Reports recent gradual weight loss          VTE Risk Mitigation (From admission, onward)         Ordered     apixaban tablet 5 mg  2 times daily         12/26/22 2237     IP VTE HIGH RISK PATIENT  Once         12/26/22 1412                Discharge Planning   RADHA:      Code Status: Full Code   Is the patient medically ready for discharge?:     Reason for patient still in hospital (select all that apply): Treatment                     Rebel Esposito MD  Department of Hospital Medicine   [facility]

## 2022-12-28 VITALS
WEIGHT: 293 LBS | TEMPERATURE: 99 F | SYSTOLIC BLOOD PRESSURE: 183 MMHG | BODY MASS INDEX: 51.91 KG/M2 | HEIGHT: 63 IN | HEART RATE: 81 BPM | DIASTOLIC BLOOD PRESSURE: 79 MMHG | OXYGEN SATURATION: 98 % | RESPIRATION RATE: 18 BRPM

## 2022-12-28 LAB
ANION GAP SERPL CALC-SCNC: 6 MMOL/L (ref 8–16)
BASOPHILS # BLD AUTO: 0.02 K/UL (ref 0–0.2)
BASOPHILS NFR BLD: 0.2 % (ref 0–1.9)
BUN SERPL-MCNC: 11 MG/DL (ref 6–20)
CALCIUM SERPL-MCNC: 9.3 MG/DL (ref 8.7–10.5)
CHLORIDE SERPL-SCNC: 97 MMOL/L (ref 95–110)
CO2 SERPL-SCNC: 33 MMOL/L (ref 23–29)
CREAT SERPL-MCNC: 0.9 MG/DL (ref 0.5–1.4)
DIFFERENTIAL METHOD: ABNORMAL
EOSINOPHIL # BLD AUTO: 0 K/UL (ref 0–0.5)
EOSINOPHIL NFR BLD: 0.3 % (ref 0–8)
ERYTHROCYTE [DISTWIDTH] IN BLOOD BY AUTOMATED COUNT: 12.5 % (ref 11.5–14.5)
EST. GFR  (NO RACE VARIABLE): >60 ML/MIN/1.73 M^2
GLUCOSE SERPL-MCNC: 118 MG/DL (ref 70–110)
HCT VFR BLD AUTO: 40.1 % (ref 37–48.5)
HGB BLD-MCNC: 12.9 G/DL (ref 12–16)
IMM GRANULOCYTES # BLD AUTO: 0.02 K/UL (ref 0–0.04)
IMM GRANULOCYTES NFR BLD AUTO: 0.2 % (ref 0–0.5)
LYMPHOCYTES # BLD AUTO: 1.7 K/UL (ref 1–4.8)
LYMPHOCYTES NFR BLD: 17 % (ref 18–48)
MCH RBC QN AUTO: 32 PG (ref 27–31)
MCHC RBC AUTO-ENTMCNC: 32.2 G/DL (ref 32–36)
MCV RBC AUTO: 100 FL (ref 82–98)
MONOCYTES # BLD AUTO: 0.6 K/UL (ref 0.3–1)
MONOCYTES NFR BLD: 6.4 % (ref 4–15)
NEUTROPHILS # BLD AUTO: 7.6 K/UL (ref 1.8–7.7)
NEUTROPHILS NFR BLD: 75.9 % (ref 38–73)
NRBC BLD-RTO: 0 /100 WBC
PLATELET # BLD AUTO: 305 K/UL (ref 150–450)
PMV BLD AUTO: 10.3 FL (ref 9.2–12.9)
POTASSIUM SERPL-SCNC: 3.9 MMOL/L (ref 3.5–5.1)
RBC # BLD AUTO: 4.03 M/UL (ref 4–5.4)
SODIUM SERPL-SCNC: 136 MMOL/L (ref 136–145)
WBC # BLD AUTO: 10.05 K/UL (ref 3.9–12.7)

## 2022-12-28 PROCEDURE — 25000242 PHARM REV CODE 250 ALT 637 W/ HCPCS: Performed by: STUDENT IN AN ORGANIZED HEALTH CARE EDUCATION/TRAINING PROGRAM

## 2022-12-28 PROCEDURE — 25000003 PHARM REV CODE 250: Performed by: STUDENT IN AN ORGANIZED HEALTH CARE EDUCATION/TRAINING PROGRAM

## 2022-12-28 PROCEDURE — 94761 N-INVAS EAR/PLS OXIMETRY MLT: CPT

## 2022-12-28 PROCEDURE — 25000003 PHARM REV CODE 250: Performed by: EMERGENCY MEDICINE

## 2022-12-28 PROCEDURE — 80048 BASIC METABOLIC PNL TOTAL CA: CPT | Performed by: STUDENT IN AN ORGANIZED HEALTH CARE EDUCATION/TRAINING PROGRAM

## 2022-12-28 PROCEDURE — 94640 AIRWAY INHALATION TREATMENT: CPT

## 2022-12-28 PROCEDURE — 36415 COLL VENOUS BLD VENIPUNCTURE: CPT | Performed by: STUDENT IN AN ORGANIZED HEALTH CARE EDUCATION/TRAINING PROGRAM

## 2022-12-28 PROCEDURE — 25000003 PHARM REV CODE 250: Performed by: NURSE PRACTITIONER

## 2022-12-28 PROCEDURE — 27000221 HC OXYGEN, UP TO 24 HOURS

## 2022-12-28 PROCEDURE — 99900035 HC TECH TIME PER 15 MIN (STAT)

## 2022-12-28 PROCEDURE — 99900031 HC PATIENT EDUCATION (STAT)

## 2022-12-28 PROCEDURE — 85025 COMPLETE CBC W/AUTO DIFF WBC: CPT | Performed by: STUDENT IN AN ORGANIZED HEALTH CARE EDUCATION/TRAINING PROGRAM

## 2022-12-28 RX ORDER — DOXYCYCLINE 100 MG/1
100 CAPSULE ORAL EVERY 12 HOURS
Qty: 6 CAPSULE | Refills: 0 | Status: SHIPPED | OUTPATIENT
Start: 2022-12-28 | End: 2022-12-31

## 2022-12-28 RX ORDER — CARVEDILOL 25 MG/1
25 TABLET ORAL 2 TIMES DAILY
Status: DISCONTINUED | OUTPATIENT
Start: 2022-12-28 | End: 2022-12-28 | Stop reason: HOSPADM

## 2022-12-28 RX ORDER — CARVEDILOL 25 MG/1
25 TABLET ORAL 2 TIMES DAILY
Qty: 60 TABLET | Refills: 0 | Status: SHIPPED | OUTPATIENT
Start: 2022-12-28 | End: 2023-12-28

## 2022-12-28 RX ORDER — LOSARTAN POTASSIUM 100 MG/1
100 TABLET ORAL DAILY
Qty: 30 TABLET | Refills: 0 | Status: SHIPPED | OUTPATIENT
Start: 2022-12-28 | End: 2023-07-15

## 2022-12-28 RX ADMIN — ACETAMINOPHEN 650 MG: 325 TABLET ORAL at 11:12

## 2022-12-28 RX ADMIN — LOSARTAN POTASSIUM 100 MG: 50 TABLET, FILM COATED ORAL at 08:12

## 2022-12-28 RX ADMIN — CARVEDILOL 12.5 MG: 12.5 TABLET, FILM COATED ORAL at 08:12

## 2022-12-28 RX ADMIN — APIXABAN 5 MG: 5 TABLET, FILM COATED ORAL at 08:12

## 2022-12-28 RX ADMIN — DOXYCYCLINE HYCLATE 100 MG: 100 CAPSULE ORAL at 08:12

## 2022-12-28 RX ADMIN — BUDESONIDE 1 MG: 0.5 INHALANT RESPIRATORY (INHALATION) at 08:12

## 2022-12-28 RX ADMIN — ARFORMOTEROL TARTRATE 15 MCG: 15 SOLUTION RESPIRATORY (INHALATION) at 08:12

## 2022-12-28 RX ADMIN — CETIRIZINE HYDROCHLORIDE 10 MG: 10 TABLET, FILM COATED ORAL at 08:12

## 2022-12-28 RX ADMIN — FUROSEMIDE 20 MG: 20 TABLET ORAL at 08:12

## 2022-12-28 RX ADMIN — PANTOPRAZOLE SODIUM 40 MG: 40 TABLET, DELAYED RELEASE ORAL at 06:12

## 2022-12-28 NOTE — CARE UPDATE
12/27/22 2013   Patient Assessment/Suction   Level of Consciousness (AVPU) alert   Respiratory Effort Normal;Unlabored   Expansion/Accessory Muscles/Retractions no retractions;no use of accessory muscles   All Lung Fields Breath Sounds Anterior:;clear;diminished   CARLIN Breath Sounds clear   LLL Breath Sounds diminished   RUL Breath Sounds clear   RML Breath Sounds diminished   RLL Breath Sounds diminished   Rhythm/Pattern, Respiratory pattern regular   Cough Frequency no cough   PRE-TX-O2   Device (Oxygen Therapy) nasal cannula   $ Is the patient on Low Flow Oxygen? Yes   Flow (L/min) 4   SpO2 (!) 93 %   Pulse 88   Resp 18   Aerosol Therapy   $ Aerosol Therapy Charges Aerosol Treatment   Daily Review of Necessity (SVN) completed   Respiratory Treatment Status (SVN) given   Treatment Route (SVN) mask;oxygen   Patient Position (SVN) HOB elevated   Post Treatment Assessment (SVN) increased aeration   Signs of Intolerance (SVN) none   Breath Sounds Post-Respiratory Treatment   Throughout All Fields Post-Treatment All Fields   Throughout All Fields Post-Treatment aeration increased   Post-treatment Heart Rate (beats/min) 90   Post-treatment Resp Rate (breaths/min) 20

## 2022-12-28 NOTE — PLAN OF CARE
12/28/22 0831   Patient Assessment/Suction   Level of Consciousness (AVPU) alert   Respiratory Effort Normal;Unlabored   All Lung Fields Breath Sounds clear;diminished   PRE-TX-O2   Device (Oxygen Therapy) nasal cannula   $ Is the patient on Low Flow Oxygen? Yes   Flow (L/min) 3   SpO2 99 %   Pulse Oximetry Type Continuous   $ Pulse Oximetry - Multiple Charge Pulse Oximetry - Multiple   Pulse 86   Resp 18   Aerosol Therapy   $ Aerosol Therapy Charges Aerosol Treatment   Daily Review of Necessity (SVN) completed   Respiratory Treatment Status (SVN) given   Treatment Route (SVN) mask;oxygen   Patient Position (SVN) Corral's;HOB elevated   Post Treatment Assessment (SVN) increased aeration   Signs of Intolerance (SVN) none   Education   $ Education Bronchodilator;15 min   Respiratory Evaluation   $ Care Plan Tech Time 15 min

## 2022-12-29 NOTE — PLAN OF CARE
Discharge orders reviewed. Patient discharged home with no needs.       12/29/22 1040   Final Note   Assessment Type Final Discharge Note   Anticipated Discharge Disposition Home   What phone number can be called within the next 1-3 days to see how you are doing after discharge? [phone]   Post-Acute Status   Discharge Delays None known at this time

## 2023-01-13 NOTE — HOSPITAL COURSE
Pt is a 49 y/o F who was admitted with HTN Urgency, she is morbidly obese and this is contributing to her hypertension and sleep apnea. She was treated wit IV blood pressure medications with improvement. She had oral blood pressure regimen adjusted for better control. She had headaches as a result of her elevated blood pressure that improved with adequate control. I strongly advised her to follow up with her primary physician for ongoing management of her uncontrolled hypertension. She is feeling well. I reviewed the discharge plan of care and instructions with the patient on the day of discharge and we discussed return precautions. She was discharged in good condition with plans for close outpatient follow up.

## 2023-01-13 NOTE — DISCHARGE SUMMARY
[facility] Medicine  Discharge Summary      Patient Name: Katiuska Preston  MRN: [id number]  DARIEN: [id number]  Patient Class: IP- Inpatient  Admission Date: 12/26/2022  Hospital Length of Stay: 1 days  Discharge Date and Time: 12/28/2022  5:45 PM  Attending Physician: No att. providers found   Discharging Provider: Rebel Esposito MD  Primary Care Provider: Harpreet Rendon MD    Primary Care Team: Networked reference to record PCT     HPI:   47-year-old morbidly obese (BMI 60) female with history of hypertension, migraine (since age 14), sinusitis, asthma, pulmonary emboli (on eliquis), GERD  Presented to the emergency department with a severe persistent headache, across both eyes, since last night  Denies aggravating/alleviating factors.  Denies fever or chills  Denies shortness of breath, chest pain, abdominal pain, urinary symptoms, weight gain, visual disturbances  Had severe nausea and vomited on arrival to the ED  Reports compliant with BP medication, taking olmesartan 20 mg daily    Case discussed with the ED physician and her nurse.  Noted to be hypertensive, 228/139. Given IV labetalol 20 mg x1, IV labetalol 40 mg x 1 with no improvements.   Started on nicardipine infusion at 5 mg /h    Laboratory studies were reviewed:  CBC unremarkable  CMP unremarkable except potassium 3.4, glucose 157  High sensitivity troponin 18.3  BNP 29  EKG, personally reviewed, sinus tachycardia, rate 119, no ST elevation  CT Head: Normal CT appearance of the brain, acute and chronic bilateral maxillary sinusitis.  Chest imaging: Cardiomegaly with pulmonary vascular congestion but no evidence of ran congestive failure, mild atelectasis or infiltrate in the left midlung.         * No surgery found *      Hospital Course:   Pt is a 49 y/o F who was admitted with HTN Urgency, she is morbidly obese and this is contributing to her hypertension and sleep apnea. She was treated wit IV blood pressure  medications with improvement. She had oral blood pressure regimen adjusted for better control. She had headaches as a result of her elevated blood pressure that improved with adequate control. I strongly advised her to follow up with her primary physician for ongoing management of her uncontrolled hypertension. She is feeling well. I reviewed the discharge plan of care and instructions with the patient on the day of discharge and we discussed return precautions. She was discharged in good condition with plans for close outpatient follow up.        Goals of Care Treatment Preferences:  Code Status: Full Code      Consults:     No new Assessment & Plan notes have been filed under this hospital service since the last note was generated.  Service: Hospital Medicine    Final Active Diagnoses:    Diagnosis Date Noted POA    PRINCIPAL PROBLEM:  Hypertensive urgency [I16.0] 12/26/2022 Yes    Headache [R51.9] 12/26/2022 Yes    Acute sinusitis [J01.90] 12/26/2022 Yes    Hypokalemia [E87.6] 12/26/2022 Yes    Morbid obesity with BMI of 60, adult [E66.01, Z68.43] 11/25/2014 Not Applicable     Chronic      Problems Resolved During this Admission:       Discharged Condition: good    Disposition: Home or Self Care    Follow Up:   Follow-up Information     Harpreet Rendon MD. Schedule an appointment as soon as possible for a visit in 1 week(s).    Specialty: Internal Medicine  Contact information:  [address] [place] [address] [address]  [facility] [address] [phone]                       Patient Instructions:      Diet Adult Regular     No dressing needed     Activity as tolerated       Significant Diagnostic Studies: Labs: BMP: No results for input(s): GLU, NA, K, CL, CO2, BUN, CREATININE, CALCIUM, MG in the last 48 hours., CBC No results for input(s): WBC, HGB, HCT, PLT in the last 48 hours. and All labs within the past 24 hours have been reviewed    Pending Diagnostic Studies:     None         Medications:  Reconciled Home  Medications:      Medication List      CHANGE how you take these medications    * carvediloL 12.5 MG tablet  Commonly known as: COREG  Take 1 tablet (12.5 mg total) by mouth 2 (two) times daily with meals.  What changed: Another medication with the same name was added. Make sure you understand how and when to take each.     * carvediloL 25 MG tablet  Commonly known as: COREG  Take 1 tablet (25 mg total) by mouth 2 (two) times daily.  What changed: You were already taking a medication with the same name, and this prescription was added. Make sure you understand how and when to take each.     losartan 100 MG tablet  Commonly known as: COZAAR  Take 1 tablet (100 mg total) by mouth once daily.  What changed:   · medication strength  · how much to take         * This list has 2 medication(s) that are the same as other medications prescribed for you. Read the directions carefully, and ask your doctor or other care provider to review them with you.            CONTINUE taking these medications    albuterol 90 mcg/actuation inhaler  Commonly known as: PROVENTIL/VENTOLIN HFA  Inhale 2 puffs into the lungs every 6 (six) hours as needed for Wheezing. Rescue     atorvastatin 10 MG tablet  Commonly known as: LIPITOR  Take 10 mg by mouth every evening.     butalbital-acetaminophen-caffeine -40 mg -40 mg per tablet  Commonly known as: FIORICET, ESGIC  Take 1 tablet by mouth every 4 (four) hours as needed.     ELIQUIS 5 mg Tab  Generic drug: apixaban  TAKE 1 TABLET BY MOUTH TWICE DAILY     ergocalciferol 50,000 unit Cap  Commonly known as: ERGOCALCIFEROL  Take 50,000 Units by mouth twice a week.     famotidine 20 MG tablet  Commonly known as: PEPCID  Take 20 mg by mouth 2 (two) times daily.     FeroSuL 325 mg (65 mg iron) Tab tablet  Generic drug: ferrous sulfate  TAKE 1 TABLET BY MOUTH EVERY DAY     fluticasone propionate 50 mcg/actuation nasal spray  Commonly known as: FLONASE  2 sprays by Each Nostril route daily as  needed for Rhinitis.     fluticasone-salmeterol 230-21 mcg/dose 230-21 mcg/actuation Hfaa inhaler  Commonly known as: ADVAIR HFA  Inhale 2 puffs into the lungs 2 (two) times daily. Controller     furosemide 20 MG tablet  Commonly known as: LASIX  Take 1 tablet (20 mg total) by mouth once daily.     meloxicam 7.5 MG tablet  Commonly known as: MOBIC  Take 7.5 mg by mouth once daily.     minocycline 100 MG capsule  Commonly known as: MINOCIN,DYNACIN  Take 100 mg by mouth 2 (two) times daily.     MIRENA 20 mcg/24 hours (8 yrs) 52 mg IUD  Generic drug: levonorgestreL  SMARTSIG:Intrauterine Once     miSOPROStoL 200 MCG Tab  Commonly known as: CYTOTEC  Take by mouth.     NexIUM 40 MG capsule  Generic drug: esomeprazole  Take 40 mg by mouth once daily.     omeprazole 20 MG capsule  Commonly known as: PRILOSEC  Take 20 mg by mouth once daily.     traMADoL 50 mg tablet  Commonly known as: ULTRAM  Take 50 mg by mouth every 4 to 6 hours as needed.     traZODone 100 MG tablet  Commonly known as: DESYREL  Take 100 mg by mouth every evening.        STOP taking these medications    lisinopriL 20 MG tablet  Commonly known as: PRINIVIL,ZESTRIL     metoprolol succinate 25 MG 24 hr tablet  Commonly known as: TOPROL-XL     olmesartan 20 MG tablet  Commonly known as: BENICAR        ASK your doctor about these medications    doxycycline 100 MG Cap  Commonly known as: VIBRAMYCIN  Take 1 capsule (100 mg total) by mouth every 12 (twelve) hours. for 3 days  Ask about: Should I take this medication?            Indwelling Lines/Drains at time of discharge:   Lines/Drains/Airways     None                 Time spent on the discharge of patient: 45 minutes         Rebel Esposito MD  Department of Hospital Medicine  [facility]

## 2023-02-07 ENCOUNTER — TELEPHONE (OUTPATIENT)
Dept: BARIATRICS | Facility: CLINIC | Age: 48
End: 2023-02-07
Payer: MEDICAID

## 2023-02-07 ENCOUNTER — OFFICE VISIT (OUTPATIENT)
Dept: ORTHOPEDICS | Facility: CLINIC | Age: 48
End: 2023-02-07
Payer: MEDICAID

## 2023-02-07 VITALS — BODY MASS INDEX: 51.91 KG/M2 | WEIGHT: 293 LBS | HEIGHT: 63 IN

## 2023-02-07 DIAGNOSIS — E66.01 CLASS 3 SEVERE OBESITY WITH BODY MASS INDEX (BMI) OF 50.0 TO 59.9 IN ADULT, UNSPECIFIED OBESITY TYPE, UNSPECIFIED WHETHER SERIOUS COMORBIDITY PRESENT: ICD-10-CM

## 2023-02-07 DIAGNOSIS — M17.11 PRIMARY OSTEOARTHRITIS OF RIGHT KNEE: Primary | ICD-10-CM

## 2023-02-07 DIAGNOSIS — M17.12 PRIMARY OSTEOARTHRITIS OF LEFT KNEE: ICD-10-CM

## 2023-02-07 PROCEDURE — 1159F MED LIST DOCD IN RCRD: CPT | Mod: CPTII,S$GLB,, | Performed by: ORTHOPAEDIC SURGERY

## 2023-02-07 PROCEDURE — 3008F BODY MASS INDEX DOCD: CPT | Mod: CPTII,S$GLB,, | Performed by: ORTHOPAEDIC SURGERY

## 2023-02-07 PROCEDURE — 20610 LARGE JOINT ASPIRATION/INJECTION: R KNEE: ICD-10-PCS | Mod: 50,S$GLB,, | Performed by: ORTHOPAEDIC SURGERY

## 2023-02-07 PROCEDURE — 3008F PR BODY MASS INDEX (BMI) DOCUMENTED: ICD-10-PCS | Mod: CPTII,S$GLB,, | Performed by: ORTHOPAEDIC SURGERY

## 2023-02-07 PROCEDURE — 1160F PR REVIEW ALL MEDS BY PRESCRIBER/CLIN PHARMACIST DOCUMENTED: ICD-10-PCS | Mod: CPTII,S$GLB,, | Performed by: ORTHOPAEDIC SURGERY

## 2023-02-07 PROCEDURE — 99213 OFFICE O/P EST LOW 20 MIN: CPT | Mod: 25,S$GLB,, | Performed by: ORTHOPAEDIC SURGERY

## 2023-02-07 PROCEDURE — 1159F PR MEDICATION LIST DOCUMENTED IN MEDICAL RECORD: ICD-10-PCS | Mod: CPTII,S$GLB,, | Performed by: ORTHOPAEDIC SURGERY

## 2023-02-07 PROCEDURE — 20610 DRAIN/INJ JOINT/BURSA W/O US: CPT | Mod: 50,S$GLB,, | Performed by: ORTHOPAEDIC SURGERY

## 2023-02-07 PROCEDURE — 99213 PR OFFICE/OUTPT VISIT, EST, LEVL III, 20-29 MIN: ICD-10-PCS | Mod: 25,S$GLB,, | Performed by: ORTHOPAEDIC SURGERY

## 2023-02-07 PROCEDURE — 1160F RVW MEDS BY RX/DR IN RCRD: CPT | Mod: CPTII,S$GLB,, | Performed by: ORTHOPAEDIC SURGERY

## 2023-02-07 RX ORDER — TRIAMCINOLONE ACETONIDE 40 MG/ML
40 INJECTION, SUSPENSION INTRA-ARTICULAR; INTRAMUSCULAR
Status: DISCONTINUED | OUTPATIENT
Start: 2023-02-07 | End: 2023-02-07 | Stop reason: HOSPADM

## 2023-02-07 RX ORDER — TRAMADOL HYDROCHLORIDE 50 MG/1
50 TABLET ORAL EVERY 6 HOURS PRN
Qty: 28 TABLET | Refills: 0 | Status: SHIPPED | OUTPATIENT
Start: 2023-02-07 | End: 2023-02-14

## 2023-02-07 RX ADMIN — TRIAMCINOLONE ACETONIDE 40 MG: 40 INJECTION, SUSPENSION INTRA-ARTICULAR; INTRAMUSCULAR at 10:02

## 2023-02-07 NOTE — PROCEDURES
Large Joint Aspiration/Injection: R knee    Date/Time: 2/7/2023 10:00 AM  Performed by: Brayan Valerio MD  Authorized by: Brayan Valerio MD     Consent Done?:  Yes (Verbal)  Indications:  Arthritis and pain  Site marked: the procedure site was marked    Timeout: prior to procedure the correct patient, procedure, and site was verified    Prep: patient was prepped and draped in usual sterile fashion      Local anesthesia used?: Yes    Local anesthetic:  Lidocaine 1% without epinephrine    Details:  Needle Size:  25 G  Ultrasonic Guidance for needle placement?: No    Location:  Knee  Site:  R knee  Medications:  40 mg triamcinolone acetonide 40 mg/mL  Patient tolerance:  Patient tolerated the procedure well with no immediate complications  Large Joint Aspiration/Injection: L knee    Date/Time: 2/7/2023 10:00 AM  Performed by: Brayan Valerio MD  Authorized by: Brayan Valerio MD     Consent Done?:  Yes (Verbal)  Indications:  Arthritis and pain  Site marked: the procedure site was marked    Timeout: prior to procedure the correct patient, procedure, and site was verified    Prep: patient was prepped and draped in usual sterile fashion      Local anesthesia used?: Yes    Local anesthetic:  Lidocaine 1% without epinephrine    Details:  Needle Size:  25 G  Ultrasonic Guidance for needle placement?: No    Location:  Knee  Site:  L knee  Medications:  40 mg triamcinolone acetonide 40 mg/mL  Patient tolerance:  Patient tolerated the procedure well with no immediate complications

## 2023-02-07 NOTE — PROGRESS NOTES
[facility]    Subjective:     Chief Complaint:   Chief Complaint   Patient presents with    Left Knee - Pain     Bilateral knee injection follow up from 22, states she is requesting an injection for both knees as well as pain medication. She says she cannot sleep due to pain    Right Knee - Pain     Bilateral knee injection follow up from 2022. states she is requesting an injection for both knees as well as pain medication. She says she cannot sleep due to pain         Past Medical History:   Diagnosis Date    Antithrombin III deficiency 2021    Asthma     Claustrophobia     Elevated factor VIII level 2021    Esophageal reflux     Gastroesophageal reflux    Generalized anxiety disorder     Anxiety, Generalized    Herniated disc     Hypertension     Iron deficiency anemia due to chronic blood loss 10/27/2021    Lower extremity weakness     Morbid obesity     Obesity     Protein S deficiency 2021    Pulmonary embolism     Upper extremity weakness        Past Surgical History:   Procedure Laterality Date     SECTION      CHOLECYSTECTOMY      TONSILLECTOMY         Current Outpatient Medications   Medication Sig    albuterol (PROVENTIL/VENTOLIN HFA) 90 mcg/actuation inhaler Inhale 2 puffs into the lungs every 6 (six) hours as needed for Wheezing. Rescue    atorvastatin (LIPITOR) 10 MG tablet Take 10 mg by mouth every evening.    butalbital-acetaminophen-caffeine -40 mg (FIORICET, ESGIC) -40 mg per tablet Take 1 tablet by mouth every 4 (four) hours as needed.    carvediloL (COREG) 25 MG tablet Take 1 tablet (25 mg total) by mouth 2 (two) times daily.    ELIQUIS 5 mg Tab TAKE 1 TABLET BY MOUTH TWICE DAILY    ergocalciferol (ERGOCALCIFEROL) 50,000 unit Cap Take 50,000 Units by mouth twice a week.    famotidine (PEPCID) 20 MG tablet Take 20 mg by mouth 2 (two) times daily.    FEROSUL 325 mg (65 mg iron) Tab tablet TAKE 1 TABLET BY MOUTH EVERY DAY    fluticasone  propionate (FLONASE) 50 mcg/actuation nasal spray 2 sprays by Each Nostril route daily as needed for Rhinitis.    fluticasone-salmeterol 230-21 mcg/dose (ADVAIR HFA) 230-21 mcg/actuation HFAA inhaler Inhale 2 puffs into the lungs 2 (two) times daily. Controller    meloxicam (MOBIC) 7.5 MG tablet Take 7.5 mg by mouth once daily.    minocycline (MINOCIN,DYNACIN) 100 MG capsule Take 100 mg by mouth 2 (two) times daily.    MIRENA 20 mcg/24 hours (8 yrs) 52 mg IUD SMARTSIG:Intrauterine Once    miSOPROStoL (CYTOTEC) 200 MCG Tab Take by mouth.    NEXIUM 40 mg capsule Take 40 mg by mouth once daily.    omeprazole (PRILOSEC) 20 MG capsule Take 20 mg by mouth once daily.    trazodone (DESYREL) 100 MG tablet Take 100 mg by mouth every evening.    furosemide (LASIX) 20 MG tablet Take 1 tablet (20 mg total) by mouth once daily.    losartan (COZAAR) 100 MG tablet Take 1 tablet (100 mg total) by mouth once daily.    traMADoL (ULTRAM) 50 mg tablet Take 1 tablet (50 mg total) by mouth every 6 (six) hours as needed for Pain.     Current Facility-Administered Medications   Medication    bebtelovimab (EUA) 175 mg/2 mL (87.5 mg/mL) injection 175 mg       Review of patient's allergies indicates:  No Known Allergies    No family history on file.    Social History     Socioeconomic History    Marital status:    Tobacco Use    Smoking status: Former     Types: Cigarettes, Cigars     Quit date: 2019     Years since quittin.1    Smokeless tobacco: Never   Substance and Sexual Activity    Alcohol use: Yes     Alcohol/week: 0.8 standard drinks     Types: 1 Standard drinks or equivalent per week     Comment: rarely    Drug use: No    Sexual activity: Not Currently     Social Determinants of Health     Financial Resource Strain: Medium Risk    Difficulty of Paying Living Expenses: Somewhat hard   Food Insecurity: Food Insecurity Present    Worried About Running Out of Food in the Last Year: Sometimes true    Ran Out of Food in the  Last Year: Sometimes true   Transportation Needs: Unmet Transportation Needs    Lack of Transportation (Medical): Yes    Lack of Transportation (Non-Medical): Yes   Stress: No Stress Concern Present    Feeling of Stress : Only a little   Social Connections: Socially Isolated    Frequency of Communication with Friends and Family: Once a week    Frequency of Social Gatherings with Friends and Family: Once a week    Attends Oriental orthodox Services: Never    Active Member of Clubs or Organizations: No    Attends Club or Organization Meetings: Never    Marital Status:    Housing Stability: High Risk    Unable to Pay for Housing in the Last Year: Yes    Unstable Housing in the Last Year: No       History of present illness:  48-year-old female with osteoarthritis in the bilateral knees.  Last seen in November, injected prior to that in August ,injected prior to that February, injected.  Now routinely following up every 3 months for injections for her bilateral knee pain.      Review of Systems:    Constitution: Negative for chills, fever, and sweats.  Negative for unexplained weight loss.    HENT:  Negative for headaches and blurry vision.    Cardiovascular:Negative for chest pain or irregular heart beat. Negative for hypertension.    Respiratory:  Negative for cough and shortness of breath.    Gastrointestinal: Negative for abdominal pain, heartburn, melena, nausea, and vomitting.    Genitourinary:  Negative bladder incontinence and dysuria.    Musculoskeletal:  See HPI for details.     Neurological: Negative for numbness.    Psychiatric/Behavioral: Negative for depression.  The patient is not nervous/anxious.      Endocrine: Negative for polyuria    Hematologic/Lymphatic: Negative for bleeding problem.  Does not bruise/bleed easily.    Skin: Negative for poor would healing and rash    Objective:      Physical Examination:    Vital Signs:  There were no vitals filed for this visit.    Body mass index is 57.39  "kg/m².    This a well-developed, well nourished patient in no acute distress.  They are alert and oriented and cooperative to examination.  Patient is morbidly obese       Examination of bilateral knees, skin is dry and intact, no erythema ecchymosis, no signs symptoms of infection.  Knees demonstrate symmetrical range of motion, 0° extension, 90° flexion.  Tender generally over the anterior part of the knee bilaterally.  Calf soft nontender, straight leg raise negative.    Pertinent New Results:    XRAY Report / Interpretation:       Assessment/Plan:      48-year-old female with advanced osteoarthritis of the bilateral knees.  Ultimately she needs total knee arthroplasties.  Her BMI right now is 57.  We discussed weight loss, we discussed bariatric surgery, we repeated her injections in her knees, bilateral knees anterior lateral approach lidocaine and triamcinolone sterile technique patient tolerated well.  She did ask for something for pain.  We had a detailed conversation about pain medication, will give her tramadol but we told her this is not a long-term recurring prescription.  She can continue to take over-the-counter medications as needed for arthritis.  Follow-up 3 months    Frank Schwartz, Physician Assistant, served in the capacity as a "scribe" for this patient encounter.  A "face-to-face" encounter occurred with Dr. Brayan Valerio on this date.  The treatment plan and medical decision-making is outlined above. Patient was seen and examined with a chaperone.       This note was created using Dragon voice recognition software that occasionally misinterpreted phrases or words.          "

## 2023-02-22 ENCOUNTER — TELEPHONE (OUTPATIENT)
Dept: HEMATOLOGY/ONCOLOGY | Facility: CLINIC | Age: 48
End: 2023-02-22

## 2023-02-22 DIAGNOSIS — I26.99 ACUTE PULMONARY EMBOLISM, UNSPECIFIED PULMONARY EMBOLISM TYPE, UNSPECIFIED WHETHER ACUTE COR PULMONALE PRESENT: Primary | ICD-10-CM

## 2023-02-22 DIAGNOSIS — D68.59 ANTITHROMBIN III DEFICIENCY: ICD-10-CM

## 2023-02-24 ENCOUNTER — LAB VISIT (OUTPATIENT)
Dept: LAB | Facility: HOSPITAL | Age: 48
End: 2023-02-24
Attending: INTERNAL MEDICINE
Payer: MEDICAID

## 2023-02-24 DIAGNOSIS — D68.59 ANTITHROMBIN III DEFICIENCY: ICD-10-CM

## 2023-02-24 DIAGNOSIS — I26.99 ACUTE PULMONARY EMBOLISM, UNSPECIFIED PULMONARY EMBOLISM TYPE, UNSPECIFIED WHETHER ACUTE COR PULMONALE PRESENT: ICD-10-CM

## 2023-02-24 LAB
ALBUMIN SERPL BCP-MCNC: 3.4 G/DL (ref 3.5–5.2)
ALP SERPL-CCNC: 84 U/L (ref 55–135)
ALT SERPL W/O P-5'-P-CCNC: 29 U/L (ref 10–44)
ANION GAP SERPL CALC-SCNC: 9 MMOL/L (ref 8–16)
AST SERPL-CCNC: 29 U/L (ref 10–40)
BASOPHILS # BLD AUTO: 0.04 K/UL (ref 0–0.2)
BASOPHILS NFR BLD: 0.4 % (ref 0–1.9)
BILIRUB SERPL-MCNC: 0.7 MG/DL (ref 0.1–1)
BUN SERPL-MCNC: 9 MG/DL (ref 6–20)
CALCIUM SERPL-MCNC: 9.5 MG/DL (ref 8.7–10.5)
CHLORIDE SERPL-SCNC: 100 MMOL/L (ref 95–110)
CO2 SERPL-SCNC: 30 MMOL/L (ref 23–29)
CREAT SERPL-MCNC: 1 MG/DL (ref 0.5–1.4)
DIFFERENTIAL METHOD: ABNORMAL
EOSINOPHIL # BLD AUTO: 0.1 K/UL (ref 0–0.5)
EOSINOPHIL NFR BLD: 1.5 % (ref 0–8)
ERYTHROCYTE [DISTWIDTH] IN BLOOD BY AUTOMATED COUNT: 14.1 % (ref 11.5–14.5)
EST. GFR  (NO RACE VARIABLE): >60 ML/MIN/1.73 M^2
FERRITIN SERPL-MCNC: 77 NG/ML (ref 20–300)
GLUCOSE SERPL-MCNC: 104 MG/DL (ref 70–110)
HCT VFR BLD AUTO: 44.5 % (ref 37–48.5)
HGB BLD-MCNC: 14.5 G/DL (ref 12–16)
IMM GRANULOCYTES # BLD AUTO: 0.04 K/UL (ref 0–0.04)
IMM GRANULOCYTES NFR BLD AUTO: 0.4 % (ref 0–0.5)
IRON SERPL-MCNC: 45 UG/DL (ref 30–160)
LYMPHOCYTES # BLD AUTO: 1.7 K/UL (ref 1–4.8)
LYMPHOCYTES NFR BLD: 17.3 % (ref 18–48)
MCH RBC QN AUTO: 30.3 PG (ref 27–31)
MCHC RBC AUTO-ENTMCNC: 32.6 G/DL (ref 32–36)
MCV RBC AUTO: 93 FL (ref 82–98)
MONOCYTES # BLD AUTO: 0.6 K/UL (ref 0.3–1)
MONOCYTES NFR BLD: 6.3 % (ref 4–15)
NEUTROPHILS # BLD AUTO: 7.1 K/UL (ref 1.8–7.7)
NEUTROPHILS NFR BLD: 74.1 % (ref 38–73)
NRBC BLD-RTO: 0 /100 WBC
PLATELET # BLD AUTO: 210 K/UL (ref 150–450)
PMV BLD AUTO: 11.8 FL (ref 9.2–12.9)
POTASSIUM SERPL-SCNC: 4.4 MMOL/L (ref 3.5–5.1)
PROT SERPL-MCNC: 8.6 G/DL (ref 6–8.4)
RBC # BLD AUTO: 4.78 M/UL (ref 4–5.4)
SATURATED IRON: 13 % (ref 20–50)
SODIUM SERPL-SCNC: 139 MMOL/L (ref 136–145)
TOTAL IRON BINDING CAPACITY: 350 UG/DL (ref 250–450)
TRANSFERRIN SERPL-MCNC: 250 MG/DL (ref 200–375)
WBC # BLD AUTO: 9.53 K/UL (ref 3.9–12.7)

## 2023-02-24 PROCEDURE — 36415 COLL VENOUS BLD VENIPUNCTURE: CPT | Performed by: INTERNAL MEDICINE

## 2023-02-24 PROCEDURE — 82728 ASSAY OF FERRITIN: CPT | Performed by: INTERNAL MEDICINE

## 2023-02-24 PROCEDURE — 84466 ASSAY OF TRANSFERRIN: CPT | Performed by: INTERNAL MEDICINE

## 2023-02-24 PROCEDURE — 80053 COMPREHEN METABOLIC PANEL: CPT | Performed by: INTERNAL MEDICINE

## 2023-02-24 PROCEDURE — 85025 COMPLETE CBC W/AUTO DIFF WBC: CPT | Performed by: INTERNAL MEDICINE

## 2023-02-24 NOTE — PROGRESS NOTES
[facility] Hematology/Oncology  Progress Note -   Follow-up Visit    Subjective:      Patient ID:   NAME: Katiuska Preston : 1975     48 y.o. female    Referring Doc: Justice  Other Physicians: Eduardo    Chief Complaint: pulm emboli f/u      HPI:    The patient returns for a regularly scheduled follow-up visit today to go over results of recently ordered tests, studies and/or labs. She is here by herself.    She is here with her niece and daughter    No CP, HA's or N/V.       She is currently on Eliquis (but is only taking one daily due to her menstrual bleeding). No excessive bleeding or bruising.     She is followed by Dr Pro with Gyn-Onc and she is planning procedure in the near future    She has some chronic right knee issues and pain.       Discussed covid19 precautions - she had her vaccinations             ROS:   GEN: normal without any fever, night sweats or weight loss; chronic knee issues  HEENT: normal with no HA's, sore throat, stiff neck, changes in vision;    CV: normal with no CP, some WINN   PULM: normal with no SOB, cough, hemoptysis, sputum or pleuritic pain  GI: normal with no abdominal pain, nausea, vomiting, constipation, diarrhea, melanotic stools, BRBPR, or hematemesis  : normal with no hematuria, dysuria  BREAST: normal with no mass, discharge, pain  SKIN: normal with no rash, erythema, bruising, or swelling; mole on right foot     Past Medical/Surgical History:  Past Medical History:   Diagnosis Date    Antithrombin III deficiency 2021    Asthma     Claustrophobia     Elevated factor VIII level 2021    Esophageal reflux     Gastroesophageal reflux    Generalized anxiety disorder     Anxiety, Generalized    Herniated disc     Hypertension     Iron deficiency anemia due to chronic blood loss 10/27/2021    Lower extremity weakness     Morbid obesity     Obesity     Protein S deficiency 2021    Pulmonary embolism     Upper extremity weakness      Past Surgical History:   Procedure  Laterality Date     SECTION      CHOLECYSTECTOMY      TONSILLECTOMY           Allergies:  Review of patient's allergies indicates:  No Known Allergies    Social/Family History:  Social History     Socioeconomic History    Marital status:    Tobacco Use    Smoking status: Former     Types: Cigarettes, Cigars     Quit date: 2019     Years since quittin.1    Smokeless tobacco: Never   Substance and Sexual Activity    Alcohol use: Yes     Alcohol/week: 0.8 standard drinks     Types: 1 Standard drinks or equivalent per week     Comment: rarely    Drug use: No    Sexual activity: Not Currently     Social Determinants of Health     Financial Resource Strain: Medium Risk    Difficulty of Paying Living Expenses: Somewhat hard   Food Insecurity: Food Insecurity Present    Worried About Running Out of Food in the Last Year: Sometimes true    Ran Out of Food in the Last Year: Sometimes true   Transportation Needs: Unmet Transportation Needs    Lack of Transportation (Medical): Yes    Lack of Transportation (Non-Medical): Yes   Stress: No Stress Concern Present    Feeling of Stress : Only a little   Social Connections: Socially Isolated    Frequency of Communication with Friends and Family: Once a week    Frequency of Social Gatherings with Friends and Family: Once a week    Attends Alevism Services: Never    Active Member of Clubs or Organizations: No    Attends Club or Organization Meetings: Never    Marital Status:    Housing Stability: High Risk    Unable to Pay for Housing in the Last Year: Yes    Unstable Housing in the Last Year: No     History reviewed. No pertinent family history.      Medications:    Current Outpatient Medications:     albuterol (PROVENTIL/VENTOLIN HFA) 90 mcg/actuation inhaler, Inhale 2 puffs into the lungs every 6 (six) hours as needed for Wheezing. Rescue, Disp: 18 g, Rfl: 5    atorvastatin (LIPITOR) 10 MG tablet, Take 10 mg by mouth every evening., Disp: , Rfl:      butalbital-acetaminophen-caffeine -40 mg (FIORICET, ESGIC) -40 mg per tablet, Take 1 tablet by mouth every 4 (four) hours as needed., Disp: , Rfl:     carvediloL (COREG) 25 MG tablet, Take 1 tablet (25 mg total) by mouth 2 (two) times daily., Disp: 60 tablet, Rfl: 0    ELIQUIS 5 mg Tab, TAKE 1 TABLET BY MOUTH TWICE DAILY, Disp: 60 tablet, Rfl: 3    ergocalciferol (ERGOCALCIFEROL) 50,000 unit Cap, Take 50,000 Units by mouth twice a week., Disp: , Rfl:     famotidine (PEPCID) 20 MG tablet, Take 20 mg by mouth 2 (two) times daily., Disp: , Rfl:     FEROSUL 325 mg (65 mg iron) Tab tablet, TAKE 1 TABLET BY MOUTH EVERY DAY, Disp: 30 tablet, Rfl: 3    fluticasone propionate (FLONASE) 50 mcg/actuation nasal spray, 2 sprays by Each Nostril route daily as needed for Rhinitis., Disp: , Rfl:     fluticasone-salmeterol 230-21 mcg/dose (ADVAIR HFA) 230-21 mcg/actuation HFAA inhaler, Inhale 2 puffs into the lungs 2 (two) times daily. Controller, Disp: 12 g, Rfl: 6    meloxicam (MOBIC) 7.5 MG tablet, Take 7.5 mg by mouth once daily., Disp: , Rfl:     minocycline (MINOCIN,DYNACIN) 100 MG capsule, Take 100 mg by mouth 2 (two) times daily., Disp: , Rfl:     MIRENA 20 mcg/24 hours (8 yrs) 52 mg IUD, SMARTSIG:Intrauterine Once, Disp: , Rfl:     miSOPROStoL (CYTOTEC) 200 MCG Tab, Take by mouth., Disp: , Rfl:     NEXIUM 40 mg capsule, Take 40 mg by mouth once daily., Disp: , Rfl:     omeprazole (PRILOSEC) 20 MG capsule, Take 20 mg by mouth once daily., Disp: , Rfl:     trazodone (DESYREL) 100 MG tablet, Take 100 mg by mouth every evening., Disp: , Rfl:     furosemide (LASIX) 20 MG tablet, Take 1 tablet (20 mg total) by mouth once daily., Disp: 30 tablet, Rfl: 0    losartan (COZAAR) 100 MG tablet, Take 1 tablet (100 mg total) by mouth once daily., Disp: 30 tablet, Rfl: 0    Current Facility-Administered Medications:     bebtelovimab (EUA) 175 mg/2 mL (87.5 mg/mL) injection 175 mg, 175 mg, Intravenous, 1 time in Clinic/HOD,  "Sherrie Parker MD      Pathology:   Cancer Staging   No matching staging information was found for the patient.      Breast biopsy 5/30/2022:    1. BREAST, LEFT, UPPER OUTER MID CALCIFICATIONS, STEREOTACTIC GUIDED    CORE BIOPSY    - BENIGN BREAST PARENCHYMA AND BLOOD VESSELS     2. BREAST, LEFT, UPPER OUTER MID CALCIFICATIONS, STEREOTACTIC GUIDED    CORE BIOPSY   - MICROCALCIFICATIONS ASSOCIATED WITH FIBROADENOMATOID CHANGE AND    BENIGN BREAST PARENCHYMA    - USUAL DUCTAL HYPERPLASIA AND COLUMNAR CELL CHANGE      Objective:   Vitals:  Blood pressure (!) 242/157, pulse 60, temperature 98.1 °F (36.7 °C), resp. rate 18, height 5' 3" (1.6 m), weight (!) 146.5 kg (323 lb).    Physical Examination:   GEN: no apparent distress, comfortable; AAOx3; morbidly overweight   HEAD: atraumatic and normocephalic  EYES: no pallor, no icterus, PERRLA  ENT: OMM, no pharyngeal erythema, external ears WNL; no nasal discharge; no thrush  NECK: no masses, thyroid normal, trachea midline, no LAD/LN's, supple  CV: RRR with no murmur; normal pulse; normal S1 and S2; no pedal edema  CHEST: Normal respiratory effort; CTAB; normal breath sounds; no wheeze or crackles;    ABDOM: nontender and nondistended; soft; normal bowel sounds; no rebound/guarding  MUSC/Skeletal: LROM and crepitus right knee; no deformities or arthropathy  EXTREM: no clubbing, cyanosis, inflammation or swelling  SKIN: no rashes, lesions, ulcers, petechiae or subcutaneous nodules  : no diaz  NEURO: grossly intact; motor/sensory WNL; AAOx3; no tremors  PSYCH: normal mood, affect and behavior  LYMPH: normal cervical, supraclavicular, and groin LN's;       Labs:   Lab Results   Component Value Date    WBC 9.53 02/24/2023    HGB 14.5 02/24/2023    HCT 44.5 02/24/2023    MCV 93 02/24/2023     02/24/2023    CMP  Sodium   Date Value Ref Range Status   02/24/2023 139 136 - 145 mmol/L Final     Potassium   Date Value Ref Range Status   02/24/2023 4.4 3.5 - 5.1 mmol/L " Final     Chloride   Date Value Ref Range Status   02/24/2023 100 95 - 110 mmol/L Final     CO2   Date Value Ref Range Status   02/24/2023 30 (H) 23 - 29 mmol/L Final     Glucose   Date Value Ref Range Status   02/24/2023 104 70 - 110 mg/dL Final     BUN   Date Value Ref Range Status   02/24/2023 9 6 - 20 mg/dL Final     Creatinine   Date Value Ref Range Status   02/24/2023 1.0 0.5 - 1.4 mg/dL Final     Calcium   Date Value Ref Range Status   02/24/2023 9.5 8.7 - 10.5 mg/dL Final     Total Protein   Date Value Ref Range Status   02/24/2023 8.6 (H) 6.0 - 8.4 g/dL Final     Albumin   Date Value Ref Range Status   02/24/2023 3.4 (L) 3.5 - 5.2 g/dL Final     Total Bilirubin   Date Value Ref Range Status   02/24/2023 0.7 0.1 - 1.0 mg/dL Final     Comment:     For infants and newborns, interpretation of results should be based  on gestational age, weight and in agreement with clinical  observations.    Premature Infant recommended reference ranges:  Up to 24 hours.............<8.0 mg/dL  Up to 48 hours............<12.0 mg/dL  3-5 days..................<15.0 mg/dL  6-29 days.................<15.0 mg/dL       Alkaline Phosphatase   Date Value Ref Range Status   02/24/2023 84 55 - 135 U/L Final     AST   Date Value Ref Range Status   02/24/2023 29 10 - 40 U/L Final     ALT   Date Value Ref Range Status   02/24/2023 29 10 - 44 U/L Final     Anion Gap   Date Value Ref Range Status   02/24/2023 9 8 - 16 mmol/L Final     eGFR if    Date Value Ref Range Status   07/18/2022 >60.0 >60 mL/min/1.73 m^2 Final     eGFR if non    Date Value Ref Range Status   07/18/2022 >60.0 >60 mL/min/1.73 m^2 Final     Comment:     Calculation used to obtain the estimated glomerular filtration  rate (eGFR) is the CKD-EPI equation.                  Lab Results   Component Value Date    IRON 45 02/24/2023    TIBC 350 02/24/2023    FERRITIN 77 02/24/2023     Lab Results   Component Value Date    KZANRDFP23 452 05/02/2021      Lab Results   Component Value Date    FOLATE 5.9 05/02/2021                I have reviewed all available lab results and radiology reports.    Radiology/Diagnostic Studies:      MRI Pelvis 6/15/2022:    Impression:     1. Normal thickness of the endometrium.  2. Query adenomyosis.  3. Nonviable uterine fibroid.  4. Involuting follicle or cyst in the left ovary.              US Lower Extremity Veins Bilateral [[id number]] Collected: 05/01/21 1348   Order Status: Completed Updated: 05/01/21 1432   Narrative:     REASON: DVT     FINDINGS:     Grayscale, color and spectral Doppler analysis of the bilateral lower   extremity deep venous system was performed.     There is normal compressibility, color and spectral Doppler analysis,   and augmentation in the bilateral lower extremity deep venous system.     IMPRESSION:     1.  No DVT of the bilateral lower extremity veins.   2.  Bilateral distal superficial femoral veins were unable to be   assessed due to body habitus       CTA Chest Non-Coronary - PE Study [[id number]] Collected: 05/01/21 1143   Order Status: Completed Updated: 05/01/21 1225   Narrative:     CMS MANDATED QUALITY DATA - CT RADIATION - 436     All CT scans at this facility utilize dose modulation, iterative   reconstruction, and/or weight based dosing when appropriate to reduce   radiation dose to as low as reasonably achievable.         REASON: PE suspected, intermediate prob, positive D-dimer     TECHNIQUE: CT angiography of thorax with 100 mL Omnipaque 350.   Maximum intensity projection coronal reformations were created at a   separate workstation and stored in the patient's permanent medical   record.     COMPARISON: CTA chest October 14, 2019.     FINDINGS:     Limited evaluation due to body habitus and inadequate bolus timing.   Filling defects identified in segmental and subsegmental artery   supplying the left lower lobe, consistent with pulmonary emboli.   Questionable filling defects versus  artifact in the right mainstem   pulmonary artery and a few segmental arteries supplying the right   lower lobe may reflect pulmonary emboli. Heart size is at the upper   limits of normal. No mediastinal lymphadenopathy or mass.     The central tracheobronchial tree is patent. There is diffuse   groundglass lung opacities in the bilateral lungs with peripheral   wedge-shaped opacities. No pleural effusions.     The visualized abdominal viscera are unremarkable. No acute osseous   abnormality..     IMPRESSION:     1.  Limited evaluation due to body habitus and inadequate bolus   timing. Filling defects identified in segmental and subsegmental   artery supplying the left lower lobe, consistent with pulmonary   emboli. There are questionable pulmonary emboli in the right mainstem   pulmonary artery and segmental artery supplying the right lower lobe.   Findings reported to Dr.Lloyd Duffy at 5/1/2021.   2.  Bilateral diffuse groundglass lung opacities with peripheral   wedge-shaped opacities may reflect changes of poor inspiration and   atelectasis. Atypical infectious process could also have a similar   appearance the proper clinical setting.                 All lab results and imaging results have been reviewed and discussed with the patient    Assessment:   (1) 48 y.o. female with diagnosis of pulmonary emboli who was seen as a consult at [facility]  - patient with diagnosis of asthma who presented to the ED at [facility] on 5/1 with progressive SOB, postnasal drip and acute hypercapnia. CTA was a limited study due to body habitus but radiology reported presence of filling defects identified in segmental and subsegmental artery supplying the left lower lobe, consistent with pulmonary emboli. They also reported questionable pulmonary emboli in the right mainstem pulmonary artery and segmental artery supplying the right lower lobe. Patient has been admitted to hospitalist service and is on Lovenox. She has no prior personal or  family history of clots.      - doppler studies are negative     5/3/2021:  - patient was seen by Dr Clark with pulm yesterday; appreciate his evaluation  - options of anticoagulation include: Coumadin, Eliquis , Xarelto, or Pradaxa  - in patients with morbid obesity, one can have difficulties with therapeutic dosing with practically any type of oral anticoagulant     5/31/2021:  - She has O2 at home and is on portable today.   - She has not followed up with pulmonary since discharge.   - She is breathing fair but does have WINN.   - She has some sinus issues. She is currently on Eliquis.   - She saw Dr Rendon since discharge  - low ATIII and protein S levels which could be the etiology, but they will need to be repeated at later date to confirm  - Elevated factor VIII which also will need to be repeated to confirm    6/28/2021:  - she saw Dr Clark with pulmonary since last visit and has PFT scheduled for this coming July 1st  - she remains on portable O2    12/8/2021:  - she saw Dr Clark again on 11/2/2021  - she remains on O2 at home  - she remains on eliquis  - Repeat AT3 was normal, repeat Protein S was WNL  - repeat factor VIII was still a little elevated at 281 but better    2/15/2022:  - she sees pulmonary NP again next month    5/19/2022:  - she saw Idania Cason NP on 5/2/2022 with pulmonary    6/20/2022:  - remains on eliquis  - currently with no excessive bleeding or bruising    10/24/2022:  - continued on eliquis   - no excessive bleeding or bruising    2/27/2023:  - continued on eliquis but she is only taking it once per day       (2) Anemia with microcytic indices with current history of chronic menorrhagia - most likely has underlying iron deficiency due to chronic heavy menstrual cycles  - s/p two units of blood  - prior hgb at 8.2  - total bilirubin is WNL, so I do not suspect any hemolysis at this time     5/3/2021:  - hgb at 8.6 today  - iron and ferritin are both low with elevated TIBC  - she  received dose of IV iron today    5/31/2021:  - she needs repeat labs incl iron panel  - will consider additional IV iron as needed  - she is on oral iron  - she needs repeat labs incl. Iron panel    6/28/2021:  - hgb currently 11.7  - iron panel is adequate at this time    10/26/2021:  - latest hgb at 10.6  - iron at 29  - she is on oral iron since May 2021  - she has heavy menstrual cycles  - discussed consideration for IV iron and she is agreeable; discussed general side-effects of iron infusions    12/8/2021:  - she has been on oral iron with little improvement in the labs  - she is starting IV iron this coming Friday    2/15/2022:  - latest labs from Jan 2022 with no anemia and iron panel was adequate  - she had IV iron x 1 only    5/19/2022:  - latest hgb at 11.2  - ferritin was 28  - iron back down to 24  - continued GYN bleeding issues  - seen by Dr Pro with GYn-Onc on 4/20/2022 6/20/2022:  - awaiting better BP control inorder to resume IV iron  - seeing Dr Rendon later today for her HBP  - labs pending for today    7/25/2022:  - s/p two cycles of IV iron  - latest iron panel adequate and hgb WNL    10/24/2022:  - hgb currently WNL at 13.1  - iron at 84 and ferritin 262    2/27/2023:  - latest hgb and iron panel WNL    (3) Abnormal mammogram:    5/19/2022:  - She had recent abnormal mammogram 5/6/2022 with cluster of calcifications 1 0'clock posittion on the left breast.   - she is scheduling US guided biopsy in near future  - discussed referral to [facility] for evaluation    6/20/2022:  - s/p breast biopsy on 5/30/2022 with pathology report coming back benign  - recommend repeat mammogram in at least 3 months    7/25/2022:  - refer to Dr Puentes for her breast issues    10/24/2022:  - she said she had repeat mammo in Aug 2022 but I do not see a report  - she is scheduled Dr Puentes in couple weeks      (3) Overweight/Obesity     (4) HTN     (5) GERD     (6) WBC and platelets are WNL     (7) Hx/of covid  vaccination on 4/16 - she received Pfizer vaccine (not the J&J one)     (8) General anxiety disorder     (9) Nonsmoker    (10) Migraine HA's - hospitalized in Dec 2022    VISIT DIAGNOSES:      1. Protein S deficiency    2. Elevated factor VIII level    3. Antithrombin III deficiency    4. Acute pulmonary embolism, unspecified pulmonary embolism type, unspecified whether acute cor pulmonale present    5. Iron deficiency anemia due to chronic blood loss    6. Iron deficiency anemia, unspecified iron deficiency anemia type            Plan:     PLAN:  1. F/u with PCP, pulmonary as directed  2. Check labs monthly  incl iron panel - set up up IV iron as needed    4. F/u with GYN and Dr Pro  6. Refill eliquis as needed  7. F/u with Dr Puentes recommended; due for repeat mammo - encouraged compliance  8. F/u with Dr Valerio for the knee issues     RTC in 4 months    Fax note to Yoav Rendon Braly; Vipul Puentes              Protein S deficiency    Elevated factor VIII level    Antithrombin III deficiency    Acute pulmonary embolism, unspecified pulmonary embolism type, unspecified whether acute cor pulmonale present    Iron deficiency anemia due to chronic blood loss    Iron deficiency anemia, unspecified iron deficiency anemia type      No follow-ups on file.    COVID-19 Discussion:    I had long discussion with patient and any applicable family about the COVID-19 coronavirus epidemic and the recommended precautions with regard to cancer and/or hematology patients. I have re-iterated the CDC recommendations for adequate hand washing, use of hand -like products, and coughing into elbow, etc. In addition, especially for our patients who are on chemotherapy and/or our otherwise immunocompromised patients, I have recommended avoidance of crowds, including movie theaters, restaurants, churches, etc. I have recommended avoidance of any sick or symptomatic family members and/or friends. I have also recommended  avoidance of any raw and unwashed food products, and general avoidance of food items that have not been prepared by themselves. The patient has been asked to call us immediately with any symptom developments, issues, questions or other general concerns.       Anticoagulation Discussion:    Discussed with patient and any applicable family members about the benefit and/or need for anticoagulation. I communicated about the risks of bleeding while on any anticoagulation, which could be serious and/or life-threatening, and which can occur at any time, regardless of degree of the level of anticoagulation. I expressed the need for compliance with any anticoagulation regimen and that failure to do so could potential lead to excessive bleeding, and risk to health and/or life. In particular, with patients on coumadin therapy, compliance with requested blood work is absolutely essential, as coumadin levels can vary from time to time, and failure to do so could potentially place the patient at risk for bleeding and/or clotting events which could be fatal. Patients on coumadin are encouraged to call the day after they have their levels drawn, as to obtain the appropriate instructions from my staff. Patients are aware that self-regulating or self-dosing of their medications is strictly prohibited.       I have explained and the patient understands all of  the current recommendation(s). I have answered all of their questions to the best of my ability and to their complete satisfaction.             Thank you for allowing me to participate in this pleasant patient's care. Please call with any questions or concerns.      Electronically signed Cisco Boston MD

## 2023-02-27 ENCOUNTER — OFFICE VISIT (OUTPATIENT)
Dept: HEMATOLOGY/ONCOLOGY | Facility: CLINIC | Age: 48
End: 2023-02-27
Payer: MEDICAID

## 2023-02-27 VITALS
TEMPERATURE: 98 F | RESPIRATION RATE: 18 BRPM | BODY MASS INDEX: 51.91 KG/M2 | WEIGHT: 293 LBS | HEART RATE: 60 BPM | DIASTOLIC BLOOD PRESSURE: 157 MMHG | HEIGHT: 63 IN | SYSTOLIC BLOOD PRESSURE: 242 MMHG

## 2023-02-27 DIAGNOSIS — I26.99 ACUTE PULMONARY EMBOLISM, UNSPECIFIED PULMONARY EMBOLISM TYPE, UNSPECIFIED WHETHER ACUTE COR PULMONALE PRESENT: ICD-10-CM

## 2023-02-27 DIAGNOSIS — D68.59 ANTITHROMBIN III DEFICIENCY: ICD-10-CM

## 2023-02-27 DIAGNOSIS — R79.1 ELEVATED FACTOR VIII LEVEL: ICD-10-CM

## 2023-02-27 DIAGNOSIS — D68.59 PROTEIN S DEFICIENCY: Primary | ICD-10-CM

## 2023-02-27 DIAGNOSIS — D50.9 IRON DEFICIENCY ANEMIA, UNSPECIFIED IRON DEFICIENCY ANEMIA TYPE: Chronic | ICD-10-CM

## 2023-02-27 DIAGNOSIS — D50.0 IRON DEFICIENCY ANEMIA DUE TO CHRONIC BLOOD LOSS: ICD-10-CM

## 2023-02-27 PROCEDURE — 3080F DIAST BP >= 90 MM HG: CPT | Mod: CPTII,S$GLB,, | Performed by: INTERNAL MEDICINE

## 2023-02-27 PROCEDURE — 3077F PR MOST RECENT SYSTOLIC BLOOD PRESSURE >= 140 MM HG: ICD-10-PCS | Mod: CPTII,S$GLB,, | Performed by: INTERNAL MEDICINE

## 2023-02-27 PROCEDURE — 1160F RVW MEDS BY RX/DR IN RCRD: CPT | Mod: CPTII,S$GLB,, | Performed by: INTERNAL MEDICINE

## 2023-02-27 PROCEDURE — 3080F PR MOST RECENT DIASTOLIC BLOOD PRESSURE >= 90 MM HG: ICD-10-PCS | Mod: CPTII,S$GLB,, | Performed by: INTERNAL MEDICINE

## 2023-02-27 PROCEDURE — 3008F PR BODY MASS INDEX (BMI) DOCUMENTED: ICD-10-PCS | Mod: CPTII,S$GLB,, | Performed by: INTERNAL MEDICINE

## 2023-02-27 PROCEDURE — 1159F PR MEDICATION LIST DOCUMENTED IN MEDICAL RECORD: ICD-10-PCS | Mod: CPTII,S$GLB,, | Performed by: INTERNAL MEDICINE

## 2023-02-27 PROCEDURE — 1159F MED LIST DOCD IN RCRD: CPT | Mod: CPTII,S$GLB,, | Performed by: INTERNAL MEDICINE

## 2023-02-27 PROCEDURE — 99213 OFFICE O/P EST LOW 20 MIN: CPT | Mod: S$GLB,,, | Performed by: INTERNAL MEDICINE

## 2023-02-27 PROCEDURE — 3077F SYST BP >= 140 MM HG: CPT | Mod: CPTII,S$GLB,, | Performed by: INTERNAL MEDICINE

## 2023-02-27 PROCEDURE — 99213 PR OFFICE/OUTPT VISIT, EST, LEVL III, 20-29 MIN: ICD-10-PCS | Mod: S$GLB,,, | Performed by: INTERNAL MEDICINE

## 2023-02-27 PROCEDURE — 3008F BODY MASS INDEX DOCD: CPT | Mod: CPTII,S$GLB,, | Performed by: INTERNAL MEDICINE

## 2023-02-27 PROCEDURE — 1160F PR REVIEW ALL MEDS BY PRESCRIBER/CLIN PHARMACIST DOCUMENTED: ICD-10-PCS | Mod: CPTII,S$GLB,, | Performed by: INTERNAL MEDICINE

## 2023-03-01 NOTE — PLAN OF CARE
Problem: Adult Inpatient Plan of Care  Goal: Plan of Care Review  Outcome: Ongoing, Progressing  Goal: Patient-Specific Goal (Individualized)  Outcome: Ongoing, Progressing  Goal: Absence of Hospital-Acquired Illness or Injury  Outcome: Ongoing, Progressing  Goal: Optimal Comfort and Wellbeing  Outcome: Ongoing, Progressing  Goal: Readiness for Transition of Care  Outcome: Ongoing, Progressing     Problem: Bariatric Environmental Safety  Goal: Safety Maintained with Care  Outcome: Ongoing, Progressing     Problem: Fall Injury Risk  Goal: Absence of Fall and Fall-Related Injury  Outcome: Ongoing, Progressing      Daily Note     Today's date: 3/1/2023  Patient name: Izella Fabry  : 1966  MRN: [id number]  Referring provider: Denisha Hagan  1/18 1/25 2/15 2/22 3/1 1/4 1/6 1/9 1/11 1/16   R patellar mobs JE JENIFFER SWIFT JE   R Knee PROM JE JENIFFER SWIFT 

## 2023-03-21 ENCOUNTER — OFFICE VISIT (OUTPATIENT)
Dept: PULMONOLOGY | Facility: CLINIC | Age: 48
End: 2023-03-21
Payer: MEDICAID

## 2023-03-21 VITALS
BODY MASS INDEX: 57.08 KG/M2 | WEIGHT: 293 LBS | TEMPERATURE: 98 F | HEART RATE: 82 BPM | OXYGEN SATURATION: 99 % | SYSTOLIC BLOOD PRESSURE: 142 MMHG | DIASTOLIC BLOOD PRESSURE: 76 MMHG

## 2023-03-21 DIAGNOSIS — G47.33 OSA (OBSTRUCTIVE SLEEP APNEA): ICD-10-CM

## 2023-03-21 DIAGNOSIS — J45.40 MODERATE PERSISTENT ASTHMA, UNSPECIFIED WHETHER COMPLICATED: Primary | ICD-10-CM

## 2023-03-21 DIAGNOSIS — J45.909 ASTHMA, UNSPECIFIED ASTHMA SEVERITY, UNSPECIFIED WHETHER COMPLICATED, UNSPECIFIED WHETHER PERSISTENT: ICD-10-CM

## 2023-03-21 DIAGNOSIS — I26.99 PE (PULMONARY THROMBOEMBOLISM): ICD-10-CM

## 2023-03-21 DIAGNOSIS — J96.11 CHRONIC HYPOXEMIC RESPIRATORY FAILURE: ICD-10-CM

## 2023-03-21 DIAGNOSIS — E66.2 OBESITY HYPOVENTILATION SYNDROME: ICD-10-CM

## 2023-03-21 PROCEDURE — 3008F BODY MASS INDEX DOCD: CPT | Mod: CPTII,S$GLB,, | Performed by: NURSE PRACTITIONER

## 2023-03-21 PROCEDURE — 3078F PR MOST RECENT DIASTOLIC BLOOD PRESSURE < 80 MM HG: ICD-10-PCS | Mod: CPTII,S$GLB,, | Performed by: NURSE PRACTITIONER

## 2023-03-21 PROCEDURE — 3077F SYST BP >= 140 MM HG: CPT | Mod: CPTII,S$GLB,, | Performed by: NURSE PRACTITIONER

## 2023-03-21 PROCEDURE — 99214 PR OFFICE/OUTPT VISIT, EST, LEVL IV, 30-39 MIN: ICD-10-PCS | Mod: S$GLB,,, | Performed by: NURSE PRACTITIONER

## 2023-03-21 PROCEDURE — 3077F PR MOST RECENT SYSTOLIC BLOOD PRESSURE >= 140 MM HG: ICD-10-PCS | Mod: CPTII,S$GLB,, | Performed by: NURSE PRACTITIONER

## 2023-03-21 PROCEDURE — 3078F DIAST BP <80 MM HG: CPT | Mod: CPTII,S$GLB,, | Performed by: NURSE PRACTITIONER

## 2023-03-21 PROCEDURE — 1159F MED LIST DOCD IN RCRD: CPT | Mod: CPTII,S$GLB,, | Performed by: NURSE PRACTITIONER

## 2023-03-21 PROCEDURE — 99214 OFFICE O/P EST MOD 30 MIN: CPT | Mod: S$GLB,,, | Performed by: NURSE PRACTITIONER

## 2023-03-21 PROCEDURE — 1159F PR MEDICATION LIST DOCUMENTED IN MEDICAL RECORD: ICD-10-PCS | Mod: CPTII,S$GLB,, | Performed by: NURSE PRACTITIONER

## 2023-03-21 PROCEDURE — 3008F PR BODY MASS INDEX (BMI) DOCUMENTED: ICD-10-PCS | Mod: CPTII,S$GLB,, | Performed by: NURSE PRACTITIONER

## 2023-03-21 RX ORDER — FLUTICASONE PROPIONATE AND SALMETEROL XINAFOATE 230; 21 UG/1; UG/1
2 AEROSOL, METERED RESPIRATORY (INHALATION) 2 TIMES DAILY
Qty: 12 G | Refills: 6 | Status: SHIPPED | OUTPATIENT
Start: 2023-03-21 | End: 2024-03-20

## 2023-03-21 RX ORDER — ALBUTEROL SULFATE 90 UG/1
2 AEROSOL, METERED RESPIRATORY (INHALATION) EVERY 6 HOURS PRN
Qty: 18 G | Refills: 5 | Status: SHIPPED | OUTPATIENT
Start: 2023-03-21

## 2023-03-21 NOTE — PROGRESS NOTES
SUBJECTIVE:    Patient ID: Katiuska Preston is a 48 y.o. female.    Chief Complaint: Follow-up (6 month follow up Respiratory Failure, Asthma)      HPI     Patient here today not on oxygen. She states she left it on the transportation bus here.  She turned in her autopap because of non compliance. She is using Advair HFA, feels her breathing is well.  She has lost some weight. She has not seen an ENT regarding frequent sinus infections. She is on Eliquis for previous PE.  Past Medical History:   Diagnosis Date    Antithrombin III deficiency 2021    Asthma     Claustrophobia     Elevated factor VIII level 2021    Esophageal reflux     Gastroesophageal reflux    Generalized anxiety disorder     Anxiety, Generalized    Herniated disc     Hypertension     Iron deficiency anemia due to chronic blood loss 10/27/2021    Lower extremity weakness     Morbid obesity     Obesity     Protein S deficiency 2021    Pulmonary embolism     Upper extremity weakness      Past Surgical History:   Procedure Laterality Date     SECTION      CHOLECYSTECTOMY      TONSILLECTOMY       No family history on file.     Social History:   Marital Status:   Occupation: Data Unavailable  Alcohol History:  reports current alcohol use of about 0.8 standard drinks per week.  Tobacco History:  reports that she quit smoking about 4 years ago. Her smoking use included cigarettes and cigars. She has never used smokeless tobacco.  Drug History:  reports no history of drug use.    Review of patient's allergies indicates:  No Known Allergies    Current Outpatient Medications   Medication Sig Dispense Refill    albuterol (PROVENTIL/VENTOLIN HFA) 90 mcg/actuation inhaler Inhale 2 puffs into the lungs every 6 (six) hours as needed for Wheezing. Rescue 18 g 5    atorvastatin (LIPITOR) 10 MG tablet Take 10 mg by mouth every evening.      carvediloL (COREG) 25 MG tablet Take 1 tablet (25 mg total) by mouth 2 (two) times daily. 60  tablet 0    ELIQUIS 5 mg Tab TAKE 1 TABLET BY MOUTH TWICE DAILY 60 tablet 3    ergocalciferol (ERGOCALCIFEROL) 50,000 unit Cap Take 50,000 Units by mouth twice a week.      famotidine (PEPCID) 20 MG tablet Take 20 mg by mouth 2 (two) times daily.      FEROSUL 325 mg (65 mg iron) Tab tablet TAKE 1 TABLET BY MOUTH EVERY DAY 30 tablet 3    fluticasone propionate (FLONASE) 50 mcg/actuation nasal spray 2 sprays by Each Nostril route daily as needed for Rhinitis.      fluticasone-salmeterol 230-21 mcg/dose (ADVAIR HFA) 230-21 mcg/actuation HFAA inhaler Inhale 2 puffs into the lungs 2 (two) times daily. Controller 12 g 6    meloxicam (MOBIC) 7.5 MG tablet Take 7.5 mg by mouth once daily.      MIRENA 20 mcg/24 hours (8 yrs) 52 mg IUD SMARTSIG:Intrauterine Once      miSOPROStoL (CYTOTEC) 200 MCG Tab Take by mouth.      NEXIUM 40 mg capsule Take 40 mg by mouth once daily.      trazodone (DESYREL) 100 MG tablet Take 100 mg by mouth every evening.      butalbital-acetaminophen-caffeine -40 mg (FIORICET, ESGIC) -40 mg per tablet Take 1 tablet by mouth every 4 (four) hours as needed.      furosemide (LASIX) 20 MG tablet Take 1 tablet (20 mg total) by mouth once daily. 30 tablet 0    losartan (COZAAR) 100 MG tablet Take 1 tablet (100 mg total) by mouth once daily. 30 tablet 0    minocycline (MINOCIN,DYNACIN) 100 MG capsule Take 100 mg by mouth 2 (two) times daily.      omeprazole (PRILOSEC) 20 MG capsule Take 20 mg by mouth once daily.       Current Facility-Administered Medications   Medication Dose Route Frequency Provider Last Rate Last Admin    bebtelovimab (EUA) 175 mg/2 mL (87.5 mg/mL) injection 175 mg  175 mg Intravenous 1 time in Clinic/HOD Sherrie Parker MD         Last chest xray 12/26/2022    IMPRESSION:  1. Cardiomegaly with pulmonary vascular congestion but no evidence of ran congestive failure.  2. Mild atelectasis or infiltrate in the left midlung    Last CTA 10/2022  IMPRESSION:     1.  Significantly limited exam with no definitive pulmonary embolus identified. If continued clinical concern for pulmonary embolus, consider further evaluation with bilateral lower extremity venous Doppler ultrasound.  2. Unchanged cardiomegaly.  3. Scattered linear pulmonary opacities most suggestive of atelectasis rather than minimal patchy consolidation.  4. Unchanged few scattered small bilateral pulmonary nodules    Review of Systems  General: Feeling Well. Snores, chronic fatigue   Eyes: Vision is good.  ENT:  recurrent sinus infections   Heart:: No chest pain or palpitation.  Lungs: breathing stable  GI: No Nausea, vomiting, constipation, diarrhea, or reflux.  : frequent night time urination,   Musculoskeletal: No joint pain or myalgias.  Skin: No lesions or rashes.  Neuro: No headaches or neuropathy.  Lymph: swelling to legs   Psych: anxiety .  Endo: weight loss    OBJECTIVE:      BP (!) 142/76 (BP Location: Right arm, Patient Position: Sitting, BP Method: Large (Manual))   Pulse 82   Temp 97.9 °F (36.6 °C)   Wt (!) 146.1 kg (322 lb 3.2 oz)   SpO2 99%   BMI 57.08 kg/m²     Physical Exam  GENERAL: middle aged patient in no distress. Clothing dirty  HEENT: Pupils equal and reactive. Extraocular movements intact. Nose intact.      Pharynx moist.   NECK: Supple.   HEART: Regular rate and rhythm. No murmur or gallop auscultated.  LUNGS: decreased breath sounds secondary to body habitus  ABDOMEN:morbidly obese  Bowel sounds present. Non-tender, no masses palpated.  EXTREMITIES: Normal muscle tone and joint movement, no cyanosis or clubbing. Nails dirty  LYMPHATICS: No adenopathy palpated, 1-2+ edema to legs   SKIN: Dry, intact, no lesions.   NEURO: Cranial nerves II-XII intact. Motor strength 5/5 bilaterally, upper and lower extremities.  PSYCH: Appropriate affect.    Assessment:       1. Moderate persistent asthma, unspecified whether complicated    2. Obesity hypoventilation syndrome    3. Asthma,  unspecified asthma severity, unspecified whether complicated, unspecified whether persistent    4. Chronic hypoxemic respiratory failure    5. SINGH (obstructive sleep apnea)    6. PE (pulmonary thromboembolism)              Plan:       Moderate persistent asthma, unspecified whether complicated  -     Complete PFT with bronchodilator; Future    Obesity hypoventilation syndrome    Asthma, unspecified asthma severity, unspecified whether complicated, unspecified whether persistent  -     albuterol (PROVENTIL/VENTOLIN HFA) 90 mcg/actuation inhaler; Inhale 2 puffs into the lungs every 6 (six) hours as needed for Wheezing. Rescue  Dispense: 18 g; Refill: 5    Chronic hypoxemic respiratory failure  -     albuterol (PROVENTIL/VENTOLIN HFA) 90 mcg/actuation inhaler; Inhale 2 puffs into the lungs every 6 (six) hours as needed for Wheezing. Rescue  Dispense: 18 g; Refill: 5  -     Stress test, pulmonary; Future; Expected date: 03/21/2023    SINGH (obstructive sleep apnea)    PE (pulmonary thromboembolism)    Other orders  -     fluticasone-salmeterol 230-21 mcg/dose (ADVAIR HFA) 230-21 mcg/actuation HFAA inhaler; Inhale 2 puffs into the lungs 2 (two) times daily. Controller  Dispense: 12 g; Refill: 6        Continue the Advair twice a day, rinse after you use it  You should treat your sleep apnea   Refill advair and albuterol   Repeat 6 minute walk and pft   Need to lose weight   Follow up in about 6 months (around 9/21/2023).

## 2023-03-21 NOTE — PATIENT INSTRUCTIONS
Continue the Advair twice a day, rinse after you use it  You should treat your sleep apnea   Refill advair and albuterol   Repeat 6 minute walk and pft   Need to lose weight   Follow up in about 6 months (around 9/21/2023).

## 2023-03-27 PROBLEM — J01.90 ACUTE SINUSITIS: Status: RESOLVED | Noted: 2022-12-26 | Resolved: 2023-03-27

## 2023-03-31 ENCOUNTER — HOSPITAL ENCOUNTER (OUTPATIENT)
Dept: PULMONOLOGY | Facility: HOSPITAL | Age: 48
Discharge: HOME | End: 2023-03-31
Attending: NURSE PRACTITIONER
Payer: MEDICAID

## 2023-03-31 VITALS — BODY MASS INDEX: 51.91 KG/M2 | WEIGHT: 293 LBS | HEIGHT: 63 IN

## 2023-03-31 DIAGNOSIS — J45.40 MODERATE PERSISTENT ASTHMA, UNSPECIFIED WHETHER COMPLICATED: ICD-10-CM

## 2023-03-31 DIAGNOSIS — J96.11 CHRONIC HYPOXEMIC RESPIRATORY FAILURE: ICD-10-CM

## 2023-03-31 PROCEDURE — 94727 GAS DIL/WSHOT DETER LNG VOL: CPT

## 2023-03-31 PROCEDURE — 94729 DIFFUSING CAPACITY: CPT

## 2023-03-31 PROCEDURE — 94010 BREATHING CAPACITY TEST: CPT

## 2023-03-31 PROCEDURE — 94060 EVALUATION OF WHEEZING: CPT

## 2023-03-31 PROCEDURE — 94618 PULMONARY STRESS TESTING: CPT

## 2023-03-31 NOTE — CARE UPDATE
"   03/31/23 1041   6MW Test   Ordering Provider MARY ELLEN Cooper   Diagnosis Shortness of Breath;Qualify for Oxygen   Height 5' 3" (1.6 m)   Weight (!) 146.1 kg (322 lb)   BMI (Calculated) 57.1   Predicted Distance Meters (Calculated) 386.91 meters   Patient Race    6MWT Status completed without stopping   Patient Reported Dyspnea   Was O2 used? Yes   Delivery Method Pull Tank;Cannula;Continuous Flow   6MW Distance walked (feet) 1000 feet   Distance walked (meters) 304.8 meters   Did patient stop? No   Type of assistive device(s) used? no assistive devices   Is extra documentation required for this patient? Yes   Pre-Exercise   Oxygen Saturation 95 %   Supplemental Oxygen Room Air   Heart Rate 106 bpm   Alf Dyspnea Rating  moderate   Exercise 1 Minute   Oxygen Saturation 97 %   Supplemental Oxygen Room Air   Heart Rate 101 bpm   Exercise 2 Minutes   Oxygen Saturation 90 %   Supplemental Oxygen Room Air   Heart Rate 124 bpm   Exercise 3 Minutes   Oxygen Saturation 86 %   Supplemental Oxygen Room Air   Heart Rate 127 bpm   Exercise 4 Minutes   Oxygen Saturation 89 %   Supplemental Oxygen 2 L/M   Heart Rate 124 bpm   Exercise 5 Minutes   Oxygen Saturation 90 %   Supplemental Oxygen 2 L/M   Heart Rate 120 bpm   Post Exercise   Oxygen Saturation 98 %   Supplemental Oxygen 2 L/M   Heart Rate 112 bpm   Alf Dyspnea Rating  heavy   Recovery   Oxygen Saturation 99 %   Supplemental Oxygen Room Air   Heart Rate 121 bpm   Alf Dyspnea Rating  somewhat heavy   Interpretation   Is procedure ready for interpretation? Yes   Did the patient stop or pause? No   Total Laps Walked 5   Final Partial Lap Distance (feet) 0 feet   Total Distance Feet (Calculated) 1000 feet   Total Distance Meters (Calculated) 304.8 meters   Percentage of Predicted (Calculated) 78.78   Peak VO2 (Calculated) 13.12   Mets 3.75   Comments This is a hypoxemic 6 min. walk.   Patient requires __2___ liters of oxygen to adequately oxygenate " with ambulation.   Oxygen Qualification   Oxygen Qualification? Yes

## 2023-04-03 ENCOUNTER — TELEPHONE (OUTPATIENT)
Dept: PULMONOLOGY | Facility: CLINIC | Age: 48
End: 2023-04-03

## 2023-04-03 NOTE — TELEPHONE ENCOUNTER
Patient is aware that she has to lose weight and that she needs to wear her oxygen all of the time. She is aware that she should be treating her SINGH as well.

## 2023-04-03 NOTE — TELEPHONE ENCOUNTER
PFT no obstruction with good response in small airways to bronchodilator. Moderate restriction severe diffusion defect.   Her ERV is 15, her obesity is causing her restriction. She has to lose weight. She is had to turn in her CPAP due to non compliance and she is not wearing her oxygen like she should contributing to the severe diffusion capacity    6 minute walk is hypoxemic needs to be on 2 liters of oxygen.

## 2023-04-13 DIAGNOSIS — D50.0 IRON DEFICIENCY ANEMIA DUE TO CHRONIC BLOOD LOSS: ICD-10-CM

## 2023-04-13 RX ORDER — FERROUS SULFATE 325(65) MG
TABLET ORAL
Qty: 30 TABLET | Refills: 6 | Status: SHIPPED | OUTPATIENT
Start: 2023-04-13

## 2023-04-16 ENCOUNTER — HOSPITAL ENCOUNTER (EMERGENCY)
Facility: HOSPITAL | Age: 48
Discharge: HOME | End: 2023-04-16
Attending: EMERGENCY MEDICINE
Payer: MEDICAID

## 2023-04-16 VITALS
SYSTOLIC BLOOD PRESSURE: 192 MMHG | RESPIRATION RATE: 21 BRPM | DIASTOLIC BLOOD PRESSURE: 89 MMHG | HEART RATE: 95 BPM | WEIGHT: 293 LBS | OXYGEN SATURATION: 98 % | TEMPERATURE: 99 F | BODY MASS INDEX: 58.81 KG/M2

## 2023-04-16 DIAGNOSIS — R91.8 PULMONARY NODULES: ICD-10-CM

## 2023-04-16 DIAGNOSIS — G43.009 MIGRAINE WITHOUT AURA AND WITHOUT STATUS MIGRAINOSUS, NOT INTRACTABLE: ICD-10-CM

## 2023-04-16 DIAGNOSIS — I16.0 HYPERTENSIVE URGENCY: Primary | ICD-10-CM

## 2023-04-16 DIAGNOSIS — R79.89 POSITIVE D DIMER: ICD-10-CM

## 2023-04-16 LAB
ALBUMIN SERPL BCP-MCNC: 3.4 G/DL (ref 3.5–5.2)
ALP SERPL-CCNC: 71 U/L (ref 55–135)
ALT SERPL W/O P-5'-P-CCNC: 18 U/L (ref 10–44)
ANION GAP SERPL CALC-SCNC: 10 MMOL/L (ref 8–16)
AST SERPL-CCNC: 22 U/L (ref 10–40)
B-HCG UR QL: NEGATIVE
BASOPHILS # BLD AUTO: 0.04 K/UL (ref 0–0.2)
BASOPHILS NFR BLD: 0.3 % (ref 0–1.9)
BILIRUB SERPL-MCNC: 0.6 MG/DL (ref 0.1–1)
BILIRUB UR QL STRIP: NEGATIVE
BUN SERPL-MCNC: 17 MG/DL (ref 6–20)
CALCIUM SERPL-MCNC: 9 MG/DL (ref 8.7–10.5)
CHLORIDE SERPL-SCNC: 95 MMOL/L (ref 95–110)
CLARITY UR: CLEAR
CO2 SERPL-SCNC: 33 MMOL/L (ref 23–29)
COLOR UR: YELLOW
CREAT SERPL-MCNC: 1.1 MG/DL (ref 0.5–1.4)
CTP QC/QA: YES
D DIMER PPP IA.FEU-MCNC: 1.01 MG/L FEU
DIFFERENTIAL METHOD: ABNORMAL
EOSINOPHIL # BLD AUTO: 0.3 K/UL (ref 0–0.5)
EOSINOPHIL NFR BLD: 2.2 % (ref 0–8)
ERYTHROCYTE [DISTWIDTH] IN BLOOD BY AUTOMATED COUNT: 14.1 % (ref 11.5–14.5)
EST. GFR  (NO RACE VARIABLE): >60 ML/MIN/1.73 M^2
GLUCOSE SERPL-MCNC: 128 MG/DL (ref 70–110)
GLUCOSE UR QL STRIP: NEGATIVE
HCT VFR BLD AUTO: 46.9 % (ref 37–48.5)
HGB BLD-MCNC: 14.8 G/DL (ref 12–16)
HGB UR QL STRIP: NEGATIVE
IMM GRANULOCYTES # BLD AUTO: 0.04 K/UL (ref 0–0.04)
IMM GRANULOCYTES NFR BLD AUTO: 0.3 % (ref 0–0.5)
KETONES UR QL STRIP: NEGATIVE
LEUKOCYTE ESTERASE UR QL STRIP: NEGATIVE
LYMPHOCYTES # BLD AUTO: 1.9 K/UL (ref 1–4.8)
LYMPHOCYTES NFR BLD: 14.5 % (ref 18–48)
MCH RBC QN AUTO: 30.3 PG (ref 27–31)
MCHC RBC AUTO-ENTMCNC: 31.6 G/DL (ref 32–36)
MCV RBC AUTO: 96 FL (ref 82–98)
MONOCYTES # BLD AUTO: 0.6 K/UL (ref 0.3–1)
MONOCYTES NFR BLD: 4.8 % (ref 4–15)
NEUTROPHILS # BLD AUTO: 10.1 K/UL (ref 1.8–7.7)
NEUTROPHILS NFR BLD: 77.9 % (ref 38–73)
NITRITE UR QL STRIP: NEGATIVE
NRBC BLD-RTO: 0 /100 WBC
PH UR STRIP: 7 [PH] (ref 5–8)
PLATELET # BLD AUTO: 325 K/UL (ref 150–450)
PMV BLD AUTO: 11.1 FL (ref 9.2–12.9)
POTASSIUM SERPL-SCNC: 4 MMOL/L (ref 3.5–5.1)
PROT SERPL-MCNC: 8 G/DL (ref 6–8.4)
PROT UR QL STRIP: ABNORMAL
RBC # BLD AUTO: 4.89 M/UL (ref 4–5.4)
SODIUM SERPL-SCNC: 138 MMOL/L (ref 136–145)
SP GR UR STRIP: 1.01 (ref 1–1.03)
URN SPEC COLLECT METH UR: ABNORMAL
UROBILINOGEN UR STRIP-ACNC: NEGATIVE EU/DL
WBC # BLD AUTO: 12.99 K/UL (ref 3.9–12.7)

## 2023-04-16 PROCEDURE — 99285 EMERGENCY DEPT VISIT HI MDM: CPT | Mod: 25

## 2023-04-16 PROCEDURE — 96375 TX/PRO/DX INJ NEW DRUG ADDON: CPT | Mod: 59

## 2023-04-16 PROCEDURE — 81003 URINALYSIS AUTO W/O SCOPE: CPT | Performed by: EMERGENCY MEDICINE

## 2023-04-16 PROCEDURE — 96376 TX/PRO/DX INJ SAME DRUG ADON: CPT | Mod: 59

## 2023-04-16 PROCEDURE — 63600175 PHARM REV CODE 636 W HCPCS: Performed by: EMERGENCY MEDICINE

## 2023-04-16 PROCEDURE — 85025 COMPLETE CBC W/AUTO DIFF WBC: CPT | Performed by: EMERGENCY MEDICINE

## 2023-04-16 PROCEDURE — 85379 FIBRIN DEGRADATION QUANT: CPT | Performed by: EMERGENCY MEDICINE

## 2023-04-16 PROCEDURE — 25000003 PHARM REV CODE 250: Performed by: EMERGENCY MEDICINE

## 2023-04-16 PROCEDURE — 80053 COMPREHEN METABOLIC PANEL: CPT | Performed by: EMERGENCY MEDICINE

## 2023-04-16 PROCEDURE — 81025 URINE PREGNANCY TEST: CPT | Performed by: EMERGENCY MEDICINE

## 2023-04-16 PROCEDURE — 25500020 PHARM REV CODE 255: Performed by: EMERGENCY MEDICINE

## 2023-04-16 PROCEDURE — 96374 THER/PROPH/DIAG INJ IV PUSH: CPT

## 2023-04-16 PROCEDURE — 96361 HYDRATE IV INFUSION ADD-ON: CPT

## 2023-04-16 RX ORDER — PROCHLORPERAZINE EDISYLATE 5 MG/ML
10 INJECTION INTRAMUSCULAR; INTRAVENOUS
Status: COMPLETED | OUTPATIENT
Start: 2023-04-16 | End: 2023-04-16

## 2023-04-16 RX ORDER — LOSARTAN POTASSIUM 25 MG/1
100 TABLET ORAL DAILY
Status: DISCONTINUED | OUTPATIENT
Start: 2023-04-16 | End: 2023-04-16 | Stop reason: HOSPADM

## 2023-04-16 RX ORDER — DIPHENHYDRAMINE HYDROCHLORIDE 50 MG/ML
25 INJECTION INTRAMUSCULAR; INTRAVENOUS
Status: COMPLETED | OUTPATIENT
Start: 2023-04-16 | End: 2023-04-16

## 2023-04-16 RX ORDER — HYDRALAZINE HYDROCHLORIDE 20 MG/ML
10 INJECTION INTRAMUSCULAR; INTRAVENOUS
Status: DISCONTINUED | OUTPATIENT
Start: 2023-04-16 | End: 2023-04-16

## 2023-04-16 RX ORDER — LABETALOL HYDROCHLORIDE 5 MG/ML
10 INJECTION, SOLUTION INTRAVENOUS
Status: COMPLETED | OUTPATIENT
Start: 2023-04-16 | End: 2023-04-16

## 2023-04-16 RX ORDER — KETOROLAC TROMETHAMINE 30 MG/ML
15 INJECTION, SOLUTION INTRAMUSCULAR; INTRAVENOUS
Status: COMPLETED | OUTPATIENT
Start: 2023-04-16 | End: 2023-04-16

## 2023-04-16 RX ORDER — DROPERIDOL 2.5 MG/ML
1.25 INJECTION, SOLUTION INTRAMUSCULAR; INTRAVENOUS ONCE
Status: COMPLETED | OUTPATIENT
Start: 2023-04-16 | End: 2023-04-16

## 2023-04-16 RX ORDER — ONDANSETRON 2 MG/ML
4 INJECTION INTRAMUSCULAR; INTRAVENOUS
Status: COMPLETED | OUTPATIENT
Start: 2023-04-16 | End: 2023-04-16

## 2023-04-16 RX ORDER — BUTALBITAL, ACETAMINOPHEN AND CAFFEINE 50; 325; 40 MG/1; MG/1; MG/1
1 TABLET ORAL EVERY 4 HOURS PRN
Qty: 16 TABLET | Refills: 0 | Status: SHIPPED | OUTPATIENT
Start: 2023-04-16 | End: 2023-05-16

## 2023-04-16 RX ADMIN — METOPROLOL TARTRATE 10 MG: 5 INJECTION INTRAVENOUS at 06:04

## 2023-04-16 RX ADMIN — SODIUM CHLORIDE 1000 ML: 9 INJECTION, SOLUTION INTRAVENOUS at 06:04

## 2023-04-16 RX ADMIN — DROPERIDOL 1.25 MG: 2.5 INJECTION, SOLUTION INTRAMUSCULAR; INTRAVENOUS at 06:04

## 2023-04-16 RX ADMIN — KETOROLAC TROMETHAMINE 15 MG: 30 INJECTION, SOLUTION INTRAMUSCULAR; INTRAVENOUS at 03:04

## 2023-04-16 RX ADMIN — LABETALOL HYDROCHLORIDE 10 MG: 5 INJECTION, SOLUTION INTRAVENOUS at 07:04

## 2023-04-16 RX ADMIN — LOSARTAN POTASSIUM 100 MG: 25 TABLET, FILM COATED ORAL at 10:04

## 2023-04-16 RX ADMIN — ONDANSETRON HYDROCHLORIDE 4 MG: 2 SOLUTION INTRAMUSCULAR; INTRAVENOUS at 03:04

## 2023-04-16 RX ADMIN — DIPHENHYDRAMINE HYDROCHLORIDE 25 MG: 50 INJECTION INTRAMUSCULAR; INTRAVENOUS at 03:04

## 2023-04-16 RX ADMIN — IOHEXOL 100 ML: 350 INJECTION, SOLUTION INTRAVENOUS at 08:04

## 2023-04-16 RX ADMIN — PROCHLORPERAZINE EDISYLATE 10 MG: 5 INJECTION INTRAMUSCULAR; INTRAVENOUS at 03:04

## 2023-04-16 NOTE — DISCHARGE INSTRUCTIONS
Please have repeat CT of chest in 6 months.  Follow-up with Pulmonary next 1-2 weeks.  Return to emergency department problems persist worsens or additional concerns.

## 2023-06-19 ENCOUNTER — TELEPHONE (OUTPATIENT)
Dept: HEMATOLOGY/ONCOLOGY | Facility: CLINIC | Age: 48
End: 2023-06-19

## 2023-07-15 ENCOUNTER — HOSPITAL ENCOUNTER (INPATIENT)
Facility: HOSPITAL | Age: 48
LOS: 3 days | Discharge: HOME | DRG: 193 | End: 2023-07-18
Attending: EMERGENCY MEDICINE | Admitting: INTERNAL MEDICINE
Payer: MEDICAID

## 2023-07-15 DIAGNOSIS — I16.1 HYPERTENSIVE EMERGENCY: ICD-10-CM

## 2023-07-15 DIAGNOSIS — R07.9 CHEST PAIN: ICD-10-CM

## 2023-07-15 DIAGNOSIS — J18.9 PNEUMONIA OF BOTH LUNGS DUE TO INFECTIOUS ORGANISM, UNSPECIFIED PART OF LUNG: ICD-10-CM

## 2023-07-15 DIAGNOSIS — J96.21 ACUTE ON CHRONIC RESPIRATORY FAILURE WITH HYPOXIA AND HYPERCAPNIA: ICD-10-CM

## 2023-07-15 DIAGNOSIS — I50.9 ACUTE HEART FAILURE: ICD-10-CM

## 2023-07-15 DIAGNOSIS — J96.22 ACUTE ON CHRONIC RESPIRATORY FAILURE WITH HYPOXIA AND HYPERCAPNIA: ICD-10-CM

## 2023-07-15 DIAGNOSIS — I50.30 (HFPEF) HEART FAILURE WITH PRESERVED EJECTION FRACTION: ICD-10-CM

## 2023-07-15 DIAGNOSIS — I16.0 HYPERTENSIVE URGENCY: ICD-10-CM

## 2023-07-15 DIAGNOSIS — J81.0 ACUTE PULMONARY EDEMA: Primary | ICD-10-CM

## 2023-07-15 DIAGNOSIS — R09.02 HYPOXIA: ICD-10-CM

## 2023-07-15 PROBLEM — I50.31 ACUTE HEART FAILURE WITH PRESERVED EJECTION FRACTION (HFPEF): Status: ACTIVE | Noted: 2023-07-15

## 2023-07-15 PROBLEM — Z86.711 HISTORY OF PULMONARY EMBOLISM: Status: ACTIVE | Noted: 2023-07-15

## 2023-07-15 PROBLEM — U07.1 COVID: Status: RESOLVED | Noted: 2022-08-07 | Resolved: 2023-07-15

## 2023-07-15 LAB
ALBUMIN SERPL BCP-MCNC: 3.2 G/DL (ref 3.5–5.2)
ALLENS TEST: ABNORMAL
ALP SERPL-CCNC: 74 U/L (ref 55–135)
ALT SERPL W/O P-5'-P-CCNC: 12 U/L (ref 10–44)
ANION GAP SERPL CALC-SCNC: 4 MMOL/L (ref 8–16)
APTT PPP: 26.8 SEC (ref 21–32)
AST SERPL-CCNC: 21 U/L (ref 10–40)
B-HCG UR QL: NEGATIVE
BASOPHILS # BLD AUTO: 0.04 K/UL (ref 0–0.2)
BASOPHILS NFR BLD: 0.4 % (ref 0–1.9)
BILIRUB SERPL-MCNC: 0.7 MG/DL (ref 0.1–1)
BILIRUB UR QL STRIP: NEGATIVE
BNP SERPL-MCNC: 267 PG/ML (ref 0–99)
BUN SERPL-MCNC: 12 MG/DL (ref 6–20)
CALCIUM SERPL-MCNC: 8.7 MG/DL (ref 8.7–10.5)
CHLORIDE SERPL-SCNC: 102 MMOL/L (ref 95–110)
CLARITY UR: CLEAR
CO2 SERPL-SCNC: 30 MMOL/L (ref 23–29)
COLOR UR: COLORLESS
CREAT SERPL-MCNC: 1 MG/DL (ref 0.5–1.4)
CTP QC/QA: YES
DELSYS: ABNORMAL
DIFFERENTIAL METHOD: ABNORMAL
EOSINOPHIL # BLD AUTO: 0.1 K/UL (ref 0–0.5)
EOSINOPHIL NFR BLD: 1.1 % (ref 0–8)
ERYTHROCYTE [DISTWIDTH] IN BLOOD BY AUTOMATED COUNT: 15.9 % (ref 11.5–14.5)
EST. GFR  (NO RACE VARIABLE): >60 ML/MIN/1.73 M^2
FLOW: 5
GLUCOSE SERPL-MCNC: 114 MG/DL (ref 70–110)
GLUCOSE SERPL-MCNC: 116 MG/DL (ref 70–110)
GLUCOSE UR QL STRIP: NEGATIVE
HCO3 UR-SCNC: 35.5 MMOL/L (ref 24–28)
HCT VFR BLD AUTO: 44.7 % (ref 37–48.5)
HCT VFR BLD CALC: 45 %PCV (ref 36–54)
HGB BLD-MCNC: 13.4 G/DL (ref 12–16)
HGB UR QL STRIP: NEGATIVE
IMM GRANULOCYTES # BLD AUTO: 0.03 K/UL (ref 0–0.04)
IMM GRANULOCYTES NFR BLD AUTO: 0.3 % (ref 0–0.5)
INR PPP: 1.1 (ref 0.8–1.2)
KETONES UR QL STRIP: NEGATIVE
LACTATE SERPL-SCNC: 1.1 MMOL/L (ref 0.5–1.9)
LEUKOCYTE ESTERASE UR QL STRIP: NEGATIVE
LYMPHOCYTES # BLD AUTO: 0.9 K/UL (ref 1–4.8)
LYMPHOCYTES NFR BLD: 8 % (ref 18–48)
MAGNESIUM SERPL-MCNC: 1.8 MG/DL (ref 1.6–2.6)
MCH RBC QN AUTO: 27.2 PG (ref 27–31)
MCHC RBC AUTO-ENTMCNC: 30 G/DL (ref 32–36)
MCV RBC AUTO: 91 FL (ref 82–98)
MODE: ABNORMAL
MONOCYTES # BLD AUTO: 0.7 K/UL (ref 0.3–1)
MONOCYTES NFR BLD: 6.6 % (ref 4–15)
NEUTROPHILS # BLD AUTO: 9 K/UL (ref 1.8–7.7)
NEUTROPHILS NFR BLD: 83.6 % (ref 38–73)
NITRITE UR QL STRIP: NEGATIVE
NRBC BLD-RTO: 0 /100 WBC
PCO2 BLDA: 67.7 MMHG (ref 35–45)
PH SMN: 7.33 [PH] (ref 7.35–7.45)
PH UR STRIP: 6 [PH] (ref 5–8)
PLATELET # BLD AUTO: 302 K/UL (ref 150–450)
PMV BLD AUTO: 10.3 FL (ref 9.2–12.9)
PO2 BLDA: 97 MMHG (ref 80–100)
POC BE: 10 MMOL/L
POC IONIZED CALCIUM: 1.26 MMOL/L (ref 1.06–1.42)
POC SATURATED O2: 97 % (ref 95–100)
POC TCO2: 38 MMOL/L (ref 23–27)
POTASSIUM BLD-SCNC: 4.2 MMOL/L (ref 3.5–5.1)
POTASSIUM SERPL-SCNC: 4.1 MMOL/L (ref 3.5–5.1)
PROCALCITONIN SERPL IA-MCNC: 0.18 NG/ML (ref 0–0.5)
PROT SERPL-MCNC: 8.4 G/DL (ref 6–8.4)
PROT UR QL STRIP: ABNORMAL
PROTHROMBIN TIME: 11.9 SEC (ref 9–12.5)
RBC # BLD AUTO: 4.92 M/UL (ref 4–5.4)
SAMPLE: ABNORMAL
SARS-COV-2 RDRP RESP QL NAA+PROBE: NEGATIVE
SITE: ABNORMAL
SODIUM BLD-SCNC: 140 MMOL/L (ref 136–145)
SODIUM SERPL-SCNC: 136 MMOL/L (ref 136–145)
SP GR UR STRIP: 1 (ref 1–1.03)
SP02: 93
TROPONIN I SERPL HS-MCNC: 21.9 PG/ML (ref 0–14.9)
TROPONIN I SERPL HS-MCNC: 25.9 PG/ML (ref 0–14.9)
URN SPEC COLLECT METH UR: ABNORMAL
UROBILINOGEN UR STRIP-ACNC: NEGATIVE EU/DL
WBC # BLD AUTO: 10.72 K/UL (ref 3.9–12.7)

## 2023-07-15 PROCEDURE — 85610 PROTHROMBIN TIME: CPT | Performed by: EMERGENCY MEDICINE

## 2023-07-15 PROCEDURE — 94761 N-INVAS EAR/PLS OXIMETRY MLT: CPT

## 2023-07-15 PROCEDURE — 96366 THER/PROPH/DIAG IV INF ADDON: CPT

## 2023-07-15 PROCEDURE — 93010 ELECTROCARDIOGRAM REPORT: CPT | Mod: ,,, | Performed by: SPECIALIST

## 2023-07-15 PROCEDURE — 25000242 PHARM REV CODE 250 ALT 637 W/ HCPCS: Performed by: NURSE PRACTITIONER

## 2023-07-15 PROCEDURE — 99291 CRITICAL CARE FIRST HOUR: CPT

## 2023-07-15 PROCEDURE — 36600 WITHDRAWAL OF ARTERIAL BLOOD: CPT

## 2023-07-15 PROCEDURE — 94640 AIRWAY INHALATION TREATMENT: CPT

## 2023-07-15 PROCEDURE — 96375 TX/PRO/DX INJ NEW DRUG ADDON: CPT

## 2023-07-15 PROCEDURE — 84145 PROCALCITONIN (PCT): CPT | Performed by: EMERGENCY MEDICINE

## 2023-07-15 PROCEDURE — 85014 HEMATOCRIT: CPT

## 2023-07-15 PROCEDURE — 87040 BLOOD CULTURE FOR BACTERIA: CPT | Mod: 59 | Performed by: EMERGENCY MEDICINE

## 2023-07-15 PROCEDURE — 83880 ASSAY OF NATRIURETIC PEPTIDE: CPT | Performed by: EMERGENCY MEDICINE

## 2023-07-15 PROCEDURE — 25000003 PHARM REV CODE 250: Performed by: NURSE PRACTITIONER

## 2023-07-15 PROCEDURE — 99900031 HC PATIENT EDUCATION (STAT)

## 2023-07-15 PROCEDURE — 83735 ASSAY OF MAGNESIUM: CPT | Performed by: EMERGENCY MEDICINE

## 2023-07-15 PROCEDURE — 82803 BLOOD GASES ANY COMBINATION: CPT

## 2023-07-15 PROCEDURE — U0002 COVID-19 LAB TEST NON-CDC: HCPCS | Performed by: EMERGENCY MEDICINE

## 2023-07-15 PROCEDURE — 99900035 HC TECH TIME PER 15 MIN (STAT)

## 2023-07-15 PROCEDURE — 81003 URINALYSIS AUTO W/O SCOPE: CPT | Performed by: EMERGENCY MEDICINE

## 2023-07-15 PROCEDURE — 93005 ELECTROCARDIOGRAM TRACING: CPT | Performed by: SPECIALIST

## 2023-07-15 PROCEDURE — 81025 URINE PREGNANCY TEST: CPT | Performed by: EMERGENCY MEDICINE

## 2023-07-15 PROCEDURE — 94660 CPAP INITIATION&MGMT: CPT

## 2023-07-15 PROCEDURE — 85730 THROMBOPLASTIN TIME PARTIAL: CPT | Performed by: EMERGENCY MEDICINE

## 2023-07-15 PROCEDURE — 80053 COMPREHEN METABOLIC PANEL: CPT | Performed by: EMERGENCY MEDICINE

## 2023-07-15 PROCEDURE — 93010 EKG 12-LEAD: ICD-10-PCS | Mod: ,,, | Performed by: SPECIALIST

## 2023-07-15 PROCEDURE — 84484 ASSAY OF TROPONIN QUANT: CPT | Mod: 91 | Performed by: NURSE PRACTITIONER

## 2023-07-15 PROCEDURE — 27000221 HC OXYGEN, UP TO 24 HOURS

## 2023-07-15 PROCEDURE — 82330 ASSAY OF CALCIUM: CPT

## 2023-07-15 PROCEDURE — 87040 BLOOD CULTURE FOR BACTERIA: CPT | Performed by: EMERGENCY MEDICINE

## 2023-07-15 PROCEDURE — 21000000 HC CCU ICU ROOM CHARGE

## 2023-07-15 PROCEDURE — 36415 COLL VENOUS BLD VENIPUNCTURE: CPT | Performed by: NURSE PRACTITIONER

## 2023-07-15 PROCEDURE — 63600175 PHARM REV CODE 636 W HCPCS: Performed by: EMERGENCY MEDICINE

## 2023-07-15 PROCEDURE — 63600175 PHARM REV CODE 636 W HCPCS: Performed by: NURSE PRACTITIONER

## 2023-07-15 PROCEDURE — 25000242 PHARM REV CODE 250 ALT 637 W/ HCPCS: Performed by: EMERGENCY MEDICINE

## 2023-07-15 PROCEDURE — 85025 COMPLETE CBC W/AUTO DIFF WBC: CPT | Performed by: EMERGENCY MEDICINE

## 2023-07-15 PROCEDURE — 84295 ASSAY OF SERUM SODIUM: CPT

## 2023-07-15 PROCEDURE — 84484 ASSAY OF TROPONIN QUANT: CPT | Performed by: EMERGENCY MEDICINE

## 2023-07-15 PROCEDURE — 96368 THER/DIAG CONCURRENT INF: CPT

## 2023-07-15 PROCEDURE — 36415 COLL VENOUS BLD VENIPUNCTURE: CPT | Performed by: EMERGENCY MEDICINE

## 2023-07-15 PROCEDURE — 84132 ASSAY OF SERUM POTASSIUM: CPT

## 2023-07-15 PROCEDURE — 83605 ASSAY OF LACTIC ACID: CPT | Performed by: NURSE PRACTITIONER

## 2023-07-15 PROCEDURE — 87154 CUL TYP ID BLD PTHGN 6+ TRGT: CPT | Performed by: EMERGENCY MEDICINE

## 2023-07-15 PROCEDURE — 96365 THER/PROPH/DIAG IV INF INIT: CPT

## 2023-07-15 RX ORDER — ONDANSETRON 2 MG/ML
4 INJECTION INTRAMUSCULAR; INTRAVENOUS EVERY 6 HOURS PRN
Status: DISCONTINUED | OUTPATIENT
Start: 2023-07-15 | End: 2023-07-18 | Stop reason: HOSPADM

## 2023-07-15 RX ORDER — FUROSEMIDE 10 MG/ML
40 INJECTION INTRAMUSCULAR; INTRAVENOUS
Status: DISCONTINUED | OUTPATIENT
Start: 2023-07-15 | End: 2023-07-17

## 2023-07-15 RX ORDER — LANOLIN ALCOHOL/MO/W.PET/CERES
800 CREAM (GRAM) TOPICAL
Status: DISCONTINUED | OUTPATIENT
Start: 2023-07-15 | End: 2023-07-18 | Stop reason: HOSPADM

## 2023-07-15 RX ORDER — SIMETHICONE 80 MG
1 TABLET,CHEWABLE ORAL 4 TIMES DAILY PRN
Status: DISCONTINUED | OUTPATIENT
Start: 2023-07-15 | End: 2023-07-18 | Stop reason: HOSPADM

## 2023-07-15 RX ORDER — CARVEDILOL 25 MG/1
25 TABLET ORAL 2 TIMES DAILY
Status: DISCONTINUED | OUTPATIENT
Start: 2023-07-15 | End: 2023-07-18 | Stop reason: HOSPADM

## 2023-07-15 RX ORDER — LANOLIN ALCOHOL/MO/W.PET/CERES
1 CREAM (GRAM) TOPICAL DAILY
Status: DISCONTINUED | OUTPATIENT
Start: 2023-07-16 | End: 2023-07-18 | Stop reason: HOSPADM

## 2023-07-15 RX ORDER — IPRATROPIUM BROMIDE AND ALBUTEROL SULFATE 2.5; .5 MG/3ML; MG/3ML
3 SOLUTION RESPIRATORY (INHALATION) EVERY 6 HOURS
Status: DISCONTINUED | OUTPATIENT
Start: 2023-07-15 | End: 2023-07-17

## 2023-07-15 RX ORDER — POLYETHYLENE GLYCOL 3350 17 G/17G
17 POWDER, FOR SOLUTION ORAL 2 TIMES DAILY PRN
Status: DISCONTINUED | OUTPATIENT
Start: 2023-07-15 | End: 2023-07-18 | Stop reason: HOSPADM

## 2023-07-15 RX ORDER — BUDESONIDE 0.5 MG/2ML
0.5 INHALANT ORAL EVERY 12 HOURS
Status: DISCONTINUED | OUTPATIENT
Start: 2023-07-16 | End: 2023-07-18 | Stop reason: HOSPADM

## 2023-07-15 RX ORDER — ATORVASTATIN CALCIUM 10 MG/1
10 TABLET, FILM COATED ORAL NIGHTLY
Status: DISCONTINUED | OUTPATIENT
Start: 2023-07-15 | End: 2023-07-18 | Stop reason: HOSPADM

## 2023-07-15 RX ORDER — FAMOTIDINE 20 MG/1
20 TABLET, FILM COATED ORAL 2 TIMES DAILY
Status: DISCONTINUED | OUTPATIENT
Start: 2023-07-15 | End: 2023-07-18 | Stop reason: HOSPADM

## 2023-07-15 RX ORDER — NITROGLYCERIN 20 MG/100ML
0-400 INJECTION INTRAVENOUS CONTINUOUS
Status: DISCONTINUED | OUTPATIENT
Start: 2023-07-15 | End: 2023-07-15

## 2023-07-15 RX ORDER — LOSARTAN POTASSIUM 50 MG/1
100 TABLET ORAL DAILY
Status: DISCONTINUED | OUTPATIENT
Start: 2023-07-16 | End: 2023-07-18 | Stop reason: HOSPADM

## 2023-07-15 RX ORDER — METHYLPREDNISOLONE SOD SUCC 125 MG
125 VIAL (EA) INJECTION
Status: COMPLETED | OUTPATIENT
Start: 2023-07-15 | End: 2023-07-15

## 2023-07-15 RX ORDER — PROCHLORPERAZINE EDISYLATE 5 MG/ML
5 INJECTION INTRAMUSCULAR; INTRAVENOUS EVERY 6 HOURS PRN
Status: DISCONTINUED | OUTPATIENT
Start: 2023-07-15 | End: 2023-07-18 | Stop reason: HOSPADM

## 2023-07-15 RX ORDER — NICARDIPINE HYDROCHLORIDE 0.2 MG/ML
0-15 INJECTION INTRAVENOUS CONTINUOUS
Status: DISCONTINUED | OUTPATIENT
Start: 2023-07-15 | End: 2023-07-17

## 2023-07-15 RX ORDER — IPRATROPIUM BROMIDE AND ALBUTEROL SULFATE 2.5; .5 MG/3ML; MG/3ML
3 SOLUTION RESPIRATORY (INHALATION)
Status: DISCONTINUED | OUTPATIENT
Start: 2023-07-15 | End: 2023-07-18 | Stop reason: HOSPADM

## 2023-07-15 RX ORDER — BUTALBITAL, ACETAMINOPHEN AND CAFFEINE 300; 40; 50 MG/1; MG/1; MG/1
1 CAPSULE ORAL EVERY 4 HOURS PRN
COMMUNITY
Start: 2023-04-17

## 2023-07-15 RX ORDER — BUDESONIDE 0.5 MG/2ML
0.5 INHALANT ORAL EVERY 12 HOURS
Status: DISCONTINUED | OUTPATIENT
Start: 2023-07-15 | End: 2023-07-15

## 2023-07-15 RX ORDER — LEVALBUTEROL INHALATION SOLUTION 1.25 MG/3ML
3.75 SOLUTION RESPIRATORY (INHALATION)
Status: COMPLETED | OUTPATIENT
Start: 2023-07-15 | End: 2023-07-15

## 2023-07-15 RX ORDER — MELOXICAM 7.5 MG/1
7.5 TABLET ORAL DAILY
Status: DISCONTINUED | OUTPATIENT
Start: 2023-07-16 | End: 2023-07-15

## 2023-07-15 RX ORDER — IPRATROPIUM BROMIDE 0.5 MG/2.5ML
1 SOLUTION RESPIRATORY (INHALATION)
Status: COMPLETED | OUTPATIENT
Start: 2023-07-15 | End: 2023-07-15

## 2023-07-15 RX ORDER — SODIUM CHLORIDE 0.9 % (FLUSH) 0.9 %
10 SYRINGE (ML) INJECTION
Status: DISCONTINUED | OUTPATIENT
Start: 2023-07-15 | End: 2023-07-18 | Stop reason: HOSPADM

## 2023-07-15 RX ORDER — NALOXONE HCL 0.4 MG/ML
0.02 VIAL (ML) INJECTION
Status: DISCONTINUED | OUTPATIENT
Start: 2023-07-15 | End: 2023-07-18 | Stop reason: HOSPADM

## 2023-07-15 RX ORDER — ACETAMINOPHEN 325 MG/1
650 TABLET ORAL EVERY 4 HOURS PRN
Status: DISCONTINUED | OUTPATIENT
Start: 2023-07-15 | End: 2023-07-18 | Stop reason: HOSPADM

## 2023-07-15 RX ORDER — OXYCODONE HYDROCHLORIDE 5 MG/1
10 TABLET ORAL EVERY 4 HOURS PRN
Status: DISCONTINUED | OUTPATIENT
Start: 2023-07-15 | End: 2023-07-18 | Stop reason: HOSPADM

## 2023-07-15 RX ORDER — TRAZODONE HYDROCHLORIDE 50 MG/1
100 TABLET ORAL NIGHTLY
Status: DISCONTINUED | OUTPATIENT
Start: 2023-07-15 | End: 2023-07-18 | Stop reason: HOSPADM

## 2023-07-15 RX ORDER — OXYCODONE HYDROCHLORIDE 5 MG/1
5 TABLET ORAL EVERY 4 HOURS PRN
Status: DISCONTINUED | OUTPATIENT
Start: 2023-07-15 | End: 2023-07-18 | Stop reason: HOSPADM

## 2023-07-15 RX ORDER — FUROSEMIDE 10 MG/ML
40 INJECTION INTRAMUSCULAR; INTRAVENOUS
Status: COMPLETED | OUTPATIENT
Start: 2023-07-15 | End: 2023-07-15

## 2023-07-15 RX ORDER — TALC
6 POWDER (GRAM) TOPICAL NIGHTLY PRN
Status: DISCONTINUED | OUTPATIENT
Start: 2023-07-15 | End: 2023-07-18 | Stop reason: HOSPADM

## 2023-07-15 RX ORDER — MUPIROCIN 20 MG/G
1 OINTMENT TOPICAL 3 TIMES DAILY
COMMUNITY
Start: 2023-04-13

## 2023-07-15 RX ORDER — PREDNISONE 20 MG/1
20 TABLET ORAL 2 TIMES DAILY
Status: DISCONTINUED | OUTPATIENT
Start: 2023-07-16 | End: 2023-07-17

## 2023-07-15 RX ADMIN — ATORVASTATIN CALCIUM 10 MG: 10 TABLET, FILM COATED ORAL at 08:07

## 2023-07-15 RX ADMIN — AZITHROMYCIN DIHYDRATE 500 MG: 500 INJECTION, POWDER, LYOPHILIZED, FOR SOLUTION INTRAVENOUS at 04:07

## 2023-07-15 RX ADMIN — NICARDIPINE HYDROCHLORIDE 2.5 MG/HR: 0.2 INJECTION, SOLUTION INTRAVENOUS at 08:07

## 2023-07-15 RX ADMIN — NITROGLYCERIN 20 MCG/MIN: 20 INJECTION INTRAVENOUS at 04:07

## 2023-07-15 RX ADMIN — IPRATROPIUM BROMIDE AND ALBUTEROL SULFATE 3 ML: .5; 3 SOLUTION RESPIRATORY (INHALATION) at 07:07

## 2023-07-15 RX ADMIN — APIXABAN 5 MG: 5 TABLET, FILM COATED ORAL at 08:07

## 2023-07-15 RX ADMIN — IPRATROPIUM BROMIDE 1 MG: 0.5 SOLUTION RESPIRATORY (INHALATION) at 03:07

## 2023-07-15 RX ADMIN — ACETAMINOPHEN 650 MG: 325 TABLET ORAL at 08:07

## 2023-07-15 RX ADMIN — FAMOTIDINE 20 MG: 20 TABLET ORAL at 08:07

## 2023-07-15 RX ADMIN — NITROGLYCERIN 55 MCG/MIN: 20 INJECTION INTRAVENOUS at 05:07

## 2023-07-15 RX ADMIN — FUROSEMIDE 40 MG: 10 INJECTION, SOLUTION INTRAMUSCULAR; INTRAVENOUS at 06:07

## 2023-07-15 RX ADMIN — CARVEDILOL 25 MG: 25 TABLET, FILM COATED ORAL at 08:07

## 2023-07-15 RX ADMIN — METHYLPREDNISOLONE SODIUM SUCCINATE 125 MG: 125 INJECTION, POWDER, FOR SOLUTION INTRAMUSCULAR; INTRAVENOUS at 03:07

## 2023-07-15 RX ADMIN — CEFTRIAXONE SODIUM 2 G: 2 INJECTION, POWDER, FOR SOLUTION INTRAMUSCULAR; INTRAVENOUS at 06:07

## 2023-07-15 RX ADMIN — TRAZODONE HYDROCHLORIDE 100 MG: 50 TABLET ORAL at 08:07

## 2023-07-15 RX ADMIN — LEVALBUTEROL HYDROCHLORIDE 3.75 MG: 1.25 SOLUTION RESPIRATORY (INHALATION) at 03:07

## 2023-07-15 RX ADMIN — FUROSEMIDE 40 MG: 10 INJECTION, SOLUTION INTRAMUSCULAR; INTRAVENOUS at 03:07

## 2023-07-15 RX ADMIN — BUDESONIDE 0.5 MG: 0.5 INHALANT RESPIRATORY (INHALATION) at 07:07

## 2023-07-15 NOTE — ASSESSMENT & PLAN NOTE
Initial mean arterial pressure on 150, Initial blood pressure 203/135, placed on nitroglycerin drip, titrate to keep systolic blood pressure between 170-180 and then re-evaluate in a.m.

## 2023-07-15 NOTE — ASSESSMENT & PLAN NOTE
Pulmonary edema on chest x-ray most likely from severe hypertension, diurese with Lasix, Last echocardiogram from May 2021 with 60% ejection fraction.  Repeat echocardiogram, daily weights, cardiac diet, strict I&O

## 2023-07-15 NOTE — HPI
Katiuska Preston is a 48-year-old female with chronic medical problems including morbid obesity, Asthma, GERD, hypertension and history of PE presents to the emergency room complaining of shortness a breath.  Patient states that she started feeling short of breath prior to arrival in the emergency room.  She wears 2 L nasal cannula O2 at baseline and was 68% when EMS arrived.  Patient states she has been taking her blood pressure medicine and just got a blood pressure cuff and noticed that her blood pressure was very high as well today.  She denies chest pain, states she wheezes occasionally and has not nebulizer, she said she was supposed to wear CPAP but can not get the mask to fix her face so she does not use it.  She denies any nausea vomiting, abdominal pain, difficulty voiding.  She said she is never been told that she has heart failure in the past.  She denies fevers or chills, headaches or dizziness.  She said she has allergies and occasionally has sinus congestion.    In the ED, she was placed on BiPAP and started on a nitroglycerin drip for initial blood pressure 203/135.  O2 sat was 87% and she improved on BiPAP at 40% FiO2, 10/5.  Lab work with , WBC 10.72, platelets 302, glucose 114, BUN/CR 12/1.0, serum bicarb 30,, sodium 136, potassium 4.1, magnesium 1.8, troponin 25.9, urinalysis unremarkable, arterial blood gas with pH 7.328, pCO2 67.7, PO2 97 and serum bicarb 35.5 on 5 L nasal cannula O2.  SARs COVID test was negative, lactic acid 1.1, procalcitonin is pending.  Chest x-ray with pulmonary hypoinflation suggestive interstitial and acinar pulmonary edema as well as atypical pneumonia, pulmonary alveolar hemorrhage and are developing ARDS.  She was also given azithromycin, ceftriaxone, Lasix, duo nebs, Solu-Medrol and placed on a nitroglycerin drip.  She was voiding well and improved greatly at time of exam.  She will be admitted to the hospitalist service for further evaluation and treatment.

## 2023-07-15 NOTE — SUBJECTIVE & OBJECTIVE
Past Medical History:   Diagnosis Date    Antithrombin III deficiency 2021    Asthma     Claustrophobia     Elevated factor VIII level 2021    Esophageal reflux     Gastroesophageal reflux    Generalized anxiety disorder     Anxiety, Generalized    Herniated disc     Hypertension     Iron deficiency anemia due to chronic blood loss 10/27/2021    Lower extremity weakness     Morbid obesity     Obesity     Protein S deficiency 2021    Pulmonary embolism     Upper extremity weakness        Past Surgical History:   Procedure Laterality Date     SECTION      CHOLECYSTECTOMY      TONSILLECTOMY         Review of patient's allergies indicates:  No Known Allergies    Current Facility-Administered Medications on File Prior to Encounter   Medication    bebtelovimab (EUA) 175 mg/2 mL (87.5 mg/mL) injection 175 mg     Current Outpatient Medications on File Prior to Encounter   Medication Sig    albuterol (PROVENTIL/VENTOLIN HFA) 90 mcg/actuation inhaler Inhale 2 puffs into the lungs every 6 (six) hours as needed for Wheezing. Rescue    ELIQUIS 5 mg Tab TAKE 1 TABLET BY MOUTH TWICE DAILY (Patient taking differently: Take by mouth 2 (two) times daily.)    famotidine (PEPCID) 20 MG tablet Take 20 mg by mouth 2 (two) times daily.    ferrous sulfate (FEROSUL) 325 mg (65 mg iron) Tab tablet TAKE 1 TABLET BY MOUTH EVERY DAY (Patient taking differently: Take by mouth once daily. TAKE 1 TABLET BY MOUTH EVERY DAY)    fluticasone propionate (FLONASE) 50 mcg/actuation nasal spray 2 sprays by Each Nostril route daily as needed for Rhinitis.    fluticasone-salmeterol 230-21 mcg/dose (ADVAIR HFA) 230-21 mcg/actuation HFAA inhaler Inhale 2 puffs into the lungs 2 (two) times daily. Controller    furosemide (LASIX) 20 MG tablet Take 1 tablet (20 mg total) by mouth once daily.    losartan (COZAAR) 100 MG tablet Take 1 tablet (100 mg total) by mouth once daily.    meloxicam (MOBIC) 7.5 MG tablet Take 7.5 mg by mouth once  daily.    trazodone (DESYREL) 100 MG tablet Take 100 mg by mouth every evening.    atorvastatin (LIPITOR) 10 MG tablet Take 10 mg by mouth every evening.    butalbital-acetaminophen-caff -40 mg Cap Take 1 capsule by mouth every 4 (four) hours as needed.    butalbital-acetaminophen-caffeine -40 mg (FIORICET, ESGIC) -40 mg per tablet Take 1 tablet by mouth every 4 (four) hours as needed.    carvediloL (COREG) 25 MG tablet Take 1 tablet (25 mg total) by mouth 2 (two) times daily.    ergocalciferol (ERGOCALCIFEROL) 50,000 unit Cap Take 50,000 Units by mouth twice a week.    minocycline (MINOCIN,DYNACIN) 100 MG capsule Take 100 mg by mouth 2 (two) times daily.    MIRENA 20 mcg/24 hours (8 yrs) 52 mg IUD SMARTSIG:Intrauterine Once    miSOPROStoL (CYTOTEC) 200 MCG Tab Take by mouth.    mupirocin (BACTROBAN) 2 % ointment Apply 1 g topically 3 (three) times daily.    NEXIUM 40 mg capsule Take 40 mg by mouth once daily.    omeprazole (PRILOSEC) 20 MG capsule Take 20 mg by mouth once daily.     Family History    None       Tobacco Use    Smoking status: Former     Types: Cigarettes, Cigars     Quit date: 2019     Years since quittin.5    Smokeless tobacco: Never   Substance and Sexual Activity    Alcohol use: Yes     Alcohol/week: 0.8 standard drinks     Types: 1 Standard drinks or equivalent per week     Comment: rarely    Drug use: No    Sexual activity: Not Currently     Review of Systems   All other systems reviewed and are negative.  Twelve point review of systems obtained and negative except as stated above in HPI      Objective:     Vital Signs (Most Recent):  Temp: 98.9 °F (37.2 °C) (07/15/23 1447)  Pulse: 104 (07/15/23 1810)  Resp: 20 (07/15/23 1524)  BP: (!) 190/92 (07/15/23 1820)  SpO2: 95 % (07/15/23 1800) Vital Signs (24h Range):  Temp:  [98.9 °F (37.2 °C)] 98.9 °F (37.2 °C)  Pulse:  [] 104  Resp:  [18-32] 20  SpO2:  [87 %-97 %] 95 %  BP: (186-245)/() 190/92     Weight: (!) 145.2  kg (320 lb)  Body mass index is 58.53 kg/m².     Physical Exam  Vitals and nursing note reviewed.   Constitutional:       General: She is awake.      Appearance: She is morbidly obese. She is ill-appearing.   HENT:      Head: Normocephalic.      Right Ear: Hearing normal.      Left Ear: Hearing normal.      Nose: Nose normal.      Mouth/Throat:      Lips: Pink.   Eyes:      General: Lids are normal. Vision grossly intact.         Right eye: Discharge present.      Pupils: Pupils are equal, round, and reactive to light.   Cardiovascular:      Rate and Rhythm: Normal rate and regular rhythm.      Pulses: Normal pulses.      Heart sounds: Normal heart sounds.   Pulmonary:      Effort: Pulmonary effort is normal. Tachypnea present.      Breath sounds: Decreased breath sounds and wheezing present. No rhonchi or rales.      Comments: Mildly tachypneic, no accessory muscle use, scattered expiratory wheezes, decreased breath sounds lower lobes - patient appears to be breathing comfortably on BiPAP though mildly tachypneic and converses easily  Abdominal:      General: Abdomen is protuberant. Bowel sounds are normal. There is no distension.      Palpations: Abdomen is soft.      Tenderness: There is no abdominal tenderness.   Musculoskeletal:         General: Normal range of motion.      Cervical back: Normal range of motion.   Skin:     General: Skin is warm and dry.      Capillary Refill: Capillary refill takes less than 2 seconds.   Neurological:      General: No focal deficit present.      Mental Status: She is alert and oriented to person, place, and time.      GCS: GCS eye subscore is 4. GCS verbal subscore is 5. GCS motor subscore is 6.      Motor: Motor function is intact.      Coordination: Coordination is intact.   Psychiatric:         Attention and Perception: Attention and perception normal.         Mood and Affect: Mood normal. Affect is flat.         Speech: Speech normal.         Behavior: Behavior normal.  Behavior is cooperative.         Thought Content: Thought content normal.         Cognition and Memory: Cognition and memory normal.         Judgment: Judgment normal.            CRANIAL NERVES     CN III, IV, VI   Pupils are equal, round, and reactive to light.     Significant Labs: All pertinent labs within the past 24 hours have been reviewed.    ABGs:   Recent Labs   Lab 07/15/23  1522   PH 7.328*   PCO2 67.7*   HCO3 35.5*   POCSATURATED 97   BE 10   PO2 97     Bilirubin:   Recent Labs   Lab 07/15/23  1446   BILITOT 0.7     BMP:   Recent Labs   Lab 07/15/23  1446   *      K 4.1      CO2 30*   BUN 12   CREATININE 1.0   CALCIUM 8.7   MG 1.8     CBC:   Recent Labs   Lab 07/15/23  1446 07/15/23  1522   WBC 10.72  --    HGB 13.4  --    HCT 44.7 45     --      CMP:   Recent Labs   Lab 07/15/23  1446      K 4.1      CO2 30*   *   BUN 12   CREATININE 1.0   CALCIUM 8.7   PROT 8.4   ALBUMIN 3.2*   BILITOT 0.7   ALKPHOS 74   AST 21   ALT 12   ANIONGAP 4*     Cardiac Markers:   Recent Labs   Lab 07/15/23  1446   *     Coagulation:   Recent Labs   Lab 07/15/23  1446   INR 1.1   APTT 26.8     Lactic Acid:   Recent Labs   Lab 07/15/23  1729   LACTATE 1.1     Magnesium:   Recent Labs   Lab 07/15/23  1446   MG 1.8     Troponin:   Recent Labs   Lab 07/15/23  1446   TROPONINIHS 25.9*     Significant Imaging: I have reviewed all pertinent imaging results/findings within the past 24 hours.    12 Lead EKG 7/15/2023 1450  Vent. Rate : 097 BPM     Atrial Rate : 097 BPM      P-R Int : 130 ms          QRS Dur : 074 ms       QT Int : 346 ms       P-R-T Axes : 049 007 052 degrees      QTc Int : 439 ms     Normal sinus rhythm   Cannot rule out Anterior infarct ,age undetermined   Abnormal ECG   When compared with ECG of 26-DEC-2022 15:22,   Premature atrial complexes are no longer Present   T wave inversion no longer evident in Inferior leads       X-Ray Chest AP Portable  [[id number]]Collected: 07/15/23 1421  FINDINGS: Portable chest radiograph at 1438 hours compared to prior exams shows the cardiac silhouette is enlarged, mostly obscured by parenchymal lung opacities. The central pulmonary vasculature is prominent and indistinct, with aortic vascular calcifications.     The lungs are hypoexpanded, with reticulonodular densities and patchy airspace opacities throughout both lungs. There is no large pleural effusion or pneumothorax. No acute fractures.     IMPRESSION: Pulmonary hypoinflation, with findings suggestive of interstitial and acinar pulmonary edema, versus multifocal viral or atypical pneumonia, pulmonary alveolar hemorrhage, and or developing ARDS.

## 2023-07-15 NOTE — ASSESSMENT & PLAN NOTE
Resume Eliquis, consider CTA chest for evaluation of PE if no improvement, Last CTA chest was on 4/16 and was negative, patient states she is been compliant with Eliquis

## 2023-07-15 NOTE — CARE UPDATE
07/15/23 1524   Patient Assessment/Suction   Level of Consciousness (AVPU) alert   Respiratory Effort Mild;Labored;Grunting   Expansion/Accessory Muscles/Retractions abdominal muscle use   All Lung Fields Breath Sounds crackles;crackles, coarse   Rhythm/Pattern, Respiratory shortness of breath;grunting   Cough Frequency infrequent   Cough Type productive;nonproductive   PRE-TX-O2   Device (Oxygen Therapy) nasal cannula   $ Is the patient on Low Flow Oxygen? Yes   Flow (L/min) 5   SpO2 96 %   Pulse Oximetry Type Continuous   $ Pulse Oximetry - Multiple Charge Pulse Oximetry - Multiple   Oximetry Probe Site Assessed   Pulse 94   Resp 20   Aerosol Therapy   $ Aerosol Therapy Charges Aerosol Treatment   Daily Review of Necessity (SVN) completed   Respiratory Treatment Status (SVN) continuous;given   Treatment Route (SVN) mask;air   Patient Position (SVN) HOB elevated   Post Treatment Assessment (SVN) breath sounds improved   Signs of Intolerance (SVN) none   Breath Sounds Post-Respiratory Treatment   Throughout All Fields Post-Treatment All Fields   Throughout All Fields Post-Treatment aeration increased   Post-treatment Heart Rate (beats/min) 97   Post-treatment Resp Rate (breaths/min) 22   Labs   $ Was an ABG obtained? Arterial Puncture;ISTAT - Blood gas;ISTAT - Calcium;ISTAT - Hematocrit;ISTAT - PH, Blood;ISTAT - Potassium;ISTAT - Sodium   $ Labs Tech Time 15 min   Critical Value Communication   Date Result Received 07/15/23   Time Result Received 1524   Resulting Department of Critical Value Respiratory   Critical Test #1 PCO2   Critical Test #1 Result 67.7   Name of Notified Physician/Designee Dr. Rosenberg   Date Notified 07/15/23   Time Notified 1535   Read Back Verification Yes

## 2023-07-15 NOTE — PHARMACY MED REC
"    Admission Medication History     The home medication history was taken by Lexis Rivera.    You may go to "Admission" then "Reconcile Home Medications" tabs to review and/or act upon these items.     The home medication list has been updated by the Pharmacy department.   Please read ALL comments highlighted in yellow.   Please address this information as you see fit.    Feel free to contact us if you have any questions or require assistance.                Medications listed below were obtained from: Patient/family and Analytic software- [employer]  Current Facility-Administered Medications on File Prior to Encounter   Medication Dose Route Frequency Provider Last Rate Last Admin    bebtelovimab (EUA) 175 mg/2 mL (87.5 mg/mL) injection 175 mg  175 mg Intravenous 1 time in Clinic/HOD Sherrie Parker MD         Current Outpatient Medications on File Prior to Encounter   Medication Sig Dispense Refill    albuterol (PROVENTIL/VENTOLIN HFA) 90 mcg/actuation inhaler Inhale 2 puffs into the lungs every 6 (six) hours as needed for Wheezing. Rescue 18 g 5    ELIQUIS 5 mg Tab TAKE 1 TABLET BY MOUTH TWICE DAILY (Patient taking differently: Take by mouth 2 (two) times daily.) 60 tablet 3    famotidine (PEPCID) 20 MG tablet Take 20 mg by mouth 2 (two) times daily.      ferrous sulfate (FEROSUL) 325 mg (65 mg iron) Tab tablet TAKE 1 TABLET BY MOUTH EVERY DAY (Patient taking differently: Take by mouth once daily. TAKE 1 TABLET BY MOUTH EVERY DAY) 30 tablet 6    fluticasone propionate (FLONASE) 50 mcg/actuation nasal spray 2 sprays by Each Nostril route daily as needed for Rhinitis.      fluticasone-salmeterol 230-21 mcg/dose (ADVAIR HFA) 230-21 mcg/actuation HFAA inhaler Inhale 2 puffs into the lungs 2 (two) times daily. Controller 12 g 6    furosemide (LASIX) 20 MG tablet Take 1 tablet (20 mg total) by mouth once daily. 30 tablet 0    losartan (COZAAR) 100 MG tablet Take 1 tablet (100 mg total) by mouth once daily. 30 " tablet 0    meloxicam (MOBIC) 7.5 MG tablet Take 7.5 mg by mouth once daily.      trazodone (DESYREL) 100 MG tablet Take 100 mg by mouth every evening.      atorvastatin (LIPITOR) 10 MG tablet Take 10 mg by mouth every evening.      butalbital-acetaminophen-caff -40 mg Cap Take 1 capsule by mouth every 4 (four) hours as needed.      butalbital-acetaminophen-caffeine -40 mg (FIORICET, ESGIC) -40 mg per tablet Take 1 tablet by mouth every 4 (four) hours as needed.      carvediloL (COREG) 25 MG tablet Take 1 tablet (25 mg total) by mouth 2 (two) times daily. 60 tablet 0    ergocalciferol (ERGOCALCIFEROL) 50,000 unit Cap Take 50,000 Units by mouth twice a week.      minocycline (MINOCIN,DYNACIN) 100 MG capsule Take 100 mg by mouth 2 (two) times daily.      MIRENA 20 mcg/24 hours (8 yrs) 52 mg IUD SMARTSIG:Intrauterine Once      miSOPROStoL (CYTOTEC) 200 MCG Tab Take by mouth.      mupirocin (BACTROBAN) 2 % ointment Apply 1 g topically 3 (three) times daily.      NEXIUM 40 mg capsule Take 40 mg by mouth once daily.      omeprazole (PRILOSEC) 20 MG capsule Take 20 mg by mouth once daily.         Potential issues to be addressed PRIOR TO DISCHARGE  Patient reported not taking the following medications: (Minocycline 100mg,Muppirocin 2% ointment, Nexium 40mg,Omeprazole 20mg and Trazodone 100mg). These medications remain on the home medication list. Please address accordingly.     Lexis Rivera  [id number]              .

## 2023-07-15 NOTE — ASSESSMENT & PLAN NOTE
Body mass index is 58.53 kg/m². Morbid obesity complicates all aspects of disease management from diagnostic modalities to treatment. Weight loss encouraged and health benefits explained to patient.

## 2023-07-15 NOTE — ASSESSMENT & PLAN NOTE
Admit to step-down unit on BiPAP, deescalate as tolerated, diurese, continue duo nebs q.6 hours and q.3 hours as needed for shortness of breath or wheezing, prednisone 20 mg twice daily, keep sat greater than 90%, diurese,

## 2023-07-15 NOTE — H&P
[facility] - Emergency Dept  Hospital Medicine  History & Physical    Patient Name: Katiuska Preston  MRN: [id number]  Patient Class: IP- Inpatient  Admission Date: 7/15/2023  Attending Physician: Pj Moulton MD   Primary Care Provider: Harpreet Rendon MD         Patient information was obtained from patient, past medical records and ER records.     Subjective:     Principal Problem:Chronic hypercapnic respiratory failure    Chief Complaint:   Chief Complaint   Patient presents with    Shortness of Breath     Pt arrived by ems from home, sob started approx 1 hours ago, pt states she has hx of copd and thinks the heat in her house increased it, ems reports clear breath sounds, 68% ra  supplemental oxygen given         HPI: Katiuska Preston is a 48-year-old female with chronic medical problems including morbid obesity, Asthma, GERD, hypertension and history of PE presents to the emergency room complaining of shortness a breath.  Patient states that she started feeling short of breath prior to arrival in the emergency room.  She wears 2 L nasal cannula O2 at baseline and was 68% when EMS arrived.  Patient states she has been taking her blood pressure medicine and just got a blood pressure cuff and noticed that her blood pressure was very high as well today.  She denies chest pain, states she wheezes occasionally and has not nebulizer, she said she was supposed to wear CPAP but can not get the mask to fix her face so she does not use it.  She denies any nausea vomiting, abdominal pain, difficulty voiding.  She said she is never been told that she has heart failure in the past.  She denies fevers or chills, headaches or dizziness.  She said she has allergies and occasionally has sinus congestion.    In the ED, she was placed on BiPAP and started on a nitroglycerin drip for initial blood pressure 203/135.  O2 sat was 87% and she improved on BiPAP at 40% FiO2, 10/5.  Lab work with , WBC 10.72,  platelets 302, glucose 114, BUN/CR 12/1.0, serum bicarb 30,, sodium 136, potassium 4.1, magnesium 1.8, troponin 25.9, urinalysis unremarkable, arterial blood gas with pH 7.328, pCO2 67.7, PO2 97 and serum bicarb 35.5 on 5 L nasal cannula O2.  SARs COVID test was negative, lactic acid 1.1, procalcitonin is pending.  Chest x-ray with pulmonary hypoinflation suggestive interstitial and acinar pulmonary edema as well as atypical pneumonia, pulmonary alveolar hemorrhage and are developing ARDS.  She was also given azithromycin, ceftriaxone, Lasix, duo nebs, Solu-Medrol and placed on a nitroglycerin drip.  She was voiding well and improved greatly at time of exam.  She will be admitted to the hospitalist service for further evaluation and treatment.      Past Medical History:   Diagnosis Date    Antithrombin III deficiency 2021    Asthma     Claustrophobia     Elevated factor VIII level 2021    Esophageal reflux     Gastroesophageal reflux    Generalized anxiety disorder     Anxiety, Generalized    Herniated disc     Hypertension     Iron deficiency anemia due to chronic blood loss 10/27/2021    Lower extremity weakness     Morbid obesity     Obesity     Protein S deficiency 2021    Pulmonary embolism     Upper extremity weakness        Past Surgical History:   Procedure Laterality Date     SECTION      CHOLECYSTECTOMY      TONSILLECTOMY         Review of patient's allergies indicates:  No Known Allergies    Scheduled Meds:   albuterol-ipratropium  3 mL Nebulization Q6H    apixaban  5 mg Oral BID    atorvastatin  10 mg Oral QHS    [START ON 2023] azithromycin  500 mg Intravenous Q24H    bebtelovimab (EUA)  175 mg Intravenous 1 time in Clinic/HOD    budesonide  0.5 mg Nebulization Q12H    carvediloL  25 mg Oral BID    [START ON 2023] cefTRIAXone (ROCEPHIN) IVPB  2 g Intravenous Q24H    famotidine  20 mg Oral BID    [START ON 2023] ferrous sulfate  1 tablet  Oral Daily    furosemide (LASIX) injection  40 mg Intravenous BID AC    [START ON 7/16/2023] losartan  100 mg Oral Daily    [START ON 7/16/2023] meloxicam  7.5 mg Oral Daily    [START ON 7/16/2023] predniSONE  20 mg Oral BID    traZODone  100 mg Oral QHS     Continuous Infusions:   nitroGLYCERIN 65 mcg/min (07/15/23 1821)     PRN Meds:.acetaminophen, albuterol-ipratropium, magnesium oxide, magnesium oxide, melatonin, naloxone, ondansetron, oxyCODONE, oxyCODONE, polyethylene glycol, potassium bicarbonate, potassium bicarbonate, potassium bicarbonate, prochlorperazine, simethicone, sodium chloride 0.9%      Current Outpatient Medications on File Prior to Encounter   Medication Sig    albuterol (PROVENTIL/VENTOLIN HFA) 90 mcg/actuation inhaler Inhale 2 puffs into the lungs every 6 (six) hours as needed for Wheezing. Rescue    ELIQUIS 5 mg Tab TAKE 1 TABLET BY MOUTH TWICE DAILY (Patient taking differently: Take by mouth 2 (two) times daily.)    famotidine (PEPCID) 20 MG tablet Take 20 mg by mouth 2 (two) times daily.    ferrous sulfate (FEROSUL) 325 mg (65 mg iron) Tab tablet TAKE 1 TABLET BY MOUTH EVERY DAY (Patient taking differently: Take by mouth once daily. TAKE 1 TABLET BY MOUTH EVERY DAY)    fluticasone propionate (FLONASE) 50 mcg/actuation nasal spray 2 sprays by Each Nostril route daily as needed for Rhinitis.    fluticasone-salmeterol 230-21 mcg/dose (ADVAIR HFA) 230-21 mcg/actuation HFAA inhaler Inhale 2 puffs into the lungs 2 (two) times daily. Controller    furosemide (LASIX) 20 MG tablet Take 1 tablet (20 mg total) by mouth once daily.    losartan (COZAAR) 100 MG tablet Take 1 tablet (100 mg total) by mouth once daily.    meloxicam (MOBIC) 7.5 MG tablet Take 7.5 mg by mouth once daily.    trazodone (DESYREL) 100 MG tablet Take 100 mg by mouth every evening.    atorvastatin (LIPITOR) 10 MG tablet Take 10 mg by mouth every evening.    butalbital-acetaminophen-caff -40 mg Cap Take 1  capsule by mouth every 4 (four) hours as needed.    butalbital-acetaminophen-caffeine -40 mg (FIORICET, ESGIC) -40 mg per tablet Take 1 tablet by mouth every 4 (four) hours as needed.    carvediloL (COREG) 25 MG tablet Take 1 tablet (25 mg total) by mouth 2 (two) times daily.    ergocalciferol (ERGOCALCIFEROL) 50,000 unit Cap Take 50,000 Units by mouth twice a week.    minocycline (MINOCIN,DYNACIN) 100 MG capsule Take 100 mg by mouth 2 (two) times daily.    MIRENA 20 mcg/24 hours (8 yrs) 52 mg IUD SMARTSIG:Intrauterine Once    miSOPROStoL (CYTOTEC) 200 MCG Tab Take by mouth.    mupirocin (BACTROBAN) 2 % ointment Apply 1 g topically 3 (three) times daily.    NEXIUM 40 mg capsule Take 40 mg by mouth once daily.    omeprazole (PRILOSEC) 20 MG capsule Take 20 mg by mouth once daily.     Family History    None       Tobacco Use    Smoking status: Former     Types: Cigarettes, Cigars     Quit date:      Years since quittin.5    Smokeless tobacco: Never   Substance and Sexual Activity    Alcohol use: Yes     Alcohol/week: 0.8 standard drinks     Types: 1 Standard drinks or equivalent per week     Comment: rarely    Drug use: No    Sexual activity: Not Currently     Review of Systems   All other systems reviewed and are negative.  Twelve point review of systems obtained and negative except as stated above in HPI      Objective:     Vital Signs (Most Recent):  Temp: 98.9 °F (37.2 °C) (07/15/23 1447)  Pulse: 104 (07/15/23 1810)  Resp: 20 (07/15/23 1524)  BP: (!) 190/92 (07/15/23 1820)  SpO2: 95 % (07/15/23 1800) Vital Signs (24h Range):  Temp:  [98.9 °F (37.2 °C)] 98.9 °F (37.2 °C)  Pulse:  [] 104  Resp:  [18-32] 20  SpO2:  [87 %-97 %] 95 %  BP: (186-245)/() 190/92     Weight: (!) 145.2 kg (320 lb)  Body mass index is 58.53 kg/m².     Physical Exam  Vitals and nursing note reviewed.   Constitutional:       General: She is awake.      Appearance: She is morbidly obese. She is  ill-appearing.   HENT:      Head: Normocephalic.      Right Ear: Hearing normal.      Left Ear: Hearing normal.      Nose: Nose normal.      Mouth/Throat:      Lips: Pink.   Eyes:      General: Lids are normal. Vision grossly intact.         Right eye: Discharge present.      Pupils: Pupils are equal, round, and reactive to light.   Cardiovascular:      Rate and Rhythm: Normal rate and regular rhythm.      Pulses: Normal pulses.      Heart sounds: Normal heart sounds.   Pulmonary:      Effort: Pulmonary effort is normal. Tachypnea present.      Breath sounds: Decreased breath sounds and wheezing present. No rhonchi or rales.      Comments: Mildly tachypneic, no accessory muscle use, scattered expiratory wheezes, decreased breath sounds lower lobes - patient appears to be breathing comfortably on BiPAP though mildly tachypneic and converses easily  Abdominal:      General: Abdomen is protuberant. Bowel sounds are normal. There is no distension.      Palpations: Abdomen is soft.      Tenderness: There is no abdominal tenderness.   Musculoskeletal:         General: Normal range of motion.      Cervical back: Normal range of motion.   Skin:     General: Skin is warm and dry.      Capillary Refill: Capillary refill takes less than 2 seconds.   Neurological:      General: No focal deficit present.      Mental Status: She is alert and oriented to person, place, and time.      GCS: GCS eye subscore is 4. GCS verbal subscore is 5. GCS motor subscore is 6.      Motor: Motor function is intact.      Coordination: Coordination is intact.   Psychiatric:         Attention and Perception: Attention and perception normal.         Mood and Affect: Mood normal. Affect is flat.         Speech: Speech normal.         Behavior: Behavior normal. Behavior is cooperative.         Thought Content: Thought content normal.         Cognition and Memory: Cognition and memory normal.         Judgment: Judgment normal.            CRANIAL NERVES      CN III, IV, VI   Pupils are equal, round, and reactive to light.     Significant Labs: All pertinent labs within the past 24 hours have been reviewed.    ABGs:   Recent Labs   Lab 07/15/23  1522   PH 7.328*   PCO2 67.7*   HCO3 35.5*   POCSATURATED 97   BE 10   PO2 97     Bilirubin:   Recent Labs   Lab 07/15/23  1446   BILITOT 0.7     BMP:   Recent Labs   Lab 07/15/23  1446   *      K 4.1      CO2 30*   BUN 12   CREATININE 1.0   CALCIUM 8.7   MG 1.8     CBC:   Recent Labs   Lab 07/15/23  1446 07/15/23  1522   WBC 10.72  --    HGB 13.4  --    HCT 44.7 45     --      CMP:   Recent Labs   Lab 07/15/23  1446      K 4.1      CO2 30*   *   BUN 12   CREATININE 1.0   CALCIUM 8.7   PROT 8.4   ALBUMIN 3.2*   BILITOT 0.7   ALKPHOS 74   AST 21   ALT 12   ANIONGAP 4*     Cardiac Markers:   Recent Labs   Lab 07/15/23  1446   *     Coagulation:   Recent Labs   Lab 07/15/23  1446   INR 1.1   APTT 26.8     Lactic Acid:   Recent Labs   Lab 07/15/23  1729   LACTATE 1.1     Magnesium:   Recent Labs   Lab 07/15/23  1446   MG 1.8     Troponin:   Recent Labs   Lab 07/15/23  1446   TROPONINIHS 25.9*     Significant Imaging: I have reviewed all pertinent imaging results/findings within the past 24 hours.    12 Lead EKG 7/15/2023 1450  Vent. Rate : 097 BPM     Atrial Rate : 097 BPM      P-R Int : 130 ms          QRS Dur : 074 ms       QT Int : 346 ms       P-R-T Axes : 049 007 052 degrees      QTc Int : 439 ms     Normal sinus rhythm   Cannot rule out Anterior infarct ,age undetermined   Abnormal ECG   When compared with ECG of 26-DEC-2022 15:22,   Premature atrial complexes are no longer Present   T wave inversion no longer evident in Inferior leads       X-Ray Chest AP Portable [[id number]]Collected: 07/15/23 1421  FINDINGS: Portable chest radiograph at 1438 hours compared to prior exams shows the cardiac silhouette is enlarged, mostly obscured by parenchymal lung opacities. The central  pulmonary vasculature is prominent and indistinct, with aortic vascular calcifications.     The lungs are hypoexpanded, with reticulonodular densities and patchy airspace opacities throughout both lungs. There is no large pleural effusion or pneumothorax. No acute fractures.     IMPRESSION: Pulmonary hypoinflation, with findings suggestive of interstitial and acinar pulmonary edema, versus multifocal viral or atypical pneumonia, pulmonary alveolar hemorrhage, and or developing ARDS.     Assessment/Plan:     * Acute on chronic hypercapnic hypoxic respiratory failure  Admit to step-down unit on BiPAP, deescalate as tolerated, diurese, continue duo nebs q.6 hours and q.3 hours as needed for shortness of breath or wheezing, prednisone 20 mg twice daily, keep sat greater than 90%, reynaldo, currently on azithromycin and ceftriaxone deescalate as indicated, procalcitonin is pending, budesonide nebs twice daily    Acute heart failure with preserved ejection fraction (HFpEF)  Pulmonary edema on chest x-ray most likely from severe hypertension, reynaldo with Lasix, Last echocardiogram from May 2021 with 60% ejection fraction.  Repeat echocardiogram, daily weights, cardiac diet, strict I&O      History of pulmonary embolus (PE)  Resume Eliquis, consider CTA chest for evaluation of PE if no improvement, Last CTA chest was on 4/16 and was negative, patient states she is been compliant with Eliquis      Hypertensive emergency  Initial mean arterial pressure on 150, Initial blood pressure 203/135, placed on nitroglycerin drip, titrate to keep systolic blood pressure between 170-180 and then re-evaluate in a.m., resume home antihypertensives including losartan and carvedilol    Morbid obesity with BMI of 50.0-59.9, adult  Body mass index is 58.53 kg/m². Morbid obesity complicates all aspects of disease management from diagnostic modalities to treatment. Weight loss encouraged and health benefits explained to patient.           VTE  Risk Mitigation (From admission, onward)         Ordered     Reason for No Pharmacological VTE Prophylaxis  Once        Question:  Reasons:  Answer:  Already adequately anticoagulated on oral Anticoagulants    07/15/23 1654     IP VTE HIGH RISK PATIENT  Once         07/15/23 1654     Place sequential compression device  Until discontinued         07/15/23 1654              Patient was examined at 1657    Code Status: FULL CODE            Kay Paris NP  Department of Hospital Medicine  [facility] - Emergency Dept

## 2023-07-15 NOTE — Clinical Note
Diagnosis: Acute on chronic respiratory failure with hypoxia and hypercapnia [[id number]]   Admitting Provider:: GARRETT SANCHEZ [[id number]]   Admit to which facility:: [facility] [[id number]]   Future Attending Provider: GARRETT SANCHEZ [[id number]]   Requested Bed Type: Bariatric [4]   Reason for IP Medical Treatment  (Clinical interventions that can only be accomplished in the IP setting? ) :: bp management   I certify that Inpatient services for greater than or equal to 2 midnights are medically necessary:: Yes   Plans for Post-Acute care--if anticipated (pick the single best option):: A. No post acute care anticipated at this time   Special Needs:: Fall Risk [15]

## 2023-07-15 NOTE — ED PROVIDER NOTES
Encounter Date: 7/15/2023       History     Chief Complaint   Patient presents with    Shortness of Breath     Pt arrived by ems from home, sob started approx 1 hours ago, pt states she has hx of copd and thinks the heat in her house increased it, ems reports clear breath sounds, 68% ra  supplemental oxygen given      Patient with extensive past medical history including poorly-controlled hypertension, congestive heart failure, asthma, history of pulmonary embolus in the past.  Patient reports increasing shortness of breath or approximally 1 hour.  O2 saturation 68%.  Patient noted to be significantly hypertensive EN route with paramedics.  Patient placed on non-rebreather by paramedics.  No chest pain, pleurisy hemoptysis.  No fever chills.  No recent illness.    Review of patient's allergies indicates:  No Known Allergies  Past Medical History:   Diagnosis Date    Antithrombin III deficiency 2021    Asthma     Claustrophobia     Elevated factor VIII level 2021    Esophageal reflux     Gastroesophageal reflux    Generalized anxiety disorder     Anxiety, Generalized    Herniated disc     Hypertension     Iron deficiency anemia due to chronic blood loss 10/27/2021    Lower extremity weakness     Morbid obesity     Obesity     Protein S deficiency 2021    Pulmonary embolism     Upper extremity weakness      Past Surgical History:   Procedure Laterality Date     SECTION      CHOLECYSTECTOMY      TONSILLECTOMY       No family history on file.  Social History     Tobacco Use    Smoking status: Former     Types: Cigarettes, Cigars     Quit date: 2019     Years since quittin.5    Smokeless tobacco: Never   Substance Use Topics    Alcohol use: Yes     Alcohol/week: 0.8 standard drinks     Types: 1 Standard drinks or equivalent per week     Comment: rarely    Drug use: No     Review of Systems   Constitutional:  Negative for chills and fever.   Eyes:  Negative for visual disturbance.   Respiratory:   Positive for shortness of breath.    Cardiovascular:  Negative for chest pain and palpitations.   Gastrointestinal:  Negative for abdominal pain and vomiting.   Genitourinary:  Negative for dysuria.   Musculoskeletal:  Negative for joint swelling.   Neurological:  Negative for headaches.   Psychiatric/Behavioral:  Negative for confusion.      Physical Exam     Initial Vitals   BP Pulse Resp Temp SpO2   07/15/23 1423 07/15/23 1423 07/15/23 1423 07/15/23 1447 07/15/23 1423   (!) 203/135 102 18 98.9 °F (37.2 °C) 95 %      MAP       --                Physical Exam    Nursing note and vitals reviewed.  Constitutional: She is not diaphoretic. No distress.   HENT:   Head: Normocephalic and atraumatic.   Eyes: Conjunctivae are normal.   Neck:   Normal range of motion.  Cardiovascular:  Normal rate.           Pulmonary/Chest:   Poor air movement with diffuse wheezing and rhonchi.   Abdominal: Abdomen is soft. There is no abdominal tenderness.   Musculoskeletal:         General: Normal range of motion.      Cervical back: Normal range of motion.      Comments: Bilateral 2+ pretibial edema     Neurological: She is alert. She has normal strength. No cranial nerve deficit or sensory deficit.   No gross deficits   Skin: No rash noted.   Psychiatric: She has a normal mood and affect.       ED Course   Critical Care    Date/Time: 7/15/2023 5:55 PM  Performed by: Chao Rosenberg MD  Authorized by: Pj Moulton MD   Direct patient critical care time: 15 minutes  Additional history critical care time: 5 minutes  Ordering / reviewing critical care time: 5 minutes  Documentation critical care time: 5 minutes  Consulting other physicians critical care time: 5 minutes  Total critical care time (exclusive of procedural time) : 35 minutes      Labs Reviewed   URINALYSIS, REFLEX TO URINE CULTURE - Abnormal; Notable for the following components:       Result Value    Color, UA Colorless (*)     Protein, UA Trace (*)     All other  components within normal limits    Narrative:     Specimen Source->Urine   CBC W/ AUTO DIFFERENTIAL - Abnormal; Notable for the following components:    MCHC 30.0 (*)     RDW 15.9 (*)     Gran # (ANC) 9.0 (*)     Lymph # 0.9 (*)     Gran % 83.6 (*)     Lymph % 8.0 (*)     All other components within normal limits   COMPREHENSIVE METABOLIC PANEL - Abnormal; Notable for the following components:    CO2 30 (*)     Glucose 114 (*)     Albumin 3.2 (*)     Anion Gap 4 (*)     All other components within normal limits   TROPONIN I HIGH SENSITIVITY - Abnormal; Notable for the following components:    Troponin I High Sensitivity 25.9 (*)     All other components within normal limits   B-TYPE NATRIURETIC PEPTIDE - Abnormal; Notable for the following components:     (*)     All other components within normal limits   ISTAT PROCEDURE - Abnormal; Notable for the following components:    POC PH 7.328 (*)     POC PCO2 67.7 (*)     POC HCO3 35.5 (*)     POC Glucose 116 (*)     POC TCO2 38 (*)     All other components within normal limits   CULTURE, BLOOD   CULTURE, BLOOD    Narrative:     Collection has been rescheduled by [facility] at 07/15/2023 16:33 Reason:   Get second set at 4:48  Collection has been rescheduled by [facility] at 07/15/2023 16:33 Reason:   Get second set at 4:48   PROTIME-INR   APTT   SARS-COV-2 RNA AMPLIFICATION, QUAL   MAGNESIUM   PROCALCITONIN   LACTIC ACID, PLASMA   PROCALCITONIN   POCT URINE PREGNANCY        ECG Results              EKG 12-lead (In process)  Result time 07/15/23 14:59:52      In process by Interface, Lab In [facility] (07/15/23 14:59:52)                   Narrative:    Test Reason : R09.02,    Vent. Rate : 097 BPM     Atrial Rate : 097 BPM     P-R Int : 130 ms          QRS Dur : 074 ms      QT Int : 346 ms       P-R-T Axes : 049 007 052 degrees     QTc Int : 439 ms    Normal sinus rhythm  Cannot rule out Anterior infarct ,age undetermined  Abnormal ECG  When compared with ECG of 26-DEC-2022  15:22,  Premature atrial complexes are no longer Present  T wave inversion no longer evident in Inferior leads    Referred By:             Confirmed By:                                   Imaging Results              X-Ray Chest AP Portable (Final result)  Result time 07/15/23 15:04:39      Final result by Lg Xavier MD (07/15/23 15:04:39)                   Narrative:    HISTORY: Hypoxia.    FINDINGS: Portable chest radiograph at 1438 hours compared to prior exams shows the cardiac silhouette is enlarged, mostly obscured by parenchymal lung opacities. The central pulmonary vasculature is prominent and indistinct, with aortic vascular calcifications.    The lungs are hypoexpanded, with reticulonodular densities and patchy airspace opacities throughout both lungs. There is no large pleural effusion or pneumothorax. No acute fractures.    IMPRESSION: Pulmonary hypoinflation, with findings suggestive of interstitial and acinar pulmonary edema, versus multifocal viral or atypical pneumonia, pulmonary alveolar hemorrhage, and or developing ARDS.    Electronically signed by:  Lg Xavier MD  7/15/2023 3:04 PM CDT Workstation: [id number]                                     Medications   cefTRIAXone (ROCEPHIN) 2 g in dextrose 5 % 100 mL IVPB (ready to mix) (has no administration in time range)   sodium chloride 0.9% flush 10 mL (has no administration in time range)   albuterol-ipratropium 2.5 mg-0.5 mg/3 mL nebulizer solution 3 mL (has no administration in time range)   melatonin tablet 6 mg (has no administration in time range)   ondansetron injection 4 mg (has no administration in time range)   prochlorperazine injection Soln 5 mg (has no administration in time range)   polyethylene glycol packet 17 g (has no administration in time range)   simethicone chewable tablet 80 mg (has no administration in time range)   acetaminophen tablet 650 mg (has no administration in time range)   naloxone 0.4 mg/mL injection 0.02 mg  (has no administration in time range)   potassium bicarbonate disintegrating tablet 50 mEq (has no administration in time range)   potassium bicarbonate disintegrating tablet 35 mEq (has no administration in time range)   potassium bicarbonate disintegrating tablet 60 mEq (has no administration in time range)   furosemide injection 40 mg (has no administration in time range)   azithromycin 500 mg in dextrose 5 % 250 mL IVPB (ready to mix) (has no administration in time range)   cefTRIAXone (ROCEPHIN) 2 g in dextrose 5 % 100 mL IVPB (ready to mix) (has no administration in time range)   magnesium oxide tablet 800 mg (has no administration in time range)   magnesium oxide tablet 800 mg (has no administration in time range)   predniSONE tablet 20 mg (has no administration in time range)   nitroGLYCERIN in 5 % dextrose 50 mg/250 mL (200 mcg/mL) infusion (60 mcg/min Intravenous Rate/Dose Change 7/15/23 1742)   methylPREDNISolone sodium succinate injection 125 mg (125 mg Intravenous Given 7/15/23 1514)   levalbuterol nebulizer solution 3.75 mg (3.75 mg Nebulization Given 7/15/23 1524)   ipratropium 0.02 % nebulizer solution 1 mg (1 mg Nebulization Given 7/15/23 1524)   furosemide injection 40 mg (40 mg Intravenous Given 7/15/23 1514)   azithromycin 500 mg in dextrose 5 % 250 mL IVPB (ready to mix) (500 mg Intravenous New Bag 7/15/23 1616)     Medical Decision Making:   History:   Old Medical Records: I decided to obtain old medical records.  Old Records Summarized: records from clinic visits and records from previous admission(s).       <> Summary of Records: History of similar events in the past.  Independently Interpreted Test(s):   I have ordered and independently interpreted X-rays - see summary below.       <> Summary of X-Ray Reading(s): Bilateral pulmonary edema  I have ordered and independently interpreted EKG Reading(s) - see summary below       <> Summary of EKG Reading(s): Normal sinus rhythm  Clinical Tests:    Lab Tests: Reviewed  The following lab test(s) were unremarkable: CBC and CMP  Radiological Study: Reviewed  Medical Tests: Reviewed  ED Management:  Patient presents with very accelerated hypertension.  Systolic blood pressure 235.  Patient does have pulmonary edema.  Similar episode in the pat.  Patient started on IV nitroglycerin and furosemide.  Patient started on BiPAP.  Muscle anxious improving.  Patient now able to lay back in bed at approximately 40° with no problems.  Chest x-ray with possible atypical infection versus pulmonary edema.  Will give 1 dose antibiotics and check procalcitonin.  Hospitalist consulted for admission.  I have low index of suspicion for pulmonary embolus.  Patient does take Eliquis.  Patient with recent multiple CTA chest with no pulmonary embolus.  If patient does not respond to treatment for pulmonary edema may consider CTA of the chest.                        Clinical Impression:   Final diagnoses:  [R09.02] Hypoxia  [J96.21, J96.22] Acute on chronic respiratory failure with hypoxia and hypercapnia  [J81.0] Acute pulmonary edema (Primary)  [I16.1] Hypertensive emergency        ED Disposition Condition    Admit                 Chao Rosenberg MD  07/15/23 5480

## 2023-07-16 ENCOUNTER — CLINICAL SUPPORT (OUTPATIENT)
Dept: CARDIOLOGY | Facility: HOSPITAL | Age: 48
DRG: 193 | End: 2023-07-16
Attending: EMERGENCY MEDICINE
Payer: MEDICAID

## 2023-07-16 VITALS — WEIGHT: 293 LBS | BODY MASS INDEX: 51.91 KG/M2 | HEIGHT: 63 IN

## 2023-07-16 LAB
ACINETOBACTER CALCOACETICUS/BAUMANNII COMPLEX: NOT DETECTED
ALBUMIN SERPL BCP-MCNC: 3 G/DL (ref 3.5–5.2)
ALP SERPL-CCNC: 75 U/L (ref 55–135)
ALT SERPL W/O P-5'-P-CCNC: 12 U/L (ref 10–44)
ANION GAP SERPL CALC-SCNC: 3 MMOL/L (ref 8–16)
AORTIC ROOT ANNULUS: 2.7 CM
AORTIC VALVE CUSP SEPERATION: 1.4 CM
AST SERPL-CCNC: 16 U/L (ref 10–40)
AV INDEX (PROSTH): 0.71
AV MEAN GRADIENT: 8 MMHG
AV PEAK GRADIENT: 16 MMHG
AV VALVE AREA: 2.23 CM2
AV VELOCITY RATIO: 0.66
BACTEROIDES FRAGILIS: NOT DETECTED
BASOPHILS # BLD AUTO: 0.01 K/UL (ref 0–0.2)
BASOPHILS NFR BLD: 0.1 % (ref 0–1.9)
BILIRUB SERPL-MCNC: 0.4 MG/DL (ref 0.1–1)
BSA FOR ECHO PROCEDURE: 2.54 M2
BUN SERPL-MCNC: 13 MG/DL (ref 6–20)
CALCIUM SERPL-MCNC: 8.7 MG/DL (ref 8.7–10.5)
CANDIDA ALBICANS: NOT DETECTED
CANDIDA AURIS: NOT DETECTED
CANDIDA GLABRATA: NOT DETECTED
CANDIDA KRUSEI: NOT DETECTED
CANDIDA PARAPSILOSIS: NOT DETECTED
CANDIDA TROPICALIS: NOT DETECTED
CHLORIDE SERPL-SCNC: 100 MMOL/L (ref 95–110)
CO2 SERPL-SCNC: 34 MMOL/L (ref 23–29)
CREAT SERPL-MCNC: 1.1 MG/DL (ref 0.5–1.4)
CRYPTOCOCCUS NEOFORMANS/GATTII: NOT DETECTED
CTX-M GENE: NOT DETECTED
CV ECHO LV RWT: 0.46 CM
DIFFERENTIAL METHOD: ABNORMAL
DOP CALC AO PEAK VEL: 1.97 M/S
DOP CALC AO VTI: 41.5 CM
DOP CALC LVOT AREA: 3.1 CM2
DOP CALC LVOT DIAMETER: 2 CM
DOP CALC LVOT PEAK VEL: 1.31 M/S
DOP CALC LVOT STROKE VOLUME: 92.63 CM3
DOP CALC MV VTI: 50.7 CM
DOP CALCLVOT PEAK VEL VTI: 29.5 CM
E WAVE DECELERATION TIME: 264 MSEC
E/A RATIO: 1.16
E/E' RATIO: 19.67 M/S
ECHO LV POSTERIOR WALL: 1.14 CM (ref 0.6–1.1)
EJECTION FRACTION: 60 %
ENTEROBACTER CLOACAE COMPLEX: NOT DETECTED
ENTEROBACTERALES: NOT DETECTED
ENTEROCOCCUS FAECALIS: NOT DETECTED
ENTEROCOCCUS FAECIUM: NOT DETECTED
EOSINOPHIL # BLD AUTO: 0 K/UL (ref 0–0.5)
EOSINOPHIL NFR BLD: 0 % (ref 0–8)
ERYTHROCYTE [DISTWIDTH] IN BLOOD BY AUTOMATED COUNT: 15.6 % (ref 11.5–14.5)
ESCHERICHIA COLI: NOT DETECTED
EST. GFR  (NO RACE VARIABLE): >60 ML/MIN/1.73 M^2
ESTIMATED AVG GLUCOSE: 131 MG/DL (ref 68–131)
FRACTIONAL SHORTENING: 37 % (ref 28–44)
GLUCOSE SERPL-MCNC: 153 MG/DL (ref 70–110)
HAEMOPHILUS INFLUENZAE: NOT DETECTED
HBA1C MFR BLD: 6.2 % (ref 4.5–6.2)
HCT VFR BLD AUTO: 43.3 % (ref 37–48.5)
HGB BLD-MCNC: 12.7 G/DL (ref 12–16)
IMM GRANULOCYTES # BLD AUTO: 0.04 K/UL (ref 0–0.04)
IMM GRANULOCYTES NFR BLD AUTO: 0.4 % (ref 0–0.5)
IMP GENE: NOT DETECTED
INTERVENTRICULAR SEPTUM: 1.01 CM (ref 0.6–1.1)
KLEBSIELLA AEROGENES: NOT DETECTED
KLEBSIELLA OXYTOCA: NOT DETECTED
KLEBSIELLA PNEUMONIAE GROUP: NOT DETECTED
KPC: NOT DETECTED
LEFT INTERNAL DIMENSION IN SYSTOLE: 3.11 CM (ref 2.1–4)
LEFT VENTRICLE DIASTOLIC VOLUME INDEX: 48.31 ML/M2
LEFT VENTRICLE DIASTOLIC VOLUME: 114 ML
LEFT VENTRICLE MASS INDEX: 83 G/M2
LEFT VENTRICLE SYSTOLIC VOLUME INDEX: 16.2 ML/M2
LEFT VENTRICLE SYSTOLIC VOLUME: 38.2 ML
LEFT VENTRICULAR INTERNAL DIMENSION IN DIASTOLE: 4.92 CM (ref 3.5–6)
LEFT VENTRICULAR MASS: 195.54 G
LISTERIA MONOCYTOGENES: NOT DETECTED
LV LATERAL E/E' RATIO: 16.86 M/S
LV SEPTAL E/E' RATIO: 23.6 M/S
LVOT MG: 3 MMHG
LVOT MV: 0.85 CM/S
LYMPHOCYTES # BLD AUTO: 0.5 K/UL (ref 1–4.8)
LYMPHOCYTES NFR BLD: 4.8 % (ref 18–48)
MAGNESIUM SERPL-MCNC: 1.8 MG/DL (ref 1.6–2.6)
MCH RBC QN AUTO: 27.2 PG (ref 27–31)
MCHC RBC AUTO-ENTMCNC: 29.3 G/DL (ref 32–36)
MCR-1: NOT DETECTED
MCV RBC AUTO: 93 FL (ref 82–98)
MEC A/C AND MREJ (MRSA): NOT DETECTED
MEC A/C: NOT DETECTED
MONOCYTES # BLD AUTO: 0.1 K/UL (ref 0.3–1)
MONOCYTES NFR BLD: 0.8 % (ref 4–15)
MV MEAN GRADIENT: 4 MMHG
MV PEAK A VEL: 1.02 M/S
MV PEAK E VEL: 1.18 M/S
MV PEAK GRADIENT: 8 MMHG
MV VALVE AREA BY CONTINUITY EQUATION: 1.83 CM2
NDM GENE: NOT DETECTED
NEISSERIA MENINGITIDIS: NOT DETECTED
NEUTROPHILS # BLD AUTO: 9.4 K/UL (ref 1.8–7.7)
NEUTROPHILS NFR BLD: 93.9 % (ref 38–73)
NRBC BLD-RTO: 0 /100 WBC
OXA-48-LIKE: NOT DETECTED
PISA MRMAX VEL: 3.2 M/S
PISA TR MAX VEL: 2.71 M/S
PLATELET # BLD AUTO: 304 K/UL (ref 150–450)
PMV BLD AUTO: 10.5 FL (ref 9.2–12.9)
POTASSIUM SERPL-SCNC: 5.1 MMOL/L (ref 3.5–5.1)
PROT SERPL-MCNC: 7.8 G/DL (ref 6–8.4)
PROTEUS SPECIES: NOT DETECTED
PSEUDOMONAS AERUGINOSA: NOT DETECTED
PV MV: 0.75 M/S
PV PEAK VELOCITY: 1.07 CM/S
RBC # BLD AUTO: 4.67 M/UL (ref 4–5.4)
RV TISSUE DOPPLER FREE WALL SYSTOLIC VELOCITY 1 (APICAL 4 CHAMBER VIEW): 12.5 CM/S
SALMONELLA SP: NOT DETECTED
SERRATIA MARCESCENS: NOT DETECTED
SINUS: 2.34 CM
SODIUM SERPL-SCNC: 137 MMOL/L (ref 136–145)
STAPHYLOCOCCUS AUREUS: NOT DETECTED
STAPHYLOCOCCUS EPIDERMIDIS: NOT DETECTED
STAPHYLOCOCCUS LUGDUNESIS: NOT DETECTED
STAPHYLOCOCCUS SPECIES: DETECTED
STENOTROPHOMONAS MALTOPHILIA: NOT DETECTED
STREPTOCOCCUS AGALACTIAE: NOT DETECTED
STREPTOCOCCUS PNEUMONIAE: NOT DETECTED
STREPTOCOCCUS PYOGENES: NOT DETECTED
STREPTOCOCCUS SPECIES: NOT DETECTED
TDI LATERAL: 0.07 M/S
TDI SEPTAL: 0.05 M/S
TDI: 0.06 M/S
TR MAX PG: 29 MMHG
TRICUSPID ANNULAR PLANE SYSTOLIC EXCURSION: 1.88 CM
TROPONIN I SERPL HS-MCNC: 21.9 PG/ML (ref 0–14.9)
VAN A/B: NOT DETECTED
VIM GENE: NOT DETECTED
WBC # BLD AUTO: 10.03 K/UL (ref 3.9–12.7)

## 2023-07-16 PROCEDURE — 36415 COLL VENOUS BLD VENIPUNCTURE: CPT | Performed by: NURSE PRACTITIONER

## 2023-07-16 PROCEDURE — 99900035 HC TECH TIME PER 15 MIN (STAT)

## 2023-07-16 PROCEDURE — 84484 ASSAY OF TROPONIN QUANT: CPT | Performed by: NURSE PRACTITIONER

## 2023-07-16 PROCEDURE — 63600175 PHARM REV CODE 636 W HCPCS: Performed by: NURSE PRACTITIONER

## 2023-07-16 PROCEDURE — 94640 AIRWAY INHALATION TREATMENT: CPT

## 2023-07-16 PROCEDURE — 25000003 PHARM REV CODE 250: Performed by: NURSE PRACTITIONER

## 2023-07-16 PROCEDURE — 94799 UNLISTED PULMONARY SVC/PX: CPT

## 2023-07-16 PROCEDURE — 25000242 PHARM REV CODE 250 ALT 637 W/ HCPCS: Performed by: NURSE PRACTITIONER

## 2023-07-16 PROCEDURE — 80053 COMPREHEN METABOLIC PANEL: CPT | Performed by: NURSE PRACTITIONER

## 2023-07-16 PROCEDURE — 83036 HEMOGLOBIN GLYCOSYLATED A1C: CPT | Performed by: NURSE PRACTITIONER

## 2023-07-16 PROCEDURE — 93306 TTE W/DOPPLER COMPLETE: CPT | Mod: 26,,, | Performed by: INTERNAL MEDICINE

## 2023-07-16 PROCEDURE — 85025 COMPLETE CBC W/AUTO DIFF WBC: CPT | Performed by: NURSE PRACTITIONER

## 2023-07-16 PROCEDURE — 93306 TTE W/DOPPLER COMPLETE: CPT

## 2023-07-16 PROCEDURE — 94761 N-INVAS EAR/PLS OXIMETRY MLT: CPT

## 2023-07-16 PROCEDURE — 94660 CPAP INITIATION&MGMT: CPT

## 2023-07-16 PROCEDURE — 21000000 HC CCU ICU ROOM CHARGE

## 2023-07-16 PROCEDURE — 93306 ECHO (CUPID ONLY): ICD-10-PCS | Mod: 26,,, | Performed by: INTERNAL MEDICINE

## 2023-07-16 PROCEDURE — 83735 ASSAY OF MAGNESIUM: CPT | Performed by: NURSE PRACTITIONER

## 2023-07-16 PROCEDURE — 27000221 HC OXYGEN, UP TO 24 HOURS

## 2023-07-16 PROCEDURE — 99900031 HC PATIENT EDUCATION (STAT)

## 2023-07-16 RX ADMIN — ATORVASTATIN CALCIUM 10 MG: 10 TABLET, FILM COATED ORAL at 08:07

## 2023-07-16 RX ADMIN — FERROUS SULFATE TAB 325 MG (65 MG ELEMENTAL FE) 1 EACH: 325 (65 FE) TAB at 08:07

## 2023-07-16 RX ADMIN — CARVEDILOL 25 MG: 25 TABLET, FILM COATED ORAL at 08:07

## 2023-07-16 RX ADMIN — OXYCODONE HYDROCHLORIDE 5 MG: 5 TABLET ORAL at 12:07

## 2023-07-16 RX ADMIN — LOSARTAN POTASSIUM 100 MG: 50 TABLET, FILM COATED ORAL at 08:07

## 2023-07-16 RX ADMIN — AZITHROMYCIN DIHYDRATE 500 MG: 500 INJECTION, POWDER, LYOPHILIZED, FOR SOLUTION INTRAVENOUS at 05:07

## 2023-07-16 RX ADMIN — FUROSEMIDE 40 MG: 10 INJECTION, SOLUTION INTRAMUSCULAR; INTRAVENOUS at 05:07

## 2023-07-16 RX ADMIN — BUDESONIDE 0.5 MG: 0.5 INHALANT RESPIRATORY (INHALATION) at 07:07

## 2023-07-16 RX ADMIN — IPRATROPIUM BROMIDE AND ALBUTEROL SULFATE 3 ML: .5; 3 SOLUTION RESPIRATORY (INHALATION) at 07:07

## 2023-07-16 RX ADMIN — FAMOTIDINE 20 MG: 20 TABLET ORAL at 08:07

## 2023-07-16 RX ADMIN — TRAZODONE HYDROCHLORIDE 100 MG: 50 TABLET ORAL at 08:07

## 2023-07-16 RX ADMIN — IPRATROPIUM BROMIDE AND ALBUTEROL SULFATE 3 ML: .5; 3 SOLUTION RESPIRATORY (INHALATION) at 01:07

## 2023-07-16 RX ADMIN — BUDESONIDE 0.5 MG: 0.5 INHALANT RESPIRATORY (INHALATION) at 08:07

## 2023-07-16 RX ADMIN — PREDNISONE 20 MG: 20 TABLET ORAL at 08:07

## 2023-07-16 RX ADMIN — OXYCODONE HYDROCHLORIDE 5 MG: 5 TABLET ORAL at 08:07

## 2023-07-16 RX ADMIN — CEFTRIAXONE SODIUM 2 G: 2 INJECTION, POWDER, FOR SOLUTION INTRAMUSCULAR; INTRAVENOUS at 05:07

## 2023-07-16 RX ADMIN — ACETAMINOPHEN 650 MG: 325 TABLET ORAL at 12:07

## 2023-07-16 RX ADMIN — APIXABAN 5 MG: 5 TABLET, FILM COATED ORAL at 08:07

## 2023-07-16 RX ADMIN — IPRATROPIUM BROMIDE AND ALBUTEROL SULFATE 3 ML: .5; 3 SOLUTION RESPIRATORY (INHALATION) at 08:07

## 2023-07-16 RX ADMIN — FUROSEMIDE 40 MG: 10 INJECTION, SOLUTION INTRAMUSCULAR; INTRAVENOUS at 06:07

## 2023-07-16 NOTE — CARE UPDATE
07/16/23 1012   Patient Assessment/Suction   Level of Consciousness (AVPU) alert   PRE-TX-O2   Device (Oxygen Therapy) BIPAP   Pulse Oximetry Type Continuous   $ Pulse Oximetry - Multiple Charge Pulse Oximetry - Multiple   Preset CPAP/BiPAP Settings   Mode Of Delivery BiPAP   Size of Mask Small   Sized Appropriately? Yes   Equipment Type V60   Airway Device Type small full face mask   Ipap 10   EPAP (cm H2O) 5   Pressure Support (cm H2O) 5   Set Rate (Breaths/Min) 18   ITime (sec) 0.9   Rise Time (sec) 2   Patient CPAP/BiPAP Settings   Timed Inspiration (Sec) 0.9   IPAP Rise Time (sec) 2   RR Total (Breaths/Min) 20   Tidal Volume (mL) 426   VE Minute Ventilation (L/min) 6.7 L/min   Peak Inspiratory Pressure (cm H2O) 10   TiTOT (%) 25   Total Leak (L/Min) 6   Patient Trigger - ST Mode Only (%) 55   CPAP/BiPAP Backup Settings   IPAP Backup 10 cmH2O   EPAP Backup 5 cmH2O   Backup Rate 24 breaths per minute (bpm)   FIO2 Backup 60 %   ITIME Backup 0.9 seconds   Rise Time Backup 3 seconds   CPAP/BiPAP Alarms   High Pressure (cm H2O) 20   Low Pressure (cm H2O) 7   Minute Ventilation (L/Min) 3   High RR (breaths/min) 40   Low RR (breaths/min) 8   Apnea (Sec) 20

## 2023-07-16 NOTE — PROGRESS NOTES
[facility] Medicine  Progress Note    Patient name: Katiuska Preston  MRN: [id number]  Admit Date: 7/15/2023   LOS: 1 day     SUBJECTIVE:     Principal problem: Chronic hypercapnic respiratory failure    Interval History:   48-year-old female with chronic medical problems including Chronic respiratory failure on 2L NC, morbid obesity, Asthma, GERD, hypertension and history of PE presents to the emergency room complaining of shortness a breath. She was admitted with acute on chronic hypoxic hypercapnic respiratory failure and put on bipap.  CXR with bilateral infiltrates consistent with pulmonary edema verus atypical pneumonia. She was started on IV lasix with reported good response.  She was also started on IV abx for possible pneumonia and prednisone and nebs for possible copd exacerbation. BP significantly elevated on admission and required NTG gtt that was later transitioned to cardene gtt. This could have been flash pulmonary edema from HTN emergency.  Cardene gtt has been able to be weaned off and home antihypertensives started. Bipap has been weaned to NC.     Hospital Course:    Scheduled Meds:   albuterol-ipratropium  3 mL Nebulization Q6H    apixaban  5 mg Oral BID    atorvastatin  10 mg Oral QHS    azithromycin  500 mg Intravenous Q24H    budesonide  0.5 mg Nebulization Q12H    carvediloL  25 mg Oral BID    cefTRIAXone (ROCEPHIN) IVPB  2 g Intravenous Q24H    famotidine  20 mg Oral BID    ferrous sulfate  1 tablet Oral Daily    furosemide (LASIX) injection  40 mg Intravenous BID AC    losartan  100 mg Oral Daily    predniSONE  20 mg Oral BID    traZODone  100 mg Oral QHS     Continuous Infusions:   nicardipine Stopped (07/15/23 2154)     PRN Meds:acetaminophen, albuterol-ipratropium, magnesium oxide, magnesium oxide, melatonin, naloxone, ondansetron, oxyCODONE, oxyCODONE, polyethylene glycol, potassium bicarbonate, potassium bicarbonate, potassium bicarbonate, prochlorperazine,  simethicone, sodium chloride 0.9%    Review of patient's allergies indicates:  No Known Allergies    Review of Systems: As per interval history    OBJECTIVE:     Vital Signs (Most Recent)  Temp: 98.7 °F (37.1 °C) (07/16/23 1601)  Pulse: 73 (07/16/23 1701)  Resp: (!) 23 (07/16/23 1701)  BP: (!) 142/67 (07/16/23 1701)  SpO2: (!) 92 % (07/16/23 1701)    Vital Signs Range (Last 24H):  Temp:  [97.6 °F (36.4 °C)-98.7 °F (37.1 °C)]   Pulse:  []   Resp:  [14-37]   BP: (117-229)/()   SpO2:  [86 %-100 %]     I & O (Last 24H):  Intake/Output Summary (Last 24 hours) at 7/16/2023 1815  Last data filed at 7/16/2023 1756  Gross per 24 hour   Intake 1250 ml   Output 1900 ml   Net -650 ml       Physical Exam:    Vitals and nursing note reviewed.     Constitutional:       General: Not in acute distress.     Appearance: Well-developed.   HENT:      Head: Normocephalic and atraumatic.   Eyes:      Pupils: Pupils are equal, round, and reactive to light.   Cardiovascular:      Rate and Rhythm: Regular rhythm.   Minimal LE edema  Pulmonary:      Effort: Pulmonary effort is normal.      Distant breath sounds  No wheezing  O2 Per NC  Abdominal:      General: There is no distension.      Palpations: Abdomen is soft.      Tenderness: There is no abdominal tenderness. There is no guarding or rebound.   Musculoskeletal:         General: Normal range of motion.      Cervical back: Normal range of motion.   Skin:     Findings: No rash.   Neurological:      Mental Status: Alert and oriented to person, place, and time.      Cranial Nerves: No cranial nerve deficit.      Sensory: No sensory deficit.     Laboratory:  I have reviewed all pertinent lab results within the past 24 hours.  CBC:   Recent Labs   Lab 07/16/23  0522   WBC 10.03   RBC 4.67   HGB 12.7   HCT 43.3      MCV 93   MCH 27.2   MCHC 29.3*     CMP:   Recent Labs   Lab 07/16/23  0521   *   CALCIUM 8.7   ALBUMIN 3.0*   PROT 7.8      K 5.1   CO2 34*       BUN 13   CREATININE 1.1   ALKPHOS 75   ALT 12   AST 16   BILITOT 0.4     Cardiac markers: No results for input(s): CKMB, CPKMB, TROPONINT, TROPONINI, MYOGLOBIN in the last 168 hours.    Diagnostic Results:      ASSESSMENT/PLAN:         Active Hospital Problems    Diagnosis  POA    *Acute on chronic hypercapnic hypoxic respiratory failure [J96.12]  Yes     Chronic    Hypertensive emergency [I16.1]  Yes    History of pulmonary embolus (PE) [Z86.711]  Yes    Acute heart failure with preserved ejection fraction (HFpEF) [I50.31]  Yes    Morbid obesity with BMI of 50.0-59.9, adult [E66.01, Z68.43]  Not Applicable      Resolved Hospital Problems   No resolved problems to display.         Plan:     -s/p cardene gtt. Suspected HTN emergency with possible pulmonary edema. Back on home oral medication.  Monitoring and adjusting as needed.  -IV abx for possible pneumonia.  IV lasix for possible pulmonary edema. Repeating CXR tomorrow.  -Supplemental Oxygen. Bipap QHS.    -Echo done and normal.   -Scheduled nebs.  Prednisone.  -Anticoagulated with Eliquis given PE Hx.       VTE Risk Mitigation (From admission, onward)           Ordered     apixaban tablet 5 mg  2 times daily         07/15/23 1843     Reason for No Pharmacological VTE Prophylaxis  Once        Question:  Reasons:  Answer:  Already adequately anticoagulated on oral Anticoagulants    07/15/23 1654     IP VTE HIGH RISK PATIENT  Once         07/15/23 1654     Place sequential compression device  Until discontinued         07/15/23 1654                      Department Hospital Medicine  [facility]  Delano Dela Cruz MD  Date of service: 07/16/2023

## 2023-07-16 NOTE — PLAN OF CARE
[facility]  Initial Discharge Assessment       Primary Care Provider: Harpreet Rendon MD    Admission Diagnosis: Acute on chronic respiratory failure with hypoxia and hypercapnia [J96.21, J96.22]    Admission Date: 7/15/2023  Expected Discharge Date:     Transition of Care Barriers: None    Payor: MEDICAID / Plan: [place] [employer] / Product Type: Managed Medicaid /     Extended Emergency Contact Information  Primary Emergency Contact: Analisa Malcolm  Address: [address] [address] [facility]  Home Phone: [phone]  Mobile Phone: [phone]  Relation: Friend    Discharge Plan A: Home  Discharge Plan B: Home      [id number] - [place] - [address] [facility] [address]  [address]  [place] [address]  Phone: [phone] Fax: [phone]    [facility] [place] - [address]  [address]  [place] [address]  Phone: [phone] Fax: [phone]      Initial Assessment (most recent)       Adult Discharge Assessment - 07/16/23 0914          Discharge Assessment    Assessment Type Discharge Planning Assessment     Confirmed/corrected address, phone number and insurance Yes     Confirmed Demographics Correct on Facesheet     Source of Information patient;health record     Reason For Admission Acute on chronic hypercapnic hypoxic respiratory failure     People in Home friend(s)     Facility Arrived From: Home     Do you expect to return to your current living situation? Yes     Do you have help at home or someone to help you manage your care at home? Yes     Who are your caregiver(s) and their phone number(s)? Analisa Malcolm (Friend)   [phone] (Mobile)     Prior to hospitilization cognitive status: Alert/Oriented     Current cognitive status: Alert/Oriented     Walking or Climbing Stairs ambulation difficulty, requires equipment     Mobility Management Walker     Home  Accessibility wheelchair accessible     Home Layout Able to live on 1st floor     Equipment Currently Used at Home walker, standard;oxygen;CPAP     Readmission within 30 days? No     Patient currently being followed by outpatient case management? No     Do you currently have service(s) that help you manage your care at home? No     Do you take prescription medications? Yes     Do you have prescription coverage? Yes     Coverage [place] [facility] [place] [facility]     Do you have any problems affording any of your prescribed medications? No     Is the patient taking medications as prescribed? yes     Who is going to help you get home at discharge? Analisa Malcolm (Friend)   [phone] (Mobile)     How do you get to doctors appointments? car, drives self;family or friend will provide     Are you on dialysis? No     Do you take coumadin? No     Discharge Plan A Home     Discharge Plan B Home     DME Needed Upon Discharge  none     Discharge Plan discussed with: Patient     Transition of Care Barriers None

## 2023-07-16 NOTE — CARE UPDATE
07/16/23 1344   Patient Assessment/Suction   Level of Consciousness (AVPU) alert   Respiratory Effort Normal;Unlabored   Rhythm/Pattern, Respiratory shallow   Cough Frequency no cough   PRE-TX-O2   Device (Oxygen Therapy) nasal cannula   Flow (L/min) 4   Pulse Oximetry Type Continuous   $ Pulse Oximetry - Multiple Charge Pulse Oximetry - Multiple   Aerosol Therapy   $ Aerosol Therapy Charges Aerosol Treatment   Daily Review of Necessity (SVN) completed   Respiratory Treatment Status (SVN) given   Treatment Route (SVN) mask   Patient Position (SVN) HOB elevated   Post Treatment Assessment (SVN) breath sounds improved   Signs of Intolerance (SVN) none   Preset CPAP/BiPAP Settings   Mode Of Delivery Standby

## 2023-07-16 NOTE — PLAN OF CARE
Problem: Infection  Goal: Absence of Infection Signs and Symptoms  Outcome: Ongoing, Progressing     Problem: Adult Inpatient Plan of Care  Goal: Plan of Care Review  Outcome: Ongoing, Progressing  Goal: Patient-Specific Goal (Individualized)  Outcome: Ongoing, Progressing  Goal: Absence of Hospital-Acquired Illness or Injury  Outcome: Ongoing, Progressing  Goal: Optimal Comfort and Wellbeing  Outcome: Ongoing, Progressing  Goal: Readiness for Transition of Care  Outcome: Ongoing, Progressing     Problem: Bariatric Environmental Safety  Goal: Safety Maintained with Care  Outcome: Ongoing, Progressing     Problem: Fall Injury Risk  Goal: Absence of Fall and Fall-Related Injury  Outcome: Ongoing, Progressing     Problem: Skin Injury Risk Increased  Goal: Skin Health and Integrity  Outcome: Ongoing, Progressing

## 2023-07-16 NOTE — CARE UPDATE
07/16/23 0739   Aerosol Therapy   $ Aerosol Therapy Charges Aerosol Treatment   Daily Review of Necessity (SVN) completed   Respiratory Treatment Status (SVN) given   Treatment Route (SVN) in-line   Patient Position (SVN) HOB elevated   Post Treatment Assessment (SVN) breath sounds improved   Signs of Intolerance (SVN) none   Breath Sounds Post-Respiratory Treatment   Throughout All Fields Post-Treatment All Fields   Throughout All Fields Post-Treatment aeration increased   Post-treatment Heart Rate (beats/min) 71   Post-treatment Resp Rate (breaths/min) 24   Preset CPAP/BiPAP Settings   Mode Of Delivery BiPAP   $ CPAP/BiPAP Daily Charge BiPAP/CPAP Daily   $ Is patient using? Yes   Size of Mask Small   Sized Appropriately? Yes   Equipment Type V60   Airway Device Type small full face mask   Humidifier not applicable   Ipap 10   EPAP (cm H2O) 5   Pressure Support (cm H2O) 5   Set Rate (Breaths/Min) 18   ITime (sec) 0.9   Rise Time (sec) 2   Patient CPAP/BiPAP Settings   Timed Inspiration (Sec) 0.9   IPAP Rise Time (sec) 2   RR Total (Breaths/Min) 23   Tidal Volume (mL) 341   VE Minute Ventilation (L/min) 5.9 L/min   Peak Inspiratory Pressure (cm H2O) 11   TiTOT (%) 24   Total Leak (L/Min) 9   Patient Trigger - ST Mode Only (%) 32   CPAP/BiPAP Backup Settings   IPAP Backup 10 cmH2O   EPAP Backup 5 cmH2O   Backup Rate 24 breaths per minute (bpm)   FIO2 Backup 60 %   ITIME Backup 0.9 seconds   Rise Time Backup 3 seconds   CPAP/BiPAP Alarms   High Pressure (cm H2O) 20   Low Pressure (cm H2O) 7   Minute Ventilation (L/Min) 3   High RR (breaths/min) 40   Low RR (breaths/min) 8   Apnea (Sec) 20

## 2023-07-17 LAB
ALBUMIN SERPL BCP-MCNC: 3 G/DL (ref 3.5–5.2)
ALP SERPL-CCNC: 70 U/L (ref 55–135)
ALT SERPL W/O P-5'-P-CCNC: 12 U/L (ref 10–44)
ANION GAP SERPL CALC-SCNC: 6 MMOL/L (ref 8–16)
AST SERPL-CCNC: 14 U/L (ref 10–40)
BACTERIA BLD CULT: ABNORMAL
BASOPHILS # BLD AUTO: 0.01 K/UL (ref 0–0.2)
BASOPHILS NFR BLD: 0.1 % (ref 0–1.9)
BILIRUB SERPL-MCNC: 0.6 MG/DL (ref 0.1–1)
BUN SERPL-MCNC: 25 MG/DL (ref 6–20)
CALCIUM SERPL-MCNC: 9.2 MG/DL (ref 8.7–10.5)
CHLORIDE SERPL-SCNC: 96 MMOL/L (ref 95–110)
CO2 SERPL-SCNC: 36 MMOL/L (ref 23–29)
CREAT SERPL-MCNC: 1.2 MG/DL (ref 0.5–1.4)
DIFFERENTIAL METHOD: ABNORMAL
EOSINOPHIL # BLD AUTO: 0 K/UL (ref 0–0.5)
EOSINOPHIL NFR BLD: 0 % (ref 0–8)
ERYTHROCYTE [DISTWIDTH] IN BLOOD BY AUTOMATED COUNT: 15.3 % (ref 11.5–14.5)
EST. GFR  (NO RACE VARIABLE): 55.5 ML/MIN/1.73 M^2
GLUCOSE SERPL-MCNC: 169 MG/DL (ref 70–110)
HCT VFR BLD AUTO: 44.4 % (ref 37–48.5)
HGB BLD-MCNC: 13.1 G/DL (ref 12–16)
IMM GRANULOCYTES # BLD AUTO: 0.05 K/UL (ref 0–0.04)
IMM GRANULOCYTES NFR BLD AUTO: 0.4 % (ref 0–0.5)
LYMPHOCYTES # BLD AUTO: 0.5 K/UL (ref 1–4.8)
LYMPHOCYTES NFR BLD: 3.9 % (ref 18–48)
MAGNESIUM SERPL-MCNC: 1.9 MG/DL (ref 1.6–2.6)
MCH RBC QN AUTO: 27.2 PG (ref 27–31)
MCHC RBC AUTO-ENTMCNC: 29.5 G/DL (ref 32–36)
MCV RBC AUTO: 92 FL (ref 82–98)
MONOCYTES # BLD AUTO: 0.3 K/UL (ref 0.3–1)
MONOCYTES NFR BLD: 2.4 % (ref 4–15)
NEUTROPHILS # BLD AUTO: 11.3 K/UL (ref 1.8–7.7)
NEUTROPHILS NFR BLD: 93.2 % (ref 38–73)
NRBC BLD-RTO: 0 /100 WBC
PLATELET # BLD AUTO: 301 K/UL (ref 150–450)
PMV BLD AUTO: 10.4 FL (ref 9.2–12.9)
POTASSIUM SERPL-SCNC: 4.7 MMOL/L (ref 3.5–5.1)
PROT SERPL-MCNC: 8.2 G/DL (ref 6–8.4)
RBC # BLD AUTO: 4.81 M/UL (ref 4–5.4)
SODIUM SERPL-SCNC: 138 MMOL/L (ref 136–145)
WBC # BLD AUTO: 12.17 K/UL (ref 3.9–12.7)

## 2023-07-17 PROCEDURE — 83735 ASSAY OF MAGNESIUM: CPT | Performed by: NURSE PRACTITIONER

## 2023-07-17 PROCEDURE — 99900035 HC TECH TIME PER 15 MIN (STAT)

## 2023-07-17 PROCEDURE — 25000003 PHARM REV CODE 250: Performed by: NURSE PRACTITIONER

## 2023-07-17 PROCEDURE — 63600175 PHARM REV CODE 636 W HCPCS: Performed by: NURSE PRACTITIONER

## 2023-07-17 PROCEDURE — 85025 COMPLETE CBC W/AUTO DIFF WBC: CPT | Performed by: NURSE PRACTITIONER

## 2023-07-17 PROCEDURE — 94761 N-INVAS EAR/PLS OXIMETRY MLT: CPT

## 2023-07-17 PROCEDURE — 36415 COLL VENOUS BLD VENIPUNCTURE: CPT | Performed by: NURSE PRACTITIONER

## 2023-07-17 PROCEDURE — 21400001 HC TELEMETRY ROOM

## 2023-07-17 PROCEDURE — 25000242 PHARM REV CODE 250 ALT 637 W/ HCPCS: Performed by: NURSE PRACTITIONER

## 2023-07-17 PROCEDURE — 25000003 PHARM REV CODE 250: Performed by: INTERNAL MEDICINE

## 2023-07-17 PROCEDURE — 99900031 HC PATIENT EDUCATION (STAT)

## 2023-07-17 PROCEDURE — 94660 CPAP INITIATION&MGMT: CPT

## 2023-07-17 PROCEDURE — 80053 COMPREHEN METABOLIC PANEL: CPT | Performed by: NURSE PRACTITIONER

## 2023-07-17 PROCEDURE — 27000221 HC OXYGEN, UP TO 24 HOURS

## 2023-07-17 PROCEDURE — 94640 AIRWAY INHALATION TREATMENT: CPT

## 2023-07-17 RX ORDER — CLONIDINE HYDROCHLORIDE 0.1 MG/1
0.1 TABLET ORAL ONCE
Status: COMPLETED | OUTPATIENT
Start: 2023-07-17 | End: 2023-07-17

## 2023-07-17 RX ORDER — AMLODIPINE BESYLATE 5 MG/1
10 TABLET ORAL DAILY
Status: DISCONTINUED | OUTPATIENT
Start: 2023-07-17 | End: 2023-07-18 | Stop reason: HOSPADM

## 2023-07-17 RX ORDER — PREDNISONE 20 MG/1
20 TABLET ORAL DAILY
Status: DISCONTINUED | OUTPATIENT
Start: 2023-07-18 | End: 2023-07-18 | Stop reason: HOSPADM

## 2023-07-17 RX ORDER — LEVOFLOXACIN 500 MG/1
500 TABLET, FILM COATED ORAL DAILY
Status: DISCONTINUED | OUTPATIENT
Start: 2023-07-17 | End: 2023-07-18 | Stop reason: HOSPADM

## 2023-07-17 RX ORDER — FUROSEMIDE 20 MG/1
20 TABLET ORAL DAILY
Status: DISCONTINUED | OUTPATIENT
Start: 2023-07-18 | End: 2023-07-18 | Stop reason: HOSPADM

## 2023-07-17 RX ADMIN — FERROUS SULFATE TAB 325 MG (65 MG ELEMENTAL FE) 1 EACH: 325 (65 FE) TAB at 08:07

## 2023-07-17 RX ADMIN — BUDESONIDE 0.5 MG: 0.5 INHALANT RESPIRATORY (INHALATION) at 07:07

## 2023-07-17 RX ADMIN — OXYCODONE HYDROCHLORIDE 5 MG: 5 TABLET ORAL at 06:07

## 2023-07-17 RX ADMIN — APIXABAN 5 MG: 5 TABLET, FILM COATED ORAL at 08:07

## 2023-07-17 RX ADMIN — BUDESONIDE 0.5 MG: 0.5 INHALANT RESPIRATORY (INHALATION) at 08:07

## 2023-07-17 RX ADMIN — LEVOFLOXACIN 500 MG: 500 TABLET, FILM COATED ORAL at 01:07

## 2023-07-17 RX ADMIN — IPRATROPIUM BROMIDE AND ALBUTEROL SULFATE 3 ML: .5; 3 SOLUTION RESPIRATORY (INHALATION) at 04:07

## 2023-07-17 RX ADMIN — TRAZODONE HYDROCHLORIDE 100 MG: 50 TABLET ORAL at 09:07

## 2023-07-17 RX ADMIN — ACETAMINOPHEN 650 MG: 325 TABLET ORAL at 08:07

## 2023-07-17 RX ADMIN — FAMOTIDINE 20 MG: 20 TABLET ORAL at 08:07

## 2023-07-17 RX ADMIN — CARVEDILOL 25 MG: 25 TABLET, FILM COATED ORAL at 08:07

## 2023-07-17 RX ADMIN — CLONIDINE HYDROCHLORIDE 0.1 MG: 0.1 TABLET ORAL at 07:07

## 2023-07-17 RX ADMIN — LOSARTAN POTASSIUM 100 MG: 50 TABLET, FILM COATED ORAL at 08:07

## 2023-07-17 RX ADMIN — AMLODIPINE BESYLATE 10 MG: 5 TABLET ORAL at 05:07

## 2023-07-17 RX ADMIN — IPRATROPIUM BROMIDE AND ALBUTEROL SULFATE 3 ML: .5; 3 SOLUTION RESPIRATORY (INHALATION) at 07:07

## 2023-07-17 RX ADMIN — FUROSEMIDE 40 MG: 10 INJECTION, SOLUTION INTRAMUSCULAR; INTRAVENOUS at 05:07

## 2023-07-17 RX ADMIN — ATORVASTATIN CALCIUM 10 MG: 10 TABLET, FILM COATED ORAL at 08:07

## 2023-07-17 RX ADMIN — PREDNISONE 20 MG: 20 TABLET ORAL at 08:07

## 2023-07-17 RX ADMIN — IPRATROPIUM BROMIDE AND ALBUTEROL SULFATE 3 ML: .5; 3 SOLUTION RESPIRATORY (INHALATION) at 01:07

## 2023-07-17 NOTE — PLAN OF CARE
Problem: Infection  Goal: Absence of Infection Signs and Symptoms  Outcome: Ongoing, Progressing     Problem: Adult Inpatient Plan of Care  Goal: Plan of Care Review  Outcome: Ongoing, Progressing  Goal: Patient-Specific Goal (Individualized)  Outcome: Ongoing, Progressing  Goal: Absence of Hospital-Acquired Illness or Injury  Outcome: Ongoing, Progressing  Goal: Optimal Comfort and Wellbeing  Outcome: Ongoing, Progressing  Goal: Readiness for Transition of Care  Outcome: Ongoing, Progressing     Problem: Bariatric Environmental Safety  Goal: Safety Maintained with Care  Outcome: Ongoing, Progressing     Problem: Fall Injury Risk  Goal: Absence of Fall and Fall-Related Injury  Outcome: Ongoing, Progressing     Problem: Skin Injury Risk Increased  Goal: Skin Health and Integrity  Outcome: Ongoing, Progressing      no rash/no diarrhea/no fever/no abdominal pain/no headache/no vomiting

## 2023-07-17 NOTE — CONSULTS
[facility]  Adult Nutrition   Consult Note (Nutrition Education)     SUMMARY     Recommendations  1) Continue current cardiac diet as tolerated.   2) RD to educate pt on weight loss due toa BMI of 55.16.    Goals:   Pt to understand the above diet information.    Nutrition Goal Status: progressing towards goal    Communication of RD Recs: other (comment)    Dietitian Rounds Brief  Consult for Diet Education: Pt is on a cardiac diet with 100% PO intake. She is morbidly obese with a BMI of 55.16. RD to educate on diet for weight loss and follow up prn.    Principal problem: Chronic hypercapnic respiratory failure     Interval History:   48-year-old female with chronic medical problems including Chronic respiratory failure on 2L NC, morbid obesity, Asthma, GERD, hypertension and history of PE presents to the emergency room complaining of shortness a breath. She was admitted with acute on chronic hypoxic hypercapnic respiratory failure and put on bipap.  CXR with bilateral infiltrates consistent with pulmonary edema verus atypical pneumonia. She was started on IV lasix with reported good response.  She was also started on IV abx for possible pneumonia and prednisone and nebs for possible copd exacerbation. BP significantly elevated on admission and required NTG gtt that was later transitioned to cardene gtt. This could have been flash pulmonary edema from HTN emergency.  Cardene gtt has been able to be weaned off and home antihypertensives started. Bipap has been weaned to NC.        Diet order:   Current Diet Order: Cardiac      Evaluation of Received Nutrient/Fluid Intake  Energy Calories Required: meeting needs  Protein Required: meeting needs  Fluid Required: meeting needs  Tolerance: tolerating     % Intake of Estimated Energy Needs: 75 - 100 %  % Meal Intake: 75 - 100 %      Intake/Output Summary (Last 24 hours) at 7/17/2023 1403  Last data filed at 7/17/2023 1328  Gross per 24 hour   Intake 1480 ml  "  Output 1400 ml   Net 80 ml        Anthropometrics  Temp: 97.5 °F (36.4 °C)  Height Method: Stated  Height: 5' 3" (160 cm)  Height (inches): 63 in  Weight Method: Bed Scale  Weight: (!) 141.2 kg (311 lb 4.6 oz)  Weight (lb): (!) 311.29 lb  Ideal Body Weight (IBW), Female: 115 lb  % Ideal Body Weight, Female (lb): 279.5 %  BMI (Calculated): 55.2  BMI Grade: greater than 40 - morbid obesity       Estimated/Assessed Needs  Weight Used For Calorie Calculations: (!) 141.2 kg (311 lb 4.6 oz)  Energy Calorie Requirements (kcal): 6489-5979 kcals/day (15-20 kcals/kg ABW)  Energy Need Method: Kcal/kg  Protein Requirements:  g/day (1.5-2 g/kg IBW)  Weight Used For Protein Calculations: 52 kg (114 lb 10.2 oz)     Estimated Fluid Requirement Method: RDA Method  RDA Method (mL): 2118       Reason for Assessment  Reason For Assessment: consult, other (see comments) (Diet Education for obesity)  Diagnosis: other (see comments) (Acute on chronic hypercapnic hypoxic respiratory failure (Chronic))  Relevant Medical History: Morbid obesity with BMI of 50.0-59.9, Hypertensive emergency, History of pulmonary embolus (PE), Acute heart failure with preserved ejection fraction (HFpEF)  Interdisciplinary Rounds: did not attend (No rounds)    Nutrition/Diet History  Food Allergies: NKFA  Factors Affecting Nutritional Intake: None identified at this time    Nutrition Risk Screen  Nutrition Risk Screen: no indicators present     MST Score: 0  Have you recently lost weight without trying?: No  Weight loss score: 0  Have you been eating poorly because of a decreased appetite?: No  Appetite score: 0       Weight History:  Wt Readings from Last 5 Encounters:   07/17/23 (!) 141.2 kg (311 lb 4.6 oz)   07/16/23 (!) 145.6 kg (321 lb)   04/16/23 (!) 150.6 kg (332 lb)   03/31/23 (!) 146.1 kg (322 lb)   03/21/23 (!) 146.1 kg (322 lb 3.2 oz)        Lab/Procedures/Meds: Pertinent Labs/Meds Reviewed    Medications:Pertinent Medications " Reviewed  Scheduled Meds:   apixaban  5 mg Oral BID    atorvastatin  10 mg Oral QHS    budesonide  0.5 mg Nebulization Q12H    carvediloL  25 mg Oral BID    famotidine  20 mg Oral BID    ferrous sulfate  1 tablet Oral Daily    [START ON 7/18/2023] furosemide  20 mg Oral Daily    levoFLOXacin  500 mg Oral Daily    losartan  100 mg Oral Daily    [START ON 7/18/2023] predniSONE  20 mg Oral Daily    traZODone  100 mg Oral QHS     Continuous Infusions:  PRN Meds:.acetaminophen, albuterol-ipratropium, magnesium oxide, magnesium oxide, melatonin, naloxone, ondansetron, oxyCODONE, oxyCODONE, polyethylene glycol, potassium bicarbonate, potassium bicarbonate, potassium bicarbonate, prochlorperazine, simethicone, sodium chloride 0.9%    Labs: Pertinent Labs Reviewed  Clinical Chemistry:  Recent Labs   Lab 07/17/23  0457      K 4.7   CL 96   CO2 36*   *   BUN 25*   CREATININE 1.2   CALCIUM 9.2   PROT 8.2   ALBUMIN 3.0*   BILITOT 0.6   ALKPHOS 70   AST 14   ALT 12   ANIONGAP 6*   MG 1.9     CBC:   Recent Labs   Lab 07/17/23  0457   WBC 12.17   RBC 4.81   HGB 13.1   HCT 44.4      MCV 92   MCH 27.2   MCHC 29.5*     Cardiac Profile:  Recent Labs   Lab 07/15/23  1446   *     Diabetes:  Recent Labs   Lab 07/16/23  0521   HGBA1C 6.2         Monitor and Evaluation  Food and Nutrient Intake: food and beverage intake, energy intake  Food and Nutrient Adminstration: diet order  Knowledge/Beliefs/Attitudes: food and nutrition knowledge/skill, beliefs and attitudes  Physical Activity and Function: nutrition-related ADLs and IADLs, factors affecting access to physical activity  Anthropometric Measurements: weight, weight change, body mass index  Biochemical Data, Medical Tests and Procedures: lipid profile, inflammatory profile, glucose/endocrine profile, gastrointestinal profile, electrolyte and renal panel  Nutrition-Focused Physical Findings: overall appearance     Nutrition Risk  Level of Risk/Frequency of  Follow-up: low     Nutrition Follow-Up  RD Follow-up?: Yes      Nicole Pretty, JACKIE 07/17/2023 2:03 PM

## 2023-07-18 VITALS
TEMPERATURE: 98 F | DIASTOLIC BLOOD PRESSURE: 67 MMHG | RESPIRATION RATE: 18 BRPM | HEART RATE: 74 BPM | BODY MASS INDEX: 51.91 KG/M2 | HEIGHT: 63 IN | OXYGEN SATURATION: 96 % | WEIGHT: 293 LBS | SYSTOLIC BLOOD PRESSURE: 143 MMHG

## 2023-07-18 PROBLEM — I16.1 HYPERTENSIVE EMERGENCY: Status: RESOLVED | Noted: 2023-07-15 | Resolved: 2023-07-18

## 2023-07-18 PROBLEM — I50.31 ACUTE HEART FAILURE WITH PRESERVED EJECTION FRACTION (HFPEF): Status: RESOLVED | Noted: 2023-07-15 | Resolved: 2023-07-18

## 2023-07-18 PROBLEM — J96.12 CHRONIC HYPERCAPNIC RESPIRATORY FAILURE: Chronic | Status: RESOLVED | Noted: 2021-05-02 | Resolved: 2023-07-18

## 2023-07-18 LAB
ALBUMIN SERPL BCP-MCNC: 2.9 G/DL (ref 3.5–5.2)
ALP SERPL-CCNC: 62 U/L (ref 55–135)
ALT SERPL W/O P-5'-P-CCNC: 11 U/L (ref 10–44)
ANION GAP SERPL CALC-SCNC: 5 MMOL/L (ref 8–16)
AST SERPL-CCNC: 17 U/L (ref 10–40)
BILIRUB SERPL-MCNC: 0.6 MG/DL (ref 0.1–1)
BUN SERPL-MCNC: 30 MG/DL (ref 6–20)
CALCIUM SERPL-MCNC: 9.2 MG/DL (ref 8.7–10.5)
CHLORIDE SERPL-SCNC: 96 MMOL/L (ref 95–110)
CO2 SERPL-SCNC: 38 MMOL/L (ref 23–29)
CREAT SERPL-MCNC: 1.2 MG/DL (ref 0.5–1.4)
EST. GFR  (NO RACE VARIABLE): 55.5 ML/MIN/1.73 M^2
GLUCOSE SERPL-MCNC: 150 MG/DL (ref 70–110)
POTASSIUM SERPL-SCNC: 4.5 MMOL/L (ref 3.5–5.1)
PROT SERPL-MCNC: 7.8 G/DL (ref 6–8.4)
SODIUM SERPL-SCNC: 139 MMOL/L (ref 136–145)

## 2023-07-18 PROCEDURE — 94660 CPAP INITIATION&MGMT: CPT

## 2023-07-18 PROCEDURE — 80053 COMPREHEN METABOLIC PANEL: CPT | Performed by: NURSE PRACTITIONER

## 2023-07-18 PROCEDURE — 25000003 PHARM REV CODE 250: Performed by: NURSE PRACTITIONER

## 2023-07-18 PROCEDURE — 36415 COLL VENOUS BLD VENIPUNCTURE: CPT | Performed by: NURSE PRACTITIONER

## 2023-07-18 PROCEDURE — 94640 AIRWAY INHALATION TREATMENT: CPT

## 2023-07-18 PROCEDURE — 25000242 PHARM REV CODE 250 ALT 637 W/ HCPCS: Performed by: NURSE PRACTITIONER

## 2023-07-18 PROCEDURE — 97165 OT EVAL LOW COMPLEX 30 MIN: CPT

## 2023-07-18 PROCEDURE — 99900031 HC PATIENT EDUCATION (STAT)

## 2023-07-18 PROCEDURE — 97535 SELF CARE MNGMENT TRAINING: CPT

## 2023-07-18 PROCEDURE — 99900035 HC TECH TIME PER 15 MIN (STAT)

## 2023-07-18 PROCEDURE — 27000221 HC OXYGEN, UP TO 24 HOURS

## 2023-07-18 PROCEDURE — 94761 N-INVAS EAR/PLS OXIMETRY MLT: CPT

## 2023-07-18 PROCEDURE — 25000003 PHARM REV CODE 250: Performed by: INTERNAL MEDICINE

## 2023-07-18 PROCEDURE — 63600175 PHARM REV CODE 636 W HCPCS: Performed by: INTERNAL MEDICINE

## 2023-07-18 PROCEDURE — 97161 PT EVAL LOW COMPLEX 20 MIN: CPT

## 2023-07-18 RX ORDER — PREDNISONE 20 MG/1
20 TABLET ORAL DAILY
Qty: 3 TABLET | Refills: 0 | Status: SHIPPED | OUTPATIENT
Start: 2023-07-19

## 2023-07-18 RX ORDER — LEVOFLOXACIN 500 MG/1
500 TABLET, FILM COATED ORAL DAILY
Qty: 3 TABLET | Refills: 0 | Status: SHIPPED | OUTPATIENT
Start: 2023-07-19 | End: 2023-09-13

## 2023-07-18 RX ORDER — AMLODIPINE BESYLATE 10 MG/1
10 TABLET ORAL DAILY
Qty: 30 TABLET | Refills: 2 | Status: SHIPPED | OUTPATIENT
Start: 2023-07-19 | End: 2024-07-18

## 2023-07-18 RX ADMIN — CARVEDILOL 25 MG: 25 TABLET, FILM COATED ORAL at 08:07

## 2023-07-18 RX ADMIN — APIXABAN 5 MG: 5 TABLET, FILM COATED ORAL at 08:07

## 2023-07-18 RX ADMIN — FERROUS SULFATE TAB 325 MG (65 MG ELEMENTAL FE) 1 EACH: 325 (65 FE) TAB at 08:07

## 2023-07-18 RX ADMIN — BUDESONIDE 0.5 MG: 0.5 INHALANT RESPIRATORY (INHALATION) at 07:07

## 2023-07-18 RX ADMIN — PREDNISONE 20 MG: 20 TABLET ORAL at 08:07

## 2023-07-18 RX ADMIN — LOSARTAN POTASSIUM 100 MG: 50 TABLET, FILM COATED ORAL at 08:07

## 2023-07-18 RX ADMIN — OXYCODONE HYDROCHLORIDE 10 MG: 5 TABLET ORAL at 08:07

## 2023-07-18 RX ADMIN — FAMOTIDINE 20 MG: 20 TABLET ORAL at 09:07

## 2023-07-18 RX ADMIN — LEVOFLOXACIN 500 MG: 500 TABLET, FILM COATED ORAL at 08:07

## 2023-07-18 RX ADMIN — OXYCODONE HYDROCHLORIDE 10 MG: 5 TABLET ORAL at 12:07

## 2023-07-18 RX ADMIN — AMLODIPINE BESYLATE 10 MG: 5 TABLET ORAL at 08:07

## 2023-07-18 RX ADMIN — ACETAMINOPHEN 650 MG: 325 TABLET ORAL at 08:07

## 2023-07-18 RX ADMIN — FUROSEMIDE 20 MG: 20 TABLET ORAL at 08:07

## 2023-07-18 NOTE — CARE UPDATE
07/17/23 2009   Patient Assessment/Suction   Level of Consciousness (AVPU) alert   Respiratory Effort Normal   Expansion/Accessory Muscles/Retractions no use of accessory muscles   All Lung Fields Breath Sounds diminished   Rhythm/Pattern, Respiratory unlabored   Cough Frequency no cough   Skin Integrity   $ Wound Care Tech Time 15 min   Area Observed Bridge of nose   Skin Appearance without discoloration   PRE-TX-O2   Device (Oxygen Therapy) nasal cannula   $ Is the patient on Low Flow Oxygen? Yes   Flow (L/min) 4   SpO2 95 %   Pulse Oximetry Type Continuous   $ Pulse Oximetry - Multiple Charge Pulse Oximetry - Multiple   Pulse 81   Resp 19   Aerosol Therapy   $ Aerosol Therapy Charges Aerosol Treatment   Daily Review of Necessity (SVN) completed   Respiratory Treatment Status (SVN) given   Treatment Route (SVN) mask   Patient Position (SVN) HOB elevated   Post Treatment Assessment (SVN) breath sounds unchanged;vital signs unchanged   Signs of Intolerance (SVN) none   Preset CPAP/BiPAP Settings   Mode Of Delivery BiPAP;Standby   $ CPAP/BiPAP Daily Charge BiPAP/CPAP Daily   Education   $ Education 15 min;Bronchodilator   Respiratory Evaluation   $ Care Plan Tech Time 15 min

## 2023-07-18 NOTE — PROGRESS NOTES
[facility] Medicine  Progress Note    Patient name: Katiuska Preston  MRN: [id number]  Admit Date: 7/15/2023   LOS: 2 days     SUBJECTIVE:     Principal problem: Chronic hypercapnic respiratory failure    Interval History:   CXR repeated and did not clear. Will continue course of abx for possible pneumonia.  It appears we have dried her out on labs and LE edema is better and thus Cassandra changed IV lasix to po.  BP did spike up to SBP greater than 200 and thus I've added Norvasc and a dose of po clonidine. Will continue to adjust her oral medication so that I know what to send her home on. PT/OT consult to mobilize and see how her respiratory state would manage.     Hospital Course:    48-year-old female with chronic medical problems including Chronic respiratory failure on 2L NC, morbid obesity, Asthma, GERD, hypertension and history of PE presents to the emergency room complaining of shortness a breath. She was admitted with acute on chronic hypoxic hypercapnic respiratory failure and put on bipap.  CXR with bilateral infiltrates consistent with pulmonary edema verus atypical pneumonia. She was started on IV lasix with reported good response.  She was also started on IV abx for possible pneumonia and prednisone and nebs for possible copd exacerbation. BP significantly elevated on admission and required NTG gtt that was later transitioned to cardene gtt. This could have been flash pulmonary edema from HTN emergency.  Cardene gtt has been able to be weaned off and home antihypertensives started. Bipap has been weaned to NC.     Scheduled Meds:   amLODIPine  10 mg Oral Daily    apixaban  5 mg Oral BID    atorvastatin  10 mg Oral QHS    budesonide  0.5 mg Nebulization Q12H    carvediloL  25 mg Oral BID    famotidine  20 mg Oral BID    ferrous sulfate  1 tablet Oral Daily    [START ON 7/18/2023] furosemide  20 mg Oral Daily    levoFLOXacin  500 mg Oral Daily    losartan  100 mg Oral Daily    [START ON  7/18/2023] predniSONE  20 mg Oral Daily    traZODone  100 mg Oral QHS     Continuous Infusions:      PRN Meds:acetaminophen, albuterol-ipratropium, magnesium oxide, magnesium oxide, melatonin, naloxone, ondansetron, oxyCODONE, oxyCODONE, polyethylene glycol, potassium bicarbonate, potassium bicarbonate, potassium bicarbonate, prochlorperazine, simethicone, sodium chloride 0.9%    Review of patient's allergies indicates:  No Known Allergies    Review of Systems: As per interval history    OBJECTIVE:     Vital Signs (Most Recent)  Temp: 97.8 °F (36.6 °C) (07/17/23 1500)  Pulse: 75 (07/17/23 1600)  Resp: 19 (07/17/23 1600)  BP: (!) 223/98 (07/17/23 1700)  SpO2: 96 % (07/17/23 1600)    Vital Signs Range (Last 24H):  Temp:  [97.5 °F (36.4 °C)-97.9 °F (36.6 °C)]   Pulse:  [66-92]   Resp:  [15-48]   BP: (119-223)/()   SpO2:  [85 %-98 %]     I & O (Last 24H):  Intake/Output Summary (Last 24 hours) at 7/17/2023 1932  Last data filed at 7/17/2023 1706  Gross per 24 hour   Intake 720 ml   Output 1200 ml   Net -480 ml         Physical Exam:    Vitals and nursing note reviewed.     Constitutional:       General: Not in acute distress.     Appearance: Well-developed.   HENT:      Head: Normocephalic and atraumatic.   Eyes:      Pupils: Pupils are equal, round, and reactive to light.   Cardiovascular:      Rate and Rhythm: Regular rhythm.   Minimal LE edema  Pulmonary:      Effort: Pulmonary effort is normal.      Distant breath sounds  No wheezing  O2 Per NC  Abdominal:      General: There is no distension.      Palpations: Abdomen is soft.      Tenderness: There is no abdominal tenderness. There is no guarding or rebound.   Musculoskeletal:         General: Normal range of motion.      Cervical back: Normal range of motion.   Skin:     Findings: No rash.   Neurological:      Mental Status: Alert and oriented to person, place, and time.      Cranial Nerves: No cranial nerve deficit.      Sensory: No sensory deficit.      Laboratory:  I have reviewed all pertinent lab results within the past 24 hours.  CBC:   Recent Labs   Lab 07/17/23  0457   WBC 12.17   RBC 4.81   HGB 13.1   HCT 44.4      MCV 92   MCH 27.2   MCHC 29.5*       CMP:   Recent Labs   Lab 07/17/23  0457   *   CALCIUM 9.2   ALBUMIN 3.0*   PROT 8.2      K 4.7   CO2 36*   CL 96   BUN 25*   CREATININE 1.2   ALKPHOS 70   ALT 12   AST 14   BILITOT 0.6       Cardiac markers: No results for input(s): CKMB, CPKMB, TROPONINT, TROPONINI, MYOGLOBIN in the last 168 hours.    Diagnostic Results:      ASSESSMENT/PLAN:         Active Hospital Problems    Diagnosis  POA    *Acute on chronic hypercapnic hypoxic respiratory failure [J96.12]  Yes     Chronic    Hypertensive emergency [I16.1]  Yes    History of pulmonary embolus (PE) [Z86.711]  Yes    Acute heart failure with preserved ejection fraction (HFpEF) [I50.31]  Yes    Morbid obesity with BMI of 50.0-59.9, adult [E66.01, Z68.43]  Not Applicable      Resolved Hospital Problems   No resolved problems to display.         Plan:     -s/p cardene gtt. Suspected HTN emergency with possible pulmonary edema. Back on home oral medication.  Monitoring and adjusting as needed. Attempting to adjust oral medication rather than use prn IV as this does not assist in knowing what she needs to go home on.   -IV abx for possible pneumonia, IV lasix for possible pulmonary edema- transitioned to po 7/17. Repeat CXR did not clear and thus will commit to course of abx for possible pneumonia.    -Supplemental Oxygen. Bipap QHS.    -Echo done and normal.   -Scheduled nebs.  Prednisone.  -Anticoagulated with Eliquis given PE Hx.   -PT/OT      VTE Risk Mitigation (From admission, onward)           Ordered     apixaban tablet 5 mg  2 times daily         07/15/23 4210     Reason for No Pharmacological VTE Prophylaxis  Once        Question:  Reasons:  Answer:  Already adequately anticoagulated on oral Anticoagulants    07/15/23 4462      IP VTE HIGH RISK PATIENT  Once         07/15/23 1654     Place sequential compression device  Until discontinued         07/15/23 1654                      Department Hospital Medicine  [facility]  Delano Dela Cruz MD  Date of service: 07/17/2023

## 2023-07-18 NOTE — PLAN OF CARE
DC orders and chart reviewed. No discharge needs noted.  Patient cleared for discharge from .  Patient is discharging to home.   attempted to schedule PCP follow up appointment but office was closed.       07/18/23 1258   Final Note   Assessment Type Final Discharge Note   Anticipated Discharge Disposition Home   What phone number can be called within the next 1-3 days to see how you are doing after discharge? [phone]   Post-Acute Status   Discharge Delays None known at this time

## 2023-07-18 NOTE — PT/OT/SLP EVAL
Occupational Therapy   Evaluation    Name: Katiuska Preston  MRN: [id number]  Admitting Diagnosis: Chronic hypercapnic respiratory failure  Recent Surgery: * No surgery found *      Recommendations:     Discharge Recommendations: home  Discharge Equipment Recommendations:  none  Barriers to discharge:  None    Assessment:     Katiuska Preston is a 48 y.o. female with a medical diagnosis of Chronic hypercapnic respiratory failure.  Pt agreeable to OT evaluation this AM. Performance deficits affecting function: weakness, impaired endurance, impaired self care skills, impaired functional mobility, decreased safety awareness, impaired cardiopulmonary response to activity.  Pt sats between 88-90% on 3L NC at rest; With nurse approval, OT raised O2 to 4L, with sats ranging from 90-92% 4L NC at rest. Nurse notified.     Rehab Prognosis: Good; patient would benefit from acute skilled OT services to address these deficits and reach maximum level of function.       Plan:     Patient to be seen 3 x/week to address the above listed problems via self-care/home management, therapeutic activities, therapeutic exercises  Plan of Care Expires: 08/18/23  Plan of Care Reviewed with: patient    Subjective     Chief Complaint: none stated  Patient/Family Comments/goals: none stated    Occupational Profile:  Living Environment: Pt lives with elderly roommate in a mobile home with a ramp to enter. Pt has both a tub/shower combo and a walk-in shower with a shower chair.  Previous level of function: Independent with ADLs and mobility; Pt cooks and cleans. Pt relies on roommate or family for transportation.  Roles and Routines: primary homemaker; mother  Equipment Used at Home: oxygen, shower chair  Assistance upon Discharge: yes, from family    Pain/Comfort:  Pain Rating 1: 0/10    Patients cultural, spiritual, Gnosticism conflicts given the current situation:      Objective:     Communicated with: nursing prior to session.  Patient found HOB  elevated with telemetry, oxygen, pulse ox (continuous), blood pressure cuff upon OT entry to room.    General Precautions: Standard, fall  Orthopedic Precautions: N/A  Braces: N/A  Respiratory Status: Nasal cannula, flow 3, raised to 4 L/min    Occupational Performance:    Bed Mobility:    Patient completed Supine to Sit with stand by assistance and with side rail    Functional Mobility/Transfers:  Patient completed Sit <> Stand Transfer with stand by assistance  with  no assistive device   Patient completed Bed <> Chair Transfer using Stand Pivot technique with stand by assistance with no assistive device    Activities of Daily Living:  Feeding:  independence per pt  Grooming: setup assistance seated in chair to brush teeth and to wash face    Lower Body Dressing: maximal assistance seated EOB to don socks; pt states that she doesn't wear socks at home  Toileting: erin      Cognitive/Visual Perceptual:  Cognitive/Psychosocial Skills:     -       Oriented to: Person, Place, Time, and Situation   -       Follows Commands/attention:Follows two-step commands  -       Communication: clear/fluent  -       Memory: No Deficits noted  -       Safety awareness/insight to disability: impaired   -       Mood/Affect/Coping skills/emotional control: Appropriate to situation, Cooperative, and Pleasant    Physical Exam:  Balance:    -       SBA seated/standing balance  Upper Extremity Range of Motion:     -       Right Upper Extremity: WFL  -       Left Upper Extremity: WFL  Upper Extremity Strength:    -       Right Upper Extremity: WFL  -       Left Upper Extremity: WFL   Strength:    -       Right Upper Extremity: WFL  -       Left Upper Extremity: WFL  Fine Motor Coordination:    -       Intact  Gross motor coordination:   WFL    AMPAC 6 Click ADL:  AMPAC Total Score: 20    Treatment & Education:  Pt educated on role of OT/POC, importance of OOB/EOB activity, use of call bell, and safety during ADLs, transfers, and  functional mobility.    Patient left up in chair with all lines intact, call button in reach, chair alarm on, and nurse notified    GOALS:   Multidisciplinary Problems       Occupational Therapy Goals          Problem: Occupational Therapy    Goal Priority Disciplines Outcome Interventions   Occupational Therapy Goal     OT, PT/OT     Description: Goals to be met by: 23     Patient will increase functional independence with ADLs by performing:    UE Dressing with Supervision.  LE Dressing with Supervision and Assistive Devices as needed.  Grooming while seated with Supervision.  Toileting from toilet with Supervision for hygiene and clothing management.   Toilet transfer to toilet with Supervision.                         History:     Past Medical History:   Diagnosis Date    Antithrombin III deficiency 2021    Asthma     Claustrophobia     Elevated factor VIII level 2021    Esophageal reflux     Gastroesophageal reflux    Generalized anxiety disorder     Anxiety, Generalized    Herniated disc     Hypertension     Iron deficiency anemia due to chronic blood loss 10/27/2021    Lower extremity weakness     Morbid obesity     Obesity     Protein S deficiency 2021    Pulmonary embolism     Upper extremity weakness          Past Surgical History:   Procedure Laterality Date     SECTION      CHOLECYSTECTOMY      TONSILLECTOMY         Time Tracking:     OT Date of Treatment: 23  OT Start Time: 930  OT Stop Time: 954  OT Total Time (min): 24 min    Billable Minutes:Evaluation 10  Self Care/Home Management 14    2023

## 2023-07-18 NOTE — PT/OT/SLP EVAL
Physical Therapy Evaluation    Patient Name:  Katiuska Preston   MRN:  [id number]    Recommendations:     Discharge Recommendations: outpatient PT (patient declines)   Discharge Equipment Recommendations: none   Barriers to discharge: None    Assessment:     Katiuska Preston is a 48 y.o. female admitted with a medical diagnosis of Chronic hypercapnic respiratory failure.  She presents with the following impairments/functional limitations: weakness, impaired endurance, impaired functional mobility, gait instability, impaired balance, decreased lower extremity function, decreased safety awareness, pain, impaired cardiopulmonary response to activity. Patient sitting up in chair upon entering the room and is agreeable to participation with PT evaluation. She denies pain at rest. She lives with roommate and is independent at baseline. She requires CGA for sit to stand transfer. She ambulated 5' forward/backward x2 trials with no AD and CGA. She declines further ambulation secondary to left knee pain which is notes is chronic. She returned to chair with chair alarm on and all needs met. SpO2 90-96% during evaluation on 3L.     Rehab Prognosis: Good; patient would benefit from acute skilled PT services to address these deficits and reach maximum level of function.    Recent Surgery: * No surgery found *      Plan:     During this hospitalization, patient to be seen 5 x/week to address the identified rehab impairments via gait training, therapeutic activities, therapeutic exercises and progress toward the following goals:    Plan of Care Expires:  08/18/23    Subjective     Chief Complaint: LK pain with gait   Patient/Family Comments/goals: patient requests PT remove socks noting LE swelling   Pain/Comfort:  Pain Rating 1: 0/10    Patients cultural, spiritual, Restoration conflicts given the current situation:      Living Environment:  Patient lives with a roommate in a mobile home with ramp to enter   Prior to admission,  patients level of function was independent. She does not drive - her niece drives her or she uses Medicaid transportation. She reports chronic neck and bilateral knee pain noting neck surgery in 2014 and arthritis in knees. She endorses 1 fall in the past year attributed to getting overheated and fainting. She does her own cooking/cleaning.She has 2L O2 at home. She denies any recent PT.  Equipment used at home: oxygen, shower chair.  DME owned (not currently used): none.  Upon discharge, patient will have assistance from family.    Objective:     Communicated with OT Herminia prior to session.  Patient found up in chair with bed alarm, blood pressure cuff, oxygen, PureWick, pulse ox (continuous), telemetry  upon PT entry to room.    General Precautions: Standard, fall, respiratory  Orthopedic Precautions:N/A   Braces: N/A  Respiratory Status: Nasal cannula, flow 3 L/min    Exams:  RLE Strength: WFL  LLE Strength: WFL    Functional Mobility:  Transfers:     Sit to Stand:  contact guard assistance with no AD  Gait: 5' forward/backward x2 trials with no AD and CGA. She declines further ambulation secondary to left knee pain which is notes is chronic      AM-PAC 6 CLICK MOBILITY  Total Score:18       Treatment & Education:  Patient was educated on the importance of OOB activity and functional mobility to negate negative effects of prolonged bed rest during hospitalization, safe transfers and ambulation, and D/C planning       Patient left up in chair with all lines intact, call button in reach, and chair alarm on.    GOALS:   Multidisciplinary Problems       Physical Therapy Goals          Problem: Physical Therapy    Goal Priority Disciplines Outcome Goal Variances Interventions   Physical Therapy Goal     PT, PT/OT      Description: Goals to be met by: 23    Patient will increase functional independence with mobility by performin. Supine to sit with Supervision  2. Sit to stand transfer with  Supervision  3. Bed to chair transfer with Supervision using Rolling Walker or no AD  4. Gait  x 250 feet with Supervision using Rolling Walker or no AD.   5. Lower extremity exercise program x20 reps per handout, with supervision                       History:     Past Medical History:   Diagnosis Date    Antithrombin III deficiency 2021    Asthma     Claustrophobia     Elevated factor VIII level 2021    Esophageal reflux     Gastroesophageal reflux    Generalized anxiety disorder     Anxiety, Generalized    Herniated disc     Hypertension     Iron deficiency anemia due to chronic blood loss 10/27/2021    Lower extremity weakness     Morbid obesity     Obesity     Protein S deficiency 2021    Pulmonary embolism     Upper extremity weakness        Past Surgical History:   Procedure Laterality Date     SECTION      CHOLECYSTECTOMY      TONSILLECTOMY         Time Tracking:     PT Received On: 23  PT Start Time: 1018     PT Stop Time: 1031  PT Total Time (min): 13 min     Billable Minutes: Evaluation 13      2023

## 2023-07-18 NOTE — CARE UPDATE
07/18/23 0737   Patient Assessment/Suction   Level of Consciousness (AVPU) alert   Respiratory Effort Normal;Unlabored   Expansion/Accessory Muscles/Retractions no use of accessory muscles;no retractions;expansion symmetric   All Lung Fields Breath Sounds Anterior:;Lateral:;diminished   Rhythm/Pattern, Respiratory unlabored;pattern regular;depth regular   Skin Integrity   $ Wound Care Tech Time 15 min   Area Observed Left;Behind ear;Cheek;Bridge of nose;Right   Skin Appearance without discoloration   PRE-TX-O2   Device (Oxygen Therapy) nasal cannula   $ Is the patient on Low Flow Oxygen? Yes   Flow (L/min) 3   SpO2 98 %   Pulse Oximetry Type Continuous   $ Pulse Oximetry - Multiple Charge Pulse Oximetry - Multiple   Pulse 75   Resp 18   Aerosol Therapy   $ Aerosol Therapy Charges Aerosol Treatment   Daily Review of Necessity (SVN) completed   Respiratory Treatment Status (SVN) given   Treatment Route (SVN) mask   Patient Position (SVN) HOB elevated   Post Treatment Assessment (SVN) breath sounds unchanged   Signs of Intolerance (SVN) none   Preset CPAP/BiPAP Settings   Mode Of Delivery BiPAP;Standby   $ CPAP/BiPAP Daily Charge BiPAP/CPAP Daily

## 2023-07-19 ENCOUNTER — PATIENT MESSAGE (OUTPATIENT)
Dept: ADMINISTRATIVE | Facility: CLINIC | Age: 48
End: 2023-07-19
Payer: MEDICAID

## 2023-07-19 ENCOUNTER — PATIENT OUTREACH (OUTPATIENT)
Dept: ADMINISTRATIVE | Facility: CLINIC | Age: 48
End: 2023-07-19
Payer: MEDICAID

## 2023-07-19 NOTE — TELEPHONE ENCOUNTER
@ 9401 call to [facility] pharmacy #[id number] regarding new medications and delivery options. Advised pt needs to call and place order, verify information. Need to put order into system in order to verify if delivery fee; pharmacy advised will work with patient regarding. Pharmacy advised this would be a same day delivery. Advised pt of the above, pt fany.

## 2023-07-19 NOTE — HOSPITAL COURSE
48-year-old female with chronic medical problems including Chronic respiratory failure on 2L NC, morbid obesity, Asthma, GERD, hypertension and history of PE presents to the emergency room complaining of shortness a breath. She was admitted with acute on chronic hypoxic hypercapnic respiratory failure and put on bipap.  CXR with bilateral infiltrates consistent with pulmonary edema verus atypical pneumonia. She was started on IV lasix with reported good response.  She was also started on IV abx for possible pneumonia and prednisone and nebs for possible copd exacerbation. BP significantly elevated on admission and required NTG gtt that was later transitioned to cardene gtt. This could have been flash pulmonary edema from HTN emergency.  Cardene gtt has been able to be weaned off and home antihypertensives started. Bipap has been weaned to NC.  7/17 CXR repeated and did not clear. Will continue course of abx for possible pneumonia.  It appears we have dried her out on labs and LE edema is better and thus Cassandra changed IV lasix to po.  BP did spike up to SBP greater than 200 and thus I've added Norvasc and a dose of po clonidine. Will continue to adjust her oral medication so that I know what to send her home on. PT/OT consult to mobilize and see how her respiratory state would manage. 7/18 did well with PT with what she would allow them to evaluate.  Oxygen level remained stable during the physical activity. Blood pressure improved with the addition of Norvasc. She will be discharged home with Rx for Norvac, 3 more days of Levaquin to complete course of abx for possible pneumonia, 3 more days of prednisone to complete course for possible bronchitis exacerbation.  She will continue her home O2 per NC.  Recommended follow up with PCP.  Examined on day of discharge and alert, NAD, comfortable respirations on 3L NC, improved LE and pedal edema. Return precautions given.

## 2023-07-19 NOTE — DISCHARGE SUMMARY
Pending sale to [facility]  Discharge Summary      Patient Name: Katiuska Preston  MRN: [id number]  [facility]: [id number]  Patient Class: IP- Inpatient  Admission Date: 7/15/2023  Hospital Length of Stay: 3 days  Discharge Date and Time: 7/18/2023  4:46 PM  Attending Physician: No att. providers found   Discharging Provider: Delano Dela Cruz MD  Primary Care Provider: Harpreet Rendon MD    Primary Care Team: Networked reference to record PCT     HPI:   Katiuska Preston is a 48-year-old female with chronic medical problems including morbid obesity, Asthma, GERD, hypertension and history of PE presents to the emergency room complaining of shortness a breath.  Patient states that she started feeling short of breath prior to arrival in the emergency room.  She wears 2 L nasal cannula O2 at baseline and was 68% when EMS arrived.  Patient states she has been taking her blood pressure medicine and just got a blood pressure cuff and noticed that her blood pressure was very high as well today.  She denies chest pain, states she wheezes occasionally and has not nebulizer, she said she was supposed to wear CPAP but can not get the mask to fix her face so she does not use it.  She denies any nausea vomiting, abdominal pain, difficulty voiding.  She said she is never been told that she has heart failure in the past.  She denies fevers or chills, headaches or dizziness.  She said she has allergies and occasionally has sinus congestion.    In the ED, she was placed on BiPAP and started on a nitroglycerin drip for initial blood pressure 203/135.  O2 sat was 87% and she improved on BiPAP at 40% FiO2, 10/5.  Lab work with , WBC 10.72, platelets 302, glucose 114, BUN/CR 12/1.0, serum bicarb 30,, sodium 136, potassium 4.1, magnesium 1.8, troponin 25.9, urinalysis unremarkable, arterial blood gas with pH 7.328, pCO2 67.7, PO2 97 and serum bicarb 35.5 on 5 L nasal cannula O2.  SARs COVID test was negative, lactic acid 1.1,  procalcitonin is pending.  Chest x-ray with pulmonary hypoinflation suggestive interstitial and acinar pulmonary edema as well as atypical pneumonia, pulmonary alveolar hemorrhage and are developing ARDS.  She was also given azithromycin, ceftriaxone, Lasix, duo nebs, Solu-Medrol and placed on a nitroglycerin drip.  She was voiding well and improved greatly at time of exam.  She will be admitted to the hospitalist service for further evaluation and treatment.      * No surgery found *      Hospital Course:      48-year-old female with chronic medical problems including Chronic respiratory failure on 2L NC, morbid obesity, Asthma, GERD, hypertension and history of PE presents to the emergency room complaining of shortness a breath. She was admitted with acute on chronic hypoxic hypercapnic respiratory failure and put on bipap.  CXR with bilateral infiltrates consistent with pulmonary edema verus atypical pneumonia. She was started on IV lasix with reported good response.  She was also started on IV abx for possible pneumonia and prednisone and nebs for possible copd exacerbation. BP significantly elevated on admission and required NTG gtt that was later transitioned to cardene gtt. This could have been flash pulmonary edema from HTN emergency.  Cardene gtt has been able to be weaned off and home antihypertensives started. Bipap has been weaned to NC.  7/17 CXR repeated and did not clear. Will continue course of abx for possible pneumonia.  It appears we have dried her out on labs and LE edema is better and thus Cassandra changed IV lasix to po.  BP did spike up to SBP greater than 200 and thus I've added Norvasc and a dose of po clonidine. Will continue to adjust her oral medication so that I know what to send her home on. PT/OT consult to mobilize and see how her respiratory state would manage. 7/18 did well with PT with what she would allow them to evaluate.  Oxygen level remained stable during the physical activity.  Blood pressure improved with the addition of Norvasc. She will be discharged home with Rx for Norvac, 3 more days of Levaquin to complete course of abx for possible pneumonia, 3 more days of prednisone to complete course for possible bronchitis exacerbation.  She will continue her home O2 per NC.  Recommended follow up with PCP.  Examined on day of discharge and alert, NAD, comfortable respirations on 3L NC, improved LE and pedal edema. Return precautions given.        Goals of Care Treatment Preferences:  Code Status: Full Code      Consults:   Consults (From admission, onward)        Status Ordering Provider     Inpatient consult to Registered Dietitian/Nutritionist  Once        Provider:  (Not yet assigned)    Completed TEX SCOTT          No new Assessment & Plan notes have been filed under this hospital service since the last note was generated.  Service: Hospital Medicine    Final Active Diagnoses:    Diagnosis Date Noted POA    History of pulmonary embolus (PE) [Z86.711] 07/15/2023 Yes    Morbid obesity with BMI of 50.0-59.9, adult [E66.01, Z68.43]  Not Applicable      Problems Resolved During this Admission:    Diagnosis Date Noted Date Resolved POA    PRINCIPAL PROBLEM:  Acute on chronic hypercapnic hypoxic respiratory failure [J96.12] 05/02/2021 07/18/2023 Yes     Chronic    Hypertensive emergency [I16.1] 07/15/2023 07/18/2023 Yes    Acute heart failure with preserved ejection fraction (HFpEF) [I50.31] 07/15/2023 07/18/2023 Yes       Discharged Condition: good    Disposition: Home or Self Care    Follow Up:   Follow-up Information     Harpreet Rendon MD. Schedule an appointment as soon as possible for a visit in 2 week(s).    Specialty: Internal Medicine  Why: post hospital discharge follow up for possible Hypertensive emergency with flash pulmonary edema.  Norvasc (Amlodipine) added to blood pressure medication.  Contact information:  [address] [place] [address] Dr Deb LONG  [id number]  [phone]                       Patient Instructions:      Diet Cardiac     Notify your health care provider if you experience any of the following:  difficulty breathing or increased cough     Activity as tolerated       Significant Diagnostic Studies: Labs:   CMP   Recent Labs   Lab 07/17/23 0457 07/18/23  0443    139   K 4.7 4.5   CL 96 96   CO2 36* 38*   * 150*   BUN 25* 30*   CREATININE 1.2 1.2   CALCIUM 9.2 9.2   PROT 8.2 7.8   ALBUMIN 3.0* 2.9*   BILITOT 0.6 0.6   ALKPHOS 70 62   AST 14 17   ALT 12 11   ANIONGAP 6* 5*    and CBC   Recent Labs   Lab 07/17/23 0457   WBC 12.17   HGB 13.1   HCT 44.4          Pending Diagnostic Studies:     None         Medications:  Reconciled Home Medications:      Medication List      START taking these medications    amLODIPine 10 MG tablet  Commonly known as: NORVASC  Take 1 tablet (10 mg total) by mouth once daily.  Start taking on: July 19, 2023     levoFLOXacin 500 MG tablet  Commonly known as: LEVAQUIN  Take 1 tablet (500 mg total) by mouth once daily.  Start taking on: July 19, 2023     predniSONE 20 MG tablet  Commonly known as: DELTASONE  Take 1 tablet (20 mg total) by mouth once daily.  Start taking on: July 19, 2023        CHANGE how you take these medications    ELIQUIS 5 mg Tab  Generic drug: apixaban  TAKE 1 TABLET BY MOUTH TWICE DAILY  What changed: how much to take     ferrous sulfate 325 mg (65 mg iron) Tab tablet  Commonly known as: FeroSuL  TAKE 1 TABLET BY MOUTH EVERY DAY  What changed:   · how to take this  · when to take this        CONTINUE taking these medications    albuterol 90 mcg/actuation inhaler  Commonly known as: PROVENTIL/VENTOLIN HFA  Inhale 2 puffs into the lungs every 6 (six) hours as needed for Wheezing. Rescue     atorvastatin 10 MG tablet  Commonly known as: LIPITOR  Take 10 mg by mouth every evening.     * butalbital-acetaminophen-caffeine -40 mg -40 mg per tablet  Commonly known as: FIORICET,  ESGIC  Take 1 tablet by mouth every 4 (four) hours as needed.     * butalbital-acetaminophen-caff -40 mg Cap  Take 1 capsule by mouth every 4 (four) hours as needed.     carvediloL 25 MG tablet  Commonly known as: COREG  Take 1 tablet (25 mg total) by mouth 2 (two) times daily.     ergocalciferol 50,000 unit Cap  Commonly known as: ERGOCALCIFEROL  Take 50,000 Units by mouth twice a week.     famotidine 20 MG tablet  Commonly known as: PEPCID  Take 20 mg by mouth 2 (two) times daily.     fluticasone propionate 50 mcg/actuation nasal spray  Commonly known as: FLONASE  2 sprays by Each Nostril route daily as needed for Rhinitis.     fluticasone-salmeterol 230-21 mcg/dose 230-21 mcg/actuation Hfaa inhaler  Commonly known as: ADVAIR HFA  Inhale 2 puffs into the lungs 2 (two) times daily. Controller     furosemide 20 MG tablet  Commonly known as: LASIX  Take 1 tablet (20 mg total) by mouth once daily.     losartan 100 MG tablet  Commonly known as: COZAAR  Take 1 tablet (100 mg total) by mouth once daily.     meloxicam 7.5 MG tablet  Commonly known as: MOBIC  Take 7.5 mg by mouth once daily.     minocycline 100 MG capsule  Commonly known as: MINOCIN,DYNACIN  Take 100 mg by mouth 2 (two) times daily.     MIRENA 21 mcg/24 hours (8 yrs) 52 mg IUD  Generic drug: levonorgestreL  SMARTSIG:Intrauterine Once     miSOPROStoL 200 MCG Tab  Commonly known as: CYTOTEC  Take by mouth.     mupirocin 2 % ointment  Commonly known as: BACTROBAN  Apply 1 g topically 3 (three) times daily.     NexIUM 40 MG capsule  Generic drug: esomeprazole  Take 40 mg by mouth once daily.     omeprazole 20 MG capsule  Commonly known as: PRILOSEC  Take 20 mg by mouth once daily.     traZODone 100 MG tablet  Commonly known as: DESYREL  Take 100 mg by mouth every evening.         * This list has 2 medication(s) that are the same as other medications prescribed for you. Read the directions carefully, and ask your doctor or other care provider to review  them with you.                Indwelling Lines/Drains at time of discharge:   Lines/Drains/Airways     None                 Time spent on the discharge of patient: 35 minutes         Delano Dela Cruz MD  Department of Hospital Medicine  [facility]

## 2023-07-19 NOTE — PROGRESS NOTES
C3 nurse spoke with Katiuska Preston  for a TCC post hospital discharge follow up call. Nurse offered to scheduled [facility] hospital follow-up appointment. The patient declined appointment at this time.      Patient's preference is to personally schedule hospfu.

## 2023-07-20 LAB — BACTERIA BLD CULT: NORMAL

## 2023-08-01 ENCOUNTER — TELEPHONE (OUTPATIENT)
Dept: HEMATOLOGY/ONCOLOGY | Facility: CLINIC | Age: 48
End: 2023-08-01

## 2023-08-07 ENCOUNTER — TELEPHONE (OUTPATIENT)
Dept: HEMATOLOGY/ONCOLOGY | Facility: CLINIC | Age: 48
End: 2023-08-07

## 2023-08-07 NOTE — PROGRESS NOTES
[facility] Hematology/Oncology  Progress Note -   Follow-up Visit    Subjective:      Patient ID:   NAME: Katiuska Preston : 1975     48 y.o. female    Referring Doc: Justice  Other Physicians: Eduardo    Chief Complaint: pulm emboli f/u      HPI:    The patient returns for a regularly scheduled follow-up visit today to go over results of recently ordered tests, studies and/or labs. She is here by herself.    She is here by herself today.    She was in hospital last month for fluid issues and required diuretics    No CP, HA's or N/V.   Breathing stable but requires O2 when outside    She is currently on Eliquis (but is only taking one daily due to her menstrual bleeding). No excessive bleeding or bruising.     She is followed by Dr Pro with Gyn-Onc but hasn't had any periods since 2023    She has some chronic right knee issues and pain.       Discussed covid19 precautions - she had her vaccinations             ROS:   GEN: normal without any fever, night sweats or weight loss; chronic knee issues  HEENT: normal with no HA's, sore throat, stiff neck, changes in vision;    CV: normal with no CP, some WINN   PULM: normal with no SOB, cough, hemoptysis, sputum or pleuritic pain  GI: normal with no abdominal pain, nausea, vomiting, constipation, diarrhea, melanotic stools, BRBPR, or hematemesis  : normal with no hematuria, dysuria  BREAST: normal with no mass, discharge, pain  SKIN: normal with no rash, erythema, bruising, or swelling; mole on right foot     Past Medical/Surgical History:  Past Medical History:   Diagnosis Date    Antithrombin III deficiency 2021    Asthma     Claustrophobia     Elevated factor VIII level 2021    Esophageal reflux     Gastroesophageal reflux    Generalized anxiety disorder     Anxiety, Generalized    Herniated disc     Hypertension     Iron deficiency anemia due to chronic blood loss 10/27/2021    Lower extremity weakness     Morbid obesity     Obesity     Protein S  deficiency 2021    Pulmonary embolism     Upper extremity weakness      Past Surgical History:   Procedure Laterality Date     SECTION      CHOLECYSTECTOMY      TONSILLECTOMY           Allergies:  Review of patient's allergies indicates:  No Known Allergies    Social/Family History:  Social History     Socioeconomic History    Marital status:    Tobacco Use    Smoking status: Former     Current packs/day: 0.00     Types: Cigarettes, Cigars     Quit date: 2019     Years since quittin.6    Smokeless tobacco: Never   Substance and Sexual Activity    Alcohol use: Yes     Alcohol/week: 0.8 standard drinks of alcohol     Types: 1 Standard drinks or equivalent per week     Comment: rarely    Drug use: No    Sexual activity: Not Currently     Social Determinants of Health     Financial Resource Strain: Medium Risk (10/18/2022)    Overall Financial Resource Strain (CARDIA)     Difficulty of Paying Living Expenses: Somewhat hard   Food Insecurity: Food Insecurity Present (10/18/2022)    Hunger Vital Sign     Worried About Running Out of Food in the Last Year: Sometimes true     Ran Out of Food in the Last Year: Sometimes true   Transportation Needs: Unmet Transportation Needs (10/18/2022)    PRAPARE - Transportation     Lack of Transportation (Medical): Yes     Lack of Transportation (Non-Medical): Yes   Stress: No Stress Concern Present (10/18/2022)    [place] [place] [employer] - Occupational Stress Questionnaire     Feeling of Stress : Only a little   Social Connections: Socially Isolated (10/18/2022)    Social Connection and Isolation Panel [NHANES]     Frequency of Communication with Friends and Family: Once a week     Frequency of Social Gatherings with Friends and Family: Once a week     Attends Holiness Services: Never     Active Member of Clubs or Organizations: No     Attends Club or Organization Meetings: Never     Marital Status:    Housing Stability: High Risk  (10/18/2022)    Housing Stability Vital Sign     Unable to Pay for Housing in the Last Year: Yes     Unstable Housing in the Last Year: No     History reviewed. No pertinent family history.      Medications:    Current Outpatient Medications:     albuterol (PROVENTIL/VENTOLIN HFA) 90 mcg/actuation inhaler, Inhale 2 puffs into the lungs every 6 (six) hours as needed for Wheezing. Rescue, Disp: 18 g, Rfl: 5    atorvastatin (LIPITOR) 10 MG tablet, Take 10 mg by mouth every evening., Disp: , Rfl:     butalbital-acetaminophen-caff -40 mg Cap, Take 1 capsule by mouth every 4 (four) hours as needed., Disp: , Rfl:     butalbital-acetaminophen-caffeine -40 mg (FIORICET, ESGIC) -40 mg per tablet, Take 1 tablet by mouth every 4 (four) hours as needed., Disp: , Rfl:     carvediloL (COREG) 25 MG tablet, Take 1 tablet (25 mg total) by mouth 2 (two) times daily., Disp: 60 tablet, Rfl: 0    ELIQUIS 5 mg Tab, TAKE 1 TABLET BY MOUTH TWICE DAILY (Patient taking differently: Take by mouth 2 (two) times daily.), Disp: 60 tablet, Rfl: 3    ergocalciferol (ERGOCALCIFEROL) 50,000 unit Cap, Take 50,000 Units by mouth twice a week., Disp: , Rfl:     famotidine (PEPCID) 20 MG tablet, Take 20 mg by mouth 2 (two) times daily., Disp: , Rfl:     ferrous sulfate (FEROSUL) 325 mg (65 mg iron) Tab tablet, TAKE 1 TABLET BY MOUTH EVERY DAY (Patient taking differently: Take by mouth once daily. TAKE 1 TABLET BY MOUTH EVERY DAY), Disp: 30 tablet, Rfl: 6    fluticasone propionate (FLONASE) 50 mcg/actuation nasal spray, 2 sprays by Each Nostril route daily as needed for Rhinitis., Disp: , Rfl:     fluticasone-salmeterol 230-21 mcg/dose (ADVAIR HFA) 230-21 mcg/actuation HFAA inhaler, Inhale 2 puffs into the lungs 2 (two) times daily. Controller, Disp: 12 g, Rfl: 6    levoFLOXacin (LEVAQUIN) 500 MG tablet, Take 1 tablet (500 mg total) by mouth once daily., Disp: 3 tablet, Rfl: 0    meloxicam (MOBIC) 7.5 MG tablet, Take 7.5 mg by mouth once  daily., Disp: , Rfl:     minocycline (MINOCIN,DYNACIN) 100 MG capsule, Take 100 mg by mouth 2 (two) times daily., Disp: , Rfl:     MIRENA 20 mcg/24 hours (8 yrs) 52 mg IUD, SMARTSIG:Intrauterine Once, Disp: , Rfl:     miSOPROStoL (CYTOTEC) 200 MCG Tab, Take by mouth., Disp: , Rfl:     mupirocin (BACTROBAN) 2 % ointment, Apply 1 g topically 3 (three) times daily., Disp: , Rfl:     NEXIUM 40 mg capsule, Take 40 mg by mouth once daily., Disp: , Rfl:     omeprazole (PRILOSEC) 20 MG capsule, Take 20 mg by mouth once daily., Disp: , Rfl:     predniSONE (DELTASONE) 20 MG tablet, Take 1 tablet (20 mg total) by mouth once daily., Disp: 3 tablet, Rfl: 0    trazodone (DESYREL) 100 MG tablet, Take 100 mg by mouth every evening., Disp: , Rfl:     amLODIPine (NORVASC) 10 MG tablet, Take 1 tablet (10 mg total) by mouth once daily. (Patient not taking: Reported on 7/19/2023), Disp: 30 tablet, Rfl: 2    furosemide (LASIX) 20 MG tablet, Take 1 tablet (20 mg total) by mouth once daily. (Patient not taking: Reported on 7/19/2023), Disp: 30 tablet, Rfl: 0    losartan (COZAAR) 100 MG tablet, Take 1 tablet (100 mg total) by mouth once daily., Disp: 30 tablet, Rfl: 0    Current Facility-Administered Medications:     bebtelovimab (EUA) 175 mg/2 mL (87.5 mg/mL) injection 175 mg, 175 mg, Intravenous, 1 time in Clinic/HOD, Sherrie Parker MD      Pathology:   Cancer Staging   No matching staging information was found for the patient.      Breast biopsy 5/30/2022:    1. BREAST, LEFT, UPPER OUTER MID CALCIFICATIONS, STEREOTACTIC GUIDED    CORE BIOPSY    - BENIGN BREAST PARENCHYMA AND BLOOD VESSELS     2. BREAST, LEFT, UPPER OUTER MID CALCIFICATIONS, STEREOTACTIC GUIDED    CORE BIOPSY   - MICROCALCIFICATIONS ASSOCIATED WITH FIBROADENOMATOID CHANGE AND    BENIGN BREAST PARENCHYMA    - USUAL DUCTAL HYPERPLASIA AND COLUMNAR CELL CHANGE      Objective:   Vitals:  Blood pressure (!) 161/94, pulse 108, temperature 97.4 °F (36.3 °C), resp. rate 20,  "height 5' 3" (1.6 m), weight (!) 141.5 kg (312 lb).    Physical Examination:   GEN: no apparent distress, comfortable; AAOx3; morbidly overweight   HEAD: atraumatic and normocephalic  EYES: no pallor, no icterus, PERRLA  ENT: OMM, no pharyngeal erythema, external ears WNL; no nasal discharge; no thrush  NECK: no masses, thyroid normal, trachea midline, no LAD/LN's, supple  CV: RRR with no murmur; normal pulse; normal S1 and S2; no pedal edema  CHEST: Normal respiratory effort; CTAB; normal breath sounds; no wheeze or crackles;    ABDOM: nontender and nondistended; soft; normal bowel sounds; no rebound/guarding  MUSC/Skeletal: LROM and crepitus right knee; no deformities or arthropathy  EXTREM: no clubbing, cyanosis, inflammation or swelling  SKIN: no rashes, lesions, ulcers, petechiae or subcutaneous nodules  : no diaz  NEURO: grossly intact; motor/sensory WNL; AAOx3; no tremors  PSYCH: normal mood, affect and behavior  LYMPH: normal cervical, supraclavicular, and groin LN's;       Labs:   Lab Results   Component Value Date    WBC 12.17 07/17/2023    HGB 13.1 07/17/2023    HCT 44.4 07/17/2023    MCV 92 07/17/2023     07/17/2023    CMP  Sodium   Date Value Ref Range Status   07/18/2023 139 136 - 145 mmol/L Final     Potassium   Date Value Ref Range Status   07/18/2023 4.5 3.5 - 5.1 mmol/L Final     Chloride   Date Value Ref Range Status   07/18/2023 96 95 - 110 mmol/L Final     CO2   Date Value Ref Range Status   07/18/2023 38 (H) 23 - 29 mmol/L Final     Glucose   Date Value Ref Range Status   07/18/2023 150 (H) 70 - 110 mg/dL Final     BUN   Date Value Ref Range Status   07/18/2023 30 (H) 6 - 20 mg/dL Final     Creatinine   Date Value Ref Range Status   07/18/2023 1.2 0.5 - 1.4 mg/dL Final     Calcium   Date Value Ref Range Status   07/18/2023 9.2 8.7 - 10.5 mg/dL Final     Total Protein   Date Value Ref Range Status   07/18/2023 7.8 6.0 - 8.4 g/dL Final     Albumin   Date Value Ref Range Status "   07/18/2023 2.9 (L) 3.5 - 5.2 g/dL Final     Total Bilirubin   Date Value Ref Range Status   07/18/2023 0.6 0.1 - 1.0 mg/dL Final     Comment:     For infants and newborns, interpretation of results should be based  on gestational age, weight and in agreement with clinical  observations.    Premature Infant recommended reference ranges:  Up to 24 hours.............<8.0 mg/dL  Up to 48 hours............<12.0 mg/dL  3-5 days..................<15.0 mg/dL  6-29 days.................<15.0 mg/dL       Alkaline Phosphatase   Date Value Ref Range Status   07/18/2023 62 55 - 135 U/L Final     AST   Date Value Ref Range Status   07/18/2023 17 10 - 40 U/L Final     ALT   Date Value Ref Range Status   07/18/2023 11 10 - 44 U/L Final     Anion Gap   Date Value Ref Range Status   07/18/2023 5 (L) 8 - 16 mmol/L Final     eGFR if    Date Value Ref Range Status   07/18/2022 >60.0 >60 mL/min/1.73 m^2 Final     eGFR if non    Date Value Ref Range Status   07/18/2022 >60.0 >60 mL/min/1.73 m^2 Final     Comment:     Calculation used to obtain the estimated glomerular filtration  rate (eGFR) is the CKD-EPI equation.                  Lab Results   Component Value Date    IRON 45 02/24/2023    TIBC 350 02/24/2023    FERRITIN 77 02/24/2023     Lab Results   Component Value Date    GPDVKUVD60 452 05/02/2021     Lab Results   Component Value Date    FOLATE 5.9 05/02/2021                I have reviewed all available lab results and radiology reports.    Radiology/Diagnostic Studies:      MRI Pelvis 6/15/2022:    Impression:     1. Normal thickness of the endometrium.  2. Query adenomyosis.  3. Nonviable uterine fibroid.  4. Involuting follicle or cyst in the left ovary.              US Lower Extremity Veins Bilateral [[id number]] Collected: 05/01/21 1348   Order Status: Completed Updated: 05/01/21 1432   Narrative:     REASON: DVT     FINDINGS:     Grayscale, color and spectral Doppler analysis of the bilateral  lower   extremity deep venous system was performed.     There is normal compressibility, color and spectral Doppler analysis,   and augmentation in the bilateral lower extremity deep venous system.     IMPRESSION:     1.  No DVT of the bilateral lower extremity veins.   2.  Bilateral distal superficial femoral veins were unable to be   assessed due to body habitus       CTA Chest Non-Coronary - PE Study [[id number]] Collected: 05/01/21 1143   Order Status: Completed Updated: 05/01/21 1225   Narrative:     CMS MANDATED QUALITY DATA - CT RADIATION - 436     All CT scans at this facility utilize dose modulation, iterative   reconstruction, and/or weight based dosing when appropriate to reduce   radiation dose to as low as reasonably achievable.         REASON: PE suspected, intermediate prob, positive D-dimer     TECHNIQUE: CT angiography of thorax with 100 mL Omnipaque 350.   Maximum intensity projection coronal reformations were created at a   separate workstation and stored in the patient's permanent medical   record.     COMPARISON: CTA chest October 14, 2019.     FINDINGS:     Limited evaluation due to body habitus and inadequate bolus timing.   Filling defects identified in segmental and subsegmental artery   supplying the left lower lobe, consistent with pulmonary emboli.   Questionable filling defects versus artifact in the right mainstem   pulmonary artery and a few segmental arteries supplying the right   lower lobe may reflect pulmonary emboli. Heart size is at the upper   limits of normal. No mediastinal lymphadenopathy or mass.     The central tracheobronchial tree is patent. There is diffuse   groundglass lung opacities in the bilateral lungs with peripheral   wedge-shaped opacities. No pleural effusions.     The visualized abdominal viscera are unremarkable. No acute osseous   abnormality..     IMPRESSION:     1.  Limited evaluation due to body habitus and inadequate bolus   timing. Filling defects  identified in segmental and subsegmental   artery supplying the left lower lobe, consistent with pulmonary   emboli. There are questionable pulmonary emboli in the right mainstem   pulmonary artery and segmental artery supplying the right lower lobe.   Findings reported to Dr.Lloyd Duffy at 5/1/2021.   2.  Bilateral diffuse groundglass lung opacities with peripheral   wedge-shaped opacities may reflect changes of poor inspiration and   atelectasis. Atypical infectious process could also have a similar   appearance the proper clinical setting.                 All lab results and imaging results have been reviewed and discussed with the patient    Assessment:   (1) 48 y.o. female with diagnosis of pulmonary emboli who was seen as a consult at [facility]  - patient with diagnosis of asthma who presented to the ED at [facility] on 5/1 with progressive SOB, postnasal drip and acute hypercapnia. CTA was a limited study due to body habitus but radiology reported presence of filling defects identified in segmental and subsegmental artery supplying the left lower lobe, consistent with pulmonary emboli. They also reported questionable pulmonary emboli in the right mainstem pulmonary artery and segmental artery supplying the right lower lobe. Patient has been admitted to hospitalist service and is on Lovenox. She has no prior personal or family history of clots.      - doppler studies are negative     5/3/2021:  - patient was seen by Dr Clark with pulm yesterday; appreciate his evaluation  - options of anticoagulation include: Coumadin, Eliquis , Xarelto, or Pradaxa  - in patients with morbid obesity, one can have difficulties with therapeutic dosing with practically any type of oral anticoagulant     5/31/2021:  - She has O2 at home and is on portable today.   - She has not followed up with pulmonary since discharge.   - She is breathing fair but does have WINN.   - She has some sinus issues. She is currently on Eliquis.   - She saw   Justice since discharge  - low ATIII and protein S levels which could be the etiology, but they will need to be repeated at later date to confirm  - Elevated factor VIII which also will need to be repeated to confirm    6/28/2021:  - she saw Dr Clark with pulmonary since last visit and has PFT scheduled for this coming July 1st  - she remains on portable O2    12/8/2021:  - she saw Dr Clark again on 11/2/2021  - she remains on O2 at home  - she remains on eliquis  - Repeat AT3 was normal, repeat Protein S was WNL  - repeat factor VIII was still a little elevated at 281 but better    2/15/2022:  - she sees pulmonary NP again next month    5/19/2022:  - she saw Idania Cason NP on 5/2/2022 with pulmonary    6/20/2022:  - remains on eliquis  - currently with no excessive bleeding or bruising    10/24/2022:  - continued on eliquis   - no excessive bleeding or bruising    2/27/2023:  - continued on eliquis but she is only taking it once per day    8/8/2023:  - continued on eliquis but only takes one a day       (2) Anemia with microcytic indices with current history of chronic menorrhagia - most likely has underlying iron deficiency due to chronic heavy menstrual cycles  - s/p two units of blood  - prior hgb at 8.2  - total bilirubin is WNL, so I do not suspect any hemolysis at this time     5/3/2021:  - hgb at 8.6 today  - iron and ferritin are both low with elevated TIBC  - she received dose of IV iron today    5/31/2021:  - she needs repeat labs incl iron panel  - will consider additional IV iron as needed  - she is on oral iron  - she needs repeat labs incl. Iron panel    6/28/2021:  - hgb currently 11.7  - iron panel is adequate at this time    10/26/2021:  - latest hgb at 10.6  - iron at 29  - she is on oral iron since May 2021  - she has heavy menstrual cycles  - discussed consideration for IV iron and she is agreeable; discussed general side-effects of iron infusions    12/8/2021:  - she has been on oral iron  with little improvement in the labs  - she is starting IV iron this coming Friday    2/15/2022:  - latest labs from Jan 2022 with no anemia and iron panel was adequate  - she had IV iron x 1 only    5/19/2022:  - latest hgb at 11.2  - ferritin was 28  - iron back down to 24  - continued GYN bleeding issues  - seen by Dr Pro with GYn-Onc on 4/20/2022 6/20/2022:  - awaiting better BP control inorder to resume IV iron  - seeing Dr Joseph later today for her HBP  - labs pending for today    7/25/2022:  - s/p two cycles of IV iron  - latest iron panel adequate and hgb WNL    10/24/2022:  - hgb currently WNL at 13.1  - iron at 84 and ferritin 262    2/27/2023:  - latest hgb and iron panel WNL    8/8/2023:  - latest labs from July 2023 with normal hgb    (3) Abnormal mammogram:    5/19/2022:  - She had recent abnormal mammogram 5/6/2022 with cluster of calcifications 1 0'clock posittion on the left breast.   - she is scheduling US guided biopsy in near future  - discussed referral to [facility] for evaluation    6/20/2022:  - s/p breast biopsy on 5/30/2022 with pathology report coming back benign  - recommend repeat mammogram in at least 3 months    7/25/2022:  - refer to Dr Puentes for her breast issues    10/24/2022:  - she said she had repeat mammo in Aug 2022 but I do not see a report  - she is scheduled Dr Puentes in couple weeks    8/8/2023:  - repeat mammo was ordered by Dr Joseph  - she is overdo to see Dr joseph      (3) Overweight/Obesity     (4) HTN     (5) GERD       (6) General anxiety disorder      (7) Migraine HA's - hospitalized in Dec 2022    VISIT DIAGNOSES:      1. Iron deficiency anemia due to chronic blood loss    2. Iron deficiency anemia, unspecified iron deficiency anemia type    3. Acute pulmonary embolism, unspecified pulmonary embolism type, unspecified whether acute cor pulmonale present    4. Antithrombin III deficiency    5. Elevated factor VIII level    6. History of pulmonary embolus  (PE)    7. Protein S deficiency            Plan:     PLAN:  1. F/u with PCP, pulmonary as directed  2. Check labs monthly  incl iron panel - set up up IV iron as needed  if ever needed   4. F/u with GYN and Dr Pro  6. Refill eliquis as needed  7. F/u with Dr Puentes recommended; due for repeat mammo - encouraged compliance - Dr Rendon has already ordered one  8. F/u with Dr Valerio for the knee issues     RTC in 6 months    Fax note to Yoav Rendon Braly; Vipul Puentes              Iron deficiency anemia due to chronic blood loss    Iron deficiency anemia, unspecified iron deficiency anemia type    Acute pulmonary embolism, unspecified pulmonary embolism type, unspecified whether acute cor pulmonale present    Antithrombin III deficiency    Elevated factor VIII level    History of pulmonary embolus (PE)    Protein S deficiency      No follow-ups on file.    COVID-19 Discussion:    I had long discussion with patient and any applicable family about the COVID-19 coronavirus epidemic and the recommended precautions with regard to cancer and/or hematology patients. I have re-iterated the CDC recommendations for adequate hand washing, use of hand -like products, and coughing into elbow, etc. In addition, especially for our patients who are on chemotherapy and/or our otherwise immunocompromised patients, I have recommended avoidance of crowds, including movie theaters, restaurants, churches, etc. I have recommended avoidance of any sick or symptomatic family members and/or friends. I have also recommended avoidance of any raw and unwashed food products, and general avoidance of food items that have not been prepared by themselves. The patient has been asked to call us immediately with any symptom developments, issues, questions or other general concerns.       Anticoagulation Discussion:    Discussed with patient and any applicable family members about the benefit and/or need for anticoagulation. I  communicated about the risks of bleeding while on any anticoagulation, which could be serious and/or life-threatening, and which can occur at any time, regardless of degree of the level of anticoagulation. I expressed the need for compliance with any anticoagulation regimen and that failure to do so could potential lead to excessive bleeding, and risk to health and/or life. In particular, with patients on coumadin therapy, compliance with requested blood work is absolutely essential, as coumadin levels can vary from time to time, and failure to do so could potentially place the patient at risk for bleeding and/or clotting events which could be fatal. Patients on coumadin are encouraged to call the day after they have their levels drawn, as to obtain the appropriate instructions from my staff. Patients are aware that self-regulating or self-dosing of their medications is strictly prohibited.       I have explained and the patient understands all of  the current recommendation(s). I have answered all of their questions to the best of my ability and to their complete satisfaction.             Thank you for allowing me to participate in this pleasant patient's care. Please call with any questions or concerns.      Electronically signed Cisco Boston MD

## 2023-08-07 NOTE — TELEPHONE ENCOUNTER
Called to remind pt to have labs done for appt on 8/8/23  Per pt she will get labs the morning of her appt.

## 2023-08-08 ENCOUNTER — OFFICE VISIT (OUTPATIENT)
Dept: HEMATOLOGY/ONCOLOGY | Facility: CLINIC | Age: 48
End: 2023-08-08
Payer: MEDICAID

## 2023-08-08 ENCOUNTER — LAB VISIT (OUTPATIENT)
Dept: LAB | Facility: HOSPITAL | Age: 48
End: 2023-08-08
Attending: INTERNAL MEDICINE
Payer: MEDICAID

## 2023-08-08 VITALS
RESPIRATION RATE: 20 BRPM | SYSTOLIC BLOOD PRESSURE: 161 MMHG | DIASTOLIC BLOOD PRESSURE: 94 MMHG | TEMPERATURE: 97 F | BODY MASS INDEX: 51.91 KG/M2 | HEIGHT: 63 IN | WEIGHT: 293 LBS | HEART RATE: 108 BPM

## 2023-08-08 DIAGNOSIS — I26.99 ACUTE PULMONARY EMBOLISM, UNSPECIFIED PULMONARY EMBOLISM TYPE, UNSPECIFIED WHETHER ACUTE COR PULMONALE PRESENT: ICD-10-CM

## 2023-08-08 DIAGNOSIS — D50.9 IRON DEFICIENCY ANEMIA, UNSPECIFIED IRON DEFICIENCY ANEMIA TYPE: Chronic | ICD-10-CM

## 2023-08-08 DIAGNOSIS — Z86.711 HISTORY OF PULMONARY EMBOLUS (PE): ICD-10-CM

## 2023-08-08 DIAGNOSIS — D68.59 ANTITHROMBIN III DEFICIENCY: ICD-10-CM

## 2023-08-08 DIAGNOSIS — D50.0 IRON DEFICIENCY ANEMIA DUE TO CHRONIC BLOOD LOSS: Primary | ICD-10-CM

## 2023-08-08 DIAGNOSIS — R79.1 ELEVATED FACTOR VIII LEVEL: ICD-10-CM

## 2023-08-08 DIAGNOSIS — D68.59 PROTEIN S DEFICIENCY: ICD-10-CM

## 2023-08-08 LAB
ALBUMIN SERPL BCP-MCNC: 3.3 G/DL (ref 3.5–5.2)
ALP SERPL-CCNC: 65 U/L (ref 55–135)
ALT SERPL W/O P-5'-P-CCNC: 23 U/L (ref 10–44)
ANION GAP SERPL CALC-SCNC: 8 MMOL/L (ref 8–16)
AST SERPL-CCNC: 31 U/L (ref 10–40)
BASOPHILS # BLD AUTO: 0.03 K/UL (ref 0–0.2)
BASOPHILS NFR BLD: 0.3 % (ref 0–1.9)
BILIRUB SERPL-MCNC: 0.5 MG/DL (ref 0.1–1)
BUN SERPL-MCNC: 12 MG/DL (ref 6–20)
CALCIUM SERPL-MCNC: 9.2 MG/DL (ref 8.7–10.5)
CHLORIDE SERPL-SCNC: 100 MMOL/L (ref 95–110)
CO2 SERPL-SCNC: 29 MMOL/L (ref 23–29)
CREAT SERPL-MCNC: 1 MG/DL (ref 0.5–1.4)
DIFFERENTIAL METHOD: ABNORMAL
EOSINOPHIL # BLD AUTO: 0.2 K/UL (ref 0–0.5)
EOSINOPHIL NFR BLD: 2 % (ref 0–8)
ERYTHROCYTE [DISTWIDTH] IN BLOOD BY AUTOMATED COUNT: 18 % (ref 11.5–14.5)
EST. GFR  (NO RACE VARIABLE): >60 ML/MIN/1.73 M^2
FERRITIN SERPL-MCNC: 118 NG/ML (ref 20–300)
GLUCOSE SERPL-MCNC: 129 MG/DL (ref 70–110)
HCT VFR BLD AUTO: 44.2 % (ref 37–48.5)
HGB BLD-MCNC: 13.5 G/DL (ref 12–16)
IMM GRANULOCYTES # BLD AUTO: 0.02 K/UL (ref 0–0.04)
IMM GRANULOCYTES NFR BLD AUTO: 0.2 % (ref 0–0.5)
IRON SERPL-MCNC: 111 UG/DL (ref 30–160)
LYMPHOCYTES # BLD AUTO: 1.3 K/UL (ref 1–4.8)
LYMPHOCYTES NFR BLD: 14.8 % (ref 18–48)
MCH RBC QN AUTO: 26.7 PG (ref 27–31)
MCHC RBC AUTO-ENTMCNC: 30.5 G/DL (ref 32–36)
MCV RBC AUTO: 87 FL (ref 82–98)
MONOCYTES # BLD AUTO: 0.5 K/UL (ref 0.3–1)
MONOCYTES NFR BLD: 6 % (ref 4–15)
NEUTROPHILS # BLD AUTO: 6.6 K/UL (ref 1.8–7.7)
NEUTROPHILS NFR BLD: 76.7 % (ref 38–73)
NRBC BLD-RTO: 0 /100 WBC
PLATELET # BLD AUTO: 279 K/UL (ref 150–450)
PMV BLD AUTO: 10.2 FL (ref 9.2–12.9)
POTASSIUM SERPL-SCNC: 4 MMOL/L (ref 3.5–5.1)
PROT SERPL-MCNC: 7.4 G/DL (ref 6–8.4)
RBC # BLD AUTO: 5.06 M/UL (ref 4–5.4)
SATURATED IRON: 36 % (ref 20–50)
SODIUM SERPL-SCNC: 137 MMOL/L (ref 136–145)
TOTAL IRON BINDING CAPACITY: 311 UG/DL (ref 250–450)
TRANSFERRIN SERPL-MCNC: 222 MG/DL (ref 200–375)
WBC # BLD AUTO: 8.61 K/UL (ref 3.9–12.7)

## 2023-08-08 PROCEDURE — 1159F PR MEDICATION LIST DOCUMENTED IN MEDICAL RECORD: ICD-10-PCS | Mod: CPTII,S$GLB,, | Performed by: INTERNAL MEDICINE

## 2023-08-08 PROCEDURE — 1159F MED LIST DOCD IN RCRD: CPT | Mod: CPTII,S$GLB,, | Performed by: INTERNAL MEDICINE

## 2023-08-08 PROCEDURE — 99213 PR OFFICE/OUTPT VISIT, EST, LEVL III, 20-29 MIN: ICD-10-PCS | Mod: S$GLB,,, | Performed by: INTERNAL MEDICINE

## 2023-08-08 PROCEDURE — 3080F DIAST BP >= 90 MM HG: CPT | Mod: CPTII,S$GLB,, | Performed by: INTERNAL MEDICINE

## 2023-08-08 PROCEDURE — 3080F PR MOST RECENT DIASTOLIC BLOOD PRESSURE >= 90 MM HG: ICD-10-PCS | Mod: CPTII,S$GLB,, | Performed by: INTERNAL MEDICINE

## 2023-08-08 PROCEDURE — 3008F BODY MASS INDEX DOCD: CPT | Mod: CPTII,S$GLB,, | Performed by: INTERNAL MEDICINE

## 2023-08-08 PROCEDURE — 99213 OFFICE O/P EST LOW 20 MIN: CPT | Mod: S$GLB,,, | Performed by: INTERNAL MEDICINE

## 2023-08-08 PROCEDURE — 36415 COLL VENOUS BLD VENIPUNCTURE: CPT | Performed by: INTERNAL MEDICINE

## 2023-08-08 PROCEDURE — 3008F PR BODY MASS INDEX (BMI) DOCUMENTED: ICD-10-PCS | Mod: CPTII,S$GLB,, | Performed by: INTERNAL MEDICINE

## 2023-08-08 PROCEDURE — 85025 COMPLETE CBC W/AUTO DIFF WBC: CPT | Performed by: INTERNAL MEDICINE

## 2023-08-08 PROCEDURE — 3044F HG A1C LEVEL LT 7.0%: CPT | Mod: CPTII,S$GLB,, | Performed by: INTERNAL MEDICINE

## 2023-08-08 PROCEDURE — 1111F PR DISCHARGE MEDS RECONCILED W/ CURRENT OUTPATIENT MED LIST: ICD-10-PCS | Mod: CPTII,S$GLB,, | Performed by: INTERNAL MEDICINE

## 2023-08-08 PROCEDURE — 3077F PR MOST RECENT SYSTOLIC BLOOD PRESSURE >= 140 MM HG: ICD-10-PCS | Mod: CPTII,S$GLB,, | Performed by: INTERNAL MEDICINE

## 2023-08-08 PROCEDURE — 3044F PR MOST RECENT HEMOGLOBIN A1C LEVEL <7.0%: ICD-10-PCS | Mod: CPTII,S$GLB,, | Performed by: INTERNAL MEDICINE

## 2023-08-08 PROCEDURE — 80053 COMPREHEN METABOLIC PANEL: CPT | Performed by: INTERNAL MEDICINE

## 2023-08-08 PROCEDURE — 1160F RVW MEDS BY RX/DR IN RCRD: CPT | Mod: CPTII,S$GLB,, | Performed by: INTERNAL MEDICINE

## 2023-08-08 PROCEDURE — 84466 ASSAY OF TRANSFERRIN: CPT | Performed by: INTERNAL MEDICINE

## 2023-08-08 PROCEDURE — 82728 ASSAY OF FERRITIN: CPT | Performed by: INTERNAL MEDICINE

## 2023-08-08 PROCEDURE — 1160F PR REVIEW ALL MEDS BY PRESCRIBER/CLIN PHARMACIST DOCUMENTED: ICD-10-PCS | Mod: CPTII,S$GLB,, | Performed by: INTERNAL MEDICINE

## 2023-08-08 PROCEDURE — 3077F SYST BP >= 140 MM HG: CPT | Mod: CPTII,S$GLB,, | Performed by: INTERNAL MEDICINE

## 2023-08-08 PROCEDURE — 1111F DSCHRG MED/CURRENT MED MERGE: CPT | Mod: CPTII,S$GLB,, | Performed by: INTERNAL MEDICINE

## 2023-08-16 ENCOUNTER — TELEPHONE (OUTPATIENT)
Dept: HEMATOLOGY/ONCOLOGY | Facility: CLINIC | Age: 48
End: 2023-08-16

## 2023-08-16 DIAGNOSIS — M25.569 KNEE PAIN, UNSPECIFIED CHRONICITY, UNSPECIFIED LATERALITY: Primary | ICD-10-CM

## 2023-08-16 NOTE — TELEPHONE ENCOUNTER
Referral placed as Dr. Valerio's office confirmed that medicaid patient's need new referral placed every 90 days. Patient aware that referral placed.

## 2023-08-24 ENCOUNTER — OFFICE VISIT (OUTPATIENT)
Dept: ORTHOPEDICS | Facility: CLINIC | Age: 48
End: 2023-08-24
Payer: MEDICAID

## 2023-08-24 DIAGNOSIS — M17.12 PRIMARY OSTEOARTHRITIS OF LEFT KNEE: ICD-10-CM

## 2023-08-24 DIAGNOSIS — M17.11 PRIMARY OSTEOARTHRITIS OF RIGHT KNEE: Primary | ICD-10-CM

## 2023-08-24 PROCEDURE — 20610 LARGE JOINT ASPIRATION/INJECTION: R KNEE: ICD-10-PCS | Mod: 50,S$GLB,, | Performed by: ORTHOPAEDIC SURGERY

## 2023-08-24 PROCEDURE — 3044F PR MOST RECENT HEMOGLOBIN A1C LEVEL <7.0%: ICD-10-PCS | Mod: CPTII,S$GLB,, | Performed by: ORTHOPAEDIC SURGERY

## 2023-08-24 PROCEDURE — 1159F MED LIST DOCD IN RCRD: CPT | Mod: CPTII,S$GLB,, | Performed by: ORTHOPAEDIC SURGERY

## 2023-08-24 PROCEDURE — 99213 OFFICE O/P EST LOW 20 MIN: CPT | Mod: 25,S$GLB,, | Performed by: ORTHOPAEDIC SURGERY

## 2023-08-24 PROCEDURE — 99213 PR OFFICE/OUTPT VISIT, EST, LEVL III, 20-29 MIN: ICD-10-PCS | Mod: 25,S$GLB,, | Performed by: ORTHOPAEDIC SURGERY

## 2023-08-24 PROCEDURE — 1159F PR MEDICATION LIST DOCUMENTED IN MEDICAL RECORD: ICD-10-PCS | Mod: CPTII,S$GLB,, | Performed by: ORTHOPAEDIC SURGERY

## 2023-08-24 PROCEDURE — 3044F HG A1C LEVEL LT 7.0%: CPT | Mod: CPTII,S$GLB,, | Performed by: ORTHOPAEDIC SURGERY

## 2023-08-24 PROCEDURE — 20610 DRAIN/INJ JOINT/BURSA W/O US: CPT | Mod: 50,S$GLB,, | Performed by: ORTHOPAEDIC SURGERY

## 2023-08-24 RX ORDER — TRIAMCINOLONE ACETONIDE 40 MG/ML
40 INJECTION, SUSPENSION INTRA-ARTICULAR; INTRAMUSCULAR
Status: DISCONTINUED | OUTPATIENT
Start: 2023-08-24 | End: 2023-08-24 | Stop reason: HOSPADM

## 2023-08-24 RX ADMIN — TRIAMCINOLONE ACETONIDE 40 MG: 40 INJECTION, SUSPENSION INTRA-ARTICULAR; INTRAMUSCULAR at 01:08

## 2023-08-24 NOTE — PROGRESS NOTES
[facility]    Subjective:     Chief Complaint:   Chief Complaint   Patient presents with    Right Knee - Pain     BL knee pain follow up. Received inj 23 which offered great relief for 4 months. Requesting inj today. Left is worse than right    Left Knee - Pain     BL knee pain follow up. Received inj 23 which offered great relief for 4 months. Requesting inj today. Left is worse than right         Past Medical History:   Diagnosis Date    Antithrombin III deficiency 2021    Asthma     Claustrophobia     Elevated factor VIII level 2021    Esophageal reflux     Gastroesophageal reflux    Generalized anxiety disorder     Anxiety, Generalized    Herniated disc     Hypertension     Iron deficiency anemia due to chronic blood loss 10/27/2021    Lower extremity weakness     Morbid obesity     Obesity     Protein S deficiency 2021    Pulmonary embolism     Upper extremity weakness        Past Surgical History:   Procedure Laterality Date     SECTION      CHOLECYSTECTOMY      TONSILLECTOMY         Current Outpatient Medications   Medication Sig    albuterol (PROVENTIL/VENTOLIN HFA) 90 mcg/actuation inhaler Inhale 2 puffs into the lungs every 6 (six) hours as needed for Wheezing. Rescue    amLODIPine (NORVASC) 10 MG tablet Take 1 tablet (10 mg total) by mouth once daily. (Patient not taking: Reported on 2023)    atorvastatin (LIPITOR) 10 MG tablet Take 10 mg by mouth every evening.    butalbital-acetaminophen-caff -40 mg Cap Take 1 capsule by mouth every 4 (four) hours as needed.    butalbital-acetaminophen-caffeine -40 mg (FIORICET, ESGIC) -40 mg per tablet Take 1 tablet by mouth every 4 (four) hours as needed.    carvediloL (COREG) 25 MG tablet Take 1 tablet (25 mg total) by mouth 2 (two) times daily.    ELIQUIS 5 mg Tab TAKE 1 TABLET BY MOUTH TWICE DAILY (Patient taking differently: Take by mouth 2 (two) times daily.)    ergocalciferol (ERGOCALCIFEROL)  50,000 unit Cap Take 50,000 Units by mouth twice a week.    famotidine (PEPCID) 20 MG tablet Take 20 mg by mouth 2 (two) times daily.    ferrous sulfate (FEROSUL) 325 mg (65 mg iron) Tab tablet TAKE 1 TABLET BY MOUTH EVERY DAY (Patient taking differently: Take by mouth once daily. TAKE 1 TABLET BY MOUTH EVERY DAY)    fluticasone propionate (FLONASE) 50 mcg/actuation nasal spray 2 sprays by Each Nostril route daily as needed for Rhinitis.    fluticasone-salmeterol 230-21 mcg/dose (ADVAIR HFA) 230-21 mcg/actuation HFAA inhaler Inhale 2 puffs into the lungs 2 (two) times daily. Controller    furosemide (LASIX) 20 MG tablet Take 1 tablet (20 mg total) by mouth once daily. (Patient not taking: Reported on 7/19/2023)    levoFLOXacin (LEVAQUIN) 500 MG tablet Take 1 tablet (500 mg total) by mouth once daily.    losartan (COZAAR) 100 MG tablet Take 1 tablet (100 mg total) by mouth once daily.    meloxicam (MOBIC) 7.5 MG tablet Take 7.5 mg by mouth once daily.    minocycline (MINOCIN,DYNACIN) 100 MG capsule Take 100 mg by mouth 2 (two) times daily.    MIRENA 20 mcg/24 hours (8 yrs) 52 mg IUD SMARTSIG:Intrauterine Once    miSOPROStoL (CYTOTEC) 200 MCG Tab Take by mouth.    mupirocin (BACTROBAN) 2 % ointment Apply 1 g topically 3 (three) times daily.    NEXIUM 40 mg capsule Take 40 mg by mouth once daily.    omeprazole (PRILOSEC) 20 MG capsule Take 20 mg by mouth once daily.    predniSONE (DELTASONE) 20 MG tablet Take 1 tablet (20 mg total) by mouth once daily.    trazodone (DESYREL) 100 MG tablet Take 100 mg by mouth every evening.     Current Facility-Administered Medications   Medication    bebtelovimab (EUA) 175 mg/2 mL (87.5 mg/mL) injection 175 mg       Review of patient's allergies indicates:  No Known Allergies    No family history on file.    Social History     Socioeconomic History    Marital status:    Tobacco Use    Smoking status: Former     Current packs/day: 0.00     Types: Cigarettes, Cigars     Quit  date: 2019     Years since quittin.6    Smokeless tobacco: Never   Substance and Sexual Activity    Alcohol use: Yes     Alcohol/week: 0.8 standard drinks of alcohol     Types: 1 Standard drinks or equivalent per week     Comment: rarely    Drug use: No    Sexual activity: Not Currently     Social Determinants of Health     Financial Resource Strain: Medium Risk (10/18/2022)    Overall Financial Resource Strain (CARDIA)     Difficulty of Paying Living Expenses: Somewhat hard   Food Insecurity: Food Insecurity Present (10/18/2022)    Hunger Vital Sign     Worried About Running Out of Food in the Last Year: Sometimes true     Ran Out of Food in the Last Year: Sometimes true   Transportation Needs: Unmet Transportation Needs (10/18/2022)    PRAPARE - Transportation     Lack of Transportation (Medical): Yes     Lack of Transportation (Non-Medical): Yes   Stress: No Stress Concern Present (10/18/2022)    [employer] [place] [employer] - Occupational Stress Questionnaire     Feeling of Stress : Only a little   Social Connections: Socially Isolated (10/18/2022)    Social Connection and Isolation Panel [NHANES]     Frequency of Communication with Friends and Family: Once a week     Frequency of Social Gatherings with Friends and Family: Once a week     Attends [employer] Services: Never     Active Member of Clubs or Organizations: No     Attends Club or Organization Meetings: Never     Marital Status:    Housing Stability: High Risk (10/18/2022)    Housing Stability Vital Sign     Unable to Pay for Housing in the Last Year: Yes     Unstable Housing in the Last Year: No       History of present illness:  Katiuska comes in today for follow-up for her bilateral knees.  She was last seen and injected in her knees just over 6 months ago with good relief of her symptoms.  Unfortunately, the pain returns.  She has known severe arthrosis in her bilateral knees.    Review of Systems:    Constitution: Negative for  chills, fever, and sweats.  Negative for unexplained weight loss.    HENT:  Negative for headaches and blurry vision.    Cardiovascular:Negative for chest pain or irregular heart beat. Negative for hypertension.    Respiratory:  Negative for cough and shortness of breath.    Gastrointestinal: Negative for abdominal pain, heartburn, melena, nausea, and vomitting.    Genitourinary:  Negative bladder incontinence and dysuria.    Musculoskeletal:  See HPI for details.     Neurological: Negative for numbness.    Psychiatric/Behavioral: Negative for depression.  The patient is not nervous/anxious.      Endocrine: Negative for polyuria    Hematologic/Lymphatic: Negative for bleeding problem.  Does not bruise/bleed easily.    Skin: Negative for poor would healing and rash    Objective:      Physical Examination:    Vital Signs:  There were no vitals filed for this visit.    There is no height or weight on file to calculate BMI.    This a well-developed, well nourished patient in no acute distress.  They are alert and oriented and cooperative to examination.        Bilateral knee exam:  Skin to bilateral knees is clean dry and intact.  No erythema or ecchymosis bilaterally.  No signs or symptoms of infection bilaterally.  Bilateral knee range of motion 0-90 degrees.  Both knees stable to varus and valgus stresses.  She is neurovascularly intact throughout bilateral lower extremities.  She can weightbear as tolerated on bilateral lower extremities patient is morbidly obese with a BMI of 55.    Pertinent New Results:    XRAY Report / Interpretation:   Three views were taken of the bilateral knees today: AP, lateral, and sunrise views.  Patient has severe arthrosis in the right knee with bone-on-bone deformity in the medial compartment.  She has moderate-severe arthrosis in the medial compartment of the left knee with near bone-on-bone deformity in the medial compartment and osteophytes off the distal medial femoral condyle.   "There are no acute fractures or dislocations seen.  Visualized soft tissues appear unremarkable.    Assessment/Plan:      1. Bilateral knee osteoarthritis.      I injected her bilateral knees today via an anterior lateral approach with 40 mg of Kenalog and lidocaine respectively.  She tolerated this well.  We will see her back in 3 months for repeat injections if she is having a recurrence of symptoms.  I did explain to her she is doing quite well, she can simply cancel and follow-up whenever she does begin to experience a recurrence of symptoms.    Franklin Kevin, Physician Assistant, served in the capacity as a "scribe" for this patient encounter.  A "face-to-face" encounter occurred with Dr. Brayan Valerio on this date.  The treatment plan and medical decision-making is outlined above. Patient was seen and examined with a chaperone.       This note was created using Dragon voice recognition software that occasionally misinterpreted phrases or words.        "

## 2023-08-24 NOTE — PROCEDURES
Large Joint Aspiration/Injection: R knee    Date/Time: 8/24/2023 1:15 PM    Performed by: Brayan Valerio MD  Authorized by: Brayan Valerio MD    Consent Done?:  Yes (Verbal)  Indications:  Arthritis and pain  Site marked: the procedure site was marked    Timeout: prior to procedure the correct patient, procedure, and site was verified    Prep: patient was prepped and draped in usual sterile fashion      Local anesthesia used?: Yes    Local anesthetic:  Lidocaine 1% without epinephrine    Details:  Needle Size:  25 G  Ultrasonic Guidance for needle placement?: No    Location:  Knee  Site:  R knee  Medications:  40 mg triamcinolone acetonide 40 mg/mL  Patient tolerance:  Patient tolerated the procedure well with no immediate complications  Large Joint Aspiration/Injection: L knee    Date/Time: 8/24/2023 1:15 PM    Performed by: Brayan Valerio MD  Authorized by: Brayan Valerio MD    Consent Done?:  Yes (Verbal)  Indications:  Arthritis and pain  Site marked: the procedure site was marked    Timeout: prior to procedure the correct patient, procedure, and site was verified    Prep: patient was prepped and draped in usual sterile fashion      Local anesthesia used?: Yes    Local anesthetic:  Lidocaine 1% without epinephrine    Details:  Needle Size:  25 G  Ultrasonic Guidance for needle placement?: No    Location:  Knee  Site:  L knee  Medications:  40 mg triamcinolone acetonide 40 mg/mL  Patient tolerance:  Patient tolerated the procedure well with no immediate complications

## 2023-09-13 ENCOUNTER — HOSPITAL ENCOUNTER (EMERGENCY)
Facility: HOSPITAL | Age: 48
Discharge: HOME | End: 2023-09-13
Attending: EMERGENCY MEDICINE
Payer: MEDICAID

## 2023-09-13 VITALS
DIASTOLIC BLOOD PRESSURE: 93 MMHG | HEART RATE: 100 BPM | WEIGHT: 293 LBS | TEMPERATURE: 98 F | OXYGEN SATURATION: 91 % | SYSTOLIC BLOOD PRESSURE: 186 MMHG | RESPIRATION RATE: 20 BRPM | HEIGHT: 63 IN | BODY MASS INDEX: 51.91 KG/M2

## 2023-09-13 DIAGNOSIS — G43.909 MIGRAINE WITHOUT STATUS MIGRAINOSUS, NOT INTRACTABLE, UNSPECIFIED MIGRAINE TYPE: Primary | ICD-10-CM

## 2023-09-13 DIAGNOSIS — I10 ACCELERATED HYPERTENSION: ICD-10-CM

## 2023-09-13 LAB
ALBUMIN SERPL BCP-MCNC: 3.4 G/DL (ref 3.5–5.2)
ALP SERPL-CCNC: 76 U/L (ref 55–135)
ALT SERPL W/O P-5'-P-CCNC: 22 U/L (ref 10–44)
ANION GAP SERPL CALC-SCNC: 7 MMOL/L (ref 8–16)
AST SERPL-CCNC: 20 U/L (ref 10–40)
BASOPHILS # BLD AUTO: 0.03 K/UL (ref 0–0.2)
BASOPHILS NFR BLD: 0.3 % (ref 0–1.9)
BILIRUB SERPL-MCNC: 0.9 MG/DL (ref 0.1–1)
BNP SERPL-MCNC: 18 PG/ML (ref 0–99)
BUN SERPL-MCNC: 22 MG/DL (ref 6–20)
CALCIUM SERPL-MCNC: 9.4 MG/DL (ref 8.7–10.5)
CHLORIDE SERPL-SCNC: 102 MMOL/L (ref 95–110)
CO2 SERPL-SCNC: 29 MMOL/L (ref 23–29)
CREAT SERPL-MCNC: 1.1 MG/DL (ref 0.5–1.4)
DIFFERENTIAL METHOD: ABNORMAL
EOSINOPHIL # BLD AUTO: 0.1 K/UL (ref 0–0.5)
EOSINOPHIL NFR BLD: 0.5 % (ref 0–8)
ERYTHROCYTE [DISTWIDTH] IN BLOOD BY AUTOMATED COUNT: 19.9 % (ref 11.5–14.5)
EST. GFR  (NO RACE VARIABLE): >60 ML/MIN/1.73 M^2
GLUCOSE SERPL-MCNC: 114 MG/DL (ref 70–110)
HCT VFR BLD AUTO: 44.7 % (ref 37–48.5)
HGB BLD-MCNC: 14 G/DL (ref 12–16)
IMM GRANULOCYTES # BLD AUTO: 0.04 K/UL (ref 0–0.04)
IMM GRANULOCYTES NFR BLD AUTO: 0.4 % (ref 0–0.5)
LYMPHOCYTES # BLD AUTO: 1.2 K/UL (ref 1–4.8)
LYMPHOCYTES NFR BLD: 11.5 % (ref 18–48)
MAGNESIUM SERPL-MCNC: 1.7 MG/DL (ref 1.6–2.6)
MCH RBC QN AUTO: 28.9 PG (ref 27–31)
MCHC RBC AUTO-ENTMCNC: 31.3 G/DL (ref 32–36)
MCV RBC AUTO: 92 FL (ref 82–98)
MONOCYTES # BLD AUTO: 0.5 K/UL (ref 0.3–1)
MONOCYTES NFR BLD: 5 % (ref 4–15)
NEUTROPHILS # BLD AUTO: 8.5 K/UL (ref 1.8–7.7)
NEUTROPHILS NFR BLD: 82.3 % (ref 38–73)
NRBC BLD-RTO: 0 /100 WBC
PLATELET # BLD AUTO: 315 K/UL (ref 150–450)
PMV BLD AUTO: 11.1 FL (ref 9.2–12.9)
POTASSIUM SERPL-SCNC: 3.6 MMOL/L (ref 3.5–5.1)
PROT SERPL-MCNC: 8.3 G/DL (ref 6–8.4)
RBC # BLD AUTO: 4.84 M/UL (ref 4–5.4)
SARS-COV-2 RDRP RESP QL NAA+PROBE: NEGATIVE
SODIUM SERPL-SCNC: 138 MMOL/L (ref 136–145)
TROPONIN I SERPL HS-MCNC: 11.9 PG/ML (ref 0–14.9)
TROPONIN I SERPL HS-MCNC: 13.2 PG/ML (ref 0–14.9)
WBC # BLD AUTO: 10.26 K/UL (ref 3.9–12.7)

## 2023-09-13 PROCEDURE — 93010 EKG 12-LEAD: ICD-10-PCS | Mod: ,,, | Performed by: GENERAL PRACTICE

## 2023-09-13 PROCEDURE — U0002 COVID-19 LAB TEST NON-CDC: HCPCS | Performed by: EMERGENCY MEDICINE

## 2023-09-13 PROCEDURE — 84484 ASSAY OF TROPONIN QUANT: CPT | Mod: 91 | Performed by: EMERGENCY MEDICINE

## 2023-09-13 PROCEDURE — 80053 COMPREHEN METABOLIC PANEL: CPT | Performed by: EMERGENCY MEDICINE

## 2023-09-13 PROCEDURE — 83880 ASSAY OF NATRIURETIC PEPTIDE: CPT | Performed by: EMERGENCY MEDICINE

## 2023-09-13 PROCEDURE — 96374 THER/PROPH/DIAG INJ IV PUSH: CPT

## 2023-09-13 PROCEDURE — 99285 EMERGENCY DEPT VISIT HI MDM: CPT | Mod: 25

## 2023-09-13 PROCEDURE — 93005 ELECTROCARDIOGRAM TRACING: CPT | Performed by: GENERAL PRACTICE

## 2023-09-13 PROCEDURE — 93010 ELECTROCARDIOGRAM REPORT: CPT | Mod: ,,, | Performed by: GENERAL PRACTICE

## 2023-09-13 PROCEDURE — 85025 COMPLETE CBC W/AUTO DIFF WBC: CPT | Performed by: EMERGENCY MEDICINE

## 2023-09-13 PROCEDURE — 83735 ASSAY OF MAGNESIUM: CPT | Performed by: EMERGENCY MEDICINE

## 2023-09-13 PROCEDURE — 63600175 PHARM REV CODE 636 W HCPCS: Performed by: EMERGENCY MEDICINE

## 2023-09-13 PROCEDURE — 96375 TX/PRO/DX INJ NEW DRUG ADDON: CPT

## 2023-09-13 RX ORDER — HYDRALAZINE HYDROCHLORIDE 20 MG/ML
5 INJECTION INTRAMUSCULAR; INTRAVENOUS
Status: COMPLETED | OUTPATIENT
Start: 2023-09-13 | End: 2023-09-13

## 2023-09-13 RX ORDER — BUTALBITAL, ACETAMINOPHEN AND CAFFEINE 50; 325; 40 MG/1; MG/1; MG/1
1 TABLET ORAL EVERY 4 HOURS PRN
Qty: 14 TABLET | Refills: 0 | Status: SHIPPED | OUTPATIENT
Start: 2023-09-13 | End: 2023-10-13

## 2023-09-13 RX ORDER — KETOROLAC TROMETHAMINE 30 MG/ML
15 INJECTION, SOLUTION INTRAMUSCULAR; INTRAVENOUS
Status: COMPLETED | OUTPATIENT
Start: 2023-09-13 | End: 2023-09-13

## 2023-09-13 RX ORDER — METOCLOPRAMIDE HYDROCHLORIDE 5 MG/ML
10 INJECTION INTRAMUSCULAR; INTRAVENOUS
Status: COMPLETED | OUTPATIENT
Start: 2023-09-13 | End: 2023-09-13

## 2023-09-13 RX ORDER — DIPHENHYDRAMINE HYDROCHLORIDE 50 MG/ML
25 INJECTION INTRAMUSCULAR; INTRAVENOUS
Status: COMPLETED | OUTPATIENT
Start: 2023-09-13 | End: 2023-09-13

## 2023-09-13 RX ADMIN — DIPHENHYDRAMINE HYDROCHLORIDE 25 MG: 50 INJECTION INTRAMUSCULAR; INTRAVENOUS at 11:09

## 2023-09-13 RX ADMIN — KETOROLAC TROMETHAMINE 15 MG: 30 INJECTION, SOLUTION INTRAMUSCULAR; INTRAVENOUS at 11:09

## 2023-09-13 RX ADMIN — METOCLOPRAMIDE 10 MG: 5 INJECTION, SOLUTION INTRAMUSCULAR; INTRAVENOUS at 11:09

## 2023-09-13 RX ADMIN — HYDRALAZINE HYDROCHLORIDE 5 MG: 20 INJECTION, SOLUTION INTRAMUSCULAR; INTRAVENOUS at 07:09

## 2023-09-13 NOTE — ED PROVIDER NOTES
Encounter Date: 2023       History     Chief Complaint   Patient presents with    Dizziness    Headache    Hypertension     Started at 3am     48-year-old female with history of asthma, obesity, gastroesophageal reflux, hypertension, anxiety, menorrhalgia gastroesophageal reflux, migraine headache.  Patient presents emergency department with complaint of left occipital headache which has been noted throughout the morning.  Has been associated with nausea vomiting as well as photophobia.  Patient denies neck stiffness, no fever, no URI symptoms.  Patient states that she just began her menstrual cycle today and usually has accompanying migraine with cycle.  She denies any other neurologic deficits including no facial paresthesia, no dysarthria, no upper lower extremity weakness.  Patient did not take anything prior to arrival to the emergency department.  Patient also with an additional complaint of left knee pain.  Has history of osteoarthritis.  Patient denies trauma.      Review of patient's allergies indicates:  No Known Allergies  Past Medical History:   Diagnosis Date    Antithrombin III deficiency 2021    Asthma     Claustrophobia     Elevated factor VIII level 2021    Esophageal reflux     Gastroesophageal reflux    Generalized anxiety disorder     Anxiety, Generalized    Herniated disc     Hypertension     Iron deficiency anemia due to chronic blood loss 10/27/2021    Lower extremity weakness     Morbid obesity     Obesity     Protein S deficiency 2021    Pulmonary embolism     Upper extremity weakness      Past Surgical History:   Procedure Laterality Date     SECTION      CHOLECYSTECTOMY      TONSILLECTOMY       No family history on file.  Social History     Tobacco Use    Smoking status: Former     Current packs/day: 0.00     Types: Cigarettes, Cigars     Quit date: 2019     Years since quittin.7    Smokeless tobacco: Never   Substance Use Topics    Alcohol use: Yes      Alcohol/week: 0.8 standard drinks of alcohol     Types: 1 Standard drinks or equivalent per week     Comment: rarely    Drug use: No     Review of Systems   Constitutional:  Negative for fever.   HENT:  Negative for sore throat.    Respiratory:  Negative for chest tightness and shortness of breath.    Cardiovascular:  Negative for chest pain.   Gastrointestinal:  Positive for nausea and vomiting.   Genitourinary:  Negative for dysuria.   Musculoskeletal:  Positive for arthralgias and myalgias. Negative for back pain.        Left knee pain   Skin:  Negative for rash.   Neurological:  Positive for headaches. Negative for weakness.   Hematological:  Does not bruise/bleed easily.       Physical Exam     Initial Vitals [09/13/23 0705]   BP Pulse Resp Temp SpO2   (!) 194/93 109 18 98.2 °F (36.8 °C) 96 %      MAP       --         Physical Exam    Nursing note and vitals reviewed.  Constitutional: She appears well-developed and well-nourished. She is not diaphoretic. No distress.   HENT:   Head: Normocephalic and atraumatic.   Nose: Nose normal.   Mouth/Throat: Oropharynx is clear and moist.   Eyes: Conjunctivae and EOM are normal. Pupils are equal, round, and reactive to light.   Neck: Neck supple. No thyromegaly present. No tracheal deviation present.   Normal range of motion.  Cardiovascular:  Normal rate, regular rhythm, normal heart sounds and intact distal pulses.     Exam reveals no gallop and no friction rub.       No murmur heard.  Pulmonary/Chest: No stridor. No respiratory distress.   Course bilateral breath sounds no adventitious sounds   Abdominal: Abdomen is soft. Bowel sounds are normal. She exhibits no distension and no mass. There is no abdominal tenderness. There is no rebound and no guarding.   Musculoskeletal:         General: No edema.      Cervical back: Normal range of motion and neck supple.      Comments: Positive neurovascularly intact, decreased range of motion secondary to pain.  2+ dorsalis  pedis, posterior tibial pulse cap refill less than 2 seconds     Lymphadenopathy:     She has no cervical adenopathy.   Neurological: She is alert and oriented to person, place, and time. She has normal strength and normal reflexes. GCS score is 15. GCS eye subscore is 4. GCS verbal subscore is 5. GCS motor subscore is 6.   Skin: Skin is warm and dry. Capillary refill takes less than 2 seconds.   Psychiatric: She has a normal mood and affect.         ED Course   Procedures  Labs Reviewed   CBC W/ AUTO DIFFERENTIAL - Abnormal; Notable for the following components:       Result Value    MCHC 31.3 (*)     RDW 19.9 (*)     Gran # (ANC) 8.5 (*)     Gran % 82.3 (*)     Lymph % 11.5 (*)     All other components within normal limits   COMPREHENSIVE METABOLIC PANEL - Abnormal; Notable for the following components:    Glucose 114 (*)     BUN 22 (*)     Albumin 3.4 (*)     Anion Gap 7 (*)     All other components within normal limits   MAGNESIUM   TROPONIN I HIGH SENSITIVITY   B-TYPE NATRIURETIC PEPTIDE   SARS-COV-2 RNA AMPLIFICATION, QUAL   TROPONIN I HIGH SENSITIVITY        ECG Results              EKG 12-lead (In process)  Result time 09/13/23 07:40:02      In process by Interface, Lab In [facility] (09/13/23 07:40:02)                   Narrative:    Test Reason : R07.9,    Vent. Rate : 107 BPM     Atrial Rate : 107 BPM     P-R Int : 138 ms          QRS Dur : 076 ms      QT Int : 320 ms       P-R-T Axes : 028 -03 065 degrees     QTc Int : 427 ms    Sinus tachycardia  Minimal voltage criteria for LVH, may be normal variant  Borderline Abnormal ECG  When compared with ECG of 15-JUL-2023 14:50,  No significant change was found    Referred By: [email or web address]   SELF           Confirmed By:                                   Imaging Results              CT Head Without Contrast (Final result)  Result time 09/13/23 09:47:44      Final result by Alli Madrid MD (09/13/23 09:47:44)                   Narrative:    CMS MANDATED QUALITY DATA  - CT RADIATION - 436    All CT scans at this facility utilize dose modulation, iterative reconstruction, and/or weight based dosing when appropriate to reduce radiation dose to as low as reasonably achievable.          Reason: headache and hypertension    TECHNIQUE: Head CT without IV contrast.    COMPARISON: 4/16/2023    FINDINGS:  Gray-white differentiation is maintained without hemorrhage, midline shift, or mass effect.  Periventricular and subcortical white matter low-attenuation bilaterally suggests unchanged chronic small vessel ischemic changes.    The ventricles and cisterns are maintained.    Calvarium is intact. Visualized sinuses are clear.    IMPRESSION:  1. No acute intracranial abnormality.  2. Unchanged chronic small vessel ischemic changes.    Electronically signed by:  Alli Madrid MD  9/13/2023 9:47 AM CDT Workstation: [id number]                                     X-Ray Chest AP Portable (Final result)  Result time 09/13/23 07:43:08      Final result by Cecilio Menezes MD (09/13/23 07:43:08)                   Narrative:    Reason: Chest Pain    FINDINGS:    Portable chest with comparison chest x-ray July 17, 2023 show enlarged cardiomediastinal silhouette. Evaluation of the lung fields is limited due to body habitus.  Bilateral perihilar hazy and interstitial opacities noted as well as prominence of the central hilar vascular structures. Elevated left hemidiaphragm noted. No acute osseous abnormality.    IMPRESSION:    Bilateral perihilar haze and interstitial lung opacities with prominence of the central hilar vascular structures could reflect pulmonary vascular congestion and pulmonary edema.    Electronically signed by:  Cecilio Menezes DO  9/13/2023 7:43 AM CDT Workstation: [id number]                                     Medications   hydrALAZINE injection 5 mg (5 mg Intravenous Given 9/13/23 0752)   metoclopramide HCl injection 10 mg (10 mg Intravenous Given 9/13/23 1124)   diphenhydrAMINE  injection 25 mg (25 mg Intravenous Given 9/13/23 1124)   ketorolac injection 15 mg (15 mg Intravenous Given 9/13/23 1123)     Medical Decision Making  Amount and/or Complexity of Data Reviewed  Labs: ordered.  Radiology: ordered.    Risk  Prescription drug management.               ED Course as of 09/13/23 1424   Wed Sep 13, 2023   1420 Patient seen evaluated emergency department.  Patient here with complaint of left occipital headache which occurred this morning.  Patient states that she normally has her migraine headaches that presents this way.  She denied any weakness to upper lower extremity, had nonfocal neurologic examination.  Patient workup in emergency department underwent CT head which showed no evidence of acute intracranial pathology.  Patient was afebrile with [place] [address].  Found to be COVID negative.  Chest x-ray showed nonspecific ground-glass opacification however lungs were clear, BNP 18.  Most likely findings due to body habitus.  Patient was given Reglan, Toradol and Benadryl for headache.  Patient had resolution of headache.  Her blood pressure did improve also for 194/93 to 177/79.  At this time patient will be discharged with Fioricet to take 1 every 6 hours as needed for headache.  Patient is to follow-up with her primary care provider this week.  She is return to emergency department problems persist worsens or additional concerns. [RM]      ED Course User Index  [RM] Frank Gibson MD               Medical Decision Making:   Initial Assessment:   48-year-old female with history of asthma, obesity, gastroesophageal reflux, hypertension, anxiety, menorrhalgia gastroesophageal reflux, migraine headache.  Patient presents emergency department with complaint of left occipital headache which has been noted throughout the morning.  Has been associated with nausea vomiting as well as photophobia.  Patient denies neck stiffness, no fever, no URI symptoms.  Patient states that she  just began her menstrual cycle today and usually has accompanying migraine with cycle.  She denies any other neurologic deficits including no facial paresthesia, no dysarthria, no upper lower extremity weakness.  Patient did not take anything prior to arrival to the emergency department.  Patient also with an additional complaint of left knee pain.  Has history of osteoarthritis.  Patient denies trauma.    Differential Diagnosis:   Tension headache, migraine headache, hypertensive urgency, hypertensive emergency, viral syndrome  Clinical Tests:   Lab Tests: Ordered and Reviewed  Radiological Study: Ordered and Reviewed  Medical Tests: Ordered and Reviewed      Clinical Impression:   Final diagnoses:  [G43.909] Migraine without status migrainosus, not intractable, unspecified migraine type (Primary)  [I10] Accelerated hypertension        ED Disposition Condition    Discharge Stable          ED Prescriptions       Medication Sig Dispense Start Date End Date Auth. Provider    butalbital-acetaminophen-caffeine -40 mg (FIORICET, ESGIC) -40 mg per tablet Take 1 tablet by mouth every 4 (four) hours as needed for Pain. 14 tablet 9/13/2023 10/13/2023 Frank Gibson MD          Follow-up Information       Follow up With Specialties Details Why Contact Info    Harpreet Rendon MD Internal Medicine Schedule an appointment as soon as possible for a visit in 1 week For recheck/continuing care [address] [place] [address] [address] Hernandez  [facility] [id number]  [phone]               Frank Gibson MD  09/13/23 [id number]

## 2023-11-30 ENCOUNTER — OFFICE VISIT (OUTPATIENT)
Dept: ORTHOPEDICS | Facility: CLINIC | Age: 48
End: 2023-11-30
Payer: MEDICAID

## 2023-11-30 VITALS
BODY MASS INDEX: 51.91 KG/M2 | SYSTOLIC BLOOD PRESSURE: 142 MMHG | WEIGHT: 293 LBS | HEIGHT: 63 IN | DIASTOLIC BLOOD PRESSURE: 90 MMHG

## 2023-11-30 DIAGNOSIS — M17.11 PRIMARY OSTEOARTHRITIS OF RIGHT KNEE: Primary | ICD-10-CM

## 2023-11-30 DIAGNOSIS — M17.12 PRIMARY OSTEOARTHRITIS OF LEFT KNEE: ICD-10-CM

## 2023-11-30 PROCEDURE — 99213 PR OFFICE/OUTPT VISIT, EST, LEVL III, 20-29 MIN: ICD-10-PCS | Mod: 25,S$GLB,, | Performed by: ORTHOPAEDIC SURGERY

## 2023-11-30 PROCEDURE — 1159F PR MEDICATION LIST DOCUMENTED IN MEDICAL RECORD: ICD-10-PCS | Mod: CPTII,S$GLB,, | Performed by: ORTHOPAEDIC SURGERY

## 2023-11-30 PROCEDURE — 1159F MED LIST DOCD IN RCRD: CPT | Mod: CPTII,S$GLB,, | Performed by: ORTHOPAEDIC SURGERY

## 2023-11-30 PROCEDURE — 3077F PR MOST RECENT SYSTOLIC BLOOD PRESSURE >= 140 MM HG: ICD-10-PCS | Mod: CPTII,S$GLB,, | Performed by: ORTHOPAEDIC SURGERY

## 2023-11-30 PROCEDURE — 1160F RVW MEDS BY RX/DR IN RCRD: CPT | Mod: CPTII,S$GLB,, | Performed by: ORTHOPAEDIC SURGERY

## 2023-11-30 PROCEDURE — 4010F PR ACE/ARB THEARPY RXD/TAKEN: ICD-10-PCS | Mod: CPTII,S$GLB,, | Performed by: ORTHOPAEDIC SURGERY

## 2023-11-30 PROCEDURE — 3044F HG A1C LEVEL LT 7.0%: CPT | Mod: CPTII,S$GLB,, | Performed by: ORTHOPAEDIC SURGERY

## 2023-11-30 PROCEDURE — 20610 LARGE JOINT ASPIRATION/INJECTION: L KNEE: ICD-10-PCS | Mod: 50,S$GLB,, | Performed by: ORTHOPAEDIC SURGERY

## 2023-11-30 PROCEDURE — 3008F BODY MASS INDEX DOCD: CPT | Mod: CPTII,S$GLB,, | Performed by: ORTHOPAEDIC SURGERY

## 2023-11-30 PROCEDURE — 3008F PR BODY MASS INDEX (BMI) DOCUMENTED: ICD-10-PCS | Mod: CPTII,S$GLB,, | Performed by: ORTHOPAEDIC SURGERY

## 2023-11-30 PROCEDURE — 3080F PR MOST RECENT DIASTOLIC BLOOD PRESSURE >= 90 MM HG: ICD-10-PCS | Mod: CPTII,S$GLB,, | Performed by: ORTHOPAEDIC SURGERY

## 2023-11-30 PROCEDURE — 99213 OFFICE O/P EST LOW 20 MIN: CPT | Mod: 25,S$GLB,, | Performed by: ORTHOPAEDIC SURGERY

## 2023-11-30 PROCEDURE — 3044F PR MOST RECENT HEMOGLOBIN A1C LEVEL <7.0%: ICD-10-PCS | Mod: CPTII,S$GLB,, | Performed by: ORTHOPAEDIC SURGERY

## 2023-11-30 PROCEDURE — 1160F PR REVIEW ALL MEDS BY PRESCRIBER/CLIN PHARMACIST DOCUMENTED: ICD-10-PCS | Mod: CPTII,S$GLB,, | Performed by: ORTHOPAEDIC SURGERY

## 2023-11-30 PROCEDURE — 3077F SYST BP >= 140 MM HG: CPT | Mod: CPTII,S$GLB,, | Performed by: ORTHOPAEDIC SURGERY

## 2023-11-30 PROCEDURE — 4010F ACE/ARB THERAPY RXD/TAKEN: CPT | Mod: CPTII,S$GLB,, | Performed by: ORTHOPAEDIC SURGERY

## 2023-11-30 PROCEDURE — 20610 DRAIN/INJ JOINT/BURSA W/O US: CPT | Mod: 50,S$GLB,, | Performed by: ORTHOPAEDIC SURGERY

## 2023-11-30 PROCEDURE — 3080F DIAST BP >= 90 MM HG: CPT | Mod: CPTII,S$GLB,, | Performed by: ORTHOPAEDIC SURGERY

## 2023-11-30 RX ORDER — TRIAMCINOLONE ACETONIDE 40 MG/ML
40 INJECTION, SUSPENSION INTRA-ARTICULAR; INTRAMUSCULAR
Status: DISCONTINUED | OUTPATIENT
Start: 2023-11-30 | End: 2023-11-30 | Stop reason: HOSPADM

## 2023-11-30 RX ADMIN — TRIAMCINOLONE ACETONIDE 40 MG: 40 INJECTION, SUSPENSION INTRA-ARTICULAR; INTRAMUSCULAR at 12:11

## 2023-11-30 NOTE — PROCEDURES
Large Joint Aspiration/Injection: L knee    Date/Time: 11/30/2023 12:30 PM    Performed by: Brayan Valerio MD  Authorized by: Brayan Valerio MD    Consent Done?:  Yes (Verbal)  Indications:  Pain  Site marked: the procedure site was marked    Timeout: prior to procedure the correct patient, procedure, and site was verified    Prep: patient was prepped and draped in usual sterile fashion      Local anesthesia used?: Yes    Local anesthetic:  Lidocaine 1% without epinephrine    Details:  Needle Size:  25 G  Ultrasonic Guidance for needle placement?: No    Approach:  Anterolateral  Location:  Knee  Site:  L knee  Medications:  40 mg triamcinolone acetonide 40 mg/mL  Patient tolerance:  Patient tolerated the procedure well with no immediate complications  Large Joint Aspiration/Injection: R knee    Date/Time: 11/30/2023 12:30 PM    Performed by: Brayan Valerio MD  Authorized by: Brayan Valerio MD    Consent Done?:  Yes (Verbal)  Indications:  Pain  Site marked: the procedure site was marked    Timeout: prior to procedure the correct patient, procedure, and site was verified    Prep: patient was prepped and draped in usual sterile fashion      Local anesthesia used?: Yes    Local anesthetic:  Lidocaine 1% without epinephrine    Details:  Needle Size:  25 G  Ultrasonic Guidance for needle placement?: No    Approach:  Anterolateral  Location:  Knee  Site:  R knee  Medications:  40 mg triamcinolone acetonide 40 mg/mL  Patient tolerance:  Patient tolerated the procedure well with no immediate complications

## 2023-11-30 NOTE — PROGRESS NOTES
[facility]    Subjective:     Chief Complaint:   Chief Complaint   Patient presents with    Right Knee - Pain     OA. Follow up for bilat knee inj, 23. Injections helped some but as the pain returned it was a lot of throbbing type pain    Left Knee - Pain     OA. Follow up for bilat knee inj, 23. Injections helped some but as the pain returned it was a lot of throbbing type pain       Past Medical History:   Diagnosis Date    Antithrombin III deficiency 2021    Asthma     Claustrophobia     Elevated factor VIII level 2021    Esophageal reflux     Gastroesophageal reflux    Generalized anxiety disorder     Anxiety, Generalized    Herniated disc     Hypertension     Iron deficiency anemia due to chronic blood loss 10/27/2021    Lower extremity weakness     Morbid obesity     Obesity     Protein S deficiency 2021    Pulmonary embolism     Upper extremity weakness        Past Surgical History:   Procedure Laterality Date     SECTION      CHOLECYSTECTOMY      TONSILLECTOMY         Current Outpatient Medications   Medication Sig    albuterol (PROVENTIL/VENTOLIN HFA) 90 mcg/actuation inhaler Inhale 2 puffs into the lungs every 6 (six) hours as needed for Wheezing. Rescue    amLODIPine (NORVASC) 10 MG tablet Take 1 tablet (10 mg total) by mouth once daily.    atorvastatin (LIPITOR) 10 MG tablet Take 10 mg by mouth every evening.    butalbital-acetaminophen-caff -40 mg Cap Take 1 capsule by mouth every 4 (four) hours as needed.    butalbital-acetaminophen-caffeine -40 mg (FIORICET, ESGIC) -40 mg per tablet Take 1 tablet by mouth every 4 (four) hours as needed.    carvediloL (COREG) 25 MG tablet Take 1 tablet (25 mg total) by mouth 2 (two) times daily.    ELIQUIS 5 mg Tab TAKE 1 TABLET BY MOUTH TWICE DAILY (Patient taking differently: Take by mouth 2 (two) times daily.)    ergocalciferol (ERGOCALCIFEROL) 50,000 unit Cap Take 50,000 Units by mouth twice a week.     famotidine (PEPCID) 20 MG tablet Take 20 mg by mouth 2 (two) times daily.    ferrous sulfate (FEROSUL) 325 mg (65 mg iron) Tab tablet TAKE 1 TABLET BY MOUTH EVERY DAY (Patient taking differently: Take by mouth once daily. TAKE 1 TABLET BY MOUTH EVERY DAY)    fluticasone propionate (FLONASE) 50 mcg/actuation nasal spray 2 sprays by Each Nostril route daily as needed for Rhinitis.    fluticasone-salmeterol 230-21 mcg/dose (ADVAIR HFA) 230-21 mcg/actuation HFAA inhaler Inhale 2 puffs into the lungs 2 (two) times daily. Controller    meloxicam (MOBIC) 7.5 MG tablet Take 7.5 mg by mouth once daily.    minocycline (MINOCIN,DYNACIN) 100 MG capsule Take 100 mg by mouth 2 (two) times daily.    MIRENA 20 mcg/24 hours (8 yrs) 52 mg IUD SMARTSIG:Intrauterine Once    miSOPROStoL (CYTOTEC) 200 MCG Tab Take by mouth.    mupirocin (BACTROBAN) 2 % ointment Apply 1 g topically 3 (three) times daily.    NEXIUM 40 mg capsule Take 40 mg by mouth once daily.    omeprazole (PRILOSEC) 20 MG capsule Take 20 mg by mouth once daily.    predniSONE (DELTASONE) 20 MG tablet Take 1 tablet (20 mg total) by mouth once daily.    trazodone (DESYREL) 100 MG tablet Take 100 mg by mouth every evening.    furosemide (LASIX) 20 MG tablet Take 1 tablet (20 mg total) by mouth once daily. (Patient not taking: Reported on 2023)    losartan (COZAAR) 100 MG tablet Take 1 tablet (100 mg total) by mouth once daily.     Current Facility-Administered Medications   Medication    bebtelovimab (EUA) 175 mg/2 mL (87.5 mg/mL) injection 175 mg       Review of patient's allergies indicates:  No Known Allergies    No family history on file.    Social History     Socioeconomic History    Marital status:    Tobacco Use    Smoking status: Former     Current packs/day: 0.00     Types: Cigarettes, Cigars     Quit date: 2019     Years since quittin.9    Smokeless tobacco: Never   Substance and Sexual Activity    Alcohol use: Yes     Alcohol/week: 0.8 standard  drinks of alcohol     Types: 1 Standard drinks or equivalent per week     Comment: rarely    Drug use: No    Sexual activity: Not Currently     Social Determinants of Health     Financial Resource Strain: Medium Risk (10/18/2022)    Overall Financial Resource Strain (CARDIA)     Difficulty of Paying Living Expenses: Somewhat hard   Food Insecurity: Food Insecurity Present (10/18/2022)    Hunger Vital Sign     Worried About Running Out of Food in the Last Year: Sometimes true     Ran Out of Food in the Last Year: Sometimes true   Transportation Needs: Unmet Transportation Needs (10/18/2022)    PRAPARE - Transportation     Lack of Transportation (Medical): Yes     Lack of Transportation (Non-Medical): Yes   Stress: No Stress Concern Present (10/18/2022)    [employer] [place] [employer] - Occupational Stress Questionnaire     Feeling of Stress : Only a little   Social Connections: Socially Isolated (10/18/2022)    Social Connection and Isolation Panel [NHANES]     Frequency of Communication with Friends and Family: Once a week     Frequency of Social Gatherings with Friends and Family: Once a week     Attends [employer] Services: Never     Active Member of Clubs or Organizations: No     Attends Club or Organization Meetings: Never     Marital Status:    Housing Stability: High Risk (10/18/2022)    Housing Stability Vital Sign     Unable to Pay for Housing in the Last Year: Yes     Unstable Housing in the Last Year: No       History of present illness:  Patient comes in today for her bilateral knees.  She wants to get injections.      Review of Systems:    Constitution: Negative for chills, fever, and sweats.  Negative for unexplained weight loss.    HENT:  Negative for headaches and blurry vision.    Cardiovascular:Negative for chest pain or irregular heart beat. Negative for hypertension.    Respiratory:  Negative for cough and shortness of breath.    Gastrointestinal: Negative for abdominal pain,  heartburn, melena, nausea, and vomitting.    Genitourinary:  Negative bladder incontinence and dysuria.    Musculoskeletal:  See HPI for details.     Neurological: Negative for numbness.    Psychiatric/Behavioral: Negative for depression.  The patient is not nervous/anxious.      Endocrine: Negative for polyuria    Hematologic/Lymphatic: Negative for bleeding problem.  Does not bruise/bleed easily.    Skin: Negative for poor would healing and rash    Objective:      Physical Examination:    Vital Signs:    Vitals:    11/30/23 1222   BP: (!) 142/90       Body mass index is 55.27 kg/m².    This a well-developed, well nourished patient in no acute distress.  They are alert and oriented and cooperative to examination.        Patient is morbidly obese.  She is range of motion 0-90 degrees bilaterally  Pertinent New Results:    XRAY Report / Interpretation:       Assessment/Plan:      Severe osteoarthritis bilateral knees.  I injected both knees with Kenalog and lidocaine follow-up p.r.n.      This note was created using Dragon voice recognition software that occasionally misinterpreted phrases or words.

## 2023-12-14 ENCOUNTER — HOSPITAL ENCOUNTER (EMERGENCY)
Facility: HOSPITAL | Age: 48
Discharge: HOME | End: 2023-12-15
Attending: EMERGENCY MEDICINE
Payer: MEDICAID

## 2023-12-14 DIAGNOSIS — I10 ACCELERATED HYPERTENSION: ICD-10-CM

## 2023-12-14 DIAGNOSIS — R51.9 NONINTRACTABLE HEADACHE, UNSPECIFIED CHRONICITY PATTERN, UNSPECIFIED HEADACHE TYPE: ICD-10-CM

## 2023-12-14 DIAGNOSIS — J18.9 PNEUMONIA OF LEFT LOWER LOBE DUE TO INFECTIOUS ORGANISM: Primary | ICD-10-CM

## 2023-12-14 LAB
ADENOVIRUS: NOT DETECTED
ALBUMIN SERPL BCP-MCNC: 3.6 G/DL (ref 3.5–5.2)
ALP SERPL-CCNC: 77 U/L (ref 55–135)
ALT SERPL W/O P-5'-P-CCNC: 18 U/L (ref 10–44)
AMPHET+METHAMPHET UR QL: NEGATIVE
ANION GAP SERPL CALC-SCNC: 6 MMOL/L (ref 8–16)
AST SERPL-CCNC: 38 U/L (ref 10–40)
B-HCG UR QL: NEGATIVE
BACTERIA #/AREA URNS HPF: NEGATIVE /HPF
BARBITURATES UR QL SCN>200 NG/ML: ABNORMAL
BASOPHILS # BLD AUTO: 0.03 K/UL (ref 0–0.2)
BASOPHILS NFR BLD: 0.3 % (ref 0–1.9)
BENZODIAZ UR QL SCN>200 NG/ML: NEGATIVE
BILIRUB SERPL-MCNC: 0.3 MG/DL (ref 0.1–1)
BILIRUB UR QL STRIP: NEGATIVE
BNP SERPL-MCNC: 28 PG/ML (ref 0–99)
BORDETELLA PARAPERTUSSIS (IS1001): NOT DETECTED
BORDETELLA PERTUSSIS (PTXP): NOT DETECTED
BUN SERPL-MCNC: 18 MG/DL (ref 6–20)
BZE UR QL SCN: NEGATIVE
CALCIUM SERPL-MCNC: 8.7 MG/DL (ref 8.7–10.5)
CANNABINOIDS UR QL SCN: ABNORMAL
CHLAMYDIA PNEUMONIAE: NOT DETECTED
CHLORIDE SERPL-SCNC: 102 MMOL/L (ref 95–110)
CK SERPL-CCNC: 53 U/L (ref 20–180)
CLARITY UR: CLEAR
CO2 SERPL-SCNC: 30 MMOL/L (ref 23–29)
COLOR UR: YELLOW
CORONAVIRUS 229E, COMMON COLD VIRUS: NOT DETECTED
CORONAVIRUS HKU1, COMMON COLD VIRUS: NOT DETECTED
CORONAVIRUS NL63, COMMON COLD VIRUS: NOT DETECTED
CORONAVIRUS OC43, COMMON COLD VIRUS: NOT DETECTED
CREAT SERPL-MCNC: 1.1 MG/DL (ref 0.5–1.4)
CREAT UR-MCNC: 126.3 MG/DL (ref 15–325)
CTP QC/QA: YES
DIFFERENTIAL METHOD BLD: ABNORMAL
EOSINOPHIL # BLD AUTO: 0.1 K/UL (ref 0–0.5)
EOSINOPHIL NFR BLD: 0.4 % (ref 0–8)
ERYTHROCYTE [DISTWIDTH] IN BLOOD BY AUTOMATED COUNT: 14.1 % (ref 11.5–14.5)
EST. GFR  (NO RACE VARIABLE): >60 ML/MIN/1.73 M^2
FLUBV RNA NPH QL NAA+NON-PROBE: NOT DETECTED
GLUCOSE SERPL-MCNC: 87 MG/DL (ref 70–110)
GLUCOSE UR QL STRIP: NEGATIVE
GROUP A STREP, MOLECULAR: NEGATIVE
HCT VFR BLD AUTO: 39.4 % (ref 37–48.5)
HGB BLD-MCNC: 12.8 G/DL (ref 12–16)
HGB UR QL STRIP: NEGATIVE
HPIV1 RNA NPH QL NAA+NON-PROBE: NOT DETECTED
HPIV2 RNA NPH QL NAA+NON-PROBE: NOT DETECTED
HPIV3 RNA NPH QL NAA+NON-PROBE: NOT DETECTED
HPIV4 RNA NPH QL NAA+NON-PROBE: NOT DETECTED
HUMAN METAPNEUMOVIRUS: NOT DETECTED
HYALINE CASTS #/AREA URNS LPF: 3 /LPF
IMM GRANULOCYTES # BLD AUTO: 0.05 K/UL (ref 0–0.04)
IMM GRANULOCYTES NFR BLD AUTO: 0.4 % (ref 0–0.5)
INFLUENZA A (SUBTYPES H1,H1-2009,H3): NOT DETECTED
KETONES UR QL STRIP: NEGATIVE
LEUKOCYTE ESTERASE UR QL STRIP: NEGATIVE
LIPASE SERPL-CCNC: 97 U/L (ref 4–60)
LYMPHOCYTES # BLD AUTO: 1.5 K/UL (ref 1–4.8)
LYMPHOCYTES NFR BLD: 12.7 % (ref 18–48)
MAGNESIUM SERPL-MCNC: 2 MG/DL (ref 1.6–2.6)
MAGNESIUM SERPL-MCNC: 2 MG/DL (ref 1.6–2.6)
MCH RBC QN AUTO: 31.5 PG (ref 27–31)
MCHC RBC AUTO-ENTMCNC: 32.5 G/DL (ref 32–36)
MCV RBC AUTO: 97 FL (ref 82–98)
MICROSCOPIC COMMENT: ABNORMAL
MONOCYTES # BLD AUTO: 0.8 K/UL (ref 0.3–1)
MONOCYTES NFR BLD: 7.1 % (ref 4–15)
MYCOPLASMA PNEUMONIAE: NOT DETECTED
NEUTROPHILS # BLD AUTO: 9.1 K/UL (ref 1.8–7.7)
NEUTROPHILS NFR BLD: 79.1 % (ref 38–73)
NITRITE UR QL STRIP: NEGATIVE
NRBC BLD-RTO: 0 /100 WBC
OPIATES UR QL SCN: NEGATIVE
PCP UR QL SCN>25 NG/ML: NEGATIVE
PH UR STRIP: 6 [PH] (ref 5–8)
PLATELET # BLD AUTO: 307 K/UL (ref 150–450)
PMV BLD AUTO: 11.6 FL (ref 9.2–12.9)
POTASSIUM SERPL-SCNC: 5.1 MMOL/L (ref 3.5–5.1)
PROT SERPL-MCNC: 8.1 G/DL (ref 6–8.4)
PROT UR QL STRIP: ABNORMAL
RBC # BLD AUTO: 4.06 M/UL (ref 4–5.4)
RBC #/AREA URNS HPF: 2 /HPF (ref 0–4)
RESPIRATORY INFECTION PANEL SOURCE: NORMAL
RSV RNA NPH QL NAA+NON-PROBE: NOT DETECTED
RV+EV RNA NPH QL NAA+NON-PROBE: NOT DETECTED
SARS-COV-2 RNA RESP QL NAA+PROBE: NOT DETECTED
SODIUM SERPL-SCNC: 138 MMOL/L (ref 136–145)
SP GR UR STRIP: 1.02 (ref 1–1.03)
SQUAMOUS #/AREA URNS HPF: 7 /HPF
TOXICOLOGY INFORMATION: ABNORMAL
TROPONIN I SERPL HS-MCNC: 18.9 PG/ML (ref 0–14.9)
TROPONIN I SERPL HS-MCNC: 22 PG/ML (ref 0–14.9)
TSH SERPL DL<=0.005 MIU/L-ACNC: 1.86 UIU/ML (ref 0.34–5.6)
URN SPEC COLLECT METH UR: ABNORMAL
UROBILINOGEN UR STRIP-ACNC: NEGATIVE EU/DL
WBC # BLD AUTO: 11.49 K/UL (ref 3.9–12.7)
WBC #/AREA URNS HPF: 3 /HPF (ref 0–5)

## 2023-12-14 PROCEDURE — 96374 THER/PROPH/DIAG INJ IV PUSH: CPT | Mod: 59

## 2023-12-14 PROCEDURE — 87633 RESP VIRUS 12-25 TARGETS: CPT | Performed by: EMERGENCY MEDICINE

## 2023-12-14 PROCEDURE — 63600175 PHARM REV CODE 636 W HCPCS: Performed by: EMERGENCY MEDICINE

## 2023-12-14 PROCEDURE — 82550 ASSAY OF CK (CPK): CPT | Performed by: EMERGENCY MEDICINE

## 2023-12-14 PROCEDURE — 25000003 PHARM REV CODE 250: Performed by: STUDENT IN AN ORGANIZED HEALTH CARE EDUCATION/TRAINING PROGRAM

## 2023-12-14 PROCEDURE — 93010 ELECTROCARDIOGRAM REPORT: CPT | Mod: ,,, | Performed by: GENERAL PRACTICE

## 2023-12-14 PROCEDURE — 99285 EMERGENCY DEPT VISIT HI MDM: CPT | Mod: 25

## 2023-12-14 PROCEDURE — 83690 ASSAY OF LIPASE: CPT | Performed by: EMERGENCY MEDICINE

## 2023-12-14 PROCEDURE — 83735 ASSAY OF MAGNESIUM: CPT | Performed by: EMERGENCY MEDICINE

## 2023-12-14 PROCEDURE — 87651 STREP A DNA AMP PROBE: CPT | Performed by: EMERGENCY MEDICINE

## 2023-12-14 PROCEDURE — 83880 ASSAY OF NATRIURETIC PEPTIDE: CPT | Performed by: EMERGENCY MEDICINE

## 2023-12-14 PROCEDURE — 93005 ELECTROCARDIOGRAM TRACING: CPT | Performed by: GENERAL PRACTICE

## 2023-12-14 PROCEDURE — 63600175 PHARM REV CODE 636 W HCPCS: Performed by: STUDENT IN AN ORGANIZED HEALTH CARE EDUCATION/TRAINING PROGRAM

## 2023-12-14 PROCEDURE — 96360 HYDRATION IV INFUSION INIT: CPT | Mod: 59

## 2023-12-14 PROCEDURE — 84484 ASSAY OF TROPONIN QUANT: CPT | Performed by: EMERGENCY MEDICINE

## 2023-12-14 PROCEDURE — 81025 URINE PREGNANCY TEST: CPT | Performed by: EMERGENCY MEDICINE

## 2023-12-14 PROCEDURE — 96375 TX/PRO/DX INJ NEW DRUG ADDON: CPT

## 2023-12-14 PROCEDURE — 81001 URINALYSIS AUTO W/SCOPE: CPT | Performed by: EMERGENCY MEDICINE

## 2023-12-14 PROCEDURE — 80053 COMPREHEN METABOLIC PANEL: CPT | Performed by: EMERGENCY MEDICINE

## 2023-12-14 PROCEDURE — 85025 COMPLETE CBC W/AUTO DIFF WBC: CPT | Performed by: EMERGENCY MEDICINE

## 2023-12-14 PROCEDURE — 84443 ASSAY THYROID STIM HORMONE: CPT | Performed by: EMERGENCY MEDICINE

## 2023-12-14 PROCEDURE — 25500020 PHARM REV CODE 255: Performed by: EMERGENCY MEDICINE

## 2023-12-14 PROCEDURE — 80307 DRUG TEST PRSMV CHEM ANLYZR: CPT | Performed by: EMERGENCY MEDICINE

## 2023-12-14 RX ORDER — ONDANSETRON HYDROCHLORIDE 2 MG/ML
4 INJECTION, SOLUTION INTRAVENOUS
Status: COMPLETED | OUTPATIENT
Start: 2023-12-14 | End: 2023-12-14

## 2023-12-14 RX ORDER — IBUPROFEN 400 MG/1
400 TABLET ORAL
Status: COMPLETED | OUTPATIENT
Start: 2023-12-14 | End: 2023-12-14

## 2023-12-14 RX ORDER — MORPHINE SULFATE 2 MG/ML
2 INJECTION, SOLUTION INTRAMUSCULAR; INTRAVENOUS
Status: COMPLETED | OUTPATIENT
Start: 2023-12-14 | End: 2023-12-14

## 2023-12-14 RX ORDER — DOXYCYCLINE 100 MG/1
100 CAPSULE ORAL ONCE
Status: COMPLETED | OUTPATIENT
Start: 2023-12-14 | End: 2023-12-14

## 2023-12-14 RX ORDER — ACETAMINOPHEN 500 MG
1000 TABLET ORAL
Status: COMPLETED | OUTPATIENT
Start: 2023-12-14 | End: 2023-12-14

## 2023-12-14 RX ORDER — DOXYCYCLINE 100 MG/1
100 CAPSULE ORAL 2 TIMES DAILY
Qty: 10 CAPSULE | Refills: 0 | Status: SHIPPED | OUTPATIENT
Start: 2023-12-14 | End: 2023-12-19

## 2023-12-14 RX ADMIN — DOXYCYCLINE HYCLATE 100 MG: 100 CAPSULE ORAL at 10:12

## 2023-12-14 RX ADMIN — IOHEXOL 100 ML: 350 INJECTION, SOLUTION INTRAVENOUS at 08:12

## 2023-12-14 RX ADMIN — IBUPROFEN 400 MG: 400 TABLET, FILM COATED ORAL at 10:12

## 2023-12-14 RX ADMIN — ONDANSETRON 4 MG: 2 INJECTION INTRAMUSCULAR; INTRAVENOUS at 06:12

## 2023-12-14 RX ADMIN — SODIUM CHLORIDE, SODIUM LACTATE, POTASSIUM CHLORIDE, AND CALCIUM CHLORIDE 1000 ML: .6; .31; .03; .02 INJECTION, SOLUTION INTRAVENOUS at 10:12

## 2023-12-14 RX ADMIN — ACETAMINOPHEN 1000 MG: 500 TABLET ORAL at 10:12

## 2023-12-14 RX ADMIN — MORPHINE SULFATE 2 MG: 2 INJECTION, SOLUTION INTRAMUSCULAR; INTRAVENOUS at 06:12

## 2023-12-14 NOTE — ED PROVIDER NOTES
Encounter Date: 12/14/2023       History     Chief Complaint   Patient presents with    Dizziness     Pt arrived EMS from residence complaining of dizziness, headache, and numbness in all finger tips x8 hours.      48-year-old female with a history of antithrombin 3 deficiency, hypertension, iron deficiency anemia, hypertension, pulmonary emboli, elevated BMI presents complaining of headache and dizziness.  Symptoms have gradually worsened throughout the day today.  Headache was gradual in onset.  The headache is located to the frontal area.  She does have a history of chronic recurrent headaches and headaches associated with her elevated blood pressure.  She states she has had to be admitted to the hospital in the past secondary to headaches and hypertension.  She denies chest pain.  She has been having a dry cough, sinus congestion and a mild sore throat over the past several days.  She has not had any known fever.  After the headache developed she did vomit several times.  She denies neck pain or stiffness, denies any focal weakness, numbness tingling or paresthesias.  Denies syncope or near-syncope.  She has a history of a pulmonary embolism but reports she is only taking Eliquis 5 mg once daily as she had a history of heavy vaginal bleeding when she was taking the Eliquis b.i.d..  Reports being mildly short of breath but denies any chest pain.  The patient is uncertain if this could be due to the discomfort associated with her headache.  She denies neck pain or stiffness, she does have some light sensitivity but reports this is normal for her with her headaches.  Current headache is typical of previous headaches.  This is not the worse headache of her life, headache was gradual and not thunderclap in onset.  Triage note reports tingling to her hands however the patient is denying any tingling or paresthesias.  Reports that she may have had this when her headache was making her feel anxious  earlier.        Review of patient's allergies indicates:  No Known Allergies  Past Medical History:   Diagnosis Date    Antithrombin III deficiency 2021    Asthma     Claustrophobia     Elevated factor VIII level 2021    Esophageal reflux     Gastroesophageal reflux    Generalized anxiety disorder     Anxiety, Generalized    Herniated disc     Hypertension     Iron deficiency anemia due to chronic blood loss 10/27/2021    Lower extremity weakness     Morbid obesity     Obesity     Protein S deficiency 2021    Pulmonary embolism     Upper extremity weakness      Past Surgical History:   Procedure Laterality Date     SECTION      CHOLECYSTECTOMY      TONSILLECTOMY       No family history on file.  Social History     Tobacco Use    Smoking status: Former     Current packs/day: 0.00     Types: Cigarettes, Cigars     Quit date: 2019     Years since quittin.9    Smokeless tobacco: Never   Substance Use Topics    Alcohol use: Yes     Alcohol/week: 0.8 standard drinks of alcohol     Types: 1 Standard drinks or equivalent per week     Comment: rarely    Drug use: No     Review of Systems   Constitutional:  Positive for activity change and appetite change. Negative for chills, fatigue and fever.   HENT:  Positive for congestion, postnasal drip and rhinorrhea. Negative for dental problem, drooling, ear pain, sore throat and trouble swallowing.    Eyes: Negative.  Negative for photophobia, pain, redness and visual disturbance.   Respiratory:  Positive for cough and shortness of breath. Negative for chest tightness and wheezing.    Cardiovascular: Negative.  Negative for chest pain, palpitations and leg swelling.   Gastrointestinal:  Positive for nausea and vomiting. Negative for abdominal distention, abdominal pain, blood in stool, constipation and diarrhea.   Endocrine: Negative.    Genitourinary: Negative.  Negative for decreased urine volume, difficulty urinating, dysuria, flank pain, frequency,  pelvic pain and urgency.   Musculoskeletal: Negative.  Negative for arthralgias, back pain, gait problem, myalgias, neck pain and neck stiffness.   Skin: Negative.  Negative for rash.   Neurological:  Positive for light-headedness and headaches. Negative for syncope, facial asymmetry, speech difficulty, weakness and numbness.   Hematological:  Does not bruise/bleed easily.   Psychiatric/Behavioral: Negative.  Negative for confusion.    All other systems reviewed and are negative.      Physical Exam     Initial Vitals [12/14/23 1633]   BP Pulse Resp Temp SpO2   (!) 230/100 84 18 98.3 °F (36.8 °C) 95 %      MAP       --         Physical Exam    Nursing note and vitals reviewed.  Constitutional: She is cooperative. She does not have a sickly appearance. She does not appear ill. No distress.   HENT:   Head: Normocephalic and atraumatic.   Right Ear: No mastoid tenderness. A middle ear effusion is present.   Left Ear: No mastoid tenderness. A middle ear effusion is present.   Nose: Mucosal edema and rhinorrhea present. Right sinus exhibits frontal sinus tenderness. Left sinus exhibits frontal sinus tenderness.   Mouth/Throat: Uvula is midline and oropharynx is clear and moist. Mucous membranes are dry. No oral lesions. No uvula swelling. No oropharyngeal exudate, posterior oropharyngeal edema or posterior oropharyngeal erythema.   Eyes: Conjunctivae, EOM and lids are normal. Pupils are equal, round, and reactive to light. No scleral icterus.   Neck: Trachea normal and phonation normal. Neck supple. No thyroid mass and no thyromegaly present. No stridor present. No JVD present.   Normal range of motion.   Full passive range of motion without pain.     Cardiovascular:  Normal rate, regular rhythm, normal heart sounds, intact distal pulses and normal pulses.     Exam reveals no gallop, no distant heart sounds, no friction rub and no decreased pulses.       No murmur heard.  Pulmonary/Chest: Effort normal and breath sounds  normal. No accessory muscle usage or stridor. No tachypnea. No respiratory distress. She has no wheezes. She has no rhonchi. She has no rales.   Abdominal: Abdomen is soft. Bowel sounds are normal. She exhibits no distension, no pulsatile midline mass and no mass. There is no abdominal tenderness. There is no rigidity and no guarding.   Musculoskeletal:         General: No tenderness or edema. Normal range of motion.      Right hand: Normal. Normal range of motion. Normal strength. Normal sensation. Normal capillary refill. Normal pulse.      Left hand: Normal. Normal range of motion. Normal strength. Normal sensation. Normal capillary refill. Normal pulse.      Cervical back: Normal, full passive range of motion without pain, normal range of motion and neck supple. No edema, erythema, rigidity or bony tenderness. No pain with movement, spinous process tenderness or muscular tenderness. Normal range of motion.      Thoracic back: Normal. No bony tenderness. Normal range of motion.      Lumbar back: Normal. No bony tenderness. Normal range of motion.      Right foot: Normal. Normal range of motion and normal capillary refill. No tenderness or bony tenderness. Normal pulse.      Left foot: Normal. Normal range of motion and normal capillary refill. No tenderness or bony tenderness. Normal pulse.      Comments: Pulses are 2+ throughout, cap refill is less than 2 sec throughout, extremities are nontender throughout with full range of motion. There is no spinal tenderness to palpation.     Neurological: She is alert and oriented to person, place, and time. She has normal strength. She displays normal reflexes. No cranial nerve deficit or sensory deficit. Gait normal.   No focal deficits.   Skin: Skin is warm, dry and intact. Capillary refill takes less than 2 seconds. No ecchymosis, no petechiae and no rash noted. No erythema. No pallor.   Psychiatric: She has a normal mood and affect. Her speech is normal and behavior  is normal. Judgment and thought content normal. Cognition and memory are normal.         ED Course   Procedures  Labs Reviewed   LIPASE - Abnormal; Notable for the following components:       Result Value    Lipase 97 (*)     All other components within normal limits   DRUG SCREEN PANEL, URINE EMERGENCY - Abnormal; Notable for the following components:    Barbiturate Screen, Ur Presumptive Positive (*)     THC Presumptive Positive (*)     All other components within normal limits    Narrative:     Specimen Source->Urine   URINALYSIS, REFLEX TO URINE CULTURE - Abnormal; Notable for the following components:    Protein, UA 1+ (*)     All other components within normal limits    Narrative:     Specimen Source->Urine   CBC W/ AUTO DIFFERENTIAL - Abnormal; Notable for the following components:    MCH 31.5 (*)     Gran # (ANC) 9.1 (*)     Immature Grans (Abs) 0.05 (*)     Gran % 79.1 (*)     Lymph % 12.7 (*)     All other components within normal limits   COMPREHENSIVE METABOLIC PANEL - Abnormal; Notable for the following components:    CO2 30 (*)     Anion Gap 6 (*)     All other components within normal limits   TROPONIN I HIGH SENSITIVITY - Abnormal; Notable for the following components:    Troponin I High Sensitivity 18.9 (*)     All other components within normal limits   TROPONIN I HIGH SENSITIVITY - Abnormal; Notable for the following components:    Troponin I High Sensitivity 22.0 (*)     All other components within normal limits   URINALYSIS MICROSCOPIC - Abnormal; Notable for the following components:    Hyaline Casts, UA 3 (*)     All other components within normal limits    Narrative:     Specimen Source->Urine   RESPIRATORY INFECTION PANEL (PCR), NASOPHARYNGEAL    Narrative:     Respiratory Infection Panel source->NP Swab   GROUP A STREP, MOLECULAR   GROUP A STREP, MOLECULAR   CK   MAGNESIUM   TSH   MAGNESIUM   B-TYPE NATRIURETIC PEPTIDE   POCT URINE PREGNANCY          Imaging Results              CTA Chest  Non-Coronary (PE Studies) (Final result)  Result time 12/14/23 21:15:28      Final result by Lynne Browning DO (12/14/23 21:15:28)                   Narrative:    EXAM:  CT Angiography Chest With Intravenous Contrast    CLINICAL HISTORY:  Pulmonary embolism (PE) suspected, high prob    TECHNIQUE:  Axial computed tomographic angiography images of the chest with intravenous contrast.  angiography:  with intravenous contrast:  Sagittal and coronal reformatted images were created and reviewed.  This CT exam was performed using one or more of the following dose reduction techniques:  automated exposure control, adjustment of the mA and/or kV according to patient size, and/or use of iterative reconstruction technique.  MIP reconstructed images were created and reviewed.    RADIATION DOSE:  DLP =  745.3 mGy    COMPARISON:  Chest radiograph December 14, 2023 prior CTA April 16, 2023    FINDINGS:  Pulmonary arteries:  No large central pulmonary emboli are noted. Subsegmental distal pulmonary artery emboli may not be visualized due to respiratory artifact.  The main pulmonary artery measures approximately 2.5 cm and is not dilated.  Aorta:  There is no thoracic aortic aneurysm or dissection.  Lungs:  Nonspecific geographic groundglass opacities are seen bilaterally in the lungs. These were also seen on the prior study.  Scattered linear and bandlike opacities in both lungs consistent with atelectasis and/or scarring again noted. There is a consolidative infiltrate seen within the left lower lobe.  In the left upper lobe there is a stable 3 mm nodule at the posterior aspect seen on image 74 series 4 and in the left lower lobe nodule is obscured by area of linear atelectasis/consolidation.  Central airways are patent.  There are 3 pulmonary nodules seen within the right lung apex that were present on the prior study and noted on today's study on image 66 series 4 the largest measuring approximately 4 mm unchanged from  prior study. Stable right middle lobe nodule seen on image 114 series 4 measuring approximately 5 mm.  Stable medial posterior right lower lobe subpleural nodule measuring approximately 4 mm on image 137 series 4 and additional posterior right lower lobe nodules that are relatively stable when compared to prior study measuring between 6 and 8 mm. This is 198 series 4.  Pleural space:  No acute pathology.  No pneumothorax.  No pleural effusion.  Heart:  The heart is enlarged. No pericardial effusion. No evidence of right heart strain.  Mediastinum:  The esophagus is unremarkable.  No mediastinal hilar or axillary adenopathy.  No hiatal hernia.  Thyroid:  The thyroid gland is unremarkable.  Bones/joints:  No acute fracture.  No dislocation.  Soft tissues:  No acute pathology.  Lymph nodes:  See above.  Gallbladder and bile ducts:  Cholecystectomy.  Intraperitoneal space:  Unremarkable as visualized.  No acute abnormalities within the upper abdomen.  Other findings:  No acute osseous abnormality. Degenerative changes within the thoracic spine. Incompletely visualized anterior cervical disc fusion.    IMPRESSION:  1.  No large central pulmonary emboli are noted. Subsegmental distal pulmonary artery emboli may not be visualized due to respiratory artifact.  2.  Scattered linear and bandlike opacities in both lungs consistent with atelectasis and/or scarring again noted. There is a consolidative infiltrate seen within the left lower lobe.  3.  Multiple bilateral stable lung nodules as discussed. Per Fleischner Society guidelines consider another noncontrast CT chest at 18-24 months.    Electronically signed by:  Lynne Browning DO  12/14/2023 09:15 PM [facility] Workstation: [id number]                                     CT Head Without Contrast (Final result)  Result time 12/14/23 20:48:10      Final result by Alexis West MD (12/14/23 20:48:10)                   Narrative:    HISTORY:  Headache, chronic, new features or  increased frequency    TECHNIQUE:  Axial CT scan images of the brain obtained with sagittal and coronal reconstructions. Dose reduction techniques were employed including smart MA, use of patient weight and/or use of noise index.    COMPARISON:  CT scan of the brain dated September 13, 2023    FINDINGS:  There is no evidence of acute intracranial hemorrhage or definite cortical infarction.    The ventricles, cisterns and sulci are within normal limits.  No hydrocephalus. No midline shift or extra-axial fluid collection. Nonspecific periventricular and subcortical hypodensities again noted similar to prior exam.  Basilar cisterns are patent.   The proximal M1 segments of the MCA's show no definite asymmetric hyperdensity.    No skull fracture.    The visualized paranasal sinuses and mastoid air cells are clear.    IMPRESSION:    No acute intracranial abnormality or significant interval change.    Electronically signed by:  Alexis West MD  12/14/2023 08:48 PM [facility] Workstation: [id number]                                     X-Ray Chest AP Portable (Final result)  Result time 12/14/23 17:28:04      Final result by Christofer Marinelli MD (12/14/23 17:28:04)                   Narrative:      EXAM: XR CHEST AP PORTABLE    HISTORY: dizziness    COMPARISON:Chest x-ray dated 9/13/2023    FINDINGS: The lungs are somewhat poorly inflated. There is scattered areas of linear atelectasis bilaterally. No definite acute focal infiltrates are identified. There no pleural effusions. The cardiomediastinal silhouette is unremarkable    IMPRESSION:   Poor lung expansion. No evidence of acute disease within the chest.    Electronically signed by:  Christofer Marinelli MD  12/14/2023 05:28 PM [facility] Workstation: [id number]                                     Medications   doxycycline capsule 100 mg (has no administration in time range)   ibuprofen tablet 400 mg (has no administration in time range)   acetaminophen tablet 1,000 mg (has no  administration in time range)   lactated ringers bolus 1,000 mL (has no administration in time range)   morphine injection 2 mg (2 mg Intravenous Given 12/14/23 1810)   ondansetron injection 4 mg (4 mg Intravenous Given 12/14/23 1810)   iohexoL (OMNIPAQUE 350) injection 100 mL (100 mLs Intravenous Given 12/14/23 2046)     Medical Decision Making  Emergent evaluation of a 48-year-old female presenting with complaints of a headache and nausea/vomiting.  Neuro exam is normal.  Blood pressure has markedly improved with morphine given for pain control.  Headache is also markedly improved.  Neurological exam is normal.  Imaging studies are currently pending.  Patient reported some hand tingling to a triage nurse however is denying paresthesias on my assessment.  Reports that she may have had this earlier when she felt anxious or after she had been vomiting.  Does also have intermittent shortness of breath.  No current shortness of breath, no hypoxia noted.    Amount and/or Complexity of Data Reviewed  Labs: ordered.  Radiology: ordered.    Risk  OTC drugs.  Prescription drug management.    UPDATE:   I assumed care of pt at 7pm. Pt was pending her workup and pending reassessment. Workup within acceptable limits except L lower lobe pneumonia, +marijuana. Discussed inpatient vs outpatient care for her pneumonia. Pt 95% on RA without labored breathing. Other than initial htn of 220, pt's vitals have been within acceptable limits including bp. Psi/port score low.  Full score unable to be completed as I do not have a pH but otherwise patient with low risk class.  Appearing well although chronically with significant comorbidities such as obesity and reports using prn oxygen. No copd/asthma in chart. Could be from obesity hypoventilation. Since pt uses prn o2 already, and while here 100% on 2L I discussed that until pt follow up with pcp she needs to use 2L o2 continually and take abx as prescribed.  Patient reports her friend  can help her get her medication tomorrow and she has no issue getting the medication or following up with her primary care doctor.  She reports she was a primary care doctor that she likes who is in [place] and has a ride to follow up with him and will call tomorrow to get ER follow up.  We will give 1st dose of doxycycline here.  Patient takes minocycline for acne.  Discussed with patient that she needs to stop this for the next 5 days while she was taking doxycycline.  Patient tolerating p.o. we will give some more fluids IV while patient was waiting for a ride home as patient is likely mildly dehydrated with mild tachycardia in the 100s and dark yellow urine and reporting decreased p.o. intake due to sore throat over last few days.  Strep negative.  With workup of CBC, CMP, troponin x2, EKG, urine pregnancy, respiratory viral panel, urinalysis, TSH, strep, Mag, BNP, CK, lipase, CT head, chest x-ray, CT PE being all within acceptable limits except for pneumonia patient has mild tachycardia likely secondary to mild dehydration and her pneumonia but no other emergent cause.  Patient is not septic. Pending pt's ride home - will need EMS for o2 and transport.   Yvrose Rios MD  Emergency Medicine Staff Physician  10:11 PM                                     Clinical Impression:  Final diagnoses:  [I10] Accelerated hypertension  [R51.9] Nonintractable headache, unspecified chronicity pattern, unspecified headache type  [J18.9] Pneumonia of left lower lobe due to infectious organism (Primary)                 Yvrose Rios MD  12/14/23 [id number]

## 2023-12-15 VITALS
WEIGHT: 293 LBS | TEMPERATURE: 98 F | RESPIRATION RATE: 20 BRPM | OXYGEN SATURATION: 97 % | DIASTOLIC BLOOD PRESSURE: 55 MMHG | HEIGHT: 63 IN | HEART RATE: 108 BPM | SYSTOLIC BLOOD PRESSURE: 114 MMHG | BODY MASS INDEX: 51.91 KG/M2

## 2023-12-15 NOTE — DISCHARGE INSTRUCTIONS
You need to see your primary care doctor tomorrow (Friday) for er follow up. If this is not possible then you need to see him first thing on Monday. Please take the antibiotic prescribed to you today for your pneumonia called Doxycyline. It will be twice a day for the next 5 days. As we discussed, During this time do not take the minocycline you take for your acne. Please use your home oxygen, you said you usually use 2L when you need it. Please use 2L at all times until you have follow up with your primary care doctor. If you worsen, your symptoms persist or new symptoms please return to the ER immediately.    You need to have a following findings on your CT followed up with the primary care doctor as well:   1.  No large central pulmonary emboli are noted. Subsegmental distal pulmonary artery emboli may not be visualized due to respiratory artifact.  2.  Scattered linear and bandlike opacities in both lungs consistent with atelectasis and/or scarring again noted. There is a consolidative infiltrate seen within the left lower lobe.  3.  Multiple bilateral stable lung nodules as discussed. Per Fleischner Society guidelines consider another noncontrast CT chest at 18-24 months.

## 2024-01-22 ENCOUNTER — HOSPITAL ENCOUNTER (EMERGENCY)
Facility: HOSPITAL | Age: 49
Discharge: HOME | End: 2024-01-22
Attending: EMERGENCY MEDICINE
Payer: MEDICAID

## 2024-01-22 VITALS
WEIGHT: 293 LBS | TEMPERATURE: 98 F | BODY MASS INDEX: 51.91 KG/M2 | RESPIRATION RATE: 16 BRPM | HEART RATE: 96 BPM | OXYGEN SATURATION: 97 % | DIASTOLIC BLOOD PRESSURE: 88 MMHG | HEIGHT: 63 IN | SYSTOLIC BLOOD PRESSURE: 151 MMHG

## 2024-01-22 DIAGNOSIS — I10 HYPERTENSION, UNSPECIFIED TYPE: Primary | ICD-10-CM

## 2024-01-22 DIAGNOSIS — R51.9 NONINTRACTABLE HEADACHE, UNSPECIFIED CHRONICITY PATTERN, UNSPECIFIED HEADACHE TYPE: ICD-10-CM

## 2024-01-22 DIAGNOSIS — R07.9 CHEST PAIN: ICD-10-CM

## 2024-01-22 LAB
ALBUMIN SERPL BCP-MCNC: 3.6 G/DL (ref 3.5–5.2)
ALP SERPL-CCNC: 91 U/L (ref 55–135)
ALT SERPL W/O P-5'-P-CCNC: 16 U/L (ref 10–44)
ANION GAP SERPL CALC-SCNC: 8 MMOL/L (ref 8–16)
AST SERPL-CCNC: 20 U/L (ref 10–40)
B-HCG UR QL: NEGATIVE
BASOPHILS # BLD AUTO: 0.04 K/UL (ref 0–0.2)
BASOPHILS NFR BLD: 0.4 % (ref 0–1.9)
BILIRUB SERPL-MCNC: 0.4 MG/DL (ref 0.1–1)
BUN SERPL-MCNC: 10 MG/DL (ref 6–20)
CALCIUM SERPL-MCNC: 9.5 MG/DL (ref 8.7–10.5)
CHLORIDE SERPL-SCNC: 99 MMOL/L (ref 95–110)
CO2 SERPL-SCNC: 29 MMOL/L (ref 23–29)
CREAT SERPL-MCNC: 1.1 MG/DL (ref 0.5–1.4)
CTP QC/QA: YES
DIFFERENTIAL METHOD BLD: ABNORMAL
EOSINOPHIL # BLD AUTO: 0 K/UL (ref 0–0.5)
EOSINOPHIL NFR BLD: 0.3 % (ref 0–8)
ERYTHROCYTE [DISTWIDTH] IN BLOOD BY AUTOMATED COUNT: 13.2 % (ref 11.5–14.5)
EST. GFR  (NO RACE VARIABLE): >60 ML/MIN/1.73 M^2
GLUCOSE SERPL-MCNC: 128 MG/DL (ref 70–110)
HCT VFR BLD AUTO: 42.6 % (ref 37–48.5)
HGB BLD-MCNC: 13.8 G/DL (ref 12–16)
IMM GRANULOCYTES # BLD AUTO: 0.02 K/UL (ref 0–0.04)
IMM GRANULOCYTES NFR BLD AUTO: 0.2 % (ref 0–0.5)
LYMPHOCYTES # BLD AUTO: 1.4 K/UL (ref 1–4.8)
LYMPHOCYTES NFR BLD: 12.7 % (ref 18–48)
MCH RBC QN AUTO: 30.5 PG (ref 27–31)
MCHC RBC AUTO-ENTMCNC: 32.4 G/DL (ref 32–36)
MCV RBC AUTO: 94 FL (ref 82–98)
MONOCYTES # BLD AUTO: 0.5 K/UL (ref 0.3–1)
MONOCYTES NFR BLD: 4.7 % (ref 4–15)
NEUTROPHILS # BLD AUTO: 8.7 K/UL (ref 1.8–7.7)
NEUTROPHILS NFR BLD: 81.7 % (ref 38–73)
NRBC BLD-RTO: 0 /100 WBC
PLATELET # BLD AUTO: 333 K/UL (ref 150–450)
PMV BLD AUTO: 10 FL (ref 9.2–12.9)
POTASSIUM SERPL-SCNC: 3.6 MMOL/L (ref 3.5–5.1)
PROT SERPL-MCNC: 8.1 G/DL (ref 6–8.4)
RBC # BLD AUTO: 4.52 M/UL (ref 4–5.4)
SODIUM SERPL-SCNC: 136 MMOL/L (ref 136–145)
WBC # BLD AUTO: 10.67 K/UL (ref 3.9–12.7)

## 2024-01-22 PROCEDURE — 63600175 PHARM REV CODE 636 W HCPCS: Performed by: EMERGENCY MEDICINE

## 2024-01-22 PROCEDURE — 93005 ELECTROCARDIOGRAM TRACING: CPT | Performed by: GENERAL PRACTICE

## 2024-01-22 PROCEDURE — 96374 THER/PROPH/DIAG INJ IV PUSH: CPT

## 2024-01-22 PROCEDURE — 81025 URINE PREGNANCY TEST: CPT | Performed by: EMERGENCY MEDICINE

## 2024-01-22 PROCEDURE — 85025 COMPLETE CBC W/AUTO DIFF WBC: CPT

## 2024-01-22 PROCEDURE — 99285 EMERGENCY DEPT VISIT HI MDM: CPT | Mod: 25

## 2024-01-22 PROCEDURE — 96375 TX/PRO/DX INJ NEW DRUG ADDON: CPT

## 2024-01-22 PROCEDURE — 93010 ELECTROCARDIOGRAM REPORT: CPT | Mod: ,,, | Performed by: GENERAL PRACTICE

## 2024-01-22 PROCEDURE — 80053 COMPREHEN METABOLIC PANEL: CPT

## 2024-01-22 PROCEDURE — 25000003 PHARM REV CODE 250: Performed by: EMERGENCY MEDICINE

## 2024-01-22 RX ORDER — CLONIDINE HYDROCHLORIDE 0.1 MG/1
0.2 TABLET ORAL
Status: COMPLETED | OUTPATIENT
Start: 2024-01-22 | End: 2024-01-22

## 2024-01-22 RX ORDER — DIPHENHYDRAMINE HYDROCHLORIDE 50 MG/ML
25 INJECTION INTRAMUSCULAR; INTRAVENOUS
Status: COMPLETED | OUTPATIENT
Start: 2024-01-22 | End: 2024-01-22

## 2024-01-22 RX ORDER — BUTALBITAL, ACETAMINOPHEN AND CAFFEINE 50; 325; 40 MG/1; MG/1; MG/1
1 TABLET ORAL ONCE
Status: DISCONTINUED | OUTPATIENT
Start: 2024-01-22 | End: 2024-01-22 | Stop reason: HOSPADM

## 2024-01-22 RX ORDER — METOCLOPRAMIDE HYDROCHLORIDE 5 MG/ML
10 INJECTION INTRAMUSCULAR; INTRAVENOUS
Status: COMPLETED | OUTPATIENT
Start: 2024-01-22 | End: 2024-01-22

## 2024-01-22 RX ORDER — LORAZEPAM 2 MG/ML
1 INJECTION INTRAMUSCULAR
Status: COMPLETED | OUTPATIENT
Start: 2024-01-22 | End: 2024-01-22

## 2024-01-22 RX ORDER — LABETALOL HYDROCHLORIDE 5 MG/ML
10 INJECTION, SOLUTION INTRAVENOUS
Status: DISCONTINUED | OUTPATIENT
Start: 2024-01-22 | End: 2024-01-22

## 2024-01-22 RX ORDER — CLONIDINE HYDROCHLORIDE 0.1 MG/1
0.1 TABLET ORAL EVERY 8 HOURS PRN
Qty: 30 TABLET | Refills: 0 | Status: SHIPPED | OUTPATIENT
Start: 2024-01-22 | End: 2025-01-21

## 2024-01-22 RX ADMIN — CLONIDINE HYDROCHLORIDE 0.2 MG: 0.1 TABLET ORAL at 02:01

## 2024-01-22 RX ADMIN — LORAZEPAM 1 MG: 2 INJECTION INTRAMUSCULAR; INTRAVENOUS at 01:01

## 2024-01-22 RX ADMIN — METOCLOPRAMIDE 10 MG: 5 INJECTION, SOLUTION INTRAMUSCULAR; INTRAVENOUS at 01:01

## 2024-01-22 RX ADMIN — DIPHENHYDRAMINE HYDROCHLORIDE 25 MG: 50 INJECTION INTRAMUSCULAR; INTRAVENOUS at 01:01

## 2024-01-22 NOTE — DISCHARGE INSTRUCTIONS
RETURN TO EMERGENCY DEPARTMENT WITHOUT FAIL, IF YOUR SYMPTOMS WORSEN, IF YOU GET NEW OR DIFFERENT SYMPTOMS, IF YOU ARE UNABLE TO FOLLOW UP AS DIRECTED, OR IF YOU HAVE ANY CONCERNS OR WORRIES.    Need to follow up with your doctor for better blood pressure control.  Keep a blood pressure log at home.  Blood pressure medication needs to be adjusted.  Take clonidine if systolic blood pressure greater than 180 on an as-needed basis.

## 2024-01-22 NOTE — ED PROVIDER NOTES
"Encounter Date: 2024       History     Chief Complaint   Patient presents with    Hypertension    Headache     Pt states headache for two days. Htn hx, states she has been taking her prescribed medication      49-year-old female past medical history of protein S deficiency, factor 8 level elevated antithrombin 3, on Eliquis, anxiety, hypertension, migraine headache, morbid obesity, presents emergency department with a headache and high blood pressure.  Patient says that headache started yesterday.  She says that it is located in the left side of her head and if she feels like "someone is grabbing my eye out".  She says that when he feels this way that she knows that her blood pressure is elevated.  She thinks it is stemed from an argument which she got in with her daughter yesterday regarding paying for things and buying her new phones.  She says she has been taking her blood pressure medication.  She denies any fall trauma or injury to her head.  She is taken blood thinner.  She denies any chest pain or any shortness of breath she denies any unilateral weakness or numbness or tingling.      Review of patient's allergies indicates:  No Known Allergies  Past Medical History:   Diagnosis Date    Antithrombin III deficiency 2021    Asthma     Claustrophobia     Elevated factor VIII level 2021    Esophageal reflux     Gastroesophageal reflux    Generalized anxiety disorder     Anxiety, Generalized    Herniated disc     Hypertension     Iron deficiency anemia due to chronic blood loss 10/27/2021    Lower extremity weakness     Morbid obesity     Obesity     Protein S deficiency 2021    Pulmonary embolism     Upper extremity weakness      Past Surgical History:   Procedure Laterality Date     SECTION      CHOLECYSTECTOMY      TONSILLECTOMY       No family history on file.  Social History     Tobacco Use    Smoking status: Former     Current packs/day: 0.00     Types: Cigarettes, Cigars     Quit " date: 2019     Years since quittin.0    Smokeless tobacco: Never   Substance Use Topics    Alcohol use: Yes     Alcohol/week: 0.8 standard drinks of alcohol     Types: 1 Standard drinks or equivalent per week     Comment: rarely    Drug use: No     Review of Systems   Constitutional:  Negative for chills and fever.   HENT:  Negative for sore throat.    Respiratory:  Negative for shortness of breath.    Cardiovascular:  Negative for chest pain.   Gastrointestinal:  Negative for nausea.   Genitourinary:  Negative for dysuria.   Musculoskeletal:  Negative for back pain.   Skin:  Negative for rash.   Neurological:  Positive for headaches. Negative for weakness.   Hematological:  Does not bruise/bleed easily.   All other systems reviewed and are negative.      Physical Exam     Initial Vitals [24 1259]   BP Pulse Resp Temp SpO2   (!) 194/106 106 18 98.5 °F (36.9 °C) 96 %      MAP       --         Physical Exam    Nursing note and vitals reviewed.  Constitutional: She appears well-developed and well-nourished. She is Obese . No distress.   HENT:   Head: Normocephalic and atraumatic.   Mouth/Throat: No oropharyngeal exudate.   Eyes: Conjunctivae and EOM are normal. Pupils are equal, round, and reactive to light.   Neck: Neck supple. No tracheal deviation present.   Normal range of motion.  Cardiovascular:  Normal rate, regular rhythm, normal heart sounds and intact distal pulses.           No murmur heard.  Pulmonary/Chest: Breath sounds normal. No stridor. No respiratory distress. She has no wheezes. She has no rhonchi. She has no rales.   Abdominal: Abdomen is soft. She exhibits no distension. There is no abdominal tenderness. There is no rebound.   Musculoskeletal:         General: No tenderness or edema. Normal range of motion.      Cervical back: Normal range of motion and neck supple.     Neurological: She is alert and oriented to person, place, and time. She has normal strength. No cranial nerve deficit or  sensory deficit.   Speech is normal.  No facial droop.  5/5 strength in upper and lower extremities bilaterally.  Normal finger-to-nose.  No pronator drift.   Skin: Skin is warm and dry. Capillary refill takes less than 2 seconds. No rash noted. No erythema. No pallor.   Psychiatric: She has a normal mood and affect. Her behavior is normal. Thought content normal.         ED Course   Procedures  Labs Reviewed   CBC W/ AUTO DIFFERENTIAL - Abnormal; Notable for the following components:       Result Value    Gran # (ANC) 8.7 (*)     Gran % 81.7 (*)     Lymph % 12.7 (*)     All other components within normal limits   COMPREHENSIVE METABOLIC PANEL - Abnormal; Notable for the following components:    Glucose 128 (*)     All other components within normal limits   POCT URINE PREGNANCY        ECG Results              EKG 12-lead (In process)  Result time 01/22/24 13:42:15      In process by Interface, Lab In [facility] (01/22/24 13:42:15)                   Narrative:    Test Reason : R07.9,    Vent. Rate : 099 BPM     Atrial Rate : 099 BPM     P-R Int : 126 ms          QRS Dur : 080 ms      QT Int : 360 ms       P-R-T Axes : 027 -13 049 degrees     QTc Int : 462 ms    Normal sinus rhythm with sinus arrhythmia  Moderate voltage criteria for LVH, may be normal variant  Borderline Abnormal ECG  When compared with ECG of 14-DEC-2023 16:58,  No significant change was found    Referred By: [email or web address]   SELF           Confirmed By:                                   Results for orders placed or performed during the hospital encounter of 01/22/24   CBC auto differential   Result Value Ref Range    WBC 10.67 3.90 - 12.70 K/uL    RBC 4.52 4.00 - 5.40 M/uL    Hemoglobin 13.8 12.0 - 16.0 g/dL    Hematocrit 42.6 37.0 - 48.5 %    MCV 94 82 - 98 fL    MCH 30.5 27.0 - 31.0 pg    MCHC 32.4 32.0 - 36.0 g/dL    RDW 13.2 11.5 - 14.5 %    Platelets 333 150 - 450 K/uL    MPV 10.0 9.2 - 12.9 fL    Immature Granulocytes 0.2 0.0 - 0.5 %    Gran # (ANC)  8.7 (H) 1.8 - 7.7 K/uL    Immature Grans (Abs) 0.02 0.00 - 0.04 K/uL    Lymph # 1.4 1.0 - 4.8 K/uL    Mono # 0.5 0.3 - 1.0 K/uL    Eos # 0.0 0.0 - 0.5 K/uL    Baso # 0.04 0.00 - 0.20 K/uL    nRBC 0 0 /100 WBC    Gran % 81.7 (H) 38.0 - 73.0 %    Lymph % 12.7 (L) 18.0 - 48.0 %    Mono % 4.7 4.0 - 15.0 %    Eosinophil % 0.3 0.0 - 8.0 %    Basophil % 0.4 0.0 - 1.9 %    Differential Method Automated    Comprehensive metabolic panel   Result Value Ref Range    Sodium 136 136 - 145 mmol/L    Potassium 3.6 3.5 - 5.1 mmol/L    Chloride 99 95 - 110 mmol/L    CO2 29 23 - 29 mmol/L    Glucose 128 (H) 70 - 110 mg/dL    BUN 10 6 - 20 mg/dL    Creatinine 1.1 0.5 - 1.4 mg/dL    Calcium 9.5 8.7 - 10.5 mg/dL    Total Protein 8.1 6.0 - 8.4 g/dL    Albumin 3.6 3.5 - 5.2 g/dL    Total Bilirubin 0.4 0.1 - 1.0 mg/dL    Alkaline Phosphatase 91 55 - 135 U/L    AST 20 10 - 40 U/L    ALT 16 10 - 44 U/L    eGFR >60.0 >60 mL/min/1.73 m^2    Anion Gap 8 8 - 16 mmol/L   POCT urine pregnancy   Result Value Ref Range    POC Preg Test, Ur Negative Negative     Acceptable Yes      CT Head Without Contrast   Final Result          Imaging Results              CT Head Without Contrast (Final result)  Result time 01/22/24 14:31:24      Final result by Cecilio Menezes MD (01/22/24 14:31:24)                   Narrative:    CMS MANDATED QUALITY DATA - CT RADIATION - 436    All CT scans at this facility utilize dose modulation, iterative reconstruction, and/or weight based dosing when appropriate to reduce radiation dose to as low as reasonably achievable.        Reason: Headache, new or worsening (Age >= 50y) Headache, hypertension    TECHNIQUE: Head CT without IV contrast.    COMPARISON: None    FINDINGS:    Gray-white differentiation is maintained without hemorrhage, midline shift, or mass effect.    The ventricles and cisterns are maintained.    Calvarium is intact. Hyperostosis frontalis observed. Visualized sinuses are  clear.    IMPRESSION:    No acute intracranial abnormality.        Electronically signed by:  Cecilio Menezes DO  01/22/2024 02:31 PM CST Workstation: [id number]                                     Medications   butalbital-acetaminophen-caffeine -40 mg per tablet 1 tablet ( Oral Canceled Entry 1/22/24 1645)   LORazepam injection 1 mg (1 mg Intravenous Given 1/22/24 1352)   metoclopramide injection 10 mg (10 mg Intravenous Given 1/22/24 1356)   diphenhydrAMINE injection 25 mg (25 mg Intravenous Given 1/22/24 1354)   cloNIDine tablet 0.2 mg (0.2 mg Oral Given 1/22/24 1421)     Medical Decision Making  Differential includes but not limited to dehydration, uncontrolled hypertension, migraine, tension headache, electrolyte abnormality, less suspicious for any intracranial hemorrhage, skull fracture, hypertensive urgency    [age] year female with past medical history as mentioned above presents with headache and high blood pressure.  Patient's headache improved after treatment emergency department.  Patient did have improvement of her blood pressure as well.  Patient with typical headache when she gets high blood pressure and also similar to prior migraines.  Patient feeling better.  Patient blood pressure has improved.  She has been recommended to follow up with PCP for blood pressure management and she has been given PRN clonidine to take on an as-needed basis for blood pressure with a systolic greater than 180.  Patient boyfriend came to pick her up and will take her home.  Patient ambulatory with steady gait at time of discharge.  Patient will return if her symptoms change or worsen.  Doubt that she has any cavernous venous sinus thrombosis with her being on Eliquis, doubt any glaucoma, doubt any subarachnoid, CT brain negative no evidence of any intracranial mass.  Considered pseudotumor but no visual changes.  Doubt carbon monoxide.  No vision symptoms.  Patient will follow up on an outpatient basis with  PCP.    A dictation software program was used for this note.  Please expect some simple typographical  errors in this note.    I had a detailed discussion with the patient and/or guardian regarding: The historical points, exam findings, and diagnostic results supporting the discharge diagnosis, lab results, pertinent radiology results, and the need for outpatient follow-up, for definitive care with a family practitioner and to return to the emergency department if symptoms worsen or persist or if there are any questions or concerns that arise at home. All questions have been answered in detail. Strict return to Emergency Department precautions have been provided.       Amount and/or Complexity of Data Reviewed  External Data Reviewed: labs and notes.  Labs: ordered.  Radiology: ordered.  ECG/medicine tests: ordered and independent interpretation performed. Decision-making details documented in ED Course.    Risk  Prescription drug management.               ED Course as of 01/22/24 1801 Mon Jan 22, 2024   1511 EKG 1:24 p.m. normal sinus rhythm rate of 99, left ventricular hypertrophy.  No ST elevation or depression.  Evidence of ischemia.  Normal intervals.  No STEMI.  EKG interpreted independently by me. [JR]   8790 Patient re-evaluated, headache now 4/10.  Blood pressure improved.  Says she feels slightly lightheaded.  Repeat neuro exam unchanged she is able to walk at the bedside without difficulty.  She walked to the bathroom.  Will discharge home.  Boyfriend will pick her up. [JR]      ED Course User Index  [JR] Andre Lowry DO                           Clinical Impression:  Final diagnoses:  [I10] Hypertension, unspecified type (Primary)  [R51.9] Nonintractable headache, unspecified chronicity pattern, unspecified headache type          ED Disposition Condition    Discharge Stable          ED Prescriptions       Medication Sig Dispense Start Date End Date Auth. Provider    cloNIDine (CATAPRES) 0.1 MG  tablet Take 1 tablet (0.1 mg total) by mouth every 8 (eight) hours as needed (SBP >180). 30 tablet 1/22/2024 1/21/2025 Andre Lowry DO          Follow-up Information       Follow up With Specialties Details Why Contact Info Additional Information    Harpreet Rendon MD Internal Medicine In 3 days  [address] [place] [address] [address]  [facility] [address]  [phone]       Pending sale to [facility] - Emergency Dept Emergency Medicine  If symptoms worsen [address] [facility] [id number]  [phone] 1st floor             Andre Lowry DO  01/22/24 1805

## 2024-01-23 NOTE — ED NOTES
Iv to left ac removed, catheter intact, no redness or swelling noted, pressure and bandage applied

## 2024-01-23 NOTE — ED NOTES
Patient states headache a 7 after moving around and going to restroom, no distress, respirations even unlabored

## 2024-02-07 ENCOUNTER — LAB VISIT (OUTPATIENT)
Dept: LAB | Facility: HOSPITAL | Age: 49
End: 2024-02-07
Attending: INTERNAL MEDICINE
Payer: MEDICAID

## 2024-02-07 DIAGNOSIS — I26.99 ACUTE PULMONARY EMBOLISM, UNSPECIFIED PULMONARY EMBOLISM TYPE, UNSPECIFIED WHETHER ACUTE COR PULMONALE PRESENT: ICD-10-CM

## 2024-02-07 DIAGNOSIS — D68.59 ANTITHROMBIN III DEFICIENCY: ICD-10-CM

## 2024-02-07 LAB
ALBUMIN SERPL BCP-MCNC: 3.5 G/DL (ref 3.5–5.2)
ALP SERPL-CCNC: 69 U/L (ref 55–135)
ALT SERPL W/O P-5'-P-CCNC: 18 U/L (ref 10–44)
ANION GAP SERPL CALC-SCNC: 6 MMOL/L (ref 8–16)
AST SERPL-CCNC: 20 U/L (ref 10–40)
BASOPHILS # BLD AUTO: 0.04 K/UL (ref 0–0.2)
BASOPHILS NFR BLD: 0.4 % (ref 0–1.9)
BILIRUB SERPL-MCNC: 0.3 MG/DL (ref 0.1–1)
BUN SERPL-MCNC: 9 MG/DL (ref 6–20)
CALCIUM SERPL-MCNC: 8.9 MG/DL (ref 8.7–10.5)
CHLORIDE SERPL-SCNC: 104 MMOL/L (ref 95–110)
CO2 SERPL-SCNC: 29 MMOL/L (ref 23–29)
CREAT SERPL-MCNC: 1.1 MG/DL (ref 0.5–1.4)
DIFFERENTIAL METHOD BLD: ABNORMAL
EOSINOPHIL # BLD AUTO: 0.2 K/UL (ref 0–0.5)
EOSINOPHIL NFR BLD: 1.4 % (ref 0–8)
ERYTHROCYTE [DISTWIDTH] IN BLOOD BY AUTOMATED COUNT: 13 % (ref 11.5–14.5)
EST. GFR  (NO RACE VARIABLE): >60 ML/MIN/1.73 M^2
FERRITIN SERPL-MCNC: 40.5 NG/ML (ref 20–300)
GLUCOSE SERPL-MCNC: 119 MG/DL (ref 70–110)
HCT VFR BLD AUTO: 43.5 % (ref 37–48.5)
HGB BLD-MCNC: 14.2 G/DL (ref 12–16)
IMM GRANULOCYTES # BLD AUTO: 0.02 K/UL (ref 0–0.04)
IMM GRANULOCYTES NFR BLD AUTO: 0.2 % (ref 0–0.5)
IRON SERPL-MCNC: 80 UG/DL (ref 30–160)
LYMPHOCYTES # BLD AUTO: 1.7 K/UL (ref 1–4.8)
LYMPHOCYTES NFR BLD: 15.5 % (ref 18–48)
MCH RBC QN AUTO: 30.7 PG (ref 27–31)
MCHC RBC AUTO-ENTMCNC: 32.6 G/DL (ref 32–36)
MCV RBC AUTO: 94 FL (ref 82–98)
MONOCYTES # BLD AUTO: 0.6 K/UL (ref 0.3–1)
MONOCYTES NFR BLD: 5.9 % (ref 4–15)
NEUTROPHILS # BLD AUTO: 8.2 K/UL (ref 1.8–7.7)
NEUTROPHILS NFR BLD: 76.6 % (ref 38–73)
NRBC BLD-RTO: 0 /100 WBC
PLATELET # BLD AUTO: 330 K/UL (ref 150–450)
PMV BLD AUTO: 10.2 FL (ref 9.2–12.9)
POTASSIUM SERPL-SCNC: 3.6 MMOL/L (ref 3.5–5.1)
PROT SERPL-MCNC: 7.9 G/DL (ref 6–8.4)
RBC # BLD AUTO: 4.63 M/UL (ref 4–5.4)
SATURATED IRON: 22 % (ref 20–50)
SODIUM SERPL-SCNC: 139 MMOL/L (ref 136–145)
TOTAL IRON BINDING CAPACITY: 371 UG/DL (ref 250–450)
TRANSFERRIN SERPL-MCNC: 265 MG/DL (ref 200–375)
WBC # BLD AUTO: 10.67 K/UL (ref 3.9–12.7)

## 2024-02-07 PROCEDURE — 85025 COMPLETE CBC W/AUTO DIFF WBC: CPT | Performed by: INTERNAL MEDICINE

## 2024-02-07 PROCEDURE — 80053 COMPREHEN METABOLIC PANEL: CPT | Performed by: INTERNAL MEDICINE

## 2024-02-07 PROCEDURE — 36415 COLL VENOUS BLD VENIPUNCTURE: CPT | Performed by: INTERNAL MEDICINE

## 2024-02-07 PROCEDURE — 82728 ASSAY OF FERRITIN: CPT | Performed by: INTERNAL MEDICINE

## 2024-02-07 PROCEDURE — 83540 ASSAY OF IRON: CPT | Performed by: INTERNAL MEDICINE

## 2024-03-14 ENCOUNTER — OFFICE VISIT (OUTPATIENT)
Dept: ORTHOPEDICS | Facility: CLINIC | Age: 49
End: 2024-03-14
Payer: MEDICAID

## 2024-03-14 VITALS — BODY MASS INDEX: 53.15 KG/M2 | WEIGHT: 293 LBS

## 2024-03-14 DIAGNOSIS — M17.11 PRIMARY OSTEOARTHRITIS OF RIGHT KNEE: Primary | ICD-10-CM

## 2024-03-14 DIAGNOSIS — M17.12 PRIMARY OSTEOARTHRITIS OF LEFT KNEE: ICD-10-CM

## 2024-03-14 PROCEDURE — 99213 OFFICE O/P EST LOW 20 MIN: CPT | Mod: 25,S$GLB,, | Performed by: ORTHOPAEDIC SURGERY

## 2024-03-14 PROCEDURE — 20610 DRAIN/INJ JOINT/BURSA W/O US: CPT | Mod: 50,S$GLB,, | Performed by: ORTHOPAEDIC SURGERY

## 2024-03-14 PROCEDURE — 3008F BODY MASS INDEX DOCD: CPT | Mod: CPTII,S$GLB,, | Performed by: ORTHOPAEDIC SURGERY

## 2024-03-14 PROCEDURE — 1160F RVW MEDS BY RX/DR IN RCRD: CPT | Mod: CPTII,S$GLB,, | Performed by: ORTHOPAEDIC SURGERY

## 2024-03-14 PROCEDURE — 4010F ACE/ARB THERAPY RXD/TAKEN: CPT | Mod: CPTII,S$GLB,, | Performed by: ORTHOPAEDIC SURGERY

## 2024-03-14 PROCEDURE — 3044F HG A1C LEVEL LT 7.0%: CPT | Mod: CPTII,S$GLB,, | Performed by: ORTHOPAEDIC SURGERY

## 2024-03-14 PROCEDURE — 1159F MED LIST DOCD IN RCRD: CPT | Mod: CPTII,S$GLB,, | Performed by: ORTHOPAEDIC SURGERY

## 2024-03-14 RX ORDER — TRIAMCINOLONE ACETONIDE 40 MG/ML
40 INJECTION, SUSPENSION INTRA-ARTICULAR; INTRAMUSCULAR
Status: DISCONTINUED | OUTPATIENT
Start: 2024-03-14 | End: 2024-03-14 | Stop reason: HOSPADM

## 2024-03-14 RX ORDER — MELOXICAM 15 MG/1
15 TABLET ORAL
COMMUNITY
Start: 2024-02-23 | End: 2024-04-09

## 2024-03-14 RX ADMIN — TRIAMCINOLONE ACETONIDE 40 MG: 40 INJECTION, SUSPENSION INTRA-ARTICULAR; INTRAMUSCULAR at 09:03

## 2024-03-14 NOTE — PROGRESS NOTES
[facility]    Subjective:     Chief Complaint:   Chief Complaint   Patient presents with    Left Knee - Pain     F/u on bilateral knee pain after injection 11.30.23 notes increases pain with popping, clicking and catches    Right Knee - Pain       Past Medical History:   Diagnosis Date    Antithrombin III deficiency 2021    Asthma     Claustrophobia     Elevated factor VIII level 2021    Esophageal reflux     Gastroesophageal reflux    Generalized anxiety disorder     Anxiety, Generalized    Herniated disc     Hypertension     Iron deficiency anemia due to chronic blood loss 10/27/2021    Lower extremity weakness     Morbid obesity     Obesity     Protein S deficiency 2021    Pulmonary embolism     Upper extremity weakness        Past Surgical History:   Procedure Laterality Date     SECTION      CHOLECYSTECTOMY      TONSILLECTOMY         Current Outpatient Medications   Medication Sig    meloxicam (MOBIC) 15 MG tablet Take 15 mg by mouth.    albuterol (PROVENTIL/VENTOLIN HFA) 90 mcg/actuation inhaler Inhale 2 puffs into the lungs every 6 (six) hours as needed for Wheezing. Rescue    amLODIPine (NORVASC) 10 MG tablet Take 1 tablet (10 mg total) by mouth once daily.    atorvastatin (LIPITOR) 10 MG tablet Take 10 mg by mouth every evening.    butalbital-acetaminophen-caff -40 mg Cap Take 1 capsule by mouth every 4 (four) hours as needed.    butalbital-acetaminophen-caffeine -40 mg (FIORICET, ESGIC) -40 mg per tablet Take 1 tablet by mouth every 4 (four) hours as needed.    carvediloL (COREG) 25 MG tablet Take 1 tablet (25 mg total) by mouth 2 (two) times daily.    cloNIDine (CATAPRES) 0.1 MG tablet Take 1 tablet (0.1 mg total) by mouth every 8 (eight) hours as needed (SBP >180).    ELIQUIS 5 mg Tab TAKE 1 TABLET BY MOUTH TWICE DAILY (Patient taking differently: Take by mouth 2 (two) times daily.)    ergocalciferol (ERGOCALCIFEROL) 50,000 unit Cap Take 50,000 Units  by mouth twice a week.    famotidine (PEPCID) 20 MG tablet Take 20 mg by mouth 2 (two) times daily.    ferrous sulfate (FEROSUL) 325 mg (65 mg iron) Tab tablet TAKE 1 TABLET BY MOUTH EVERY DAY (Patient taking differently: Take by mouth once daily. TAKE 1 TABLET BY MOUTH EVERY DAY)    fluticasone propionate (FLONASE) 50 mcg/actuation nasal spray 2 sprays by Each Nostril route daily as needed for Rhinitis.    fluticasone-salmeterol 230-21 mcg/dose (ADVAIR HFA) 230-21 mcg/actuation HFAA inhaler Inhale 2 puffs into the lungs 2 (two) times daily. Controller    furosemide (LASIX) 20 MG tablet Take 1 tablet (20 mg total) by mouth once daily. (Patient not taking: Reported on 2023)    losartan (COZAAR) 100 MG tablet Take 1 tablet (100 mg total) by mouth once daily.    minocycline (MINOCIN,DYNACIN) 100 MG capsule Take 100 mg by mouth 2 (two) times daily.    MIRENA 20 mcg/24 hours (8 yrs) 52 mg IUD SMARTSIG:Intrauterine Once    miSOPROStoL (CYTOTEC) 200 MCG Tab Take by mouth.    mupirocin (BACTROBAN) 2 % ointment Apply 1 g topically 3 (three) times daily.    NEXIUM 40 mg capsule Take 40 mg by mouth once daily.    omeprazole (PRILOSEC) 20 MG capsule Take 20 mg by mouth once daily.    predniSONE (DELTASONE) 20 MG tablet Take 1 tablet (20 mg total) by mouth once daily.    trazodone (DESYREL) 100 MG tablet Take 100 mg by mouth every evening.     Current Facility-Administered Medications   Medication    bebtelovimab (EUA) 175 mg/2 mL (87.5 mg/mL) injection 175 mg       Review of patient's allergies indicates:  No Known Allergies    No family history on file.    Social History     Socioeconomic History    Marital status:    Tobacco Use    Smoking status: Former     Current packs/day: 0.00     Types: Cigarettes, Cigars     Quit date:      Years since quittin.2    Smokeless tobacco: Never   Substance and Sexual Activity    Alcohol use: Yes     Alcohol/week: 0.8 standard drinks of alcohol     Types: 1 Standard  drinks or equivalent per week     Comment: rarely    Drug use: No    Sexual activity: Not Currently     Social Determinants of Health     Financial Resource Strain: Medium Risk (10/18/2022)    Overall Financial Resource Strain (CARDIA)     Difficulty of Paying Living Expenses: Somewhat hard   Food Insecurity: Food Insecurity Present (10/18/2022)    Hunger Vital Sign     Worried About Running Out of Food in the Last Year: Sometimes true     Ran Out of Food in the Last Year: Sometimes true   Transportation Needs: Unmet Transportation Needs (10/18/2022)    PRAPARE - Transportation     Lack of Transportation (Medical): Yes     Lack of Transportation (Non-Medical): Yes   Stress: No Stress Concern Present (10/18/2022)    [place] [employer] - Occupational Stress Questionnaire     Feeling of Stress : Only a little   Social Connections: Socially Isolated (10/18/2022)    Social Connection and Isolation Panel [NHANES]     Frequency of Communication with Friends and Family: Once a week     Frequency of Social Gatherings with Friends and Family: Once a week     Attends Shinto Services: Never     Active Member of Clubs or Organizations: No     Attends Club or Organization Meetings: Never     Marital Status:    Housing Stability: High Risk (10/18/2022)    Housing Stability Vital Sign     Unable to Pay for Housing in the Last Year: Yes     Unstable Housing in the Last Year: No       History of present illness:  49-year-old female with advanced bone-on-bone osteoarthritis bilateral knees.  Returns today for repeat injections.  She remains a poor surgical candidate secondary to morbid obesity.      Review of Systems:    Constitution: Negative for chills, fever, and sweats.  Negative for unexplained weight loss.    HENT:  Negative for headaches and blurry vision.    Cardiovascular:Negative for chest pain or irregular heart beat. Negative for hypertension.    Respiratory:  Negative for cough and  "shortness of breath.    Gastrointestinal: Negative for abdominal pain, heartburn, melena, nausea, and vomitting.    Genitourinary:  Negative bladder incontinence and dysuria.    Musculoskeletal:  See HPI for details.     Neurological: Negative for numbness.    Psychiatric/Behavioral: Negative for depression.  The patient is not nervous/anxious.      Endocrine: Negative for polyuria    Hematologic/Lymphatic: Negative for bleeding problem.  Does not bruise/bleed easily.    Skin: Negative for poor would healing and rash    Objective:      Physical Examination:    Vital Signs:  There were no vitals filed for this visit.    Body mass index is 53.15 kg/m².    This a well-developed, well nourished patient in no acute distress.  They are alert and oriented and cooperative to examination.        Examination of the bilateral knees, skin is dry and intact, no erythema or ecchymosis, no signs symptoms of infection.  She is got crepitus bilaterally with range of motion.  Range of motion 0-100 degrees and symmetrical bilaterally.  Stable to anterior-posterior varus and valgus stress.  Calf soft nontender, straight leg raise negative.  Pertinent New Results:    XRAY Report / Interpretation:       Assessment/Plan:      Osteoarthritis bilateral knees, we reinjected the bilateral knees today with lidocaine and triamcinolone.  Patient remains a poor surgical candidate secondary to morbid obesity.  We discussed the benefits of healthy diet exercise and weight loss.  Encouraged her to follow up with primary care for weight loss counseling and recommendations possibly bariatric surgery.  Follow-up in 3 months for follow-up on the knees.    Frank Schwartz, Physician Assistant, served in the capacity as a "scribe" for this patient encounter.  A "face-to-face" encounter occurred with Dr. Brayan Valerio on this date.  The treatment plan and medical decision-making is outlined above. Patient was seen and examined with a chaperone.       This " note was created using Dragon voice recognition software that occasionally misinterpreted phrases or words.

## 2024-03-14 NOTE — PROCEDURES
Large Joint Aspiration/Injection: R knee    Date/Time: 3/14/2024 9:45 AM    Performed by: Brayan Valerio MD  Authorized by: Brayan Valerio MD    Consent Done?:  Yes (Verbal)  Indications:  Arthritis and pain  Site marked: the procedure site was marked    Timeout: prior to procedure the correct patient, procedure, and site was verified    Prep: patient was prepped and draped in usual sterile fashion      Local anesthesia used?: Yes    Local anesthetic:  Lidocaine 1% without epinephrine    Details:  Needle Size:  25 G  Ultrasonic Guidance for needle placement?: No    Location:  Knee  Site:  R knee  Medications:  40 mg triamcinolone acetonide 40 mg/mL  Patient tolerance:  Patient tolerated the procedure well with no immediate complications  Large Joint Aspiration/Injection: L knee    Date/Time: 3/14/2024 9:45 AM    Performed by: Brayan Valerio MD  Authorized by: Brayan Valerio MD    Consent Done?:  Yes (Verbal)  Indications:  Arthritis and pain  Site marked: the procedure site was marked    Timeout: prior to procedure the correct patient, procedure, and site was verified    Prep: patient was prepped and draped in usual sterile fashion      Local anesthesia used?: Yes    Local anesthetic:  Lidocaine 1% without epinephrine    Details:  Needle Size:  25 G  Ultrasonic Guidance for needle placement?: No    Location:  Knee  Site:  L knee  Medications:  40 mg triamcinolone acetonide 40 mg/mL  Patient tolerance:  Patient tolerated the procedure well with no immediate complications

## 2024-04-09 ENCOUNTER — HOSPITAL ENCOUNTER (INPATIENT)
Facility: HOSPITAL | Age: 49
LOS: 2 days | Discharge: HOME | DRG: 065 | End: 2024-04-11
Attending: EMERGENCY MEDICINE | Admitting: FAMILY MEDICINE
Payer: MEDICAID

## 2024-04-09 DIAGNOSIS — I16.0 HYPERTENSIVE URGENCY: ICD-10-CM

## 2024-04-09 DIAGNOSIS — I63.9 CVA (CEREBRAL VASCULAR ACCIDENT): ICD-10-CM

## 2024-04-09 DIAGNOSIS — I63.9 STROKE: ICD-10-CM

## 2024-04-09 DIAGNOSIS — G45.9 TIA (TRANSIENT ISCHEMIC ATTACK): Primary | ICD-10-CM

## 2024-04-09 DIAGNOSIS — R07.9 CHEST PAIN: ICD-10-CM

## 2024-04-09 PROBLEM — J45.20 MILD INTERMITTENT ASTHMA WITHOUT COMPLICATION: Status: ACTIVE | Noted: 2024-04-09

## 2024-04-09 PROBLEM — I26.99 PULMONARY EMBOLISM: Status: ACTIVE | Noted: 2024-04-09

## 2024-04-09 PROBLEM — R29.818 NEUROLOGICAL DEFICIT PRESENT: Status: ACTIVE | Noted: 2024-04-09

## 2024-04-09 PROBLEM — E66.01 MORBID OBESITY: Status: ACTIVE | Noted: 2024-04-09

## 2024-04-09 PROBLEM — N17.9 ACUTE RENAL FAILURE: Status: ACTIVE | Noted: 2024-04-09

## 2024-04-09 LAB
ALBUMIN SERPL BCP-MCNC: 3.6 G/DL (ref 3.5–5.2)
ALP SERPL-CCNC: 79 U/L (ref 55–135)
ALT SERPL W/O P-5'-P-CCNC: 18 U/L (ref 10–44)
ANION GAP SERPL CALC-SCNC: 5 MMOL/L (ref 8–16)
APTT PPP: 30.2 SEC (ref 21–32)
AST SERPL-CCNC: 19 U/L (ref 10–40)
B-HCG UR QL: NEGATIVE
BASOPHILS # BLD AUTO: 0.04 K/UL (ref 0–0.2)
BASOPHILS NFR BLD: 0.5 % (ref 0–1.9)
BILIRUB SERPL-MCNC: 0.4 MG/DL (ref 0.1–1)
BUN SERPL-MCNC: 19 MG/DL (ref 6–20)
CALCIUM SERPL-MCNC: 9.3 MG/DL (ref 8.7–10.5)
CHLORIDE SERPL-SCNC: 104 MMOL/L (ref 95–110)
CHOLEST SERPL-MCNC: 165 MG/DL (ref 120–199)
CHOLEST/HDLC SERPL: 3.2 {RATIO} (ref 2–5)
CO2 SERPL-SCNC: 30 MMOL/L (ref 23–29)
CREAT SERPL-MCNC: 1.4 MG/DL (ref 0.5–1.4)
CREAT SERPL-MCNC: 1.6 MG/DL (ref 0.5–1.4)
CTP QC/QA: YES
DIFFERENTIAL METHOD BLD: ABNORMAL
EOSINOPHIL # BLD AUTO: 0.2 K/UL (ref 0–0.5)
EOSINOPHIL NFR BLD: 2.7 % (ref 0–8)
ERYTHROCYTE [DISTWIDTH] IN BLOOD BY AUTOMATED COUNT: 13.8 % (ref 11.5–14.5)
EST. GFR  (NO RACE VARIABLE): 45.8 ML/MIN/1.73 M^2
GLUCOSE SERPL-MCNC: 106 MG/DL (ref 70–110)
GLUCOSE SERPL-MCNC: 123 MG/DL (ref 70–110)
HCT VFR BLD AUTO: 42 % (ref 37–48.5)
HDLC SERPL-MCNC: 52 MG/DL (ref 40–75)
HDLC SERPL: 31.5 % (ref 20–50)
HGB BLD-MCNC: 13.3 G/DL (ref 12–16)
IMM GRANULOCYTES # BLD AUTO: 0.03 K/UL (ref 0–0.04)
IMM GRANULOCYTES NFR BLD AUTO: 0.4 % (ref 0–0.5)
INR PPP: 1 (ref 0.8–1.2)
LDLC SERPL CALC-MCNC: 92.4 MG/DL (ref 63–159)
LYMPHOCYTES # BLD AUTO: 1.5 K/UL (ref 1–4.8)
LYMPHOCYTES NFR BLD: 18 % (ref 18–48)
MCH RBC QN AUTO: 30.9 PG (ref 27–31)
MCHC RBC AUTO-ENTMCNC: 31.7 G/DL (ref 32–36)
MCV RBC AUTO: 98 FL (ref 82–98)
MONOCYTES # BLD AUTO: 0.5 K/UL (ref 0.3–1)
MONOCYTES NFR BLD: 6.1 % (ref 4–15)
NEUTROPHILS # BLD AUTO: 5.8 K/UL (ref 1.8–7.7)
NEUTROPHILS NFR BLD: 72.3 % (ref 38–73)
NONHDLC SERPL-MCNC: 113 MG/DL
NRBC BLD-RTO: 0 /100 WBC
PLATELET # BLD AUTO: 293 K/UL (ref 150–450)
PMV BLD AUTO: 10.7 FL (ref 9.2–12.9)
POTASSIUM SERPL-SCNC: 4 MMOL/L (ref 3.5–5.1)
PROT SERPL-MCNC: 7.6 G/DL (ref 6–8.4)
PROTHROMBIN TIME: 10.8 SEC (ref 9–12.5)
RBC # BLD AUTO: 4.3 M/UL (ref 4–5.4)
SAMPLE: ABNORMAL
SODIUM SERPL-SCNC: 139 MMOL/L (ref 136–145)
TRIGL SERPL-MCNC: 103 MG/DL (ref 30–150)
TSH SERPL DL<=0.005 MIU/L-ACNC: 1.76 UIU/ML (ref 0.34–5.6)
WBC # BLD AUTO: 8.06 K/UL (ref 3.9–12.7)

## 2024-04-09 PROCEDURE — 93005 ELECTROCARDIOGRAM TRACING: CPT | Performed by: GENERAL PRACTICE

## 2024-04-09 PROCEDURE — 96374 THER/PROPH/DIAG INJ IV PUSH: CPT

## 2024-04-09 PROCEDURE — 84443 ASSAY THYROID STIM HORMONE: CPT | Performed by: HOSPITALIST

## 2024-04-09 PROCEDURE — 99900031 HC PATIENT EDUCATION (STAT)

## 2024-04-09 PROCEDURE — G0378 HOSPITAL OBSERVATION PER HR: HCPCS

## 2024-04-09 PROCEDURE — 85730 THROMBOPLASTIN TIME PARTIAL: CPT | Performed by: EMERGENCY MEDICINE

## 2024-04-09 PROCEDURE — 99900035 HC TECH TIME PER 15 MIN (STAT)

## 2024-04-09 PROCEDURE — 94761 N-INVAS EAR/PLS OXIMETRY MLT: CPT

## 2024-04-09 PROCEDURE — 27000221 HC OXYGEN, UP TO 24 HOURS

## 2024-04-09 PROCEDURE — 25000003 PHARM REV CODE 250: Performed by: INTERNAL MEDICINE

## 2024-04-09 PROCEDURE — 99285 EMERGENCY DEPT VISIT HI MDM: CPT | Mod: 25

## 2024-04-09 PROCEDURE — 63600175 PHARM REV CODE 636 W HCPCS: Performed by: EMERGENCY MEDICINE

## 2024-04-09 PROCEDURE — 96361 HYDRATE IV INFUSION ADD-ON: CPT

## 2024-04-09 PROCEDURE — 82565 ASSAY OF CREATININE: CPT

## 2024-04-09 PROCEDURE — 80053 COMPREHEN METABOLIC PANEL: CPT | Performed by: EMERGENCY MEDICINE

## 2024-04-09 PROCEDURE — 85610 PROTHROMBIN TIME: CPT | Performed by: EMERGENCY MEDICINE

## 2024-04-09 PROCEDURE — 85025 COMPLETE CBC W/AUTO DIFF WBC: CPT | Performed by: EMERGENCY MEDICINE

## 2024-04-09 PROCEDURE — 82962 GLUCOSE BLOOD TEST: CPT

## 2024-04-09 PROCEDURE — 94799 UNLISTED PULMONARY SVC/PX: CPT

## 2024-04-09 PROCEDURE — 80061 LIPID PANEL: CPT | Performed by: HOSPITALIST

## 2024-04-09 PROCEDURE — 21400001 HC TELEMETRY ROOM

## 2024-04-09 PROCEDURE — 94760 N-INVAS EAR/PLS OXIMETRY 1: CPT

## 2024-04-09 PROCEDURE — 36415 COLL VENOUS BLD VENIPUNCTURE: CPT | Performed by: HOSPITALIST

## 2024-04-09 PROCEDURE — 94640 AIRWAY INHALATION TREATMENT: CPT | Mod: XB

## 2024-04-09 PROCEDURE — 25000003 PHARM REV CODE 250: Performed by: HOSPITALIST

## 2024-04-09 PROCEDURE — 25000242 PHARM REV CODE 250 ALT 637 W/ HCPCS: Performed by: HOSPITALIST

## 2024-04-09 PROCEDURE — 96376 TX/PRO/DX INJ SAME DRUG ADON: CPT

## 2024-04-09 PROCEDURE — 96375 TX/PRO/DX INJ NEW DRUG ADDON: CPT

## 2024-04-09 PROCEDURE — 83036 HEMOGLOBIN GLYCOSYLATED A1C: CPT | Performed by: HOSPITALIST

## 2024-04-09 PROCEDURE — 93010 ELECTROCARDIOGRAM REPORT: CPT | Mod: ,,, | Performed by: GENERAL PRACTICE

## 2024-04-09 PROCEDURE — 81025 URINE PREGNANCY TEST: CPT | Performed by: EMERGENCY MEDICINE

## 2024-04-09 RX ORDER — ONDANSETRON HYDROCHLORIDE 2 MG/ML
4 INJECTION, SOLUTION INTRAVENOUS EVERY 6 HOURS PRN
Status: DISCONTINUED | OUTPATIENT
Start: 2024-04-09 | End: 2024-04-11 | Stop reason: HOSPADM

## 2024-04-09 RX ORDER — ALUMINUM HYDROXIDE, MAGNESIUM HYDROXIDE, AND SIMETHICONE 1200; 120; 1200 MG/30ML; MG/30ML; MG/30ML
30 SUSPENSION ORAL 4 TIMES DAILY PRN
Status: DISCONTINUED | OUTPATIENT
Start: 2024-04-09 | End: 2024-04-11 | Stop reason: HOSPADM

## 2024-04-09 RX ORDER — HYDRALAZINE HYDROCHLORIDE 20 MG/ML
5 INJECTION INTRAMUSCULAR; INTRAVENOUS ONCE
Status: COMPLETED | OUTPATIENT
Start: 2024-04-09 | End: 2024-04-09

## 2024-04-09 RX ORDER — ATORVASTATIN CALCIUM 40 MG/1
40 TABLET, FILM COATED ORAL NIGHTLY
Status: DISCONTINUED | OUTPATIENT
Start: 2024-04-09 | End: 2024-04-11 | Stop reason: HOSPADM

## 2024-04-09 RX ORDER — OLMESARTAN MEDOXOMIL AND HYDROCHLOROTHIAZIDE 40/25 40; 25 MG/1; MG/1
1 TABLET ORAL DAILY
COMMUNITY
Start: 2024-03-21

## 2024-04-09 RX ORDER — ACETAMINOPHEN 325 MG/1
650 TABLET ORAL EVERY 4 HOURS PRN
Status: DISCONTINUED | OUTPATIENT
Start: 2024-04-09 | End: 2024-04-11 | Stop reason: HOSPADM

## 2024-04-09 RX ORDER — SODIUM,POTASSIUM PHOSPHATES 280-250MG
2 POWDER IN PACKET (EA) ORAL
Status: DISCONTINUED | OUTPATIENT
Start: 2024-04-09 | End: 2024-04-11 | Stop reason: HOSPADM

## 2024-04-09 RX ORDER — FAMOTIDINE 20 MG/1
20 TABLET, FILM COATED ORAL 2 TIMES DAILY
Status: DISCONTINUED | OUTPATIENT
Start: 2024-04-09 | End: 2024-04-11 | Stop reason: HOSPADM

## 2024-04-09 RX ORDER — LANOLIN ALCOHOL/MO/W.PET/CERES
800 CREAM (GRAM) TOPICAL
Status: DISCONTINUED | OUTPATIENT
Start: 2024-04-09 | End: 2024-04-11 | Stop reason: HOSPADM

## 2024-04-09 RX ORDER — AMLODIPINE BESYLATE 5 MG/1
10 TABLET ORAL DAILY
Status: DISCONTINUED | OUTPATIENT
Start: 2024-04-10 | End: 2024-04-11 | Stop reason: HOSPADM

## 2024-04-09 RX ORDER — NALOXONE HCL 0.4 MG/ML
0.02 VIAL (ML) INJECTION
Status: DISCONTINUED | OUTPATIENT
Start: 2024-04-09 | End: 2024-04-11 | Stop reason: HOSPADM

## 2024-04-09 RX ORDER — LOSARTAN POTASSIUM 25 MG/1
100 TABLET ORAL DAILY
Status: DISCONTINUED | OUTPATIENT
Start: 2024-04-09 | End: 2024-04-09

## 2024-04-09 RX ORDER — CLONIDINE HYDROCHLORIDE 0.1 MG/1
0.1 TABLET ORAL EVERY 8 HOURS PRN
Status: DISCONTINUED | OUTPATIENT
Start: 2024-04-09 | End: 2024-04-11 | Stop reason: HOSPADM

## 2024-04-09 RX ORDER — FLUTICASONE PROPIONATE 50 MCG
2 SPRAY, SUSPENSION (ML) NASAL DAILY
Status: DISCONTINUED | OUTPATIENT
Start: 2024-04-10 | End: 2024-04-11 | Stop reason: HOSPADM

## 2024-04-09 RX ORDER — TRAZODONE HYDROCHLORIDE 50 MG/1
100 TABLET ORAL NIGHTLY
Status: DISCONTINUED | OUTPATIENT
Start: 2024-04-09 | End: 2024-04-11 | Stop reason: HOSPADM

## 2024-04-09 RX ORDER — ATORVASTATIN CALCIUM 40 MG/1
40 TABLET, FILM COATED ORAL DAILY
Status: DISCONTINUED | OUTPATIENT
Start: 2024-04-09 | End: 2024-04-09

## 2024-04-09 RX ORDER — ATORVASTATIN CALCIUM 10 MG/1
10 TABLET, FILM COATED ORAL NIGHTLY
Status: DISCONTINUED | OUTPATIENT
Start: 2024-04-09 | End: 2024-04-09

## 2024-04-09 RX ORDER — TALC
6 POWDER (GRAM) TOPICAL NIGHTLY PRN
Status: DISCONTINUED | OUTPATIENT
Start: 2024-04-09 | End: 2024-04-11 | Stop reason: HOSPADM

## 2024-04-09 RX ORDER — SODIUM CHLORIDE 0.9 % (FLUSH) 0.9 %
10 SYRINGE (ML) INJECTION
Status: DISCONTINUED | OUTPATIENT
Start: 2024-04-09 | End: 2024-04-11 | Stop reason: HOSPADM

## 2024-04-09 RX ORDER — GLUCAGON 1 MG
1 KIT INJECTION
Status: DISCONTINUED | OUTPATIENT
Start: 2024-04-09 | End: 2024-04-11 | Stop reason: HOSPADM

## 2024-04-09 RX ORDER — BUDESONIDE 0.5 MG/2ML
2 INHALANT ORAL EVERY 12 HOURS
Status: DISCONTINUED | OUTPATIENT
Start: 2024-04-09 | End: 2024-04-11

## 2024-04-09 RX ORDER — IBUPROFEN 200 MG
24 TABLET ORAL
Status: DISCONTINUED | OUTPATIENT
Start: 2024-04-09 | End: 2024-04-11 | Stop reason: HOSPADM

## 2024-04-09 RX ORDER — ARFORMOTEROL TARTRATE 15 UG/2ML
15 SOLUTION RESPIRATORY (INHALATION) 2 TIMES DAILY
Status: DISCONTINUED | OUTPATIENT
Start: 2024-04-09 | End: 2024-04-11 | Stop reason: HOSPADM

## 2024-04-09 RX ORDER — ASPIRIN 81 MG/1
81 TABLET ORAL DAILY
Status: DISCONTINUED | OUTPATIENT
Start: 2024-04-09 | End: 2024-04-11 | Stop reason: HOSPADM

## 2024-04-09 RX ORDER — DIPHENHYDRAMINE HYDROCHLORIDE 50 MG/ML
12.5 INJECTION INTRAMUSCULAR; INTRAVENOUS
Status: COMPLETED | OUTPATIENT
Start: 2024-04-09 | End: 2024-04-09

## 2024-04-09 RX ORDER — IBUPROFEN 200 MG
16 TABLET ORAL
Status: DISCONTINUED | OUTPATIENT
Start: 2024-04-09 | End: 2024-04-11 | Stop reason: HOSPADM

## 2024-04-09 RX ORDER — HYDROCODONE BITARTRATE AND ACETAMINOPHEN 5; 325 MG/1; MG/1
1 TABLET ORAL EVERY 6 HOURS PRN
Status: DISCONTINUED | OUTPATIENT
Start: 2024-04-09 | End: 2024-04-11 | Stop reason: HOSPADM

## 2024-04-09 RX ORDER — TOPIRAMATE 25 MG/1
25 TABLET ORAL 2 TIMES DAILY
COMMUNITY
Start: 2024-04-03

## 2024-04-09 RX ORDER — LANOLIN ALCOHOL/MO/W.PET/CERES
1 CREAM (GRAM) TOPICAL DAILY
Status: DISCONTINUED | OUTPATIENT
Start: 2024-04-09 | End: 2024-04-11 | Stop reason: HOSPADM

## 2024-04-09 RX ORDER — SODIUM CHLORIDE 0.9 % (FLUSH) 0.9 %
10 SYRINGE (ML) INJECTION EVERY 12 HOURS PRN
Status: DISCONTINUED | OUTPATIENT
Start: 2024-04-09 | End: 2024-04-11 | Stop reason: HOSPADM

## 2024-04-09 RX ORDER — BISACODYL 10 MG/1
10 SUPPOSITORY RECTAL DAILY PRN
Status: DISCONTINUED | OUTPATIENT
Start: 2024-04-09 | End: 2024-04-11 | Stop reason: HOSPADM

## 2024-04-09 RX ORDER — METOCLOPRAMIDE HYDROCHLORIDE 5 MG/ML
10 INJECTION INTRAMUSCULAR; INTRAVENOUS
Status: COMPLETED | OUTPATIENT
Start: 2024-04-09 | End: 2024-04-09

## 2024-04-09 RX ORDER — FLUTICASONE PROPIONATE AND SALMETEROL XINAFOATE 230; 21 UG/1; UG/1
2 AEROSOL, METERED RESPIRATORY (INHALATION) 2 TIMES DAILY
Status: DISCONTINUED | OUTPATIENT
Start: 2024-04-09 | End: 2024-04-09

## 2024-04-09 RX ORDER — LABETALOL HYDROCHLORIDE 5 MG/ML
10 INJECTION, SOLUTION INTRAVENOUS
Status: DISCONTINUED | OUTPATIENT
Start: 2024-04-09 | End: 2024-04-11 | Stop reason: HOSPADM

## 2024-04-09 RX ORDER — CARVEDILOL 25 MG/1
25 TABLET ORAL 2 TIMES DAILY
Status: DISCONTINUED | OUTPATIENT
Start: 2024-04-09 | End: 2024-04-11 | Stop reason: HOSPADM

## 2024-04-09 RX ORDER — ACETAMINOPHEN 325 MG/1
650 TABLET ORAL EVERY 8 HOURS PRN
Status: DISCONTINUED | OUTPATIENT
Start: 2024-04-09 | End: 2024-04-11 | Stop reason: HOSPADM

## 2024-04-09 RX ADMIN — ATORVASTATIN CALCIUM 40 MG: 40 TABLET, FILM COATED ORAL at 09:04

## 2024-04-09 RX ADMIN — BUDESONIDE INHALATION 0.5 MG: 0.5 SUSPENSION RESPIRATORY (INHALATION) at 09:04

## 2024-04-09 RX ADMIN — HYDROCODONE BITARTRATE AND ACETAMINOPHEN 1 TABLET: 5; 325 TABLET ORAL at 07:04

## 2024-04-09 RX ADMIN — FAMOTIDINE 20 MG: 20 TABLET ORAL at 12:04

## 2024-04-09 RX ADMIN — FERROUS SULFATE TAB 325 MG (65 MG ELEMENTAL FE) 1 EACH: 325 (65 FE) TAB at 12:04

## 2024-04-09 RX ADMIN — METOCLOPRAMIDE 10 MG: 5 INJECTION, SOLUTION INTRAMUSCULAR; INTRAVENOUS at 09:04

## 2024-04-09 RX ADMIN — APIXABAN 5 MG: 5 TABLET, FILM COATED ORAL at 09:04

## 2024-04-09 RX ADMIN — FAMOTIDINE 20 MG: 20 TABLET ORAL at 09:04

## 2024-04-09 RX ADMIN — CLONIDINE HYDROCHLORIDE 0.1 MG: 0.1 TABLET ORAL at 09:04

## 2024-04-09 RX ADMIN — ARFORMOTEROL TARTRATE 15 MCG: 15 SOLUTION RESPIRATORY (INHALATION) at 09:04

## 2024-04-09 RX ADMIN — HYDRALAZINE HYDROCHLORIDE 5 MG: 20 INJECTION, SOLUTION INTRAMUSCULAR; INTRAVENOUS at 11:04

## 2024-04-09 RX ADMIN — HYDRALAZINE HYDROCHLORIDE 5 MG: 20 INJECTION, SOLUTION INTRAMUSCULAR; INTRAVENOUS at 10:04

## 2024-04-09 RX ADMIN — TRAZODONE HYDROCHLORIDE 100 MG: 50 TABLET ORAL at 09:04

## 2024-04-09 RX ADMIN — SODIUM CHLORIDE, POTASSIUM CHLORIDE, SODIUM LACTATE AND CALCIUM CHLORIDE 1000 ML: 600; 310; 30; 20 INJECTION, SOLUTION INTRAVENOUS at 09:04

## 2024-04-09 RX ADMIN — LOSARTAN POTASSIUM 100 MG: 25 TABLET, FILM COATED ORAL at 12:04

## 2024-04-09 RX ADMIN — ASPIRIN 81 MG: 81 TABLET, COATED ORAL at 02:04

## 2024-04-09 RX ADMIN — DIPHENHYDRAMINE HYDROCHLORIDE 12.5 MG: 50 INJECTION INTRAMUSCULAR; INTRAVENOUS at 09:04

## 2024-04-09 RX ADMIN — CARVEDILOL 25 MG: 25 TABLET, FILM COATED ORAL at 08:04

## 2024-04-09 NOTE — ED NOTES
Patient c/o headache, left side arm and leg weakness, moves all extremities well, bilateral finger  strong and equal

## 2024-04-09 NOTE — PLAN OF CARE
Problem: Adult Inpatient Plan of Care  Goal: Plan of Care Review  Outcome: Ongoing, Progressing  Goal: Patient-Specific Goal (Individualized)  Outcome: Ongoing, Progressing  Goal: Absence of Hospital-Acquired Illness or Injury  Outcome: Ongoing, Progressing  Goal: Optimal Comfort and Wellbeing  Outcome: Ongoing, Progressing  Goal: Readiness for Transition of Care  Outcome: Ongoing, Progressing     Problem: Bariatric Environmental Safety  Goal: Safety Maintained with Care  Outcome: Ongoing, Progressing     Problem: Infection  Goal: Absence of Infection Signs and Symptoms  Outcome: Ongoing, Progressing     Problem: Adjustment to Illness (Stroke, Ischemic/Transient Ischemic Attack)  Goal: Optimal Coping  Outcome: Ongoing, Progressing     Problem: Cerebral Tissue Perfusion (Stroke, Ischemic/Transient Ischemic Attack)  Goal: Optimal Cerebral Tissue Perfusion  Outcome: Ongoing, Progressing

## 2024-04-09 NOTE — HPI
49-year-old female with past medical history of complex migraine, hypertension, morbid obesity, protein S deficiency on Eliquis, and pulmonary embolism who presented to the ER because of headache, paresthesia of her left fingertips, slurred speech and left-sided body weakness.  According to the patient, she went to sleep at 11:00 p.m. yesterday, and wake up at 8 am with the symptoms.  Her headache is bifrontal, radiate to the back of her head.  Described as sharp, and 9/10 on pain scale.  She denied any chest pain, palpitation or shortness of breath.  Because of the symptoms, patient presented to the ER for evaluation.  She has a history of complex migraine, with similar prior presentations.    In the ER, blood pressure was initially 120/80, but half an hour later it jumped to 203/92.  CBC with no leukocytosis.  CMP showed mild acute renal failure with creatinine of 1.4 compared to baseline 1.1.  Chest x-ray with no acute pulmonary process.  EKG showed normal sinus rhythm with premature atrial complexes.  CT head showed Mild nonspecific loss of the gray-white junction in the right frontal lobe, possibly secondary to attenuation artifact or edema.  According to the ER provider, he discussed the case with Neurology who recommended MRI brain, MRA head and neck.  Patient was given Reglan, and Benadryl for her headache.  Also couple of doses of hydralazine 5 mg IV for her blood pressure.  Patient will be admitted to Medicine for further care.

## 2024-04-09 NOTE — ED PROVIDER NOTES
Encounter Date: 2024       History     Chief Complaint   Patient presents with    Extremity Weakness     C/o left side weakness states woke up with this am around 8am also c/o slurred speech     49-year-old female with history of asthma, generalized anxiety disorder, morbid obesity, protein S deficiency, previous pulmonary embolism, migraine headache.  Patient presents emergency department with complaint of bitemporal frontal headache associated with left hand paresthesia which was noted since this morning.  Patient states that she woke up around 8:00 a.m. and felt this way.  Patient also with subjective speech difficulty.  She states felt like her voice changed in temporal.  She did not have any problems with swallowing or now stating words however.  Patient denies blurred or double vision, no fevers, no chest pain, no shortness of breath, denies any other constitutional symptoms.      Review of patient's allergies indicates:  No Known Allergies  Past Medical History:   Diagnosis Date    Antithrombin III deficiency 2021    Asthma     Claustrophobia     Elevated factor VIII level 2021    Esophageal reflux     Gastroesophageal reflux    Generalized anxiety disorder     Anxiety, Generalized    Herniated disc     Hypertension     Iron deficiency anemia due to chronic blood loss 10/27/2021    Lower extremity weakness     Morbid obesity     Obesity     Protein S deficiency 2021    Pulmonary embolism     Upper extremity weakness      Past Surgical History:   Procedure Laterality Date     SECTION      CHOLECYSTECTOMY      TONSILLECTOMY       No family history on file.  Social History     Tobacco Use    Smoking status: Former     Current packs/day: 0.00     Types: Cigarettes, Cigars     Quit date: 2019     Years since quittin.2    Smokeless tobacco: Never   Substance Use Topics    Alcohol use: Yes     Alcohol/week: 0.8 standard drinks of alcohol     Types: 1 Standard drinks or equivalent per  week     Comment: rarely    Drug use: No     Review of Systems   Constitutional:  Negative for fever.   HENT:  Negative for sore throat.    Respiratory:  Negative for shortness of breath.    Cardiovascular:  Negative for chest pain.   Gastrointestinal:  Negative for nausea.   Genitourinary:  Negative for dysuria.   Musculoskeletal:  Negative for back pain.   Skin:  Negative for rash.   Neurological:  Positive for headaches. Negative for dizziness and weakness.        Left arm paresthesia   Hematological:  Does not bruise/bleed easily.       Physical Exam     Initial Vitals [04/09/24 0906]   BP Pulse Resp Temp SpO2   120/80 84 18 98.1 °F (36.7 °C) 97 %      MAP       --         Physical Exam    Nursing note and vitals reviewed.  Constitutional: She appears well-developed and well-nourished.   HENT:   Head: Normocephalic and atraumatic.   Nose: Nose normal.   Mouth/Throat: Oropharynx is clear and moist.   Eyes: Conjunctivae and EOM are normal. Pupils are equal, round, and reactive to light.   Neck: Neck supple. No thyromegaly present. No tracheal deviation present.   Normal range of motion.  Cardiovascular:  Normal rate, regular rhythm, normal heart sounds and intact distal pulses.     Exam reveals no gallop and no friction rub.       No murmur heard.  Pulmonary/Chest: No stridor. No respiratory distress.   Course bilateral breath sounds no adventitious sounds   Abdominal: Abdomen is soft. Bowel sounds are normal. She exhibits no mass. There is no rebound and no guarding.   Musculoskeletal:         General: No edema. Normal range of motion.      Cervical back: Normal range of motion and neck supple.     Lymphadenopathy:     She has no cervical adenopathy.   Neurological: She is alert and oriented to person, place, and time. She has normal strength and normal reflexes. She displays normal reflexes. A sensory deficit (Left forearm numbness) is present. No cranial nerve deficit. GCS score is 15. GCS eye subscore is 4.  GCS verbal subscore is 5. GCS motor subscore is 6.   NIH -1 (left arm paresthesia).   Skin: Skin is warm and dry. Capillary refill takes less than 2 seconds.   Psychiatric: She has a normal mood and affect.         ED Course   Procedures  Labs Reviewed   CBC W/ AUTO DIFFERENTIAL - Abnormal; Notable for the following components:       Result Value    MCHC 31.7 (*)     All other components within normal limits   ISTAT CREATININE - Abnormal; Notable for the following components:    POC Creatinine 1.6 (*)     All other components within normal limits   PROTIME-INR   APTT   COMPREHENSIVE METABOLIC PANEL   POCT GLUCOSE   POCT GLUCOSE MONITORING CONTINUOUS   POCT CREATININE          Imaging Results              X-Ray Chest AP Portable (In process)                      CT HEAD FOR STROKE (Final result)  Result time 04/09/24 09:33:36      Final result by Gabriel Bennett MD (04/09/24 09:33:36)                   Impression:      1.  No acute intracranial hemorrhage, hydrocephalus, herniation or midline shift.    2.  Mild nonspecific loss of the gray-white junction in the right frontal lobe, possibly secondary to attenuation artifact or edema, consider correlation with signs/symptoms and MRI as an evolving infarct is a consideration    RESULT NOTIFICATION: These observations were discussed by the dictating physician, by phone with, and acknowledged by Frank Gibson at 09:30 on 4/9/2024.      Electronically signed by: Gabriel Bennett  Date:    04/09/2024  Time:    09:33               Narrative:    CLINICAL HISTORY:  ([id number] y/o  (1975) F    Neuro deficit, acute, stroke suspected;    TECHNIQUE:  (A#[id number], exam time 4/9/2024 9:29)    CT HEAD FOR STROKE [id number]    Axial CT of the brain without contrast using soft tissue and bone algorithm. Please note in the acute setting if there is a clinical concern for an acute stroke MRI would be more sensitive/specific for evaluation of ischemia.    CMS MANDATED QUALITY  DATA - CT RADIATION - 436    All CT scans at this facility utilize dose modulation, iterative reconstruction, and/or weight based dosing when appropriate to reduce radiation dose to as low as reasonably achievable.    COMPARISON:  CT from 01/22/2024.    FINDINGS:  No acute intracranial hemorrhage, edema or mass effect, and no acute parenchymal abnormality. There is no hydrocephalus, herniation or midline shift, and the basal and suprasellar cisterns are within normal limits. The osseous structures show no acute skull fracture.  Decreased visualization of the gray-white junction is seen in the anterior right frontal lobe, possibly secondary to attenuation artifact or edema.    Mild periventricular deep cerebral white matter low attenuation  nonspecific findings which can be seen in any diffuse white matter process but most commonly associated with chronic microvascular ischemic disease. There are scattered atheromatous calcifications in the intracranial internal carotid arteries.    Orbital contents appear within normal limits. External auditory canals are unremarkable. The visualized paranasal sinuses and mastoid air cells are essentially clear.                                       CTA Head and Neck (xpd) (No Result on File)  Result time 04/09/24 09:14:36                     Medications   iohexoL (OMNIPAQUE 350) injection 100 mL (has no administration in time range)   metoclopramide injection 10 mg (has no administration in time range)   lactated ringers bolus 1,000 mL (1,000 mLs Intravenous New Bag 4/9/24 0937)   diphenhydrAMINE injection 12.5 mg (12.5 mg Intravenous Given 4/9/24 0937)     Medical Decision Making  Amount and/or Complexity of Data Reviewed  Labs: ordered.  Radiology: ordered.    Risk  Prescription drug management.                          Medical Decision Making:   Initial Assessment:   49-year-old female with history of asthma, generalized anxiety disorder, morbid obesity, protein S deficiency,  previous pulmonary embolism, migraine headache.  Patient presents emergency department with complaint of bitemporal frontal headache associated with left hand paresthesia which was noted since this morning.  Patient states that she woke up around 8:00 a.m. and felt this way.  Patient also with subjective speech difficulty.  She states felt like her voice changed in temporal.  She did not have any problems with swallowing or now stating words however.  Patient denies blurred or double vision, no fevers, no chest pain, no shortness of breath, denies any other constitutional symptoms.    Differential Diagnosis:   TIA, complex migraine, CVA, anxiety disorder  Clinical Tests:   Lab Tests: Ordered and Reviewed  Radiological Study: Ordered and Reviewed  Medical Tests: Ordered and Reviewed             Clinical Impression:  Final diagnoses:  [G45.9] TIA (transient ischemic attack) (Primary)                 Frank Gibson MD  04/09/24 0999

## 2024-04-09 NOTE — PROGRESS NOTES
Automatic Inhaler to Nebulizer Interchange    fluticasone/salmeterol (Advair HFA) 920 mcg/84 mcg changed to budesonide 2 mg twice daily AND arformoterol 15 mcg twice daily per [facility] Automatic Therapeutic Substitutions Protocol.    Please contact pharmacy at extension [phone] with any questions.     Thank you,   Jamie Shabazz

## 2024-04-09 NOTE — ED NOTES
Patient c/o headache, left arm weakness, moves all extremities well, states also noticed voice deeper, states also numbness to left fingers

## 2024-04-09 NOTE — ASSESSMENT & PLAN NOTE
Will allow for permissive hypertension until stroke has been ruled out.  Otherwise, will resume home bp meds.     Hypertension Medications               amLODIPine (NORVASC) 10 MG tablet Take 1 tablet (10 mg total) by mouth once daily.    carvediloL (COREG) 25 MG tablet Take 1 tablet (25 mg total) by mouth 2 (two) times daily.    cloNIDine (CATAPRES) 0.1 MG tablet Take 1 tablet (0.1 mg total) by mouth every 8 (eight) hours as needed (SBP >180).    furosemide (LASIX) 20 MG tablet Take 1 tablet (20 mg total) by mouth once daily.    losartan (COZAAR) 100 MG tablet Take 1 tablet (100 mg total) by mouth once daily.

## 2024-04-09 NOTE — ASSESSMENT & PLAN NOTE
Body mass index is 52.26 kg/m². Morbid obesity complicates all aspects of disease management from diagnostic modalities to treatment. Weight loss encouraged and health benefits explained to patient.

## 2024-04-09 NOTE — ED NOTES
Patient was able to walk from wheelchair to bed, gait steady, moves all extremities well, nurse at bedside for report

## 2024-04-09 NOTE — ASSESSMENT & PLAN NOTE
Patient is still with slurred speech.  However motor and sensory function is intact.  She has a history of complex migraine, and previously presented with the same symptoms.  CT head showed Mild nonspecific loss of the gray-white junction in the right frontal lobe, possibly secondary to attenuation artifact or edema.   Patient's point of care creatinine was 1.6, so CTA head and neck was deferred at this time.  Neurology recommended MRI brain, MRA head and neck.  Will order 2D echo, PT/OT/speech.  Admit to tele.  Will order 2 sets of troponin.  Neurology has been consulted, appreciate recommendation.

## 2024-04-09 NOTE — ASSESSMENT & PLAN NOTE
Will allow for permissive hypertension until stroke has been ruled out.  Otherwise, will resume home bp meds. Will hold losartan for now given her mildly elevated creatinine.      Hypertension Medications               amLODIPine (NORVASC) 10 MG tablet Take 1 tablet (10 mg total) by mouth once daily.    carvediloL (COREG) 25 MG tablet Take 1 tablet (25 mg total) by mouth 2 (two) times daily.    cloNIDine (CATAPRES) 0.1 MG tablet Take 1 tablet (0.1 mg total) by mouth every 8 (eight) hours as needed (SBP >180).    furosemide (LASIX) 20 MG tablet Take 1 tablet (20 mg total) by mouth once daily.    losartan (COZAAR) 100 MG tablet Take 1 tablet (100 mg total) by mouth once daily.

## 2024-04-09 NOTE — ED NOTES
Back from ct no distress, patient was able to grab on to rails to pull self from ct bed back to stretcher

## 2024-04-09 NOTE — ASSESSMENT & PLAN NOTE
Creatinine of 1.4 compared to baseline of 1.1.  Likely 2/2 HTN emergency.  Pt received one liter of lactate ringer in the ER.  Will repeat labs in am.

## 2024-04-09 NOTE — SUBJECTIVE & OBJECTIVE
Past Medical History:   Diagnosis Date    Antithrombin III deficiency 2021    Asthma     Claustrophobia     Elevated factor VIII level 2021    Esophageal reflux     Gastroesophageal reflux    Generalized anxiety disorder     Anxiety, Generalized    Herniated disc     Hypertension     Iron deficiency anemia due to chronic blood loss 10/27/2021    Lower extremity weakness     Morbid obesity     Obesity     Protein S deficiency 2021    Pulmonary embolism     Upper extremity weakness        Past Surgical History:   Procedure Laterality Date     SECTION      CHOLECYSTECTOMY      TONSILLECTOMY         Review of patient's allergies indicates:  No Known Allergies    Current Facility-Administered Medications on File Prior to Encounter   Medication    bebtelovimab (EUA) 175 mg/2 mL (87.5 mg/mL) injection 175 mg     Current Outpatient Medications on File Prior to Encounter   Medication Sig    albuterol (PROVENTIL/VENTOLIN HFA) 90 mcg/actuation inhaler Inhale 2 puffs into the lungs every 6 (six) hours as needed for Wheezing. Rescue    amLODIPine (NORVASC) 10 MG tablet Take 1 tablet (10 mg total) by mouth once daily.    atorvastatin (LIPITOR) 10 MG tablet Take 10 mg by mouth every evening.    butalbital-acetaminophen-caff -40 mg Cap Take 1 capsule by mouth every 4 (four) hours as needed.    butalbital-acetaminophen-caffeine -40 mg (FIORICET, ESGIC) -40 mg per tablet Take 1 tablet by mouth every 4 (four) hours as needed.    carvediloL (COREG) 25 MG tablet Take 1 tablet (25 mg total) by mouth 2 (two) times daily.    cloNIDine (CATAPRES) 0.1 MG tablet Take 1 tablet (0.1 mg total) by mouth every 8 (eight) hours as needed (SBP >180).    ELIQUIS 5 mg Tab TAKE 1 TABLET BY MOUTH TWICE DAILY (Patient taking differently: Take by mouth 2 (two) times daily.)    ergocalciferol (ERGOCALCIFEROL) 50,000 unit Cap Take 50,000 Units by mouth twice a week.    famotidine (PEPCID) 20 MG tablet Take 20 mg by  mouth 2 (two) times daily.    ferrous sulfate (FEROSUL) 325 mg (65 mg iron) Tab tablet TAKE 1 TABLET BY MOUTH EVERY DAY (Patient taking differently: Take by mouth once daily. TAKE 1 TABLET BY MOUTH EVERY DAY)    fluticasone propionate (FLONASE) 50 mcg/actuation nasal spray 2 sprays by Each Nostril route daily as needed for Rhinitis.    fluticasone-salmeterol 230-21 mcg/dose (ADVAIR HFA) 230-21 mcg/actuation HFAA inhaler Inhale 2 puffs into the lungs 2 (two) times daily. Controller    furosemide (LASIX) 20 MG tablet Take 1 tablet (20 mg total) by mouth once daily. (Patient not taking: Reported on 2023)    losartan (COZAAR) 100 MG tablet Take 1 tablet (100 mg total) by mouth once daily.    meloxicam (MOBIC) 15 MG tablet Take 15 mg by mouth.    minocycline (MINOCIN,DYNACIN) 100 MG capsule Take 100 mg by mouth 2 (two) times daily.    MIRENA 20 mcg/24 hours (8 yrs) 52 mg IUD SMARTSIG:Intrauterine Once    miSOPROStoL (CYTOTEC) 200 MCG Tab Take by mouth.    mupirocin (BACTROBAN) 2 % ointment Apply 1 g topically 3 (three) times daily.    NEXIUM 40 mg capsule Take 40 mg by mouth once daily.    omeprazole (PRILOSEC) 20 MG capsule Take 20 mg by mouth once daily.    predniSONE (DELTASONE) 20 MG tablet Take 1 tablet (20 mg total) by mouth once daily.    trazodone (DESYREL) 100 MG tablet Take 100 mg by mouth every evening.     Family History    None       Tobacco Use    Smoking status: Former     Current packs/day: 0.00     Types: Cigarettes, Cigars     Quit date: 2019     Years since quittin.2    Smokeless tobacco: Never   Substance and Sexual Activity    Alcohol use: Yes     Alcohol/week: 0.8 standard drinks of alcohol     Types: 1 Standard drinks or equivalent per week     Comment: rarely    Drug use: No    Sexual activity: Not Currently     Review of Systems   Constitutional:  Negative for chills and fever.   HENT:  Negative for sore throat.    Eyes:  Negative for visual disturbance.   Respiratory:  Negative for  cough and shortness of breath.    Cardiovascular:  Negative for chest pain and palpitations.   Gastrointestinal:  Negative for abdominal pain, nausea and vomiting.   Endocrine: Negative for polydipsia and polyphagia.   Genitourinary:  Negative for dysuria, frequency and urgency.   Musculoskeletal:  Positive for arthralgias.   Skin:  Negative for rash.   Neurological:  Positive for speech difficulty, weakness, numbness and headaches.   Psychiatric/Behavioral:  Negative for confusion.      Objective:     Vital Signs (Most Recent):  Temp: 98.1 °F (36.7 °C) (04/09/24 0906)  Pulse: 80 (04/09/24 1141)  Resp: 18 (04/09/24 1006)  BP: (!) 179/104 (04/09/24 1131)  SpO2: 100 % (04/09/24 1141) Vital Signs (24h Range):  Temp:  [98.1 °F (36.7 °C)] 98.1 °F (36.7 °C)  Pulse:  [74-90] 80  Resp:  [18-22] 18  SpO2:  [95 %-100 %] 100 %  BP: (120-231)/() 179/104     Weight: 133.8 kg (295 lb)  Body mass index is 52.26 kg/m².     Physical Exam  Vitals reviewed.   Constitutional:       Appearance: Normal appearance.   HENT:      Head: Normocephalic and atraumatic.      Mouth/Throat:      Mouth: Mucous membranes are moist.   Eyes:      Extraocular Movements: Extraocular movements intact.      Conjunctiva/sclera: Conjunctivae normal.      Pupils: Pupils are equal, round, and reactive to light.   Cardiovascular:      Rate and Rhythm: Normal rate and regular rhythm.   Pulmonary:      Effort: Pulmonary effort is normal.      Breath sounds: Normal breath sounds.   Abdominal:      General: Abdomen is flat. Bowel sounds are normal. There is no distension.      Palpations: Abdomen is soft.      Tenderness: There is no abdominal tenderness.   Musculoskeletal:         General: No swelling or tenderness.      Cervical back: Normal range of motion and neck supple.   Skin:     General: Skin is warm.   Neurological:      Mental Status: She is alert and oriented to person, place, and time.      Comments: Slurred speech.  Motor and sensory function  "are intact.   Psychiatric:         Mood and Affect: Mood normal.         Behavior: Behavior normal.              CRANIAL NERVES     CN III, IV, VI   Pupils are equal, round, and reactive to light.       Significant Labs: All pertinent labs within the past 24 hours have been reviewed.  CBC:   Recent Labs   Lab 04/09/24  0914   WBC 8.06   HGB 13.3   HCT 42.0        CMP:   Recent Labs   Lab 04/09/24  0914      K 4.0      CO2 30*   *   BUN 19   CREATININE 1.4   CALCIUM 9.3   PROT 7.6   ALBUMIN 3.6   BILITOT 0.4   ALKPHOS 79   AST 19   ALT 18   ANIONGAP 5*     Cardiac Markers: No results for input(s): "CKMB", "MYOGLOBIN", "BNP", "TROPISTAT" in the last 48 hours.  Lactic Acid: No results for input(s): "LACTATE" in the last 48 hours.  Lipase: No results for input(s): "LIPASE" in the last 48 hours.  Magnesium: No results for input(s): "MG" in the last 48 hours.  TSH:   Recent Labs   Lab 02/28/24  1305   TSH 1.71     Urine Culture: No results for input(s): "LABURIN" in the last 48 hours.  Urine Studies: No results for input(s): "COLORU", "APPEARANCEUA", "PHUR", "SPECGRAV", "PROTEINUA", "GLUCUA", "KETONESU", "BILIRUBINUA", "OCCULTUA", "NITRITE", "UROBILINOGEN", "LEUKOCYTESUR", "RBCUA", "WBCUA", "BACTERIA", "SQUAMEPITHEL", "HYALINECASTS" in the last 48 hours.    Invalid input(s): "WRIGHTSUR"    Significant Imaging: I have reviewed all pertinent imaging results/findings within the past 24 hours.  "

## 2024-04-09 NOTE — H&P
[facility] - Emergency Dept  Hospital Medicine  History & Physical    Patient Name: Katiuska Preston  MRN: [id number]  Patient Class: OP- Observation  Admission Date: 4/9/2024  Attending Physician: Deepti Gupta MD   Primary Care Provider: Candi Primary Doctor         Patient information was obtained from patient and ER records.     Subjective:     Principal Problem:Neurological deficit present    Chief Complaint:   Chief Complaint   Patient presents with    Extremity Weakness     C/o left side weakness states woke up with this am around 8am also c/o slurred speech        HPI: 49-year-old female with past medical history of complex migraine, hypertension, morbid obesity, protein S deficiency on Eliquis, and pulmonary embolism who presented to the ER because of headache, paresthesia of her left fingertips, slurred speech and left-sided body weakness.  According to the patient, she went to sleep at 11:00 p.m. yesterday, and wake up at 8 am with the symptoms.  Her headache is bifrontal, radiate to the back of her head.  Described as sharp, and 9/10 on pain scale.  She denied any chest pain, palpitation or shortness of breath.  Because of the symptoms, patient presented to the ER for evaluation.  She has a history of complex migraine, with similar prior presentations.    In the ER, blood pressure was initially 120/80, but half an hour later it jumped to 203/92.  CBC with no leukocytosis.  CMP showed mild acute renal failure with creatinine of 1.4 compared to baseline 1.1.  Chest x-ray with no acute pulmonary process.  EKG showed normal sinus rhythm with premature atrial complexes.  CT head showed Mild nonspecific loss of the gray-white junction in the right frontal lobe, possibly secondary to attenuation artifact or edema.  According to the ER provider, he discussed the case with Neurology who recommended MRI brain, MRA head and neck.  Patient was given Reglan, and Benadryl for her headache.  Also couple of  doses of hydralazine 5 mg IV for her blood pressure.  Patient will be admitted to Medicine for further care.        Past Medical History:   Diagnosis Date    Antithrombin III deficiency 2021    Asthma     Claustrophobia     Elevated factor VIII level 2021    Esophageal reflux     Gastroesophageal reflux    Generalized anxiety disorder     Anxiety, Generalized    Herniated disc     Hypertension     Iron deficiency anemia due to chronic blood loss 10/27/2021    Lower extremity weakness     Morbid obesity     Obesity     Protein S deficiency 2021    Pulmonary embolism     Upper extremity weakness        Past Surgical History:   Procedure Laterality Date     SECTION      CHOLECYSTECTOMY      TONSILLECTOMY         Review of patient's allergies indicates:  No Known Allergies    Current Facility-Administered Medications on File Prior to Encounter   Medication    bebtelovimab (EUA) 175 mg/2 mL (87.5 mg/mL) injection 175 mg     Current Outpatient Medications on File Prior to Encounter   Medication Sig    albuterol (PROVENTIL/VENTOLIN HFA) 90 mcg/actuation inhaler Inhale 2 puffs into the lungs every 6 (six) hours as needed for Wheezing. Rescue    amLODIPine (NORVASC) 10 MG tablet Take 1 tablet (10 mg total) by mouth once daily.    atorvastatin (LIPITOR) 10 MG tablet Take 10 mg by mouth every evening.    butalbital-acetaminophen-caff -40 mg Cap Take 1 capsule by mouth every 4 (four) hours as needed.    butalbital-acetaminophen-caffeine -40 mg (FIORICET, ESGIC) -40 mg per tablet Take 1 tablet by mouth every 4 (four) hours as needed.    carvediloL (COREG) 25 MG tablet Take 1 tablet (25 mg total) by mouth 2 (two) times daily.    cloNIDine (CATAPRES) 0.1 MG tablet Take 1 tablet (0.1 mg total) by mouth every 8 (eight) hours as needed (SBP >180).    ELIQUIS 5 mg Tab TAKE 1 TABLET BY MOUTH TWICE DAILY (Patient taking differently: Take by mouth 2 (two) times daily.)    ergocalciferol  (ERGOCALCIFEROL) 50,000 unit Cap Take 50,000 Units by mouth twice a week.    famotidine (PEPCID) 20 MG tablet Take 20 mg by mouth 2 (two) times daily.    ferrous sulfate (FEROSUL) 325 mg (65 mg iron) Tab tablet TAKE 1 TABLET BY MOUTH EVERY DAY (Patient taking differently: Take by mouth once daily. TAKE 1 TABLET BY MOUTH EVERY DAY)    fluticasone propionate (FLONASE) 50 mcg/actuation nasal spray 2 sprays by Each Nostril route daily as needed for Rhinitis.    fluticasone-salmeterol 230-21 mcg/dose (ADVAIR HFA) 230-21 mcg/actuation HFAA inhaler Inhale 2 puffs into the lungs 2 (two) times daily. Controller    furosemide (LASIX) 20 MG tablet Take 1 tablet (20 mg total) by mouth once daily. (Patient not taking: Reported on 2023)    losartan (COZAAR) 100 MG tablet Take 1 tablet (100 mg total) by mouth once daily.    meloxicam (MOBIC) 15 MG tablet Take 15 mg by mouth.    minocycline (MINOCIN,DYNACIN) 100 MG capsule Take 100 mg by mouth 2 (two) times daily.    MIRENA 20 mcg/24 hours (8 yrs) 52 mg IUD SMARTSIG:Intrauterine Once    miSOPROStoL (CYTOTEC) 200 MCG Tab Take by mouth.    mupirocin (BACTROBAN) 2 % ointment Apply 1 g topically 3 (three) times daily.    NEXIUM 40 mg capsule Take 40 mg by mouth once daily.    omeprazole (PRILOSEC) 20 MG capsule Take 20 mg by mouth once daily.    predniSONE (DELTASONE) 20 MG tablet Take 1 tablet (20 mg total) by mouth once daily.    trazodone (DESYREL) 100 MG tablet Take 100 mg by mouth every evening.     Family History    HTN       Tobacco Use    Smoking status: Former     Current packs/day: 0.00     Types: Cigarettes, Cigars     Quit date: 2019     Years since quittin.2    Smokeless tobacco: Never   Substance and Sexual Activity    Alcohol use: Yes     Alcohol/week: 0.8 standard drinks of alcohol     Types: 1 Standard drinks or equivalent per week     Comment: rarely    Drug use: No    Sexual activity: Not Currently     Review of Systems   Constitutional:  Negative for chills  and fever.   HENT:  Negative for sore throat.    Eyes:  Negative for visual disturbance.   Respiratory:  Negative for cough and shortness of breath.    Cardiovascular:  Negative for chest pain and palpitations.   Gastrointestinal:  Negative for abdominal pain, nausea and vomiting.   Endocrine: Negative for polydipsia and polyphagia.   Genitourinary:  Negative for dysuria, frequency and urgency.   Musculoskeletal:  Positive for arthralgias.   Skin:  Negative for rash.   Neurological:  Positive for speech difficulty, weakness, numbness and headaches.   Psychiatric/Behavioral:  Negative for confusion.      Objective:     Vital Signs (Most Recent):  Temp: 98.1 °F (36.7 °C) (04/09/24 0906)  Pulse: 80 (04/09/24 1141)  Resp: 18 (04/09/24 1006)  BP: (!) 179/104 (04/09/24 1131)  SpO2: 100 % (04/09/24 1141) Vital Signs (24h Range):  Temp:  [98.1 °F (36.7 °C)] 98.1 °F (36.7 °C)  Pulse:  [74-90] 80  Resp:  [18-22] 18  SpO2:  [95 %-100 %] 100 %  BP: (120-231)/() 179/104     Weight: 133.8 kg (295 lb)  Body mass index is 52.26 kg/m².     Physical Exam  Vitals reviewed.   Constitutional:       Appearance: Normal appearance.   HENT:      Head: Normocephalic and atraumatic.      Mouth/Throat:      Mouth: Mucous membranes are moist.   Eyes:      Extraocular Movements: Extraocular movements intact.      Conjunctiva/sclera: Conjunctivae normal.      Pupils: Pupils are equal, round, and reactive to light.   Cardiovascular:      Rate and Rhythm: Normal rate and regular rhythm.   Pulmonary:      Effort: Pulmonary effort is normal.      Breath sounds: Normal breath sounds.   Abdominal:      General: Abdomen is flat. Bowel sounds are normal. There is no distension.      Palpations: Abdomen is soft.      Tenderness: There is no abdominal tenderness.   Musculoskeletal:         General: No swelling or tenderness.      Cervical back: Normal range of motion and neck supple.   Skin:     General: Skin is warm.   Neurological:      Mental  "Status: She is alert and oriented to person, place, and time.      Comments: Slurred speech.  Motor and sensory function are intact.   Psychiatric:         Mood and Affect: Mood normal.         Behavior: Behavior normal.              CRANIAL NERVES     CN III, IV, VI   Pupils are equal, round, and reactive to light.       Significant Labs: All pertinent labs within the past 24 hours have been reviewed.  CBC:   Recent Labs   Lab 04/09/24  0914   WBC 8.06   HGB 13.3   HCT 42.0        CMP:   Recent Labs   Lab 04/09/24  0914      K 4.0      CO2 30*   *   BUN 19   CREATININE 1.4   CALCIUM 9.3   PROT 7.6   ALBUMIN 3.6   BILITOT 0.4   ALKPHOS 79   AST 19   ALT 18   ANIONGAP 5*     Cardiac Markers: No results for input(s): "CKMB", "MYOGLOBIN", "BNP", "TROPISTAT" in the last 48 hours.  Lactic Acid: No results for input(s): "LACTATE" in the last 48 hours.  Lipase: No results for input(s): "LIPASE" in the last 48 hours.  Magnesium: No results for input(s): "MG" in the last 48 hours.  TSH:   Recent Labs   Lab 02/28/24  1305   TSH 1.71     Urine Culture: No results for input(s): "LABURIN" in the last 48 hours.  Urine Studies: No results for input(s): "COLORU", "APPEARANCEUA", "PHUR", "SPECGRAV", "PROTEINUA", "GLUCUA", "KETONESU", "BILIRUBINUA", "OCCULTUA", "NITRITE", "UROBILINOGEN", "LEUKOCYTESUR", "RBCUA", "WBCUA", "BACTERIA", "SQUAMEPITHEL", "HYALINECASTS" in the last 48 hours.    Invalid input(s): "WRIGHTSUR"    Significant Imaging: I have reviewed all pertinent imaging results/findings within the past 24 hours.  Assessment/Plan:     * Neurological deficit present  Patient is still with slurred speech.  However motor and sensory function is intact.  She has a history of complex migraine, and previously presented with the same symptoms.  CT head showed Mild nonspecific loss of the gray-white junction in the right frontal lobe, possibly secondary to attenuation artifact or edema.   Patient's point of " care creatinine was 1.6, so CTA head and neck was deferred at this time.  Neurology recommended MRI brain, MRA head and neck.  Will order 2D echo, PT/OT/speech.  Admit to tele.  Will order 2 sets of troponin.  Neurology has been consulted, appreciate recommendation.      Hypertensive emergency  Will allow for permissive hypertension until stroke has been ruled out.  Otherwise, will resume home bp meds. Will hold losartan for now given her mildly elevated creatinine.      Hypertension Medications               amLODIPine (NORVASC) 10 MG tablet Take 1 tablet (10 mg total) by mouth once daily.    carvediloL (COREG) 25 MG tablet Take 1 tablet (25 mg total) by mouth 2 (two) times daily.    cloNIDine (CATAPRES) 0.1 MG tablet Take 1 tablet (0.1 mg total) by mouth every 8 (eight) hours as needed (SBP >180).    furosemide (LASIX) 20 MG tablet Take 1 tablet (20 mg total) by mouth once daily.    losartan (COZAAR) 100 MG tablet Take 1 tablet (100 mg total) by mouth once daily.              Acute renal failure  Creatinine of 1.4 compared to baseline of 1.1.  Likely 2/2 HTN emergency.  Pt received one liter of lactate ringer in the ER.  Will repeat labs in am.     Morbid obesity  Body mass index is 52.26 kg/m². Morbid obesity complicates all aspects of disease management from diagnostic modalities to treatment. Weight loss encouraged and health benefits explained to patient.         Mild intermittent asthma without complication  Resume inhalers.      Pulmonary embolism  Resume Eliquis.      Protein S deficiency  Complicated by PE.  Resume Eliquis.        VTE Risk Mitigation (From admission, onward)           Ordered     apixaban tablet 5 mg  2 times daily         04/09/24 1200     IP VTE HIGH RISK PATIENT  Once         04/09/24 1200     Place sequential compression device  Until discontinued         04/09/24 1200                       On 04/09/2024, patient should be placed in hospital observation services under my  care.        Automatic Inhaler to Nebulizer Interchange    fluticasone/salmeterol (Advair HFA) 920 mcg/84 mcg changed to budesonide 2 mg twice daily AND arformoterol 15 mcg twice daily per [facility] Automatic Therapeutic Substitutions Protocol.    Please contact pharmacy at extension [phone] with any questions.     Thank you,   Jamie Gupta MD  Department of Hospital Medicine  [facility] - Emergency Dept

## 2024-04-09 NOTE — CONSULTS
[facility]  Department of Neurology  Neurology Consultation Note        PATIENT NAME: Katiuska Preston  MRN: [id number]  CSN: [id number]      TODAY'S DATE: 04/09/2024  ADMIT DATE: 4/9/2024                            CONSULTING PROVIDER: Juanito Leiva MD  CONSULT REQUESTED BY: Frank Gibson MD      Reason for consult: CVA       History obtained from chart review and the patient.    HPI per EMR: Katiuska Preston is a 49 y.o. female with a history of asthma, generalized anxiety disorder, morbid obesity, protein S deficiency, previous pulmonary embolism, migraine headache. Patient presents emergency department with complaint of bitemporal frontal headache associated with left hand paresthesia which was noted since this morning. Patient states that she woke up around 8:00 a.m. and felt this way. Patient also with subjective speech difficulty. She states felt like her voice changed.  She did not have any problems with swallowing or now stating words however.  Patient denies blurred or double vision, no fevers, no chest pain, no shortness of breath, denies any other constitutional symptoms.       Neurology consult:  Patient was seen and examined.  She states that she woke up around 8:00 a.m. and she noticed that her speech was slurred at that time.  She also endorsed of some numbness on the left hand however that has improved.  She presented to the hospital with these symptoms.  She also endorsed of a bifrontal headache that was radiated into her back which was about 7 to 8/10 in intensity at the time of her symptoms which is now improved.  On initial evaluation in the ER, her NIH stroke scale was 1 for left arm paresthesias and speech was normal for ER physician.  Due to low stroke scale and being out of window, he was not a tPA candidate.    CT head was done was negative for acute pathology.  Admitted for further stroke workup and management.    On my assessment, she had mild slurred speech and her  paresthesias have improved.  Denies any weakness or other sensory changes.    PREVIOUS MEDICAL HISTORY:  Past Medical History:   Diagnosis Date    Antithrombin III deficiency 2021    Asthma     Claustrophobia     Elevated factor VIII level 2021    Esophageal reflux     Gastroesophageal reflux    Generalized anxiety disorder     Anxiety, Generalized    Herniated disc     Hypertension     Iron deficiency anemia due to chronic blood loss 10/27/2021    Lower extremity weakness     Morbid obesity     Obesity     Protein S deficiency 2021    Pulmonary embolism     Upper extremity weakness      PREVIOUS SURGICAL HISTORY:  Past Surgical History:   Procedure Laterality Date     SECTION      CHOLECYSTECTOMY      TONSILLECTOMY       FAMILY MEDICAL HISTORY:  No family history on file.  SOCIAL HISTORY:  Social History     Tobacco Use    Smoking status: Former     Current packs/day: 0.00     Types: Cigarettes, Cigars     Quit date: 2019     Years since quittin.2    Smokeless tobacco: Never   Substance Use Topics    Alcohol use: Yes     Alcohol/week: 0.8 standard drinks of alcohol     Types: 1 Standard drinks or equivalent per week     Comment: rarely    Drug use: No     ALLERGIES:  Review of patient's allergies indicates:  No Known Allergies  HOME MEDICATIONS:  Prior to Admission medications    Medication Sig Start Date End Date Taking? Authorizing Provider   albuterol (PROVENTIL/VENTOLIN HFA) 90 mcg/actuation inhaler Inhale 2 puffs into the lungs every 6 (six) hours as needed for Wheezing. Rescue 3/21/23   Idania Cason, MARY ELLEN   amLODIPine (NORVASC) 10 MG tablet Take 1 tablet (10 mg total) by mouth once daily. 23  Delano Dela Cruz MD   atorvastatin (LIPITOR) 10 MG tablet Take 10 mg by mouth every evening. 3/29/22   Provider, Historical   butalbital-acetaminophen-caff -40 mg Cap Take 1 capsule by mouth every 4 (four) hours as needed. 23   Provider, Historical    butalbital-acetaminophen-caffeine -40 mg (FIORICET, ESGIC) -40 mg per tablet Take 1 tablet by mouth every 4 (four) hours as needed. 10/13/22   Provider, Historical   carvediloL (COREG) 25 MG tablet Take 1 tablet (25 mg total) by mouth 2 (two) times daily. 12/28/22 12/28/23  Rebel Esposito MD   cloNIDine (CATAPRES) 0.1 MG tablet Take 1 tablet (0.1 mg total) by mouth every 8 (eight) hours as needed (SBP >180). 1/22/24 1/21/25  Andre Lowry DO   ELIQUIS 5 mg Tab TAKE 1 TABLET BY MOUTH TWICE DAILY  Patient taking differently: Take by mouth 2 (two) times daily. 3/23/23   Lissa Hooper NP-C   ergocalciferol (ERGOCALCIFEROL) 50,000 unit Cap Take 50,000 Units by mouth twice a week. 4/23/22   Provider, Historical   famotidine (PEPCID) 20 MG tablet Take 20 mg by mouth 2 (two) times daily.    Provider, Historical   ferrous sulfate (FEROSUL) 325 mg (65 mg iron) Tab tablet TAKE 1 TABLET BY MOUTH EVERY DAY  Patient taking differently: Take by mouth once daily. TAKE 1 TABLET BY MOUTH EVERY DAY 4/13/23   Cisco Boston MD   fluticasone propionate (FLONASE) 50 mcg/actuation nasal spray 2 sprays by Each Nostril route daily as needed for Rhinitis.    Provider, Historical   fluticasone-salmeterol 230-21 mcg/dose (ADVAIR HFA) 230-21 mcg/actuation HFAA inhaler Inhale 2 puffs into the lungs 2 (two) times daily. Controller 3/21/23 3/20/24  Idania Cason, MARY ELLEN   furosemide (LASIX) 20 MG tablet Take 1 tablet (20 mg total) by mouth once daily.  Patient not taking: Reported on 7/19/2023 5/31/21 7/15/23  Cisco Boston MD   losartan (COZAAR) 100 MG tablet Take 1 tablet (100 mg total) by mouth once daily. 12/28/22 7/15/23  Rebel Esposito MD   meloxicam (MOBIC) 15 MG tablet Take 15 mg by mouth. 2/23/24   Provider, Historical   minocycline (MINOCIN,DYNACIN) 100 MG capsule Take 100 mg by mouth 2 (two) times daily. 9/26/22   Provider, Historical   MIRENA 20 mcg/24 hours (8 yrs) 52 mg IUD  "SMARTSIG:Intrauterine Once 10/4/22   Provider, Historical   miSOPROStoL (CYTOTEC) 200 MCG Tab Take by mouth. 10/11/22   Provider, Historical   mupirocin (BACTROBAN) 2 % ointment Apply 1 g topically 3 (three) times daily. 4/13/23   Provider, Historical   NEXIUM 40 mg capsule Take 40 mg by mouth once daily. 12/23/20   Provider, Historical   omeprazole (PRILOSEC) 20 MG capsule Take 20 mg by mouth once daily. 2/22/21   Provider, Historical   predniSONE (DELTASONE) 20 MG tablet Take 1 tablet (20 mg total) by mouth once daily. 7/19/23   Delano Dela Cruz MD   trazodone (DESYREL) 100 MG tablet Take 100 mg by mouth every evening.    Provider, Historical     CURRENT SCHEDULED MEDICATIONS:   bebtelovimab (EUA)  175 mg Intravenous 1 time in Clinic/HOD    hydrALAZINE  5 mg Intravenous Once    lactated ringers  1,000 mL Intravenous Once     CURRENT INFUSIONS:    CURRENT PRN MEDICATIONS:  iohexoL    REVIEW OF SYSTEMS:  Please refer to the HPI for all pertinent positive and negative findings. A comprehensive review of all other systems was negative.       PHYSICAL EXAM:  Patient Vitals for the past 24 hrs:   BP Temp Pulse Resp SpO2 Height Weight   04/09/24 0931 -- -- 84 (!) 22 97 % -- --   04/09/24 0916 -- -- 82 -- 98 % -- --   04/09/24 0906 120/80 98.1 °F (36.7 °C) 84 18 97 % 5' 3" (1.6 m) 133.8 kg (295 lb)       GENERAL APPEARANCE: Alert, well-developed, well-nourished female in no acute distress.  HEENT: Normocephalic and atraumatic. PERRL. Oropharynx unremarkable.  PULM: Normal respiratory effort. No accessory muscle use.  CV: RRR.  ABDOMEN: Soft, nontender.  EXTREMITIES: No obvious signs of vascular compromise. Pulses present. No cyanosis, clubbing or edema.  SKIN: Clear; no rashes, lesions or skin breaks in exposed areas.    NEURO:  MENTAL STATUS: Patient awake and oriented to time, place, and person, recent/remote memory normal, attention span/concentration normal, and speech fluent without paraphasic errors.  Affect " euthymic.    CRANIAL NERVES:  CN I: Not tested.  CN II: Fundoscopic exam deferred.  CN III, IV, VI: Pupils equal, round and reactive to light.  Extraocular movements full and intact.  CN V: Facial sensation normal.  CN VII: Facial asymmetry absent.  CN VIII: Hearing grossly normal and equal bilaterally.  No skew deviation or pathologic nystagmus.  CN IX, X: Palate elevates symmetrically. Speech/articulation is dysarthric.  CN XI: Shoulder shrug and chin rotation equal with good strength.  CN XII: Tongue protrusion midline.    MOTOR:  Bulk normal. Tone normal and symmetric throughout.  Abnormal movements absent.  Tremor: none present.  Strength 5/5 throughout except/unless specified below:.    REFLEXES:  DTRs 1+ throughout.  Plantar response downgoing bilaterally.  SENSATION: grossly intact throughout.  COORDINATION: normal finger-to-nose.  STATION: not tested.  GAIT: not tested.      NIHSS:  1a      Level of Consciousness (alert, drowsy, etc.):   0=alert; keenly responsive  1b.     Level of Consciousness Questions (month, age): 0= able to answer both questions  1c.     Level of Consciousness Commands (open, close eyes, make fist, let go):  0=Answers both tasks correctly  2.      Best Gaze (eyes open - patient follows examiner's finger or face):      0=normal  3.      Visual (introduce visual stimulus/threat to patient's visual field quadrants):  0=No visual loss  4.      Facial Palsy (show teeth, raise eyebrows and squeeze eyes shut):        0=Normal symmetric movement  5a.     Motor Arm - Left (elevate extremity 90 degrees and score drift/movement):       0=No drift, limb holds 90 (or 45) degrees for full 10 seconds  5b.     Motor Arm - Right (elevate extremity 90 degrees and score drift/movement):      0=No drift, limb holds 90 (or 45) degrees for full 10 seconds  6a.     Motor Leg - Left (elevate extremity 30 degrees and score drift/movement):       0=No drift, limb holds 90 (or 45) degrees for full 10  "seconds  6b      Motor Leg - Right (elevate extremity 30 degrees and score drift/movement):      0=No drift, limb holds 90 (or 45) degrees for full 10 seconds  7.      Limb Ataxia (finger-nose, heel down shin):      0=Absent  8.      Sensory (pin prick to face, arm, trunk, and leg - compare side to side):        0=Normal; no sensory loss  9.      Best Language (name items, describe a picture and read sentences):      0=No aphasia, normal  10.     Dysarthria (evaluate speech clarity by patient repeating listed words): 1=Mild to moderate, patient slurs at least some words and at worst, can be understood with some difficulty  11.     Extinction and Inattention (use prior testing to identify neglect or double simultaneous stimuli testing):      0=No abnormality          NIH Stroke Scale Total:         1      Labs:  Recent Labs   Lab 04/09/24 0914      K 4.0      CO2 30*   BUN 19   CREATININE 1.4   *   CALCIUM 9.3     Recent Labs   Lab 04/09/24  0914   WBC 8.06   HGB 13.3   HCT 42.0        Recent Labs   Lab 04/09/24 0914   ALBUMIN 3.6   PROT 7.6   BILITOT 0.4   ALKPHOS 79   ALT 18   AST 19     Lab Results   Component Value Date    INR 1.0 04/09/2024     Lab Results   Component Value Date    TRIG 181 (H) 02/28/2024    HDL 52 02/28/2024    CHOLHDL 3.56 02/28/2024     Lab Results   Component Value Date    HGBA1C 6.5 (H) 02/28/2024     No results found for: "PROTEINCSF", "GLUCCSF", "WBCCSF"    Imaging:  I have reviewed and interpreted the pertinent imaging and lab results.      X-Ray Chest AP Portable  Narrative: EXAMINATION:  XR CHEST AP PORTABLE    CLINICAL HISTORY:  Stroke;    FINDINGS:  Portable chest radiographs 929 hours compared to prior exams including 12/14/2023 shows stable enlarged cardiac silhouette and normal pulmonary vascularity, with aortic vascular calcifications.    The lungs are hypoexpanded, with left lower lung linear and band like subsegmental atelectasis, similar to prior.  " No consolidation, large pleural effusion, evidence of pulmonary edema, or pneumothorax.  No acute fractures.  There are lower neck surgical clips.  Impression: No evidence of acute cardiopulmonary disease.    Electronically signed by: Lg Xavier  Date:    04/09/2024  Time:    09:47  CT HEAD FOR STROKE  Narrative: CLINICAL HISTORY:  ([id number])50 y/o  (1975) F    Neuro deficit, acute, stroke suspected;    TECHNIQUE:  (A#[id number], exam time 4/9/2024 9:29)    CT HEAD FOR STROKE [id number]    Axial CT of the brain without contrast using soft tissue and bone algorithm. Please note in the acute setting if there is a clinical concern for an acute stroke MRI would be more sensitive/specific for evaluation of ischemia.    CMS MANDATED QUALITY DATA - CT RADIATION - 436    All CT scans at this facility utilize dose modulation, iterative reconstruction, and/or weight based dosing when appropriate to reduce radiation dose to as low as reasonably achievable.    COMPARISON:  CT from 01/22/2024.    FINDINGS:  No acute intracranial hemorrhage, edema or mass effect, and no acute parenchymal abnormality. There is no hydrocephalus, herniation or midline shift, and the basal and suprasellar cisterns are within normal limits. The osseous structures show no acute skull fracture.  Decreased visualization of the gray-white junction is seen in the anterior right frontal lobe, possibly secondary to attenuation artifact or edema.    Mild periventricular deep cerebral white matter low attenuation  nonspecific findings which can be seen in any diffuse white matter process but most commonly associated with chronic microvascular ischemic disease. There are scattered atheromatous calcifications in the intracranial internal carotid arteries.    Orbital contents appear within normal limits. External auditory canals are unremarkable. The visualized paranasal sinuses and mastoid air cells are essentially clear.  Impression: 1.  No acute intracranial  hemorrhage, hydrocephalus, herniation or midline shift.    2.  Mild nonspecific loss of the gray-white junction in the right frontal lobe, possibly secondary to attenuation artifact or edema, consider correlation with signs/symptoms and MRI as an evolving infarct is a consideration    RESULT NOTIFICATION: These observations were discussed by the dictating physician, by phone with, and acknowledged by Frank Gibson at 09:30 on 4/9/2024.    Electronically signed by: Gabriel Bennett  Date:    04/09/2024  Time:    09:33         ASSESSMENT & PLAN:      Acute ischemic infarct   Protein S deficiency   Hypertension     Workup  CTH: No acute intracranial abnormality   MRI brain:  Small acute infarct in left corona radiata.  MRA head:  No large vessel occlusion or high-grade stenosis.  Bilateral carotid ultrasound:  No significant carotid stenosis  ECHO:  pending   LDL:  Pending  HbA1c: pending          Plan  Admitted for further stroke workup.  Etiology likely small-vessel disease.  Start with Aspirin 81 mg and Lipitor 80mg.  Continue with Eliquis for protein S deficiency.  Permissive BP to 220 systolic for 24 hrs from symptom onset and after that normalize BP  PT OT  Speech therapy  DVT prophylaxis with chemo/SCD prophylaxis  Discussed lifestyle modifications as prophylactic measures for stroke prevention including, adequate blood pressure management, healthy diet and regular exercise.          Thank you kindly for including us in the care of this patient. Please do not hesitate to contact us with any questions.           Juanito Leiva MD  Neurology/vascular Neurology  Date of Service: 04/09/2024  10:16 AM    --------------------------------------------------------------------------------------------------------------------------------------------------------------------------------------------------------------------------------------------------------------  Please note: This note was transcribed using voice  recognition software. Because of this technology there are often uinintended grammatical, spelling, and other transcription errors. Please disregard these errors.

## 2024-04-09 NOTE — NURSING
Nurses Note -- 4 Eyes      4/9/2024   3:48 PM      Skin assessed during: Admit      [] No Altered Skin Integrity Present    []Prevention Measures Documented      [] Yes- Altered Skin Integrity Present or Discovered   [] LDA Added if Not in Epic (Describe Wound)   [] New Altered Skin Integrity was Present on Admit and Documented in LDA   [x] Wound Image Taken    Wound Care Consulted? Yes    Attending Nurse:  Syl Marie RN/Staff Member:  Carolina LONDONO

## 2024-04-10 PROBLEM — I63.9 CVA (CEREBRAL VASCULAR ACCIDENT): Status: ACTIVE | Noted: 2024-04-10

## 2024-04-10 LAB
ALBUMIN SERPL BCP-MCNC: 3.2 G/DL (ref 3.5–5.2)
ALP SERPL-CCNC: 72 U/L (ref 55–135)
ALT SERPL W/O P-5'-P-CCNC: 16 U/L (ref 10–44)
ANION GAP SERPL CALC-SCNC: 6 MMOL/L (ref 8–16)
APTT PPP: 31.5 SEC (ref 21–32)
AST SERPL-CCNC: 19 U/L (ref 10–40)
BASOPHILS # BLD AUTO: 0.02 K/UL (ref 0–0.2)
BASOPHILS NFR BLD: 0.3 % (ref 0–1.9)
BILIRUB SERPL-MCNC: 0.6 MG/DL (ref 0.1–1)
BUN SERPL-MCNC: 15 MG/DL (ref 6–20)
CALCIUM SERPL-MCNC: 9.1 MG/DL (ref 8.7–10.5)
CHLORIDE SERPL-SCNC: 106 MMOL/L (ref 95–110)
CK MB SERPL-MCNC: 1.8 NG/ML (ref 0.1–6.5)
CO2 SERPL-SCNC: 28 MMOL/L (ref 23–29)
CREAT SERPL-MCNC: 1.2 MG/DL (ref 0.5–1.4)
DIFFERENTIAL METHOD BLD: ABNORMAL
EOSINOPHIL # BLD AUTO: 0.2 K/UL (ref 0–0.5)
EOSINOPHIL NFR BLD: 2.7 % (ref 0–8)
ERYTHROCYTE [DISTWIDTH] IN BLOOD BY AUTOMATED COUNT: 13.9 % (ref 11.5–14.5)
EST. GFR  (NO RACE VARIABLE): 55.1 ML/MIN/1.73 M^2
ESTIMATED AVG GLUCOSE: 123 MG/DL (ref 68–131)
GLUCOSE SERPL-MCNC: 120 MG/DL (ref 70–110)
GLUCOSE SERPL-MCNC: 125 MG/DL (ref 70–110)
HBA1C MFR BLD: 5.9 % (ref 4.5–6.2)
HCT VFR BLD AUTO: 38.9 % (ref 37–48.5)
HGB BLD-MCNC: 12.3 G/DL (ref 12–16)
IMM GRANULOCYTES # BLD AUTO: 0.01 K/UL (ref 0–0.04)
IMM GRANULOCYTES NFR BLD AUTO: 0.2 % (ref 0–0.5)
INR PPP: 1.1 (ref 0.8–1.2)
LYMPHOCYTES # BLD AUTO: 1.6 K/UL (ref 1–4.8)
LYMPHOCYTES NFR BLD: 25.7 % (ref 18–48)
MAGNESIUM SERPL-MCNC: 1.5 MG/DL (ref 1.6–2.6)
MCH RBC QN AUTO: 30.5 PG (ref 27–31)
MCHC RBC AUTO-ENTMCNC: 31.6 G/DL (ref 32–36)
MCV RBC AUTO: 97 FL (ref 82–98)
MONOCYTES # BLD AUTO: 0.5 K/UL (ref 0.3–1)
MONOCYTES NFR BLD: 7.8 % (ref 4–15)
NEUTROPHILS # BLD AUTO: 4 K/UL (ref 1.8–7.7)
NEUTROPHILS NFR BLD: 63.3 % (ref 38–73)
NRBC BLD-RTO: 0 /100 WBC
PHOSPHATE SERPL-MCNC: 3.6 MG/DL (ref 2.7–4.5)
PLATELET # BLD AUTO: 263 K/UL (ref 150–450)
PMV BLD AUTO: 10.8 FL (ref 9.2–12.9)
POTASSIUM SERPL-SCNC: 3.6 MMOL/L (ref 3.5–5.1)
PROT SERPL-MCNC: 6.6 G/DL (ref 6–8.4)
PROTHROMBIN TIME: 11.9 SEC (ref 9–12.5)
RBC # BLD AUTO: 4.03 M/UL (ref 4–5.4)
SODIUM SERPL-SCNC: 140 MMOL/L (ref 136–145)
TROPONIN I SERPL HS-MCNC: 8.4 PG/ML (ref 0–14.9)
WBC # BLD AUTO: 6.27 K/UL (ref 3.9–12.7)

## 2024-04-10 PROCEDURE — 25000242 PHARM REV CODE 250 ALT 637 W/ HCPCS: Performed by: HOSPITALIST

## 2024-04-10 PROCEDURE — 80053 COMPREHEN METABOLIC PANEL: CPT | Performed by: HOSPITALIST

## 2024-04-10 PROCEDURE — 92523 SPEECH SOUND LANG COMPREHEN: CPT

## 2024-04-10 PROCEDURE — 21400001 HC TELEMETRY ROOM

## 2024-04-10 PROCEDURE — 84484 ASSAY OF TROPONIN QUANT: CPT | Performed by: HOSPITALIST

## 2024-04-10 PROCEDURE — 97535 SELF CARE MNGMENT TRAINING: CPT

## 2024-04-10 PROCEDURE — 25000003 PHARM REV CODE 250: Performed by: HOSPITALIST

## 2024-04-10 PROCEDURE — 92610 EVALUATE SWALLOWING FUNCTION: CPT

## 2024-04-10 PROCEDURE — 94640 AIRWAY INHALATION TREATMENT: CPT

## 2024-04-10 PROCEDURE — 85610 PROTHROMBIN TIME: CPT | Performed by: HOSPITALIST

## 2024-04-10 PROCEDURE — 85730 THROMBOPLASTIN TIME PARTIAL: CPT | Performed by: HOSPITALIST

## 2024-04-10 PROCEDURE — 36415 COLL VENOUS BLD VENIPUNCTURE: CPT | Performed by: HOSPITALIST

## 2024-04-10 PROCEDURE — 27000221 HC OXYGEN, UP TO 24 HOURS

## 2024-04-10 PROCEDURE — 99900031 HC PATIENT EDUCATION (STAT)

## 2024-04-10 PROCEDURE — 97161 PT EVAL LOW COMPLEX 20 MIN: CPT

## 2024-04-10 PROCEDURE — 84100 ASSAY OF PHOSPHORUS: CPT | Performed by: HOSPITALIST

## 2024-04-10 PROCEDURE — 25000003 PHARM REV CODE 250: Performed by: INTERNAL MEDICINE

## 2024-04-10 PROCEDURE — 97116 GAIT TRAINING THERAPY: CPT

## 2024-04-10 PROCEDURE — 94760 N-INVAS EAR/PLS OXIMETRY 1: CPT

## 2024-04-10 PROCEDURE — 85025 COMPLETE CBC W/AUTO DIFF WBC: CPT | Performed by: HOSPITALIST

## 2024-04-10 PROCEDURE — 83735 ASSAY OF MAGNESIUM: CPT | Performed by: HOSPITALIST

## 2024-04-10 PROCEDURE — 82553 CREATINE MB FRACTION: CPT | Performed by: HOSPITALIST

## 2024-04-10 PROCEDURE — 94761 N-INVAS EAR/PLS OXIMETRY MLT: CPT

## 2024-04-10 PROCEDURE — 97165 OT EVAL LOW COMPLEX 30 MIN: CPT

## 2024-04-10 PROCEDURE — 94640 AIRWAY INHALATION TREATMENT: CPT | Mod: XB

## 2024-04-10 RX ADMIN — ATORVASTATIN CALCIUM 40 MG: 40 TABLET, FILM COATED ORAL at 08:04

## 2024-04-10 RX ADMIN — APIXABAN 5 MG: 5 TABLET, FILM COATED ORAL at 09:04

## 2024-04-10 RX ADMIN — ARFORMOTEROL TARTRATE 15 MCG: 15 SOLUTION RESPIRATORY (INHALATION) at 07:04

## 2024-04-10 RX ADMIN — AMLODIPINE BESYLATE 10 MG: 5 TABLET ORAL at 09:04

## 2024-04-10 RX ADMIN — FERROUS SULFATE TAB 325 MG (65 MG ELEMENTAL FE) 1 EACH: 325 (65 FE) TAB at 09:04

## 2024-04-10 RX ADMIN — ASPIRIN 81 MG: 81 TABLET, COATED ORAL at 09:04

## 2024-04-10 RX ADMIN — BUDESONIDE INHALATION 2 MG: 0.5 SUSPENSION RESPIRATORY (INHALATION) at 08:04

## 2024-04-10 RX ADMIN — BUDESONIDE INHALATION 2 MG: 0.5 SUSPENSION RESPIRATORY (INHALATION) at 07:04

## 2024-04-10 RX ADMIN — FAMOTIDINE 20 MG: 20 TABLET ORAL at 09:04

## 2024-04-10 RX ADMIN — FAMOTIDINE 20 MG: 20 TABLET ORAL at 08:04

## 2024-04-10 RX ADMIN — HYDROCODONE BITARTRATE AND ACETAMINOPHEN 1 TABLET: 5; 325 TABLET ORAL at 05:04

## 2024-04-10 RX ADMIN — CARVEDILOL 25 MG: 25 TABLET, FILM COATED ORAL at 09:04

## 2024-04-10 RX ADMIN — ARFORMOTEROL TARTRATE 15 MCG: 15 SOLUTION RESPIRATORY (INHALATION) at 08:04

## 2024-04-10 RX ADMIN — TRAZODONE HYDROCHLORIDE 100 MG: 50 TABLET ORAL at 08:04

## 2024-04-10 RX ADMIN — CARVEDILOL 25 MG: 25 TABLET, FILM COATED ORAL at 08:04

## 2024-04-10 RX ADMIN — APIXABAN 5 MG: 5 TABLET, FILM COATED ORAL at 08:04

## 2024-04-10 NOTE — PROGRESS NOTES
"[facility]  Adult Nutrition   Progress Note (Initial Assessment)     SUMMARY     Recommendations  RD recommends to  advance pts diet to a regular as soon as medically feasible or to what ST recommends after swallow evaluation.    Goals:   Pt to meet 75% or more of her EEN and 100% of her protein by follow up.    Nutrition Goal Status: goal not met    Communication of RD Recs: other (comment)    Nutrition Diagnosis PES Statement: Inadequate oral intake related to PO intake as evidenced by pt being NPO due to awaiting ST evaluation.     Dietitian Rounds Brief  MST of 4: Pt is now on a regular diet with 100% PO intake with LBM yesterday. Her skin appears to be intact and labs have been reviewed. Will continue to follow weekly and prn.    Nutrition Related Social Determinants of Health: SDOH: Adequate food in home environment    Diet order:   Current Diet Order: NPO      Evaluation of Received Nutrient/Fluid Intake  Energy Calories Required: not meeting needs  Protein Required: not meeting needs  Fluid Required: meeting needs  Tolerance: other (see comments)     % Intake of Estimated Energy Needs: 0%  % Meal Intake: NPO      Intake/Output Summary (Last 24 hours) at 4/10/2024 1118  Last data filed at 4/10/2024 0858  Gross per 24 hour   Intake 0 ml   Output --   Net 0 ml        Anthropometrics  Temp: 97.8 °F (36.6 °C)  Height Method: Stated  Height: 5' 3" (160 cm)  Height (inches): 63 in  Weight Method: Standard Scale  Weight: 133.3 kg (293 lb 14 oz)  Weight (lb): 293.88 lb  Ideal Body Weight (IBW), Female: 115 lb  % Ideal Body Weight, Female (lb): 256.52 %  BMI (Calculated): 52.1  BMI Grade: greater than 40 - morbid obesity       Estimated/Assessed Needs  Weight Used For Calorie Calculations: 133.3 kg (293 lb 14 oz)  Energy Calorie Requirements (kcal): 2095-7502 kcals/day (15-20 kcals/kg ABW)  Energy Need Method: Kcal/kg  Protein Requirements:  g/day (1.5-2.0 g/kg IBW)  Weight Used For Protein " Calculations: 52 kg (114 lb 10.2 oz)     Estimated Fluid Requirement Method: RDA Method  RDA Method (mL): 2000       Reason for Assessment  Reason For Assessment: consult, identified at risk by screening criteria  Diagnosis: other (see comments) (Neurological deficit present)  Relevant Medical History: Obesity, Hypertension, Lower extremity weakness, Upper extremity weakness, Morbid obesity, Herniated disc, Gastroesophageal reflux, Generalized anxiety disorder, Claustrophobia, Antithrombin III deficiency, Elevated factor VIII level, Protein S deficiency, Pulmonary embolism, Asthma, Iron deficiency anemia due to chronic blood loss, Acute renal failure  Interdisciplinary Rounds: did not attend  Nutrition Discharge Planning: Pending    Nutrition/Diet History  Spiritual, Cultural Beliefs, Sabianist Practices, Values that Affect Care: no  Food Allergies: NKFA  Factors Affecting Nutritional Intake: NPO    Nutrition Risk Screen  Nutrition Risk Screen: no indicators present     MST Score: 4  Have you recently lost weight without trying?: Yes: 34 lbs or more  Weight loss score: 4  Have you been eating poorly because of a decreased appetite?: No  Appetite score: 0       Weight History:  Wt Readings from Last 10 Encounters:   04/09/24 133.3 kg (293 lb 14 oz)   03/14/24 (!) 136.1 kg (300 lb 0.7 oz)   01/22/24 136.1 kg (300 lb)   12/14/23 136.1 kg (300 lb)   11/30/23 (!) 141.5 kg (312 lb)   09/13/23 (!) 141.5 kg (312 lb)   08/08/23 (!) 141.5 kg (312 lb)   07/17/23 (!) 141.2 kg (311 lb 4.6 oz)   07/16/23 (!) 145.6 kg (321 lb)   04/16/23 (!) 150.6 kg (332 lb)        Lab/Procedures/Meds: Pertinent Labs/Meds Reviewed    Medications:Pertinent Medications Reviewed  Scheduled Meds:   amLODIPine  10 mg Oral Daily    apixaban  5 mg Oral BID    arformoteroL  15 mcg Nebulization BID    aspirin  81 mg Oral Daily    atorvastatin  40 mg Oral QHS    budesonide  2 mg Nebulization Q12H    carvediloL  25 mg Oral BID    famotidine  20 mg Oral BID     ferrous sulfate  1 tablet Oral Daily    fluticasone propionate  2 spray Each Nostril Daily    traZODone  100 mg Oral QHS     Continuous Infusions:  PRN Meds:.acetaminophen, acetaminophen, aluminum-magnesium hydroxide-simethicone, bisacodyL, cloNIDine, dextrose 50%, dextrose 50%, glucagon (human recombinant), glucose, glucose, HYDROcodone-acetaminophen, iohexoL, labetalol, magnesium oxide, magnesium oxide, melatonin, naloxone, ondansetron, potassium bicarbonate, potassium bicarbonate, potassium bicarbonate, potassium, sodium phosphates, potassium, sodium phosphates, potassium, sodium phosphates, sodium chloride 0.9%, sodium chloride 0.9%    Labs: Pertinent Labs Reviewed  Clinical Chemistry:  Recent Labs   Lab 04/09/24  0914 04/10/24  0439    140   K 4.0 3.6    106   CO2 30* 28   * 120*   BUN 19 15   CREATININE 1.4 1.2   CALCIUM 9.3 9.1   PROT 7.6 6.6   ALBUMIN 3.6 3.2*   BILITOT 0.4 0.6   ALKPHOS 79 72   AST 19 19   ALT 18 16   ANIONGAP 5* 6*   MG  --  1.5*   PHOS  --  3.6     CBC:   Recent Labs   Lab 04/10/24  0439   WBC 6.27   RBC 4.03   HGB 12.3   HCT 38.9      MCV 97   MCH 30.5   MCHC 31.6*     Lipid Panel:  Recent Labs   Lab 04/09/24 0914   CHOL 165   HDL 52   LDLCALC 92.4   TRIG 103   CHOLHDL 31.5     Cardiac Profile:  Recent Labs   Lab 04/10/24  0439   CPKMB 1.8     Diabetes:  Recent Labs   Lab 04/09/24 0914   HGBA1C 5.9     Thyroid & Parathyroid:  Recent Labs   Lab 04/09/24 0914   TSH 1.760       Monitor and Evaluation  Food and Nutrient Intake: food and beverage intake, energy intake  Food and Nutrient Adminstration: diet order  Knowledge/Beliefs/Attitudes: food and nutrition knowledge/skill, beliefs and attitudes  Physical Activity and Function: nutrition-related ADLs and IADLs, factors affecting access to physical activity  Anthropometric Measurements: weight, weight change, body mass index  Biochemical Data, Medical Tests and Procedures: electrolyte and renal panel, lipid  profile, inflammatory profile, glucose/endocrine profile, gastrointestinal profile  Nutrition-Focused Physical Findings: overall appearance     Nutrition Risk  Level of Risk/Frequency of Follow-up: high     Nutrition Follow-Up  RD Follow-up?: Yes      Nicole Pretty RD 04/10/2024 11:18 AM

## 2024-04-10 NOTE — ASSESSMENT & PLAN NOTE
CT head showed Mild nonspecific loss of the gray-white junction in the right frontal lobe, possibly secondary to attenuation artifact or edema.   MRI/MRA brain:  Findings of a small acute or subacute infarct in the deep cerebral white matter of the left frontal lobe/centrum semiovale.  Unremarkable MR angiography of the brain.  Deep cerebral white matter findings are (mild) small-vessel ischemic disease  CUS negative for significant stenosis   Neurology has been consulted, appreciate recommendation.  Continue ASA/Eliquis/Statin  Echo pending  PT/OT/ST    Antithrombotics for secondary stroke prevention: Antiplatelets: Aspirin: 81 mg daily  Anticoagulants: Apixaban 5 mg BID     Statins for secondary stroke prevention and hyperlipidemia, if present:   Statins: Atorvastatin- 40 mg daily    Aggressive risk factor modification: HTN, Obesity     Rehab efforts: The patient has been evaluated by a stroke team provider and the therapy needs have been fully considered based off the presenting complaints and exam findings. The following therapy evaluations are needed: PT evaluate and treat, OT evaluate and treat, SLP evaluate and treat    Diagnostics ordered/pending: Carotid ultrasound to assess vasculature, CT scan of head without contrast to asses brain parenchyma, HgbA1C to assess blood glucose levels, Lipid Profile to assess cholesterol levels, MRA head to assess vasculature, MRI head without contrast to assess brain parenchyma, TTE to assess cardiac function/status , TSH to assess thyroid function    VTE prophylaxis: Mechanical prophylaxis: Place SCDs    BP parameters: Infarct: No intervention, SBP <220

## 2024-04-10 NOTE — PROGRESS NOTES
[facility] Medicine  Progress Note    Patient Name: Katiuska Preston  MRN: [id number]  Patient Class: IP- Inpatient   Admission Date: 4/9/2024  Length of Stay: 0 days  Attending Physician: Bri Shabazz MD  Primary Care Provider: Candi, Primary Doctor        Subjective:     Principal Problem:Neurological deficit present        HPI:  49-year-old female with past medical history of complex migraine, hypertension, morbid obesity, protein S deficiency on Eliquis, and pulmonary embolism who presented to the ER because of headache, paresthesia of her left fingertips, slurred speech and left-sided body weakness.  According to the patient, she went to sleep at 11:00 p.m. yesterday, and wake up at 8 am with the symptoms.  Her headache is bifrontal, radiate to the back of her head.  Described as sharp, and 9/10 on pain scale.  She denied any chest pain, palpitation or shortness of breath.  Because of the symptoms, patient presented to the ER for evaluation.  She has a history of complex migraine, with similar prior presentations.    In the ER, blood pressure was initially 120/80, but half an hour later it jumped to 203/92.  CBC with no leukocytosis.  CMP showed mild acute renal failure with creatinine of 1.4 compared to baseline 1.1.  Chest x-ray with no acute pulmonary process.  EKG showed normal sinus rhythm with premature atrial complexes.  CT head showed Mild nonspecific loss of the gray-white junction in the right frontal lobe, possibly secondary to attenuation artifact or edema.  According to the ER provider, he discussed the case with Neurology who recommended MRI brain, MRA head and neck.  Patient was given Reglan, and Benadryl for her headache.  Also couple of doses of hydralazine 5 mg IV for her blood pressure.  Patient will be admitted to Medicine for further care.        Overview/Hospital Course:  Patient monitored closely during hospitalization.  Patient found to have a small acute or  subacute infarct in the deep cerebral white matter of the left frontal lobe/centrum semiovale .  Neurology was consulted.  MRA brain and CUS negative.  Echocardiogram pending.  Patient to continue aspirin, eliquis, statin.  Patient with mildly elevated creatinine on admit, trended  down.  LLE US ordered due to complaint of left calf pain.  PT/OT/ST consulted for eval.       Interval History: Patient seen sitting up in chair.  NAD.  Reports some improvement in left sided weakness.  Continues with dysarthria.  She states that she does have pain to left calf area, worse with movement.  Will order LLE venous US to eval for DVT.   PT/OT/ST consulted.       Review of Systems   Respiratory:  Negative for cough and shortness of breath.    Cardiovascular:  Negative for chest pain.   Gastrointestinal:  Negative for abdominal pain, nausea and vomiting.   Neurological:  Positive for speech difficulty and weakness.     Objective:     Vital Signs (Most Recent):  Temp: 97.1 °F (36.2 °C) (04/10/24 1100)  Pulse: 84 (04/10/24 1100)  Resp: 18 (04/10/24 1100)  BP: (!) 164/101 (04/10/24 1219)  SpO2: 97 % (04/10/24 1100) Vital Signs (24h Range):  Temp:  [97.1 °F (36.2 °C)-98.2 °F (36.8 °C)] 97.1 °F (36.2 °C)  Pulse:  [78-95] 84  Resp:  [17-18] 18  SpO2:  [92 %-100 %] 97 %  BP: (125-188)/() 164/101     Weight: 133.3 kg (293 lb 14 oz)  Body mass index is 52.06 kg/m².    Intake/Output Summary (Last 24 hours) at 4/10/2024 1455  Last data filed at 4/10/2024 1228  Gross per 24 hour   Intake 240 ml   Output --   Net 240 ml         Physical Exam  Vitals and nursing note reviewed.   Constitutional:       General: She is not in acute distress.     Appearance: She is obese.   HENT:      Head: Normocephalic.      Mouth/Throat:      Mouth: Mucous membranes are moist.   Cardiovascular:      Rate and Rhythm: Normal rate and regular rhythm.   Pulmonary:      Effort: Pulmonary effort is normal. No respiratory distress.      Breath sounds: Normal  breath sounds.   Abdominal:      Palpations: Abdomen is soft.      Tenderness: There is no abdominal tenderness.   Musculoskeletal:         General: Tenderness (LLE) present.      Right lower leg: No edema.      Left lower leg: No edema.   Skin:     General: Skin is warm.   Neurological:      Mental Status: She is alert and oriented to person, place, and time.      Comments: Dysarthric    Psychiatric:         Mood and Affect: Mood normal.             Significant Labs: All pertinent labs within the past 24 hours have been reviewed.  BMP:   Recent Labs   Lab 04/10/24  0439   *      K 3.6      CO2 28   BUN 15   CREATININE 1.2   CALCIUM 9.1   MG 1.5*     CBC:   Recent Labs   Lab 04/09/24 0914 04/10/24  0439   WBC 8.06 6.27   HGB 13.3 12.3   HCT 42.0 38.9    263     CMP:   Recent Labs   Lab 04/09/24  0914 04/10/24  0439    140   K 4.0 3.6    106   CO2 30* 28   * 120*   BUN 19 15   CREATININE 1.4 1.2   CALCIUM 9.3 9.1   PROT 7.6 6.6   ALBUMIN 3.6 3.2*   BILITOT 0.4 0.6   ALKPHOS 79 72   AST 19 19   ALT 18 16   ANIONGAP 5* 6*     Magnesium:   Recent Labs   Lab 04/10/24  0439   MG 1.5*       Significant Imaging: I have reviewed all pertinent imaging results/findings within the past 24 hours.    Assessment/Plan:      * Neurological deficit present  Patient is still with slurred speech.  However motor and sensory function is intact.  She has a history of complex migraine, and previously presented with the same symptoms.  CT head showed Mild nonspecific loss of the gray-white junction in the right frontal lobe, possibly secondary to attenuation artifact or edema.   Patient's point of care creatinine was 1.6, so CTA head and neck was deferred at this time.  Neurology recommended MRI brain, MRA head and neck.  Will order 2D echo, PT/OT/speech.  Admit to tele.  Will order 2 sets of troponin.  Neurology has been consulted, appreciate recommendation.  MRI/MRA brain:  Findings of a small  acute or subacute infarct in the deep cerebral white matter of the left frontal lobe/centrum semiovale.  Unremarkable MR angiography of the brain.  Deep cerebral white matter findings are (mild) small-vessel ischemic disease  CUS negative for significant stenosis   Continue ASA/Eliquis/Statin      CVA (cerebral vascular accident)  CT head showed Mild nonspecific loss of the gray-white junction in the right frontal lobe, possibly secondary to attenuation artifact or edema.   MRI/MRA brain:  Findings of a small acute or subacute infarct in the deep cerebral white matter of the left frontal lobe/centrum semiovale.  Unremarkable MR angiography of the brain.  Deep cerebral white matter findings are (mild) small-vessel ischemic disease  CUS negative for significant stenosis   Neurology has been consulted, appreciate recommendation.  Continue ASA/Eliquis/Statin  Echo pending  PT/OT/ST    Antithrombotics for secondary stroke prevention: Antiplatelets: Aspirin: 81 mg daily  Anticoagulants: Apixaban 5 mg BID     Statins for secondary stroke prevention and hyperlipidemia, if present:   Statins: Atorvastatin- 40 mg daily    Aggressive risk factor modification: HTN, Obesity     Rehab efforts: The patient has been evaluated by a stroke team provider and the therapy needs have been fully considered based off the presenting complaints and exam findings. The following therapy evaluations are needed: PT evaluate and treat, OT evaluate and treat, SLP evaluate and treat    Diagnostics ordered/pending: Carotid ultrasound to assess vasculature, CT scan of head without contrast to asses brain parenchyma, HgbA1C to assess blood glucose levels, Lipid Profile to assess cholesterol levels, MRA head to assess vasculature, MRI head without contrast to assess brain parenchyma, TTE to assess cardiac function/status , TSH to assess thyroid function    VTE prophylaxis: Mechanical prophylaxis: Place SCDs    BP parameters: Infarct: No intervention,  SBP <220        Morbid obesity  Body mass index is 52.26 kg/m². Morbid obesity complicates all aspects of disease management from diagnostic modalities to treatment. Weight loss encouraged and health benefits explained to patient.         Acute renal failure  Creatinine of 1.4  on admit compared to baseline of 1.1.  Likely 2/2 HTN emergency.  Pt received one liter of lactate ringer in the ER.  Trend, improved    Mild intermittent asthma without complication  Resume inhalers.      Pulmonary embolism  History of PE  Resume Eliquis.      Hypertensive emergency  Will allow for permissive hypertension until stroke has been ruled out.  Otherwise, will resume home bp meds. Will hold losartan for now given her mildly elevated creatinine.      Hypertension Medications               amLODIPine (NORVASC) 10 MG tablet Take 1 tablet (10 mg total) by mouth once daily.    carvediloL (COREG) 25 MG tablet Take 1 tablet (25 mg total) by mouth 2 (two) times daily.    cloNIDine (CATAPRES) 0.1 MG tablet Take 1 tablet (0.1 mg total) by mouth every 8 (eight) hours as needed (SBP >180).    furosemide (LASIX) 20 MG tablet Take 1 tablet (20 mg total) by mouth once daily.    losartan (COZAAR) 100 MG tablet Take 1 tablet (100 mg total) by mouth once daily.              Protein S deficiency  Complicated by PE.  Resume Eliquis.        VTE Risk Mitigation (From admission, onward)           Ordered     apixaban tablet 5 mg  2 times daily         04/09/24 1200     IP VTE HIGH RISK PATIENT  Once         04/09/24 1200     Place sequential compression device  Until discontinued         04/09/24 1200                    Discharge Planning   RADHA: 4/12/2024     Code Status: Full Code   Is the patient medically ready for discharge?:     Reason for patient still in hospital (select all that apply): Patient trending condition, Consult recommendations, and PT / OT recommendations  Discharge Plan A: Home                  Ayah Leonard NP  Department of  [facility]   [facility]

## 2024-04-10 NOTE — CARE UPDATE
04/09/24 2105   Patient Assessment/Suction   Level of Consciousness (AVPU) alert   Respiratory Effort Normal;Unlabored   Expansion/Accessory Muscles/Retractions no use of accessory muscles   All Lung Fields Breath Sounds diminished   Rhythm/Pattern, Respiratory pattern regular;unlabored   Cough Frequency no cough   PRE-TX-O2   Device (Oxygen Therapy) room air   SpO2 98 %   Pulse Oximetry Type Intermittent   $ Pulse Oximetry - Single Charge Pulse Oximetry - Single   Pulse 95   Resp 18   Positioning HOB elevated 30 degrees   Positioning   Body Position position changed independently   Head of Bed (HOB) Positioning HOB at 30 degrees   Positioning/Transfer Devices pillows;in use   Aerosol Therapy   $ Aerosol Therapy Charges Aerosol Treatment   Daily Review of Necessity (SVN) completed   Respiratory Treatment Status (SVN) given   Treatment Route (SVN) mask;oxygen   Patient Position (SVN) HOB elevated   Post Treatment Assessment (SVN) increased aeration   Signs of Intolerance (SVN) none   Education   $ Education Bronchodilator;15 min

## 2024-04-10 NOTE — NURSING
[facility]  Wound Care    Patient Name:  Katiuska Preston   MRN:  [id number]  Date: 4/10/2024  Diagnosis: Neurological deficit present    History:     Past Medical History:   Diagnosis Date    Antithrombin III deficiency 2021    Asthma     Claustrophobia     Elevated factor VIII level 2021    Esophageal reflux     Gastroesophageal reflux    Generalized anxiety disorder     Anxiety, Generalized    Herniated disc     Hypertension     Iron deficiency anemia due to chronic blood loss 10/27/2021    Lower extremity weakness     Morbid obesity     Obesity     Protein S deficiency 2021    Pulmonary embolism     Upper extremity weakness        Social History     Socioeconomic History    Marital status:    Tobacco Use    Smoking status: Former     Current packs/day: 0.00     Types: Cigarettes, Cigars     Quit date: 2019     Years since quittin.2    Smokeless tobacco: Never   Substance and Sexual Activity    Alcohol use: Yes     Alcohol/week: 0.8 standard drinks of alcohol     Types: 1 Standard drinks or equivalent per week     Comment: rarely    Drug use: No    Sexual activity: Not Currently     Social Determinants of Health     Financial Resource Strain: Medium Risk (10/18/2022)    Overall Financial Resource Strain (CARDIA)     Difficulty of Paying Living Expenses: Somewhat hard   Food Insecurity: Food Insecurity Present (10/18/2022)    Hunger Vital Sign     Worried About Running Out of Food in the Last Year: Sometimes true     Ran Out of Food in the Last Year: Sometimes true   Transportation Needs: Unmet Transportation Needs (10/18/2022)    PRAPARE - Transportation     Lack of Transportation (Medical): Yes     Lack of Transportation (Non-Medical): Yes   Stress: No Stress Concern Present (10/18/2022)    [employer] - Occupational Stress Questionnaire     Feeling of Stress : Only a little   Social Connections: Socially Isolated (10/18/2022)    Social  Connection and Isolation Panel [NHANES]     Frequency of Communication with Friends and Family: Once a week     Frequency of Social Gatherings with Friends and Family: Once a week     Attends [employer] Services: Never     Active Member of Clubs or Organizations: No     Attends Club or Organization Meetings: Never     Marital Status:    Housing Stability: High Risk (10/18/2022)    Housing Stability Vital Sign     Unable to Pay for Housing in the Last Year: Yes     Unstable Housing in the Last Year: No       Precautions:     Allergies as of 04/09/2024    (No Known Allergies)       WOC Assessment Details/Treatment   49 yr old female  sitting in bedside chair   Mole removed from back  says it itches     Blood bruise to the left heel said it has been there      No wound care needs     04/10/2024

## 2024-04-10 NOTE — PLAN OF CARE
Problem: Adult Inpatient Plan of Care  Goal: Plan of Care Review  Outcome: Ongoing, Progressing  Goal: Patient-Specific Goal (Individualized)  Outcome: Ongoing, Progressing  Goal: Absence of Hospital-Acquired Illness or Injury  Outcome: Ongoing, Progressing  Goal: Optimal Comfort and Wellbeing  Outcome: Ongoing, Progressing  Goal: Readiness for Transition of Care  Outcome: Ongoing, Progressing     Problem: Bariatric Environmental Safety  Goal: Safety Maintained with Care  Outcome: Ongoing, Progressing     Problem: Infection  Goal: Absence of Infection Signs and Symptoms  Outcome: Ongoing, Progressing     Problem: Adjustment to Illness (Stroke, Ischemic/Transient Ischemic Attack)  Goal: Optimal Coping  Outcome: Ongoing, Progressing     Problem: Bowel Elimination Impaired (Stroke, Ischemic/Transient Ischemic Attack)  Goal: Effective Bowel Elimination  Outcome: Ongoing, Progressing     Problem: Cerebral Tissue Perfusion (Stroke, Ischemic/Transient Ischemic Attack)  Goal: Optimal Cerebral Tissue Perfusion  Outcome: Ongoing, Progressing     Problem: Cognitive Impairment (Stroke, Ischemic/Transient Ischemic Attack)  Goal: Optimal Cognitive Function  Outcome: Ongoing, Progressing     Problem: Communication Impairment (Stroke, Ischemic/Transient Ischemic Attack)  Goal: Improved Communication Skills  Outcome: Ongoing, Progressing     Problem: Functional Ability Impaired (Stroke, Ischemic/Transient Ischemic Attack)  Goal: Optimal Functional Ability  Outcome: Ongoing, Progressing     Problem: Respiratory Compromise (Stroke, Ischemic/Transient Ischemic Attack)  Goal: Effective Oxygenation and Ventilation  Outcome: Ongoing, Progressing     Problem: Sensorimotor Impairment (Stroke, Ischemic/Transient Ischemic Attack)  Goal: Improved Sensorimotor Function  Outcome: Ongoing, Progressing     Problem: Swallowing Impairment (Stroke, Ischemic/Transient Ischemic Attack)  Goal: Optimal Eating and Swallowing without Aspiration  Outcome:  Ongoing, Progressing     Problem: Urinary Elimination Impaired (Stroke, Ischemic/Transient Ischemic Attack)  Goal: Effective Urinary Elimination  Outcome: Ongoing, Progressing     Problem: Fluid and Electrolyte Imbalance (Acute Kidney Injury/Impairment)  Goal: Fluid and Electrolyte Balance  Outcome: Ongoing, Progressing     Problem: Oral Intake Inadequate (Acute Kidney Injury/Impairment)  Goal: Optimal Nutrition Intake  Outcome: Ongoing, Progressing     Problem: Renal Function Impairment (Acute Kidney Injury/Impairment)  Goal: Effective Renal Function  Outcome: Ongoing, Progressing

## 2024-04-10 NOTE — PT/OT/SLP EVAL
"Speech Language Pathology Evaluation  Cognitive/Bedside Swallow    Patient Name:  Katiuska Preston   MRN:  [id number]  Admitting Diagnosis: Neurological deficit present    Recommendations:                  General Recommendations:  Speech/language therapy  Diet recommendations:  Regular Diet - IDDSI Level 7, Thin liquids - IDDSI Level 0   Aspiration Precautions: Standard aspiration precautions   General Precautions: Standard,    Communication strategies:  provide increased time to answer    Assessment:     Katiuska Preston is a 49 y.o. female with an SLP diagnosis of Dysarthria.  She presents with slow speech rate and imprecise articulation. Voice, cognition, swallowing, and language all intact. Rec regular textures and thin liquids. Low intensity ST at d'c to address speech. SLP to follow during admit.    History:   Per H&P:  "HPI: 49-year-old female with past medical history of complex migraine, hypertension, morbid obesity, protein S deficiency on Eliquis, and pulmonary embolism who presented to the ER because of headache, paresthesia of her left fingertips, slurred speech and left-sided body weakness.  According to the patient, she went to sleep at 11:00 p.m. yesterday, and wake up at 8 am with the symptoms.  Her headache is bifrontal, radiate to the back of her head.  Described as sharp, and 9/10 on pain scale.  She denied any chest pain, palpitation or shortness of breath.  Because of the symptoms, patient presented to the ER for evaluation.  She has a history of complex migraine, with similar prior presentations.     In the ER, blood pressure was initially 120/80, but half an hour later it jumped to 203/92.  CBC with no leukocytosis.  CMP showed mild acute renal failure with creatinine of 1.4 compared to baseline 1.1.  Chest x-ray with no acute pulmonary process.  EKG showed normal sinus rhythm with premature atrial complexes.  CT head showed Mild nonspecific loss of the gray-white junction in the right frontal " "lobe, possibly secondary to attenuation artifact or edema.  According to the ER provider, he discussed the case with Neurology who recommended MRI brain, MRA head and neck.  Patient was given Reglan, and Benadryl for her headache.  Also couple of doses of hydralazine 5 mg IV for her blood pressure.  Patient will be admitted to Medicine for further care."    Past Medical History:   Diagnosis Date    Antithrombin III deficiency 2021    Asthma     Claustrophobia     Elevated factor VIII level 2021    Esophageal reflux     Gastroesophageal reflux    Generalized anxiety disorder     Anxiety, Generalized    Herniated disc     Hypertension     Iron deficiency anemia due to chronic blood loss 10/27/2021    Lower extremity weakness     Morbid obesity     Obesity     Protein S deficiency 2021    Pulmonary embolism     Upper extremity weakness        Past Surgical History:   Procedure Laterality Date     SECTION      CHOLECYSTECTOMY      TONSILLECTOMY         Social History: Patient lives with a roommate.    Prior Intubation HX:  none this admit    Modified Barium Swallow: none found in epic or reported by pt    Imaging:   Imaging Results              US Carotid Bilateral (Final result)  Result time 24 14:55:37      Final result by Raeann Vieira IV, MD (24 14:55:37)                   Impression:      No evidence of hemodynamically significant carotid arterial stenosis.      Electronically signed by: Raeann Vieira  Date:    2024  Time:    14:55               Narrative:    EXAMINATION:  CMS MANDATED QUALITY DATA - CAROTID - 195    All measurements and percent stenosis described below were determined using NASCET criteria or criteria similar to NASCET, as defined by the Society of Radiologists in Ultrasound Consensus Conference, Radiology, 2003    US CAROTID BILATERAL    CLINICAL HISTORY:  stroke symptoms;    TECHNIQUE:  Grey scale, duplex and color Doppler interrogation of the carotid " arteries is performed bilaterally.    COMPARISON:  None.    FINDINGS:  No significant atheromatous change is demonstrated. Doppler interrogation fails to demonstrate evidence of hemodynamically significant carotid arterial stenosis. Antegrade flow is observed within both vertebral arteries.                                       MRA Brain without contrast (Final result)  Result time 04/09/24 12:57:16      Final result by Gabriel Bennett MD (04/09/24 12:57:16)                   Impression:      1.  Findings of a small acute or subacute infarct in the deep cerebral white matter of the left frontal lobe/centrum semiovale.    2.  Unremarkable MR angiography of the brain.    3.  Deep cerebral white matter findings are (mild) small-vessel ischemic disease.      Electronically signed by: Gabriel Bennett  Date:    04/09/2024  Time:    12:57               Narrative:    CLINICAL HISTORY:  ([id number])50 y/o  (1975) F    TIA;; Stroke/TIA, determine embolic source;    TECHNIQUE:  (A#[id number]; [id number], exam time 4/9/2024 12:45; 4/9/2024 12:47)    MRI BRAIN WITHOUT CONTRAST; MRA BRAIN WITHOUT CONTRAST [id number]; [id number]    Multiplanar multisequence MRI of the brain was performed.    Subsequently non-contrasted, time of flight images were obtained with MIP and 3-D reconstructions at a separate workstation to evaluate the intracranial arterial circulation.    CONTRAST:    No contrast was administered.    CMS MANDATED QUALITY DATA - CAROTID - 195    All measurements and percent stenosis described below were determined using NASCET criteria or criteria similar to NASCET, as defined by the Society of Radiologists in Ultrasound Consensus Conference, Radiology, 2003    COMPARISON:  CT of the brain most recently from 04/09/2024    FINDINGS:  Mildly suboptimal exam secondary to motion artifact.    MRI BRAIN:    Small focal curvilinear area of abnormal diffusion restriction in the periventricular centrum semiovale of the posterior  left frontal lobe covering an area approximately 12 x 10 mm (image [id number]), with matched decreased ADC signal intensities of the increased T2 signal intensity compatible with an acute-subacute infarct.  No abnormal GRE signal intensity to suggest an intracranial bleed.  No hydrocephalus, herniation or midline shift and the basal/suprasellar cisterns are patent.    Overall there is minimal cerebral/cerebellar atrophy when accounting for age with mild periventricular nodular deep cerebral white matter T2 FLAIR hyperintensity, a nonspecific finding this age reflection is seen diffuse white matter process, which is most commonly associated with (remote) small-vessel ischemic disease.    The pituitary gland and infundibulum is normal in size without abnormal signal pattern. The craniocervical junction is unremarkable. The temporal bones, internal and external meati, and semicircular canals appear normal. The orbital contents are normal.  The visualized paranasal sinuses and mastoid air cells are clear.    MRA BRAIN:    Evaluation of the anterior intracranial arterial circulation demonstrates the intracranial portions of the internal carotid arteries to be normal and symmetric. M1 and M2 segments of the middle cerebral arteries are unremarkable. The A1 segments of the anterior cerebral arteries appears normal. The more distal segments of the RAMAN and MCA are symmetric and within normal limits.    Evaluation of the posterior intracranial arterial circulation demonstrates the distal vertebral arteries to be patent and symmetric. The basal artery is normal in course and caliber with no evidence of aneurysmal dilatation or focal stenosis. Cerebellar and posterior cerebral arteries arise from the basilar artery and appear normal and symmetric.  Posterior communicating arteries are seen bilaterally.    There is no intracranial aneurysm. Please note MRA is less sensitive for those aneurysms under 5 mm.                                        MRI Brain Without Contrast (Final result)  Result time 04/09/24 12:57:16      Final result by Gabriel Bennett MD (04/09/24 12:57:16)                   Impression:      1.  Findings of a small acute or subacute infarct in the deep cerebral white matter of the left frontal lobe/centrum semiovale.    2.  Unremarkable MR angiography of the brain.    3.  Deep cerebral white matter findings are (mild) small-vessel ischemic disease.      Electronically signed by: Gabriel Bennett  Date:    04/09/2024  Time:    12:57               Narrative:    CLINICAL HISTORY:  ([id number])50 y/o  (1975) F    TIA;; Stroke/TIA, determine embolic source;    TECHNIQUE:  (A#[id number]; [id number], exam time 4/9/2024 12:45; 4/9/2024 12:47)    MRI BRAIN WITHOUT CONTRAST; MRA BRAIN WITHOUT CONTRAST [id number]; [id number]    Multiplanar multisequence MRI of the brain was performed.    Subsequently non-contrasted, time of flight images were obtained with MIP and 3-D reconstructions at a separate workstation to evaluate the intracranial arterial circulation.    CONTRAST:    No contrast was administered.    CMS MANDATED QUALITY DATA - CAROTID - 195    All measurements and percent stenosis described below were determined using NASCET criteria or criteria similar to NASCET, as defined by the Society of Radiologists in Ultrasound Consensus Conference, Radiology, 2003    COMPARISON:  CT of the brain most recently from 04/09/2024    FINDINGS:  Mildly suboptimal exam secondary to motion artifact.    MRI BRAIN:    Small focal curvilinear area of abnormal diffusion restriction in the periventricular centrum semiovale of the posterior left frontal lobe covering an area approximately 12 x 10 mm (image [id number]), with matched decreased ADC signal intensities of the increased T2 signal intensity compatible with an acute-subacute infarct.  No abnormal GRE signal intensity to suggest an intracranial bleed.  No hydrocephalus, herniation or midline shift and  the basal/suprasellar cisterns are patent.    Overall there is minimal cerebral/cerebellar atrophy when accounting for age with mild periventricular nodular deep cerebral white matter T2 FLAIR hyperintensity, a nonspecific finding this age reflection is seen diffuse white matter process, which is most commonly associated with (remote) small-vessel ischemic disease.    The pituitary gland and infundibulum is normal in size without abnormal signal pattern. The craniocervical junction is unremarkable. The temporal bones, internal and external meati, and semicircular canals appear normal. The orbital contents are normal.  The visualized paranasal sinuses and mastoid air cells are clear.    MRA BRAIN:    Evaluation of the anterior intracranial arterial circulation demonstrates the intracranial portions of the internal carotid arteries to be normal and symmetric. M1 and M2 segments of the middle cerebral arteries are unremarkable. The A1 segments of the anterior cerebral arteries appears normal. The more distal segments of the RAMAN and MCA are symmetric and within normal limits.    Evaluation of the posterior intracranial arterial circulation demonstrates the distal vertebral arteries to be patent and symmetric. The basal artery is normal in course and caliber with no evidence of aneurysmal dilatation or focal stenosis. Cerebellar and posterior cerebral arteries arise from the basilar artery and appear normal and symmetric.  Posterior communicating arteries are seen bilaterally.    There is no intracranial aneurysm. Please note MRA is less sensitive for those aneurysms under 5 mm.                                       X-Ray Chest AP Portable (Final result)  Result time 04/09/24 09:47:16      Final result by Lg Xavier MD (04/09/24 09:47:16)                   Impression:      No evidence of acute cardiopulmonary disease.      Electronically signed by: Lg Xavier  Date:    04/09/2024  Time:    09:47                Narrative:    EXAMINATION:  XR CHEST AP PORTABLE    CLINICAL HISTORY:  Stroke;    FINDINGS:  Portable chest radiographs 929 hours compared to prior exams including 12/14/2023 shows stable enlarged cardiac silhouette and normal pulmonary vascularity, with aortic vascular calcifications.    The lungs are hypoexpanded, with left lower lung linear and band like subsegmental atelectasis, similar to prior.  No consolidation, large pleural effusion, evidence of pulmonary edema, or pneumothorax.  No acute fractures.  There are lower neck surgical clips.                                       CT HEAD FOR STROKE (Final result)  Result time 04/09/24 09:33:36      Final result by Gabriel Bennett MD (04/09/24 09:33:36)                   Impression:      1.  No acute intracranial hemorrhage, hydrocephalus, herniation or midline shift.    2.  Mild nonspecific loss of the gray-white junction in the right frontal lobe, possibly secondary to attenuation artifact or edema, consider correlation with signs/symptoms and MRI as an evolving infarct is a consideration    RESULT NOTIFICATION: These observations were discussed by the dictating physician, by phone with, and acknowledged by Frank Gibson at 09:30 on 4/9/2024.      Electronically signed by: Gabriel Bennett  Date:    04/09/2024  Time:    09:33               Narrative:    CLINICAL HISTORY:  ([id number])50 y/o  (1975) F    Neuro deficit, acute, stroke suspected;    TECHNIQUE:  (A#[id number], exam time 4/9/2024 9:29)    CT HEAD FOR STROKE [id number]    Axial CT of the brain without contrast using soft tissue and bone algorithm. Please note in the acute setting if there is a clinical concern for an acute stroke MRI would be more sensitive/specific for evaluation of ischemia.    CMS MANDATED QUALITY DATA - CT RADIATION - 436    All CT scans at this facility utilize dose modulation, iterative reconstruction, and/or weight based dosing when appropriate to reduce radiation dose to  "as low as reasonably achievable.    COMPARISON:  CT from 01/22/2024.    FINDINGS:  No acute intracranial hemorrhage, edema or mass effect, and no acute parenchymal abnormality. There is no hydrocephalus, herniation or midline shift, and the basal and suprasellar cisterns are within normal limits. The osseous structures show no acute skull fracture.  Decreased visualization of the gray-white junction is seen in the anterior right frontal lobe, possibly secondary to attenuation artifact or edema.    Mild periventricular deep cerebral white matter low attenuation  nonspecific findings which can be seen in any diffuse white matter process but most commonly associated with chronic microvascular ischemic disease. There are scattered atheromatous calcifications in the intracranial internal carotid arteries.    Orbital contents appear within normal limits. External auditory canals are unremarkable. The visualized paranasal sinuses and mastoid air cells are essentially clear.                                      Prior diet: Regular, thin at home; NPO at time of eval.    Occupation/hobbies/homemaking: Pt enjoys crocheting, cooking, and decorating cakes.    Subjective     Pt awake sitting in bedside chair talking on the phone to her mom. Pt was agreeable to ST evaluation.  Patient goals: "Oh good! I'm glad you're here. I want to eat"     Pain/Comfort:       Respiratory Status: Room air    Objective:     Cognitive Status:    Portions of the Mo[facility] basic were administered. Executive function 1/1, immediate recall 2/5 on first trial and 4/5 on 2nd trial, fluency 2/2, orientation 6/6, abstraction 3/3, delayed recall 3/5, naming 4/4. Calculation, visuoperception, and attention portions of exam not administered 2/2 not having second page to assessment. Focused attention intact, in addition to sustained attention. Basic problem solving intact. Pt reports her education level was 12th grade; however, she was in special education classes " and received a different type of application. Overall, pt's cognition appears to be at baseline.       Receptive Language:   Comprehension:   Questions Simple yes/no WFL  Open ended questions WFL  Commands  One step WFL  two step basic commands WFL  Conversation WFL    Pragmatics:    Appropriate    Expressive Language:  Verbal:    Initiation WFL  Naming Confrontation WFL, Convergent WFL, and Divergent responsive WFL  Conversational speech WFL      Motor Speech:  Dysarthria Suspect flaccid dysarthria  Intelligibility decreased, but pt is ~75% intelligible  Articulation imprecise  Resonance intact  Prosody slow rate, though flow consistent  Respiration functional  Phonation functional  Initiation appropriate    Voice:   Sustained vowel: 16 sec, 11 sec, 8 sec-- 11.7 average  S/z ratio: 5/5.6-- 0.89    Visual-Spatial:  WFL    Reading:   Not assessed pt w/o glasses      Written Expression:   Not assessed pt w/o glasses    Oral Musculature Evaluation  Oral Musculature: facial asymmetry present (right)  Dentition: present and adequate  Secretion Management: adequate  Mucosal Quality: adequate  Mandibular Strength and Mobility: WFL  Oral Labial Strength and Mobility: impaired retraction, functional pursing, functional seal, functional coordination  Lingual Strength and Mobility: functional protrusion, functional anterior elevation, functional lateral movement  Velar Elevation: WFL  Buccal Strength and Mobility: WFL  Volitional Cough: elicited; functional  Volitional Swallow: laryngeal movement palpated  Voice Prior to PO Intake: clear    Bedside Swallow Eval:   Consistencies Assessed:  Thin liquids water via cup and straw  Puree tsp bites applesauce  Mixed consistencies tsp bites diced peaches in thin juice  Solids cracker      Oral Phase:   WFL    Pharyngeal Phase:   no overt clinical signs/symptoms of aspiration  no overt clinical signs/symptoms of pharyngeal dysphagia    Compensatory Strategies  None    Treatment: Pt  educated re purpose of evaluation, role of SLP, results of evaluation, and recs. Pt expressed understanding of recs. Recs shared w/ nursing.    Goals:   Multidisciplinary Problems       SLP Goals          Problem: SLP    Goal Priority Disciplines Outcome   SLP Goal     SLP    Description: 1. Pt will complete speech drills using articulation strategies in 80% of trials                       Plan:     Patient to be seen:  3 x/week   Plan of Care expires:     Plan of Care reviewed with:  patient, other (see comments) (nursing)   SLP Follow-Up:  Yes       Discharge recommendations:  Therapy Intensity Recommendations at Discharge: Low Intensity Therapy   Barriers to Discharge:  None    Time Tracking:     SLP Treatment Date:   04/10/24  Speech Start Time:  1115  Speech Stop Time:  1153     Speech Total Time (min):  38 min    Billable Minutes: Eval speech/cognitive-linguistic 13 min , Eval Swallow and Oral Function 10 min, and Self Care/Home Management Training 15 min    04/10/2024

## 2024-04-10 NOTE — CARE UPDATE
04/09/24 210   Aerosol Therapy   $ Aerosol Therapy Charges Aerosol Treatment   Daily Review of Necessity (SVN) completed   Respiratory Treatment Status (SVN) given   Treatment Route (SVN) mask;oxygen   Patient Position (SVN) HOB elevated   Post Treatment Assessment (SVN) increased aeration   Signs of Intolerance (SVN) none

## 2024-04-10 NOTE — PROGRESS NOTES
[facility]  Department of Neurology  Progress Note  Date: 04/10/2024 12:13 PM          Patient Name: Katiuska Preston   MRN: [id number]   : 1975    AGE: 49 y.o.    LOS: 0 days Hospital Day: 2  Admit date: 2024  9:00 AM     HPI per EMR: Katiuska Preston is a 49 y.o. female with a history of asthma, generalized anxiety disorder, morbid obesity, protein S deficiency, previous pulmonary embolism, migraine headache. Patient presents emergency department with complaint of bitemporal frontal headache associated with left hand paresthesia which was noted since this morning. Patient states that she woke up around 8:00 a.m. and felt this way. Patient also with subjective speech difficulty. She states felt like her voice changed.  She did not have any problems with swallowing or now stating words however.  Patient denies blurred or double vision, no fevers, no chest pain, no shortness of breath, denies any other constitutional symptoms.        Neurology consult:  Patient was seen and examined.  She states that she woke up around 8:00 a.m. and she noticed that her speech was slurred at that time.  She also endorsed of some numbness on the left hand however that has improved.  She presented to the hospital with these symptoms.  She also endorsed of a bifrontal headache that was radiated into her back which was about 7 to 8/10 in intensity at the time of her symptoms which is now improved.  On initial evaluation in the ER, her NIH stroke scale was 1 for left arm paresthesias and speech was normal for ER physician.  Due to low stroke scale and being out of window, he was not a tPA candidate.     CT head was done was negative for acute pathology.  Admitted for further stroke workup and management.     On my assessment, she had mild slurred speech and her paresthesias have improved.  Denies any weakness or other sensory changes.       04/10/2024: No acute events overnight. Patient was seen and examined by me  this morning. Speech improving per her. Denies any other complains.        Vitals:  Patient Vitals for the past 24 hrs:   BP Temp Temp src Pulse Resp SpO2 Weight   04/10/24 0717 -- -- -- 80 18 (!) 93 % --   04/10/24 0700 (!) 162/67 97.8 °F (36.6 °C) Oral 78 18 (!) 92 % --   04/10/24 0329 (!) 143/71 98 °F (36.7 °C) Oral 83 18 95 % --   04/09/24 2318 (!) 134/58 97.6 °F (36.4 °C) Oral 86 18 (!) 94 % --   04/09/24 2105 -- -- -- 95 18 98 % --   04/09/24 1916 (!) 188/81 98.1 °F (36.7 °C) Oral 89 18 95 % --   04/09/24 1915 -- -- -- -- 18 -- --   04/09/24 1531 (!) 143/89 98 °F (36.7 °C) Oral 85 17 95 % 133.3 kg (293 lb 14 oz)   04/09/24 1504 -- -- -- 82 -- 100 % --   04/09/24 1500 (!) 125/90 98.2 °F (36.8 °C) Oral 83 -- 100 % --   04/09/24 1446 120/79 -- -- 80 -- 100 % --   04/09/24 1431 (!) 178/95 -- -- 80 16 100 % --   04/09/24 1415 (!) 165/111 -- -- 80 16 100 % --   04/09/24 1330 (!) 178/79 -- -- 78 16 100 % --   04/09/24 1324 -- -- -- 83 16 100 % --   04/09/24 1251 -- -- -- 80 16 100 % --   04/09/24 1248 -- -- -- 90 16 98 % --   04/09/24 1246 (!) 164/98 98.2 °F (36.8 °C) Oral 88 16 100 % --   04/09/24 1241 -- -- -- 87 -- 100 % --   04/09/24 1238 -- -- -- 77 16 100 % --   04/09/24 1236 (!) 226/98 -- -- 82 16 98 % --     PHYSICAL EXAM:     GENERAL APPEARANCE: Well-developed, well-nourished female in no acute distress.  HEENT: Normocephalic and atraumatic. PERRL. Oropharynx unremarkable.  PULM: Comfortable on room air.  CV: RRR.  ABDOMEN: Soft, nontender.  EXTREMITIES: No signs of vascular compromise. Pulses present. No cyanosis, clubbing or edema.  SKIN: Clear; no rashes, lesions or skin breaks in exposed areas.      NEURO:   MENTAL STATUS: Patient awake and oriented to time, place, and person. Affect normal.  CRANIAL NERVES II-XII: Pupils equal, round and reactive to light. Extraocular movements full and intact. No facial asymmetry. Mild dysarthria  MOTOR: Normal bulk. Tone normal and symmetrical throughout.  No abnormal  "movements. No tremor.   Strength 4/5 throughout unless specified below.  REFLEXES: DTRs 1+; normal and symmetric throughout.   SENSATION: Sensation grossly intact to fine touch.  COORDINATION: Finger-to-nose normal for age and symmetric.  STATION: Romberg deferred.  GAIT: Deferred.    CURRENT SCHEDULED MEDICATIONS:   amLODIPine  10 mg Oral Daily    apixaban  5 mg Oral BID    arformoteroL  15 mcg Nebulization BID    aspirin  81 mg Oral Daily    atorvastatin  40 mg Oral QHS    budesonide  2 mg Nebulization Q12H    carvediloL  25 mg Oral BID    famotidine  20 mg Oral BID    ferrous sulfate  1 tablet Oral Daily    fluticasone propionate  2 spray Each Nostril Daily    traZODone  100 mg Oral QHS     CURRENT INFUSIONS:    DATA:  Recent Labs   Lab 04/09/24  0914 04/10/24  0439    140   K 4.0 3.6    106   CO2 30* 28   BUN 19 15   CREATININE 1.4 1.2   * 120*   CALCIUM 9.3 9.1   PHOS  --  3.6   MG  --  1.5*   AST 19 19   ALT 18 16     Recent Labs   Lab 04/09/24  0914 04/10/24  0439   WBC 8.06 6.27   HGB 13.3 12.3   HCT 42.0 38.9    263     No results found for: "PROTEINCSF", "GLUCCSF", "WBCCSF", "RBCCSF", "PMNCSF"  Hemoglobin A1C   Date Value Ref Range Status   04/09/2024 5.9 4.5 - 6.2 % Final     Comment:     According to ADA guidelines, hemoglobin A1C <7.0% represents  optimal control in non-pregnant diabetic patients.  Different  metrics may apply to specific populations.   Standards of Medical Care in Diabetes - 2016.    For the purpose of screening for the presence of diabetes:  <5.7%     Consistent with the absence of diabetes  5.7-6.4%  Consistent with increasing risk for diabetes   (prediabetes)  >or=6.5%  Consistent with diabetes    Currently no consensus exists for use of hemoglobin A1C  for diagnosis of diabetes for children.     02/28/2024 6.5 (H) 4.7 - 5.6 % Final   07/16/2023 6.2 4.5 - 6.2 % Final     Comment:     According to ADA guidelines, hemoglobin A1C <7.0% represents  optimal " control in non-pregnant diabetic patients.  Different  metrics may apply to specific populations.   Standards of Medical Care in Diabetes - 2016.    For the purpose of screening for the presence of diabetes:  <5.7%     Consistent with the absence of diabetes  5.7-6.4%  Consistent with increasing risk for diabetes   (prediabetes)  >or=6.5%  Consistent with diabetes    Currently no consensus exists for use of hemoglobin A1C  for diagnosis of diabetes for children.     11/25/2014 5.3 4.5 - 6.2 % Final            I have personally reviewed and interpreted the pertinent imaging and lab results.  Imaging Results              US Carotid Bilateral (Final result)  Result time 04/09/24 14:55:37      Final result by Raeann Vieira IV, MD (04/09/24 14:55:37)                   Impression:      No evidence of hemodynamically significant carotid arterial stenosis.      Electronically signed by: Raeann Vieira  Date:    04/09/2024  Time:    14:55               Narrative:    EXAMINATION:  CMS MANDATED QUALITY DATA - CAROTID - 195    All measurements and percent stenosis described below were determined using NASCET criteria or criteria similar to NASCET, as defined by the Society of Radiologists in Ultrasound Consensus Conference, Radiology, 2003    US CAROTID BILATERAL    CLINICAL HISTORY:  stroke symptoms;    TECHNIQUE:  Grey scale, duplex and color Doppler interrogation of the carotid arteries is performed bilaterally.    COMPARISON:  None.    FINDINGS:  No significant atheromatous change is demonstrated. Doppler interrogation fails to demonstrate evidence of hemodynamically significant carotid arterial stenosis. Antegrade flow is observed within both vertebral arteries.                                       MRA Brain without contrast (Final result)  Result time 04/09/24 12:57:16      Final result by Gabriel Bennett MD (04/09/24 12:57:16)                   Impression:      1.  Findings of a small acute or subacute infarct in  the deep cerebral white matter of the left frontal lobe/centrum semiovale.    2.  Unremarkable MR angiography of the brain.    3.  Deep cerebral white matter findings are (mild) small-vessel ischemic disease.      Electronically signed by: Gabriel Bennett  Date:    04/09/2024  Time:    12:57               Narrative:    CLINICAL HISTORY:  ([id number])50 y/o  (1975) F    TIA;; Stroke/TIA, determine embolic source;    TECHNIQUE:  (A#[id number]; [id number], exam time 4/9/2024 12:45; 4/9/2024 12:47)    MRI BRAIN WITHOUT CONTRAST; MRA BRAIN WITHOUT CONTRAST [id number]; [id number]    Multiplanar multisequence MRI of the brain was performed.    Subsequently non-contrasted, time of flight images were obtained with MIP and 3-D reconstructions at a separate workstation to evaluate the intracranial arterial circulation.    CONTRAST:    No contrast was administered.    CMS MANDATED QUALITY DATA - CAROTID - 195    All measurements and percent stenosis described below were determined using NASCET criteria or criteria similar to NASCET, as defined by the Society of Radiologists in Ultrasound Consensus Conference, Radiology, 2003    COMPARISON:  CT of the brain most recently from 04/09/2024    FINDINGS:  Mildly suboptimal exam secondary to motion artifact.    MRI BRAIN:    Small focal curvilinear area of abnormal diffusion restriction in the periventricular centrum semiovale of the posterior left frontal lobe covering an area approximately 12 x 10 mm (image [id number]), with matched decreased ADC signal intensities of the increased T2 signal intensity compatible with an acute-subacute infarct.  No abnormal GRE signal intensity to suggest an intracranial bleed.  No hydrocephalus, herniation or midline shift and the basal/suprasellar cisterns are patent.    Overall there is minimal cerebral/cerebellar atrophy when accounting for age with mild periventricular nodular deep cerebral white matter T2 FLAIR hyperintensity, a nonspecific finding this age  reflection is seen diffuse white matter process, which is most commonly associated with (remote) small-vessel ischemic disease.    The pituitary gland and infundibulum is normal in size without abnormal signal pattern. The craniocervical junction is unremarkable. The temporal bones, internal and external meati, and semicircular canals appear normal. The orbital contents are normal.  The visualized paranasal sinuses and mastoid air cells are clear.    MRA BRAIN:    Evaluation of the anterior intracranial arterial circulation demonstrates the intracranial portions of the internal carotid arteries to be normal and symmetric. M1 and M2 segments of the middle cerebral arteries are unremarkable. The A1 segments of the anterior cerebral arteries appears normal. The more distal segments of the RAMAN and MCA are symmetric and within normal limits.    Evaluation of the posterior intracranial arterial circulation demonstrates the distal vertebral arteries to be patent and symmetric. The basal artery is normal in course and caliber with no evidence of aneurysmal dilatation or focal stenosis. Cerebellar and posterior cerebral arteries arise from the basilar artery and appear normal and symmetric.  Posterior communicating arteries are seen bilaterally.    There is no intracranial aneurysm. Please note MRA is less sensitive for those aneurysms under 5 mm.                                       MRI Brain Without Contrast (Final result)  Result time 04/09/24 12:57:16      Final result by Gabriel Bennett MD (04/09/24 12:57:16)                   Impression:      1.  Findings of a small acute or subacute infarct in the deep cerebral white matter of the left frontal lobe/centrum semiovale.    2.  Unremarkable MR angiography of the brain.    3.  Deep cerebral white matter findings are (mild) small-vessel ischemic disease.      Electronically signed by: Gabriel Bennett  Date:    04/09/2024  Time:    12:57               Narrative:     CLINICAL HISTORY:  ([id number])50 y/o  (1975) F    TIA;; Stroke/TIA, determine embolic source;    TECHNIQUE:  (A#[id number]; [id number], exam time 4/9/2024 12:45; 4/9/2024 12:47)    MRI BRAIN WITHOUT CONTRAST; MRA BRAIN WITHOUT CONTRAST [id number]; [id number]    Multiplanar multisequence MRI of the brain was performed.    Subsequently non-contrasted, time of flight images were obtained with MIP and 3-D reconstructions at a separate workstation to evaluate the intracranial arterial circulation.    CONTRAST:    No contrast was administered.    CMS MANDATED QUALITY DATA - CAROTID - 195    All measurements and percent stenosis described below were determined using NASCET criteria or criteria similar to NASCET, as defined by the Society of Radiologists in Ultrasound Consensus Conference, Radiology, 2003    COMPARISON:  CT of the brain most recently from 04/09/2024    FINDINGS:  Mildly suboptimal exam secondary to motion artifact.    MRI BRAIN:    Small focal curvilinear area of abnormal diffusion restriction in the periventricular centrum semiovale of the posterior left frontal lobe covering an area approximately 12 x 10 mm (image [id number]), with matched decreased ADC signal intensities of the increased T2 signal intensity compatible with an acute-subacute infarct.  No abnormal GRE signal intensity to suggest an intracranial bleed.  No hydrocephalus, herniation or midline shift and the basal/suprasellar cisterns are patent.    Overall there is minimal cerebral/cerebellar atrophy when accounting for age with mild periventricular nodular deep cerebral white matter T2 FLAIR hyperintensity, a nonspecific finding this age reflection is seen diffuse white matter process, which is most commonly associated with (remote) small-vessel ischemic disease.    The pituitary gland and infundibulum is normal in size without abnormal signal pattern. The craniocervical junction is unremarkable. The temporal bones, internal and external meati, and  semicircular canals appear normal. The orbital contents are normal.  The visualized paranasal sinuses and mastoid air cells are clear.    MRA BRAIN:    Evaluation of the anterior intracranial arterial circulation demonstrates the intracranial portions of the internal carotid arteries to be normal and symmetric. M1 and M2 segments of the middle cerebral arteries are unremarkable. The A1 segments of the anterior cerebral arteries appears normal. The more distal segments of the RAMAN and MCA are symmetric and within normal limits.    Evaluation of the posterior intracranial arterial circulation demonstrates the distal vertebral arteries to be patent and symmetric. The basal artery is normal in course and caliber with no evidence of aneurysmal dilatation or focal stenosis. Cerebellar and posterior cerebral arteries arise from the basilar artery and appear normal and symmetric.  Posterior communicating arteries are seen bilaterally.    There is no intracranial aneurysm. Please note MRA is less sensitive for those aneurysms under 5 mm.                                       X-Ray Chest AP Portable (Final result)  Result time 04/09/24 09:47:16      Final result by Lg Xavier MD (04/09/24 09:47:16)                   Impression:      No evidence of acute cardiopulmonary disease.      Electronically signed by: Lg Xavier  Date:    04/09/2024  Time:    09:47               Narrative:    EXAMINATION:  XR CHEST AP PORTABLE    CLINICAL HISTORY:  Stroke;    FINDINGS:  Portable chest radiographs 929 hours compared to prior exams including 12/14/2023 shows stable enlarged cardiac silhouette and normal pulmonary vascularity, with aortic vascular calcifications.    The lungs are hypoexpanded, with left lower lung linear and band like subsegmental atelectasis, similar to prior.  No consolidation, large pleural effusion, evidence of pulmonary edema, or pneumothorax.  No acute fractures.  There are lower neck surgical clips.                                        CT HEAD FOR STROKE (Final result)  Result time 04/09/24 09:33:36      Final result by Gabriel Bennett MD (04/09/24 09:33:36)                   Impression:      1.  No acute intracranial hemorrhage, hydrocephalus, herniation or midline shift.    2.  Mild nonspecific loss of the gray-white junction in the right frontal lobe, possibly secondary to attenuation artifact or edema, consider correlation with signs/symptoms and MRI as an evolving infarct is a consideration    RESULT NOTIFICATION: These observations were discussed by the dictating physician, by phone with, and acknowledged by Frank Gibson at 09:30 on 4/9/2024.      Electronically signed by: Gabriel Bennett  Date:    04/09/2024  Time:    09:33               Narrative:    CLINICAL HISTORY:  ([id number])50 y/o  (1975) F    Neuro deficit, acute, stroke suspected;    TECHNIQUE:  (A#[id number], exam time 4/9/2024 9:29)    CT HEAD FOR STROKE [id number]    Axial CT of the brain without contrast using soft tissue and bone algorithm. Please note in the acute setting if there is a clinical concern for an acute stroke MRI would be more sensitive/specific for evaluation of ischemia.    CMS MANDATED QUALITY DATA - CT RADIATION - 436    All CT scans at this facility utilize dose modulation, iterative reconstruction, and/or weight based dosing when appropriate to reduce radiation dose to as low as reasonably achievable.    COMPARISON:  CT from 01/22/2024.    FINDINGS:  No acute intracranial hemorrhage, edema or mass effect, and no acute parenchymal abnormality. There is no hydrocephalus, herniation or midline shift, and the basal and suprasellar cisterns are within normal limits. The osseous structures show no acute skull fracture.  Decreased visualization of the gray-white junction is seen in the anterior right frontal lobe, possibly secondary to attenuation artifact or edema.    Mild periventricular deep cerebral white matter low  attenuation  nonspecific findings which can be seen in any diffuse white matter process but most commonly associated with chronic microvascular ischemic disease. There are scattered atheromatous calcifications in the intracranial internal carotid arteries.    Orbital contents appear within normal limits. External auditory canals are unremarkable. The visualized paranasal sinuses and mastoid air cells are essentially clear.                                              ASSESSMENT AND PLAN:     Acute ischemic infarct   Protein S deficiency   Hypertension      Workup  CTH: No acute intracranial abnormality   MRI brain:  Small acute infarct in left corona radiata.  MRA head:  No large vessel occlusion or high-grade stenosis.  Bilateral carotid ultrasound:  No significant carotid stenosis  ECHO:  recent echo showed EF>75%, no PFO and normal LA   LDL:  92  HbA1c: 5.9            Plan  Admitted for further stroke workup.  Etiology likely small-vessel disease.  Start with Aspirin 81 mg and Lipitor 80mg.  Continue with Eliquis for protein S deficiency. Will not recommend DAPT as patient is on anticoagulation   Normalize BP  PT OT and Speech therapy  DVT prophylaxis with chemo/SCD prophylaxis  Discussed lifestyle modifications as prophylactic measures for stroke prevention including, adequate blood pressure management, healthy diet and regular exercise.        [phone] 10 days from discharge.     Stroke education was provided including stroke risk factors modification and any acute neurological changes including weakness, confusion, visual changes to come straight to the ER.     All questions were answered.                                   Juanito Leiva MD  Neurology/vascular Neurology  Date of Service: 04/10/2024  12:13 PM    Please note: This note was transcribed using voice recognition software. Because of this technology there are often uinintended  grammatical, spelling, and other transcription errors. Please disregard these errors.

## 2024-04-10 NOTE — PT/OT/SLP EVAL
Occupational Therapy   Evaluation and Discharge Note    Name: Katiuska Preston  MRN: [id number]  Admitting Diagnosis: Neurological deficit present  Recent Surgery: * No surgery found *      Recommendations:     Discharge Recommendations: No Therapy Indicated  Discharge Equipment Recommendations: none  Barriers to discharge:  None    Assessment:     Katiuska Preston is a 49 y.o. female with a medical diagnosis of Neurological deficit present. At this time, patient does not require further acute OT services.     Pt presents today with slurred speech; her paresthesias have resolved.  No other deficits noted; pt completed ADLs with independence.      Plan:     During this hospitalization, patient does not require further acute OT services.  Please re-consult if situation changes.    Plan of Care Reviewed with: patient    Subjective     Chief Complaint: none  Patient/Family Comments/goals: return home    Occupational Profile:  Living Environment: Lives in a [facility] with a ramp to enter; lives with a roommate   Previous level of function: Independent with ADLs; does not drive; relies on transportation from her boyfriend or medicaid transportation services   Equipment Used at home: none  Assistance upon Discharge: boyfriend    Pain/Comfort:  Pain Rating 1: 0/10  Pain Rating Post-Intervention 1: 0/10    Objective:     Communicated with: nurse prior to session.  Patient found up in chair with telemetry upon OT entry to room.    General Precautions: Standard, fall  Orthopedic Precautions: N/A  Braces: N/A  Respiratory Status: Room air     Occupational Performance:    Functional Mobility/Transfers:  Patient completed Sit <> Stand Transfer with independence  with  no assistive device   Functional Mobility: independent in hospital room with no AD    Activities of Daily Living:  Grooming: independence    Lower Body Dressing: independence    Feeding: independence     Cognitive/Visual Perceptual:  Cognitive/Psychosocial Skills:     -        Oriented to: Person, Place, Time, and Situation   -       Follows Commands/attention:Follows multistep  commands  -       Communication: slurred speech  Visual/Perceptual:      -Intact      Physical Exam:  Dominant hand:    -       Left  Upper Extremity Range of Motion:     -       Right Upper Extremity: WNL  -       Left Upper Extremity: WNL  Upper Extremity Strength:    -       Right Upper Extremity: 4/5  -       Left Upper Extremity: 5/5   Strength:    -       Right Upper Extremity: WFL  -       Left Upper Extremity: WFL  Fine Motor Coordination:    -       Intact  Gross motor coordination:   WFL    AMPAC 6 Click ADL:  AMPAC Total Score: 24    Treatment & Education:  Therapist provided facilitation and instruction of proper body mechanics and fall prevention strategies during tasks listed above.   Instructed patient to sit in bedside chair as tolerated daily to increase OOB/activity tolerance.   Instructed patient to use call light to have nursing staff assist with needs/transfers.    Patient left up in chair with all lines intact and call button in reach      History:     Past Medical History:   Diagnosis Date    Antithrombin III deficiency 2021    Asthma     Claustrophobia     Elevated factor VIII level 2021    Esophageal reflux     Gastroesophageal reflux    Generalized anxiety disorder     Anxiety, Generalized    Herniated disc     Hypertension     Iron deficiency anemia due to chronic blood loss 10/27/2021    Lower extremity weakness     Morbid obesity     Obesity     Protein S deficiency 2021    Pulmonary embolism     Upper extremity weakness          Past Surgical History:   Procedure Laterality Date     SECTION      CHOLECYSTECTOMY      TONSILLECTOMY         Time Tracking:     OT Date of Treatment: 04/10/24  OT Start Time: 1200  OT Stop Time: 1220  OT Total Time (min): 20 min    Billable Minutes:Evaluation 10  Self Care/Home Management 10    4/10/2024

## 2024-04-10 NOTE — PT/OT/SLP EVAL
"Physical Therapy Evaluation and Discharge Note    Patient Name:  Katiuska Preston   MRN:  [id number]    Recommendations:     Discharge Recommendations: No Therapy Indicated  Discharge Equipment Recommendations: none   Barriers to discharge: None    Assessment:     Katiuska Preston is a 49 y.o. female admitted with a medical diagnosis of Neurological deficit present. .  At this time, patient is functioning at their prior level of function and does not require further acute PT services.     Recent Surgery: * No surgery found *      Plan:     During this hospitalization, patient does not require further acute PT services.  Please re-consult if situation changes.      Subjective     Chief Complaint: dysarthric speech/NPO status   Patient/Family Comments/goals: Return home with roommate  Pain/Comfort:       Patients cultural, spiritual, Spiritism conflicts given the current situation:      Living Environment:  Pt lives at home with roommate  Prior to admission, patients level of function was independent .  Equipment used at home: oxygen, shower chair.  DME owned (not currently used): none.  Upon discharge, patient will have assistance from her roommate.    Objective:     Communicated with nurse prior to session.  Patient found up in chair with telemetry upon PT entry to room.    General Precautions: Standard, fall    Orthopedic Precautions:    Braces:    Respiratory Status: Room air    Exams:  Cognitive Exam:  Patient is oriented to Person, Place, Time, and Situation  RLE ROM: WNL  RLE Strength: WNL  LLE ROM: WNL  LLE Strength: WNL    Functional Mobility:  Transfers:     Sit to Stand:  modified independence with no AD  Gait: 100 ft no AD  lateral  lean "I have bad arthritis in both knees."    AM-PAC 6 CLICK MOBILITY  Total Score:        Treatment and Education:  PT and DC at baseline level of function    AM-PAC 6 CLICK MOBILITY  Total Score:      Patient left up in chair with all lines intact and call button in " reach.    GOALS:   Multidisciplinary Problems       Physical Therapy Goals       Not on file                    History:     Past Medical History:   Diagnosis Date    Antithrombin III deficiency 2021    Asthma     Claustrophobia     Elevated factor VIII level 2021    Esophageal reflux     Gastroesophageal reflux    Generalized anxiety disorder     Anxiety, Generalized    Herniated disc     Hypertension     Iron deficiency anemia due to chronic blood loss 10/27/2021    Lower extremity weakness     Morbid obesity     Obesity     Protein S deficiency 2021    Pulmonary embolism     Upper extremity weakness        Past Surgical History:   Procedure Laterality Date     SECTION      CHOLECYSTECTOMY      TONSILLECTOMY         Time Tracking:     PT Received On: 04/10/24  PT Start Time: 1025     PT Stop Time: 1040  PT Total Time (min): 15 min     Billable Minutes: Evaluation 7 minutes  and Gait Training 8 minutes      04/10/2024

## 2024-04-10 NOTE — HOSPITAL COURSE
Patient monitored closely during hospitalization.  Patient found to have a small acute or subacute infarct in the deep cerebral white matter of the left frontal lobe/centrum semiovale .  Neurology was consulted.  MRA brain and CUS negative.  Echocardiogram done at outside facility Feb 2024 showing normal EF and negative for PFO.  Patient to continue aspirin, eliquis, statin.  Patient with mildly elevated creatinine on admit, trended down.  LLE US ordered due to complaint of left calf pain, negative for DVT.  PT/OT/ST consulted for eval.  No PT/OT needs, however ST has recommended outpatient ST for dysarthria which has been ordered.  Patient has been cleared for discharge by neurology.  Patient seen and examined on day of discharge and in stable condition for discharge home.  Patient to follow up with PCP and Neurology in 1 week.  Patient agreeable with discharge plan.

## 2024-04-10 NOTE — ASSESSMENT & PLAN NOTE
Creatinine of 1.4  on admit compared to baseline of 1.1.  Likely 2/2 HTN emergency.  Pt received one liter of lactate ringer in the ER.  Trend, improved

## 2024-04-10 NOTE — SUBJECTIVE & OBJECTIVE
Interval History: Patient seen sitting up in chair.  NAD.  Reports some improvement in left sided weakness.  Continues with dysarthria.  She states that she does have pain to left calf area, worse with movement.  Will order LLE venous US to eval for DVT.   PT/OT/ST consulted.       Review of Systems   Respiratory:  Negative for cough and shortness of breath.    Cardiovascular:  Negative for chest pain.   Gastrointestinal:  Negative for abdominal pain, nausea and vomiting.   Neurological:  Positive for speech difficulty and weakness.     Objective:     Vital Signs (Most Recent):  Temp: 97.1 °F (36.2 °C) (04/10/24 1100)  Pulse: 84 (04/10/24 1100)  Resp: 18 (04/10/24 1100)  BP: (!) 164/101 (04/10/24 1219)  SpO2: 97 % (04/10/24 1100) Vital Signs (24h Range):  Temp:  [97.1 °F (36.2 °C)-98.2 °F (36.8 °C)] 97.1 °F (36.2 °C)  Pulse:  [78-95] 84  Resp:  [17-18] 18  SpO2:  [92 %-100 %] 97 %  BP: (125-188)/() 164/101     Weight: 133.3 kg (293 lb 14 oz)  Body mass index is 52.06 kg/m².    Intake/Output Summary (Last 24 hours) at 4/10/2024 1455  Last data filed at 4/10/2024 1228  Gross per 24 hour   Intake 240 ml   Output --   Net 240 ml         Physical Exam  Vitals and nursing note reviewed.   Constitutional:       General: She is not in acute distress.     Appearance: She is obese.   HENT:      Head: Normocephalic.      Mouth/Throat:      Mouth: Mucous membranes are moist.   Cardiovascular:      Rate and Rhythm: Normal rate and regular rhythm.   Pulmonary:      Effort: Pulmonary effort is normal. No respiratory distress.      Breath sounds: Normal breath sounds.   Abdominal:      Palpations: Abdomen is soft.      Tenderness: There is no abdominal tenderness.   Musculoskeletal:         General: Tenderness (LLE) present.      Right lower leg: No edema.      Left lower leg: No edema.   Skin:     General: Skin is warm.   Neurological:      Mental Status: She is alert and oriented to person, place, and time.      Comments:  Dysarthric    Psychiatric:         Mood and Affect: Mood normal.             Significant Labs: All pertinent labs within the past 24 hours have been reviewed.  BMP:   Recent Labs   Lab 04/10/24  0439   *      K 3.6      CO2 28   BUN 15   CREATININE 1.2   CALCIUM 9.1   MG 1.5*     CBC:   Recent Labs   Lab 04/09/24 0914 04/10/24  0439   WBC 8.06 6.27   HGB 13.3 12.3   HCT 42.0 38.9    263     CMP:   Recent Labs   Lab 04/09/24 0914 04/10/24  0439    140   K 4.0 3.6    106   CO2 30* 28   * 120*   BUN 19 15   CREATININE 1.4 1.2   CALCIUM 9.3 9.1   PROT 7.6 6.6   ALBUMIN 3.6 3.2*   BILITOT 0.4 0.6   ALKPHOS 79 72   AST 19 19   ALT 18 16   ANIONGAP 5* 6*     Magnesium:   Recent Labs   Lab 04/10/24  0439   MG 1.5*       Significant Imaging: I have reviewed all pertinent imaging results/findings within the past 24 hours.

## 2024-04-10 NOTE — ASSESSMENT & PLAN NOTE
Patient is still with slurred speech.  However motor and sensory function is intact.  She has a history of complex migraine, and previously presented with the same symptoms.  CT head showed Mild nonspecific loss of the gray-white junction in the right frontal lobe, possibly secondary to attenuation artifact or edema.   Patient's point of care creatinine was 1.6, so CTA head and neck was deferred at this time.  Neurology recommended MRI brain, MRA head and neck.  Will order 2D echo, PT/OT/speech.  Admit to tele.  Will order 2 sets of troponin.  Neurology has been consulted, appreciate recommendation.  MRI/MRA brain:  Findings of a small acute or subacute infarct in the deep cerebral white matter of the left frontal lobe/centrum semiovale.  Unremarkable MR angiography of the brain.  Deep cerebral white matter findings are (mild) small-vessel ischemic disease  CUS negative for significant stenosis   Continue ASA/Eliquis/Statin

## 2024-04-10 NOTE — PLAN OF CARE
[facility]  Initial Discharge Assessment         Assessment completed at bedside with Pt and all information on FaceSheet confirmed, including demographics, PCP, pharmacy and insurance.  Pt has not addressed advance directives. She currently lives with a room mate in [place]. Her PCP is Saadia Ybarra MD and last appointment was on 3/27/2024. Her preferred pharmacy is [facility] on [address]. She denies any DME/HH/HD/Blood Thinners. For transportation to appointments, she usually Medicaid transportation or friends and she will use either to trnaport her home at discharge. She denies any recent hospitalizations. Plan for discharge is to return home. Case Management to continue to follow for discharge planning needs.         Primary Care Provider: No, Primary Doctor    Admission Diagnosis: TIA (transient ischemic attack) [G45.9]    Admission Date: 4/9/2024  Expected Discharge Date: 4/12/2024         Payor: MEDICAID / Plan: [place] [employer] / Product Type: Managed Medicaid /     Extended Emergency Contact Information  Primary Emergency Contact: jose de jesus tabor  Mobile Phone: [phone]  Relation: Significant other   needed? No    Discharge Plan A: (P) Home  Discharge Plan B: (P) Home      [id number] - [place], [place] - [address] AT [facility] [address]  [address]  [place] [place] [address]  Phone: [phone] Fax: [phone]    [facility] [place] - [address]  [address]  [place] [address]  Phone: [phone] Fax: [phone]      Initial Assessment (most recent)       Adult Discharge Assessment - 04/10/24 1457          Discharge Assessment    Assessment Type Discharge Planning Assessment (P)      Confirmed/corrected address, phone number and insurance Yes (P)      Confirmed Demographics Correct on Facesheet (P)      Source of Information patient (P)      When was your last doctors appointment? 03/27/24 (P)      Does  patient/caregiver understand observation status Yes (P)      People in Home friend(s) (P)      Do you expect to return to your current living situation? Yes (P)      Do you have help at home or someone to help you manage your care at home? Yes (P)      Who are your caregiver(s) and their phone number(s)? lizaon,jose de jesus (Significant other) [phone] (Mobile) (P)      Prior to hospitilization cognitive status: Alert/Oriented (P)      Current cognitive status: Alert/Oriented (P)      Walking or Climbing Stairs Difficulty no (P)      Dressing/Bathing Difficulty no (P)      Home Accessibility wheelchair accessible (P)      Home Layout Able to live on 1st floor (P)      Equipment Currently Used at Home none (P)      Readmission within 30 days? No (P)      Patient currently being followed by outpatient case management? No (P)      Do you currently have service(s) that help you manage your care at home? No (P)      Do you take prescription medications? Yes (P)      Do you have prescription coverage? Yes (P)      Coverage [place] healthcare connect (P)      Do you have any problems affording any of your prescribed medications? No (P)      Is the patient taking medications as prescribed? yes (P)      Who is going to help you get home at discharge? Medicaid transportatyion or friends (P)      How do you get to doctors appointments? family or friend will provide;health plan transportation (P)      Are you on dialysis? No (P)      Do you take coumadin? No (P)      Discharge Plan A Home (P)      Discharge Plan B Home (P)      DME Needed Upon Discharge  none (P)      Discharge Plan discussed with: Patient (P)

## 2024-04-10 NOTE — CARE UPDATE
04/10/24 0717   Patient Assessment/Suction   Level of Consciousness (AVPU) alert   Respiratory Effort Normal;Unlabored   Expansion/Accessory Muscles/Retractions no retractions;no use of accessory muscles   All Lung Fields Breath Sounds diminished   Rhythm/Pattern, Respiratory unlabored;pattern regular;depth regular   Cough Frequency no cough   Cough Type no productive sputum   PRE-TX-O2   Device (Oxygen Therapy) room air   SpO2 (!) 93 %   Pulse Oximetry Type Intermittent   $ Pulse Oximetry - Single Charge Pulse Oximetry - Single   Pulse 80   Resp 18   Aerosol Therapy   $ Aerosol Therapy Charges Aerosol Treatment   Daily Review of Necessity (SVN) completed   Respiratory Treatment Status (SVN) given   Treatment Route (SVN) mask;oxygen   Patient Position (SVN) HOB elevated   Post Treatment Assessment (SVN) breath sounds unchanged   Signs of Intolerance (SVN) none   Breath Sounds Post-Respiratory Treatment   Throughout All Fields Post-Treatment All Fields   Throughout All Fields Post-Treatment no change   Post-treatment Heart Rate (beats/min) 84   Post-treatment Resp Rate (breaths/min) 18   Education   $ Education Bronchodilator;15 min

## 2024-04-11 VITALS
RESPIRATION RATE: 18 BRPM | HEART RATE: 81 BPM | DIASTOLIC BLOOD PRESSURE: 115 MMHG | BODY MASS INDEX: 51.91 KG/M2 | OXYGEN SATURATION: 97 % | TEMPERATURE: 97 F | SYSTOLIC BLOOD PRESSURE: 125 MMHG | WEIGHT: 293 LBS | HEIGHT: 63 IN

## 2024-04-11 LAB
ALBUMIN SERPL BCP-MCNC: 3.5 G/DL (ref 3.5–5.2)
ALP SERPL-CCNC: 84 U/L (ref 55–135)
ALT SERPL W/O P-5'-P-CCNC: 16 U/L (ref 10–44)
ANION GAP SERPL CALC-SCNC: 7 MMOL/L (ref 8–16)
AST SERPL-CCNC: 21 U/L (ref 10–40)
BASOPHILS # BLD AUTO: 0.03 K/UL (ref 0–0.2)
BASOPHILS NFR BLD: 0.3 % (ref 0–1.9)
BILIRUB SERPL-MCNC: 0.4 MG/DL (ref 0.1–1)
BUN SERPL-MCNC: 17 MG/DL (ref 6–20)
CALCIUM SERPL-MCNC: 9.5 MG/DL (ref 8.7–10.5)
CHLORIDE SERPL-SCNC: 105 MMOL/L (ref 95–110)
CO2 SERPL-SCNC: 26 MMOL/L (ref 23–29)
CREAT SERPL-MCNC: 1.3 MG/DL (ref 0.5–1.4)
DIFFERENTIAL METHOD BLD: ABNORMAL
EOSINOPHIL # BLD AUTO: 0.2 K/UL (ref 0–0.5)
EOSINOPHIL NFR BLD: 2.8 % (ref 0–8)
ERYTHROCYTE [DISTWIDTH] IN BLOOD BY AUTOMATED COUNT: 13.6 % (ref 11.5–14.5)
EST. GFR  (NO RACE VARIABLE): 50.1 ML/MIN/1.73 M^2
GLUCOSE SERPL-MCNC: 121 MG/DL (ref 70–110)
HCT VFR BLD AUTO: 42.4 % (ref 37–48.5)
HGB BLD-MCNC: 13.6 G/DL (ref 12–16)
IMM GRANULOCYTES # BLD AUTO: 0.02 K/UL (ref 0–0.04)
IMM GRANULOCYTES NFR BLD AUTO: 0.2 % (ref 0–0.5)
LYMPHOCYTES # BLD AUTO: 1.7 K/UL (ref 1–4.8)
LYMPHOCYTES NFR BLD: 19.8 % (ref 18–48)
MAGNESIUM SERPL-MCNC: 1.8 MG/DL (ref 1.6–2.6)
MCH RBC QN AUTO: 31.6 PG (ref 27–31)
MCHC RBC AUTO-ENTMCNC: 32.1 G/DL (ref 32–36)
MCV RBC AUTO: 98 FL (ref 82–98)
MONOCYTES # BLD AUTO: 0.6 K/UL (ref 0.3–1)
MONOCYTES NFR BLD: 6.9 % (ref 4–15)
NEUTROPHILS # BLD AUTO: 6 K/UL (ref 1.8–7.7)
NEUTROPHILS NFR BLD: 70 % (ref 38–73)
NRBC BLD-RTO: 0 /100 WBC
PLATELET # BLD AUTO: 272 K/UL (ref 150–450)
PMV BLD AUTO: 11.2 FL (ref 9.2–12.9)
POTASSIUM SERPL-SCNC: 4.2 MMOL/L (ref 3.5–5.1)
PROT SERPL-MCNC: 7.6 G/DL (ref 6–8.4)
RBC # BLD AUTO: 4.31 M/UL (ref 4–5.4)
SODIUM SERPL-SCNC: 138 MMOL/L (ref 136–145)
WBC # BLD AUTO: 8.64 K/UL (ref 3.9–12.7)

## 2024-04-11 PROCEDURE — 36415 COLL VENOUS BLD VENIPUNCTURE: CPT | Performed by: HOSPITALIST

## 2024-04-11 PROCEDURE — 25000242 PHARM REV CODE 250 ALT 637 W/ HCPCS: Performed by: HOSPITALIST

## 2024-04-11 PROCEDURE — 99900031 HC PATIENT EDUCATION (STAT)

## 2024-04-11 PROCEDURE — 25000242 PHARM REV CODE 250 ALT 637 W/ HCPCS: Performed by: FAMILY MEDICINE

## 2024-04-11 PROCEDURE — 97535 SELF CARE MNGMENT TRAINING: CPT

## 2024-04-11 PROCEDURE — 83735 ASSAY OF MAGNESIUM: CPT | Performed by: HOSPITALIST

## 2024-04-11 PROCEDURE — 94640 AIRWAY INHALATION TREATMENT: CPT

## 2024-04-11 PROCEDURE — 92507 TX SP LANG VOICE COMM INDIV: CPT

## 2024-04-11 PROCEDURE — 25000003 PHARM REV CODE 250: Performed by: HOSPITALIST

## 2024-04-11 PROCEDURE — 80053 COMPREHEN METABOLIC PANEL: CPT | Performed by: HOSPITALIST

## 2024-04-11 PROCEDURE — 94761 N-INVAS EAR/PLS OXIMETRY MLT: CPT

## 2024-04-11 PROCEDURE — 85025 COMPLETE CBC W/AUTO DIFF WBC: CPT | Performed by: HOSPITALIST

## 2024-04-11 RX ORDER — ASPIRIN 81 MG/1
81 TABLET ORAL DAILY
Qty: 30 TABLET | Refills: 0 | Status: SHIPPED | OUTPATIENT
Start: 2024-04-12 | End: 2024-05-12

## 2024-04-11 RX ORDER — ATORVASTATIN CALCIUM 40 MG/1
40 TABLET, FILM COATED ORAL NIGHTLY
Qty: 30 TABLET | Refills: 0 | Status: SHIPPED | OUTPATIENT
Start: 2024-04-11 | End: 2024-05-11

## 2024-04-11 RX ORDER — BUDESONIDE 0.5 MG/2ML
0.5 INHALANT ORAL EVERY 12 HOURS
Status: DISCONTINUED | OUTPATIENT
Start: 2024-04-11 | End: 2024-04-11 | Stop reason: HOSPADM

## 2024-04-11 RX ADMIN — AMLODIPINE BESYLATE 10 MG: 5 TABLET ORAL at 10:04

## 2024-04-11 RX ADMIN — ASPIRIN 81 MG: 81 TABLET, COATED ORAL at 10:04

## 2024-04-11 RX ADMIN — FAMOTIDINE 20 MG: 20 TABLET ORAL at 10:04

## 2024-04-11 RX ADMIN — FERROUS SULFATE TAB 325 MG (65 MG ELEMENTAL FE) 1 EACH: 325 (65 FE) TAB at 10:04

## 2024-04-11 RX ADMIN — ARFORMOTEROL TARTRATE 15 MCG: 15 SOLUTION RESPIRATORY (INHALATION) at 07:04

## 2024-04-11 RX ADMIN — CLONIDINE HYDROCHLORIDE 0.1 MG: 0.1 TABLET ORAL at 04:04

## 2024-04-11 RX ADMIN — BUDESONIDE INHALATION 0.5 MG: 0.5 SUSPENSION RESPIRATORY (INHALATION) at 07:04

## 2024-04-11 RX ADMIN — APIXABAN 5 MG: 5 TABLET, FILM COATED ORAL at 10:04

## 2024-04-11 RX ADMIN — CARVEDILOL 25 MG: 25 TABLET, FILM COATED ORAL at 10:04

## 2024-04-11 RX ADMIN — FLUTICASONE PROPIONATE 100 MCG: 50 SPRAY, METERED NASAL at 10:04

## 2024-04-11 NOTE — PROGRESS NOTES
Pt discharged home. IV and telemetry removed. All belongings given. Discharge instructions reviewed. Pt verbalizes understanding. Pt transported via wheelchair to private vehicle.

## 2024-04-11 NOTE — PT/OT/SLP PROGRESS
Speech Language Pathology Treatment    Patient Name:  Katiuska Preston   MRN:  [id number]  Admitting Diagnosis: Neurological deficit present    Recommendations:                 General Recommendations:  Speech/language therapy  Diet recommendations:  Regular Diet - IDDSI Level 7, Liquid Diet Level: Thin liquids - IDDSI Level 0   Aspiration Precautions: Standard aspiration precautions   General Precautions: Standard,    Communication strategies:  provide increased time to answer    Assessment:     Katiuska Preston is a 49 y.o. female with an SLP diagnosis of Dysarthria.  She presents with poor breath support, slow rate of speech, halting prosody, and imprecise articulation. Speech strategies introduced today. Rec continue POC and recs for low intensity ST at d'c.    Subjective     Pt awake sitting in bedside chair; boyfriend at bedside. Pt agreeable to ST.  Patient goals: none stated     Pain/Comfort:       Respiratory Status: Room air    Objective:     Has the patient been evaluated by SLP for swallowing?   Yes  Keep patient NPO? No   Current Respiratory Status:        Clear speech strategies, diaphragmatic breathing, and A&P of speech mechanism explained and demonstrated to pt. Pt completed diaphragmatic breathing exercises x5; sustained phonation completed x3 all resulting in sustained vowel production of ~4 seconds across trials. Over-articulation and projection used in speech drills of functional phrases w/ ~80% accuracy given min cues for use of strategies. Discussion of at home practice and examples of practicing speech strategies provided; pt expressed understanding.    Goals:   Multidisciplinary Problems       SLP Goals          Problem: SLP    Goal Priority Disciplines Outcome   SLP Goal     SLP    Description: 1. Pt will complete speech drills using articulation strategies in 80% of trials                       Plan:     Patient to be seen:  3 x/week   Plan of Care expires:     Plan of Care reviewed with:   patient   SLP Follow-Up:  Yes       Discharge recommendations:  Low Intensity Therapy   Barriers to Discharge:  None    Time Tracking:     SLP Treatment Date:   04/11/24  Speech Start Time:  1126  Speech Stop Time:  1149     Speech Total Time (min):  23 min    Billable Minutes: Speech Therapy Individual 8 min and Self Care/Home Management Training 15 min    04/11/2024

## 2024-04-11 NOTE — CARE UPDATE
04/11/24 0731   Patient Assessment/Suction   Level of Consciousness (AVPU) alert   Respiratory Effort Normal;Unlabored   Expansion/Accessory Muscles/Retractions no use of accessory muscles;no retractions   All Lung Fields Breath Sounds Anterior:;Lateral:;diminished   Rhythm/Pattern, Respiratory unlabored;pattern regular;depth regular   Cough Frequency no cough   PRE-TX-O2   Device (Oxygen Therapy) room air   SpO2 (!) 93 %   Pulse Oximetry Type Intermittent   $ Pulse Oximetry - Multiple Charge Pulse Oximetry - Multiple   Pulse 78   Resp 18   Aerosol Therapy   $ Aerosol Therapy Charges Aerosol Treatment   Daily Review of Necessity (SVN) completed   Respiratory Treatment Status (SVN) given   Treatment Route (SVN) mask;oxygen   Patient Position (SVN) HOB elevated   Post Treatment Assessment (SVN) breath sounds improved   Signs of Intolerance (SVN) none   Breath Sounds Post-Respiratory Treatment   Throughout All Fields Post-Treatment All Fields   Throughout All Fields Post-Treatment aeration increased   Post-treatment Heart Rate (beats/min) 80   Post-treatment Resp Rate (breaths/min) 18   Education   $ Education Bronchodilator;15 min

## 2024-04-11 NOTE — PLAN OF CARE
DC orders and chart reviewed. No discharge needs noted.  Patient cleared for discharge from .    Patient is discharging to home. Per therapy, no needs indicated.  FU appointment scheduled and added to AVS.    1526-  received message from  requesting outpatient speech therapy at discharge.  Ambulatory referral noted. Someone will be reaching out to the patient to schedule.        04/11/24 1417   Final Note   Assessment Type Final Discharge Note   Anticipated Discharge Disposition Home   What phone number can be called within the next 1-3 days to see how you are doing after discharge? [phone]   Hospital Resources/Appts/Education Provided Appointments scheduled and added to AVS;Provided patient/caregiver with written discharge plan information   Post-Acute Status   Discharge Delays None known at this time

## 2024-04-11 NOTE — RESPIRATORY THERAPY
02 saturation 94% on room air.  Patient receiving aerosol tx via pulmicort follow [facility] now and bid.

## 2024-04-11 NOTE — DISCHARGE SUMMARY
[facility] Medicine  Discharge Summary      Patient Name: Katiuska Preston  MRN: [id number]  DARIEN: [id number]  Patient Class: IP- Inpatient  Admission Date: 4/9/2024  Hospital Length of Stay: 1 days  Discharge Date and Time: 4/11/2024  4:03 PM  Attending Physician: Candi att. providers found   Discharging Provider: Ayah Leonard NP  Primary Care Provider: Candi, Primary Doctor    Primary Care Team: Networked reference to record PCT     HPI:   49-year-old female with past medical history of complex migraine, hypertension, morbid obesity, protein S deficiency on Eliquis, and pulmonary embolism who presented to the ER because of headache, paresthesia of her left fingertips, slurred speech and left-sided body weakness.  According to the patient, she went to sleep at 11:00 p.m. yesterday, and wake up at 8 am with the symptoms.  Her headache is bifrontal, radiate to the back of her head.  Described as sharp, and 9/10 on pain scale.  She denied any chest pain, palpitation or shortness of breath.  Because of the symptoms, patient presented to the ER for evaluation.  She has a history of complex migraine, with similar prior presentations.    In the ER, blood pressure was initially 120/80, but half an hour later it jumped to 203/92.  CBC with no leukocytosis.  CMP showed mild acute renal failure with creatinine of 1.4 compared to baseline 1.1.  Chest x-ray with no acute pulmonary process.  EKG showed normal sinus rhythm with premature atrial complexes.  CT head showed Mild nonspecific loss of the gray-white junction in the right frontal lobe, possibly secondary to attenuation artifact or edema.  According to the ER provider, he discussed the case with Neurology who recommended MRI brain, MRA head and neck.  Patient was given Reglan, and Benadryl for her headache.  Also couple of doses of hydralazine 5 mg IV for her blood pressure.  Patient will be admitted to Medicine for further care.        * No surgery  found *      Hospital Course:   Patient monitored closely during hospitalization.  Patient found to have a small acute or subacute infarct in the deep cerebral white matter of the left frontal lobe/centrum semiovale .  Neurology was consulted.  MRA brain and CUS negative.  Echocardiogram done at outside facility Feb 2024 showing normal EF and negative for PFO.  Patient to continue aspirin, eliquis, statin.  Patient with mildly elevated creatinine on admit, trended down.  LLE US ordered due to complaint of left calf pain, negative for DVT.  PT/OT/ST consulted for eval.  No PT/OT needs, however ST has recommended outpatient ST for dysarthria which has been ordered.  Patient has been cleared for discharge by neurology.  Patient seen and examined on day of discharge and in stable condition for discharge home.  Patient to follow up with PCP and Neurology in 1 week.  Patient agreeable with discharge plan.        Goals of Care Treatment Preferences:  Code Status: Full Code      Consults:   Consults (From admission, onward)          Status Ordering Provider     IP consult to case management/social work  Once        Provider:  (Not yet assigned)    Completed MICHEL HERRERA     Inpatient consult to Neurology  Once        Provider:  Jany Limon MD    Completed MARCO A RAMÍREZ new Assessment & Plan notes have been filed under this hospital service since the last note was generated.  Service: Hospital Medicine    Final Active Diagnoses:    Diagnosis Date Noted POA    PRINCIPAL PROBLEM:  Neurological deficit present [R29.818] 04/09/2024 Yes    CVA (cerebral vascular accident) [I63.9] 04/10/2024 Yes    Pulmonary embolism [I26.99] 04/09/2024 Yes    Mild intermittent asthma without complication [J45.20] 04/09/2024 Yes    Acute renal failure [N17.9] 04/09/2024 Yes    Morbid obesity [E66.01] 04/09/2024 Yes    Hypertensive emergency [I16.1] 07/15/2023 Yes    Protein S deficiency [D68.59] 05/31/2021 Yes       Problems Resolved During this Admission:       Discharged Condition: stable    Disposition: Home or Self Care    Follow Up:   Follow-up Information       Saadia Ybarra MD .    Specialty: Family Medicine  Contact information:  [address]  [facility] [id number] [phone]               No, Primary Doctor Follow up in 1 week(s).               Juanito Leiva MD Follow up in 1 week(s).    Specialty: Neurology  Contact information:  [address] [facility] [id number] [phone]                           Patient Instructions:      Ambulatory referral/consult to Speech Therapy   Standing Status: Future   Referral Priority: Routine Referral Type: Speech Therapy   Referral Reason: Specialty Services Required   Requested Specialty: Speech Pathology   Number of Visits Requested: 1     Diet Cardiac     Notify your health care provider if you experience any of the following:  temperature >100.4     Notify your health care provider if you experience any of the following:  persistent nausea and vomiting or diarrhea     Notify your health care provider if you experience any of the following:  severe uncontrolled pain     Notify your health care provider if you experience any of the following:  difficulty breathing or increased cough     Notify your health care provider if you experience any of the following:  persistent dizziness, light-headedness, or visual disturbances     Notify your health care provider if you experience any of the following:  increased confusion or weakness     Notify your health care provider if you experience any of the following:  severe persistent headache     Activity as tolerated       Significant Diagnostic Studies: Labs: BMP:   Recent Labs   Lab 04/10/24  0439 04/11/24  0408   * 121*    138   K 3.6 4.2    105   CO2 28 26   BUN 15 17   CREATININE 1.2 1.3   CALCIUM 9.1 9.5   MG 1.5* 1.8   , CMP   Recent Labs   Lab 04/10/24  0439 04/11/24  0408    138   K  3.6 4.2    105   CO2 28 26   * 121*   BUN 15 17   CREATININE 1.2 1.3   CALCIUM 9.1 9.5   PROT 6.6 7.6   ALBUMIN 3.2* 3.5   BILITOT 0.6 0.4   ALKPHOS 72 84   AST 19 21   ALT 16 16   ANIONGAP 6* 7*   , CBC   Recent Labs   Lab 04/10/24  0439 04/11/24  0408   WBC 6.27 8.64   HGB 12.3 13.6   HCT 38.9 42.4    272   , and All labs within the past 24 hours have been reviewed    Pending Diagnostic Studies:       Procedure Component Value Units Date/Time    Echo Saline Bubble? Yes [[id number]]     Order Status: Sent Lab Status: No result            Medications:  Reconciled Home Medications:      Medication List        START taking these medications      aspirin 81 MG EC tablet  Commonly known as: ECOTRIN  Take 1 tablet (81 mg total) by mouth once daily.  Start taking on: April 12, 2024            CHANGE how you take these medications      atorvastatin 40 MG tablet  Commonly known as: LIPITOR  Take 1 tablet (40 mg total) by mouth every evening.  What changed:   medication strength  how much to take     ELIQUIS 5 mg Tab  Generic drug: apixaban  TAKE 1 TABLET BY MOUTH TWICE DAILY  What changed:   how much to take  when to take this     ferrous sulfate 325 mg (65 mg iron) Tab tablet  Commonly known as: FeroSuL  TAKE 1 TABLET BY MOUTH EVERY DAY  What changed:   how much to take  how to take this  when to take this            CONTINUE taking these medications      albuterol 90 mcg/actuation inhaler  Commonly known as: PROVENTIL/VENTOLIN HFA  Inhale 2 puffs into the lungs every 6 (six) hours as needed for Wheezing. Rescue     amLODIPine 10 MG tablet  Commonly known as: NORVASC  Take 1 tablet (10 mg total) by mouth once daily.     carvediloL 25 MG tablet  Commonly known as: COREG  Take 1 tablet (25 mg total) by mouth 2 (two) times daily.     cloNIDine 0.1 MG tablet  Commonly known as: CATAPRES  Take 1 tablet (0.1 mg total) by mouth every 8 (eight) hours as needed (SBP >180).     fluticasone propionate 50  mcg/actuation nasal spray  Commonly known as: FLONASE  2 sprays by Each Nostril route daily as needed for Rhinitis.     fluticasone-salmeterol 230-21 mcg/dose 230-21 mcg/actuation Hfaa inhaler  Commonly known as: ADVAIR HFA  Inhale 2 puffs into the lungs 2 (two) times daily. Controller     NexIUM 40 MG capsule  Generic drug: esomeprazole  Take 40 mg by mouth once daily.     olmesartan-hydrochlorothiazide 40-25 mg per tablet  Commonly known as: BENICAR HCT  Take 1 tablet by mouth once daily.     topiramate 25 MG tablet  Commonly known as: TOPAMAX  Take 25 mg by mouth 2 (two) times daily.     traZODone 100 MG tablet  Commonly known as: DESYREL  Take 100 mg by mouth every evening.            STOP taking these medications      losartan 100 MG tablet  Commonly known as: COZAAR     predniSONE 20 MG tablet  Commonly known as: DELTASONE              Indwelling Lines/Drains at time of discharge:   Lines/Drains/Airways       None                   Time spent on the discharge of patient: 35 minutes         Ayah Leonard NP  Department of Hospital Medicine  [facility]

## 2024-04-17 ENCOUNTER — TELEPHONE (OUTPATIENT)
Dept: HEMATOLOGY/ONCOLOGY | Facility: CLINIC | Age: 49
End: 2024-04-17

## 2024-04-18 ENCOUNTER — CLINICAL SUPPORT (OUTPATIENT)
Dept: REHABILITATION | Facility: HOSPITAL | Age: 49
End: 2024-04-18
Payer: MEDICAID

## 2024-04-18 DIAGNOSIS — R47.1 DYSARTHRIA: Primary | ICD-10-CM

## 2024-04-18 DIAGNOSIS — I63.9 CVA (CEREBRAL VASCULAR ACCIDENT): ICD-10-CM

## 2024-04-18 PROCEDURE — 92522 EVALUATE SPEECH PRODUCTION: CPT | Mod: PO

## 2024-04-18 NOTE — PROGRESS NOTES
"[facility]  Speech Therapy Evaluation -Neurological Rehabilitation    Date: 4/18/2024     Name: Katiuska Preston   MRN: [id number]    Therapy Diagnosis:   Encounter Diagnosis   Name Primary?    CVA (cerebral vascular accident)     Physician: Ayah Leonard NP  Physician Orders: Ambulatory Referral to Speech Therapy   Medical Diagnosis: CVA (cerebral vascular accident) [I63.9]     Visit # / Visits Authorized:  1 / 1   Date of Evaluation:  4/18/2024   Insurance Authorization Period: 4/11/2024 - 4/11/2025   Plan of Care Certification:    4/18/2024 to 5/31/2024      Time In:1145   Time Out: 1225   Total time: 40    Procedure   Evaluation of Speech Sound Production     Precautions: Standard, Fall, and Communication  Subjective   Date of Onset:  March 28th and April 9th- CVAs  History of Current Condition: Patient reports: "I had a stroke on the 28th of this year and then I had another one last Tuesday. I work up with my speech messed up and my right side messed up- I dont have my strength on the right side, I had the stroke on the left side of my brain. Some words I can get out and some words I can't get out. I used to talk loud, but now I talk soft, my roommate can barely hear me."    Per acute care notes: Katiuska Preston is a 49 y.o. female with an SLP diagnosis of Dysarthria.  She presents with poor breath support, slow rate of speech, halting prosody, and imprecise articulation. Speech strategies introduced today. Rec continue POC and recs for low intensity ST at d'c..  Prior Level of Function:  Indenpendent  Social History: Patient lives with other in [facility]. Patient is not currently driving.  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, clothing, blankets  Prior Therapy:  Seen in acute  Pain:     Pain Scale:  8    Pain Location:  [place]- from birth 19 years ago  Patient/Caregiver Goals:  "That I can speak clearly and have my voice back so I can start to yell at everyone again"     Past Medical " History: Katiuska Preston  has a past medical history of Antithrombin III deficiency (2021), Asthma, Claustrophobia, Elevated factor VIII level (2021), Esophageal reflux, Generalized anxiety disorder, Herniated disc, Hypertension, Iron deficiency anemia due to chronic blood loss (10/27/2021), Lower extremity weakness, Morbid obesity, Obesity, Protein S deficiency (2021), Pulmonary embolism, and Upper extremity weakness.  Katiuska Preston  has a past surgical history that includes Cholecystectomy;  section; and Tonsillectomy.  Medical Hx and Allergies: Katiuska has a current medication list which includes the following prescription(s): albuterol, amlodipine, aspirin, atorvastatin, carvedilol, clonidine, eliquis, ferrous sulfate, fluticasone propionate, fluticasone-salmeterol 230-21 mcg/dose, nexium, olmesartan-hydrochlorothiazide, topiramate, and trazodone, and the following Facility-Administered Medications: bebtelovimab (eua). Review of patient's allergies indicates:  No Known Allergies    Objective   Formal Assessment:  MOTOR SPEECH/DYSARTHRIA EVALUATION    The patient completed the Communicative Effectiveness Survey. The results are below:    The patient is asked to rate his/her perception of their communication effectiveness on a scale of 1 (not at all effective) to 4 (very effective).    1) Having a conversation with a family member or friends at home:   2) Participating in conversation with strangers in a quiet place:   3) Conversing with a familiar person over the phone:   4) Conversing with a stranger over the phone:   5) Being part of a conversation in a noisy environment:   6) Speaking to a friend when you are emotionally upset or you are angry:   7) Having a conversation while traveling in a car:   8) Having a conversation with someone at a distance:     Total (average): 1.3      Evaluation of Speech Mechanisms  Respiration:  Posture: WNL  Breath Support:  Shallow  Breath Rate: Slow  Respiratory Features: Abnormal: Thoracic    Oral Mechanism at Rest   Labial Structures  Structure WNL   Symmetry WNL   Tone WNL   Ability to control secretions reduced/drooling- reported on the right side        Lingual Structure (at rest in mouth)   Structure WNL   Symmetry WNL   Tone WNL   Irregular Movement WNL       Mandible  Symmetry WNL   Posture at Rest WNL=closed   Dentition WNL     Face  Symmetry WNL   Expression WNL   Irregular Movement WNL     Soft Palate  Symmetry WNL   Structure WNL     Hard Palate  Structure WNL     Oral Mechanism during Sustained Postures   Labial Retraction   Symmetry Right Reduced   Range Reduced   Tone Reduced   Strength Reduced     Labial Protrusion  Range Reduced- right    Tone Reduced   Strength Reduced     Labial Compression  Strength (Upper R) Reduced   Strength (Upper L) WNL   Strength (Lower R) Reduced   Strength (Lower L) WNL     Lingual Protrusion  Symmetry WNL   Range WNL   Tone WNL   Tremor WNL   Strength Reduced -right      Lingual Elevation to Alveolar Ridge   Range WNL   Symmetry WNL     Mandible Depression  Symmetry WNL   Range WNL   Jaw Clonus WNL   Strength WNL     Mandible Elevation  Symmetry WNL   Range WNL   Strength WNL     Face: Raising Eyebrows  Symmetry WNL   Range WNL   Forehead Wrinkle Not Present     Velopharyngeal Function: Cheek Puffing   Symmetry WNL   Range Reduced-Cannot maintain air in mouth   Tone WNL   Strength Reduced       Oral Mechanism during Movement   Labial Protrusion and Lateralization   Rate Slow   Rhythm Irregular     Lingual Lateralization (External)   Rate Slow   Rhythm Irregular   Range Reduced     Lingual Lateralization (Internal)  Rate Slow   Rhythm Irregular   Range Reduced     Circular Range of Motion (External)   Rate Slow   Rhythm WNL   Range WNL     Circular Range of Motion (Internal)  Rate Slow   Rhythm WNL   Range WNL     Velopharyngeal Function: Sustained /a/  Velum Range WNL   Pharyngeal Wall  Range WNL     Velopharyngeal Function: Short Repeating /a/  Velum Range WNL   Pharyngeal Wall Range WNL     Gross Sensation  Sensation  Face: Upper L WNL    Upper R WNL    Lower L WNL    Lower R WNL   Lips: Upper L WNL    Upper R WNL    Lower L WNL    Lower R WNL       Perceptual Ratings  Respiratory Features: Forced inspiration/expiration  Respiratory/Phonatory Features: Reduced loudness  Phonatory Features: Monopitch  Phonatory Quality:  WFL  Resonatory Features:  WFL  Articulatory Features: Imprecise consonants  Prosodic Features: Slow rate, inappropriate silences  Other Features:  nonpresent     Treatment   SLP Treatment: no treatment    Education provided: Role of Speech Therapy, Insurance limitations with patient, Goals/Plan of Care and Scheduling/Cancellations  Additional Education provided: Motor speech strategies    Patient expressed understanding.    Home Program: Use of strategies trained in acute     Assessment     Katiuska presents to [facility] status post medical diagnosis of CVA (cerebral vascular accident) [I63.9] .       Interpretation of objective assessment: Patient presents with Mild-moderate Spastic dysarthria characterized by forced inspiration, reduced loudness, monopitch, imprecise consonants, slow rate, and inappropriate silences. Noting changes in her taste post cerebrovascular accident (CVA) as well. Monitor cognitive status, patient reports at baseline.     Demonstrates impairments including limitations as described in the problem list.     Positive prognostic factors: time since onset  Negative prognostic factors: none present    Barriers to therapy: Yes  Barriers to therapy include: Relies on others for transportation    Patient's spiritual, cultural, and educational needs considered and patient agreeable to plan of care and goals.    Patient will benefit from skilled therapy to address the deficits listed in the problem list on initial evaluation, provide patient/family  education and to maximize patient's level of independence in the home and community environment.    Rehab potential:  Good       Short Term Goals: (4 weeks) Current Progress:   Pt will rate her speech while reading as at least a 2 on a 3 point scale ( 1 = same as before beginning therapy, 2 =better but not best, 3 =best possible outcome) on  8 /10 trials.     Progressing/ Not Met 4/18/2024   Established this date   2.  Pt will differentiate between her speech and error-free speech by giving specific examples of differences on  8 /10 trials.    Progressing/ Not Met 4/18/2024   Established this date   3. Pt will use motor speech strategies and answer questions in conversation with a self-rating of at least 2 on a 3 point scale ( 1 = same as before beginning therapy, 2 =better but not best, 3 =best possible outcome) on  8 /10 trials.     Progressing/ Not Met 4/18/2024   Established this date    4.  In order to promote generalization of intelligibility, patient will produce sentences during diaphragmatic breathing while incorporating intelligibility strategies (overarticulation, increased loudness) at 80% accuracy with moderate verbal and visual cues.     Progressing/ Not Met 4/18/2024   Established this date    5.  Patient will complete diaphragmatic breathing exercises by producing maximal exhalation via diaphragmic breathing with a duration of 5 seconds in 10 trials, consistently with minimal tactile and verbal clinician cues.     Progressing/ Not Met 4/18/2024   Established this date      Long Term Goals: (6 weeks) Current Progress:   She  will develop functional and intelligible speech and utilize compensatory strategies through the use of adequate labial and lingual function, increased articulatory precision and speech prosody.    Established this date         Plan     Recommended Treatment Plan: Patient will participate in the [facility] rehabilitation program for speech therapy 1 time(s) per week for 6 week(s) to  address her motor speech deficits, to educate patient and their family, and to participate in a home exercise program.    Other Recommendations: Ambulatory Referral to Physical Therapy and Ambulatory Referral to Occupational Therapy       Therapist's Name:   Bailey Ca CCC-SLP   4/18/2024

## 2024-04-18 NOTE — PLAN OF CARE
"[facility]  Speech Therapy Evaluation -Neurological Rehabilitation    Date: 4/18/2024     Name: Katiuska Preston   MRN: [id number]    Therapy Diagnosis:   Encounter Diagnosis   Name Primary?    CVA (cerebral vascular accident)     Physician: Ayah Leonard NP  Physician Orders: Ambulatory Referral to Speech Therapy   Medical Diagnosis: CVA (cerebral vascular accident) [I63.9]     Visit # / Visits Authorized:  1 / 1   Date of Evaluation:  4/18/2024   Insurance Authorization Period: 4/11/2024 - 4/11/2025   Plan of Care Certification:    4/18/2024 to 5/31/2024      Time In:1145   Time Out: 1225   Total time: 40    Procedure   Evaluation of Speech Sound Production     Precautions: Standard, Fall, and Communication  Subjective   Date of Onset:  March 28th and April 9th- CVAs  History of Current Condition: Patient reports: "I had a stroke on the 28th of this year and then I had another one last Tuesday. I work up with my speech messed up and my right side messed up- I dont have my strength on the right side, I had the stroke on the left side of my brain. Some words I can get out and some words I can't get out. I used to talk loud, but now I talk soft, my roommate can barely hear me."    Per acute care notes: Katiuska Preston is a 49 y.o. female with an SLP diagnosis of Dysarthria.  She presents with poor breath support, slow rate of speech, halting prosody, and imprecise articulation. Speech strategies introduced today. Rec continue POC and recs for low intensity ST at d'c..  Prior Level of Function:  Indenpendent  Social History: Patient lives with other in [facility]. Patient is not currently driving.  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, clothing, blankets  Prior Therapy:  Seen in acute  Pain:     Pain Scale:  8    Pain Location:  [place]- from birth 19 years ago  Patient/Caregiver Goals:  "That I can speak clearly and have my voice back so I can start to yell at everyone again"     Past Medical " History: Katiuska Preston  has a past medical history of Antithrombin III deficiency (2021), Asthma, Claustrophobia, Elevated factor VIII level (2021), Esophageal reflux, Generalized anxiety disorder, Herniated disc, Hypertension, Iron deficiency anemia due to chronic blood loss (10/27/2021), Lower extremity weakness, Morbid obesity, Obesity, Protein S deficiency (2021), Pulmonary embolism, and Upper extremity weakness.  Katiuska Preston  has a past surgical history that includes Cholecystectomy;  section; and Tonsillectomy.  Medical Hx and Allergies: Katiuska has a current medication list which includes the following prescription(s): albuterol, amlodipine, aspirin, atorvastatin, carvedilol, clonidine, eliquis, ferrous sulfate, fluticasone propionate, fluticasone-salmeterol 230-21 mcg/dose, nexium, olmesartan-hydrochlorothiazide, topiramate, and trazodone, and the following Facility-Administered Medications: bebtelovimab (eua). Review of patient's allergies indicates:  No Known Allergies    Objective   Formal Assessment:  MOTOR SPEECH/DYSARTHRIA EVALUATION    The patient completed the Communicative Effectiveness Survey. The results are below:    The patient is asked to rate his/her perception of their communication effectiveness on a scale of 1 (not at all effective) to 4 (very effective).    1) Having a conversation with a family member or friends at home:   2) Participating in conversation with strangers in a quiet place:   3) Conversing with a familiar person over the phone:   4) Conversing with a stranger over the phone:   5) Being part of a conversation in a noisy environment:   6) Speaking to a friend when you are emotionally upset or you are angry:   7) Having a conversation while traveling in a car:   8) Having a conversation with someone at a distance:     Total (average): 1.3      Evaluation of Speech Mechanisms  Respiration:  Posture: WNL  Breath Support:  Shallow  Breath Rate: Slow  Respiratory Features: Abnormal: Thoracic    Oral Mechanism at Rest   Labial Structures  Structure WNL   Symmetry WNL   Tone WNL   Ability to control secretions reduced/drooling- reported on the right side        Lingual Structure (at rest in mouth)   Structure WNL   Symmetry WNL   Tone WNL   Irregular Movement WNL       Mandible  Symmetry WNL   Posture at Rest WNL=closed   Dentition WNL     Face  Symmetry WNL   Expression WNL   Irregular Movement WNL     Soft Palate  Symmetry WNL   Structure WNL     Hard Palate  Structure WNL     Oral Mechanism during Sustained Postures   Labial Retraction   Symmetry Right Reduced   Range Reduced   Tone Reduced   Strength Reduced     Labial Protrusion  Range Reduced- right    Tone Reduced   Strength Reduced     Labial Compression  Strength (Upper R) Reduced   Strength (Upper L) WNL   Strength (Lower R) Reduced   Strength (Lower L) WNL     Lingual Protrusion  Symmetry WNL   Range WNL   Tone WNL   Tremor WNL   Strength Reduced -right      Lingual Elevation to Alveolar Ridge   Range WNL   Symmetry WNL     Mandible Depression  Symmetry WNL   Range WNL   Jaw Clonus WNL   Strength WNL     Mandible Elevation  Symmetry WNL   Range WNL   Strength WNL     Face: Raising Eyebrows  Symmetry WNL   Range WNL   Forehead Wrinkle Not Present     Velopharyngeal Function: Cheek Puffing   Symmetry WNL   Range Reduced-Cannot maintain air in mouth   Tone WNL   Strength Reduced       Oral Mechanism during Movement   Labial Protrusion and Lateralization   Rate Slow   Rhythm Irregular     Lingual Lateralization (External)   Rate Slow   Rhythm Irregular   Range Reduced     Lingual Lateralization (Internal)  Rate Slow   Rhythm Irregular   Range Reduced     Circular Range of Motion (External)   Rate Slow   Rhythm WNL   Range WNL     Circular Range of Motion (Internal)  Rate Slow   Rhythm WNL   Range WNL     Velopharyngeal Function: Sustained /a/  Velum Range WNL   Pharyngeal Wall  Range WNL     Velopharyngeal Function: Short Repeating /a/  Velum Range WNL   Pharyngeal Wall Range WNL     Gross Sensation  Sensation  Face: Upper L WNL    Upper R WNL    Lower L WNL    Lower R WNL   Lips: Upper L WNL    Upper R WNL    Lower L WNL    Lower R WNL       Perceptual Ratings  Respiratory Features: Forced inspiration/expiration  Respiratory/Phonatory Features: Reduced loudness  Phonatory Features: Monopitch  Phonatory Quality: WFL  Resonatory Features: WFL  Articulatory Features: Imprecise consonants  Prosodic Features: Slow rate, inappropriate silences  Other Features: nonpresent     Treatment   SLP Treatment: no treatment    Education provided: Role of Speech Therapy, Insurance limitations with patient, Goals/Plan of Care and Scheduling/Cancellations  Additional Education provided: Motor speech strategies    Patient expressed understanding.    Home Program: Use of strategies trained in acute     Assessment     Katiuska presents to [facility] status post medical diagnosis of CVA (cerebral vascular accident) [I63.9] .       Interpretation of objective assessment: Patient presents with Mild-moderate Spastic dysarthria characterized by forced inspiration, reduced loudness, monopitch, imprecise consonants, slow rate, and inappropriate silences. Noting changes in her taste post cerebrovascular accident (CVA) as well. Monitor cognitive status, patient reports at baseline.     Demonstrates impairments including limitations as described in the problem list.     Positive prognostic factors: time since onset  Negative prognostic factors: none present    Barriers to therapy: Yes  Barriers to therapy include: Relies on others for transportation    Patient's spiritual, cultural, and educational needs considered and patient agreeable to plan of care and goals.    Patient will benefit from skilled therapy to address the deficits listed in the problem list on initial evaluation, provide patient/family  education and to maximize patient's level of independence in the home and community environment.    Rehab potential:  Good       Short Term Goals: (4 weeks) Current Progress:   Pt will rate her speech while reading as at least a 2 on a 3 point scale ( 1 = same as before beginning therapy, 2 =better but not best, 3 =best possible outcome) on  8 /10 trials.     Progressing/ Not Met 4/18/2024   Established this date   2.  Pt will differentiate between her speech and error-free speech by giving specific examples of differences on  8 /10 trials.    Progressing/ Not Met 4/18/2024   Established this date   3. Pt will use motor speech strategies and answer questions in conversation with a self-rating of at least 2 on a 3 point scale ( 1 = same as before beginning therapy, 2 =better but not best, 3 =best possible outcome) on  8 /10 trials.     Progressing/ Not Met 4/18/2024   Established this date    4.  In order to promote generalization of intelligibility, patient will produce sentences during diaphragmatic breathing while incorporating intelligibility strategies (overarticulation, increased loudness) at 80% accuracy with moderate verbal and visual cues.     Progressing/ Not Met 4/18/2024   Established this date    5.  Patient will complete diaphragmatic breathing exercises by producing maximal exhalation via diaphragmic breathing with a duration of 5 seconds in 10 trials, consistently with minimal tactile and verbal clinician cues.     Progressing/ Not Met 4/18/2024   Established this date      Long Term Goals: (6 weeks) Current Progress:   She  will develop functional and intelligible speech and utilize compensatory strategies through the use of adequate labial and lingual function, increased articulatory precision and speech prosody.    Established this date         Plan     Recommended Treatment Plan: Patient will participate in the [facility] rehabilitation program for speech therapy 1 time(s) per week for 6 week(s) to  address her motor speech deficits, to educate patient and their family, and to participate in a home exercise program.    Other Recommendations: Ambulatory Referral to Physical Therapy and Ambulatory Referral to Occupational Therapy       Therapist's Name:   Bailey Ca CCC-SLP   4/18/2024

## 2024-04-22 ENCOUNTER — TELEPHONE (OUTPATIENT)
Dept: HEMATOLOGY/ONCOLOGY | Facility: CLINIC | Age: 49
End: 2024-04-22

## 2024-04-22 DIAGNOSIS — D50.0 IRON DEFICIENCY ANEMIA DUE TO CHRONIC BLOOD LOSS: Primary | ICD-10-CM

## 2024-04-25 ENCOUNTER — LAB VISIT (OUTPATIENT)
Dept: LAB | Facility: HOSPITAL | Age: 49
End: 2024-04-25
Attending: INTERNAL MEDICINE
Payer: MEDICAID

## 2024-04-25 DIAGNOSIS — D50.0 IRON DEFICIENCY ANEMIA DUE TO CHRONIC BLOOD LOSS: ICD-10-CM

## 2024-04-25 LAB
ALBUMIN SERPL BCP-MCNC: 3.7 G/DL (ref 3.5–5.2)
ALP SERPL-CCNC: 81 U/L (ref 55–135)
ALT SERPL W/O P-5'-P-CCNC: 13 U/L (ref 10–44)
ANION GAP SERPL CALC-SCNC: 6 MMOL/L (ref 8–16)
AST SERPL-CCNC: 17 U/L (ref 10–40)
BASOPHILS # BLD AUTO: 0.04 K/UL (ref 0–0.2)
BASOPHILS NFR BLD: 0.4 % (ref 0–1.9)
BILIRUB SERPL-MCNC: 0.5 MG/DL (ref 0.1–1)
BUN SERPL-MCNC: 22 MG/DL (ref 6–20)
CALCIUM SERPL-MCNC: 9.1 MG/DL (ref 8.7–10.5)
CHLORIDE SERPL-SCNC: 102 MMOL/L (ref 95–110)
CO2 SERPL-SCNC: 29 MMOL/L (ref 23–29)
CREAT SERPL-MCNC: 1.5 MG/DL (ref 0.5–1.4)
DIFFERENTIAL METHOD BLD: ABNORMAL
EOSINOPHIL # BLD AUTO: 0.2 K/UL (ref 0–0.5)
EOSINOPHIL NFR BLD: 1.4 % (ref 0–8)
ERYTHROCYTE [DISTWIDTH] IN BLOOD BY AUTOMATED COUNT: 13.8 % (ref 11.5–14.5)
EST. GFR  (NO RACE VARIABLE): 42.2 ML/MIN/1.73 M^2
FERRITIN SERPL-MCNC: 81 NG/ML (ref 20–300)
GLUCOSE SERPL-MCNC: 120 MG/DL (ref 70–110)
HCT VFR BLD AUTO: 41.1 % (ref 37–48.5)
HGB BLD-MCNC: 13.3 G/DL (ref 12–16)
IMM GRANULOCYTES # BLD AUTO: 0.04 K/UL (ref 0–0.04)
IMM GRANULOCYTES NFR BLD AUTO: 0.4 % (ref 0–0.5)
IRON SERPL-MCNC: 100 UG/DL (ref 30–160)
LYMPHOCYTES # BLD AUTO: 1.7 K/UL (ref 1–4.8)
LYMPHOCYTES NFR BLD: 15.8 % (ref 18–48)
MCH RBC QN AUTO: 31.2 PG (ref 27–31)
MCHC RBC AUTO-ENTMCNC: 32.4 G/DL (ref 32–36)
MCV RBC AUTO: 97 FL (ref 82–98)
MONOCYTES # BLD AUTO: 0.6 K/UL (ref 0.3–1)
MONOCYTES NFR BLD: 5 % (ref 4–15)
NEUTROPHILS # BLD AUTO: 8.5 K/UL (ref 1.8–7.7)
NEUTROPHILS NFR BLD: 77 % (ref 38–73)
NRBC BLD-RTO: 0 /100 WBC
PLATELET # BLD AUTO: 270 K/UL (ref 150–450)
PMV BLD AUTO: 10.3 FL (ref 9.2–12.9)
POTASSIUM SERPL-SCNC: 3.9 MMOL/L (ref 3.5–5.1)
PROT SERPL-MCNC: 7.6 G/DL (ref 6–8.4)
RBC # BLD AUTO: 4.26 M/UL (ref 4–5.4)
SATURATED IRON: 29 % (ref 20–50)
SODIUM SERPL-SCNC: 137 MMOL/L (ref 136–145)
TOTAL IRON BINDING CAPACITY: 347 UG/DL (ref 250–450)
TRANSFERRIN SERPL-MCNC: 248 MG/DL (ref 200–375)
WBC # BLD AUTO: 10.96 K/UL (ref 3.9–12.7)

## 2024-04-25 PROCEDURE — 80053 COMPREHEN METABOLIC PANEL: CPT | Performed by: INTERNAL MEDICINE

## 2024-04-25 PROCEDURE — 82728 ASSAY OF FERRITIN: CPT | Performed by: INTERNAL MEDICINE

## 2024-04-25 PROCEDURE — 85025 COMPLETE CBC W/AUTO DIFF WBC: CPT | Performed by: INTERNAL MEDICINE

## 2024-04-25 PROCEDURE — 36415 COLL VENOUS BLD VENIPUNCTURE: CPT | Performed by: INTERNAL MEDICINE

## 2024-04-25 PROCEDURE — 83540 ASSAY OF IRON: CPT | Performed by: INTERNAL MEDICINE

## 2024-04-26 ENCOUNTER — CLINICAL SUPPORT (OUTPATIENT)
Dept: REHABILITATION | Facility: HOSPITAL | Age: 49
End: 2024-04-26
Payer: MEDICAID

## 2024-04-26 DIAGNOSIS — R47.1 DYSARTHRIA: Primary | ICD-10-CM

## 2024-04-26 PROCEDURE — 92507 TX SP LANG VOICE COMM INDIV: CPT | Mod: PN

## 2024-04-26 NOTE — PROGRESS NOTES
[facility]  Speech Therapy Treatment Note- Neurological Rehabilitation  Date: 4/26/2024     Name: Katiuska Preston   MRN: [id number]   Therapy Diagnosis:   Encounter Diagnosis   Name Primary?    Dysarthria Yes   Physician: Ayah Leonard NP  Physician Orders: Ambulatory Referral to Speech Therapy   Medical Diagnosis: CVA (cerebral vascular accident) [I63.9]     Visit #/ Visits Authorized: 1/ 16  Date of Evaluation:  4/18/2024  Insurance Authorization Period: 4/15/2024 to 8/21/2024  Plan of Care Expiration Date:    5/31/2024  Extended Plan of Care:  NA   Progress Note: 5/24/2024     Time In:  1345  Time Out:  1430  Total Billable Time: 45     Precautions: Standard, Fall, and Communication  Subjective:   Patient reports: she has been practicing her diaphragmatic breathing to improve her speech and singing.   She was compliant to home exercise program.   Response to previous treatment: good  Pain Scale: no pain indicated throughout session  Objective:   UNTIMED  Procedure   Speech- Language- Voice Therapy     Short Term Goals: (4 weeks) Current Progress:   Pt will rate her speech while reading as at least a 2 on a 3 point scale ( 1 = same as before beginning therapy, 2 =better but not best, 3 =best possible outcome) on 8 /10 trials.     Progressing/ Not Met 4/26/2024   Patient rated her speech 1/3 on 1/1 trial of short passage reading. Patient reported she does not have her glasses yet, noting not feeling confident in her ability to read aloud until she gets them next week.    2. Pt will differentiate between her speech and error-free speech by giving specific examples of differences on 8 /10 trials.      Progressing/ Not Met 4/26/2024   Introduced, Not formally addressed this session.        3. Pt will use motor speech strategies and answer questions in conversation with a self-rating of at least 2 on a 3 point scale ( 1 = same as before beginning therapy, 2 =better but not best, 3 =best possible  outcome) on 8 /10 trials.      Progressing/ Not Met 2024   Patient produced functional phrases providing self-rating following productions:   -2/3 rating on 1/2 trials  -3/3 rating on 1/2 trials   4. In order to promote generalization of intelligibility, patient will produce sentences during diaphragmatic breathing while incorporating intelligibility strategies (overarticulation, increased loudness) at 80% accuracy with moderate verbal and visual cues.      Progressing/ Not Met 2024   Discussed clear speech strategies (SLAP strategies) to improve overall intelligibility of speech. Patient produced functional phrases incorporating SLAP strategies with 80% accuracy given moderate verbal and visual cues to improve intelligibility.   5.  Patient will complete diaphragmatic breathing exercises by producing maximal exhalation via diaphragmic breathing with a duration of 5 seconds in 10 trials, consistently with minimal tactile and verbal clinician cues.       Progressing/ Not Met 2024   Patient participated in education, instruction, and demonstration of diaphragmatic breathing to increase respiratory support to improve speech breath coordination.      Patient Education/Response:   Patient educated regarding the followin. diaphragmatic breathing to increase respiratory support to improve speech breath coordination  2. Clear speech strategies- slow down, loud, articulate, and pause  3. Biofeedback to improve self-monitoring of speech productions    Home program established: Patient instructed to continue prior program  Patient verbalized understanding to all above education provided.     See Electronic Medical Record under Patient Instructions for exercises provided throughout therapy.  Assessment:   Katiuska participated well in today's session which focused on dysarthria education, clear speech strategies, and diaphragmatic breathing. Patient with good utilization of clear speech strategies, noting  improved self-rating scores following final production.     Katiuska is progressing well towards her goals. Current goals remain appropriate. Goals to be updated as necessary.     Patient prognosis is Good. Patient will continue to benefit from skilled outpatient speech and language therapy to address the deficits listed in the problem list on initial evaluation, provide patient/family education and to maximize patient's level of independence in the home and community environment.   Medical necessity is demonstrated by the following IMPAIRMENTS:  Motor speech: Decreased speech intelligibility results in decreased efficiency communicating medical and social wants and needs in the case of emergency.    Barriers to Therapy: relies on others for transportation  Patient's spiritual, cultural and educational needs considered and patient agreeable to plan of care and goals.  Plan:   Continue Plan of Care with focus on rehabilitation and compensation for motor speech deficits s/p cerbrovascular accident.    Yony Upton M.S., CF-SLP  Speech-Language Pathologist Resident  4/26/2024

## 2024-04-29 ENCOUNTER — OFFICE VISIT (OUTPATIENT)
Dept: HEMATOLOGY/ONCOLOGY | Facility: CLINIC | Age: 49
End: 2024-04-29
Payer: MEDICAID

## 2024-04-29 VITALS
DIASTOLIC BLOOD PRESSURE: 64 MMHG | BODY MASS INDEX: 52.13 KG/M2 | TEMPERATURE: 97 F | HEART RATE: 72 BPM | SYSTOLIC BLOOD PRESSURE: 131 MMHG | RESPIRATION RATE: 16 BRPM | WEIGHT: 293 LBS

## 2024-04-29 DIAGNOSIS — D68.59 ANTITHROMBIN III DEFICIENCY: ICD-10-CM

## 2024-04-29 DIAGNOSIS — D50.9 IRON DEFICIENCY ANEMIA, UNSPECIFIED IRON DEFICIENCY ANEMIA TYPE: Chronic | ICD-10-CM

## 2024-04-29 DIAGNOSIS — D64.9 ACUTE ON CHRONIC ANEMIA: ICD-10-CM

## 2024-04-29 DIAGNOSIS — Z86.711 HISTORY OF PULMONARY EMBOLUS (PE): ICD-10-CM

## 2024-04-29 DIAGNOSIS — I26.99 PULMONARY EMBOLISM, UNSPECIFIED CHRONICITY, UNSPECIFIED PULMONARY EMBOLISM TYPE, UNSPECIFIED WHETHER ACUTE COR PULMONALE PRESENT: Primary | ICD-10-CM

## 2024-04-29 DIAGNOSIS — I26.99 ACUTE PULMONARY EMBOLISM, UNSPECIFIED PULMONARY EMBOLISM TYPE, UNSPECIFIED WHETHER ACUTE COR PULMONALE PRESENT: ICD-10-CM

## 2024-04-29 DIAGNOSIS — D50.0 IRON DEFICIENCY ANEMIA DUE TO CHRONIC BLOOD LOSS: ICD-10-CM

## 2024-04-29 DIAGNOSIS — R79.1 ELEVATED FACTOR VIII LEVEL: ICD-10-CM

## 2024-04-29 DIAGNOSIS — D68.59 PROTEIN S DEFICIENCY: ICD-10-CM

## 2024-04-29 LAB
OHS QRS DURATION: 84 MS
OHS QTC CALCULATION: 453 MS

## 2024-04-29 PROCEDURE — 4010F ACE/ARB THERAPY RXD/TAKEN: CPT | Mod: CPTII,S$GLB,, | Performed by: INTERNAL MEDICINE

## 2024-04-29 PROCEDURE — 99213 OFFICE O/P EST LOW 20 MIN: CPT | Mod: S$GLB,,, | Performed by: INTERNAL MEDICINE

## 2024-04-29 PROCEDURE — 3075F SYST BP GE 130 - 139MM HG: CPT | Mod: CPTII,S$GLB,, | Performed by: INTERNAL MEDICINE

## 2024-04-29 PROCEDURE — 1160F RVW MEDS BY RX/DR IN RCRD: CPT | Mod: CPTII,S$GLB,, | Performed by: INTERNAL MEDICINE

## 2024-04-29 PROCEDURE — 3044F HG A1C LEVEL LT 7.0%: CPT | Mod: CPTII,S$GLB,, | Performed by: INTERNAL MEDICINE

## 2024-04-29 PROCEDURE — 3008F BODY MASS INDEX DOCD: CPT | Mod: CPTII,S$GLB,, | Performed by: INTERNAL MEDICINE

## 2024-04-29 PROCEDURE — 1111F DSCHRG MED/CURRENT MED MERGE: CPT | Mod: CPTII,S$GLB,, | Performed by: INTERNAL MEDICINE

## 2024-04-29 PROCEDURE — 1159F MED LIST DOCD IN RCRD: CPT | Mod: CPTII,S$GLB,, | Performed by: INTERNAL MEDICINE

## 2024-04-29 PROCEDURE — 3078F DIAST BP <80 MM HG: CPT | Mod: CPTII,S$GLB,, | Performed by: INTERNAL MEDICINE

## 2024-04-29 NOTE — PROGRESS NOTES
[facility] Hematology/Oncology  Progress Note -   Follow-up Visit    Subjective:      Patient ID:   NAME: Katiuska Preston : 1975     49 y.o. female    Referring Doc: Justice  Other Physicians: Eduardo    Chief Complaint: pulm emboli f/u      HPI:    The patient returns for a regularly scheduled follow-up visit today to go over results of recently ordered tests, studies and/or labs. She is here by herself. She missed her appointment in 2024    She reports she had TIA/low grade stroke in 2024. MRI brain showed a small acute infarct in left corona radiata. MRA head showed no large vessel occlusion or high-grade stenosis. Bilateral carotid ultrasound with no significant carotid stenosis.     She saw Dr Leiva with neurology in 2024. She has repeat MRI planned for May 3rd 2024. Neurology suspects she has likely small-vessel disease. Dr Leiva recommended she continue the eliquis and started her on aspirin 81mg and lipitor.         No CP, HA's or N/V.   Breathing stable but requires O2 prn.     She is currently on Eliquis. No excessive bleeding or bruising.     She has some chronic right knee issues and pain.       Discussed covid19 precautions - she had her vaccinations             ROS:   GEN: normal without any fever, night sweats or weight loss; chronic knee issues  HEENT: normal with no HA's, sore throat, stiff neck, changes in vision;    CV: normal with no CP, some WINN   PULM: normal with no SOB, cough, hemoptysis, sputum or pleuritic pain  GI: normal with no abdominal pain, nausea, vomiting, constipation, diarrhea, melanotic stools, BRBPR, or hematemesis  : normal with no hematuria, dysuria  BREAST: normal with no mass, discharge, pain  SKIN: normal with no rash, erythema, bruising, or swelling; mole on right foot     Past Medical/Surgical History:  Past Medical History:   Diagnosis Date    Antithrombin III deficiency 2021    Asthma     Claustrophobia     Elevated factor VIII level 2021     Esophageal reflux     Gastroesophageal reflux    Generalized anxiety disorder     Anxiety, Generalized    Herniated disc     Hypertension     Iron deficiency anemia due to chronic blood loss 10/27/2021    Lower extremity weakness     Morbid obesity     Obesity     Protein S deficiency 2021    Pulmonary embolism     Upper extremity weakness      Past Surgical History:   Procedure Laterality Date     SECTION      CHOLECYSTECTOMY      TONSILLECTOMY           Allergies:  Review of patient's allergies indicates:  No Known Allergies    Social/Family History:  Social History     Socioeconomic History    Marital status:    Tobacco Use    Smoking status: Former     Current packs/day: 0.00     Types: Cigarettes, Cigars     Quit date:      Years since quittin.3    Smokeless tobacco: Never   Substance and Sexual Activity    Alcohol use: Yes     Alcohol/week: 0.8 standard drinks of alcohol     Types: 1 Standard drinks or equivalent per week     Comment: rarely    Drug use: No    Sexual activity: Not Currently     Social Determinants of Health     Financial Resource Strain: Medium Risk (10/18/2022)    Overall Financial Resource Strain (CARDIA)     Difficulty of Paying Living Expenses: Somewhat hard   Food Insecurity: Food Insecurity Present (10/18/2022)    Hunger Vital Sign     Worried About Running Out of Food in the Last Year: Sometimes true     Ran Out of Food in the Last Year: Sometimes true   Transportation Needs: Unmet Transportation Needs (10/18/2022)    PRAPARE - Transportation     Lack of Transportation (Medical): Yes     Lack of Transportation (Non-Medical): Yes   Stress: No Stress Concern Present (10/18/2022)    [employer] [place] [employer] - Occupational Stress Questionnaire     Feeling of Stress : Only a little   Social Connections: Socially Isolated (10/18/2022)    Social Connection and Isolation Panel [NHANES]     Frequency of Communication with Friends and Family: Once a  week     Frequency of Social Gatherings with Friends and Family: Once a week     Attends Taoist Services: Never     Active Member of Clubs or Organizations: No     Attends Club or Organization Meetings: Never     Marital Status:    Housing Stability: High Risk (10/18/2022)    Housing Stability Vital Sign     Unable to Pay for Housing in the Last Year: Yes     Unstable Housing in the Last Year: No     No family history on file.      Medications:    Current Outpatient Medications:     albuterol (PROVENTIL/VENTOLIN HFA) 90 mcg/actuation inhaler, Inhale 2 puffs into the lungs every 6 (six) hours as needed for Wheezing. Rescue, Disp: 18 g, Rfl: 5    amLODIPine (NORVASC) 10 MG tablet, Take 1 tablet (10 mg total) by mouth once daily., Disp: 30 tablet, Rfl: 2    aspirin (ECOTRIN) 81 MG EC tablet, Take 1 tablet (81 mg total) by mouth once daily., Disp: 30 tablet, Rfl: 0    atorvastatin (LIPITOR) 40 MG tablet, Take 1 tablet (40 mg total) by mouth every evening., Disp: 30 tablet, Rfl: 0    cloNIDine (CATAPRES) 0.1 MG tablet, Take 1 tablet (0.1 mg total) by mouth every 8 (eight) hours as needed (SBP >180)., Disp: 30 tablet, Rfl: 0    ELIQUIS 5 mg Tab, TAKE 1 TABLET BY MOUTH TWICE DAILY (Patient taking differently: Take 5 mg by mouth once daily.), Disp: 60 tablet, Rfl: 3    ferrous sulfate (FEROSUL) 325 mg (65 mg iron) Tab tablet, TAKE 1 TABLET BY MOUTH EVERY DAY (Patient taking differently: Take 325 mg by mouth once daily. TAKE 1 TABLET BY MOUTH EVERY DAY), Disp: 30 tablet, Rfl: 6    fluticasone propionate (FLONASE) 50 mcg/actuation nasal spray, 2 sprays by Each Nostril route daily as needed for Rhinitis., Disp: , Rfl:     NEXIUM 40 mg capsule, Take 40 mg by mouth once daily., Disp: , Rfl:     olmesartan-hydrochlorothiazide (BENICAR HCT) 40-25 mg per tablet, Take 1 tablet by mouth once daily., Disp: , Rfl:     topiramate (TOPAMAX) 25 MG tablet, Take 25 mg by mouth 2 (two) times daily., Disp: , Rfl:     trazodone  (DESYREL) 100 MG tablet, Take 100 mg by mouth every evening., Disp: , Rfl:     carvediloL (COREG) 25 MG tablet, Take 1 tablet (25 mg total) by mouth 2 (two) times daily., Disp: 60 tablet, Rfl: 0    fluticasone-salmeterol 230-21 mcg/dose (ADVAIR HFA) 230-21 mcg/actuation HFAA inhaler, Inhale 2 puffs into the lungs 2 (two) times daily. Controller, Disp: 12 g, Rfl: 6    Current Facility-Administered Medications:     bebtelovimab (EUA) 175 mg/2 mL (87.5 mg/mL) injection 175 mg, 175 mg, Intravenous, 1 time in Clinic/HOD, Sherrie Parker MD      Pathology:   Cancer Staging   No matching staging information was found for the patient.        Breast biopsy 5/30/2022:    1. BREAST, LEFT, UPPER OUTER MID CALCIFICATIONS, STEREOTACTIC GUIDED    CORE BIOPSY    - BENIGN BREAST PARENCHYMA AND BLOOD VESSELS     2. BREAST, LEFT, UPPER OUTER MID CALCIFICATIONS, STEREOTACTIC GUIDED    CORE BIOPSY   - MICROCALCIFICATIONS ASSOCIATED WITH FIBROADENOMATOID CHANGE AND    BENIGN BREAST PARENCHYMA    - USUAL DUCTAL HYPERPLASIA AND COLUMNAR CELL CHANGE      Objective:   Vitals:  Blood pressure 131/64, pulse 72, temperature 97.2 °F (36.2 °C), resp. rate 16, weight 133.5 kg (294 lb 4.8 oz).    Physical Examination:   GEN: no apparent distress, comfortable; AAOx3; morbidly overweight   HEAD: atraumatic and normocephalic  EYES: no pallor, no icterus, PERRLA  ENT: OMM, no pharyngeal erythema, external ears WNL; no nasal discharge; no thrush  NECK: no masses, thyroid normal, trachea midline, no LAD/LN's, supple  CV: RRR with no murmur; normal pulse; normal S1 and S2; no pedal edema  CHEST: Normal respiratory effort; CTAB; normal breath sounds; no wheeze or crackles;    ABDOM: nontender and nondistended; soft; normal bowel sounds; no rebound/guarding  MUSC/Skeletal: LROM and crepitus right knee; no deformities or arthropathy  EXTREM: no clubbing, cyanosis, inflammation or swelling  SKIN: no rashes, lesions, ulcers, petechiae or subcutaneous  nodules  : no diaz  NEURO: grossly intact; motor/sensory WNL; AAOx3; no tremors  PSYCH: normal mood, affect and behavior  LYMPH: normal cervical, supraclavicular, and groin LN's;       Labs:   Lab Results   Component Value Date    WBC 10.96 04/25/2024    HGB 13.3 04/25/2024    HCT 41.1 04/25/2024    MCV 97 04/25/2024     04/25/2024    CMP  Sodium   Date Value Ref Range Status   04/25/2024 137 136 - 145 mmol/L Final     Potassium   Date Value Ref Range Status   04/25/2024 3.9 3.5 - 5.1 mmol/L Final     Chloride   Date Value Ref Range Status   04/25/2024 102 95 - 110 mmol/L Final     CO2   Date Value Ref Range Status   04/25/2024 29 23 - 29 mmol/L Final     Glucose   Date Value Ref Range Status   04/25/2024 120 (H) 70 - 110 mg/dL Final     BUN   Date Value Ref Range Status   04/25/2024 22 (H) 6 - 20 mg/dL Final     Creatinine   Date Value Ref Range Status   04/25/2024 1.5 (H) 0.5 - 1.4 mg/dL Final     Calcium   Date Value Ref Range Status   04/25/2024 9.1 8.7 - 10.5 mg/dL Final     Total Protein   Date Value Ref Range Status   04/25/2024 7.6 6.0 - 8.4 g/dL Final     Albumin   Date Value Ref Range Status   04/25/2024 3.7 3.5 - 5.2 g/dL Final     Total Bilirubin   Date Value Ref Range Status   04/25/2024 0.5 0.1 - 1.0 mg/dL Final     Comment:     For infants and newborns, interpretation of results should be based  on gestational age, weight and in agreement with clinical  observations.    Premature Infant recommended reference ranges:  Up to 24 hours.............<8.0 mg/dL  Up to 48 hours............<12.0 mg/dL  3-5 days..................<15.0 mg/dL  6-29 days.................<15.0 mg/dL       Alkaline Phosphatase   Date Value Ref Range Status   04/25/2024 81 55 - 135 U/L Final     AST   Date Value Ref Range Status   04/25/2024 17 10 - 40 U/L Final     ALT   Date Value Ref Range Status   04/25/2024 13 10 - 44 U/L Final     Anion Gap   Date Value Ref Range Status   04/25/2024 6 (L) 8 - 16 mmol/L Final     eGFR  if    Date Value Ref Range Status   07/18/2022 >60.0 >60 mL/min/1.73 m^2 Final     eGFR if non    Date Value Ref Range Status   07/18/2022 >60.0 >60 mL/min/1.73 m^2 Final     Comment:     Calculation used to obtain the estimated glomerular filtration  rate (eGFR) is the CKD-EPI equation.                  Lab Results   Component Value Date    IRON 100 04/25/2024    TIBC 347 04/25/2024    FERRITIN 81.0 04/25/2024     Lab Results   Component Value Date    TCHHVECJ62 452 05/02/2021     Lab Results   Component Value Date    FOLATE 5.9 05/02/2021                I have reviewed all available lab results and radiology reports.    Radiology/Diagnostic Studies:      MRI Pelvis 6/15/2022:    Impression:     1. Normal thickness of the endometrium.  2. Query adenomyosis.  3. Nonviable uterine fibroid.  4. Involuting follicle or cyst in the left ovary.              US Lower Extremity Veins Bilateral [[id number]] Collected: 05/01/21 1348   Order Status: Completed Updated: 05/01/21 1432   Narrative:     REASON: DVT     FINDINGS:     Grayscale, color and spectral Doppler analysis of the bilateral lower   extremity deep venous system was performed.     There is normal compressibility, color and spectral Doppler analysis,   and augmentation in the bilateral lower extremity deep venous system.     IMPRESSION:     1.  No DVT of the bilateral lower extremity veins.   2.  Bilateral distal superficial femoral veins were unable to be   assessed due to body habitus       CTA Chest Non-Coronary - PE Study [[id number]] Collected: 05/01/21 1143   Order Status: Completed Updated: 05/01/21 1225   Narrative:     CMS MANDATED QUALITY DATA - CT RADIATION - 436     All CT scans at this facility utilize dose modulation, iterative   reconstruction, and/or weight based dosing when appropriate to reduce   radiation dose to as low as reasonably achievable.         REASON: PE suspected, intermediate prob, positive D-dimer      TECHNIQUE: CT angiography of thorax with 100 mL Omnipaque 350.   Maximum intensity projection coronal reformations were created at a   separate workstation and stored in the patient's permanent medical   record.     COMPARISON: CTA chest October 14, 2019.     FINDINGS:     Limited evaluation due to body habitus and inadequate bolus timing.   Filling defects identified in segmental and subsegmental artery   supplying the left lower lobe, consistent with pulmonary emboli.   Questionable filling defects versus artifact in the right mainstem   pulmonary artery and a few segmental arteries supplying the right   lower lobe may reflect pulmonary emboli. Heart size is at the upper   limits of normal. No mediastinal lymphadenopathy or mass.     The central tracheobronchial tree is patent. There is diffuse   groundglass lung opacities in the bilateral lungs with peripheral   wedge-shaped opacities. No pleural effusions.     The visualized abdominal viscera are unremarkable. No acute osseous   abnormality..     IMPRESSION:     1.  Limited evaluation due to body habitus and inadequate bolus   timing. Filling defects identified in segmental and subsegmental   artery supplying the left lower lobe, consistent with pulmonary   emboli. There are questionable pulmonary emboli in the right mainstem   pulmonary artery and segmental artery supplying the right lower lobe.   Findings reported to Dr.Lloyd Duffy at 5/1/2021.   2.  Bilateral diffuse groundglass lung opacities with peripheral   wedge-shaped opacities may reflect changes of poor inspiration and   atelectasis. Atypical infectious process could also have a similar   appearance the proper clinical setting.                 All lab results and imaging results have been reviewed and discussed with the patient    Assessment:   (1) 49 y.o. female with diagnosis of pulmonary emboli who was seen as a consult at [facility]  - patient with diagnosis of asthma who presented to the ED at  [facility] on 5/1 with progressive SOB, postnasal drip and acute hypercapnia. CTA was a limited study due to body habitus but radiology reported presence of filling defects identified in segmental and subsegmental artery supplying the left lower lobe, consistent with pulmonary emboli. They also reported questionable pulmonary emboli in the right mainstem pulmonary artery and segmental artery supplying the right lower lobe. Patient has been admitted to hospitalist service and is on Lovenox. She has no prior personal or family history of clots.      - doppler studies are negative     5/3/2021:  - patient was seen by Dr Clark with pulm yesterday; appreciate his evaluation  - options of anticoagulation include: Coumadin, Eliquis , Xarelto, or Pradaxa  - in patients with morbid obesity, one can have difficulties with therapeutic dosing with practically any type of oral anticoagulant     5/31/2021:  - She has O2 at home and is on portable today.   - She has not followed up with pulmonary since discharge.   - She is breathing fair but does have WINN.   - She has some sinus issues. She is currently on Eliquis.   - She saw Dr Rendon since discharge  - low ATIII and protein S levels which could be the etiology, but they will need to be repeated at later date to confirm  - Elevated factor VIII which also will need to be repeated to confirm    6/28/2021:  - she saw Dr Clark with pulmonary since last visit and has PFT scheduled for this coming July 1st  - she remains on portable O2    12/8/2021:  - she saw Dr Clark again on 11/2/2021  - she remains on O2 at home  - she remains on eliquis  - Repeat AT3 was normal, repeat Protein S was WNL  - repeat factor VIII was still a little elevated at 281 but better    2/15/2022:  - she sees pulmonary NP again next month    5/19/2022:  - she saw Idania Cason NP on 5/2/2022 with pulmonary    6/20/2022:  - remains on eliquis  - currently with no excessive bleeding or bruising    10/24/2022:  -  continued on eliquis   - no excessive bleeding or bruising    2/27/2023:  - continued on eliquis but she is only taking it once per day    8/8/2023:  - continued on eliquis but only takes one a day    4/29/2024:  - She missed her appointment in march 2024  - She reports she had TIA/low grade stroke in April 2024. MRI brain showed a small acute infarct in left corona radiata. MRA head showed no large vessel occlusion or high-grade stenosis. Bilateral carotid ultrasound with no significant carotid stenosis.   - She saw Dr Leiva with neurology in April 2024. She has repeat MRI planned for May 3rd 2024. Neurology suspects she has likely small-vessel disease. Dr Leiva recommended she continue the eliquis and started her on aspirin 81mg and lipitor.  - encouraged her to take the eliquis bid            (2) Anemia with microcytic indices with current history of chronic menorrhagia - most likely has underlying iron deficiency due to chronic heavy menstrual cycles  - s/p two units of blood  - prior hgb at 8.2  - total bilirubin is WNL, so I do not suspect any hemolysis at this time     5/3/2021:  - hgb at 8.6 today  - iron and ferritin are both low with elevated TIBC  - she received dose of IV iron today    5/31/2021:  - she needs repeat labs incl iron panel  - will consider additional IV iron as needed  - she is on oral iron  - she needs repeat labs incl. Iron panel    6/28/2021:  - hgb currently 11.7  - iron panel is adequate at this time    10/26/2021:  - latest hgb at 10.6  - iron at 29  - she is on oral iron since May 2021  - she has heavy menstrual cycles  - discussed consideration for IV iron and she is agreeable; discussed general side-effects of iron infusions    12/8/2021:  - she has been on oral iron with little improvement in the labs  - she is starting IV iron this coming Friday    2/15/2022:  - latest labs from Jan 2022 with no anemia and iron panel was adequate  - she had IV iron x 1 only    5/19/2022:  - latest  hgb at 11.2  - ferritin was 28  - iron back down to 24  - continued GYN bleeding issues  - seen by Dr Pro with GYn-Onc on 4/20/2022 6/20/2022:  - awaiting better BP control inorder to resume IV iron  - seeing Dr Joseph later today for her HBP  - labs pending for today    7/25/2022:  - s/p two cycles of IV iron  - latest iron panel adequate and hgb WNL    10/24/2022:  - hgb currently WNL at 13.1  - iron at 84 and ferritin 262    2/27/2023:  - latest hgb and iron panel WNL    8/8/2023:  - latest labs from July 2023 with normal hgb    4/29/2024:  - no current anemia and iron panels adequate    (3) Abnormal mammogram:    5/19/2022:  - She had recent abnormal mammogram 5/6/2022 with cluster of calcifications 1 0'clock posittion on the left breast.   - she is scheduling US guided biopsy in near future  - discussed referral to [facility] ([place], [place]) for evaluation    6/20/2022:  - s/p breast biopsy on 5/30/2022 with pathology report coming back benign  - recommend repeat mammogram in at least 3 months    7/25/2022:  - refer to Dr Puentes for her breast issues    10/24/2022:  - she said she had repeat mammo in Aug 2022 but I do not see a report  - she is scheduled Dr Puentes in couple weeks    8/8/2023:  - repeat mammo was ordered by Dr Joseph  - she is overdo to see Dr joseph    4/29/2024:  - she is now seeing [place]barra in [place]  - she reports no new mammogram issues      (3) Overweight/Obesity     (4) HTN     (5) GERD       (6) General anxiety disorder      (7) Migraine HA's - hospitalized in Dec 2022    VISIT DIAGNOSES:      1. Pulmonary embolism, unspecified chronicity, unspecified pulmonary embolism type, unspecified whether acute cor pulmonale present    2. History of pulmonary embolus (PE)    3. Acute on chronic anemia    4. Iron deficiency anemia, unspecified iron deficiency anemia type    5. Iron deficiency anemia due to chronic blood loss    6. Protein S deficiency    7. Elevated factor VIII level    8. Antithrombin  III deficiency    9. Acute pulmonary embolism, unspecified pulmonary embolism type, unspecified whether acute cor pulmonale present            Plan:     PLAN:  1. F/u with PCP, pulmonary, neurology as directed - encouraged her to take the eliquis bid  2. She is to have repeat MRI brain on 5/3/2024 and f/u with Dr Leiva  3. Check labs monthly  incl iron panel - set up up IV iron as needed    4. F/u with GYN and Dr Pro  5. Refill eliquis as needed  6. F/u with Dr Puentes recommended for her history of abn mammogram  7. F/u with Dr Valerio for the knee issues     RTC in 3 months    Fax note to  (new PCP), Inocencia Cason; Vipul Puentes; Abbie              Pulmonary embolism, unspecified chronicity, unspecified pulmonary embolism type, unspecified whether acute cor pulmonale present    History of pulmonary embolus (PE)    Acute on chronic anemia    Iron deficiency anemia, unspecified iron deficiency anemia type    Iron deficiency anemia due to chronic blood loss    Protein S deficiency    Elevated factor VIII level    Antithrombin III deficiency    Acute pulmonary embolism, unspecified pulmonary embolism type, unspecified whether acute cor pulmonale present      No follow-ups on file.    COVID-19 Discussion:    I had long discussion with patient and any applicable family about the COVID-19 coronavirus epidemic and the recommended precautions with regard to cancer and/or hematology patients. I have re-iterated the CDC recommendations for adequate hand washing, use of hand -like products, and coughing into elbow, etc. In addition, especially for our patients who are on chemotherapy and/or our otherwise immunocompromised patients, I have recommended avoidance of crowds, including movie theaters, restaurants, churches, etc. I have recommended avoidance of any sick or symptomatic family members and/or friends. I have also recommended avoidance of any raw and unwashed food products, and general avoidance of  food items that have not been prepared by themselves. The patient has been asked to call us immediately with any symptom developments, issues, questions or other general concerns.       Anticoagulation Discussion:    Discussed with patient and any applicable family members about the benefit and/or need for anticoagulation. I communicated about the risks of bleeding while on any anticoagulation, which could be serious and/or life-threatening, and which can occur at any time, regardless of degree of the level of anticoagulation. I expressed the need for compliance with any anticoagulation regimen and that failure to do so could potential lead to excessive bleeding, and risk to health and/or life. In particular, with patients on coumadin therapy, compliance with requested blood work is absolutely essential, as coumadin levels can vary from time to time, and failure to do so could potentially place the patient at risk for bleeding and/or clotting events which could be fatal. Patients on coumadin are encouraged to call the day after they have their levels drawn, as to obtain the appropriate instructions from my staff. Patients are aware that self-regulating or self-dosing of their medications is strictly prohibited.       I have explained and the patient understands all of  the current recommendation(s). I have answered all of their questions to the best of my ability and to their complete satisfaction.             Thank you for allowing me to participate in this pleasant patient's care. Please call with any questions or concerns.      Electronically signed Cisco Boston MD

## 2024-05-02 ENCOUNTER — CLINICAL SUPPORT (OUTPATIENT)
Dept: REHABILITATION | Facility: HOSPITAL | Age: 49
End: 2024-05-02
Payer: MEDICAID

## 2024-05-02 ENCOUNTER — TELEPHONE (OUTPATIENT)
Dept: HEMATOLOGY/ONCOLOGY | Facility: CLINIC | Age: 49
End: 2024-05-02

## 2024-05-02 DIAGNOSIS — R47.1 DYSARTHRIA: Primary | ICD-10-CM

## 2024-05-02 PROCEDURE — 92507 TX SP LANG VOICE COMM INDIV: CPT | Mod: PN

## 2024-05-02 NOTE — PROGRESS NOTES
[facility]  Speech Therapy Treatment Note- Neurological Rehabilitation  Date: 5/2/2024     Name: Katiuska Preston   MRN: [id number]   Therapy Diagnosis:   No diagnosis found.  Physician: Ayah Leonard NP  Physician Orders: Ambulatory Referral to Speech Therapy   Medical Diagnosis: CVA (cerebral vascular accident) [I63.9]     Visit #/ Visits Authorized: 2/ 16  Date of Evaluation:  4/18/2024  Insurance Authorization Period: 4/15/2024 to 8/21/2024  Plan of Care Expiration Date:    5/31/2024  Extended Plan of Care:  NA   Progress Note: 5/24/2024     Time In:  1240  Time Out:  1315  Total Billable Time: 35     Precautions: Standard, Fall, and Communication  Subjective:   Patient reports: she has a physical therapy appointment scheduled at 2pm today following speech therapy.  Speech Therapist notes patient has no physical therapy referrals noted within her chart or history of physical therapy appointments scheduled within the [facility] system.     Patient made phone call to doctors office while in session to check status of referral service. Doctor's office reported they are in the process of setting up.     She was compliant to home exercise program.   Response to previous treatment: good  Pain Scale: 7/10 on a Visual Analog Scale currently.  Pain Location: shoulder  Objective:   UNTIMED  Procedure   Speech- Language- Voice Therapy     Short Term Goals: (4 weeks) Current Progress:   Pt will rate her speech while reciting relevant functional phrases modeled by Speech Therapist 2 on a 3 point scale ( 1 = same as before beginning therapy, 2 =better but not best, 3 =best possible outcome) on 8 /10 trials.     *Goal updated to meet functional needs of patient.   Progressing/ Not Met 5/2/2024   Patient produced functional phrases providing self-rating following productions:   -2/3 rating on 6/11 trials   -3/3 rating on 5/11 trials    2. Pt will differentiate between her speech and error-free speech by giving  specific examples of differences on 8 /10 trials.      Progressing/ Not Met 2024   Patient provided specific examples to differentiate between her speech and error-free speech on /11 trials.         3. Pt will use motor speech strategies and answer questions in conversation with a self-rating of at least 2 on a 3 point scale ( 1 = same as before beginning therapy, 2 =better but not best, 3 =best possible outcome) on 8 /10 trials.      Progressing/ Not Met 2024   Not addressed in today's session.     4. In order to promote generalization of intelligibility, patient will produce sentences during diaphragmatic breathing while incorporating intelligibility strategies (overarticulation, increased loudness) at 80% accuracy with moderate verbal and visual cues.      Progressing/ Not Met 2024   Reviewed clear speech strategies (SLAP strategies) to improve overall intelligibility of speech. Patient produced functional phrases incorporating SLAP strategies with 80% accuracy given moderate verbal and visual cues to improve intelligibility.   5.  Patient will complete diaphragmatic breathing exercises by producing maximal exhalation via diaphragmic breathing with a duration of 5 seconds in 10 trials, consistently with minimal tactile and verbal clinician cues.       Progressing/ Not Met 2024   Patient completed diaphragmatic breathing exercises by producing maximal exhalation via diaphragmic breathing with a duration of 5 seconds in 10 trials, given minimal tactile and verbal clinician cues.          Patient Education/Response:   Patient educated regarding the followin. diaphragmatic breathing to increase respiratory support to improve speech breath coordination  2. Clear speech strategies- slow down, loud, articulate, and pause  3. Biofeedback to improve self-monitoring of speech productions    Home program established: Patient instructed to continue prior program  Patient verbalized understanding to all  above education provided.     See Electronic Medical Record under Patient Instructions for exercises provided throughout therapy.  Assessment:   Katiuska participated well in today's session which focused on dysarthria education, clear speech strategies, and diaphragmatic breathing. Patient with good utilization of clear speech strategies, noting improved self-monitoring when producing functional phrases. Difficulties continue to be noted in self-monitoring intelligibility of speech within conversation.    Katiuska is progressing well towards her goals. Current goals remain appropriate. Goals to be updated as necessary.     Patient prognosis is Good. Patient will continue to benefit from skilled outpatient speech and language therapy to address the deficits listed in the problem list on initial evaluation, provide patient/family education and to maximize patient's level of independence in the home and community environment.   Medical necessity is demonstrated by the following IMPAIRMENTS:  Motor speech: Decreased speech intelligibility results in decreased efficiency communicating medical and social wants and needs in the case of emergency.    Barriers to Therapy: relies on others for transportation  Patient's spiritual, cultural and educational needs considered and patient agreeable to plan of care and goals.  Plan:   Continue Plan of Care with focus on rehabilitation and compensation for motor speech deficits s/p cerbrovascular accident.    Yony Upton M.S., Provisional SLP, CF-SLP  Speech Language Pathologist Resident  5/2/2024

## 2024-05-02 NOTE — TELEPHONE ENCOUNTER
[employer] message sent to Dr Rendon with request for mammo results from last year per Dr Boston's orders to do so.

## 2024-05-09 ENCOUNTER — CLINICAL SUPPORT (OUTPATIENT)
Dept: REHABILITATION | Facility: HOSPITAL | Age: 49
End: 2024-05-09
Payer: MEDICAID

## 2024-05-09 DIAGNOSIS — R47.1 DYSARTHRIA: Primary | ICD-10-CM

## 2024-05-09 PROCEDURE — 92507 TX SP LANG VOICE COMM INDIV: CPT | Mod: PN

## 2024-05-09 NOTE — PROGRESS NOTES
[facility]  Speech Therapy Treatment Note- Neurological Rehabilitation  Date: 5/9/2024     Name: Katiuska rPeston   MRN: [id number]   Therapy Diagnosis:   No diagnosis found.  Physician: Ayah Leonard NP  Physician Orders: Ambulatory Referral to Speech Therapy   Medical Diagnosis: CVA (cerebral vascular accident) [I63.9]     Visit #/ Visits Authorized: 3/ 16  Date of Evaluation:  4/18/2024  Insurance Authorization Period: 4/15/2024 to 8/21/2024  Plan of Care Expiration Date:    5/31/2024  Extended Plan of Care:  NA   Progress Note: 5/24/2024     Time In:  1305  Time Out:  1345  Total Billable Time: 45     Precautions: Standard, Fall, and Communication  Subjective:   Patient reports: She went to her daughter's graduation on Monday. She reports that she utilized her clear speech strategies with her boyfriend at the graduation and was proud of herself. She has not been sleeping great all week.     She was compliant to home exercise program. -she has been 10 diaphragmatic breaths in the morning.   Response to previous treatment: good  Pain Scale: 9/10 on a Visual Analog Scale currently.  Pain Location: shoulder  Objective:   UNTIMED  Procedure   Speech- Language- Voice Therapy     Short Term Goals: (4 weeks) Current Progress:   Pt will rate her speech while reciting relevant functional phrases modeled by Speech Therapist 2 on a 3 point scale ( 1 = same as before beginning therapy, 2 =better but not best, 3 =best possible outcome) on 8 /10 trials.     *Goal updated to meet functional needs of patient.   Progressing/ Not Met 5/9/2024   Patient produced functional phrases providing self-rating following productions:   -2/3 rating on 3/10 trials   -3/3 rating on 7/10 trials    2. Pt will differentiate between her speech and error-free speech by giving specific examples of differences on 8 /10 trials.      Progressing/ Not Met 5/9/2024   Patient provided specific examples to differentiate between her  speech and error-free speech on /10 trials.        3. Pt will use motor speech strategies and answer questions in conversation with a self-rating of at least 2 on a 3 point scale ( 1 = same as before beginning therapy, 2 =better but not best, 3 =best possible outcome) on 8 /10 trials.      Progressing/ Not Met 2024   Patient utilized clear speech strategies answering questions within structured conversation with Speech Therapist providing self rating:   -3/3 on 5/5 trials     4. In order to promote generalization of intelligibility, patient will produce sentences during diaphragmatic breathing while incorporating intelligibility strategies (overarticulation, increased loudness) at 80% accuracy with moderate verbal and visual cues.      Progressing/ Not Met 2024   Reviewed clear speech strategies (SLAP strategies) to improve overall intelligibility of speech. Patient produced functional phrases incorporating SLAP strategies with 80% accuracy given minimal cues to improve breath support during productions.   5.  Patient will complete diaphragmatic breathing exercises by producing maximal exhalation via diaphragmic breathing with a duration of 5 seconds in 10 trials, consistently with minimal tactile and verbal clinician cues.       Progressing/ Not Met 2024   Patient completed diaphragmatic breathing exercises by producing maximal exhalation via diaphragmic breathing with a duration of 5 seconds in 10 trials.       Patient Education/Response:   Patient educated regarding the followin. diaphragmatic breathing to increase respiratory support to improve speech breath coordination  2. Clear speech strategies- slow down, loud, articulate, and pause  3. Biofeedback to improve self-monitoring of speech productions    Home program established: Patient instructed to continue prior program  Patient verbalized understanding to all above education provided.     See Electronic Medical Record under Patient  Instructions for exercises provided throughout therapy.  Assessment:   Katiuska participated well in today's session which focused on dysarthria education, clear speech strategies, and diaphragmatic breathing. Patient with good utilization of clear speech strategies, noting improved self-monitoring when producing functional phrases. Improvements noted in self-monitoring intelligibility of speech within conversation.    Katiuska is progressing well towards her goals. Current goals remain appropriate. Goals to be updated as necessary.     Patient prognosis is Good. Patient will continue to benefit from skilled outpatient speech and language therapy to address the deficits listed in the problem list on initial evaluation, provide patient/family education and to maximize patient's level of independence in the home and community environment.   Medical necessity is demonstrated by the following IMPAIRMENTS:  Motor speech: Decreased speech intelligibility results in decreased efficiency communicating medical and social wants and needs in the case of emergency.    Barriers to Therapy: relies on others for transportation  Patient's spiritual, cultural and educational needs considered and patient agreeable to plan of care and goals.  Plan:   Continue Plan of Care with focus on rehabilitation and compensation for motor speech deficits s/p cerbrovascular accident.    Yony Upton M.S., Provisional SLP, CF-SLP  Speech Language Pathologist Resident  5/9/2024

## 2024-05-15 ENCOUNTER — HOSPITAL ENCOUNTER (EMERGENCY)
Facility: HOSPITAL | Age: 49
Discharge: HOME | End: 2024-05-15
Attending: STUDENT IN AN ORGANIZED HEALTH CARE EDUCATION/TRAINING PROGRAM
Payer: MEDICAID

## 2024-05-15 VITALS
HEART RATE: 74 BPM | HEIGHT: 63 IN | BODY MASS INDEX: 49.61 KG/M2 | WEIGHT: 280 LBS | OXYGEN SATURATION: 97 % | SYSTOLIC BLOOD PRESSURE: 134 MMHG | DIASTOLIC BLOOD PRESSURE: 91 MMHG | TEMPERATURE: 98 F | RESPIRATION RATE: 16 BRPM

## 2024-05-15 DIAGNOSIS — R07.9 CHEST PAIN: ICD-10-CM

## 2024-05-15 DIAGNOSIS — M54.12 CERVICAL RADICULOPATHY: ICD-10-CM

## 2024-05-15 DIAGNOSIS — M79.601 RIGHT ARM PAIN: Primary | ICD-10-CM

## 2024-05-15 LAB
ALBUMIN SERPL BCP-MCNC: 3.7 G/DL (ref 3.5–5.2)
ALP SERPL-CCNC: 82 U/L (ref 55–135)
ALT SERPL W/O P-5'-P-CCNC: 14 U/L (ref 10–44)
ANION GAP SERPL CALC-SCNC: 8 MMOL/L (ref 8–16)
AST SERPL-CCNC: 16 U/L (ref 10–40)
BASOPHILS # BLD AUTO: 0.03 K/UL (ref 0–0.2)
BASOPHILS NFR BLD: 0.4 % (ref 0–1.9)
BILIRUB SERPL-MCNC: 0.4 MG/DL (ref 0.1–1)
BNP SERPL-MCNC: 12 PG/ML (ref 0–99)
BUN SERPL-MCNC: 33 MG/DL (ref 6–20)
CALCIUM SERPL-MCNC: 9.2 MG/DL (ref 8.7–10.5)
CHLORIDE SERPL-SCNC: 103 MMOL/L (ref 95–110)
CO2 SERPL-SCNC: 28 MMOL/L (ref 23–29)
CREAT SERPL-MCNC: 2.1 MG/DL (ref 0.5–1.4)
DIFFERENTIAL METHOD BLD: ABNORMAL
EOSINOPHIL # BLD AUTO: 0.2 K/UL (ref 0–0.5)
EOSINOPHIL NFR BLD: 2.5 % (ref 0–8)
ERYTHROCYTE [DISTWIDTH] IN BLOOD BY AUTOMATED COUNT: 13.2 % (ref 11.5–14.5)
EST. GFR  (NO RACE VARIABLE): 28.2 ML/MIN/1.73 M^2
GLUCOSE SERPL-MCNC: 123 MG/DL (ref 70–110)
HCT VFR BLD AUTO: 42.9 % (ref 37–48.5)
HGB BLD-MCNC: 13.8 G/DL (ref 12–16)
IMM GRANULOCYTES # BLD AUTO: 0.02 K/UL (ref 0–0.04)
IMM GRANULOCYTES NFR BLD AUTO: 0.2 % (ref 0–0.5)
LYMPHOCYTES # BLD AUTO: 1.9 K/UL (ref 1–4.8)
LYMPHOCYTES NFR BLD: 22.9 % (ref 18–48)
MAGNESIUM SERPL-MCNC: 1.6 MG/DL (ref 1.6–2.6)
MCH RBC QN AUTO: 31.2 PG (ref 27–31)
MCHC RBC AUTO-ENTMCNC: 32.2 G/DL (ref 32–36)
MCV RBC AUTO: 97 FL (ref 82–98)
MONOCYTES # BLD AUTO: 0.5 K/UL (ref 0.3–1)
MONOCYTES NFR BLD: 5.9 % (ref 4–15)
NEUTROPHILS # BLD AUTO: 5.6 K/UL (ref 1.8–7.7)
NEUTROPHILS NFR BLD: 68.1 % (ref 38–73)
NRBC BLD-RTO: 0 /100 WBC
PLATELET # BLD AUTO: 316 K/UL (ref 150–450)
PMV BLD AUTO: 10.8 FL (ref 9.2–12.9)
POTASSIUM SERPL-SCNC: 3.9 MMOL/L (ref 3.5–5.1)
PROT SERPL-MCNC: 7.9 G/DL (ref 6–8.4)
RBC # BLD AUTO: 4.43 M/UL (ref 4–5.4)
SODIUM SERPL-SCNC: 139 MMOL/L (ref 136–145)
TROPONIN I SERPL HS-MCNC: 9 PG/ML (ref 0–14.9)
WBC # BLD AUTO: 8.14 K/UL (ref 3.9–12.7)

## 2024-05-15 PROCEDURE — 99285 EMERGENCY DEPT VISIT HI MDM: CPT | Mod: 25

## 2024-05-15 PROCEDURE — 83880 ASSAY OF NATRIURETIC PEPTIDE: CPT | Performed by: STUDENT IN AN ORGANIZED HEALTH CARE EDUCATION/TRAINING PROGRAM

## 2024-05-15 PROCEDURE — 80053 COMPREHEN METABOLIC PANEL: CPT | Performed by: STUDENT IN AN ORGANIZED HEALTH CARE EDUCATION/TRAINING PROGRAM

## 2024-05-15 PROCEDURE — 83735 ASSAY OF MAGNESIUM: CPT | Performed by: STUDENT IN AN ORGANIZED HEALTH CARE EDUCATION/TRAINING PROGRAM

## 2024-05-15 PROCEDURE — 84484 ASSAY OF TROPONIN QUANT: CPT | Performed by: STUDENT IN AN ORGANIZED HEALTH CARE EDUCATION/TRAINING PROGRAM

## 2024-05-15 PROCEDURE — 85025 COMPLETE CBC W/AUTO DIFF WBC: CPT | Performed by: STUDENT IN AN ORGANIZED HEALTH CARE EDUCATION/TRAINING PROGRAM

## 2024-05-15 PROCEDURE — 93005 ELECTROCARDIOGRAM TRACING: CPT | Performed by: INTERNAL MEDICINE

## 2024-05-15 PROCEDURE — 93010 ELECTROCARDIOGRAM REPORT: CPT | Mod: ,,, | Performed by: INTERNAL MEDICINE

## 2024-05-15 NOTE — ED PROVIDER NOTES
"Encounter Date: 5/15/2024       History     Chief Complaint   Patient presents with    Arm Pain     States that her right arm began "hurting" about 1 hour ago and called 911.      49-year-old female presents for right-sided chest pain radiating down the right arm ongoing for an hour.  Patient had history of strokes, wants him February and another a month ago, causing initially right arm paresthesias, now with residual expressive aphasia.  The pain started about an hour prior to arrival.  She called her doctor who recommended coming in for evaluation.  The pain is sharp, shooting down the right arm and she endorses right shoulder pain and neck pain.       Review of patient's allergies indicates:  No Known Allergies  Past Medical History:   Diagnosis Date    Antithrombin III deficiency 2021    Asthma     Claustrophobia     Elevated factor VIII level 2021    Esophageal reflux     Gastroesophageal reflux    Generalized anxiety disorder     Anxiety, Generalized    Herniated disc     Hypertension     Iron deficiency anemia due to chronic blood loss 10/27/2021    Lower extremity weakness     Morbid obesity     Obesity     Protein S deficiency 2021    Pulmonary embolism     Upper extremity weakness      Past Surgical History:   Procedure Laterality Date     SECTION      CHOLECYSTECTOMY      TONSILLECTOMY       No family history on file.  Social History     Tobacco Use    Smoking status: Former     Current packs/day: 0.00     Types: Cigarettes, Cigars     Quit date: 2019     Years since quittin.3    Smokeless tobacco: Never   Substance Use Topics    Alcohol use: Yes     Alcohol/week: 0.8 standard drinks of alcohol     Types: 1 Standard drinks or equivalent per week     Comment: rarely    Drug use: No     Review of Systems   Musculoskeletal:  Positive for arthralgias and neck pain.       Physical Exam     Initial Vitals [05/15/24 1716]   BP Pulse Resp Temp SpO2   (!) 134/91 74 16 98.4 °F (36.9 °C) " 97 %      MAP       --         Physical Exam    Nursing note and vitals reviewed.  Constitutional: She appears well-developed and well-nourished. She is not diaphoretic.   HENT:   Head: Normocephalic and atraumatic.   Mouth/Throat: Oropharynx is clear and moist.   Eyes: EOM are normal. Pupils are equal, round, and reactive to light. Right eye exhibits no discharge. Left eye exhibits no discharge.   Neck: No tracheal deviation present.   Normal range of motion.  Cardiovascular:  Normal rate, regular rhythm and intact distal pulses.           Pulmonary/Chest: No respiratory distress. She has no wheezes. She exhibits no tenderness.   Abdominal: Abdomen is soft. She exhibits no distension. There is no abdominal tenderness.   Musculoskeletal:         General: Tenderness present. No edema.      Cervical back: Normal range of motion.      Comments: Right lateral shoulder tender to palpation and trapezius of the right neck is tender to palpation with radiation down the right     Neurological: She is alert and oriented to person, place, and time. She has normal strength. No cranial nerve deficit or sensory deficit. GCS eye subscore is 4. GCS verbal subscore is 5. GCS motor subscore is 6.   Skin: Skin is warm and dry. No rash noted.   Psychiatric: She has a normal mood and affect. Her behavior is normal. Thought content normal.         ED Course   Procedures  Labs Reviewed   CBC W/ AUTO DIFFERENTIAL - Abnormal; Notable for the following components:       Result Value    MCH 31.2 (*)     All other components within normal limits   COMPREHENSIVE METABOLIC PANEL - Abnormal; Notable for the following components:    Glucose 123 (*)     BUN 33 (*)     Creatinine 2.1 (*)     eGFR 28.2 (*)     All other components within normal limits   MAGNESIUM   TROPONIN I HIGH SENSITIVITY   B-TYPE NATRIURETIC PEPTIDE          Imaging Results              X-Ray Chest AP Portable (Final result)  Result time 05/15/24 19:48:37      Final result by  Martín Jacques MD (05/15/24 19:48:37)                   Impression:      No convincing radiographic evidence of acute intrathoracic process on this single view.      Electronically signed by: Martín Jacques MD  Date:    05/15/2024  Time:    19:48               Narrative:    EXAMINATION:  XR CHEST AP PORTABLE    CLINICAL HISTORY:  Chest Pain;    TECHNIQUE:  Single frontal view of the chest was performed.    COMPARISON:  04/09/2024    FINDINGS:  Cardiac silhouette is mildly enlarged, similar to prior examination.  Lungs demonstrate mild chronic coarse interstitial attenuation.  There is bibasilar subsegmental atelectasis or scarring.  No new large confluent airspace consolidation identified.  No significant volume of pleural fluid or pneumothorax appreciated.  Osseous structures demonstrate degenerative changes.                                       Medications - No data to display  Medical Decision Making  49-year-old female presents for right shoulder pain radiating down right arm.  She is tender to palpation along the cervical spine and also in the right shoulder eliciting the pain, most consistent with radiculopathy.  Patient did endorse chest pain, and chest pain workup here without evidence of life-threatening cause of her pain.  Chest x-ray without acute cardiopulmonary abnormality.  EKG without acute ischemic changes, troponin within normal limits, doubt ACS.  BNP within normal limits.  CBC and CMP within normal limits. Patient well appearing and pain free on repeat exam, she is stable for discharge with outpatient follow up and return precautions for worsening symptoms.     Amount and/or Complexity of Data Reviewed  Labs: ordered.  Radiology: ordered.                                      Clinical Impression:  Final diagnoses:  [R07.9] Chest pain  [M79.601] Right arm pain (Primary)  [M54.12] Cervical radiculopathy          ED Disposition Condition    Discharge Stable          ED Prescriptions    None        Follow-up Information    None          Anthony Turner MD  05/16/24 5119

## 2024-05-16 ENCOUNTER — CLINICAL SUPPORT (OUTPATIENT)
Dept: REHABILITATION | Facility: HOSPITAL | Age: 49
End: 2024-05-16
Payer: MEDICAID

## 2024-05-16 DIAGNOSIS — R47.1 DYSARTHRIA: Primary | ICD-10-CM

## 2024-05-16 PROCEDURE — 92507 TX SP LANG VOICE COMM INDIV: CPT | Mod: PN

## 2024-05-16 NOTE — DISCHARGE INSTRUCTIONS

## 2024-05-16 NOTE — PROGRESS NOTES
[facility]  Speech Therapy Treatment Note- Neurological Rehabilitation  Date: 5/16/2024     Name: Katiuska Preston   MRN: [id number]   Therapy Diagnosis:   Encounter Diagnosis   Name Primary?    Dysarthria Yes     Physician: Ayah Leonard NP  Physician Orders: Ambulatory Referral to Speech Therapy   Medical Diagnosis: CVA (cerebral vascular accident) [I63.9]     Visit #/ Visits Authorized: 4/16  Date of Evaluation:  4/18/2024  Insurance Authorization Period: 4/15/2024 to 8/21/2024  Plan of Care Expiration Date: 5/31/2024  Extended Plan of Care:  NA   Progress Note: 5/24/2024     Time In:  1300  Time Out:  1338  Total Billable Time: 38     Precautions: Standard, Fall, and Communication  Subjective:   Patient reports: She went to the ER last night due to severe neck and arm pain, noting she had a pinched nerve. Patient continues to report severe pain.     She was compliant to home exercise program. -she has been 10 diaphragmatic breaths in the morning.   Response to previous treatment: good  Pain Scale: 10/10 on a Visual Analog Scale currently.  Pain Location: neck/shoulder - patient said she did not need to go to doctor/ED with this reported pain level. She reported she wanted to continue with therapy.   Objective:   UNTIMED  Procedure   Speech- Language- Voice Therapy     Short Term Goals: (4 weeks) Current Progress:   Pt will rate her speech while reciting relevant functional phrases modeled by Speech Therapist 2 on a 3 point scale ( 1 = same as before beginning therapy, 2 =better but not best, 3 =best possible outcome) on 8 /10 trials.     *Goal updated to meet functional needs of patient.   Progressing/ Not Met 5/16/2024   Patient produced functional phrases providing self-rating following productions:   -1/3 rating on 1/10 trials  -2/3 rating on 3/10 trials   -3/3 rating on 6/10 trials    2. Pt will differentiate between her speech and error-free speech by giving specific examples of  differences on 8 /10 trials.      Progressing/ Not Met 2024   Patient provided specific examples to differentiate between her speech and error-free speech on 5/10 trials.     Met x1   3. Pt will use motor speech strategies and answer questions in conversation with a self-rating of at least 2 on a 3 point scale ( 1 = same as before beginning therapy, 2 =better but not best, 3 =best possible outcome) on 8 /10 trials.      Progressing/ Not Met 2024   Patient utilized clear speech strategies answering questions within structured conversation with Speech Therapist providing self rating:   -1/3 rating on +  -2/3 rating on +++  -3/3 rating on +++/5 trials     4. In order to promote generalization of intelligibility, patient will produce sentences during diaphragmatic breathing while incorporating intelligibility strategies (overarticulation, increased loudness) at 80% accuracy with moderate verbal and visual cues.      Progressing/ Not Met 2024   Reviewed clear speech strategies (SLAP strategies) to improve overall intelligibility of speech. Patient produced functional phrases incorporating SLAP strategies with 75% accuracy given moderate cues to improve breath support to improve overall intelligibility of speech.   5.  Patient will complete diaphragmatic breathing exercises by producing maximal exhalation via diaphragmic breathing with a duration of 5 seconds in 10 trials, consistently with minimal tactile and verbal clinician cues.       Progressing/ Not Met 2024   Patient completed diaphragmatic breathing exercises by producing maximal exhalation via diaphragmic breathing with a duration of 5 seconds in 10 trials independently.     Met x1       Patient Education/Response:   Patient educated regarding the followin. diaphragmatic breathing to increase respiratory support to improve speech breath coordination  2. Clear speech strategies- slow down, loud, articulate, and pause  3. Biofeedback to  improve self-monitoring of speech productions    Home program established: Patient instructed to continue prior program  Patient verbalized understanding to all above education provided.     See Electronic Medical Record under Patient Instructions for exercises provided throughout therapy.  Assessment:   Katiuska participated well in today's session which focused on dysarthria education, clear speech strategies, and diaphragmatic breathing. Patient with good utilization of clear speech strategies when cued. Patient with more difficulty self-monitoring within conversation and production of functional phrases this date, suspected due to internal distraction (high-pain level reported).  Katiuska is progressing well towards her goals. Current goals remain appropriate. Goals to be updated as necessary.     Patient prognosis is Good. Patient will continue to benefit from skilled outpatient speech and language therapy to address the deficits listed in the problem list on initial evaluation, provide patient/family education and to maximize patient's level of independence in the home and community environment.   Medical necessity is demonstrated by the following IMPAIRMENTS:  Motor speech: Decreased speech intelligibility results in decreased efficiency communicating medical and social wants and needs in the case of emergency.    Barriers to Therapy: relies on others for transportation  Patient's spiritual, cultural and educational needs considered and patient agreeable to plan of care and goals.  Plan:   Continue Plan of Care with focus on rehabilitation and compensation for motor speech deficits s/p cerbrovascular accident.    Yony Upton M.S., Provisional SLP, CF-SLP  Speech Language Pathologist Resident  5/16/2024

## 2024-05-22 ENCOUNTER — TELEPHONE (OUTPATIENT)
Facility: CLINIC | Age: 49
End: 2024-05-22
Payer: MEDICAID

## 2024-05-22 DIAGNOSIS — Z12.31 VISIT FOR SCREENING MAMMOGRAM: Primary | ICD-10-CM

## 2024-05-22 NOTE — TELEPHONE ENCOUNTER
After multiple failed attempted to contact Dr Rendon's office to see if they have results for mammo in last, I spoke to patient who states she has not had mammo in last year and no longer sees Dr Rendon. Per Dr. Boston's orders I placed orders for screening mammo.

## 2024-05-22 NOTE — PROGRESS NOTES
[facility]  Speech Therapy Treatment Note- Neurological Rehabilitation  PROGRESS NOTE  Date: 5/23/2024     Name: Katiuska Preston   MRN: [id number]   Therapy Diagnosis:   Encounter Diagnosis   Name Primary?    Dysarthria Yes     Physician: Ayah Leonard NP  Physician Orders: Ambulatory Referral to Speech Therapy   Medical Diagnosis: CVA (cerebral vascular accident) [I63.9]     Visit #/ Visits Authorized: 5/16  Date of Evaluation:  4/18/2024  Insurance Authorization Period: 4/15/2024 to 8/21/2024  Plan of Care Expiration Date: 5/31/2024  Extended Plan of Care:  NA   Progress Note: 5/24/2024 - this note     Time In:  1325  Time Out:  1410  Total Billable Time:  45    Precautions: Standard, Fall, and Communication  Subjective:   Patient reports: she is using SLAP at home to improve intelligibility. Patient reports completing diaphragmatic breaths in AM and PM 10x each. Patient reports great improvement with speech intelligibility.     She was compliant to home exercise program.  Response to previous treatment: good  Pain Scale: no pain indicated throughout session  Objective:   UNTIMED  Procedure   Speech- Language- Voice Therapy     Short Term Goals: (4 weeks) Current Progress:   Pt will rate her speech while reciting relevant functional phrases modeled by Speech Therapist 2 on a 3 point scale ( 1 = same as before beginning therapy, 2 =better but not best, 3 =best possible outcome) on 8 /10 trials.     *Goal updated to meet functional needs of patient.   Progressing/ Not Met 5/23/2024   Patient produced functional phrases providing self-rating following productions:   -1/3 rating on 0/10 trials  -2/3 rating on 3/10 trials   -3/3 rating on 7/10 trials    2. Pt will differentiate between her speech and error-free speech by giving specific examples of differences on 8 /10 trials.      Progressing/ Not Met 5/23/2024   Patient provided specific examples to differentiate between her speech and  error-free speech on 2/10 trials that patient rated the above 2/3 from the sentences completed in goal #1.     Met x1   3. Pt will use motor speech strategies and answer questions in conversation with a self-rating of at least 2 on a 3 point scale ( 1 = same as before beginning therapy, 2 =better but not best, 3 =best possible outcome) on 8 /10 trials.      Progressing/ Not Met 2024   Patient utilized clear speech strategies answering questions within structured conversation with Speech Therapist providing self rating. Patient provided rating 3/3 intermittently across conversation today.      4. In order to promote generalization of intelligibility, patient will produce sentences during diaphragmatic breathing while incorporating intelligibility strategies (overarticulation, increased loudness) at 80% accuracy with moderate verbal and visual cues.      Progressing/ Not Met 2024   Reviewed clear speech strategies (SLAP strategies) to improve overall intelligibility of speech. Patient produced functional phrases incorporating SLAP strategies with 75% accuracy given moderate cues to improve breath support to improve overall intelligibility of speech.   5.  Patient will complete diaphragmatic breathing exercises by producing maximal exhalation via diaphragmic breathing with a duration of 5 seconds in 10 trials, consistently with minimal tactile and verbal clinician cues.       Progressing/ Not Met 2024   Patient completed diaphragmatic breathing exercises by producing maximal exhalation via diaphragmic breathing with a duration of 5 seconds in 10 trials independently.     Met x1       Patient Education/Response:   Patient educated regarding the followin. diaphragmatic breathing to increase respiratory support to improve speech breath coordination  2. Clear speech strategies- slow down, loud, articulate, and pause  3. Biofeedback to improve self-monitoring of speech productions    Home program  established: Patient instructed to continue prior program  Patient verbalized understanding to all above education provided.     See Electronic Medical Record under Patient Instructions for exercises provided throughout therapy.  Assessment:   Katiuska participated well in today's session which focused on dysarthria education, clear speech strategies, and diaphragmatic breathing. Patient with good utilization of clear speech strategies when cued. Patient with more difficulty self-monitoring within conversation and production of functional phrases this date, suspected due to internal distraction (high-pain level reported).  Katiuska is progressing well towards her goals. Current goals remain appropriate. Goals to be updated as necessary.     Patient prognosis is Good. Patient will continue to benefit from skilled outpatient speech and language therapy to address the deficits listed in the problem list on initial evaluation, provide patient/family education and to maximize patient's level of independence in the home and community environment.   Medical necessity is demonstrated by the following IMPAIRMENTS:  Motor speech: Decreased speech intelligibility results in decreased efficiency communicating medical and social wants and needs in the case of emergency.    Barriers to Therapy: relies on others for transportation  Patient's spiritual, cultural and educational needs considered and patient agreeable to plan of care and goals.  Plan:   Continue Plan of Care with focus on rehabilitation and compensation for motor speech deficits s/p cerbrovascular accident.    NOLBERTO Juan, CCC-SLP, CBIS  Speech-Language Pathology  5/23/2024

## 2024-05-23 ENCOUNTER — CLINICAL SUPPORT (OUTPATIENT)
Dept: REHABILITATION | Facility: HOSPITAL | Age: 49
End: 2024-05-23
Payer: MEDICAID

## 2024-05-23 DIAGNOSIS — R47.1 DYSARTHRIA: Primary | ICD-10-CM

## 2024-05-23 PROCEDURE — 92507 TX SP LANG VOICE COMM INDIV: CPT | Mod: PO

## 2024-05-29 NOTE — PROGRESS NOTES
"[facility]  Speech Therapy Treatment Note- Neurological Rehabilitation  DISCHARGE SUMMARY  Date: 5/30/2024     Name: Katiuska Preston   MRN: [id number]   Therapy Diagnosis:   Encounter Diagnosis   Name Primary?    Dysarthria Yes       Physician: Ayah Leonard NP  Physician Orders: Ambulatory Referral to Speech Therapy   Medical Diagnosis: CVA (cerebral vascular accident) [I63.9]     Visit #/ Visits Authorized: 6/16  Date of Evaluation:  4/18/2024  Insurance Authorization Period: 4/15/2024 to 8/21/2024  Plan of Care Expiration Date: 5/31/2024   Extended Plan of Care:  NA   Progress Note: 6/21/2024      Time In:  1130  Time Out:  1200  Total Billable Time:  30    Precautions: Standard, Fall, and Communication  Subjective:   Patient reports: she is using SLAP at home to improve intelligibility. Patient reports continued completion diaphragmatic breaths in AM and PM 10x each. Patient reports great improvement with speech intelligibility. Patient reports she is approximately 95% back to baseline in regards to speech intelligibility. Patient states "I feel I can do this at home at this time". Patient reports noted changes in grasping in right hand - per chart, no OT orders have been placed - speech therapy to reach out to Dr. Ybarra (PCP per patient report) to obtain orders. Patient observed on phone in session today - no repetitions noted.     She was compliant to home exercise program.  Response to previous treatment: good  Pain Scale: no pain indicated throughout session  Objective:   UNTIMED  Procedure   Speech- Language- Voice Therapy     Short Term Goals: (4 weeks) Current Progress:   Pt will rate her speech while reciting relevant functional phrases modeled by Speech Therapist 2 on a 3 point scale ( 1 = same as before beginning therapy, 2 =better but not best, 3 =best possible outcome) on 8 /10 trials.     *Goal updated to meet functional needs of patient.   Progressing/ Not Met 5/30/2024   " -Not addressed this session.    2. Pt will differentiate between her speech and error-free speech by giving specific examples of differences on 8 /10 trials.      Progressing/ Not Met 2024   Patient provided specific examples to differentiate between her speech and error-free speech x3 completed in goal #3.     Met x1   3. Pt will use motor speech strategies and answer questions in conversation with a self-rating of at least 2 on a 3 point scale ( 1 = same as before beginning therapy, 2 =better but not best, 3 =best possible outcome) on 8 /10 trials.      Progressing/ Not Met 2024   Patient utilized clear speech strategies answering questions within structured conversation with Speech Therapist providing self rating. Patient provided rating 3/3 intermittently across conversation today.      4. In order to promote generalization of intelligibility, patient will produce sentences during diaphragmatic breathing while incorporating intelligibility strategies (overarticulation, increased loudness) at 80% accuracy with moderate verbal and visual cues.      Progressing/ Not Met 2024   Patient recalled SLAP strategies with 100% accuracy independently.      5.  Patient will complete diaphragmatic breathing exercises by producing maximal exhalation via diaphragmic breathing with a duration of 5 seconds in 10 trials, consistently with minimal tactile and verbal clinician cues.       Progressing/ Not Met 2024   Patient reported continued independent use of breathing at home with no difficulty    Met x2       Patient Education/Response:   Patient educated regarding the followin. diaphragmatic breathing to increase respiratory support to improve speech breath coordination  2. Clear speech strategies- slow down, loud, articulate, and pause  3. Biofeedback to improve self-monitoring of speech productions  4. Speech therapy Plan of Care and plan to discharge today    Home program established: Patient  "instructed to continue prior program  Patient verbalized understanding to all above education provided.     See Electronic Medical Record under Patient Instructions for exercises provided throughout therapy.  Assessment:   Katiuska participated well in today's session which focused on dysarthria education, clear speech strategies, and diaphragmatic breathing. Patient with good utilization of clear speech strategies when cued. Patient with improved self-monitoring within conversation and production of functional phrases this date as compared to previous sessions. Per patient report, she is 95% "back to normal". No further speech therapy indicated given independent use of strategies, approaching baseline, and demonstrated understanding with all education across sessions    Katiuska is progressing well towards her goals. Current goals remain appropriate. Goals to be updated as necessary.     Patient prognosis is Good. Patient will continue to benefit from skilled outpatient speech and language therapy to address the deficits listed in the problem list on initial evaluation, provide patient/family education and to maximize patient's level of independence in the home and community environment.   Medical necessity is demonstrated by the following IMPAIRMENTS:  Motor speech: Decreased speech intelligibility results in decreased efficiency communicating medical and social wants and needs in the case of emergency.    Barriers to Therapy: relies on others for transportation  Patient's spiritual, cultural and educational needs considered and patient agreeable to plan of care and goals.  Plan:   No further speech therapy indicated given independent use of strategies, approaching baseline, and demonstrated understanding with all education across sessions    NOLBERTO Juan, CCC-SLP, CBIS  Speech-Language Pathology  5/30/2024        "

## 2024-05-30 ENCOUNTER — CLINICAL SUPPORT (OUTPATIENT)
Dept: REHABILITATION | Facility: HOSPITAL | Age: 49
End: 2024-05-30
Payer: MEDICAID

## 2024-05-30 DIAGNOSIS — R47.1 DYSARTHRIA: Primary | ICD-10-CM

## 2024-05-30 PROCEDURE — 92507 TX SP LANG VOICE COMM INDIV: CPT | Mod: PO

## 2024-05-30 NOTE — PLAN OF CARE
"  Outpatient Therapy Discharge Summary   Discharge Date: 5/30/2024   Name: Katiuska Preston  Clinic Number: [id number]  Therapy Diagnosis:   Encounter Diagnosis   Name Primary?    Dysarthria Yes     Physician: Ayah Leonard NP  Physician Orders: Ambulatory Referral to Speech Therapy   Medical Diagnosis: CVA (cerebral vascular accident) [I63.9]   Evaluation Date: 4/18/2024    Date of Last visit: 5/30/2024  Total Visits Received: 6  Cancelled Visits: 0  No Show Visits: 0    Assessment    Assessment of Current Status: Katiuska participated well in today's session which focused on dysarthria education, clear speech strategies, and diaphragmatic breathing. Patient with good utilization of clear speech strategies when cued. Patient with improved self-monitoring within conversation and production of functional phrases this date as compared to previous sessions. Per patient report, she is 95% "back to normal". No further speech therapy indicated given independent use of strategies, approaching baseline, and demonstrated understanding with all education across sessions     Goals:   Short Term Goals: (4 weeks) Current Progress:   Pt will rate her speech while reciting relevant functional phrases modeled by Speech Therapist 2 on a 3 point scale ( 1 = same as before beginning therapy, 2 =better but not best, 3 =best possible outcome) on 8 /10 trials.      *Goal updated to meet functional needs of patient.  Met x1   2. Pt will differentiate between her speech and error-free speech by giving specific examples of differences on 8 /10 trials.       Progressing/ Not Met 5/30/2024   Met x2   3. Pt will use motor speech strategies and answer questions in conversation with a self-rating of at least 2 on a 3 point scale ( 1 = same as before beginning therapy, 2 =better but not best, 3 =best possible outcome) on 8 /10 trials.       Progressing/ Not Met 5/30/2024   Met x1      4. In order to promote generalization of intelligibility, " patient will produce sentences during diaphragmatic breathing while incorporating intelligibility strategies (overarticulation, increased loudness) at 80% accuracy with moderate verbal and visual cues.       Progressing/ Not Met 5/30/2024   Met x1      5.  Patient will complete diaphragmatic breathing exercises by producing maximal exhalation via diaphragmic breathing with a duration of 5 seconds in 10 trials, consistently with minimal tactile and verbal clinician cues.        Progressing/ Not Met 5/30/2024   Met x2       Long Term Goals:  Long Term Goals: (6 weeks) Current Progress:   She  will develop functional and intelligible speech and utilize compensatory strategies through the use of adequate labial and lingual function, increased articulatory precision and speech prosody.     GOAL MET        Discharge reason: Patient has reached the maximum rehab potential for the present time    Plan   This patient is discharged from Speech Therapy    Rosalinda Frazier CCC-SLP   5/30/2024

## 2024-06-03 ENCOUNTER — CLINICAL SUPPORT (OUTPATIENT)
Dept: REHABILITATION | Facility: HOSPITAL | Age: 49
End: 2024-06-03
Payer: MEDICAID

## 2024-06-03 DIAGNOSIS — R29.898 WEAKNESS OF BOTH LOWER EXTREMITIES: ICD-10-CM

## 2024-06-03 DIAGNOSIS — Z74.09 IMPAIRED FUNCTIONAL MOBILITY, BALANCE, GAIT, AND ENDURANCE: Primary | ICD-10-CM

## 2024-06-03 DIAGNOSIS — I63.9 CEREBROVASCULAR ACCIDENT (CVA), UNSPECIFIED MECHANISM: ICD-10-CM

## 2024-06-03 DIAGNOSIS — E66.01 MORBID OBESITY: ICD-10-CM

## 2024-06-03 DIAGNOSIS — R29.898 UPPER EXTREMITY WEAKNESS: ICD-10-CM

## 2024-06-03 PROCEDURE — 97162 PT EVAL MOD COMPLEX 30 MIN: CPT | Mod: PO
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
## 2024-06-03 NOTE — PROGRESS NOTES
Thank you for consult. Please refer to Plan of Care for complete note and goals.  Maty Quispe PT, DPT, CLT  6/4/2024

## 2024-06-04 ENCOUNTER — HOSPITAL ENCOUNTER (OUTPATIENT)
Dept: RADIOLOGY | Facility: HOSPITAL | Age: 49
Discharge: HOME | End: 2024-06-04
Attending: INTERNAL MEDICINE
Payer: MEDICAID

## 2024-06-04 VITALS — WEIGHT: 280 LBS | HEIGHT: 63 IN | BODY MASS INDEX: 49.61 KG/M2

## 2024-06-04 DIAGNOSIS — Z12.31 VISIT FOR SCREENING MAMMOGRAM: ICD-10-CM

## 2024-06-04 PROBLEM — Z74.09 IMPAIRED FUNCTIONAL MOBILITY, BALANCE, GAIT, AND ENDURANCE: Status: ACTIVE | Noted: 2024-06-04

## 2024-06-04 PROCEDURE — 77067 SCR MAMMO BI INCL CAD: CPT | Mod: TC,PO

## 2024-06-04 PROCEDURE — 77063 BREAST TOMOSYNTHESIS BI: CPT | Mod: 26,,, | Performed by: RADIOLOGY

## 2024-06-04 PROCEDURE — 77063 BREAST TOMOSYNTHESIS BI: CPT | Mod: TC,PO

## 2024-06-04 PROCEDURE — 77067 SCR MAMMO BI INCL CAD: CPT | Mod: 26,,, | Performed by: RADIOLOGY

## 2024-06-08 LAB
OHS QRS DURATION: 84 MS
OHS QTC CALCULATION: 430 MS

## 2024-06-17 ENCOUNTER — CLINICAL SUPPORT (OUTPATIENT)
Dept: REHABILITATION | Facility: HOSPITAL | Age: 49
End: 2024-06-17
Payer: MEDICAID

## 2024-06-17 DIAGNOSIS — E66.01 MORBID OBESITY: ICD-10-CM

## 2024-06-17 DIAGNOSIS — I63.9 CEREBROVASCULAR ACCIDENT (CVA), UNSPECIFIED MECHANISM: ICD-10-CM

## 2024-06-17 DIAGNOSIS — R29.898 WEAKNESS OF BOTH LOWER EXTREMITIES: Primary | ICD-10-CM

## 2024-06-17 DIAGNOSIS — Z74.09 IMPAIRED FUNCTIONAL MOBILITY, BALANCE, GAIT, AND ENDURANCE: ICD-10-CM

## 2024-06-17 DIAGNOSIS — R29.898 UPPER EXTREMITY WEAKNESS: ICD-10-CM

## 2024-06-17 PROCEDURE — 97110 THERAPEUTIC EXERCISES: CPT | Mod: PO

## 2024-06-17 PROCEDURE — 97530 THERAPEUTIC ACTIVITIES: CPT | Mod: PO

## 2024-06-17 NOTE — PROGRESS NOTES
[facility]   Physical Therapy Treatment Note     Name: Katiuska Preston  Clinic Number: [id number]    Therapy Diagnosis:   Encounter Diagnoses   Name Primary?    Weakness of both lower extremities Yes    Upper extremity weakness     Morbid obesity     Cerebrovascular accident (CVA), unspecified mechanism     Impaired functional mobility, balance, gait, and endurance      Physician: Saadia Ybarra MD    Visit Date: 6/17/2024    Physician Orders: PT Eval and Treat   Medical Diagnosis from Referral:   Z86.73 (ICD-10-CM) - History of stroke   M54.12 (ICD-10-CM) - Cervical radiculopathy   R53.1 (ICD-10-CM) - Right sided weakness      Evaluation Date: 6/3/2024  Authorization Period Expiration: 5/10/24-5/10/25  Plan of Care Expiration: 8/31/24  Visit # / Visits authorized: 1/ 8 (2/evaluation)    PTA Visit #: 0/5     Time In: 1330  Time Out: 1425  Total Billable Time: 55 minutes    SUBJECTIVE     Pt reports: she was fatigued after the last visit.    She was compliant with home exercise program. She reports that she has been trying to do her home exercise program and increase her walking at home.    Response to previous treatment: good  Functional change: working on her home exercise program     Pain: 8/10  Location: bilateral knee      OBJECTIVE     Objective Measures updated at progress report unless specified.     Treatment     Katiuska received the treatments listed below:      therapeutic exercises to develop strength, endurance, ROM, flexibility, posture, and core stabilization for 10 minutes including:    Nu step x 10 minutes, bilateral arms and legs.   Patient required rest break at 6 mins 2' local and systemic fatigue    neuromuscular re-education activities to improve: Balance, Coordination, Kinesthetic, Proprioception, and Posture for 0 minutes. The following activities were included:    therapeutic activities to improve functional performance for self care and household activities for  45  minutes, including:    Bridging x 10  1/2 bridging, left and right, x 10  SAQ with dorsiflexion, left and right x 10 each  Hooklying hip abduction, yellow theraband, bilateral, x 2 x 10 repetitions     Sidelying clams, left and right, yellow theraband, x 10 each    Seated hip flexion, left and right x 10 each  Seated long arc quad with dorsiflexion, left and right x 10 each   Seated hamstring curl,yellow theraband, left and right, x 10  Seated shoulder external rotation, yellow theraband, bilateral, 2 x 10 repetitions   Seated scaption, yellow theraband, left and right, x 10    Standing toe and heel raises, bilateral x 20 each    gait training to improve functional mobility and safety for 0  minutes, including:  ,     Patient Education and Home Exercises     Home Exercises Provided and Patient Education Provided     Education provided:   - She was recommended to continue with her home exercise program as issue at initial evaluation and to try to increased her walking time during her day.    Written Home Exercises Provided: Patient instructed to cont prior HEP. Exercises were reviewed and Katiuska was able to demonstrate them prior to the end of the session.  Katiuska demonstrated good  understanding of the education provided. See EMR under Patient Instructions for exercises provided during therapy sessions    ASSESSMENT     Katiuska was engaged and motivated during the session and put forth good effort.  It is anticipated that she will progress towards goals attainment with ongoing skilled PT.  She remains debilitated and deconditioned.     Katiuska Is progressing well towards her goals.   Pt prognosis is Fair.     Pt will continue to benefit from skilled outpatient physical therapy to address the deficits listed in the problem list box on initial evaluation, provide pt/family education and to maximize pt's level of independence in the home and community environment.     Pt's spiritual, cultural and educational needs  considered and pt agreeable to plan of care and goals.     Anticipated barriers to physical therapy: none    Goals:     Goals:  Short Term Goals: 4 weeks  Date Last Assessed Status   Patient to be independent with home exercise program.   New   2. Patient to tolerate cardiorespiratory activity for at least 10 minutes with RPE of </= 3/10.   New   3. Patient to participate in dynamic balance activities to increase balance with supervision.   New      Long Term Goals: 6 weeks Date Last Assessed Status   Patient to participate in strengthening exercises of low back and bilateral lower extremities to decrease fall risk.   New   2. Patient to improve Tinettie Gait & Balance Balance Assessment by at least 5 points to reduce fall risk.   New   3. Patient to improve 5x sit to stand test to reduce fall risk.    New   5. Patient to improve 6MWT distance by 100ft to improve endurance.   New        PLAN     Plan of care Certification: 6/3/2024 to 8/31/2024.     Outpatient Physical Therapy 2 times weekly for 12 weeks to include the following interventions: Gait Training, Manual Therapy, Neuromuscular Re-ed, Patient Education, Self Care, Therapeutic Activities, and Therapeutic Exercise.      Patient may be seen by a PTA    Arielle Robin, PT  CLT

## 2024-06-25 ENCOUNTER — CLINICAL SUPPORT (OUTPATIENT)
Dept: REHABILITATION | Facility: HOSPITAL | Age: 49
End: 2024-06-25
Payer: MEDICAID

## 2024-06-25 DIAGNOSIS — I63.9 CEREBROVASCULAR ACCIDENT (CVA), UNSPECIFIED MECHANISM: ICD-10-CM

## 2024-06-25 DIAGNOSIS — Z74.09 IMPAIRED FUNCTIONAL MOBILITY, BALANCE, GAIT, AND ENDURANCE: ICD-10-CM

## 2024-06-25 DIAGNOSIS — R29.898 WEAKNESS OF BOTH LOWER EXTREMITIES: Primary | ICD-10-CM

## 2024-06-25 DIAGNOSIS — E66.01 MORBID OBESITY: ICD-10-CM

## 2024-06-25 DIAGNOSIS — R29.898 UPPER EXTREMITY WEAKNESS: ICD-10-CM

## 2024-06-25 PROCEDURE — 97110 THERAPEUTIC EXERCISES: CPT | Mod: PO

## 2024-06-25 NOTE — PROGRESS NOTES
[facility]   Physical Therapy Treatment Note     Name: Katiuska Preston  Clinic Number: [id number]    Therapy Diagnosis:   Encounter Diagnoses   Name Primary?    Weakness of both lower extremities Yes    Upper extremity weakness     Morbid obesity     Cerebrovascular accident (CVA), unspecified mechanism     Impaired functional mobility, balance, gait, and endurance        Physician: aSadia Ybarra MD    Visit Date: 6/25/2024    Physician Orders: PT Eval and Treat   Medical Diagnosis from Referral:   Z86.73 (ICD-10-CM) - History of stroke   M54.12 (ICD-10-CM) - Cervical radiculopathy   R53.1 (ICD-10-CM) - Right sided weakness      Evaluation Date: 6/3/2024  Authorization Period Expiration: 5/10/24-5/10/25  Plan of Care Expiration: 8/31/24  Visit # / Visits authorized: 3/ 8 (4/evaluation)    PTA Visit #: 0/5     Time In: 1515   Time Out: 1600  Total Billable Time: 45 minutes    SUBJECTIVE     Pt reports: c/o bilateral knee pain due to osteoarthritis.    She was compliant with home exercise program. She reports that she has been trying to do her home exercise program and increase her walking at home.    Response to previous treatment: good  Functional change: working on her home exercise program     Pain: 8/10  Location: bilateral knee      OBJECTIVE     Objective Measures updated at progress report unless specified.   BP: ALLAN due to cuff  HR: 73bpm  SaO2: 95%  Treatment     Katiuska received the treatments listed below:      therapeutic exercises to develop strength, endurance, ROM, flexibility, posture, and core stabilization for 45 minutes including:    Bridging 2 x 10  SAQ with dorsiflexion, left and right x 10 each  Hooklying hip abduction, green theraband, bilateral, x 2 x 10 repetitions   Sidelying clams, left and right, green theraband, 2 x 10 each  Supine glute sets x20 reps  Sit to stand from mat height x10    Gait x165ft x 2 trials: significantly reduced gait  speed.  neuromuscular re-education activities to improve: Balance, Coordination, Kinesthetic, Proprioception, and Posture for 0 minutes. The following activities were included:    therapeutic activities to improve functional performance for self care and household activities for 0 minutes, including:    gait training to improve functional mobility and safety for 0  minutes, including:    Patient Education and Home Exercises     Home Exercises Provided and Patient Education Provided     Education provided:   - She was recommended to continue with her home exercise program as issue at initial evaluation and to try to increased her walking time during her day.    Written Home Exercises Provided: Patient instructed to cont prior HEP. Exercises were reviewed and Katiuska was able to demonstrate them prior to the end of the session.  Katiuska demonstrated good  understanding of the education provided. See EMR under Patient Instructions for exercises provided during therapy sessions    ASSESSMENT     Katiuska was engaged and motivated during the session and put forth good effort. Patient requires vc's for correct performance of all exercises and to stay on task with therex. Plan to progress to dynamic exercises as she can tolerate.    Katiuska Is progressing well towards her goals.   Pt prognosis is Fair.     Pt will continue to benefit from skilled outpatient physical therapy to address the deficits listed in the problem list box on initial evaluation, provide pt/family education and to maximize pt's level of independence in the home and community environment.     Pt's spiritual, cultural and educational needs considered and pt agreeable to plan of care and goals.     Anticipated barriers to physical therapy: none    Goals:     Goals:  Short Term Goals: 4 weeks  Date Last Assessed Status   Patient to be independent with home exercise program.   New   2. Patient to tolerate cardiorespiratory activity for at least 10 minutes with RPE  of </= 3/10.   New   3. Patient to participate in dynamic balance activities to increase balance with supervision.   New      Long Term Goals: 6 weeks Date Last Assessed Status   Patient to participate in strengthening exercises of low back and bilateral lower extremities to decrease fall risk.   New   2. Patient to improve Tinettie Gait & Balance Balance Assessment by at least 5 points to reduce fall risk.   New   3. Patient to improve 5x sit to stand test to reduce fall risk.    New   5. Patient to improve 6MWT distance by 100ft to improve endurance.   New        PLAN     Plan of care Certification: 6/3/2024 to 8/31/2024.     Outpatient Physical Therapy 2 times weekly for 12 weeks to include the following interventions: Gait Training, Manual Therapy, Neuromuscular Re-ed, Patient Education, Self Care, Therapeutic Activities, and Therapeutic Exercise.      Maty Quispe PT, DPT, CLT  6/25/2024

## 2024-06-27 ENCOUNTER — CLINICAL SUPPORT (OUTPATIENT)
Dept: REHABILITATION | Facility: HOSPITAL | Age: 49
End: 2024-06-27
Payer: MEDICAID

## 2024-06-27 DIAGNOSIS — E66.01 MORBID OBESITY: ICD-10-CM

## 2024-06-27 DIAGNOSIS — Z74.09 IMPAIRED FUNCTIONAL MOBILITY, BALANCE, GAIT, AND ENDURANCE: ICD-10-CM

## 2024-06-27 DIAGNOSIS — R29.898 UPPER EXTREMITY WEAKNESS: Primary | ICD-10-CM

## 2024-06-27 PROCEDURE — 97110 THERAPEUTIC EXERCISES: CPT | Mod: PO,CQ

## 2024-06-27 NOTE — PROGRESS NOTES
"[facility]   Physical Therapy Treatment Note     Name: Katiuska Preston  Clinic Number: [id number]    Therapy Diagnosis:   Encounter Diagnoses   Name Primary?    Upper extremity weakness Yes    Morbid obesity     Impaired functional mobility, balance, gait, and endurance        Physician: Saadia Ybarra MD    Visit Date: 6/27/2024    Physician Orders: PT Eval and Treat   Medical Diagnosis from Referral:   Z86.73 (ICD-10-CM) - History of stroke   M54.12 (ICD-10-CM) - Cervical radiculopathy   R53.1 (ICD-10-CM) - Right sided weakness      Evaluation Date: 6/3/2024  Authorization Period Expiration: 5/10/24-5/10/25  Plan of Care Expiration: 8/31/24  Visit # / Visits authorized: 3/8(4)    PTA Visit #: 1/5     Time In: 1300  Time Out: 1340  Total Billable Time: 40 minutes    SUBJECTIVE     Pt reports: reports Bilateral knee pain 2nd to "arthritis", states being scheduled for pain shot  She was compliant with home exercise program.  Response to previous treatment: no problems stated  Functional change: ongoing    Pain: 8/10  Location: bilateral knees    OBJECTIVE     Objective Measures updated at progress report unless specified.     Treatment     Katiuska received the treatments listed below:      therapeutic exercises to develop strength, endurance, ROM, flexibility and core stabilization for 40 minutes including:    SciFit Level 1 10 minutes with Bilateral Upper Extremities     Supine:  Lateral trunk rotation 30 times Right Left alternating  Straight leg raise 3 sets of 10 Right Left (reported Bilateral knee pain after completion)    Seated:  Bilateral ankle pumps 30 times  Long arc quads 3 sets of 10 Right Left     (Activity time includes skilled set-up and positioning as well as therapeutic rest)    Patient Education and Home Exercises     Home Exercises Provided and Patient Education Provided     Education provided:     - Patient provided with verbal and demonstrative instruction for all " activities performed in today's session..    Written Home Exercises Provided: Patient instructed to cont prior HEP.     See EMR under Patient Instructions for exercises provided during therapy sessions    ASSESSMENT     Katiuska provided good participation and effort during today's session with treatment focused on lower extremity strengthening/endurance and functional mobility. Katiuska reporting Bilateral knee pain with supine Straight leg raises, without complaints during SciFit Stepper or with seated Long arc quads.    Katiuska Is progressing well towards her goals.   Pt prognosis is Fair.     Pt will continue to benefit from skilled outpatient physical therapy to address the deficits listed in the problem list box on initial evaluation, provide pt/family education and to maximize pt's level of independence in the home and community environment.     Pt's spiritual, cultural and educational needs considered and pt agreeable to plan of care and goals.     Anticipated barriers to physical therapy: none    Goals:     Goals:  Short Term Goals: 4 weeks  Date Last Assessed Status   Patient to be independent with home exercise program.   progressing   2. Patient to tolerate cardiorespiratory activity for at least 10 minutes with RPE of </= 3/10.   progressing   3. Patient to participate in dynamic balance activities to increase balance with supervision.   progressing      Long Term Goals: 6 weeks Date Last Assessed Status   Patient to participate in strengthening exercises of low back and bilateral lower extremities to decrease fall risk.   ongoing   2. Patient to improve Tinettie Gait & Balance Balance Assessment by at least 5 points to reduce fall risk.   ongoing   3. Patient to improve 5x sit to stand test to reduce fall risk.    ongoing   5. Patient to improve 6MWT distance by 100ft to improve endurance.   ongoing        PLAN     Plan of care Certification: 6/3/2024 to 8/31/2024.     Outpatient Physical Therapy 2 times  weekly for 12 weeks to include the following interventions: Gait Training, Manual Therapy, Neuromuscular Re-ed, Patient Education, Self Care, Therapeutic Activities, and Therapeutic Exercise.      Arielle Donato, Physical Therapist Assistant   6/27/2024

## 2024-07-02 ENCOUNTER — OFFICE VISIT (OUTPATIENT)
Dept: ORTHOPEDICS | Facility: CLINIC | Age: 49
End: 2024-07-02
Payer: MEDICAID

## 2024-07-02 VITALS — BODY MASS INDEX: 49.61 KG/M2 | WEIGHT: 280 LBS | HEIGHT: 63 IN

## 2024-07-02 DIAGNOSIS — M17.11 PRIMARY OSTEOARTHRITIS OF RIGHT KNEE: Primary | ICD-10-CM

## 2024-07-02 DIAGNOSIS — M17.12 PRIMARY OSTEOARTHRITIS OF LEFT KNEE: ICD-10-CM

## 2024-07-02 PROCEDURE — 20610 DRAIN/INJ JOINT/BURSA W/O US: CPT | Mod: PBBFAC,PN | Performed by: ORTHOPAEDIC SURGERY

## 2024-07-02 PROCEDURE — 99213 OFFICE O/P EST LOW 20 MIN: CPT | Mod: PBBFAC,PN | Performed by: ORTHOPAEDIC SURGERY

## 2024-07-02 PROCEDURE — 99999 PR PBB SHADOW E&M-EST. PATIENT-LVL III: CPT | Mod: PBBFAC,,, | Performed by: ORTHOPAEDIC SURGERY

## 2024-07-02 PROCEDURE — 99999PBSHW PR PBB SHADOW TECHNICAL ONLY FILED TO HB: Mod: PBBFAC,,,

## 2024-07-02 RX ORDER — METOPROLOL SUCCINATE 200 MG/1
TABLET, EXTENDED RELEASE ORAL
COMMUNITY
Start: 2024-06-03

## 2024-07-02 RX ORDER — TRIAMCINOLONE ACETONIDE 40 MG/ML
40 INJECTION, SUSPENSION INTRA-ARTICULAR; INTRAMUSCULAR
Status: DISCONTINUED | OUTPATIENT
Start: 2024-07-02 | End: 2024-07-02 | Stop reason: HOSPADM

## 2024-07-02 RX ORDER — MINOCYCLINE HYDROCHLORIDE 100 MG/1
CAPSULE ORAL
COMMUNITY
Start: 2024-06-11

## 2024-07-02 RX ADMIN — TRIAMCINOLONE ACETONIDE 40 MG: 40 INJECTION, SUSPENSION INTRA-ARTICULAR; INTRAMUSCULAR at 10:07

## 2024-07-02 NOTE — PROGRESS NOTES
[facility]    Subjective:     Chief Complaint:   Chief Complaint   Patient presents with    Left Knee - Pain     Patient is here for a f/up on Bilat knee injections 3.14.24, stats her pain is the same as her last visit, would like to repeat the injections, had 2 strokes one in 24 and 2024.     Right Knee - Pain       Past Medical History:   Diagnosis Date    Antithrombin III deficiency 2021    Asthma     Claustrophobia     Elevated factor VIII level 2021    Esophageal reflux     Gastroesophageal reflux    Generalized anxiety disorder     Anxiety, Generalized    Herniated disc     Hypertension     Iron deficiency anemia due to chronic blood loss 10/27/2021    Lower extremity weakness     Morbid obesity     Obesity     Protein S deficiency 2021    Pulmonary embolism     Upper extremity weakness        Past Surgical History:   Procedure Laterality Date     SECTION      CHOLECYSTECTOMY      TONSILLECTOMY         Current Outpatient Medications   Medication Sig    albuterol (PROVENTIL/VENTOLIN HFA) 90 mcg/actuation inhaler Inhale 2 puffs into the lungs every 6 (six) hours as needed for Wheezing. Rescue    amLODIPine (NORVASC) 10 MG tablet Take 1 tablet (10 mg total) by mouth once daily.    aspirin (ECOTRIN) 81 MG EC tablet Take 1 tablet (81 mg total) by mouth once daily.    atorvastatin (LIPITOR) 40 MG tablet Take 1 tablet (40 mg total) by mouth every evening.    carvediloL (COREG) 25 MG tablet Take 1 tablet (25 mg total) by mouth 2 (two) times daily.    cloNIDine (CATAPRES) 0.1 MG tablet Take 1 tablet (0.1 mg total) by mouth every 8 (eight) hours as needed (SBP >180).    ELIQUIS 5 mg Tab TAKE 1 TABLET BY MOUTH TWICE DAILY (Patient taking differently: Take 5 mg by mouth once daily.)    ferrous sulfate (FEROSUL) 325 mg (65 mg iron) Tab tablet TAKE 1 TABLET BY MOUTH EVERY DAY (Patient taking differently: Take 325 mg by mouth once daily. TAKE 1 TABLET BY MOUTH EVERY DAY)     fluticasone propionate (FLONASE) 50 mcg/actuation nasal spray 2 sprays by Each Nostril route daily as needed for Rhinitis.    fluticasone-salmeterol 230-21 mcg/dose (ADVAIR HFA) 230-21 mcg/actuation HFAA inhaler Inhale 2 puffs into the lungs 2 (two) times daily. Controller    metoprolol succinate (TOPROL-XL) 200 MG 24 hr tablet     minocycline (MINOCIN,DYNACIN) 100 MG capsule     NEXIUM 40 mg capsule Take 40 mg by mouth once daily.    olmesartan-hydrochlorothiazide (BENICAR HCT) 40-25 mg per tablet Take 1 tablet by mouth once daily.    topiramate (TOPAMAX) 25 MG tablet Take 25 mg by mouth 2 (two) times daily.    trazodone (DESYREL) 100 MG tablet Take 100 mg by mouth every evening.     Current Facility-Administered Medications   Medication    bebtelovimab (EUA) 175 mg/2 mL (87.5 mg/mL) injection 175 mg       Review of patient's allergies indicates:  No Known Allergies    No family history on file.    Social History     Socioeconomic History    Marital status:    Tobacco Use    Smoking status: Former     Current packs/day: 0.00     Types: Cigarettes, Cigars     Quit date: 2019     Years since quittin.5    Smokeless tobacco: Never   Substance and Sexual Activity    Alcohol use: Yes     Alcohol/week: 0.8 standard drinks of alcohol     Types: 1 Standard drinks or equivalent per week     Comment: rarely    Drug use: No    Sexual activity: Not Currently     Social Determinants of Health     Financial Resource Strain: Medium Risk (10/18/2022)    Overall Financial Resource Strain (CARDIA)     Difficulty of Paying Living Expenses: Somewhat hard   Food Insecurity: Food Insecurity Present (10/18/2022)    Hunger Vital Sign     Worried About Running Out of Food in the Last Year: Sometimes true     Ran Out of Food in the Last Year: Sometimes true   Transportation Needs: Unmet Transportation Needs (10/18/2022)    PRAPARE - Transportation     Lack of Transportation (Medical): Yes     Lack of Transportation (Non-Medical):  Yes   Stress: No Stress Concern Present (10/18/2022)    [employer] [place] [employer] - Occupational Stress Questionnaire     Feeling of Stress : Only a little   Housing Stability: High Risk (10/18/2022)    Housing Stability Vital Sign     Unable to Pay for Housing in the Last Year: Yes     Unstable Housing in the Last Year: No       History of present illness: 49-year-old female with advanced bone-on-bone osteoarthritis bilateral knees. Returns today for repeat injections. She remains a poor surgical candidate secondary to morbid obesity.       Review of Systems:    Constitution: Negative for chills, fever, and sweats.  Negative for unexplained weight loss.    HENT:  Negative for headaches and blurry vision.    Cardiovascular:Negative for chest pain or irregular heart beat. Negative for hypertension.    Respiratory:  Negative for cough and shortness of breath.    Gastrointestinal: Negative for abdominal pain, heartburn, melena, nausea, and vomitting.    Genitourinary:  Negative bladder incontinence and dysuria.    Musculoskeletal:  See HPI for details.     Neurological: Negative for numbness.    Psychiatric/Behavioral: Negative for depression.  The patient is not nervous/anxious.      Endocrine: Negative for polyuria    Hematologic/Lymphatic: Negative for bleeding problem.  Does not bruise/bleed easily.    Skin: Negative for poor would healing and rash    Objective:      Physical Examination:    Vital Signs:  There were no vitals filed for this visit.    Body mass index is 49.6 kg/m².    This a well-developed, well nourished patient in no acute distress.  They are alert and oriented and cooperative to examination.        Examination of the bilateral knees, skin is dry and intact, no erythema or ecchymosis, no signs symptoms of infection. She is got crepitus bilaterally with range of motion. Range of motion 0-100 degrees and symmetrical bilaterally. Stable to anterior-posterior varus and valgus stress.  "Calf soft nontender, straight leg raise negative.     Pertinent New Results:    XRAY Report / Interpretation:       Assessment/Plan:      Osteoarthritis bilateral knees, we reinjected the bilateral knees today with lidocaine and triamcinolone.  Patient remains a poor surgical candidate secondary to morbid obesity.  We discussed the benefits of healthy diet exercise and weight loss.  Encouraged her to follow up with primary care for weight loss counseling and recommendations possibly bariatric surgery.  Follow-up in 3 months for follow-up on the knees.     Frank Schwartz, Physician Assistant, served in the capacity as a "scribe" for this patient encounter.  A "face-to-face" encounter occurred with Dr. Brayan Valerio on this date.  The treatment plan and medical decision-making is outlined above. Patient was seen and examined with a chaperone.       This note was created using Dragon voice recognition software that occasionally misinterpreted phrases or words.          "

## 2024-07-02 NOTE — PROCEDURES
Large Joint Aspiration/Injection: R knee    Date/Time: 7/2/2024 10:00 AM    Performed by: Brayan Valerio MD  Authorized by: Brayan Valerio MD    Consent Done?:  Yes (Verbal)  Indications:  Pain and arthritis  Site marked: the procedure site was marked    Timeout: prior to procedure the correct patient, procedure, and site was verified    Prep: patient was prepped and draped in usual sterile fashion      Local anesthesia used?: Yes    Local anesthetic:  Lidocaine 1% without epinephrine    Details:  Needle Size:  25 G  Ultrasonic Guidance for needle placement?: No    Location:  Knee  Site:  R knee  Medications:  40 mg triamcinolone acetonide 40 mg/mL  Patient tolerance:  Patient tolerated the procedure well with no immediate complications  Large Joint Aspiration/Injection: L knee    Date/Time: 7/2/2024 10:00 AM    Performed by: Brayan Valerio MD  Authorized by: Brayan Valerio MD    Consent Done?:  Yes (Verbal)  Indications:  Arthritis and pain  Site marked: the procedure site was marked    Timeout: prior to procedure the correct patient, procedure, and site was verified    Prep: patient was prepped and draped in usual sterile fashion      Local anesthesia used?: Yes    Local anesthetic:  Lidocaine 1% without epinephrine    Details:  Needle Size:  25 G  Ultrasonic Guidance for needle placement?: No    Location:  Knee  Site:  L knee  Medications:  40 mg triamcinolone acetonide 40 mg/mL  Patient tolerance:  Patient tolerated the procedure well with no immediate complications

## 2024-07-03 ENCOUNTER — DOCUMENTATION ONLY (OUTPATIENT)
Dept: REHABILITATION | Facility: HOSPITAL | Age: 49
End: 2024-07-03
Payer: MEDICAID

## 2024-07-03 NOTE — PROGRESS NOTES
PT/PTA met face to face to discuss pt's treatment plan and progress towards established goals. Pt will be seen by a physical therapist minimally every 6th visit or every 30 days.    Arielle Donato PTA

## 2024-07-09 ENCOUNTER — CLINICAL SUPPORT (OUTPATIENT)
Dept: REHABILITATION | Facility: HOSPITAL | Age: 49
End: 2024-07-09
Payer: MEDICAID

## 2024-07-09 DIAGNOSIS — Z74.09 IMPAIRED FUNCTIONAL MOBILITY, BALANCE, GAIT, AND ENDURANCE: ICD-10-CM

## 2024-07-09 DIAGNOSIS — R29.898 WEAKNESS OF BOTH LOWER EXTREMITIES: ICD-10-CM

## 2024-07-09 DIAGNOSIS — R29.898 UPPER EXTREMITY WEAKNESS: Primary | ICD-10-CM

## 2024-07-09 DIAGNOSIS — E66.01 MORBID OBESITY: ICD-10-CM

## 2024-07-09 PROCEDURE — 97110 THERAPEUTIC EXERCISES: CPT | Mod: PO,CQ

## 2024-07-11 ENCOUNTER — CLINICAL SUPPORT (OUTPATIENT)
Dept: REHABILITATION | Facility: HOSPITAL | Age: 49
End: 2024-07-11
Payer: MEDICAID

## 2024-07-11 DIAGNOSIS — Z74.09 IMPAIRED FUNCTIONAL MOBILITY, BALANCE, GAIT, AND ENDURANCE: ICD-10-CM

## 2024-07-11 DIAGNOSIS — E66.01 MORBID OBESITY: ICD-10-CM

## 2024-07-11 DIAGNOSIS — R29.898 UPPER EXTREMITY WEAKNESS: ICD-10-CM

## 2024-07-11 DIAGNOSIS — R29.898 WEAKNESS OF BOTH LOWER EXTREMITIES: Primary | ICD-10-CM

## 2024-07-11 PROCEDURE — 97110 THERAPEUTIC EXERCISES: CPT | Mod: PO,CQ

## 2024-07-11 NOTE — PROGRESS NOTES
"[facility]   Physical Therapy Treatment Note     Name: Katiuska Preston  Clinic Number: [id number]    Therapy Diagnosis:   Encounter Diagnoses   Name Primary?    Weakness of both lower extremities Yes    Upper extremity weakness     Morbid obesity     Impaired functional mobility, balance, gait, and endurance        Physician: Saadia Ybarra MD    Visit Date: 7/11/2024    Physician Orders: PT Eval and Treat   Medical Diagnosis from Referral:   Z86.73 (ICD-10-CM) - History of stroke   M54.12 (ICD-10-CM) - Cervical radiculopathy   R53.1 (ICD-10-CM) - Right sided weakness      Evaluation Date: 6/3/2024  Authorization Period Expiration: 5/10/24-5/10/25  Plan of Care Expiration: 8/31/24  Visit # / Visits authorized: 5/8(6)    FOTO score: 67    PTA Visit #: 3/5     Time In: 1307 (late arrival)  Time Out: 1345  Total Billable Time: 38 minutes    SUBJECTIVE     Pt reports: "kind of tired" from trip to [place] yesterday, reports had 9/10 pain yesterday while sitting in traffic, stated without pain today.  She reports was compliant with home exercise program.  Response to previous treatment: no problems stated  Functional change: ongoing    Pain: 0/10  Location: Not Applicable     OBJECTIVE     Objective Measures updated at progress report unless specified.     Treatment     Katiuska received the treatments listed below:      therapeutic exercises to develop strength, endurance, ROM, flexibility, posture and core stabilization for 15 minutes including:    SciFit Level 3 10 minutes with and without Bilateral Upper Extremities    (Activity time includes skilled set-up and positioning as well as therapeutic rest)    therapeutic activities to improve functional performance for 23  minutes, including:    Assisted patient in filling out FOTO outcome measure, providing professional judgement in filling out his/her survey.    6 minute walk test: (unable to complete, stated Right hip pain 6/10 at 4 " minutes and 508 feet      Patient Education and Home Exercises     Home Exercises Provided and Patient Education Provided     Education provided:     - Patient provided with verbal and demonstrative instruction for all activities performed in today's session..    Written Home Exercises Provided: Patient instructed to cont prior HEP.     See EMR under Patient Instructions for exercises provided during therapy sessions    ASSESSMENT     Katiuska provided fair/good participation and effort during today's session with treatment focused on lower extremity strengthening/endurance and functional mobility. Katiuska tolerated increased time on SciFit Stepper with encouragement to continue for 1 more minute to reach 10 minutes.  Katiuska unable to complete 6 minute walk test today 2nd to reports of Right hip pain, decrease with sit rest.    Katiuska Is progressing well towards her goals.   Pt prognosis is Fair.     Pt will continue to benefit from skilled outpatient physical therapy to address the deficits listed in the problem list box on initial evaluation, provide pt/family education and to maximize pt's level of independence in the home and community environment.     Pt's spiritual, cultural and educational needs considered and pt agreeable to plan of care and goals.     Anticipated barriers to physical therapy: none    Goals:  Short Term Goals: 4 weeks  Date Last Assessed Status   Patient to be independent with home exercise program.   progressing   2. Patient to tolerate cardiorespiratory activity for at least 10 minutes with RPE of </= 3/10.   progressing   3. Patient to participate in dynamic balance activities to increase balance with supervision.   progressing      Long Term Goals: 6 weeks Date Last Assessed Status   Patient to participate in strengthening exercises of low back and bilateral lower extremities to decrease fall risk.   ongoing   2. Patient to improve Tinettie Gait & Balance Balance Assessment by at least 5 points  to reduce fall risk.   ongoing   3. Patient to improve 5x sit to stand test to reduce fall risk.    ongoing   5. Patient to improve 6MWT distance by 100ft to improve endurance.   Ongoing        PLAN     Plan of care Certification: 6/3/2024 to 8/31/2024.     Outpatient Physical Therapy 2 times weekly for 12 weeks to include the following interventions: Gait Training, Manual Therapy, Neuromuscular Re-ed, Patient Education, Self Care, Therapeutic Activities, and Therapeutic Exercise.      Arielle Donato, Physical Therapist Assistant   7/11/2024

## 2024-07-15 PROBLEM — I63.9 CVA (CEREBRAL VASCULAR ACCIDENT): Status: RESOLVED | Noted: 2024-04-10 | Resolved: 2024-07-15

## 2024-07-15 PROBLEM — I26.99 PULMONARY EMBOLISM: Status: RESOLVED | Noted: 2024-04-09 | Resolved: 2024-07-15

## 2024-07-15 PROBLEM — N17.9 ACUTE RENAL FAILURE: Status: RESOLVED | Noted: 2024-04-09 | Resolved: 2024-07-15

## 2024-07-16 ENCOUNTER — CLINICAL SUPPORT (OUTPATIENT)
Dept: REHABILITATION | Facility: HOSPITAL | Age: 49
End: 2024-07-16
Payer: MEDICAID

## 2024-07-16 DIAGNOSIS — E66.01 MORBID OBESITY: ICD-10-CM

## 2024-07-16 DIAGNOSIS — Z74.09 IMPAIRED FUNCTIONAL MOBILITY, BALANCE, GAIT, AND ENDURANCE: ICD-10-CM

## 2024-07-16 DIAGNOSIS — R29.898 WEAKNESS OF BOTH LOWER EXTREMITIES: Primary | ICD-10-CM

## 2024-07-16 DIAGNOSIS — R29.898 UPPER EXTREMITY WEAKNESS: ICD-10-CM

## 2024-07-16 PROCEDURE — 97110 THERAPEUTIC EXERCISES: CPT | Mod: PO,CQ

## 2024-07-16 NOTE — PROGRESS NOTES
"[facility]   Physical Therapy Treatment Note     Name: Katiuska Preston  Clinic Number: [id number]    Therapy Diagnosis:   Encounter Diagnoses   Name Primary?    Weakness of both lower extremities Yes    Upper extremity weakness     Morbid obesity     Impaired functional mobility, balance, gait, and endurance        Physician: Saadia Ybarra MD    Visit Date: 2024    Physician Orders: PT Eval and Treat   Medical Diagnosis from Referral:   Z86.73 (ICD-10-CM) - History of stroke   M54.12 (ICD-10-CM) - Cervical radiculopathy   R53.1 (ICD-10-CM) - Right sided weakness      Evaluation Date: 6/3/2024  Authorization Period Expiration: 5/10/24-5/10/25  Plan of Care Expiration: 24  Visit # / Visits authorized: (6)    FOTO score: 67    PTA Visit #: 3/5     Time In: 1345  Time Out: 1428  Total Billable Time: 43 minutes    SUBJECTIVE     Pt reports: "every once in a while my tail bone hurts".  She reports was compliant with home exercise program.  Response to previous treatment: no problems stated  Functional change: ongoing    Pain: 6/10  Location: "tail bone"    OBJECTIVE     Objective Measures updated at progress report unless specified.     Treatment     Katiuska received the treatments listed below:      therapeutic exercises to develop strength, endurance, ROM, flexibility, posture and core stabilization for 10 minutes including:    SciFit Level 3 10 minutes with and without Bilateral Upper Extremities    (Activity time includes skilled set-up and positioning as well as therapeutic rest)    therapeutic activities to improve functional performance for 33  minutes, includin minute walk test attempt: 545 feet with 1 stand rest and after 4 minutes 45 seconds    Ambulation without assistive device supervision    Sit<>stand 5 times: 22 seconds    (Activity time includes skilled set-up and positioning as well as therapeutic rest)    Patient Education and Home Exercises     Home " Exercises Provided and Patient Education Provided     Education provided:     - Patient provided with verbal and demonstrative instruction for all activities performed in today's session..    Written Home Exercises Provided: Patient instructed to cont prior HEP.     See EMR under Patient Instructions for exercises provided during therapy sessions    ASSESSMENT     Katiuska provided good participation and effort during today's session with treatment focused on lower extremity strengthening/endurance and functional mobility. Katiuska with poor tolerance to 6 minute walk test 2nd to Right hip pain, was able to walk a little further than last time. Katiuska easily fatigued with all activities but able to complete 10 minutes on Stepper without cues and increased time/distance on 6 minute walk test.    Katiuska Is progressing well towards her goals.   Pt prognosis is Fair.     Pt will continue to benefit from skilled outpatient physical therapy to address the deficits listed in the problem list box on initial evaluation, provide pt/family education and to maximize pt's level of independence in the home and community environment.     Pt's spiritual, cultural and educational needs considered and pt agreeable to plan of care and goals.     Anticipated barriers to physical therapy: none    Goals:  Short Term Goals: 4 weeks  Date Last Assessed Status   Patient to be independent with home exercise program.   progressing   2. Patient to tolerate cardiorespiratory activity for at least 10 minutes with RPE of </= 3/10.   progressing   3. Patient to participate in dynamic balance activities to increase balance with supervision.   progressing      Long Term Goals: 6 weeks Date Last Assessed Status   Patient to participate in strengthening exercises of low back and bilateral lower extremities to decrease fall risk.   ongoing   2. Patient to improve Tinettie Gait & Balance Balance Assessment by at least 5 points to reduce fall risk.   ongoing    3. Patient to improve 5x sit to stand test to reduce fall risk.    ongoing   5. Patient to improve 6MWT distance by 100ft to improve endurance.   Ongoing        PLAN     Plan of care Certification: 6/3/2024 to 8/31/2024.     Outpatient Physical Therapy 2 times weekly for 12 weeks to include the following interventions: Gait Training, Manual Therapy, Neuromuscular Re-ed, Patient Education, Self Care, Therapeutic Activities, and Therapeutic Exercise.      Arielle Donato, Physical Therapist Assistant   7/16/2024

## 2024-07-22 ENCOUNTER — TELEPHONE (OUTPATIENT)
Facility: CLINIC | Age: 49
End: 2024-07-22
Payer: MEDICAID

## 2024-07-22 NOTE — TELEPHONE ENCOUNTER
I spoke with pt and reminded her to have labs done prior to appt on 7/29/24 pt verbalized understanding states that she is going Wednesday to do them.

## 2024-07-23 ENCOUNTER — CLINICAL SUPPORT (OUTPATIENT)
Dept: REHABILITATION | Facility: HOSPITAL | Age: 49
End: 2024-07-23
Payer: MEDICAID

## 2024-07-23 DIAGNOSIS — E66.01 MORBID OBESITY: ICD-10-CM

## 2024-07-23 DIAGNOSIS — Z74.09 IMPAIRED FUNCTIONAL MOBILITY, BALANCE, GAIT, AND ENDURANCE: ICD-10-CM

## 2024-07-23 DIAGNOSIS — R29.898 UPPER EXTREMITY WEAKNESS: ICD-10-CM

## 2024-07-23 DIAGNOSIS — R29.898 WEAKNESS OF BOTH LOWER EXTREMITIES: Primary | ICD-10-CM

## 2024-07-23 PROCEDURE — 97110 THERAPEUTIC EXERCISES: CPT | Mod: PO

## 2024-07-23 PROCEDURE — 97530 THERAPEUTIC ACTIVITIES: CPT | Mod: PO

## 2024-07-23 NOTE — PROGRESS NOTES
"[facility]   Physical Therapy Treatment Note   Discharge Summary    Name: Katiuska Preston  Clinic Number: [id number]    Therapy Diagnosis:   Encounter Diagnoses   Name Primary?    Weakness of both lower extremities Yes    Upper extremity weakness     Morbid obesity     Impaired functional mobility, balance, gait, and endurance        Physician: Saadia Ybarra MD    Visit Date: 7/23/2024    Physician Orders: PT Eval and Treat   Medical Diagnosis from Referral:   Z86.73 (ICD-10-CM) - History of stroke   M54.12 (ICD-10-CM) - Cervical radiculopathy   R53.1 (ICD-10-CM) - Right sided weakness      Evaluation Date: 6/3/2024  Authorization Period Expiration: 5/10/24-5/10/25  Plan of Care Expiration: 8/31/24  Visit # / Visits authorized: 7/8(8)    FOTO score: 67    PTA Visit #: 0/5     Time In: 1310  Time Out: 1350  Total Billable Time: 40 minutes    SUBJECTIVE     Pt reports: "I'm trying to drink my water and Gatorade."  She reports was compliant with home exercise program.  Response to previous treatment: no problems stated  Functional change: ongoing    Pain: 6/10  Location: "tail bone"    OBJECTIVE   Therapeutic Activity: x28 minutes  Objective Measures updated at progress report unless specified.     APTA Core Set Outcome Measures for Adults with Neurologic Conditions     Evaluation  Re-assessment 7/23/24 Reference values    Timed Up and Go 26 sec (average of 3 trials);    18.6 seconds score >14 seconds indicates risk for falls in older stroke patients (Bibiana et al, 2006)   Gait Speed  Self selected: 0.45m/s  Fast: 0.51m/s   "Limited community ambulator" speed category Self-selected: 0.61 m/s  Fast: 0.72m/s  Limited community ambulatory 0-0.4 m/s = household walker  0.4-0.8 m/s = limited community ambulator   0.8-1.4 m/s = community ambultor  >1.4 m/s = can cross street safely    Functional Gait Assessment  Did not test due to time constraints 17/30 <22/30 = identifies fall risk in " community dwelling older adults   (MCID = 4)   Tinetti Gait & Balance Assessment 19/28 26/28 Tinetti Tool Score                Risk of Falls              ?18                                    High              19-23                                    Moderate              ?24                                    Low   5 times sit-stand    27 seconds    22 seconds >12 sec= fall risk for general elderly  >16 sec= fall risk for Parkinson's disease  >10 sec= balance/vestibular dysfunction (<59 y/o)  >14.2 sec= balance/vestibular dysfunction (>59 y/o)  >12 sec= fall risk for CVA      Functional Gait Assessment:   1. Gait on level surface =  1   (3) Normal: less than 5.5 sec, no A.D., no imbalance, normal gait pattern, deviates< 6in   (2) Mild impairment: 7-5.6 sec, uses A.D., mild gait deviations, or deviates 6-10 in   (1) Moderate impairment: > 7 sec, slow speed, imbalance, deviates 10-15 in.   (0) Severe impairment: needs assist, deviates >15 in, reach/touch wall  2. Change in Gait Speed = 2   (3) Normal: smooth change w/o loss of balance or gait deviation, deviates < 6 in, significant difference between speeds   (2) Mild impairment: changes speed, but demonstrates mild gait deviations, deviates 6-10 in, OR no deviations but unable to significantly speed, OR uses A.D.   (1) Moderate impairment: minor changes to speed, OR changes speed w/ significant deviations, deviates 10-15 in, OR  Changes speed , but loses balance & recovers   (0) Severe impairment: cannot change speed, deviates >15 in, or loses balance & needs assist  3. Gait with horizontal head turns  = 2   (3) Normal: no change in gait, deviates <6 in   (2) Mild impairment: slight change in speed, deviates 6-10 in, OR uses A.D.   (1) Moderate impairment: moderate change in speed, deviates 10-15 in   (0) Severe impairment: severe disruption of gait, deviates >15in  4. Gait with vertical head turns = 2   (3) Normal: no change in gait, deviates <6 in   (2) Mild  impairment: slight change in speed, deviates 6-10 in OR uses A.D.   (1) Moderate impairment: moderate change in speed, deviates 10-15 in   (0) Severe impairment: severe disruption of gait, deviates >15 in  5. Gait with pivot turns = 3   (3) Normal: performs safely in 3 sec, no LOB   (2) Mild impairment: performs in >3 sec & no LOB, OR turns safely & requires several steps to regain LOB   (1) Moderate impairment: turns slow, OR requires several small steps for balance following turn & stop   (0) Severe impairment: cannot turn safely, needs assist  6. Step over obstacle = 1   (3) Normal: steps over 2 stacked boxes w/o change in speed or LOB   (2) Mild impairment: able to step over 1 box w/o change in speed or LOB   (1) Moderate impairment: steps over 1 box but must slow down, may require VC   (0) Severe impairment: cannot perform w/o assist  7. Gait with Narrow INOCENCIA = 2   (3) Normal: 10 steps no staggering   (2) Mild impairment: 7-9 steps   (1) Moderate impairment: 4-7 steps   (0) Severe impairment: < 4 steps or cannot perform w/o assist  8. Gait with eyes closed = 2   (3) Normal: < 7 sec, no A.D., no LOB, normal gait pattern, deviates <6 in   (2) Mild impairment: 7.1-9 sec, mild gait deviations, deviates 6-10 in   (1) Moderate impairment: > 9 sec, abnormal pattern, LOB, deviates 10-15 in   (0) Severe impairment: cannot perform w/o assist, LOB, deviates >15in  9. Ambulating Backwards = 1   (3) Normal: no A.D., no LOB, normal gait pattern, deviates <6in   (2) Mild impairment: uses A.D., slower speed, mild gait deviations, deviates 6-10 in   (1) Moderate impairment: slow speed, abnormal gait pattern, LOB, deviates 10-15 in   (0) Severe impairment: severe gait deviations or LOB, deviates >15in  10. Steps = 1   (3) Normal: alternating feet, no rail   (2) Mild Impairment: alternating feet, uses rail   (1) Moderate impairment: step-to, uses rail   (0) Severe impairment: cannot perform safely    Score 17/30     Score:    <22/30 fall risk   <20/30 fall risk in older adults   <18/30 fall risk in Parkinsons     TINETTI BALANCE ASSESSMENT TOOL    Tinetti ME, Dougie TF, Abdi R, Fall Risk Index for elderly patients based on number of chronic disabilities.Am J Med 1986:[id number]    Assistive Device  Normally Used: No  Assistive Device During Testing: No     BALANCE SECTION  Patient is seated in a hard, armless chair.    1. Sitting Balance:   Steady; safe = 1  2 .Rises from chair :  Able, uses arms to help = 1  3. Attempts to arise :  Able to arise, 1 attempt = 2  4. Immediate standing Balance (first 5 seconds) : Steady without walker or other support = 2  5. Standing balance: Narrow stance without support = 2  6. Nudged : Steady = 2  7. Eyes closed: Steady = 1  8. Turning 360 degrees:  Continuous = 1 and Steady = 1  9. Sitting Down : Safe, smooth motion = 2    Balance Score:  14/16    GAIT SECTION  Patient stands with therapist, walks across room (+/- aids), first at usual pace, then at rapid pace, but safe pace.     10. Initiation of Gait (Immediately after told to go.): No hesitancy = 1  11. Step length and height & Foot Clearance: Right swing foot passes left stance foot = 1 and Right foot completely clears floor = 1 and Left swing foot passes right stance foot = 1 and Left foot completely clears floor = 1  12. Step symmetry: Right & Left step length appear equal = 1  13. Step continuity : Steps appear continuous = 1  14. Path: Straight without walking aid = 2  15. Trunk : No sway, no flexion, no use of arms, and no use of walking aid = 2  16. Walking Time: Heels amost touching while walking = 1    Balance score:14/16  Gait Score: 12/12    Total Score=Balance + Gait Score: 26/28     Risk Indicators:    Tinetti Tool Score   Risk of Falls   ?18    High   19-23   Moderate   ?24   Low      Treatment     Katiuska received the treatments listed below:      therapeutic exercises to develop strength, endurance, ROM, flexibility,  posture and core stabilization for 12 minutes including:    SciFit Level 2 12 minutes with and without Bilateral Upper Extremities    (Activity time includes skilled set-up and positioning as well as therapeutic rest)    Patient Education and Home Exercises     Home Exercises Provided and Patient Education Provided     Education provided:     - Patient provided with verbal and demonstrative instruction for all activities performed in today's session..    Written Home Exercises Provided: Patient instructed to cont prior HEP.     See EMR under Patient Instructions for exercises provided during therapy sessions    ASSESSMENT     Katiuska has made good progress during this episode of care. She has much improved gait speed, TUG and 5x sit to stand test. She continued to have reduced scores on balance assessments likely due to bilateral knee pain due to arthritis. She has met all of her goals. Provided education to continue home exercise program and increasing daily intake of water. Patient is at premorbid level of function and states no other difficulties at home outside of her knee pain. Patient is discharged from outpatient therapy this date.    Katiuska Is progressing well towards her goals.   Pt prognosis is Fair.     Pt will continue to benefit from skilled outpatient physical therapy to address the deficits listed in the problem list box on initial evaluation, provide pt/family education and to maximize pt's level of independence in the home and community environment.     Pt's spiritual, cultural and educational needs considered and pt agreeable to plan of care and goals.     Anticipated barriers to physical therapy: none    Goals:  Short Term Goals: 4 weeks  Date Last Assessed Status   Patient to be independent with home exercise program.  7/23.24 MET   2. Patient to tolerate cardiorespiratory activity for at least 10 minutes with RPE of </= 3/10.  7/23/24 MET   3. Patient to participate in dynamic balance activities to  increase balance with supervision.  7/23/24 MET      Long Term Goals: 6 weeks Date Last Assessed Status   Patient to participate in strengthening exercises of low back and bilateral lower extremities to decrease fall risk.  7/23/24 MET   2. Patient to improve Tinettie Gait & Balance Balance Assessment by at least 5 points to reduce fall risk.  7/23/24 MET   3. Patient to improve 5x sit to stand test to reduce fall risk.  7/23/24 MET   5. Patient to improve 6MWT distance by 100ft to improve endurance.  7/23/24 Discontinued        PLAN     Discharge PT at this time.     Maty Quispe PT, DPT, CLT  7/23/2024

## 2024-07-24 ENCOUNTER — LAB VISIT (OUTPATIENT)
Dept: LAB | Facility: HOSPITAL | Age: 49
End: 2024-07-24
Attending: INTERNAL MEDICINE
Payer: MEDICAID

## 2024-07-24 ENCOUNTER — TELEPHONE (OUTPATIENT)
Facility: CLINIC | Age: 49
End: 2024-07-24
Payer: MEDICAID

## 2024-07-24 DIAGNOSIS — R79.89 ELEVATED SERUM CREATININE: Primary | ICD-10-CM

## 2024-07-24 DIAGNOSIS — D50.0 IRON DEFICIENCY ANEMIA DUE TO CHRONIC BLOOD LOSS: ICD-10-CM

## 2024-07-24 LAB
ALBUMIN SERPL BCP-MCNC: 3.2 G/DL (ref 3.5–5.2)
ALP SERPL-CCNC: 81 U/L (ref 55–135)
ALT SERPL W/O P-5'-P-CCNC: 12 U/L (ref 10–44)
ANION GAP SERPL CALC-SCNC: 7 MMOL/L (ref 8–16)
AST SERPL-CCNC: 14 U/L (ref 10–40)
BASOPHILS # BLD AUTO: 0.03 K/UL (ref 0–0.2)
BASOPHILS NFR BLD: 0.3 % (ref 0–1.9)
BILIRUB SERPL-MCNC: 0.3 MG/DL (ref 0.1–1)
BUN SERPL-MCNC: 29 MG/DL (ref 6–20)
CALCIUM SERPL-MCNC: 8.8 MG/DL (ref 8.7–10.5)
CHLORIDE SERPL-SCNC: 102 MMOL/L (ref 95–110)
CO2 SERPL-SCNC: 27 MMOL/L (ref 23–29)
CREAT SERPL-MCNC: 2.2 MG/DL (ref 0.5–1.4)
DIFFERENTIAL METHOD BLD: ABNORMAL
EOSINOPHIL # BLD AUTO: 0.2 K/UL (ref 0–0.5)
EOSINOPHIL NFR BLD: 1.9 % (ref 0–8)
ERYTHROCYTE [DISTWIDTH] IN BLOOD BY AUTOMATED COUNT: 12.9 % (ref 11.5–14.5)
EST. GFR  (NO RACE VARIABLE): 26.6 ML/MIN/1.73 M^2
FERRITIN SERPL-MCNC: 121.5 NG/ML (ref 20–300)
GLUCOSE SERPL-MCNC: 141 MG/DL (ref 70–110)
HCT VFR BLD AUTO: 36.5 % (ref 37–48.5)
HGB BLD-MCNC: 11.7 G/DL (ref 12–16)
IMM GRANULOCYTES # BLD AUTO: 0.04 K/UL (ref 0–0.04)
IMM GRANULOCYTES NFR BLD AUTO: 0.4 % (ref 0–0.5)
IRON SERPL-MCNC: 105 UG/DL (ref 30–160)
LYMPHOCYTES # BLD AUTO: 1.6 K/UL (ref 1–4.8)
LYMPHOCYTES NFR BLD: 15.4 % (ref 18–48)
MCH RBC QN AUTO: 31.5 PG (ref 27–31)
MCHC RBC AUTO-ENTMCNC: 32.1 G/DL (ref 32–36)
MCV RBC AUTO: 98 FL (ref 82–98)
MONOCYTES # BLD AUTO: 0.4 K/UL (ref 0.3–1)
MONOCYTES NFR BLD: 3.9 % (ref 4–15)
NEUTROPHILS # BLD AUTO: 7.9 K/UL (ref 1.8–7.7)
NEUTROPHILS NFR BLD: 78.1 % (ref 38–73)
NRBC BLD-RTO: 0 /100 WBC
PLATELET # BLD AUTO: 213 K/UL (ref 150–450)
PMV BLD AUTO: 10.7 FL (ref 9.2–12.9)
POTASSIUM SERPL-SCNC: 4 MMOL/L (ref 3.5–5.1)
PROT SERPL-MCNC: 7.2 G/DL (ref 6–8.4)
RBC # BLD AUTO: 3.71 M/UL (ref 4–5.4)
SATURATED IRON: 38 % (ref 20–50)
SODIUM SERPL-SCNC: 136 MMOL/L (ref 136–145)
TOTAL IRON BINDING CAPACITY: 273 UG/DL (ref 250–450)
TRANSFERRIN SERPL-MCNC: 195 MG/DL (ref 200–375)
WBC # BLD AUTO: 10.14 K/UL (ref 3.9–12.7)

## 2024-07-24 PROCEDURE — 82728 ASSAY OF FERRITIN: CPT | Performed by: INTERNAL MEDICINE

## 2024-07-24 PROCEDURE — 36415 COLL VENOUS BLD VENIPUNCTURE: CPT | Performed by: INTERNAL MEDICINE

## 2024-07-24 PROCEDURE — 85025 COMPLETE CBC W/AUTO DIFF WBC: CPT | Performed by: INTERNAL MEDICINE

## 2024-07-24 PROCEDURE — 83540 ASSAY OF IRON: CPT | Performed by: INTERNAL MEDICINE

## 2024-07-24 PROCEDURE — 80053 COMPREHEN METABOLIC PANEL: CPT | Performed by: INTERNAL MEDICINE

## 2024-07-24 NOTE — TELEPHONE ENCOUNTER
----- Message from Samantha KenoshaKieran Pink NP sent at 7/24/2024  2:03 PM CDT -----  She needs a nephrologist, kidneys show a decline  ----- Message -----  From: [employer]  Sent: 7/24/2024   9:27 AM CDT  To: Cisco Boston MD

## 2024-08-20 ENCOUNTER — TELEPHONE (OUTPATIENT)
Facility: CLINIC | Age: 49
End: 2024-08-20
Payer: MEDICAID

## 2024-08-20 NOTE — TELEPHONE ENCOUNTER
I spoke with pt and reminded her to have labs done prior to her appt on 8/26/24 pt states that she is going tomorrow to do her labs.

## 2024-08-21 ENCOUNTER — LAB VISIT (OUTPATIENT)
Dept: LAB | Facility: HOSPITAL | Age: 49
End: 2024-08-21
Attending: INTERNAL MEDICINE
Payer: MEDICAID

## 2024-08-21 DIAGNOSIS — D50.0 IRON DEFICIENCY ANEMIA DUE TO CHRONIC BLOOD LOSS: ICD-10-CM

## 2024-08-21 LAB
ALBUMIN SERPL BCP-MCNC: 3.3 G/DL (ref 3.5–5.2)
ALP SERPL-CCNC: 97 U/L (ref 55–135)
ALT SERPL W/O P-5'-P-CCNC: 12 U/L (ref 10–44)
ANION GAP SERPL CALC-SCNC: 10 MMOL/L (ref 8–16)
AST SERPL-CCNC: 17 U/L (ref 10–40)
BASOPHILS # BLD AUTO: 0.03 K/UL (ref 0–0.2)
BASOPHILS NFR BLD: 0.3 % (ref 0–1.9)
BILIRUB SERPL-MCNC: 0.4 MG/DL (ref 0.1–1)
BUN SERPL-MCNC: 37 MG/DL (ref 6–20)
CALCIUM SERPL-MCNC: 8.9 MG/DL (ref 8.7–10.5)
CHLORIDE SERPL-SCNC: 102 MMOL/L (ref 95–110)
CO2 SERPL-SCNC: 26 MMOL/L (ref 23–29)
CREAT SERPL-MCNC: 2.4 MG/DL (ref 0.5–1.4)
DIFFERENTIAL METHOD BLD: ABNORMAL
EOSINOPHIL # BLD AUTO: 0.1 K/UL (ref 0–0.5)
EOSINOPHIL NFR BLD: 1 % (ref 0–8)
ERYTHROCYTE [DISTWIDTH] IN BLOOD BY AUTOMATED COUNT: 12.6 % (ref 11.5–14.5)
EST. GFR  (NO RACE VARIABLE): 24 ML/MIN/1.73 M^2
FERRITIN SERPL-MCNC: 148.4 NG/ML (ref 20–300)
GLUCOSE SERPL-MCNC: 194 MG/DL (ref 70–110)
HCT VFR BLD AUTO: 37.2 % (ref 37–48.5)
HGB BLD-MCNC: 11.8 G/DL (ref 12–16)
IMM GRANULOCYTES # BLD AUTO: 0.04 K/UL (ref 0–0.04)
IMM GRANULOCYTES NFR BLD AUTO: 0.4 % (ref 0–0.5)
IRON SERPL-MCNC: 93 UG/DL (ref 30–160)
LYMPHOCYTES # BLD AUTO: 1.3 K/UL (ref 1–4.8)
LYMPHOCYTES NFR BLD: 12.9 % (ref 18–48)
MCH RBC QN AUTO: 31.6 PG (ref 27–31)
MCHC RBC AUTO-ENTMCNC: 31.7 G/DL (ref 32–36)
MCV RBC AUTO: 100 FL (ref 82–98)
MONOCYTES # BLD AUTO: 0.4 K/UL (ref 0.3–1)
MONOCYTES NFR BLD: 4.3 % (ref 4–15)
NEUTROPHILS # BLD AUTO: 8.3 K/UL (ref 1.8–7.7)
NEUTROPHILS NFR BLD: 81.1 % (ref 38–73)
NRBC BLD-RTO: 0 /100 WBC
PLATELET # BLD AUTO: 263 K/UL (ref 150–450)
PMV BLD AUTO: 10.8 FL (ref 9.2–12.9)
POTASSIUM SERPL-SCNC: 3.7 MMOL/L (ref 3.5–5.1)
PROT SERPL-MCNC: 7.4 G/DL (ref 6–8.4)
RBC # BLD AUTO: 3.74 M/UL (ref 4–5.4)
SATURATED IRON: 38 % (ref 20–50)
SODIUM SERPL-SCNC: 138 MMOL/L (ref 136–145)
TOTAL IRON BINDING CAPACITY: 248 UG/DL (ref 250–450)
TRANSFERRIN SERPL-MCNC: 177 MG/DL (ref 200–375)
WBC # BLD AUTO: 10.24 K/UL (ref 3.9–12.7)

## 2024-08-21 PROCEDURE — 36415 COLL VENOUS BLD VENIPUNCTURE: CPT | Performed by: INTERNAL MEDICINE

## 2024-08-21 PROCEDURE — 83540 ASSAY OF IRON: CPT | Performed by: INTERNAL MEDICINE

## 2024-08-21 PROCEDURE — 85025 COMPLETE CBC W/AUTO DIFF WBC: CPT | Performed by: INTERNAL MEDICINE

## 2024-08-21 PROCEDURE — 80053 COMPREHEN METABOLIC PANEL: CPT | Performed by: INTERNAL MEDICINE

## 2024-08-21 PROCEDURE — 82728 ASSAY OF FERRITIN: CPT | Performed by: INTERNAL MEDICINE

## 2024-08-23 NOTE — PROGRESS NOTES
[facility] Hematology/Oncology  Progress Note -   Follow-up Visit    Subjective:      Patient ID:   NAME: Katiuska Preston : 1975     49 y.o. female    Referring Doc: Justice  Other Physicians: Eduardo    Chief Complaint: pulm emboli f/u      HPI:    The patient returns for a regularly scheduled follow-up visit today to go over results of recently ordered tests, studies and/or labs. She is herewith a male family member.     She is followed by neurology, saw Dr Gabriel Hall in 2024 and she sees them again in 2024. Dr Leiva previously had recommended she continue the eliquis and aspirin 81mg and lipitor.     She is seeing nephrology on Oct 18th 2024.     She has some chronic right knee issues and pain. She requests referral to Dr Valerio for her knees and she just saw him in 2024    No CP, HA's or N/V.   Breathing stable but requires O2 prn.     She is currently on Eliquis. No excessive bleeding or bruising.            Discussed covid19 precautions - she had her vaccinations             ROS:   GEN: normal without any fever, night sweats or weight loss; chronic knee issues  HEENT: normal with no HA's, sore throat, stiff neck, changes in vision;    CV: normal with no CP, some WINN   PULM: normal with no SOB, cough, hemoptysis, sputum or pleuritic pain  GI: normal with no abdominal pain, nausea, vomiting, constipation, diarrhea, melanotic stools, BRBPR, or hematemesis  : normal with no hematuria, dysuria  BREAST: normal with no mass, discharge, pain  SKIN: normal with no rash, erythema, bruising, or swelling;       Past Medical/Surgical History:  Past Medical History:   Diagnosis Date    Antithrombin III deficiency 2021    Asthma     Claustrophobia     Elevated factor VIII level 2021    Esophageal reflux     Gastroesophageal reflux    Generalized anxiety disorder     Anxiety, Generalized    Herniated disc     Hypertension     Iron deficiency anemia due to chronic blood loss 10/27/2021    Lower  extremity weakness     Morbid obesity     Obesity     Protein S deficiency 2021    Pulmonary embolism     Upper extremity weakness      Past Surgical History:   Procedure Laterality Date     SECTION      CHOLECYSTECTOMY      TONSILLECTOMY           Allergies:  Review of patient's allergies indicates:  No Known Allergies    Social/Family History:  Social History     Socioeconomic History    Marital status:    Tobacco Use    Smoking status: Former     Current packs/day: 0.00     Types: Cigarettes, Cigars     Quit date: 2019     Years since quittin.6    Smokeless tobacco: Never   Substance and Sexual Activity    Alcohol use: Yes     Alcohol/week: 0.8 standard drinks of alcohol     Types: 1 Standard drinks or equivalent per week     Comment: rarely    Drug use: No    Sexual activity: Not Currently     Social Determinants of Health     Financial Resource Strain: Medium Risk (10/18/2022)    Overall Financial Resource Strain (CARDIA)     Difficulty of Paying Living Expenses: Somewhat hard   Food Insecurity: Food Insecurity Present (10/18/2022)    Hunger Vital Sign     Worried About Running Out of Food in the Last Year: Sometimes true     Ran Out of Food in the Last Year: Sometimes true   Transportation Needs: Unmet Transportation Needs (10/18/2022)    PRAPARE - Transportation     Lack of Transportation (Medical): Yes     Lack of Transportation (Non-Medical): Yes   Stress: No Stress Concern Present (10/18/2022)    [employer] [place] [employer] - Occupational Stress Questionnaire     Feeling of Stress : Only a little   Housing Stability: High Risk (10/18/2022)    Housing Stability Vital Sign     Unable to Pay for Housing in the Last Year: Yes     Unstable Housing in the Last Year: No     No family history on file.      Medications:    Current Outpatient Medications:     albuterol (PROVENTIL/VENTOLIN HFA) 90 mcg/actuation inhaler, Inhale 2 puffs into the lungs every 6 (six) hours as needed  for Wheezing. Rescue, Disp: 18 g, Rfl: 5    cloNIDine (CATAPRES) 0.1 MG tablet, Take 1 tablet (0.1 mg total) by mouth every 8 (eight) hours as needed (SBP >180)., Disp: 30 tablet, Rfl: 0    ELIQUIS 5 mg Tab, TAKE 1 TABLET BY MOUTH TWICE DAILY (Patient taking differently: Take 5 mg by mouth once daily.), Disp: 60 tablet, Rfl: 3    ferrous sulfate (FEROSUL) 325 mg (65 mg iron) Tab tablet, TAKE 1 TABLET BY MOUTH EVERY DAY (Patient taking differently: Take 325 mg by mouth once daily. TAKE 1 TABLET BY MOUTH EVERY DAY), Disp: 30 tablet, Rfl: 6    fluticasone propionate (FLONASE) 50 mcg/actuation nasal spray, 2 sprays by Each Nostril route daily as needed for Rhinitis., Disp: , Rfl:     metoprolol succinate (TOPROL-XL) 200 MG 24 hr tablet, , Disp: , Rfl:     minocycline (MINOCIN,DYNACIN) 100 MG capsule, , Disp: , Rfl:     NEXIUM 40 mg capsule, Take 40 mg by mouth once daily., Disp: , Rfl:     olmesartan-hydrochlorothiazide (BENICAR HCT) 40-25 mg per tablet, Take 1 tablet by mouth once daily., Disp: , Rfl:     topiramate (TOPAMAX) 25 MG tablet, Take 25 mg by mouth 2 (two) times daily., Disp: , Rfl:     trazodone (DESYREL) 100 MG tablet, Take 100 mg by mouth every evening., Disp: , Rfl:     amLODIPine (NORVASC) 10 MG tablet, Take 1 tablet (10 mg total) by mouth once daily., Disp: 30 tablet, Rfl: 2    aspirin (ECOTRIN) 81 MG EC tablet, Take 1 tablet (81 mg total) by mouth once daily., Disp: 30 tablet, Rfl: 0    atorvastatin (LIPITOR) 40 MG tablet, Take 1 tablet (40 mg total) by mouth every evening., Disp: 30 tablet, Rfl: 0    carvediloL (COREG) 25 MG tablet, Take 1 tablet (25 mg total) by mouth 2 (two) times daily., Disp: 60 tablet, Rfl: 0    fluticasone-salmeterol 230-21 mcg/dose (ADVAIR HFA) 230-21 mcg/actuation HFAA inhaler, Inhale 2 puffs into the lungs 2 (two) times daily. Controller, Disp: 12 g, Rfl: 6    Current Facility-Administered Medications:     bebtelovimab (EUA) 175 mg/2 mL (87.5 mg/mL) injection 175 mg, 175 mg,  Intravenous, 1 time in Clinic/HOD, Sherrie Parker MD      Pathology:   Cancer Staging   No matching staging information was found for the patient.        Breast biopsy 5/30/2022:    1. BREAST, LEFT, UPPER OUTER MID CALCIFICATIONS, STEREOTACTIC GUIDED    CORE BIOPSY    - BENIGN BREAST PARENCHYMA AND BLOOD VESSELS     2. BREAST, LEFT, UPPER OUTER MID CALCIFICATIONS, STEREOTACTIC GUIDED    CORE BIOPSY   - MICROCALCIFICATIONS ASSOCIATED WITH FIBROADENOMATOID CHANGE AND    BENIGN BREAST PARENCHYMA    - USUAL DUCTAL HYPERPLASIA AND COLUMNAR CELL CHANGE      Objective:   Vitals:  Blood pressure 129/69, pulse 75, temperature 97.4 °F (36.3 °C), weight 132.2 kg (291 lb 6.4 oz).    Physical Examination:   GEN: no apparent distress, comfortable; AAOx3; morbidly overweight   HEAD: atraumatic and normocephalic  EYES: no pallor, no icterus, PERRLA  ENT: OMM, no pharyngeal erythema, external ears WNL; no nasal discharge; no thrush  NECK: no masses, thyroid normal, trachea midline, no LAD/LN's, supple  CV: RRR with no murmur; normal pulse; normal S1 and S2; no pedal edema  CHEST: Normal respiratory effort; CTAB; normal breath sounds; no wheeze or crackles;    ABDOM: nontender and nondistended; soft; normal bowel sounds; no rebound/guarding  MUSC/Skeletal: LROM and crepitus right knee; no deformities or arthropathy  EXTREM: no clubbing, cyanosis, inflammation or swelling  SKIN: no rashes, lesions, ulcers, petechiae or subcutaneous nodules  : no diaz  NEURO: grossly intact; motor/sensory WNL; AAOx3; no tremors  PSYCH: normal mood, affect and behavior  LYMPH: normal cervical, supraclavicular, and groin LN's;       Labs:   Lab Results   Component Value Date    WBC 10.24 08/21/2024    HGB 11.8 (L) 08/21/2024    HCT 37.2 08/21/2024     (H) 08/21/2024     08/21/2024    CMP  Sodium   Date Value Ref Range Status   08/21/2024 138 136 - 145 mmol/L Final     Potassium   Date Value Ref Range Status   08/21/2024 3.7 3.5 -  5.1 mmol/L Final     Chloride   Date Value Ref Range Status   08/21/2024 102 95 - 110 mmol/L Final     CO2   Date Value Ref Range Status   08/21/2024 26 23 - 29 mmol/L Final     Glucose   Date Value Ref Range Status   08/21/2024 194 (H) 70 - 110 mg/dL Final     BUN   Date Value Ref Range Status   08/21/2024 37 (H) 6 - 20 mg/dL Final     Creatinine   Date Value Ref Range Status   08/21/2024 2.4 (H) 0.5 - 1.4 mg/dL Final     Calcium   Date Value Ref Range Status   08/21/2024 8.9 8.7 - 10.5 mg/dL Final     Total Protein   Date Value Ref Range Status   08/21/2024 7.4 6.0 - 8.4 g/dL Final     Albumin   Date Value Ref Range Status   08/21/2024 3.3 (L) 3.5 - 5.2 g/dL Final     Total Bilirubin   Date Value Ref Range Status   08/21/2024 0.4 0.1 - 1.0 mg/dL Final     Comment:     For infants and newborns, interpretation of results should be based  on gestational age, weight and in agreement with clinical  observations.    Premature Infant recommended reference ranges:  Up to 24 hours.............<8.0 mg/dL  Up to 48 hours............<12.0 mg/dL  3-5 days..................<15.0 mg/dL  6-29 days.................<15.0 mg/dL       Alkaline Phosphatase   Date Value Ref Range Status   08/21/2024 97 55 - 135 U/L Final     AST   Date Value Ref Range Status   08/21/2024 17 10 - 40 U/L Final     ALT   Date Value Ref Range Status   08/21/2024 12 10 - 44 U/L Final     Anion Gap   Date Value Ref Range Status   08/21/2024 10 8 - 16 mmol/L Final     eGFR if    Date Value Ref Range Status   07/18/2022 >60.0 >60 mL/min/1.73 m^2 Final     eGFR if non    Date Value Ref Range Status   07/18/2022 >60.0 >60 mL/min/1.73 m^2 Final     Comment:     Calculation used to obtain the estimated glomerular filtration  rate (eGFR) is the CKD-EPI equation.                  Lab Results   Component Value Date    IRON 93 08/21/2024    TIBC 248 (L) 08/21/2024    FERRITIN 148.4 08/21/2024     Lab Results   Component Value Date     PTJZPOTC01 452 05/02/2021     Lab Results   Component Value Date    FOLATE 5.9 05/02/2021                I have reviewed all available lab results and radiology reports.    Radiology/Diagnostic Studies:      MRI Pelvis 6/15/2022:    Impression:     1. Normal thickness of the endometrium.  2. Query adenomyosis.  3. Nonviable uterine fibroid.  4. Involuting follicle or cyst in the left ovary.              US Lower Extremity Veins Bilateral [[id number]] Collected: 05/01/21 1348   Order Status: Completed Updated: 05/01/21 1432   Narrative:     REASON: DVT     FINDINGS:     Grayscale, color and spectral Doppler analysis of the bilateral lower   extremity deep venous system was performed.     There is normal compressibility, color and spectral Doppler analysis,   and augmentation in the bilateral lower extremity deep venous system.     IMPRESSION:     1.  No DVT of the bilateral lower extremity veins.   2.  Bilateral distal superficial femoral veins were unable to be   assessed due to body habitus       CTA Chest Non-Coronary - PE Study [[id number]] Collected: 05/01/21 1143   Order Status: Completed Updated: 05/01/21 1225   Narrative:     CMS MANDATED QUALITY DATA - CT RADIATION - 436     All CT scans at this facility utilize dose modulation, iterative   reconstruction, and/or weight based dosing when appropriate to reduce   radiation dose to as low as reasonably achievable.         REASON: PE suspected, intermediate prob, positive D-dimer     TECHNIQUE: CT angiography of thorax with 100 mL Omnipaque 350.   Maximum intensity projection coronal reformations were created at a   separate workstation and stored in the patient's permanent medical   record.     COMPARISON: CTA chest October 14, 2019.     FINDINGS:     Limited evaluation due to body habitus and inadequate bolus timing.   Filling defects identified in segmental and subsegmental artery   supplying the left lower lobe, consistent with pulmonary emboli.   Questionable  filling defects versus artifact in the right mainstem   pulmonary artery and a few segmental arteries supplying the right   lower lobe may reflect pulmonary emboli. Heart size is at the upper   limits of normal. No mediastinal lymphadenopathy or mass.     The central tracheobronchial tree is patent. There is diffuse   groundglass lung opacities in the bilateral lungs with peripheral   wedge-shaped opacities. No pleural effusions.     The visualized abdominal viscera are unremarkable. No acute osseous   abnormality..     IMPRESSION:     1.  Limited evaluation due to body habitus and inadequate bolus   timing. Filling defects identified in segmental and subsegmental   artery supplying the left lower lobe, consistent with pulmonary   emboli. There are questionable pulmonary emboli in the right mainstem   pulmonary artery and segmental artery supplying the right lower lobe.   Findings reported to Dr.Lloyd Duffy at 5/1/2021.   2.  Bilateral diffuse groundglass lung opacities with peripheral   wedge-shaped opacities may reflect changes of poor inspiration and   atelectasis. Atypical infectious process could also have a similar   appearance the proper clinical setting.                 All lab results and imaging results have been reviewed and discussed with the patient    Assessment:   (1) 49 y.o. female with diagnosis of pulmonary emboli who was seen as a consult at [facility]  - patient with diagnosis of asthma who presented to the ED at [facility] on 5/1 with progressive SOB, postnasal drip and acute hypercapnia. CTA was a limited study due to body habitus but radiology reported presence of filling defects identified in segmental and subsegmental artery supplying the left lower lobe, consistent with pulmonary emboli. They also reported questionable pulmonary emboli in the right mainstem pulmonary artery and segmental artery supplying the right lower lobe. Patient has been admitted to hospitalist service and is on Lovenox. She has  no prior personal or family history of clots.      - doppler studies are negative     5/3/2021:  - patient was seen by Dr Clark with pulm yesterday; appreciate his evaluation  - options of anticoagulation include: Coumadin, Eliquis , Xarelto, or Pradaxa  - in patients with morbid obesity, one can have difficulties with therapeutic dosing with practically any type of oral anticoagulant     5/31/2021:  - She has O2 at home and is on portable today.   - She has not followed up with pulmonary since discharge.   - She is breathing fair but does have WINN.   - She has some sinus issues. She is currently on Eliquis.   - She saw Dr Rendon since discharge  - low ATIII and protein S levels which could be the etiology, but they will need to be repeated at later date to confirm  - Elevated factor VIII which also will need to be repeated to confirm    6/28/2021:  - she saw Dr Clark with pulmonary since last visit and has PFT scheduled for this coming July 1st  - she remains on portable O2    12/8/2021:  - she saw Dr Clark again on 11/2/2021  - she remains on O2 at home  - she remains on eliquis  - Repeat AT3 was normal, repeat Protein S was WNL  - repeat factor VIII was still a little elevated at 281 but better    2/15/2022:  - she sees pulmonary NP again next month    5/19/2022:  - she saw Idania Cason NP on 5/2/2022 with pulmonary    6/20/2022:  - remains on eliquis  - currently with no excessive bleeding or bruising    10/24/2022:  - continued on eliquis   - no excessive bleeding or bruising    2/27/2023:  - continued on eliquis but she is only taking it once per day    8/8/2023:  - continued on eliquis but only takes one a day    4/29/2024:  - She missed her appointment in march 2024  - She reports she had TIA/low grade stroke in April 2024. MRI brain showed a small acute infarct in left corona radiata. MRA head showed no large vessel occlusion or high-grade stenosis. Bilateral carotid ultrasound with no significant carotid  stenosis.   - She saw Dr Leiva with neurology in April 2024. She has repeat MRI planned for May 3rd 2024. Neurology suspects she has likely small-vessel disease. Dr Leiva recommended she continue the eliquis and started her on aspirin 81mg and lipitor.  - encouraged her to take the eliquis bid     8/26/2024:  - continued on eliquis po bid and baby aspirin  - no excessive bleeding or bruising  - she saw Dr Gabriel Hall in June 2024  - sees neurology again in Jan 2024              (2) Anemia with microcytic indices with current history of chronic menorrhagia - most likely has underlying iron deficiency due to chronic heavy menstrual cycles  - s/p two units of blood  - prior hgb at 8.2  - total bilirubin is WNL, so I do not suspect any hemolysis at this time     5/3/2021:  - hgb at 8.6 today  - iron and ferritin are both low with elevated TIBC  - she received dose of IV iron today    5/31/2021:  - she needs repeat labs incl iron panel  - will consider additional IV iron as needed  - she is on oral iron  - she needs repeat labs incl. Iron panel    6/28/2021:  - hgb currently 11.7  - iron panel is adequate at this time    10/26/2021:  - latest hgb at 10.6  - iron at 29  - she is on oral iron since May 2021  - she has heavy menstrual cycles  - discussed consideration for IV iron and she is agreeable; discussed general side-effects of iron infusions    12/8/2021:  - she has been on oral iron with little improvement in the labs  - she is starting IV iron this coming Friday    2/15/2022:  - latest labs from Jan 2022 with no anemia and iron panel was adequate  - she had IV iron x 1 only    5/19/2022:  - latest hgb at 11.2  - ferritin was 28  - iron back down to 24  - continued GYN bleeding issues  - seen by Dr Pro with GYn-Onc on 4/20/2022 6/20/2022:  - awaiting better BP control inorder to resume IV iron  - seeing Dr Rendon later today for her HBP  - labs pending for today    7/25/2022:  - s/p two cycles of IV iron  -  latest iron panel adequate and hgb WNL    10/24/2022:  - hgb currently WNL at 13.1  - iron at 84 and ferritin 262    2/27/2023:  - latest hgb and iron panel WNL    8/8/2023:  - latest labs from July 2023 with normal hgb    4/29/2024:  - no current anemia and iron panels adequate    8/26/2024:  - latest hgb practically WNL at 11.8  - iron panel currently adequate; ferritin WNL    (3) Abnormal mammogram:    5/19/2022:  - She had recent abnormal mammogram 5/6/2022 with cluster of calcifications 1 0'clock posittion on the left breast.   - she is scheduling US guided biopsy in near future  - discussed referral to [facility] for evaluation    6/20/2022:  - s/p breast biopsy on 5/30/2022 with pathology report coming back benign  - recommend repeat mammogram in at least 3 months    7/25/2022:  - refer to Dr Puentes for her breast issues    10/24/2022:  - she said she had repeat mammo in Aug 2022 but I do not see a report  - she is scheduled Dr Puentes in couple weeks    8/8/2023:  - repeat mammo was ordered by Dr Joseph  - she is overdo to see Dr joseph    4/29/2024:  - she is now seeing [place] Ybarra in [place]  - she reports no new mammogram issues      (3) Overweight/Obesity     (4) HTN     (5) GERD       (6) General anxiety disorder      (7) Migraine HA's - hospitalized in Dec 2022    VISIT DIAGNOSES:      1. Elevated factor VIII level    2. Antithrombin III deficiency    3. Acute pulmonary embolism, unspecified pulmonary embolism type, unspecified whether acute cor pulmonale present    4. Protein S deficiency    5. History of pulmonary embolus (PE)    6. Iron deficiency anemia, unspecified iron deficiency anemia type    7. Iron deficiency anemia due to chronic blood loss            Plan:     PLAN:  1. F/u with PCP, pulmonary, neurology as directed - encouraged her to take the eliquis bid  2. She is to f/u with neurology in Jan 2025  3. Check labs monthly  incl iron panel - set up up IV iron as needed    4. F/u with GYN and  Dr Pro  5. Refill eliquis as needed  6. F/u with Dr Puentes recommended for her history of abn mammogram  7. F/u with Dr Valerio for the knee issues  8. Proceed with nephrology evaluation in Oct 2024     RTC in 3 months    Fax note to  (new PCP), Inocencia Cason; Alexandra; D[place]; [place]              Elevated factor VIII level    Antithrombin III deficiency    Acute pulmonary embolism, unspecified pulmonary embolism type, unspecified whether acute cor pulmonale present    Protein S deficiency    History of pulmonary embolus (PE)    Iron deficiency anemia, unspecified iron deficiency anemia type    Iron deficiency anemia due to chronic blood loss      No follow-ups on file.    COVID-19 Discussion:    I had long discussion with patient and any applicable family about the COVID-19 coronavirus epidemic and the recommended precautions with regard to cancer and/or hematology patients. I have re-iterated the CDC recommendations for adequate hand washing, use of hand -like products, and coughing into elbow, etc. In addition, especially for our patients who are on chemotherapy and/or our otherwise immunocompromised patients, I have recommended avoidance of crowds, including movie theaters, restaurants, churches, etc. I have recommended avoidance of any sick or symptomatic family members and/or friends. I have also recommended avoidance of any raw and unwashed food products, and general avoidance of food items that have not been prepared by themselves. The patient has been asked to call us immediately with any symptom developments, issues, questions or other general concerns.       Anticoagulation Discussion:    Discussed with patient and any applicable family members about the benefit and/or need for anticoagulation. I communicated about the risks of bleeding while on any anticoagulation, which could be serious and/or life-threatening, and which can occur at any time, regardless of degree of the level  of anticoagulation. I expressed the need for compliance with any anticoagulation regimen and that failure to do so could potential lead to excessive bleeding, and risk to health and/or life. In particular, with patients on coumadin therapy, compliance with requested blood work is absolutely essential, as coumadin levels can vary from time to time, and failure to do so could potentially place the patient at risk for bleeding and/or clotting events which could be fatal. Patients on coumadin are encouraged to call the day after they have their levels drawn, as to obtain the appropriate instructions from my staff. Patients are aware that self-regulating or self-dosing of their medications is strictly prohibited.       I have explained and the patient understands all of  the current recommendation(s). I have answered all of their questions to the best of my ability and to their complete satisfaction.             Thank you for allowing me to participate in this pleasant patient's care. Please call with any questions or concerns.      Electronically signed Cisco Boston MD                             Answers submitted by the patient for this visit:  Review of Systems Questionnaire (Submitted on 8/19/2024)  appetite change : No  unexpected weight change: No  mouth sores: No  visual disturbance: No  cough: No  shortness of breath: No  chest pain: No  abdominal pain: No  diarrhea: No  frequency: No  back pain: Yes  rash: No  headaches: No  adenopathy: No  nervous/ anxious: No

## 2024-08-26 ENCOUNTER — OFFICE VISIT (OUTPATIENT)
Facility: CLINIC | Age: 49
End: 2024-08-26
Payer: MEDICAID

## 2024-08-26 VITALS
TEMPERATURE: 97 F | HEART RATE: 75 BPM | BODY MASS INDEX: 51.62 KG/M2 | WEIGHT: 291.38 LBS | DIASTOLIC BLOOD PRESSURE: 69 MMHG | SYSTOLIC BLOOD PRESSURE: 129 MMHG

## 2024-08-26 DIAGNOSIS — D50.9 IRON DEFICIENCY ANEMIA, UNSPECIFIED IRON DEFICIENCY ANEMIA TYPE: Chronic | ICD-10-CM

## 2024-08-26 DIAGNOSIS — D50.0 IRON DEFICIENCY ANEMIA DUE TO CHRONIC BLOOD LOSS: ICD-10-CM

## 2024-08-26 DIAGNOSIS — I26.99 ACUTE PULMONARY EMBOLISM, UNSPECIFIED PULMONARY EMBOLISM TYPE, UNSPECIFIED WHETHER ACUTE COR PULMONALE PRESENT: ICD-10-CM

## 2024-08-26 DIAGNOSIS — D68.59 ANTITHROMBIN III DEFICIENCY: ICD-10-CM

## 2024-08-26 DIAGNOSIS — Z86.711 HISTORY OF PULMONARY EMBOLUS (PE): ICD-10-CM

## 2024-08-26 DIAGNOSIS — M25.561 CHRONIC PAIN OF BOTH KNEES: ICD-10-CM

## 2024-08-26 DIAGNOSIS — M25.562 CHRONIC PAIN OF BOTH KNEES: ICD-10-CM

## 2024-08-26 DIAGNOSIS — R79.1 ELEVATED FACTOR VIII LEVEL: Primary | ICD-10-CM

## 2024-08-26 DIAGNOSIS — D68.59 PROTEIN S DEFICIENCY: ICD-10-CM

## 2024-08-26 DIAGNOSIS — G89.29 CHRONIC PAIN OF BOTH KNEES: ICD-10-CM

## 2024-08-26 PROCEDURE — 3008F BODY MASS INDEX DOCD: CPT | Mod: CPTII,,, | Performed by: INTERNAL MEDICINE

## 2024-08-26 PROCEDURE — 99214 OFFICE O/P EST MOD 30 MIN: CPT | Mod: S$PBB,,, | Performed by: INTERNAL MEDICINE

## 2024-08-26 PROCEDURE — 1159F MED LIST DOCD IN RCRD: CPT | Mod: CPTII,,, | Performed by: INTERNAL MEDICINE

## 2024-08-26 PROCEDURE — 3078F DIAST BP <80 MM HG: CPT | Mod: CPTII,,, | Performed by: INTERNAL MEDICINE

## 2024-08-26 PROCEDURE — 4010F ACE/ARB THERAPY RXD/TAKEN: CPT | Mod: CPTII,,, | Performed by: INTERNAL MEDICINE

## 2024-08-26 PROCEDURE — 3074F SYST BP LT 130 MM HG: CPT | Mod: CPTII,,, | Performed by: INTERNAL MEDICINE

## 2024-08-26 PROCEDURE — 99999 PR PBB SHADOW E&M-EST. PATIENT-LVL IV: CPT | Mod: PBBFAC,,, | Performed by: INTERNAL MEDICINE

## 2024-08-26 PROCEDURE — 3044F HG A1C LEVEL LT 7.0%: CPT | Mod: CPTII,,, | Performed by: INTERNAL MEDICINE

## 2024-08-26 PROCEDURE — G2211 COMPLEX E/M VISIT ADD ON: HCPCS | Mod: S$PBB,,, | Performed by: INTERNAL MEDICINE

## 2024-08-26 PROCEDURE — 1160F RVW MEDS BY RX/DR IN RCRD: CPT | Mod: CPTII,,, | Performed by: INTERNAL MEDICINE

## 2024-08-26 PROCEDURE — 99214 OFFICE O/P EST MOD 30 MIN: CPT | Mod: PBBFAC,PN | Performed by: INTERNAL MEDICINE

## 2024-09-05 DIAGNOSIS — N64.4 BREAST PAIN, LEFT: Primary | ICD-10-CM

## 2024-09-09 ENCOUNTER — HOSPITAL ENCOUNTER (OUTPATIENT)
Dept: RADIOLOGY | Facility: HOSPITAL | Age: 49
Discharge: HOME | End: 2024-09-09
Attending: STUDENT IN AN ORGANIZED HEALTH CARE EDUCATION/TRAINING PROGRAM
Payer: MEDICAID

## 2024-09-09 DIAGNOSIS — N64.4 BREAST PAIN, LEFT: ICD-10-CM

## 2024-09-09 PROCEDURE — 76642 ULTRASOUND BREAST LIMITED: CPT | Mod: TC,LT

## 2024-09-09 PROCEDURE — 77061 BREAST TOMOSYNTHESIS UNI: CPT | Mod: TC,LT

## 2024-09-09 PROCEDURE — 77065 DX MAMMO INCL CAD UNI: CPT | Mod: TC,LT

## 2024-09-13 ENCOUNTER — HOSPITAL ENCOUNTER (EMERGENCY)
Facility: HOSPITAL | Age: 49
Discharge: HOME | End: 2024-09-13
Attending: EMERGENCY MEDICINE
Payer: MEDICAID

## 2024-09-13 VITALS
SYSTOLIC BLOOD PRESSURE: 123 MMHG | HEART RATE: 71 BPM | RESPIRATION RATE: 18 BRPM | DIASTOLIC BLOOD PRESSURE: 59 MMHG | BODY MASS INDEX: 51.56 KG/M2 | WEIGHT: 291 LBS | OXYGEN SATURATION: 98 % | TEMPERATURE: 98 F | HEIGHT: 63 IN

## 2024-09-13 DIAGNOSIS — N63.23 MASS OF LOWER OUTER QUADRANT OF LEFT BREAST: Primary | ICD-10-CM

## 2024-09-13 PROCEDURE — 99283 EMERGENCY DEPT VISIT LOW MDM: CPT | Mod: 25

## 2024-09-13 PROCEDURE — 25000003 PHARM REV CODE 250: Performed by: EMERGENCY MEDICINE

## 2024-09-13 PROCEDURE — 10160 PNXR ASPIR ABSC HMTMA BULLA: CPT

## 2024-09-13 RX ORDER — CEPHALEXIN 500 MG/1
500 CAPSULE ORAL 4 TIMES DAILY
Qty: 20 CAPSULE | Refills: 0 | Status: SHIPPED | OUTPATIENT
Start: 2024-09-13 | End: 2024-09-18

## 2024-09-13 RX ORDER — LIDOCAINE HYDROCHLORIDE 10 MG/ML
10 INJECTION, SOLUTION EPIDURAL; INFILTRATION; INTRACAUDAL; PERINEURAL
Status: COMPLETED | OUTPATIENT
Start: 2024-09-13 | End: 2024-09-13

## 2024-09-13 RX ADMIN — LIDOCAINE HYDROCHLORIDE 100 MG: 10 INJECTION, SOLUTION EPIDURAL; INFILTRATION; INTRACAUDAL; PERINEURAL at 08:09

## 2024-09-13 NOTE — ED PROVIDER NOTES
Encounter Date: 2024       History     Chief Complaint   Patient presents with    Breast Pain     Lump to bottom of L breast, pt states it is red and hot to touch x 2.5 weeks     HPI 49-year-old woman who presents emergency department complaining of painful lump to the bottom of her left breast.  Symptoms have been ongoing for the past 2-1/2 weeks and she completed antibiotic therapy this week without improvement.  Patient reports recent mammogram and ultrasound of the breast that was negative for areas suspicious for neoplastic process or any acute abnormalities.  Patient states she was sent here by her OBGYN for I&D.  No associated fever.  Review of patient's allergies indicates:  No Known Allergies  Past Medical History:   Diagnosis Date    Antithrombin III deficiency 2021    Asthma     Claustrophobia     Elevated factor VIII level 2021    Esophageal reflux     Gastroesophageal reflux    Generalized anxiety disorder     Anxiety, Generalized    Herniated disc     Hypertension     Iron deficiency anemia due to chronic blood loss 10/27/2021    Lower extremity weakness     Morbid obesity     Obesity     Protein S deficiency 2021    Pulmonary embolism     Upper extremity weakness      Past Surgical History:   Procedure Laterality Date     SECTION      CHOLECYSTECTOMY      TONSILLECTOMY       No family history on file.  Social History     Tobacco Use    Smoking status: Former     Current packs/day: 0.00     Types: Cigarettes, Cigars     Quit date: 2019     Years since quittin.7    Smokeless tobacco: Never   Substance Use Topics    Alcohol use: Yes     Alcohol/week: 0.8 standard drinks of alcohol     Types: 1 Standard drinks or equivalent per week     Comment: rarely    Drug use: No     Review of Systems   Constitutional:  Negative for fever.   HENT:  Negative for sore throat.    Respiratory:  Negative for shortness of breath.    Cardiovascular:  Negative for chest pain.    Gastrointestinal:  Negative for nausea.   Genitourinary:  Negative for dysuria.   Musculoskeletal:  Negative for back pain.   Skin:  Positive for rash.        Painful left breast mass   Neurological:  Negative for weakness.   Hematological:  Does not bruise/bleed easily.       Physical Exam     Initial Vitals [24 0656]   BP Pulse Resp Temp SpO2   125/81 67 18 98.3 °F (36.8 °C) 97 %      MAP       --         Physical Exam    Nursing note and vitals reviewed.  Constitutional: She appears well-developed and well-nourished. No distress.   HENT:   Head: Normocephalic and atraumatic.   Eyes: EOM are normal. Pupils are equal, round, and reactive to light.   Neck: Neck supple.   Cardiovascular:  Regular rhythm.           Pulmonary/Chest: Breath sounds normal. No respiratory distress.   Musculoskeletal:         General: Normal range of motion.      Cervical back: Neck supple.      Comments: There is a 3 x 3 firm painful mobile mass with a central eschar around the 5 o'clock position of her left breast.  There is mild overlying erythema.  There is no fluctuance, there is no induration of the skin.  There is no discharge.     Neurological: She is alert and oriented to person, place, and time.   Skin: Skin is warm and dry.         ED Course   Abscess Aspiration    Date/Time: 2024 9:23 AM    Performed by: Denny Gregory MD  Authorized by: Denny Gregory MD    Consent Done?:  Yes  Universal Protocol:     Risks and benefits: Risks, benefits and alternatives were discussed      Consent given by:  Patient    Required items: Required blood products, implants, devices and special equipment avialable      Patient identity confirmed:  Verbally with patient,  and name  A time out verifies correct patient, procedure, equipment, support staff and site/side marked as required:   Anesthesia:     Anesthesia:  Local infiltration    Local anesthetic:  Lidocaine 1% without epinephrine  Procedure Details:     Site  prepped with:  Chlorhexadine    Location of Abscess #1:  Left breast    Size of needle #1:  18    Aspirated amount #1 (mL):  0  Post-procedure:     Patient tolerance:  Patient tolerated the procedure well with no immediate complications     No pus obtained just adibose/fibrous tissue with slight tinge of blood.    Labs Reviewed - No data to display       Imaging Results    None          Medications   LIDOcaine (PF) 10 mg/ml (1%) injection 100 mg (100 mg Infiltration Given 9/13/24 0800)     Medical Decision Making  1-year-old woman who presents emergency department complaining of painful lump to the bottom of her left breast.  Symptoms have been ongoing for the past 2-1/2 weeks and she completed antibiotic therapy this week without improvement.  Patient reports recent mammogram and ultrasound of the breast that was negative for areas suspicious for neoplastic process or any acute abnormalities.  Patient states she was sent here by her OBGYN for I&D.  No associated fever.  On examination the mass is firm and mobile and does not feel like an abscess.  I discussed this with the patient however she still would like it drained.  I did needle aspiration which revealed no purulence.  I do not feel comfortable draining this and would feel that it is more appropriate to refer to general surgery for evaluation and excision if necessary.  Will place the patient on prophylactic antibiotics.  Return precautions discussed.  Discharged in no acute distress.    Risk  Prescription drug management.                                      Clinical Impression:  Final diagnoses:  [N63.23] Mass of lower outer quadrant of left breast (Primary)          ED Disposition Condition    Discharge Stable          ED Prescriptions       Medication Sig Dispense Start Date End Date Auth. Provider    cephALEXin (KEFLEX) 500 MG capsule Take 1 capsule (500 mg total) by mouth 4 (four) times daily. for 5 days 20 capsule 9/13/2024 9/18/2024 Denny Gregory  MD MARCELINO          Follow-up Information       Follow up With Specialties Details Why Contact Info Additional Information    [facility] - Emergency Dept Emergency Medicine  If symptoms worsen, As needed [id number] [facility] [id number] [phone] 1st floor             Denny Gregory MD  09/13/24 [id number]

## 2024-09-16 ENCOUNTER — OFFICE VISIT (OUTPATIENT)
Dept: SURGERY | Facility: CLINIC | Age: 49
End: 2024-09-16
Payer: MEDICAID

## 2024-09-16 DIAGNOSIS — N63.23 MASS OF LOWER OUTER QUADRANT OF LEFT BREAST: ICD-10-CM

## 2024-09-16 PROCEDURE — 10060 I&D ABSCESS SIMPLE/SINGLE: CPT | Mod: PBBFAC,PN | Performed by: SURGERY

## 2024-09-16 PROCEDURE — 99211 OFF/OP EST MAY X REQ PHY/QHP: CPT | Mod: PBBFAC,PN,25 | Performed by: SURGERY

## 2024-09-16 PROCEDURE — 99999 PR PBB SHADOW E&M-EST. PATIENT-LVL I: CPT | Mod: PBBFAC,,, | Performed by: SURGERY

## 2024-09-16 RX ORDER — AMLODIPINE BESYLATE 10 MG/1
TABLET ORAL
COMMUNITY

## 2024-09-16 RX ORDER — DOXYCYCLINE HYCLATE 100 MG
100 TABLET ORAL EVERY 12 HOURS
Qty: 20 TABLET | Refills: 0 | Status: SHIPPED | OUTPATIENT
Start: 2024-09-16 | End: 2024-09-26

## 2024-09-16 RX ORDER — MUPIROCIN 20 MG/G
OINTMENT TOPICAL
COMMUNITY
Start: 2024-09-05

## 2024-09-16 RX ORDER — SULFAMETHOXAZOLE AND TRIMETHOPRIM 800; 160 MG/1; MG/1
1 TABLET ORAL 2 TIMES DAILY
COMMUNITY
Start: 2024-09-05

## 2024-09-16 NOTE — H&P
GENERAL SURGERY  OUTPATIENT H&P    REASON FOR VISIT/CC: Painful breast mass    HPI: Katiuska Preston is a 49 y.o. female here for evaluation of a left breast lesion. Approximally 1 month ago patient developed pain and discomfort in the left breast with a small skin opening. This was painful.  Her OBGYN called in an antibiotic which failed to improve her symptoms. She was subsequently went to the emergency room where bedside ultrasound was performed showing no fluid collection.  Needle aspiration was attempted but it was unsuccessful. Was started on an additional antibiotic, Keflex, which the patient has been taken without improvement.  She has been referred to General surgery for further evaluation.  She has undergone official mammogram and ultrasound of the left breast which failed to show any significant abnormality. She reports ongoing pain.  No drainage.  Has bruising from needle aspiration attempt in the ER. Prior to this she did not have any skin changes other than an ulceration of the skin. Denies nipple drainage or discharge.    I have reviewed the patient's chart including prior progress notes, procedures and testing.     ROS:   Review of Systems    PROBLEM LIST:  Patient Active Problem List   Diagnosis    Lower extremity weakness    HTN (hypertension), not well controlled    GERD (gastroesophageal reflux disease)    Anxiety    Upper extremity weakness    Dysarthria    Morbid obesity with BMI of 60, adult    Insomnia    Urinary retention with incomplete bladder emptying    Lower back pain    Paresis    GERD (gastroesophageal reflux disease)    Upper extremity weakness    Dysarthria    Morbid obesity with BMI of 50.0-59.9, adult    Cervical stenosis of spine    Compression of spinal cord with myelopathy    Cervical myelopathy    S/P cervical spinal fusion    Pulmonary nodules    Abdominal pain    Acute pulmonary embolism, left lower lobe, segmental and subsegmental    Acute on chronic anemia    Menorrhagia     Obesity hypoventilation syndrome    Acute heart failure with preserved ejection fraction    TRISH (iron deficiency anemia), likely due to menorrhagia/blood loss    Sedentary lifestyle    Antithrombin III deficiency    Elevated factor VIII level    Protein S deficiency    Iron deficiency anemia due to chronic blood loss    Urinary tract infection with hematuria    Uterine leiomyoma    Hypertension    Hypertensive urgency    Headache    Hypokalemia    Hypertensive emergency    History of pulmonary embolus (PE)    Neurological deficit present    Mild intermittent asthma without complication    Morbid obesity    Impaired functional mobility, balance, gait, and endurance         HISTORY  Past Medical History:   Diagnosis Date    Antithrombin III deficiency 2021    Asthma     Claustrophobia     Elevated factor VIII level 2021    Esophageal reflux     Gastroesophageal reflux    Generalized anxiety disorder     Anxiety, Generalized    Herniated disc     Hypertension     Iron deficiency anemia due to chronic blood loss 10/27/2021    Lower extremity weakness     Morbid obesity     Obesity     Protein S deficiency 2021    Pulmonary embolism     Upper extremity weakness        Past Surgical History:   Procedure Laterality Date     SECTION      CHOLECYSTECTOMY      TONSILLECTOMY         Social History     Tobacco Use    Smoking status: Former     Current packs/day: 0.00     Types: Cigarettes, Cigars     Quit date: 2019     Years since quittin.7    Smokeless tobacco: Never   Substance Use Topics    Alcohol use: Yes     Alcohol/week: 0.8 standard drinks of alcohol     Types: 1 Standard drinks or equivalent per week     Comment: rarely    Drug use: No       No family history on file.      MEDS:  Current Outpatient Medications on File Prior to Visit   Medication Sig Dispense Refill    albuterol (PROVENTIL/VENTOLIN HFA) 90 mcg/actuation inhaler Inhale 2 puffs into the lungs every 6 (six) hours as needed for  Wheezing. Rescue 18 g 5    cephALEXin (KEFLEX) 500 MG capsule Take 1 capsule (500 mg total) by mouth 4 (four) times daily. for 5 days 20 capsule 0    cloNIDine (CATAPRES) 0.1 MG tablet Take 1 tablet (0.1 mg total) by mouth every 8 (eight) hours as needed (SBP >180). 30 tablet 0    ELIQUIS 5 mg Tab TAKE 1 TABLET BY MOUTH TWICE DAILY (Patient taking differently: Take 5 mg by mouth once daily.) 60 tablet 3    ferrous sulfate (FEROSUL) 325 mg (65 mg iron) Tab tablet TAKE 1 TABLET BY MOUTH EVERY DAY (Patient taking differently: Take 325 mg by mouth once daily. TAKE 1 TABLET BY MOUTH EVERY DAY) 30 tablet 6    fluticasone propionate (FLONASE) 50 mcg/actuation nasal spray 2 sprays by Each Nostril route daily as needed for Rhinitis.      metoprolol succinate (TOPROL-XL) 200 MG 24 hr tablet       minocycline (MINOCIN,DYNACIN) 100 MG capsule       mupirocin (BACTROBAN) 2 % ointment Apply topically.      NEXIUM 40 mg capsule Take 40 mg by mouth once daily.      olmesartan-hydrochlorothiazide (BENICAR HCT) 40-25 mg per tablet Take 1 tablet by mouth once daily.      sulfamethoxazole-trimethoprim 800-160mg (BACTRIM DS) 800-160 mg Tab Take 1 tablet by mouth 2 (two) times daily.      topiramate (TOPAMAX) 25 MG tablet Take 25 mg by mouth 2 (two) times daily.      trazodone (DESYREL) 100 MG tablet Take 100 mg by mouth every evening.      amLODIPine (NORVASC) 10 MG tablet 1 tablet Orally Once a day for 30 days      aspirin (ECOTRIN) 81 MG EC tablet Take 1 tablet (81 mg total) by mouth once daily. 30 tablet 0    atorvastatin (LIPITOR) 40 MG tablet Take 1 tablet (40 mg total) by mouth every evening. 30 tablet 0    carvediloL (COREG) 25 MG tablet Take 1 tablet (25 mg total) by mouth 2 (two) times daily. 60 tablet 0    fluticasone-salmeterol 230-21 mcg/dose (ADVAIR HFA) 230-21 mcg/actuation HFAA inhaler Inhale 2 puffs into the lungs 2 (two) times daily. Controller 12 g 6     Current Facility-Administered Medications on File Prior to Visit    Medication Dose Route Frequency Provider Last Rate Last Admin    bebtelovimab (EUA) 175 mg/2 mL (87.5 mg/mL) injection 175 mg  175 mg Intravenous 1 time in Clinic/HOD Sherrie Parker MD           ALLERGIES:  Review of patient's allergies indicates:  No Known Allergies      VITALS:  There were no vitals filed for this visit.      PHYSICAL EXAM:  Physical Exam  Vitals reviewed.   Constitutional:       General: She is not in acute distress.     Appearance: Normal appearance. She is well-developed.   HENT:      Head: Normocephalic and atraumatic.      Nose: Nose normal.   Eyes:      General: No scleral icterus.     Conjunctiva/sclera: Conjunctivae normal.   Neck:      Trachea: No tracheal tenderness or tracheal deviation.   Cardiovascular:      Rate and Rhythm: Normal rate and regular rhythm.      Pulses: Normal pulses.   Pulmonary:      Effort: Pulmonary effort is normal. No accessory muscle usage or respiratory distress.      Breath sounds: Normal breath sounds.   Chest:   Breasts:     Right: No nipple discharge, skin change or tenderness.      Left: Mass, skin change and tenderness present. No inverted nipple or nipple discharge.      Comments: Very pendulous large breast, in the left inferior lateral aspect it was a 3-1/2 x 3 cm area of induration which it was relatively mobile and superficial, there is a 1 x 1 cm dry eschar present, it was tender to palpation, there is surrounding ecchymosis  Abdominal:      General: There is no distension.      Palpations: Abdomen is soft.      Tenderness: There is no abdominal tenderness.   Musculoskeletal:         General: No swelling or tenderness. Normal range of motion.      Cervical back: Normal range of motion and neck supple. No rigidity.   Skin:     General: Skin is warm and dry.      Coloration: Skin is not jaundiced.      Findings: No erythema.   Neurological:      General: No focal deficit present.      Mental Status: She is alert and oriented to person, place,  and time.      Motor: No weakness or abnormal muscle tone.   Psychiatric:         Mood and Affect: Mood normal.         Behavior: Behavior normal.         Thought Content: Thought content normal.         Judgment: Judgment normal.           LABS:  Lab Results   Component Value Date    WBC 10.24 08/21/2024    RBC 3.74 (L) 08/21/2024    HGB 11.8 (L) 08/21/2024    HCT 37.2 08/21/2024    HCT 45 07/15/2023     08/21/2024     Lab Results   Component Value Date     (H) 08/21/2024     08/21/2024    K 3.7 08/21/2024     08/21/2024    CO2 26 08/21/2024    BUN 37 (H) 08/21/2024    CREATININE 2.4 (H) 08/21/2024    CALCIUM 8.9 08/21/2024     Lab Results   Component Value Date    ALT 12 08/21/2024    AST 17 08/21/2024    ALKPHOS 97 08/21/2024    BILITOT 0.4 08/21/2024     Lab Results   Component Value Date    MG 1.6 05/15/2024    PHOS 3.6 04/10/2024       STUDIES:  Images and reports were personally reviewed.  Mammogram ultrasound  Findings:  This procedure was performed using tomosynthesis. Computer-aided detection was utilized in the interpretation of this examination.     Mammo Digital Diagnostic Left with Jaquan  There are scattered areas of fibroglandular density.  There is no corresponding skin lesion seen on this modality. Patient reportedly has scab on the skin. There are no suspicious findings in the breast.      There is no evidence of suspicious masses, calcifications, or other abnormal findings in the left breast.     US Breast Left Limited  There is no evidence of suspicious masses or other abnormal findings in the left breast.     Impression:  Mammo Digital Diagnostic Left with Jaquan  There is no mammographic evidence of malignancy in the left breast.     US Breast Left Limited  There is no sonographic evidence of malignancy in the left breast.     BI-RADS Category:   Overall: 2 - Benign         ASSESSMENT & PLAN:  49 y.o. female with left breast mass  -left breast lesion with the acute onset  and tenderness  -no evidence malignancy on imaging, no evidence of underlying fluid collection  -suspect underlying infection with induration  -incision and drainage performed at bedside with anticipation of just getting bloody fluid which did occur, cultures were taken  -will call in doxycycline 100 mg b.i.d. x 10 days, stop Keflex  -contact the office in 1 week if symptoms are failing to improve at which time she may need further exploration in OR    09/16/2024    Katiuska Preston  [id number]    PROCEDURE PERFORMED: Incision drainage left breast abscess    PERFORMING SURGEON: Abdullahi Adames Jr    CONSENT:  The risks benefits and alternatives of the procedure were discussed with the patient. The patient was queried for questions and all concerns were addressed.  The patient provided written consent for the procedure.    ANESTHESIA: Lidocaine 2% with epinephrine    INDICATION: Left breast induration and suspected underlying infection    FINDINGS: Bloody fluid drained, no purulence    PROCEDURE IN DETAIL: Verbal consent obtained.  Area prepped with Betadine.  Local anesthetic injected to create a skin wheal.  It was 4 mm punch biopsy was used to cut through the skin and dermis into the underlying breast mass. This core of tissue was removed.  There was bloody drainage.  No purulence was expressed. The wound was slightly probe with no drainage.  Cultures were taken of the bloody fluid.  A gauze bandage was applied.    EBL: 10 cc    COMPLICATIONS: none

## 2024-09-19 LAB — BACTERIA SPEC ANAEROBE CULT: NORMAL

## 2024-09-20 LAB — BACTERIA SPEC AEROBE CULT: NO GROWTH

## 2024-09-23 ENCOUNTER — OFFICE VISIT (OUTPATIENT)
Dept: SURGERY | Facility: CLINIC | Age: 49
End: 2024-09-23
Payer: MEDICAID

## 2024-09-23 DIAGNOSIS — N61.1 BREAST ABSCESS: ICD-10-CM

## 2024-09-23 DIAGNOSIS — L30.9 ECZEMA, UNSPECIFIED TYPE: Primary | ICD-10-CM

## 2024-09-23 PROCEDURE — 99999 PR PBB SHADOW E&M-EST. PATIENT-LVL I: CPT | Mod: PBBFAC,,, | Performed by: SURGERY

## 2024-09-23 PROCEDURE — 3044F HG A1C LEVEL LT 7.0%: CPT | Mod: CPTII,,, | Performed by: SURGERY

## 2024-09-23 PROCEDURE — 99211 OFF/OP EST MAY X REQ PHY/QHP: CPT | Mod: PBBFAC,PN | Performed by: SURGERY

## 2024-09-23 PROCEDURE — 4010F ACE/ARB THERAPY RXD/TAKEN: CPT | Mod: CPTII,,, | Performed by: SURGERY

## 2024-09-23 PROCEDURE — 99024 POSTOP FOLLOW-UP VISIT: CPT | Mod: ,,, | Performed by: SURGERY

## 2024-09-23 RX ORDER — COLLOIDAL OATMEAL 1 %
1 CREAM (GRAM) TOPICAL 2 TIMES DAILY
Qty: 206 G | Refills: 2 | Status: SHIPPED | OUTPATIENT
Start: 2024-09-23

## 2024-09-23 NOTE — PROGRESS NOTES
GENERAL SURGERY PROGRESS NOTE    HPI: Katiuska Preston is a 49 y.o. female here for follow up of the left breast lesion/abscess. Last week patient was seen for left breast lesion that developed approximately 1 month ago. Had initially been prescribed Keflex and underwent needle aspiration without improvement. In my office she was found to have an area of induration but no fluctuance. I performed incision and drainage with punch biopsy and which large amount of bloody fluid but no purulence was expressed. She was started on doxycycline in his here today for scheduled follow-up. Reports tenderness has improved.  Areas now itchy. Eschar still persist. It was still currently taking doxycycline.      VITALS:  There were no vitals filed for this visit.    Physical Exam  Vitals reviewed.   Constitutional:       General: She is not in acute distress.     Appearance: Normal appearance. She is well-developed.   HENT:      Head: Normocephalic and atraumatic.      Nose: Nose normal.   Eyes:      General: No scleral icterus.     Conjunctiva/sclera: Conjunctivae normal.   Neck:      Trachea: No tracheal tenderness or tracheal deviation.   Cardiovascular:      Rate and Rhythm: Normal rate and regular rhythm.      Pulses: Normal pulses.   Pulmonary:      Effort: Pulmonary effort is normal. No accessory muscle usage or respiratory distress.      Breath sounds: Normal breath sounds.   Abdominal:      General: There is no distension.      Palpations: Abdomen is soft.      Tenderness: There is no abdominal tenderness.   Musculoskeletal:         General: No swelling or tenderness. Normal range of motion.      Cervical back: Normal range of motion and neck supple. No rigidity.   Skin:     General: Skin is warm and dry.      Coloration: Skin is not jaundiced.      Findings: Lesion present. No erythema.   Neurological:      General: No focal deficit present.      Mental Status: She is alert and oriented to person, place, and time.       Motor: No weakness or abnormal muscle tone.   Psychiatric:         Mood and Affect: Mood normal.         Behavior: Behavior normal.         Thought Content: Thought content normal.         Judgment: Judgment normal.        Left lateral breast    ASSESSMENT & PLAN:  49 y.o. female s/p incision drainage of left breast lesion  -tenderness has improved  -still with dry eschar and underlying induration but also felt to be improved  -recommend completing course of antibiotics and continued observation  -return to clinic in one-month for re-evaluation or sooner if need arises

## 2024-09-25 ENCOUNTER — LAB VISIT (OUTPATIENT)
Dept: LAB | Facility: HOSPITAL | Age: 49
End: 2024-09-25
Attending: INTERNAL MEDICINE
Payer: MEDICAID

## 2024-09-25 DIAGNOSIS — D50.0 IRON DEFICIENCY ANEMIA DUE TO CHRONIC BLOOD LOSS: ICD-10-CM

## 2024-09-25 LAB
ALBUMIN SERPL BCP-MCNC: 3.1 G/DL (ref 3.5–5.2)
ALP SERPL-CCNC: 107 U/L (ref 55–135)
ALT SERPL W/O P-5'-P-CCNC: 14 U/L (ref 10–44)
ANION GAP SERPL CALC-SCNC: 8 MMOL/L (ref 8–16)
AST SERPL-CCNC: 16 U/L (ref 10–40)
BASOPHILS # BLD AUTO: 0.03 K/UL (ref 0–0.2)
BASOPHILS NFR BLD: 0.3 % (ref 0–1.9)
BILIRUB SERPL-MCNC: 0.3 MG/DL (ref 0.1–1)
BUN SERPL-MCNC: 34 MG/DL (ref 6–20)
CALCIUM SERPL-MCNC: 8.7 MG/DL (ref 8.7–10.5)
CHLORIDE SERPL-SCNC: 103 MMOL/L (ref 95–110)
CO2 SERPL-SCNC: 27 MMOL/L (ref 23–29)
CREAT SERPL-MCNC: 2 MG/DL (ref 0.5–1.4)
DIFFERENTIAL METHOD BLD: ABNORMAL
EOSINOPHIL # BLD AUTO: 0.1 K/UL (ref 0–0.5)
EOSINOPHIL NFR BLD: 1.4 % (ref 0–8)
ERYTHROCYTE [DISTWIDTH] IN BLOOD BY AUTOMATED COUNT: 13.2 % (ref 11.5–14.5)
EST. GFR  (NO RACE VARIABLE): 29.9 ML/MIN/1.73 M^2
FERRITIN SERPL-MCNC: 172.6 NG/ML (ref 20–300)
GLUCOSE SERPL-MCNC: 215 MG/DL (ref 70–110)
HCT VFR BLD AUTO: 35.2 % (ref 37–48.5)
HGB BLD-MCNC: 11.3 G/DL (ref 12–16)
IMM GRANULOCYTES # BLD AUTO: 0.03 K/UL (ref 0–0.04)
IMM GRANULOCYTES NFR BLD AUTO: 0.3 % (ref 0–0.5)
IRON SERPL-MCNC: 72 UG/DL (ref 30–160)
LYMPHOCYTES # BLD AUTO: 1.6 K/UL (ref 1–4.8)
LYMPHOCYTES NFR BLD: 15.7 % (ref 18–48)
MCH RBC QN AUTO: 31.6 PG (ref 27–31)
MCHC RBC AUTO-ENTMCNC: 32.1 G/DL (ref 32–36)
MCV RBC AUTO: 98 FL (ref 82–98)
MONOCYTES # BLD AUTO: 0.5 K/UL (ref 0.3–1)
MONOCYTES NFR BLD: 4.7 % (ref 4–15)
NEUTROPHILS # BLD AUTO: 7.7 K/UL (ref 1.8–7.7)
NEUTROPHILS NFR BLD: 77.6 % (ref 38–73)
NRBC BLD-RTO: 0 /100 WBC
PLATELET # BLD AUTO: 260 K/UL (ref 150–450)
PMV BLD AUTO: 11.3 FL (ref 9.2–12.9)
POTASSIUM SERPL-SCNC: 3.6 MMOL/L (ref 3.5–5.1)
PROT SERPL-MCNC: 7.1 G/DL (ref 6–8.4)
RBC # BLD AUTO: 3.58 M/UL (ref 4–5.4)
SATURATED IRON: 31 % (ref 20–50)
SODIUM SERPL-SCNC: 138 MMOL/L (ref 136–145)
TOTAL IRON BINDING CAPACITY: 231 UG/DL (ref 250–450)
TRANSFERRIN SERPL-MCNC: 165 MG/DL (ref 200–375)
WBC # BLD AUTO: 9.93 K/UL (ref 3.9–12.7)

## 2024-09-25 PROCEDURE — 83540 ASSAY OF IRON: CPT | Performed by: INTERNAL MEDICINE

## 2024-09-25 PROCEDURE — 80053 COMPREHEN METABOLIC PANEL: CPT | Performed by: INTERNAL MEDICINE

## 2024-09-25 PROCEDURE — 85025 COMPLETE CBC W/AUTO DIFF WBC: CPT | Performed by: INTERNAL MEDICINE

## 2024-09-25 PROCEDURE — 36415 COLL VENOUS BLD VENIPUNCTURE: CPT | Performed by: INTERNAL MEDICINE

## 2024-09-25 PROCEDURE — 82728 ASSAY OF FERRITIN: CPT | Performed by: INTERNAL MEDICINE

## 2024-10-10 ENCOUNTER — OFFICE VISIT (OUTPATIENT)
Dept: ORTHOPEDICS | Facility: CLINIC | Age: 49
End: 2024-10-10
Payer: MEDICAID

## 2024-10-10 VITALS — WEIGHT: 291 LBS | HEIGHT: 63 IN | BODY MASS INDEX: 51.56 KG/M2

## 2024-10-10 DIAGNOSIS — M17.11 PRIMARY OSTEOARTHRITIS OF RIGHT KNEE: ICD-10-CM

## 2024-10-10 DIAGNOSIS — M17.12 PRIMARY OSTEOARTHRITIS OF LEFT KNEE: Primary | ICD-10-CM

## 2024-10-10 PROCEDURE — 20610 DRAIN/INJ JOINT/BURSA W/O US: CPT | Mod: PBBFAC,PN | Performed by: ORTHOPAEDIC SURGERY

## 2024-10-10 PROCEDURE — 99214 OFFICE O/P EST MOD 30 MIN: CPT | Mod: PBBFAC,PN | Performed by: ORTHOPAEDIC SURGERY

## 2024-10-10 PROCEDURE — 99999 PR PBB SHADOW E&M-EST. PATIENT-LVL IV: CPT | Mod: PBBFAC,,, | Performed by: ORTHOPAEDIC SURGERY

## 2024-10-10 PROCEDURE — 99999PBSHW PR PBB SHADOW TECHNICAL ONLY FILED TO HB: Mod: PBBFAC,,,

## 2024-10-10 RX ORDER — TRIAMCINOLONE ACETONIDE 40 MG/ML
40 INJECTION, SUSPENSION INTRA-ARTICULAR; INTRAMUSCULAR
Status: DISCONTINUED | OUTPATIENT
Start: 2024-10-10 | End: 2024-10-10 | Stop reason: HOSPADM

## 2024-10-10 RX ADMIN — TRIAMCINOLONE ACETONIDE 40 MG: 40 INJECTION, SUSPENSION INTRA-ARTICULAR; INTRAMUSCULAR at 10:10

## 2024-10-10 NOTE — PROCEDURES
Large Joint Aspiration/Injection: R knee    Date/Time: 10/10/2024 10:00 AM    Performed by: Brayan Valerio MD  Authorized by: Brayan Valerio MD    Consent Done?:  Yes (Verbal)  Indications:  Arthritis and pain  Site marked: the procedure site was marked    Timeout: prior to procedure the correct patient, procedure, and site was verified    Prep: patient was prepped and draped in usual sterile fashion      Local anesthesia used?: Yes    Local anesthetic:  Lidocaine 1% without epinephrine    Details:  Needle Size:  25 G  Ultrasonic Guidance for needle placement?: No    Location:  Knee  Site:  R knee  Medications:  40 mg triamcinolone acetonide 40 mg/mL  Patient tolerance:  Patient tolerated the procedure well with no immediate complications  Large Joint Aspiration/Injection: L knee    Date/Time: 10/10/2024 10:00 AM    Performed by: Brayan Valerio MD  Authorized by: Brayan Valerio MD    Consent Done?:  Yes (Verbal)  Indications:  Arthritis and pain  Site marked: the procedure site was marked    Timeout: prior to procedure the correct patient, procedure, and site was verified    Prep: patient was prepped and draped in usual sterile fashion      Local anesthesia used?: Yes    Local anesthetic:  Lidocaine 1% without epinephrine    Details:  Needle Size:  25 G  Ultrasonic Guidance for needle placement?: No    Location:  Knee  Site:  L knee  Medications:  40 mg triamcinolone acetonide 40 mg/mL  Patient tolerance:  Patient tolerated the procedure well with no immediate complications

## 2024-10-10 NOTE — PROGRESS NOTES
[facility]    Subjective:     Chief Complaint:   Chief Complaint   Patient presents with    Left Knee - Pain     Follow up for Bilat knee inj 24, states her pain is worse than her last visit, would like to repeat the injections, left is worse    Right Knee - Pain       Past Medical History:   Diagnosis Date    Antithrombin III deficiency 2021    Asthma     Claustrophobia     Elevated factor VIII level 2021    Esophageal reflux     Gastroesophageal reflux    Generalized anxiety disorder     Anxiety, Generalized    Herniated disc     Hypertension     Iron deficiency anemia due to chronic blood loss 10/27/2021    Lower extremity weakness     Morbid obesity     Obesity     Protein S deficiency 2021    Pulmonary embolism     Upper extremity weakness        Past Surgical History:   Procedure Laterality Date     SECTION      CHOLECYSTECTOMY      TONSILLECTOMY         Current Outpatient Medications   Medication Sig    albuterol (PROVENTIL/VENTOLIN HFA) 90 mcg/actuation inhaler Inhale 2 puffs into the lungs every 6 (six) hours as needed for Wheezing. Rescue    amLODIPine (NORVASC) 10 MG tablet 1 tablet Orally Once a day for 30 days    cloNIDine (CATAPRES) 0.1 MG tablet Take 1 tablet (0.1 mg total) by mouth every 8 (eight) hours as needed (SBP >180).    colloidal oatmeaL (EUCERIN ECZEMA RELIEF) 1 % Crea Apply 1 Application topically 2 (two) times a day.    ELIQUIS 5 mg Tab TAKE 1 TABLET BY MOUTH TWICE DAILY (Patient taking differently: Take 5 mg by mouth once daily.)    ferrous sulfate (FEROSUL) 325 mg (65 mg iron) Tab tablet TAKE 1 TABLET BY MOUTH EVERY DAY (Patient taking differently: Take 325 mg by mouth once daily. TAKE 1 TABLET BY MOUTH EVERY DAY)    fluticasone propionate (FLONASE) 50 mcg/actuation nasal spray 2 sprays by Each Nostril route daily as needed for Rhinitis.    metoprolol succinate (TOPROL-XL) 200 MG 24 hr tablet     minocycline (MINOCIN,DYNACIN) 100 MG capsule      mupirocin (BACTROBAN) 2 % ointment Apply topically.    NEXIUM 40 mg capsule Take 40 mg by mouth once daily.    olmesartan-hydrochlorothiazide (BENICAR HCT) 40-25 mg per tablet Take 1 tablet by mouth once daily.    sulfamethoxazole-trimethoprim 800-160mg (BACTRIM DS) 800-160 mg Tab Take 1 tablet by mouth 2 (two) times daily.    topiramate (TOPAMAX) 25 MG tablet Take 25 mg by mouth 2 (two) times daily.    trazodone (DESYREL) 100 MG tablet Take 100 mg by mouth every evening.    aspirin (ECOTRIN) 81 MG EC tablet Take 1 tablet (81 mg total) by mouth once daily.    atorvastatin (LIPITOR) 40 MG tablet Take 1 tablet (40 mg total) by mouth every evening.    carvediloL (COREG) 25 MG tablet Take 1 tablet (25 mg total) by mouth 2 (two) times daily.    fluticasone-salmeterol 230-21 mcg/dose (ADVAIR HFA) 230-21 mcg/actuation HFAA inhaler Inhale 2 puffs into the lungs 2 (two) times daily. Controller     Current Facility-Administered Medications   Medication    bebtelovimab (EUA) 175 mg/2 mL (87.5 mg/mL) injection 175 mg       Review of patient's allergies indicates:  No Known Allergies    No family history on file.    Social History     Socioeconomic History    Marital status:    Tobacco Use    Smoking status: Former     Current packs/day: 0.00     Types: Cigarettes, Cigars     Quit date: 2019     Years since quittin.7    Smokeless tobacco: Never   Substance and Sexual Activity    Alcohol use: Yes     Alcohol/week: 0.8 standard drinks of alcohol     Types: 1 Standard drinks or equivalent per week     Comment: rarely    Drug use: No    Sexual activity: Not Currently     Social Drivers of Health     Financial Resource Strain: Medium Risk (10/18/2022)    Overall Financial Resource Strain (CARDIA)     Difficulty of Paying Living Expenses: Somewhat hard   Food Insecurity: Food Insecurity Present (10/18/2022)    Hunger Vital Sign     Worried About Running Out of Food in the Last Year: Sometimes true     Ran Out of Food in  the Last Year: Sometimes true   Transportation Needs: Unmet Transportation Needs (10/18/2022)    PRAPARE - Transportation     Lack of Transportation (Medical): Yes     Lack of Transportation (Non-Medical): Yes   Stress: No Stress Concern Present (10/18/2022)    [employer] [place] [employer] - Occupational Stress Questionnaire     Feeling of Stress : Only a little   Housing Stability: High Risk (10/18/2022)    Housing Stability Vital Sign     Unable to Pay for Housing in the Last Year: Yes     Unstable Housing in the Last Year: No       History of present illness:  Patient comes in today for her bilateral knees.  She has known advanced arthritis of the bilateral knees      Review of Systems:    Constitution: Negative for chills, fever, and sweats.  Negative for unexplained weight loss.    HENT:  Negative for headaches and blurry vision.    Cardiovascular:Negative for chest pain or irregular heart beat. Negative for hypertension.    Respiratory:  Negative for cough and shortness of breath.    Gastrointestinal: Negative for abdominal pain, heartburn, melena, nausea, and vomitting.    Genitourinary:  Negative bladder incontinence and dysuria.    Musculoskeletal:  See HPI for details.     Neurological: Negative for numbness.    Psychiatric/Behavioral: Negative for depression.  The patient is not nervous/anxious.      Endocrine: Negative for polyuria    Hematologic/Lymphatic: Negative for bleeding problem.  Does not bruise/bleed easily.    Skin: Negative for poor would healing and rash    Objective:      Physical Examination:    Vital Signs:  There were no vitals filed for this visit.    Body mass index is 51.55 kg/m².    This a well-developed, well nourished patient in no acute distress.  They are alert and oriented and cooperative to examination.        Patient is morbidly obese.  She has varus deformities range of motion 0-95 degrees  Pertinent New Results:    XRAY Report / Interpretation:   No new XRAYS  Today.    Assessment/Plan:      Bone-on-bone arthrosis bilateral knees I injected both knees today was steroid and local she will follow-up p.r.n..  She has started losing weight.  I again encouraged her to continue on that [facility].      This note was created using Dragon voice recognition software that occasionally misinterpreted phrases or words.

## 2024-10-18 DIAGNOSIS — N17.9 AKI (ACUTE KIDNEY INJURY): Primary | ICD-10-CM

## 2024-10-22 ENCOUNTER — TELEPHONE (OUTPATIENT)
Dept: SURGERY | Facility: CLINIC | Age: 49
End: 2024-10-22
Payer: MEDICAID

## 2024-10-22 NOTE — TELEPHONE ENCOUNTER
----- Message from Justina sent at 10/22/2024 10:55 AM CDT -----  Regarding: Reschedule  Contact: pt  Type:  Reschedule Appointment Request    Caller is requesting to reschedule appointment.      Name of Caller:pt    When is the first available appointment?none    Symptoms:  post op    Best Call Back Number:[phone]    Additional Information:  pt requesting to reschedule appt that was set for today.

## 2024-10-22 NOTE — TELEPHONE ENCOUNTER
Spoke with patient, states she is ill and wants to reschedule in a couple weeks.  Appointment rescheduled to 11-6-2024 at 1045 am with Dr Adames.

## 2024-10-24 ENCOUNTER — LAB VISIT (OUTPATIENT)
Dept: LAB | Facility: HOSPITAL | Age: 49
End: 2024-10-24
Attending: INTERNAL MEDICINE
Payer: MEDICAID

## 2024-10-24 DIAGNOSIS — D50.0 IRON DEFICIENCY ANEMIA DUE TO CHRONIC BLOOD LOSS: ICD-10-CM

## 2024-10-24 LAB
ALBUMIN SERPL BCP-MCNC: 3.6 G/DL (ref 3.5–5.2)
ALP SERPL-CCNC: 100 U/L (ref 55–135)
ALT SERPL W/O P-5'-P-CCNC: 17 U/L (ref 10–44)
ANION GAP SERPL CALC-SCNC: 5 MMOL/L (ref 8–16)
AST SERPL-CCNC: 18 U/L (ref 10–40)
BASOPHILS # BLD AUTO: 0.03 K/UL (ref 0–0.2)
BASOPHILS NFR BLD: 0.3 % (ref 0–1.9)
BILIRUB SERPL-MCNC: 0.4 MG/DL (ref 0.1–1)
BUN SERPL-MCNC: 25 MG/DL (ref 6–20)
CALCIUM SERPL-MCNC: 9.3 MG/DL (ref 8.7–10.5)
CHLORIDE SERPL-SCNC: 101 MMOL/L (ref 95–110)
CO2 SERPL-SCNC: 31 MMOL/L (ref 23–29)
CREAT SERPL-MCNC: 1.9 MG/DL (ref 0.5–1.4)
DIFFERENTIAL METHOD BLD: ABNORMAL
EOSINOPHIL # BLD AUTO: 0 K/UL (ref 0–0.5)
EOSINOPHIL NFR BLD: 0.3 % (ref 0–8)
ERYTHROCYTE [DISTWIDTH] IN BLOOD BY AUTOMATED COUNT: 13.6 % (ref 11.5–14.5)
EST. GFR  (NO RACE VARIABLE): 31.8 ML/MIN/1.73 M^2
FERRITIN SERPL-MCNC: 88.5 NG/ML (ref 20–300)
GLUCOSE SERPL-MCNC: 150 MG/DL (ref 70–110)
HCT VFR BLD AUTO: 40 % (ref 37–48.5)
HGB BLD-MCNC: 12.5 G/DL (ref 12–16)
IMM GRANULOCYTES # BLD AUTO: 0.04 K/UL (ref 0–0.04)
IMM GRANULOCYTES NFR BLD AUTO: 0.4 % (ref 0–0.5)
IRON SERPL-MCNC: 102 UG/DL (ref 30–160)
LYMPHOCYTES # BLD AUTO: 1.6 K/UL (ref 1–4.8)
LYMPHOCYTES NFR BLD: 15.7 % (ref 18–48)
MCH RBC QN AUTO: 31.2 PG (ref 27–31)
MCHC RBC AUTO-ENTMCNC: 31.3 G/DL (ref 32–36)
MCV RBC AUTO: 100 FL (ref 82–98)
MONOCYTES # BLD AUTO: 0.6 K/UL (ref 0.3–1)
MONOCYTES NFR BLD: 5.4 % (ref 4–15)
NEUTROPHILS # BLD AUTO: 8 K/UL (ref 1.8–7.7)
NEUTROPHILS NFR BLD: 77.9 % (ref 38–73)
NRBC BLD-RTO: 0 /100 WBC
PLATELET # BLD AUTO: 320 K/UL (ref 150–450)
PMV BLD AUTO: 10.9 FL (ref 9.2–12.9)
POTASSIUM SERPL-SCNC: 3.4 MMOL/L (ref 3.5–5.1)
PROT SERPL-MCNC: 7.9 G/DL (ref 6–8.4)
RBC # BLD AUTO: 4.01 M/UL (ref 4–5.4)
SATURATED IRON: 32 % (ref 20–50)
SODIUM SERPL-SCNC: 137 MMOL/L (ref 136–145)
TOTAL IRON BINDING CAPACITY: 316 UG/DL (ref 250–450)
TRANSFERRIN SERPL-MCNC: 226 MG/DL (ref 200–375)
WBC # BLD AUTO: 10.32 K/UL (ref 3.9–12.7)

## 2024-10-24 PROCEDURE — 83540 ASSAY OF IRON: CPT | Performed by: INTERNAL MEDICINE

## 2024-10-24 PROCEDURE — 82728 ASSAY OF FERRITIN: CPT | Performed by: INTERNAL MEDICINE

## 2024-10-24 PROCEDURE — 36415 COLL VENOUS BLD VENIPUNCTURE: CPT | Performed by: INTERNAL MEDICINE

## 2024-10-24 PROCEDURE — 85025 COMPLETE CBC W/AUTO DIFF WBC: CPT | Performed by: INTERNAL MEDICINE

## 2024-10-24 PROCEDURE — 80053 COMPREHEN METABOLIC PANEL: CPT | Performed by: INTERNAL MEDICINE

## 2024-10-25 ENCOUNTER — HOSPITAL ENCOUNTER (OUTPATIENT)
Dept: RADIOLOGY | Facility: HOSPITAL | Age: 49
Discharge: HOME | End: 2024-10-25
Attending: INTERNAL MEDICINE
Payer: MEDICAID

## 2024-10-25 DIAGNOSIS — N17.9 AKI (ACUTE KIDNEY INJURY): ICD-10-CM

## 2024-10-25 PROCEDURE — 76857 US EXAM PELVIC LIMITED: CPT | Mod: TC,PO

## 2024-10-25 PROCEDURE — 76857 US EXAM PELVIC LIMITED: CPT | Mod: 26,,, | Performed by: RADIOLOGY

## 2024-10-25 PROCEDURE — 76770 US EXAM ABDO BACK WALL COMP: CPT | Mod: TC,PO

## 2024-10-25 PROCEDURE — 76770 US EXAM ABDO BACK WALL COMP: CPT | Mod: 26,,, | Performed by: RADIOLOGY

## 2024-11-06 ENCOUNTER — OFFICE VISIT (OUTPATIENT)
Dept: SURGERY | Facility: CLINIC | Age: 49
End: 2024-11-06
Payer: MEDICAID

## 2024-11-06 VITALS — HEART RATE: 74 BPM | TEMPERATURE: 98 F | DIASTOLIC BLOOD PRESSURE: 79 MMHG | SYSTOLIC BLOOD PRESSURE: 124 MMHG

## 2024-11-06 DIAGNOSIS — N61.1 BREAST ABSCESS: Primary | ICD-10-CM

## 2024-11-06 PROCEDURE — 3044F HG A1C LEVEL LT 7.0%: CPT | Mod: CPTII,,, | Performed by: SURGERY

## 2024-11-06 PROCEDURE — 99212 OFFICE O/P EST SF 10 MIN: CPT | Mod: PBBFAC,PN | Performed by: SURGERY

## 2024-11-06 PROCEDURE — 3078F DIAST BP <80 MM HG: CPT | Mod: CPTII,,, | Performed by: SURGERY

## 2024-11-06 PROCEDURE — 1159F MED LIST DOCD IN RCRD: CPT | Mod: CPTII,,, | Performed by: SURGERY

## 2024-11-06 PROCEDURE — 3074F SYST BP LT 130 MM HG: CPT | Mod: CPTII,,, | Performed by: SURGERY

## 2024-11-06 PROCEDURE — 4010F ACE/ARB THERAPY RXD/TAKEN: CPT | Mod: CPTII,,, | Performed by: SURGERY

## 2024-11-06 PROCEDURE — 99212 OFFICE O/P EST SF 10 MIN: CPT | Mod: S$PBB,,, | Performed by: SURGERY

## 2024-11-06 PROCEDURE — 99999 PR PBB SHADOW E&M-EST. PATIENT-LVL II: CPT | Mod: PBBFAC,,, | Performed by: SURGERY

## 2024-11-07 NOTE — PROGRESS NOTES
GENERAL SURGERY PROGRESS NOTE    HPI: Katiuska Preston is a 49 y.o. female here for follow up after incision drainage of left breast abscess. Had one-month history of pain and swelling in the left breaths with a small skin opening. Underwent bedside incision and drainage on 09/16/2024. It was on antibiotics which she has now completed. Still with a open wound which occasionally has an eschar which will be pulled away.  Does feel it was closing incised. Some tenderness but no severe pain. No significant ongoing drainage.      VITALS:  Vitals:    11/06/24 1031   BP: 124/79   Pulse: 74   Temp: 97.5 °F (36.4 °C)       Physical Exam  Vitals reviewed.   Constitutional:       Appearance: She is obese.   Cardiovascular:      Rate and Rhythm: Normal rate and regular rhythm.   Chest:      Comments: Large pendulous breasts.  On the left breast in the inferior outer aspect is and a proximally 1 x 1 cm superficial ulceration at the site of incision and drainage. The wound has evidence of re-epithelialization occurring along the edges. There was some surrounding scar tissue around this but no induration, fluctuance or erythema.  Abdominal:      General: There is no distension.      Palpations: Abdomen is soft.   Musculoskeletal:      Cervical back: Normal range of motion and neck supple. No rigidity.   Neurological:      General: No focal deficit present.      Mental Status: She is alert and oriented to person, place, and time.            ASSESSMENT & PLAN:  49 y.o. female with left breast abscess status post incision and drainage  -still with a small open wound but it does appear to be improving with re-epithelialization occurring  -no evidence of ongoing abscess  -did have mammogram at the time of initial presentation which was BI-RADS 2  -recommend continuing to monitor wound for healing, can return in a few weeks for follow up  -no indication for ongoing antibiotics

## 2024-11-27 ENCOUNTER — LAB VISIT (OUTPATIENT)
Dept: LAB | Facility: HOSPITAL | Age: 49
End: 2024-11-27
Attending: INTERNAL MEDICINE
Payer: MEDICAID

## 2024-11-27 DIAGNOSIS — D50.0 IRON DEFICIENCY ANEMIA DUE TO CHRONIC BLOOD LOSS: ICD-10-CM

## 2024-11-27 LAB
ALBUMIN SERPL BCP-MCNC: 3.4 G/DL (ref 3.5–5.2)
ALP SERPL-CCNC: 94 U/L (ref 55–135)
ALT SERPL W/O P-5'-P-CCNC: 17 U/L (ref 10–44)
ANION GAP SERPL CALC-SCNC: 6 MMOL/L (ref 8–16)
AST SERPL-CCNC: 19 U/L (ref 10–40)
BASOPHILS # BLD AUTO: 0.03 K/UL (ref 0–0.2)
BASOPHILS NFR BLD: 0.4 % (ref 0–1.9)
BILIRUB SERPL-MCNC: 0.4 MG/DL (ref 0.1–1)
BUN SERPL-MCNC: 29 MG/DL (ref 6–20)
CALCIUM SERPL-MCNC: 9.3 MG/DL (ref 8.7–10.5)
CHLORIDE SERPL-SCNC: 102 MMOL/L (ref 95–110)
CO2 SERPL-SCNC: 28 MMOL/L (ref 23–29)
CREAT SERPL-MCNC: 2.1 MG/DL (ref 0.5–1.4)
DIFFERENTIAL METHOD BLD: ABNORMAL
EOSINOPHIL # BLD AUTO: 0.1 K/UL (ref 0–0.5)
EOSINOPHIL NFR BLD: 0.7 % (ref 0–8)
ERYTHROCYTE [DISTWIDTH] IN BLOOD BY AUTOMATED COUNT: 13.1 % (ref 11.5–14.5)
EST. GFR  (NO RACE VARIABLE): 28.2 ML/MIN/1.73 M^2
FERRITIN SERPL-MCNC: 88.8 NG/ML (ref 20–300)
GLUCOSE SERPL-MCNC: 115 MG/DL (ref 70–110)
HCT VFR BLD AUTO: 38.7 % (ref 37–48.5)
HGB BLD-MCNC: 12.2 G/DL (ref 12–16)
IMM GRANULOCYTES # BLD AUTO: 0.02 K/UL (ref 0–0.04)
IMM GRANULOCYTES NFR BLD AUTO: 0.2 % (ref 0–0.5)
IRON SERPL-MCNC: 71 UG/DL (ref 30–160)
LYMPHOCYTES # BLD AUTO: 1.5 K/UL (ref 1–4.8)
LYMPHOCYTES NFR BLD: 18.5 % (ref 18–48)
MCH RBC QN AUTO: 31.6 PG (ref 27–31)
MCHC RBC AUTO-ENTMCNC: 31.5 G/DL (ref 32–36)
MCV RBC AUTO: 100 FL (ref 82–98)
MONOCYTES # BLD AUTO: 0.6 K/UL (ref 0.3–1)
MONOCYTES NFR BLD: 6.8 % (ref 4–15)
NEUTROPHILS # BLD AUTO: 5.9 K/UL (ref 1.8–7.7)
NEUTROPHILS NFR BLD: 73.4 % (ref 38–73)
NRBC BLD-RTO: 0 /100 WBC
PLATELET # BLD AUTO: 287 K/UL (ref 150–450)
PMV BLD AUTO: 10.5 FL (ref 9.2–12.9)
POTASSIUM SERPL-SCNC: 4.2 MMOL/L (ref 3.5–5.1)
PROT SERPL-MCNC: 7.7 G/DL (ref 6–8.4)
RBC # BLD AUTO: 3.86 M/UL (ref 4–5.4)
SATURATED IRON: 25 % (ref 20–50)
SODIUM SERPL-SCNC: 136 MMOL/L (ref 136–145)
TOTAL IRON BINDING CAPACITY: 283 UG/DL (ref 250–450)
TRANSFERRIN SERPL-MCNC: 202 MG/DL (ref 200–375)
WBC # BLD AUTO: 8.07 K/UL (ref 3.9–12.7)

## 2024-11-27 PROCEDURE — 82728 ASSAY OF FERRITIN: CPT | Performed by: INTERNAL MEDICINE

## 2024-11-27 PROCEDURE — 80053 COMPREHEN METABOLIC PANEL: CPT | Performed by: INTERNAL MEDICINE

## 2024-11-27 PROCEDURE — 84466 ASSAY OF TRANSFERRIN: CPT | Performed by: INTERNAL MEDICINE

## 2024-11-27 PROCEDURE — 85025 COMPLETE CBC W/AUTO DIFF WBC: CPT | Performed by: INTERNAL MEDICINE

## 2024-11-27 PROCEDURE — 36415 COLL VENOUS BLD VENIPUNCTURE: CPT | Performed by: INTERNAL MEDICINE

## 2024-12-27 ENCOUNTER — LAB VISIT (OUTPATIENT)
Dept: LAB | Facility: HOSPITAL | Age: 49
End: 2024-12-27
Attending: INTERNAL MEDICINE
Payer: MEDICAID

## 2024-12-27 DIAGNOSIS — D50.0 IRON DEFICIENCY ANEMIA DUE TO CHRONIC BLOOD LOSS: ICD-10-CM

## 2024-12-27 LAB
ALBUMIN SERPL BCP-MCNC: 3.3 G/DL (ref 3.5–5.2)
ALP SERPL-CCNC: 98 U/L (ref 55–135)
ALT SERPL W/O P-5'-P-CCNC: 13 U/L (ref 10–44)
ANION GAP SERPL CALC-SCNC: 3 MMOL/L (ref 8–16)
AST SERPL-CCNC: 15 U/L (ref 10–40)
BASOPHILS # BLD AUTO: 0.04 K/UL (ref 0–0.2)
BASOPHILS NFR BLD: 0.4 % (ref 0–1.9)
BILIRUB SERPL-MCNC: 0.3 MG/DL (ref 0.1–1)
BUN SERPL-MCNC: 25 MG/DL (ref 6–20)
CALCIUM SERPL-MCNC: 9 MG/DL (ref 8.7–10.5)
CHLORIDE SERPL-SCNC: 102 MMOL/L (ref 95–110)
CO2 SERPL-SCNC: 31 MMOL/L (ref 23–29)
CREAT SERPL-MCNC: 2.2 MG/DL (ref 0.5–1.4)
DIFFERENTIAL METHOD BLD: ABNORMAL
EOSINOPHIL # BLD AUTO: 0.1 K/UL (ref 0–0.5)
EOSINOPHIL NFR BLD: 1.3 % (ref 0–8)
ERYTHROCYTE [DISTWIDTH] IN BLOOD BY AUTOMATED COUNT: 12.9 % (ref 11.5–14.5)
EST. GFR  (NO RACE VARIABLE): 26.6 ML/MIN/1.73 M^2
FERRITIN SERPL-MCNC: 77.3 NG/ML (ref 20–300)
GLUCOSE SERPL-MCNC: 104 MG/DL (ref 70–110)
HCT VFR BLD AUTO: 36.1 % (ref 37–48.5)
HGB BLD-MCNC: 11.6 G/DL (ref 12–16)
IMM GRANULOCYTES # BLD AUTO: 0.04 K/UL (ref 0–0.04)
IMM GRANULOCYTES NFR BLD AUTO: 0.4 % (ref 0–0.5)
IRON SERPL-MCNC: 69 UG/DL (ref 30–160)
LYMPHOCYTES # BLD AUTO: 2 K/UL (ref 1–4.8)
LYMPHOCYTES NFR BLD: 19.8 % (ref 18–48)
MCH RBC QN AUTO: 32 PG (ref 27–31)
MCHC RBC AUTO-ENTMCNC: 32.1 G/DL (ref 32–36)
MCV RBC AUTO: 100 FL (ref 82–98)
MONOCYTES # BLD AUTO: 0.7 K/UL (ref 0.3–1)
MONOCYTES NFR BLD: 6.9 % (ref 4–15)
NEUTROPHILS # BLD AUTO: 7.2 K/UL (ref 1.8–7.7)
NEUTROPHILS NFR BLD: 71.2 % (ref 38–73)
NRBC BLD-RTO: 0 /100 WBC
PLATELET # BLD AUTO: 332 K/UL (ref 150–450)
PMV BLD AUTO: 11.1 FL (ref 9.2–12.9)
POTASSIUM SERPL-SCNC: 3.8 MMOL/L (ref 3.5–5.1)
PROT SERPL-MCNC: 7.4 G/DL (ref 6–8.4)
RBC # BLD AUTO: 3.62 M/UL (ref 4–5.4)
SATURATED IRON: 25 % (ref 20–50)
SODIUM SERPL-SCNC: 136 MMOL/L (ref 136–145)
TOTAL IRON BINDING CAPACITY: 279 UG/DL (ref 250–450)
TRANSFERRIN SERPL-MCNC: 199 MG/DL (ref 200–375)
WBC # BLD AUTO: 10.11 K/UL (ref 3.9–12.7)

## 2024-12-27 PROCEDURE — 36415 COLL VENOUS BLD VENIPUNCTURE: CPT | Performed by: INTERNAL MEDICINE

## 2024-12-27 PROCEDURE — 82728 ASSAY OF FERRITIN: CPT | Performed by: INTERNAL MEDICINE

## 2024-12-27 PROCEDURE — 85025 COMPLETE CBC W/AUTO DIFF WBC: CPT | Performed by: INTERNAL MEDICINE

## 2024-12-27 PROCEDURE — 84466 ASSAY OF TRANSFERRIN: CPT | Performed by: INTERNAL MEDICINE

## 2024-12-27 PROCEDURE — 80053 COMPREHEN METABOLIC PANEL: CPT | Performed by: INTERNAL MEDICINE

## 2024-12-31 ENCOUNTER — OFFICE VISIT (OUTPATIENT)
Facility: CLINIC | Age: 49
End: 2024-12-31
Payer: MEDICAID

## 2024-12-31 VITALS
SYSTOLIC BLOOD PRESSURE: 142 MMHG | RESPIRATION RATE: 18 BRPM | BODY MASS INDEX: 49.59 KG/M2 | HEIGHT: 63 IN | DIASTOLIC BLOOD PRESSURE: 89 MMHG | TEMPERATURE: 98 F | WEIGHT: 279.88 LBS | HEART RATE: 97 BPM

## 2024-12-31 DIAGNOSIS — Z86.711 HISTORY OF PULMONARY EMBOLUS (PE): ICD-10-CM

## 2024-12-31 DIAGNOSIS — D50.9 IRON DEFICIENCY ANEMIA, UNSPECIFIED IRON DEFICIENCY ANEMIA TYPE: Chronic | ICD-10-CM

## 2024-12-31 DIAGNOSIS — D68.59 PROTEIN S DEFICIENCY: ICD-10-CM

## 2024-12-31 DIAGNOSIS — D50.0 IRON DEFICIENCY ANEMIA DUE TO CHRONIC BLOOD LOSS: ICD-10-CM

## 2024-12-31 DIAGNOSIS — D68.59 ANTITHROMBIN III DEFICIENCY: ICD-10-CM

## 2024-12-31 DIAGNOSIS — I26.99 ACUTE PULMONARY EMBOLISM, UNSPECIFIED PULMONARY EMBOLISM TYPE, UNSPECIFIED WHETHER ACUTE COR PULMONALE PRESENT: Primary | ICD-10-CM

## 2024-12-31 DIAGNOSIS — R79.1 ELEVATED FACTOR VIII LEVEL: ICD-10-CM

## 2024-12-31 PROCEDURE — 99214 OFFICE O/P EST MOD 30 MIN: CPT | Mod: PBBFAC,PN | Performed by: INTERNAL MEDICINE

## 2024-12-31 PROCEDURE — 99999 PR PBB SHADOW E&M-EST. PATIENT-LVL IV: CPT | Mod: PBBFAC,,, | Performed by: INTERNAL MEDICINE

## 2024-12-31 NOTE — PROGRESS NOTES
[facility] Hematology/Oncology  Progress Note -   Follow-up Visit    Subjective:      Patient ID:   NAME: Katiuska Preston : 1975     49 y.o. female    Referring Doc: Justice  Other Physicians: Eduardo    Chief Complaint: pulm emboli f/u      HPI:    The patient returns for a regularly scheduled follow-up visit today to go over results of recently ordered tests, studies and/or labs. She is here by herself today    She saw her PCP Chani Ornelas NP on 2024.     She saw Dr Herndon with Gen-Surg in 2024 for post-op f/u after having I&D of incision drainage of left breast abscess (which has since healed)    She is followed by neurology, saw Dr Gabriel Hall in 2024 and she sees them again in 2025. Dr Leiva previously had recommended she continue the eliquis and aspirin 81mg and lipitor.     She has some chronic right knee issues and pain.  She saw Dr Valerio with ortho in Oct 2024 and she had an injection and she sees him again in 2025    No CP, HA's or N/V.   Breathing stable but requires O2 prn. General lack of energy    She is currently on Eliquis. No excessive bleeding or bruising.            Discussed covid19 precautions - she had her vaccinations             ROS:   GEN: normal without any fever, night sweats or weight loss; chronic knee issues  HEENT: normal with no HA's, sore throat, stiff neck, changes in vision;    CV: normal with no CP, some WINN   PULM: normal with no SOB, cough, hemoptysis, sputum or pleuritic pain  GI: normal with no abdominal pain, nausea, vomiting, constipation, diarrhea, melanotic stools, BRBPR, or hematemesis  : normal with no hematuria, dysuria  BREAST: normal with no mass, discharge, pain  SKIN: normal with no rash, erythema, bruising, or swelling;       Past Medical/Surgical History:  Past Medical History:   Diagnosis Date    Antithrombin III deficiency 2021    Asthma     Claustrophobia     Elevated factor VIII level 2021    Esophageal reflux      Gastroesophageal reflux    Generalized anxiety disorder     Anxiety, Generalized    Herniated disc     Hypertension     Iron deficiency anemia due to chronic blood loss 10/27/2021    Lower extremity weakness     Morbid obesity     Obesity     Protein S deficiency 2021    Pulmonary embolism     Upper extremity weakness      Past Surgical History:   Procedure Laterality Date     SECTION      CHOLECYSTECTOMY      TONSILLECTOMY           Allergies:  Review of patient's allergies indicates:  No Known Allergies    Social/Family History:  Social History     Socioeconomic History    Marital status:    Tobacco Use    Smoking status: Former     Current packs/day: 0.00     Types: Cigarettes, Cigars     Quit date: 2019     Years since quittin.0    Smokeless tobacco: Never   Substance and Sexual Activity    Alcohol use: Yes     Alcohol/week: 0.8 standard drinks of alcohol     Types: 1 Standard drinks or equivalent per week     Comment: rarely    Drug use: No    Sexual activity: Not Currently     Social Drivers of Health     Financial Resource Strain: Medium Risk (10/18/2022)    Overall Financial Resource Strain (CARDIA)     Difficulty of Paying Living Expenses: Somewhat hard   Food Insecurity: Food Insecurity Present (10/18/2022)    Hunger Vital Sign     Worried About Running Out of Food in the Last Year: Sometimes true     Ran Out of Food in the Last Year: Sometimes true   Transportation Needs: Unmet Transportation Needs (10/18/2022)    PRAPARE - Transportation     Lack of Transportation (Medical): Yes     Lack of Transportation (Non-Medical): Yes   Stress: No Stress Concern Present (10/18/2022)    [employer] [place] - Occupational Stress Questionnaire     Feeling of Stress : Only a little   Housing Stability: High Risk (10/18/2022)    Housing Stability Vital Sign     Unable to Pay for Housing in the Last Year: Yes     Unstable Housing in the Last Year: No     No family history on  file.      Medications:    Current Outpatient Medications:     albuterol (PROVENTIL/VENTOLIN HFA) 90 mcg/actuation inhaler, Inhale 2 puffs into the lungs every 6 (six) hours as needed for Wheezing. Rescue, Disp: 18 g, Rfl: 5    amLODIPine (NORVASC) 10 MG tablet, 1 tablet Orally Once a day for 30 days, Disp: , Rfl:     cloNIDine (CATAPRES) 0.1 MG tablet, Take 1 tablet (0.1 mg total) by mouth every 8 (eight) hours as needed (SBP >180)., Disp: 30 tablet, Rfl: 0    colloidal oatmeaL (EUCERIN ECZEMA RELIEF) 1 % Crea, Apply 1 Application topically 2 (two) times a day., Disp: 206 g, Rfl: 2    ELIQUIS 5 mg Tab, TAKE 1 TABLET BY MOUTH TWICE DAILY, Disp: 60 tablet, Rfl: 3    ferrous sulfate (FEROSUL) 325 mg (65 mg iron) Tab tablet, TAKE 1 TABLET BY MOUTH EVERY DAY, Disp: 30 tablet, Rfl: 6    fluticasone propionate (FLONASE) 50 mcg/actuation nasal spray, 2 sprays by Each Nostril route daily as needed for Rhinitis., Disp: , Rfl:     metoprolol succinate (TOPROL-XL) 200 MG 24 hr tablet, , Disp: , Rfl:     minocycline (MINOCIN,DYNACIN) 100 MG capsule, , Disp: , Rfl:     mupirocin (BACTROBAN) 2 % ointment, Apply topically., Disp: , Rfl:     NEXIUM 40 mg capsule, Take 40 mg by mouth once daily., Disp: , Rfl:     olmesartan-hydrochlorothiazide (BENICAR HCT) 40-25 mg per tablet, Take 1 tablet by mouth once daily., Disp: , Rfl:     sulfamethoxazole-trimethoprim 800-160mg (BACTRIM DS) 800-160 mg Tab, Take 1 tablet by mouth 2 (two) times daily., Disp: , Rfl:     topiramate (TOPAMAX) 25 MG tablet, Take 25 mg by mouth 2 (two) times daily., Disp: , Rfl:     trazodone (DESYREL) 100 MG tablet, Take 100 mg by mouth every evening., Disp: , Rfl:     aspirin (ECOTRIN) 81 MG EC tablet, Take 1 tablet (81 mg total) by mouth once daily., Disp: 30 tablet, Rfl: 0    atorvastatin (LIPITOR) 40 MG tablet, Take 1 tablet (40 mg total) by mouth every evening., Disp: 30 tablet, Rfl: 0    carvediloL (COREG) 25 MG tablet, Take 1 tablet (25 mg total) by mouth 2  "(two) times daily., Disp: 60 tablet, Rfl: 0    fluticasone-salmeterol 230-21 mcg/dose (ADVAIR HFA) 230-21 mcg/actuation HFAA inhaler, Inhale 2 puffs into the lungs 2 (two) times daily. Controller, Disp: 12 g, Rfl: 6    Current Facility-Administered Medications:     bebtelovimab (EUA) 175 mg/2 mL (87.5 mg/mL) injection 175 mg, 175 mg, Intravenous, 1 time in Clinic/HOD, Sherrie Parker MD      Pathology:   Cancer Staging   No matching staging information was found for the patient.        Breast biopsy 5/30/2022:    1. BREAST, LEFT, UPPER OUTER MID CALCIFICATIONS, STEREOTACTIC GUIDED    CORE BIOPSY    - BENIGN BREAST PARENCHYMA AND BLOOD VESSELS     2. BREAST, LEFT, UPPER OUTER MID CALCIFICATIONS, STEREOTACTIC GUIDED    CORE BIOPSY   - MICROCALCIFICATIONS ASSOCIATED WITH FIBROADENOMATOID CHANGE AND    BENIGN BREAST PARENCHYMA    - USUAL DUCTAL HYPERPLASIA AND COLUMNAR CELL CHANGE      Objective:   Vitals:  Blood pressure (!) 142/89, pulse 97, temperature 97.8 °F (36.6 °C), temperature source Temporal, resp. rate 18, height 5' 3" (1.6 m), weight 127 kg (279 lb 14.4 oz).    Physical Examination:   GEN: no apparent distress, comfortable; AAOx3; morbidly overweight; poor personal hygiene   HEAD: atraumatic and normocephalic  EYES: no pallor, no icterus, PERRLA  ENT: OMM, no pharyngeal erythema, external ears WNL; no nasal discharge; no thrush  NECK: no masses, thyroid normal, trachea midline, no LAD/LN's, supple  CV: RRR with no murmur; normal pulse; normal S1 and S2; no pedal edema  CHEST: Normal respiratory effort; CTAB; normal breath sounds; no wheeze or crackles;    ABDOM: nontender and nondistended; soft; normal bowel sounds; no rebound/guarding  MUSC/Skeletal: LROM and crepitus right knee; no deformities or arthropathy  EXTREM: no clubbing, cyanosis, inflammation or swelling  SKIN: no rashes, lesions, ulcers, petechiae or subcutaneous nodules  : no diaz  NEURO: grossly intact; motor/sensory WNL; AAOx3; no " tremors  PSYCH: normal mood, affect and behavior  LYMPH: normal cervical, supraclavicular, and groin LN's;       Labs:   Lab Results   Component Value Date    WBC 10.11 12/27/2024    HGB 11.6 (L) 12/27/2024    HCT 36.1 (L) 12/27/2024     (H) 12/27/2024     12/27/2024    CMP  Sodium   Date Value Ref Range Status   12/27/2024 136 136 - 145 mmol/L Final     Potassium   Date Value Ref Range Status   12/27/2024 3.8 3.5 - 5.1 mmol/L Final     Chloride   Date Value Ref Range Status   12/27/2024 102 95 - 110 mmol/L Final     CO2   Date Value Ref Range Status   12/27/2024 31 (H) 23 - 29 mmol/L Final     Glucose   Date Value Ref Range Status   12/27/2024 104 70 - 110 mg/dL Final     BUN   Date Value Ref Range Status   12/27/2024 25 (H) 6 - 20 mg/dL Final     Creatinine   Date Value Ref Range Status   12/27/2024 2.2 (H) 0.5 - 1.4 mg/dL Final     Calcium   Date Value Ref Range Status   12/27/2024 9.0 8.7 - 10.5 mg/dL Final     Total Protein   Date Value Ref Range Status   12/27/2024 7.4 6.0 - 8.4 g/dL Final     Albumin   Date Value Ref Range Status   12/27/2024 3.3 (L) 3.5 - 5.2 g/dL Final     Total Bilirubin   Date Value Ref Range Status   12/27/2024 0.3 0.1 - 1.0 mg/dL Final     Comment:     For infants and newborns, interpretation of results should be based  on gestational age, weight and in agreement with clinical  observations.    Premature Infant recommended reference ranges:  Up to 24 hours.............<8.0 mg/dL  Up to 48 hours............<12.0 mg/dL  3-5 days..................<15.0 mg/dL  6-29 days.................<15.0 mg/dL       Alkaline Phosphatase   Date Value Ref Range Status   12/27/2024 98 55 - 135 U/L Final     AST   Date Value Ref Range Status   12/27/2024 15 10 - 40 U/L Final     ALT   Date Value Ref Range Status   12/27/2024 13 10 - 44 U/L Final     Anion Gap   Date Value Ref Range Status   12/27/2024 3 (L) 8 - 16 mmol/L Final     eGFR if    Date Value Ref Range Status    07/18/2022 >60.0 >60 mL/min/1.73 m^2 Final     eGFR if non    Date Value Ref Range Status   07/18/2022 >60.0 >60 mL/min/1.73 m^2 Final     Comment:     Calculation used to obtain the estimated glomerular filtration  rate (eGFR) is the CKD-EPI equation.                  Lab Results   Component Value Date    IRON 69 12/27/2024    TIBC 279 12/27/2024    FERRITIN 77.3 12/27/2024     Lab Results   Component Value Date    [id number] 452 05/02/2021     Lab Results   Component Value Date    FOLATE 5.9 05/02/2021                I have reviewed all available lab results and radiology reports.    Radiology/Diagnostic Studies:      MRI Pelvis 6/15/2022:    Impression:     1. Normal thickness of the endometrium.  2. Query adenomyosis.  3. Nonviable uterine fibroid.  4. Involuting follicle or cyst in the left ovary.              US Lower Extremity Veins Bilateral [[id number]] Collected: 05/01/21 1348   Order Status: Completed Updated: 05/01/21 1432   Narrative:     REASON: DVT     FINDINGS:     Grayscale, color and spectral Doppler analysis of the bilateral lower   extremity deep venous system was performed.     There is normal compressibility, color and spectral Doppler analysis,   and augmentation in the bilateral lower extremity deep venous system.     IMPRESSION:     1.  No DVT of the bilateral lower extremity veins.   2.  Bilateral distal superficial femoral veins were unable to be   assessed due to body habitus       CTA Chest Non-Coronary - PE Study [[id number]] Collected: 05/01/21 1143   Order Status: Completed Updated: 05/01/21 1225   Narrative:     CMS MANDATED QUALITY DATA - CT RADIATION - 436     All CT scans at this facility utilize dose modulation, iterative   reconstruction, and/or weight based dosing when appropriate to reduce   radiation dose to as low as reasonably achievable.         REASON: PE suspected, intermediate prob, positive D-dimer     TECHNIQUE: CT angiography of thorax with 100 mL  Omnipaque 350.   Maximum intensity projection coronal reformations were created at a   separate workstation and stored in the patient's permanent medical   record.     COMPARISON: CTA chest October 14, 2019.     FINDINGS:     Limited evaluation due to body habitus and inadequate bolus timing.   Filling defects identified in segmental and subsegmental artery   supplying the left lower lobe, consistent with pulmonary emboli.   Questionable filling defects versus artifact in the right mainstem   pulmonary artery and a few segmental arteries supplying the right   lower lobe may reflect pulmonary emboli. Heart size is at the upper   limits of normal. No mediastinal lymphadenopathy or mass.     The central tracheobronchial tree is patent. There is diffuse   groundglass lung opacities in the bilateral lungs with peripheral   wedge-shaped opacities. No pleural effusions.     The visualized abdominal viscera are unremarkable. No acute osseous   abnormality..     IMPRESSION:     1.  Limited evaluation due to body habitus and inadequate bolus   timing. Filling defects identified in segmental and subsegmental   artery supplying the left lower lobe, consistent with pulmonary   emboli. There are questionable pulmonary emboli in the right mainstem   pulmonary artery and segmental artery supplying the right lower lobe.   Findings reported to Dr.Lloyd Duffy at 5/1/2021.   2.  Bilateral diffuse groundglass lung opacities with peripheral   wedge-shaped opacities may reflect changes of poor inspiration and   atelectasis. Atypical infectious process could also have a similar   appearance the proper clinical setting.                 All lab results and imaging results have been reviewed and discussed with the patient    Assessment:   (1) 49 y.o. female with diagnosis of pulmonary emboli who was seen as a consult at [facility]  - patient with diagnosis of asthma who presented to the ED at [facility] on 5/1 with progressive SOB, postnasal drip and  acute hypercapnia. CTA was a limited study due to body habitus but radiology reported presence of filling defects identified in segmental and subsegmental artery supplying the left lower lobe, consistent with pulmonary emboli. They also reported questionable pulmonary emboli in the right mainstem pulmonary artery and segmental artery supplying the right lower lobe. Patient has been admitted to hospitalist service and is on Lovenox. She has no prior personal or family history of clots.      - doppler studies are negative     5/3/2021:  - patient was seen by Dr Clark with pulm yesterday; appreciate his evaluation  - options of anticoagulation include: Coumadin, Eliquis , Xarelto, or Pradaxa  - in patients with morbid obesity, one can have difficulties with therapeutic dosing with practically any type of oral anticoagulant     5/31/2021:  - She has O2 at home and is on portable today.   - She has not followed up with pulmonary since discharge.   - She is breathing fair but does have WINN.   - She has some sinus issues. She is currently on Eliquis.   - She saw Dr Rendon since discharge  - low ATIII and protein S levels which could be the etiology, but they will need to be repeated at later date to confirm  - Elevated factor VIII which also will need to be repeated to confirm    6/28/2021:  - she saw Dr Clark with pulmonary since last visit and has PFT scheduled for this coming July 1st  - she remains on portable O2    12/8/2021:  - she saw Dr Clark again on 11/2/2021  - she remains on O2 at home  - she remains on eliquis  - Repeat AT3 was normal, repeat Protein S was WNL  - repeat factor VIII was still a little elevated at 281 but better    2/15/2022:  - she sees pulmonary NP again next month    5/19/2022:  - she saw Idania Cason NP on 5/2/2022 with pulmonary    6/20/2022:  - remains on eliquis  - currently with no excessive bleeding or bruising    10/24/2022:  - continued on eliquis   - no excessive bleeding or  bruising    2/27/2023:  - continued on eliquis but she is only taking it once per day    8/8/2023:  - continued on eliquis but only takes one a day    4/29/2024:  - She missed her appointment in march 2024  - She reports she had TIA/low grade stroke in April 2024. MRI brain showed a small acute infarct in left corona radiata. MRA head showed no large vessel occlusion or high-grade stenosis. Bilateral carotid ultrasound with no significant carotid stenosis.   - She saw Dr Leiva with neurology in April 2024. She has repeat MRI planned for May 3rd 2024. Neurology suspects she has likely small-vessel disease. Dr Leiva recommended she continue the eliquis and started her on aspirin 81mg and lipitor.  - encouraged her to take the eliquis bid     8/26/2024:  - continued on eliquis po bid and baby aspirin  - no excessive bleeding or bruising  - she saw Dr Gabriel Hall in June 2024  - sees neurology again in Jan 2024 12/31/2024:  - she is continued on eliquis and aspirin  - no excessive bleeding or bruising              (2) Anemia with microcytic indices with current history of chronic menorrhagia - most likely has underlying iron deficiency due to chronic heavy menstrual cycles  - s/p two units of blood  - prior hgb at 8.2  - total bilirubin is WNL, so I do not suspect any hemolysis at this time     5/3/2021:  - hgb at 8.6 today  - iron and ferritin are both low with elevated TIBC  - she received dose of IV iron today    5/31/2021:  - she needs repeat labs incl iron panel  - will consider additional IV iron as needed  - she is on oral iron  - she needs repeat labs incl. Iron panel    6/28/2021:  - hgb currently 11.7  - iron panel is adequate at this time    10/26/2021:  - latest hgb at 10.6  - iron at 29  - she is on oral iron since May 2021  - she has heavy menstrual cycles  - discussed consideration for IV iron and she is agreeable; discussed general side-effects of iron infusions    12/8/2021:  - she has been on oral  iron with little improvement in the labs  - she is starting IV iron this coming Friday    2/15/2022:  - latest labs from Jan 2022 with no anemia and iron panel was adequate  - she had IV iron x 1 only    5/19/2022:  - latest hgb at 11.2  - ferritin was 28  - iron back down to 24  - continued GYN bleeding issues  - seen by Dr Pro with GYn-Onc on 4/20/2022 6/20/2022:  - awaiting better BP control inorder to resume IV iron  - seeing Dr Joseph later today for her HBP  - labs pending for today    7/25/2022:  - s/p two cycles of IV iron  - latest iron panel adequate and hgb WNL    10/24/2022:  - hgb currently WNL at 13.1  - iron at 84 and ferritin 262    2/27/2023:  - latest hgb and iron panel WNL    8/8/2023:  - latest labs from July 2023 with normal hgb    4/29/2024:  - no current anemia and iron panels adequate    8/26/2024:  - latest hgb practically WNL at 11.8  - iron panel currently adequate; ferritin WNL    12/21/2024:  - latest hgb at 11.6  - iron panel currently adequate    (3) Abnormal mammogram:    5/19/2022:  - She had recent abnormal mammogram 5/6/2022 with cluster of calcifications 1 0'clock posittion on the left breast.   - she is scheduling US guided biopsy in near future  - discussed referral to [facility] for evaluation    6/20/2022:  - s/p breast biopsy on 5/30/2022 with pathology report coming back benign  - recommend repeat mammogram in at least 3 months    7/25/2022:  - refer to Dr Puentes for her breast issues    10/24/2022:  - she said she had repeat mammo in Aug 2022 but I do not see a report  - she is scheduled Dr Puentes in couple weeks    8/8/2023:  - repeat mammo was ordered by Dr Joseph  - she is overdo to see Dr joseph    4/29/2024:  - she is now seeing [place]barra [place]  - she reports no new mammogram issues    12/31/2024:  - She saw Dr Herndon with Gen-Surg in Nov 2024 for post-op f/u after having I&D of incision drainage of left breast abscess (which has since healed)      (3)  Overweight/Obesity     (4) HTN     (5) GERD       (6) General anxiety disorder      (7) Migraine HA's - hospitalized in Dec 2022    VISIT DIAGNOSES:      1. Acute pulmonary embolism, unspecified pulmonary embolism type, unspecified whether acute cor pulmonale present    2. Antithrombin III deficiency    3. Elevated factor VIII level    4. Protein S deficiency    5. History of pulmonary embolus (PE)    6. Iron deficiency anemia, unspecified iron deficiency anemia type    7. Iron deficiency anemia due to chronic blood loss            Plan:     PLAN:  1. F/u with PCP, pulmonary, neurology as directed - encouraged her to take the eliquis bid  2. She is to f/u with neurology in Jan 2025  3. Check labs monthly  incl iron panel - set up up IV iron as needed    4. F/u with GYN and Dr Pro  5. Refill eliquis as needed  6. F/u with Gen-Surg as directed  7. F/u with Dr Valerio for the knee issues - Jan 2025        RTC in 4 months    Fax note to  (new PCP), Inocencia Cason; Alexandra; Dhaval; Abbie/Rafael              Acute pulmonary embolism, unspecified pulmonary embolism type, unspecified whether acute cor pulmonale present    Antithrombin III deficiency    Elevated factor VIII level    Protein S deficiency    History of pulmonary embolus (PE)    Iron deficiency anemia, unspecified iron deficiency anemia type    Iron deficiency anemia due to chronic blood loss      Follow up in about 6 months (around 6/30/2025).    COVID-19 Discussion:    I had long discussion with patient and any applicable family about the COVID-19 coronavirus epidemic and the recommended precautions with regard to cancer and/or hematology patients. I have re-iterated the CDC recommendations for adequate hand washing, use of hand -like products, and coughing into elbow, etc. In addition, especially for our patients who are on chemotherapy and/or our otherwise immunocompromised patients, I have recommended avoidance of crowds, including movie  theaters, restaurants, churches, etc. I have recommended avoidance of any sick or symptomatic family members and/or friends. I have also recommended avoidance of any raw and unwashed food products, and general avoidance of food items that have not been prepared by themselves. The patient has been asked to call us immediately with any symptom developments, issues, questions or other general concerns.       Anticoagulation Discussion:    Discussed with patient and any applicable family members about the benefit and/or need for anticoagulation. I communicated about the risks of bleeding while on any anticoagulation, which could be serious and/or life-threatening, and which can occur at any time, regardless of degree of the level of anticoagulation. I expressed the need for compliance with any anticoagulation regimen and that failure to do so could potential lead to excessive bleeding, and risk to health and/or life. In particular, with patients on coumadin therapy, compliance with requested blood work is absolutely essential, as coumadin levels can vary from time to time, and failure to do so could potentially place the patient at risk for bleeding and/or clotting events which could be fatal. Patients on coumadin are encouraged to call the day after they have their levels drawn, as to obtain the appropriate instructions from my staff. Patients are aware that self-regulating or self-dosing of their medications is strictly prohibited.       I have explained and the patient understands all of  the current recommendation(s). I have answered all of their questions to the best of my ability and to their complete satisfaction.             Thank you for allowing me to participate in this pleasant patient's care. Please call with any questions or concerns.      Electronically signed Cisco Boston MD

## 2025-01-08 ENCOUNTER — HOSPITAL ENCOUNTER (EMERGENCY)
Facility: HOSPITAL | Age: 50
Discharge: HOME | End: 2025-01-08
Attending: STUDENT IN AN ORGANIZED HEALTH CARE EDUCATION/TRAINING PROGRAM
Payer: MEDICAID

## 2025-01-08 VITALS
HEART RATE: 94 BPM | OXYGEN SATURATION: 97 % | RESPIRATION RATE: 18 BRPM | BODY MASS INDEX: 47.84 KG/M2 | DIASTOLIC BLOOD PRESSURE: 75 MMHG | HEIGHT: 63 IN | TEMPERATURE: 98 F | WEIGHT: 270 LBS | SYSTOLIC BLOOD PRESSURE: 134 MMHG

## 2025-01-08 DIAGNOSIS — R10.31 RIGHT LOWER QUADRANT ABDOMINAL PAIN: Primary | ICD-10-CM

## 2025-01-08 LAB
ALBUMIN SERPL BCP-MCNC: 3.4 G/DL (ref 3.5–5.2)
ALP SERPL-CCNC: 116 U/L (ref 55–135)
ALT SERPL W/O P-5'-P-CCNC: 16 U/L (ref 10–44)
ANION GAP SERPL CALC-SCNC: 5 MMOL/L (ref 8–16)
AST SERPL-CCNC: 22 U/L (ref 10–40)
BACTERIA #/AREA URNS HPF: NORMAL /HPF
BASOPHILS # BLD AUTO: 0.03 K/UL (ref 0–0.2)
BASOPHILS NFR BLD: 0.3 % (ref 0–1.9)
BILIRUB SERPL-MCNC: 0.3 MG/DL (ref 0.1–1)
BILIRUB UR QL STRIP: NEGATIVE
BUN SERPL-MCNC: 15 MG/DL (ref 6–20)
CALCIUM SERPL-MCNC: 9 MG/DL (ref 8.7–10.5)
CHLORIDE SERPL-SCNC: 104 MMOL/L (ref 95–110)
CLARITY UR: ABNORMAL
CO2 SERPL-SCNC: 30 MMOL/L (ref 23–29)
COLOR UR: YELLOW
CREAT SERPL-MCNC: 1.7 MG/DL (ref 0.5–1.4)
DIFFERENTIAL METHOD BLD: ABNORMAL
EOSINOPHIL # BLD AUTO: 0.1 K/UL (ref 0–0.5)
EOSINOPHIL NFR BLD: 1.5 % (ref 0–8)
ERYTHROCYTE [DISTWIDTH] IN BLOOD BY AUTOMATED COUNT: 12.7 % (ref 11.5–14.5)
EST. GFR  (NO RACE VARIABLE): 36.1 ML/MIN/1.73 M^2
GLUCOSE SERPL-MCNC: 135 MG/DL (ref 70–110)
GLUCOSE UR QL STRIP: NEGATIVE
HCT VFR BLD AUTO: 38.8 % (ref 37–48.5)
HCV AB SERPL QL IA: NEGATIVE
HGB BLD-MCNC: 12.5 G/DL (ref 12–16)
HGB UR QL STRIP: NEGATIVE
HIV 1+2 AB+HIV1 P24 AG SERPL QL IA: NEGATIVE
IMM GRANULOCYTES # BLD AUTO: 0.02 K/UL (ref 0–0.04)
IMM GRANULOCYTES NFR BLD AUTO: 0.2 % (ref 0–0.5)
KETONES UR QL STRIP: NEGATIVE
LEUKOCYTE ESTERASE UR QL STRIP: ABNORMAL
LIPASE SERPL-CCNC: 38 U/L (ref 4–60)
LYMPHOCYTES # BLD AUTO: 1.7 K/UL (ref 1–4.8)
LYMPHOCYTES NFR BLD: 19.6 % (ref 18–48)
MCH RBC QN AUTO: 31.7 PG (ref 27–31)
MCHC RBC AUTO-ENTMCNC: 32.2 G/DL (ref 32–36)
MCV RBC AUTO: 99 FL (ref 82–98)
MICROSCOPIC COMMENT: NORMAL
MONOCYTES # BLD AUTO: 0.6 K/UL (ref 0.3–1)
MONOCYTES NFR BLD: 7.3 % (ref 4–15)
NEUTROPHILS # BLD AUTO: 6.2 K/UL (ref 1.8–7.7)
NEUTROPHILS NFR BLD: 71.1 % (ref 38–73)
NITRITE UR QL STRIP: NEGATIVE
NRBC BLD-RTO: 0 /100 WBC
PH UR STRIP: 5 [PH] (ref 5–8)
PLATELET # BLD AUTO: 308 K/UL (ref 150–450)
PMV BLD AUTO: 10.7 FL (ref 9.2–12.9)
POTASSIUM SERPL-SCNC: 3.3 MMOL/L (ref 3.5–5.1)
PROT SERPL-MCNC: 7.9 G/DL (ref 6–8.4)
PROT UR QL STRIP: ABNORMAL
RBC # BLD AUTO: 3.94 M/UL (ref 4–5.4)
RBC #/AREA URNS HPF: 2 /HPF (ref 0–4)
SODIUM SERPL-SCNC: 139 MMOL/L (ref 136–145)
SP GR UR STRIP: 1.02 (ref 1–1.03)
SQUAMOUS #/AREA URNS HPF: 25 /HPF
URN SPEC COLLECT METH UR: ABNORMAL
UROBILINOGEN UR STRIP-ACNC: NEGATIVE EU/DL
WBC # BLD AUTO: 8.67 K/UL (ref 3.9–12.7)
WBC #/AREA URNS HPF: 5 /HPF (ref 0–5)

## 2025-01-08 PROCEDURE — 80053 COMPREHEN METABOLIC PANEL: CPT | Performed by: NURSE PRACTITIONER

## 2025-01-08 PROCEDURE — 85025 COMPLETE CBC W/AUTO DIFF WBC: CPT | Performed by: NURSE PRACTITIONER

## 2025-01-08 PROCEDURE — 63600175 PHARM REV CODE 636 W HCPCS: Performed by: NURSE PRACTITIONER

## 2025-01-08 PROCEDURE — 81001 URINALYSIS AUTO W/SCOPE: CPT | Performed by: NURSE PRACTITIONER

## 2025-01-08 PROCEDURE — 96375 TX/PRO/DX INJ NEW DRUG ADDON: CPT

## 2025-01-08 PROCEDURE — 86803 HEPATITIS C AB TEST: CPT | Performed by: EMERGENCY MEDICINE

## 2025-01-08 PROCEDURE — 83690 ASSAY OF LIPASE: CPT | Performed by: NURSE PRACTITIONER

## 2025-01-08 PROCEDURE — 96374 THER/PROPH/DIAG INJ IV PUSH: CPT

## 2025-01-08 PROCEDURE — 87389 HIV-1 AG W/HIV-1&-2 AB AG IA: CPT | Performed by: EMERGENCY MEDICINE

## 2025-01-08 PROCEDURE — 99285 EMERGENCY DEPT VISIT HI MDM: CPT | Mod: 25

## 2025-01-08 RX ORDER — ONDANSETRON HYDROCHLORIDE 2 MG/ML
4 INJECTION, SOLUTION INTRAVENOUS
Status: COMPLETED | OUTPATIENT
Start: 2025-01-08 | End: 2025-01-08

## 2025-01-08 RX ORDER — KETOROLAC TROMETHAMINE 30 MG/ML
10 INJECTION, SOLUTION INTRAMUSCULAR; INTRAVENOUS
Status: COMPLETED | OUTPATIENT
Start: 2025-01-08 | End: 2025-01-08

## 2025-01-08 RX ADMIN — ONDANSETRON 4 MG: 2 INJECTION INTRAMUSCULAR; INTRAVENOUS at 04:01

## 2025-01-08 RX ADMIN — KETOROLAC TROMETHAMINE 10 MG: 30 INJECTION, SOLUTION INTRAMUSCULAR; INTRAVENOUS at 04:01

## 2025-01-08 NOTE — ED PROVIDER NOTES
Encounter Date: 2025       History     Chief Complaint   Patient presents with    Abdominal Pain     Patient states she had a coloscopy yesterday and last night began with pain in her right back that is wrapping around her side.  Patient states she is having stabbing pain.  States that she did have a BM today .  Pain is increased with movement.       HPI  50-year-old presenting with abdominal pain.  Reports that she had a colonoscopy yesterday morning and was feeling fine when she is discharge and then suddenly last night when she moves she had severe right lower quadrant pain wrapping around to the right lower back.  Every time she moves the pain is severe and takes her breath away but otherwise she has no shortness breath, chest pain, nausea, vomiting.  Mirta has a blood clotting disorder but was off of Eliquis for about a week leading up to the colonoscopy.  No bright red blood per rectum or melena.  Review of patient's allergies indicates:  No Known Allergies  Past Medical History:   Diagnosis Date    Antithrombin III deficiency 2021    Asthma     Claustrophobia     Elevated factor VIII level 2021    Esophageal reflux     Gastroesophageal reflux    Generalized anxiety disorder     Anxiety, Generalized    Herniated disc     Hypertension     Iron deficiency anemia due to chronic blood loss 10/27/2021    Lower extremity weakness     Morbid obesity     Obesity     Protein S deficiency 2021    Pulmonary embolism     Upper extremity weakness      Past Surgical History:   Procedure Laterality Date     SECTION      CHOLECYSTECTOMY      TONSILLECTOMY       No family history on file.  Social History     Tobacco Use    Smoking status: Former     Current packs/day: 0.00     Types: Cigarettes, Cigars     Quit date: 2019     Years since quittin.0    Smokeless tobacco: Never   Substance Use Topics    Alcohol use: Yes     Alcohol/week: 0.8 standard drinks of alcohol     Types: 1 Standard drinks  or equivalent per week     Comment: rarely    Drug use: No     Review of Systems  Per hpi  Physical Exam     Initial Vitals [01/08/25 1511]   BP Pulse Resp Temp SpO2   (!) 166/112 (!) 117 18 98 °F (36.7 °C) 97 %      MAP       --         Physical Exam    Nursing note and vitals reviewed.  Constitutional: She appears well-developed. She is not diaphoretic.   HENT:   Head: Normocephalic.   Eyes: Right eye exhibits no discharge. Left eye exhibits no discharge. No scleral icterus.   Neck: Neck supple. No tracheal deviation present.   Cardiovascular:  Normal rate and regular rhythm.           Pulmonary/Chest: Breath sounds normal. No stridor. No respiratory distress. She has no wheezes. She has no rhonchi. She has no rales.   Abdominal: Abdomen is soft. She exhibits no distension. There is abdominal tenderness (mild RLQ). There is no rebound and no guarding.   Musculoskeletal:         General: No edema.      Cervical back: Neck supple.     Neurological: She is alert and oriented to person, place, and time.   Skin: Skin is warm and dry.         ED Course   Procedures  Labs Reviewed   CBC W/ AUTO DIFFERENTIAL - Abnormal       Result Value    WBC 8.67      RBC 3.94 (*)     Hemoglobin 12.5      Hematocrit 38.8      MCV 99 (*)     MCH 31.7 (*)     MCHC 32.2      RDW 12.7      Platelets 308      MPV 10.7      Immature Granulocytes 0.2      Gran # (ANC) 6.2      Immature Grans (Abs) 0.02      Lymph # 1.7      Mono # 0.6      Eos # 0.1      Baso # 0.03      nRBC 0      Gran % 71.1      Lymph % 19.6      Mono % 7.3      Eosinophil % 1.5      Basophil % 0.3      Differential Method Automated      Narrative:     Release to patient->Immediate   COMPREHENSIVE METABOLIC PANEL - Abnormal    Sodium 139      Potassium 3.3 (*)     Chloride 104      CO2 30 (*)     Glucose 135 (*)     BUN 15      Creatinine 1.7 (*)     Calcium 9.0      Total Protein 7.9      Albumin 3.4 (*)     Total Bilirubin 0.3      Alkaline Phosphatase 116      AST 22       ALT 16      eGFR 36.1 (*)     Anion Gap 5 (*)     Narrative:     Release to patient->Immediate   URINALYSIS, REFLEX TO URINE CULTURE - Abnormal    Specimen UA Urine, Clean Catch      Color, UA Yellow      Appearance, UA Hazy (*)     pH, UA 5.0      Specific Gravity, UA 1.025      Protein, UA Trace (*)     Glucose, UA Negative      Ketones, UA Negative      Bilirubin (UA) Negative      Occult Blood UA Negative      Nitrite, UA Negative      Urobilinogen, UA Negative      Leukocytes, UA 1+ (*)     Narrative:     Specimen Source->Urine   HEPATITIS C ANTIBODY    Hepatitis C Ab Negative      Narrative:     Release to patient->Immediate   HIV 1 / 2 ANTIBODY    HIV 1/2 Ag/Ab Negative      Narrative:     Release to patient->Immediate   LIPASE    Lipase 38      Narrative:     Release to patient->Immediate   URINALYSIS MICROSCOPIC    RBC, UA 2      WBC, UA 5      Bacteria Rare      Squam Epithel, UA 25      Microscopic Comment SEE COMMENT      Narrative:     Specimen Source->Urine   POCT URINE PREGNANCY          Imaging Results              CT Abdomen Pelvis  Without Contrast (Final result)  Result time 01/08/25 18:06:23      Final result by Martín Jacques MD (01/08/25 18:06:23)                   Impression:      No CT evidence of acute intra-abdominal abnormality.    Chronic bibasilar subsegmental atelectasis or scarring, left more so than right.    Cholecystectomy.    Additional findings as above.      Electronically signed by: Martín Jacques MD  Date:    01/08/2025  Time:    18:06               Narrative:    EXAMINATION:  CT ABDOMEN PELVIS WITHOUT CONTRAST    CLINICAL HISTORY:  RLQ abdominal pain (Age >= 14y);sudden RLQ  pain after colonoscopy yesterday;    TECHNIQUE:  Low dose axial images, sagittal and coronal reformations were obtained from the lung bases to the pubic symphysis without IV contrast.  Oral contrast was not administered.    COMPARISON:  [facility] chest 12/14/2023, CT abdomen and pelvis  03/24/2022    FINDINGS:  There are chronic subsegmental opacities at the visualized lung bases, left greater than right, which may reflect atelectasis or scarring.  There is mild elevation of the left hemidiaphragm.  There are scattered nodular opacities at the lung bases which appear similar to prior examination.  There is no significant pleural fluid.  There is coronary artery calcification.    The liver is enlarged.  No focal hepatic abnormality appreciated on this limited noncontrast examination.  The gallbladder is surgically absent.  There is no intra-or extrahepatic biliary ductal dilatation.    The stomach, spleen, pancreas, and adrenal glands appear within normal limits for noncontrast technique.    Kidneys normal in size and location.  No evidence of hydronephrosis.  There is no evidence of hydronephrosis. The urinary bladder is unremarkable. Uterus demonstrates a lobular contour which may reflect underlying fibroids.  Further assessment is limited by lack of IV contrast.  No significant free fluid in the pelvis.    The abdominal aorta is normal in course and caliber without significant atherosclerotic calcifications.    The visualized loops of small and large bowel show no evidence of obstruction or inflammation. There is no CT evidence of acute appendicitis. There is no ascites, free fluid, or intraperitoneal free air. There are scattered shotty small mesenteric and retroperitoneal lymph nodes. There is a small fat containing umbilical hernia.    Osseous structures demonstrate mild degenerative changes.  The extraperitoneal soft tissues are unremarkable.                                       X-Ray Abdomen Flat And Erect (Final result)  Result time 01/08/25 16:40:03      Final result by Shun Teran MD (01/08/25 16:40:03)                   Impression:      No radiographic evidence of acute abdominal pathology.    Status post cholecystectomy.      Electronically signed by: Shun  Jeffry  Date:    01/08/2025  Time:    16:40               Narrative:    EXAMINATION:  XR ABDOMEN FLAT AND ERECT    CLINICAL HISTORY:  Abdominal Pain;    COMPARISON:  CT abdomen and pelvis 03/24/2022    FINDINGS:  Erect and supine views of the abdomen show no free intraperitoneal air or pneumatosis.  Overall nonobstructive bowel gas pattern with general paucity of small bowel gas.  Moderate fecal material in the proximal colon.  Right upper quadrant cholecystectomy clips.  Mild degenerative and scoliotic changes of the thoracolumbar spine.                                       Medications   ondansetron injection 4 mg (4 mg Intravenous Given 1/8/25 1605)   ketorolac injection 9.999 mg (9.999 mg Intravenous Given 1/8/25 1605)     Medical Decision Making  Amount and/or Complexity of Data Reviewed  Labs: ordered.  Radiology: ordered.    On my independent interpretation EKG with rate of, normal intervals, no sign of acute occlusion myocardial infarction      On my independent interpretation labs CBC, CMP, urinalysis, within acceptable limits    Per Radiology x-ray flat and erect and CT abdomen and pelvis within acceptable limits    On serial exams patient resting comfortably, no distress.  Patient when walking seems to have pain in her right side.  Imaging and workup unrevealing.  Unclear etiology of patient's symptoms.  Discussed diagnostic uncertainty with the patient but due to normal vital signs, reassuring exam, reassuring workup discussed symptomatic care and close monitoring and follow up with her primary care doctor tomorrow,    Strict return precautions discussed    Yvrose Rios MD  Emergency Medicine Staff Physician                                      Clinical Impression:  Final diagnoses:  [R10.31] Right lower quadrant abdominal pain (Primary)          ED Disposition Condition    Discharge Stable          ED Prescriptions    None       Follow-up Information       Follow up With Specialties Details Why  Contact Info Additional Information    [facility] - Emergency Dept Emergency Medicine Go to  Return to an emergency department immediately if you develop persisting or worsening symptoms or with any new symptoms such as fevers, chills, inability to eat or drink, uncontrollable pain, headaches, chagnes in vision, nausea, vomiting, stomach pain [address] [facility] [id number]  [phone] 1st floor             Yvrose Rios MD  01/08/25 [id number]

## 2025-01-08 NOTE — FIRST PROVIDER EVALUATION
Emergency Department TeleTriage Encounter Note      CHIEF COMPLAINT    Chief Complaint   Patient presents with    Abdominal Pain     Patient states she had a coloscopy yesterday and last night began with pain in her right back that is wrapping around her side.  Patient states she is having stabbing pain.  States that she did have a BM today .  Pain is increased with movement.         VITAL SIGNS   Initial Vitals [01/08/25 1511]   BP Pulse Resp Temp SpO2   (!) 166/112 (!) 117 18 98 °F (36.7 °C) 97 %      MAP       --            ALLERGIES    Review of patient's allergies indicates:  No Known Allergies    PROVIDER TRIAGE NOTE  This is a teletriage evaluation of a 50 y.o. female presenting to the ED complaining of abd pain after colonoscopy yesterday. Has had BM. No vomiting.     Alert, no distress.     Initial orders will be placed and care will be transferred to an alternate provider when patient is roomed for a full evaluation. Any additional orders and the final disposition will be determined by that provider.         ORDERS  Labs Reviewed   HEPATITIS C ANTIBODY   HIV 1 / 2 ANTIBODY       ED Orders (720h ago, onward)      Start Ordered     Status Ordering Provider    01/08/25 1530 01/08/25 1520  ondansetron injection 4 mg  ED 1 Time         Ordered LYNDSEY IRWIN NKieran    01/08/25 1530 01/08/25 1520  ketorolac injection 9.999 mg  ED 1 Time         Ordered LYNDSEY IRWIN N.    01/08/25 1521 01/08/25 1520  Vital signs  Every 2 hours         Ordered LYNDSEY IRWIN    01/08/25 1521 01/08/25 1520  X-Ray Abdomen Flat And Erect  1 time imaging         Ordered LYNDSEY IRWIN N.    01/08/25 1520 01/08/25 1520  Diet NPO  Diet effective now         Ordered LYNDSEY IRWIN    01/08/25 1520 01/08/25 1520  Insert peripheral IV  Once         Ordered LYNDSEY IRWIN    01/08/25 1520 01/08/25 1520  CBC W/ AUTO DIFFERENTIAL  STAT         Ordered LYNDSEY IRWIN     01/08/25 1520 01/08/25 1520  Comp. Metabolic Panel  STAT         Ordered DC LYNDSEY N.    01/08/25 1520 01/08/25 1520  Lipase  STAT         Ordered RAVIJOSÉ LUIS LYNDSEY N.    01/08/25 1520 01/08/25 1520  Urinalysis, Reflex to Urine Culture Urine, Clean Catch  STAT         Ordered LYNDSEY IRWIN N.    01/08/25 1515 01/08/25 1514  Hepatitis C Antibody  STAT         Ordered RACHEL GORDON    01/08/25 1515 01/08/25 1514  HIV 1/2 Ag/Ab (4th Gen)  STAT         Ordered RACHEL GORDON              Virtual Visit Note: The provider triage portion of this emergency department evaluation and documentation was performed via [employer], a HIPAA-compliant telemedicine application, in concert with a tele-presenter in the room. A face to face patient evaluation with one of my colleagues will occur once the patient is placed in an emergency department room.      DISCLAIMER: This note was prepared with [employer] voice recognition transcription software. Garbled syntax, mangled pronouns, and other bizarre constructions may be attributed to that software system.

## 2025-01-09 NOTE — DISCHARGE INSTRUCTIONS
No emergency reasons for your symptoms were found but you need to monitor your symptoms and with any persistence, worsening, new symptoms or any concerns then you need to return to the ER immediately.  You will need to have follow up of your symptoms, please follow up with your primary care doctor within 3 days for further evaluation.

## 2025-02-03 ENCOUNTER — HOSPITAL ENCOUNTER (EMERGENCY)
Facility: HOSPITAL | Age: 50
Discharge: HOME | End: 2025-02-03
Attending: EMERGENCY MEDICINE
Payer: MEDICAID

## 2025-02-03 VITALS
SYSTOLIC BLOOD PRESSURE: 150 MMHG | HEART RATE: 72 BPM | OXYGEN SATURATION: 100 % | TEMPERATURE: 98 F | RESPIRATION RATE: 18 BRPM | HEIGHT: 63 IN | DIASTOLIC BLOOD PRESSURE: 67 MMHG | WEIGHT: 270 LBS | BODY MASS INDEX: 47.84 KG/M2

## 2025-02-03 DIAGNOSIS — R10.11 RUQ ABDOMINAL PAIN: ICD-10-CM

## 2025-02-03 LAB
ALBUMIN SERPL BCP-MCNC: 3.3 G/DL (ref 3.5–5.2)
ALP SERPL-CCNC: 104 U/L (ref 55–135)
ALT SERPL W/O P-5'-P-CCNC: 16 U/L (ref 10–44)
ANION GAP SERPL CALC-SCNC: 5 MMOL/L (ref 8–16)
AST SERPL-CCNC: 21 U/L (ref 10–40)
B-HCG UR QL: NEGATIVE
BASOPHILS # BLD AUTO: 0.03 K/UL (ref 0–0.2)
BASOPHILS NFR BLD: 0.3 % (ref 0–1.9)
BILIRUB SERPL-MCNC: 0.4 MG/DL (ref 0.1–1)
BUN SERPL-MCNC: 13 MG/DL (ref 6–20)
CALCIUM SERPL-MCNC: 9.1 MG/DL (ref 8.7–10.5)
CHLORIDE SERPL-SCNC: 106 MMOL/L (ref 95–110)
CO2 SERPL-SCNC: 28 MMOL/L (ref 23–29)
CREAT SERPL-MCNC: 1.7 MG/DL (ref 0.5–1.4)
CTP QC/QA: YES
DIFFERENTIAL METHOD BLD: ABNORMAL
EOSINOPHIL # BLD AUTO: 0.2 K/UL (ref 0–0.5)
EOSINOPHIL NFR BLD: 1.5 % (ref 0–8)
ERYTHROCYTE [DISTWIDTH] IN BLOOD BY AUTOMATED COUNT: 12.6 % (ref 11.5–14.5)
EST. GFR  (NO RACE VARIABLE): 36.1 ML/MIN/1.73 M^2
GLUCOSE SERPL-MCNC: 110 MG/DL (ref 70–110)
HCT VFR BLD AUTO: 40.1 % (ref 37–48.5)
HGB BLD-MCNC: 12.5 G/DL (ref 12–16)
IMM GRANULOCYTES # BLD AUTO: 0.03 K/UL (ref 0–0.04)
IMM GRANULOCYTES NFR BLD AUTO: 0.3 % (ref 0–0.5)
LIPASE SERPL-CCNC: 79 U/L (ref 4–60)
LYMPHOCYTES # BLD AUTO: 1.4 K/UL (ref 1–4.8)
LYMPHOCYTES NFR BLD: 14.6 % (ref 18–48)
MCH RBC QN AUTO: 30.7 PG (ref 27–31)
MCHC RBC AUTO-ENTMCNC: 31.2 G/DL (ref 32–36)
MCV RBC AUTO: 99 FL (ref 82–98)
MONOCYTES # BLD AUTO: 0.5 K/UL (ref 0.3–1)
MONOCYTES NFR BLD: 5.2 % (ref 4–15)
NEUTROPHILS # BLD AUTO: 7.6 K/UL (ref 1.8–7.7)
NEUTROPHILS NFR BLD: 78.1 % (ref 38–73)
NRBC BLD-RTO: 0 /100 WBC
PLATELET # BLD AUTO: 294 K/UL (ref 150–450)
PMV BLD AUTO: 10.5 FL (ref 9.2–12.9)
POTASSIUM SERPL-SCNC: 4 MMOL/L (ref 3.5–5.1)
PROT SERPL-MCNC: 7.4 G/DL (ref 6–8.4)
RBC # BLD AUTO: 4.07 M/UL (ref 4–5.4)
SODIUM SERPL-SCNC: 139 MMOL/L (ref 136–145)
WBC # BLD AUTO: 9.77 K/UL (ref 3.9–12.7)

## 2025-02-03 PROCEDURE — 25500020 PHARM REV CODE 255: Performed by: EMERGENCY MEDICINE

## 2025-02-03 PROCEDURE — 80053 COMPREHEN METABOLIC PANEL: CPT | Performed by: EMERGENCY MEDICINE

## 2025-02-03 PROCEDURE — 93005 ELECTROCARDIOGRAM TRACING: CPT | Performed by: INTERNAL MEDICINE

## 2025-02-03 PROCEDURE — 83690 ASSAY OF LIPASE: CPT | Performed by: EMERGENCY MEDICINE

## 2025-02-03 PROCEDURE — 81025 URINE PREGNANCY TEST: CPT | Performed by: EMERGENCY MEDICINE

## 2025-02-03 PROCEDURE — 85025 COMPLETE CBC W/AUTO DIFF WBC: CPT | Performed by: EMERGENCY MEDICINE

## 2025-02-03 PROCEDURE — 93010 ELECTROCARDIOGRAM REPORT: CPT | Mod: ,,, | Performed by: INTERNAL MEDICINE

## 2025-02-03 PROCEDURE — 99285 EMERGENCY DEPT VISIT HI MDM: CPT | Mod: 25

## 2025-02-03 PROCEDURE — 96374 THER/PROPH/DIAG INJ IV PUSH: CPT

## 2025-02-03 PROCEDURE — 63600175 PHARM REV CODE 636 W HCPCS: Performed by: EMERGENCY MEDICINE

## 2025-02-03 RX ORDER — MORPHINE SULFATE 2 MG/ML
6 INJECTION, SOLUTION INTRAMUSCULAR; INTRAVENOUS
Status: COMPLETED | OUTPATIENT
Start: 2025-02-03 | End: 2025-02-03

## 2025-02-03 RX ADMIN — MORPHINE SULFATE 6 MG: 2 INJECTION, SOLUTION INTRAMUSCULAR; INTRAVENOUS at 09:02

## 2025-02-03 RX ADMIN — IOHEXOL 100 ML: 350 INJECTION, SOLUTION INTRAVENOUS at 11:02

## 2025-02-03 NOTE — ED TRIAGE NOTES
Pt presents with c/o RLQ pain with radiation to lower back. Pt reports this happened a month ago also, reports pain got worse after she bent over and picked something up. Denies n/v. No bowel/bladder issues.

## 2025-02-03 NOTE — ED PROVIDER NOTES
Chief complaint:  Abdominal Pain (RLQ pain.  Has been here before for this pain.  Pain went away; yesterday pain started again after heavy lifting.  Denies any urinary symptoms)      HPI:  Katiuska Preston is a 50 y.o. female with hx PE on apixaban, GERD, obesity, , anemia, prior cholex presenting with ongoing right upper quadrant pain. Patient seen 25 (almost one month prior) for RLQ/R back pain with workup including CT-AP w/o acute process.  Patient denies relationship to eating or any nausea or emesis.  Pain is worse with certain movements and is significantly worse since picking up a box yesterday.  She reports pain since prior evaluation has not resolved and she reports discussing with outpatient follow-up with plan to monitor.  She denies injury preceding onset of pain.  No associated fever.  No urinary complaints such as urinary frequency, urgency, dysuria, hematuria.  It does radiate to the right back at times.  Pain has not migrated.    ROS: As per HPI and below:  No associated chest pain, dyspnea, pleuritic pain, hemoptysis, fever, cough, blood in the stools, dark stools, diarrhea.    Review of patient's allergies indicates:  No Known Allergies    Patient's Medications   New Prescriptions    No medications on file   Previous Medications    ALBUTEROL (PROVENTIL/VENTOLIN HFA) 90 MCG/ACTUATION INHALER    Inhale 2 puffs into the lungs every 6 (six) hours as needed for Wheezing. Rescue    AMLODIPINE (NORVASC) 10 MG TABLET    1 tablet Orally Once a day for 30 days    ASPIRIN (ECOTRIN) 81 MG EC TABLET    Take 1 tablet (81 mg total) by mouth once daily.    ATORVASTATIN (LIPITOR) 40 MG TABLET    Take 1 tablet (40 mg total) by mouth every evening.    CARVEDILOL (COREG) 25 MG TABLET    Take 1 tablet (25 mg total) by mouth 2 (two) times daily.    CLONIDINE (CATAPRES) 0.1 MG TABLET    Take 1 tablet (0.1 mg total) by mouth every 8 (eight) hours as needed (SBP >180).    COLLOIDAL OATMEAL (EUCERIN ECZEMA  RELIEF) 1 % CREA    Apply 1 Application topically 2 (two) times a day.    ELIQUIS 5 MG TAB    TAKE 1 TABLET BY MOUTH TWICE DAILY    FERROUS SULFATE (FEROSUL) 325 MG (65 MG IRON) TAB TABLET    TAKE 1 TABLET BY MOUTH EVERY DAY    FLUTICASONE PROPIONATE (FLONASE) 50 MCG/ACTUATION NASAL SPRAY    2 sprays by Each Nostril route daily as needed for Rhinitis.    FLUTICASONE-SALMETEROL 230-21 MCG/DOSE (ADVAIR HFA) 230-21 MCG/ACTUATION HFAA INHALER    Inhale 2 puffs into the lungs 2 (two) times daily. Controller    METOPROLOL SUCCINATE (TOPROL-XL) 200 MG 24 HR TABLET        MINOCYCLINE (MINOCIN,DYNACIN) 100 MG CAPSULE        MUPIROCIN (BACTROBAN) 2 % OINTMENT    Apply topically.    NEXIUM 40 MG CAPSULE    Take 40 mg by mouth once daily.    OLMESARTAN-HYDROCHLOROTHIAZIDE (BENICAR HCT) 40-25 MG PER TABLET    Take 1 tablet by mouth once daily.    SULFAMETHOXAZOLE-TRIMETHOPRIM 800-160MG (BACTRIM DS) 800-160 MG TAB    Take 1 tablet by mouth 2 (two) times daily.    TOPIRAMATE (TOPAMAX) 25 MG TABLET    Take 25 mg by mouth 2 (two) times daily.    TRAZODONE (DESYREL) 100 MG TABLET    Take 100 mg by mouth every evening.   Modified Medications    No medications on file   Discontinued Medications    No medications on file       PMH:  As per HPI and below:  Past Medical History:   Diagnosis Date    Antithrombin III deficiency 2021    Asthma     Claustrophobia     Elevated factor VIII level 2021    Esophageal reflux     Gastroesophageal reflux    Generalized anxiety disorder     Anxiety, Generalized    Herniated disc     Hypertension     Iron deficiency anemia due to chronic blood loss 10/27/2021    Lower extremity weakness     Morbid obesity     Obesity     Protein S deficiency 2021    Pulmonary embolism     Upper extremity weakness      Past Surgical History:   Procedure Laterality Date     SECTION      CHOLECYSTECTOMY      TONSILLECTOMY         Social History     Socioeconomic History    Marital status:     Tobacco Use    Smoking status: Former     Current packs/day: 0.00     Types: Cigarettes, Cigars     Quit date: 2019     Years since quittin.0    Smokeless tobacco: Never   Substance and Sexual Activity    Alcohol use: Yes     Alcohol/week: 0.8 standard drinks of alcohol     Types: 1 Standard drinks or equivalent per week     Comment: rarely    Drug use: No    Sexual activity: Not Currently     Social Drivers of Health     Financial Resource Strain: Medium Risk (10/18/2022)    Overall Financial Resource Strain (CARDIA)     Difficulty of Paying Living Expenses: Somewhat hard   Food Insecurity: Food Insecurity Present (10/18/2022)    Hunger Vital Sign     Worried About Running Out of Food in the Last Year: Sometimes true     Ran Out of Food in the Last Year: Sometimes true   Transportation Needs: Unmet Transportation Needs (10/18/2022)    PRAPARE - Transportation     Lack of Transportation (Medical): Yes     Lack of Transportation (Non-Medical): Yes   Stress: No Stress Concern Present (10/18/2022)    [employer] - Occupational Stress Questionnaire     Feeling of Stress : Only a little   Housing Stability: High Risk (10/18/2022)    Housing Stability Vital Sign     Unable to Pay for Housing in the Last Year: Yes     Unstable Housing in the Last Year: No       No family history on file.    Physical Exam:    Vitals:    25 1230   BP: (!) 157/70   Pulse: 72   Resp:    Temp:      GENERAL:  No apparent distress.  Alert.  Large body habitus.  HEENT:  Moist mucous membranes.  Normocephalic and atraumatic.    NECK:  No swelling.  Midline trachea.   CARDIOVASCULAR:  Regular rate and rhythm.  2+ radial pulses.    PULMONARY:  Lungs clear to auscultation bilaterally.  No wheezes, rales, or rhonci.    ABDOMEN:  Right upper quadrant abdominal tenderness with mild voluntary guarding.  No involuntary guarding, distention, masses, palpable hernias.    EXTREMITIES:  Warm and well perfused.  Brisk  capillary refill.    NEUROLOGICAL:  Normal mental status.  Appropriate and conversant.    SKIN:  No rashes or ecchymoses.    BACK:  Atraumatic.  No CVA tenderness to palpation.      Labs Reviewed   CBC W/ AUTO DIFFERENTIAL - Abnormal       Result Value    WBC 9.77      RBC 4.07      Hemoglobin 12.5      Hematocrit 40.1      MCV 99 (*)     MCH 30.7      MCHC 31.2 (*)     RDW 12.6      Platelets 294      MPV 10.5      Immature Granulocytes 0.3      Gran # (ANC) 7.6      Immature Grans (Abs) 0.03      Lymph # 1.4      Mono # 0.5      Eos # 0.2      Baso # 0.03      nRBC 0      Gran % 78.1 (*)     Lymph % 14.6 (*)     Mono % 5.2      Eosinophil % 1.5      Basophil % 0.3      Differential Method Automated     COMPREHENSIVE METABOLIC PANEL - Abnormal    Sodium 139      Potassium 4.0      Chloride 106      CO2 28      Glucose 110      BUN 13      Creatinine 1.7 (*)     Calcium 9.1      Total Protein 7.4      Albumin 3.3 (*)     Total Bilirubin 0.4      Alkaline Phosphatase 104      AST 21      ALT 16      eGFR 36.1 (*)     Anion Gap 5 (*)    LIPASE - Abnormal    Lipase 79 (*)    POCT URINE PREGNANCY    POC Preg Test, Ur Negative       Acceptable Yes         Current Discharge Medication List        CONTINUE these medications which have NOT CHANGED    Details   albuterol (PROVENTIL/VENTOLIN HFA) 90 mcg/actuation inhaler Inhale 2 puffs into the lungs every 6 (six) hours as needed for Wheezing. Rescue  Qty: 18 g, Refills: 5    Associated Diagnoses: Asthma, unspecified asthma severity, unspecified whether complicated, unspecified whether persistent; Chronic hypoxemic respiratory failure      amLODIPine (NORVASC) 10 MG tablet 1 tablet Orally Once a day for 30 days      aspirin (ECOTRIN) 81 MG EC tablet Take 1 tablet (81 mg total) by mouth once daily.  Qty: 30 tablet, Refills: 0      atorvastatin (LIPITOR) 40 MG tablet Take 1 tablet (40 mg total) by mouth every evening.  Qty: 30 tablet, Refills: 0      carvediloL  (COREG) 25 MG tablet Take 1 tablet (25 mg total) by mouth 2 (two) times daily.  Qty: 60 tablet, Refills: 0    Comments: .      cloNIDine (CATAPRES) 0.1 MG tablet Take 1 tablet (0.1 mg total) by mouth every 8 (eight) hours as needed (SBP >180).  Qty: 30 tablet, Refills: 0    Comments: .      colloidal oatmeaL (EUCERIN ECZEMA RELIEF) 1 % Crea Apply 1 Application topically 2 (two) times a day.  Qty: 206 g, Refills: 2    Associated Diagnoses: Eczema, unspecified type      ELIQUIS 5 mg Tab TAKE 1 TABLET BY MOUTH TWICE DAILY  Qty: 60 tablet, Refills: 3    Associated Diagnoses: Acute pulmonary embolism, unspecified pulmonary embolism type, unspecified whether acute cor pulmonale present      ferrous sulfate (FEROSUL) 325 mg (65 mg iron) Tab tablet TAKE 1 TABLET BY MOUTH EVERY DAY  Qty: 30 tablet, Refills: 6    Associated Diagnoses: Iron deficiency anemia due to chronic blood loss      fluticasone propionate (FLONASE) 50 mcg/actuation nasal spray 2 sprays by Each Nostril route daily as needed for Rhinitis.      fluticasone-salmeterol 230-21 mcg/dose (ADVAIR HFA) 230-21 mcg/actuation HFAA inhaler Inhale 2 puffs into the lungs 2 (two) times daily. Controller  Qty: 12 g, Refills: 6      metoprolol succinate (TOPROL-XL) 200 MG 24 hr tablet       minocycline (MINOCIN,DYNACIN) 100 MG capsule       mupirocin (BACTROBAN) 2 % ointment Apply topically.      NEXIUM 40 mg capsule Take 40 mg by mouth once daily.      olmesartan-hydrochlorothiazide (BENICAR HCT) 40-25 mg per tablet Take 1 tablet by mouth once daily.      sulfamethoxazole-trimethoprim 800-160mg (BACTRIM DS) 800-160 mg Tab Take 1 tablet by mouth 2 (two) times daily.      topiramate (TOPAMAX) 25 MG tablet Take 25 mg by mouth 2 (two) times daily.      trazodone (DESYREL) 100 MG tablet Take 100 mg by mouth every evening.             Orders Placed This Encounter   Procedures    CTA Abdomen and Pelvis    CBC Auto Differential    Comprehensive Metabolic Panel    Lipase    POCT  urine pregnancy    EKG 12-lead    Insert peripheral IV       Imaging Results              CTA Abdomen and Pelvis (Final result)  Result time 02/03/25 12:02:26      Final result by Eladio Wilkins MD (02/03/25 12:02:26)                   Impression:      1. No acute intra-abdominal or intrapelvic abnormalities.  2. Additional observations as above.      Electronically signed by: Eladio Wilkins  Date:    02/03/2025  Time:    12:02               Narrative:    EXAMINATION:  CTA ABDOMEN AND PELVIS    CLINICAL HISTORY:  RUQ pain on apixaban;    COMPARISON:  January 8, 2025; March 2022    FINDINGS:  100 cc nonionic contrast was utilized for the examination.  The bolus was timed for optimal arterial opacification.  Multiplanar reformatted images were obtained and stored as a part of the patient's permanent medical record.    Several right lower lobe subpleural noncalcified nodules are unchanged compared to 2022.  Regions of scarring or atelectasis at the left lung base are increased compared to 2022, but stable compared to January 2025.    Images of the upper abdomen are limited by beam hardening secondary to body habitus.  No hepatic mass or contour abnormality is identified.  The gallbladder is absent.  The biliary tree is nondilated.  The spleen, pancreas, and adrenal glands are normal.  There is no evidence of renal mass, calculus, or hydronephrosis.    The abdominal aorta is normal in caliber, without atherosclerotic calcification.  The celiac, superior mesenteric, and inferior mesenteric arteries are within normal limits.  Bilateral renal arteries are unremarkable.  There is no identifiable contrast extravasation to indicate active gastrointestinal hemorrhage.    There is no pathologic bowel wall thickening or evidence of obstruction.  The appendix is visualized and is normal.    Images of the pelvis demonstrate a normal urinary bladder and bowel structures.  There is no free fluid or lymphadenopathy.  Small  calcified uterine fibroid is unchanged.    No acute osseous abnormalities are identified.                                  (Rad read)    The patient was informed of the incidental finding(s) as well as the need for PCP or specialist follow-up for reevaluation and possible further investigation or monitoring.      ED Course as of 02/03/25 1336   Mon Feb 03, 2025 0908 EKG: Normal sinus rhythm with sinus arrhythmia, rate of 71, normal intervals and axis.  There are no acute ST or T wave changes suggestive of acute ischemia or infarction.  (Independently interpreted by me) [MR]      ED Course User Index  [MR] Martín De Leon MD       MDM:    50 y.o. female with almost one month of right upper quadrant pain now worse after lifting.  Musculoskeletal etiologies discussed including abdominal wall etiology, although abnormal time course for muscular or ligamentous strain.  She has had prior cholecystectomy and I doubt cholecystitis or cholelithiasis.  CT to be repeated here to exclude acute, life-threatening etiology given recent worsening of pain prior to planned continued outpatient reassessment and workup.  Laboratories sent to exclude other end-organ dysfunction.  Workup including imaging and laboratories without sign of acute, life-threatening process.  I doubt mesenteric ischemia, perforated viscus, obstruction, abscess, atypical appendicitis.  With symptoms going on for nearly a month, I think she is appropriate for outpatient PCP and GI follow up for further workup.  EKG was reviewed with no sign of acute ischemia or infarction.  I doubt ACS.  I do not think further troponin enzyme assay or provocative testing is indicated.  Detailed return precautions reviewed.    Diagnoses:    1. RUQ abdominal pain       Martín De Leon MD  02/03/25 1564

## 2025-02-08 LAB
OHS QRS DURATION: 82 MS
OHS QTC CALCULATION: 445 MS

## 2025-03-27 DIAGNOSIS — S76.011A STRAIN OF FLEXOR MUSCLE OF RIGHT HIP, INITIAL ENCOUNTER: ICD-10-CM

## 2025-03-27 DIAGNOSIS — M54.50 RECURRENT LOW BACK PAIN: ICD-10-CM

## 2025-03-27 DIAGNOSIS — E66.01 MORBID OBESITY: Primary | ICD-10-CM

## 2025-05-15 ENCOUNTER — CLINICAL SUPPORT (OUTPATIENT)
Dept: REHABILITATION | Facility: HOSPITAL | Age: 50
End: 2025-05-15
Payer: MEDICAID

## 2025-05-15 DIAGNOSIS — R29.3 POOR POSTURE: Primary | ICD-10-CM

## 2025-05-15 DIAGNOSIS — M53.86 DECREASED RANGE OF MOTION OF LUMBAR SPINE: ICD-10-CM

## 2025-05-15 DIAGNOSIS — R29.898 DECREASED STRENGTH OF LOWER EXTREMITY: ICD-10-CM

## 2025-05-15 PROCEDURE — 97110 THERAPEUTIC EXERCISES: CPT | Mod: PN

## 2025-05-15 PROCEDURE — 97161 PT EVAL LOW COMPLEX 20 MIN: CPT | Mod: PN

## 2025-05-15 NOTE — LETTER
May 15, 2025  Saadia Ybarra MD  [address] [place] [address]    To whom it may concern,     The attached plan of care is being sent to you for review and reference.    You may indicate your approval by signing the document electronically, or by faxing/mailing a signed copy of the final page of this document back to the attention of Omayra Clark, PT:         Plan of Care 5/15/25   Effective from: 5/15/2025  Effective to: 8/15/2025    Plan ID: [id number]            Participants as of Finalize on 5/15/2025    Name Type Comments Contact Info    Saadia Ybarra MD PCP - General  [phone]       Last Plan Note     Author: Omayra Clark, PT Status: Signed Last edited: 5/15/2025  2:00 PM       Physical Therapy Evaluation    Patient Name: Katiuska Preston  MRN: [id number]  YOB: 1975  Encounter Date: 5/15/2025    Therapy Diagnosis:   Encounter Diagnoses   Name Primary?    Poor posture Yes    Decreased strength of lower extremity     Decreased range of motion of lumbar spine      Physician: Saadia Ybarra MD    Physician Orders: Eval and Treat  Medical Diagnosis: Recurrent low back pain    Visit # / Visits Authorized:  1 / 1  Insurance Authorization Period: 3/27/2025 to 3/27/2026  Date of Evaluation: 5/15/2025  Plan of Care Certification: 5/15/2025 to 8/15/2025     Time In: 1355   Time Out: 1455  Total Time (in minutes): 60   Total Billable Time (in minutes): 60    Intake Outcome Measure for FOTO Survey    Therapist reviewed FOTO scores for Katiuska Preston on 5/15/2025.   FOTO report - see Media section or FOTO account episode details.     Intake Score: 53%    Precautions:       Subjective   History of Present Illness  Katiuska is a 50 y.o. female who reports to physical therapy with a chief concern of Chronic neck and back pain.     The patient reports a medical diagnosis of M54.50 (ICD-10-CM) - Recurrent low back pain.            Dominant Hand: Left  History of Present  Condition/Illness: Reports history of cracked tailbone when she was delivering her daughter who was breached. If she sits too long the pain starts to bother her. She also has scoliosis. She reports she history of two strokes last year. She sits mostly in the bed most of the time because the apartment is a small/studio. She sits outside sometimes with a cushion. She lives with her neighbor and daughter. There are only two females in the apartment complex she is in and doesn't feel comfortable sitting outside too much. Reports the R side was affected by the stroke. She had therapy following the stroke. She had to re learn how to walk when she was younger due to scoliosis. She had anterior neck surgery in 2014 due to buldging disc and had to relearn how to walk again. Reports numbness in the legs when she is sideways in the bed.     Activities of Daily Living  Social history was obtained from Patient.    General Prior Level of Function Comments: independent  General Current Level of Function Comments: independent, she wants her daughter around just in case she falls       Previously independent with activities of daily living? Yes     Currently independent with activities of daily living? Yes          Previously independent with instrumental activities of daily living? Yes     Currently independent with instrumental activities of daily living? Yes              Pain     Patient reports a current pain level of 5/10. Pain at best is reported as 0/10. Pain at worst is reported as 10/10.   Location: tailbone and low back region  Clinical Progression (since onset): Stable  Pain Qualities: Other (Comment)  Other Pain Qualities: pinch, pressure on her tailbone  Pain-Relieving Factors: Medications - prescription, Heat, Other (Comment)  Other Pain-Relieving Factors: muscle relaxer and hot shower really helps  Pain-Aggravating Factors: Sitting, Standing         Review of Systems  Patient reports: Night Pain  Patient denies:  Bladder Incontinence, Bowel Incontinence, and Weight Loss        Treatment History  Treatments  Previously Received Treatments: No  Currently Receiving Treatments: No  Additional Treatment Details: No therapy for the back pain only post stroke     Living Arrangements  Living Situation  Housing: Home independently  Living Arrangements: Alone, Other (Comment)  Other Living Arrangements Comment: daughter is in and out  Support Systems: Children    Home Setup  Type of Structure: Apartment/condo  Number of Levels in Home: One level        Employment  Patient does not report that: Does the patient's condition impact their ability to work?      Hobbies: Bakari- L handed,  puzzles, enjoys cooking       Past Medical History/Physical Systems Review:   Katiuska Preston  has a past medical history of Antithrombin III deficiency, Asthma, Claustrophobia, Elevated factor VIII level, Esophageal reflux, Generalized anxiety disorder, Herniated disc, Hypertension, Iron deficiency anemia due to chronic blood loss, Lower extremity weakness, Morbid obesity, Obesity, Protein S deficiency, Pulmonary embolism, and Upper extremity weakness.    Katiuska Preston  has a past surgical history that includes Cholecystectomy;  section; and Tonsillectomy.    Katiuska has a current medication list which includes the following prescription(s): albuterol, amlodipine, aspirin, atorvastatin, carvedilol, clonidine, eucerin eczema relief, eliquis, ferrous sulfate, fluticasone propionate, fluticasone-salmeterol 230-21 mcg/dose, metoprolol succinate, minocycline, mupirocin, nexium, olmesartan-hydrochlorothiazide, sulfamethoxazole-trimethoprim 800-160mg, topiramate, and trazodone, and the following Facility-Administered Medications: bebtelovimab (eua).    Review of patient's allergies indicates:  No Known Allergies     Objective      Lower Extremity Sensation  Right Lumbar/Lower Extremity Sensation  Intact: Light Touch       Left Lumbar/Lower Extremity  Sensation  Intact: Light Touch                Lumbar Range of Motion   Standing Lumbar range of motion: Flexion: Moderate; Extension: Moderate; Right SG: Minimal ; Left SG: Minimal. Pain in the back with flexion and extension.       Hip Range of Motion    No pain with passive ER/IR of B hip. No limitations noted               Hip Strength - Planes of Motion   Right Strength Right Pain Left Strength Left  Pain   Flexion (L2) 4   4     Extension           ABduction           ADduction           Internal Rotation 4   4     External Rotation 4-   4-             Lumbar/Pelvic Girdle Special Tests  Lumbar Tests - Repeated  Positive: Extension                 Improvement in baseline pain with sitting with use of lumbar roll. Baseline: Pain with standing flexion and extension. Prone not assessed due to not comfortable rolling to prone on small mat. She tolerated repeated standing extensions well without complaints. No pain with standing extension following.           Transfers Assessment  Sit to Stand Assistance: Independent  Chair to Bed Assistance: Independent  Bed to Chair Assistance: Independent    Bed Mobility Assessment  Rolling Assistance: Independent  Sidelying to Sit Assistance: Independent  Sit to Sidelying Assistance: Independent  Scooting to Edge of Bed Assistance: Independent      Ambulation Details    Indepdent without AD    Gait Analysis  Base of Support: Wide  Walking Speed: Decreased            Gait Analysis Details  Forward flexed         Treatment:  Therapeutic Activity  TA 1: Education on HB program with visit 1 handout reviewed  TA 2: Education on use of lumbar roll and information on how to purchase  TA 3: Education on repeated movement testing and findings from evaluation        5/15/2025     4:54 PM   [facility]   Visit Number 1   VAS Pain Rating 5       Time Entry(in minutes):  PT Evaluation (Low) Time Entry: 50  Therapeutic Exercise Time Entry: 10    Assessment & Plan   Assessment  Katiuska  presents with a condition of Low complexity.   Presentation of Symptoms: Stable  Will Comorbidities Impact Care: Yes  History of stroke    Functional Limitations: Activity tolerance, Completing work/school activities, Completing self-care activities, Decreased ambulation distance/endurance, Disrupted sleep pattern, Pain with ADLs/IADLs, Painful locomotion/ambulation, Participating in leisure activities, Range of motion, Performing household chores, Sitting tolerance, Standing tolerance, Transfers  Impairments: Abnormal or restricted range of motion, Activity intolerance, Impaired physical strength, Pain with functional activity  Personal Factors Affecting Prognosis: Financial, Reading difficulty    Patient Goal for Therapy (PT): improve sitting and standing tolerance  Prognosis: Good  Assessment Details: Patient presents with c/o chronic back pain without mechanism of injury. Patient presents with reported limitations including sitting and standing tolerance.  Patient demonstrates limited lumbar range of motion and decreased LE strength based on standing lumbar AROM and hip MMT. Repeated movement testing performed with baselines established which are stated in objective section above. Following repeated movements, there was improvement in baselines including baseline pain with use of lumbar roll and standing extension discomfort improved. Based on subjective reports and improvement in baselines following repeated extensions, this indicates extension directional preference as well as 1 PEP pain pattern. Patient will benefit from skilled Physical Therapist services to address above deficits to improve functional mobility and quality of life as well as return to PLOF without limitations.      Plan  From a physical therapy perspective, the patient would benefit from: Skilled Rehab Services    Planned therapy interventions include: Therapeutic exercise, Therapeutic activities, Neuromuscular re-education, Manual therapy,  ADLs/IADLs, and Gait training.            Visit Frequency: 2 times Per Week for 10 Weeks.       This plan was discussed with Patient.   Discussion participants: Agreed Upon Plan of Care             Patient's spiritual, cultural, and educational needs considered and patient agreeable to plan of care and goals.           Goals:   Active       Long term goals       - Pt will demonstrate increased lumbar MedX ROM by at least 6 degrees from initial ROM value, resulting in improved ability to perform functional forward bending while standing and sitting. Appropriate and Ongoing        Start:  05/15/25    Expected End:  07/24/25            - Pt will demonstrate increased MedX average isometric strength value by 30% from initial test resulting in improved ability to perform bending, lifting, and carrying activities safely and confidently. Appropriate and Ongoing        Start:  05/15/25    Expected End:  07/24/25            - Pt to demonstrate ability to independently control and reduce their pain through posture positioning and mechanical movements throughout a typical day. Appropriate and Ongoing        Start:  05/15/25    Expected End:  07/24/25            - Pt will demonstrate reduced pain and improved functional outcomes as reported on the FOTO by reaching an intake score of >/= 58% functional ability in order to demonstrate subjective improvement in patient's condition. . Appropriate and Ongoing        Start:  05/15/25    Expected End:  07/24/25            - Pt will demonstrate independence with the HEP at discharge. Appropriate and Ongoing        Start:  05/15/25    Expected End:  07/24/25               Short Term Goals       - Pt will demonstrate increased lumbar MedX ROM by at least 3 degrees from the initial ROM value with improvements noted in functional ROM and ability to perform ADLs. Appropriate and Ongoing        Start:  05/15/25    Expected End:  06/19/25            - Pt will demonstrate increased MedX  average isometric strength value by 15% from initial test resulting in improved ability to perform bending, lifting, and carrying activities safely, confidently. Appropriate and Ongoing        Start:  05/15/25    Expected End:  06/19/25            - Pt will report a reduction in worst pain score by 1-2 points for improved tolerance for sitting and standing. Appropriate and Ongoing        Start:  05/15/25    Expected End:  06/19/25            - Pt able to perform HEP correctly with minimal cueing or supervision from therapist to encourage independent management of symptoms. Appropriate and Ongoing        Start:  05/15/25    Expected End:  06/19/25                Omayra Clark PT             Current Participants as of 5/15/2025    Name Type Comments Contact Info    Saadia Ybarra MD PCP - General  [phone]    Signature pending            Sincerely,      Omayra Clark PT  [facility]                                                            Dear Omayra Clark PT,    RE: Ms. Katiuska Preston, MRN: [id number]    I certify that I have reviewed the attached plan of care and agree to the details within.        ___________________________  ___________________________  Provider Printed Name   Provider Signed Name      ___________________________  Date and Time
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
## 2025-05-19 NOTE — PROGRESS NOTES
Physical Therapy Visit    Patient Name: Katiuska Preston  MRN: [id number]  YOB: 1975  Encounter Date: 5/20/2025    Therapy Diagnosis:   Encounter Diagnoses   Name Primary?    Poor posture Yes    Decreased strength of lower extremity     Decreased range of motion of lumbar spine      Physician: Saadia Ybarra MD    Physician Orders: Eval and Treat  Medical Diagnosis: Recurrent low back pain    Visit # / Visits Authorized:  1 / 12  Insurance Authorization Period: 5/20/2025 to 7/19/2025  Date of Evaluation: 5/15/2025  Plan of Care Certification: 5/15/2025 to 8/15/2025     5/15/2025 to 8/15/2025      PT/PTA: PTA   Number of PTA visits since last PT visit:1  Time In: 0855   Time Out: 0930  Total Time (in minutes): 35   Total Billable Time (in minutes): 35    FOTO:  Intake Score:  %  Survey Score 2:  %  Survey Score 3:  %    Precautions:       Subjective   Pt reports she is having a good bit of pain today.  She also reported feeling dizzy after leaning forward when testing her baselines.  She stated she has not taken her blood pressure medicine because she hadn't eaten yet today.  She stated she normally doesn't eat until 11:00..  Pain reported as 8/10.      Objective          ++All charges filed per Medicaid Guidelines++    Treatment:   Therapeutic exercises:  Baseline:  Flexion - no pain  Extension - no pian, limited ROM  Rotation - no pain  Side glides - no pain      Nustep x 5 minutes, level 3  Standing lumbar extension x 10 reps (felt dizzy and had to sit)    After reporting feeling dizzy and informing PTA that she has not taken her blood pressure medicine yet today, pt's blood pressure was taken.  Her initial blood pressure was 158/116, pulse 103 bpm.  Pt was visibly upset and crying.  After several minutes sitting at edge of mat, her blood pressure was retaken and was 153/105, pulse 105 bpm.  Educated pt on importance of taking her medicine prior to coming to therapy.  Treatment was stopped at that  time.  Pt requested to speak with therapist in private.  We went in a private room and she expressed that she was having a bad day with traumatic memories from her past.  She was asked if she felt she needed to speak with someone regarding theses emotions and she stated that this was just a bad day for her.  Pt was informed that there are services that may be available to her and if she feels she needs something to let me or supervising therapist, Omayra Clark, aware and we will make all efforts to put her in contact with someone.      Not performed today  Bridging   Open books  Side lying hip external rotation   Prone on elbows  Prone press ups  Prone hip extension  Standing lumbar extension     Time Entry(in minutes):  Therapeutic Exercise Time Entry: 35    Assessment & Plan   Assessment: See Above  Evaluation/Treatment Tolerance: Other (Comment)    Patient will continue to benefit from skilled outpatient physical therapy to address the deficits listed in the problem list box on initial evaluation, provide pt/family education and to maximize pt's level of independence in the home and community environment.     Patient's spiritual, cultural, and educational needs considered and patient agreeable to plan of care and goals.           Plan: Cont with therapeutic ex, therapeutic activities, and neuromuscular re-ed as tolerated to progress towards patients goals per POC.    Goals:   Active       Long term goals       - Pt will demonstrate increased lumbar MedX ROM by at least 6 degrees from initial ROM value, resulting in improved ability to perform functional forward bending while standing and sitting. Appropriate and Ongoing  (Progressing)       Start:  05/15/25    Expected End:  07/24/25            - Pt will demonstrate increased MedX average isometric strength value by 30% from initial test resulting in improved ability to perform bending, lifting, and carrying activities safely and confidently. Appropriate and  Ongoing  (Progressing)       Start:  05/15/25    Expected End:  07/24/25            - Pt to demonstrate ability to independently control and reduce their pain through posture positioning and mechanical movements throughout a typical day. Appropriate and Ongoing  (Progressing)       Start:  05/15/25    Expected End:  07/24/25            - Pt will demonstrate reduced pain and improved functional outcomes as reported on the FOTO by reaching an intake score of >/= 58% functional ability in order to demonstrate subjective improvement in patient's condition. . Appropriate and Ongoing  (Progressing)       Start:  05/15/25    Expected End:  07/24/25            - Pt will demonstrate independence with the HEP at discharge. Appropriate and Ongoing  (Progressing)       Start:  05/15/25    Expected End:  07/24/25               Short Term Goals       - Pt will demonstrate increased lumbar MedX ROM by at least 3 degrees from the initial ROM value with improvements noted in functional ROM and ability to perform ADLs. Appropriate and Ongoing  (Progressing)       Start:  05/15/25    Expected End:  06/19/25            - Pt will demonstrate increased MedX average isometric strength value by 15% from initial test resulting in improved ability to perform bending, lifting, and carrying activities safely, confidently. Appropriate and Ongoing  (Progressing)       Start:  05/15/25    Expected End:  06/19/25            - Pt will report a reduction in worst pain score by 1-2 points for improved tolerance for sitting and standing. Appropriate and Ongoing  (Progressing)       Start:  05/15/25    Expected End:  06/19/25            - Pt able to perform HEP correctly with minimal cueing or supervision from therapist to encourage independent management of symptoms. Appropriate and Ongoing  (Progressing)       Start:  05/15/25    Expected End:  06/19/25                Uma Pastrana PTA

## 2025-05-20 ENCOUNTER — CLINICAL SUPPORT (OUTPATIENT)
Dept: REHABILITATION | Facility: HOSPITAL | Age: 50
End: 2025-05-20
Payer: MEDICAID

## 2025-05-20 DIAGNOSIS — R29.898 DECREASED STRENGTH OF LOWER EXTREMITY: ICD-10-CM

## 2025-05-20 DIAGNOSIS — R29.3 POOR POSTURE: Primary | ICD-10-CM

## 2025-05-20 DIAGNOSIS — M53.86 DECREASED RANGE OF MOTION OF LUMBAR SPINE: ICD-10-CM

## 2025-05-20 PROCEDURE — 97110 THERAPEUTIC EXERCISES: CPT | Mod: PN,CQ

## 2025-05-22 ENCOUNTER — CLINICAL SUPPORT (OUTPATIENT)
Dept: REHABILITATION | Facility: HOSPITAL | Age: 50
End: 2025-05-22
Payer: MEDICAID

## 2025-05-22 DIAGNOSIS — R29.898 DECREASED STRENGTH OF LOWER EXTREMITY: ICD-10-CM

## 2025-05-22 DIAGNOSIS — M53.86 DECREASED RANGE OF MOTION OF LUMBAR SPINE: ICD-10-CM

## 2025-05-22 DIAGNOSIS — R29.3 POOR POSTURE: Primary | ICD-10-CM

## 2025-05-22 PROCEDURE — 97110 THERAPEUTIC EXERCISES: CPT | Mod: PN,CQ

## 2025-05-22 NOTE — PROGRESS NOTES
Physical Therapy Visit    Patient Name: Katiuska Preston  MRN: [id number]  YOB: 1975  Encounter Date: 5/22/2025    Therapy Diagnosis:   Encounter Diagnoses   Name Primary?    Poor posture Yes    Decreased strength of lower extremity     Decreased range of motion of lumbar spine      Physician: Saadia Ybarra MD    Physician Orders: Eval and Treat  Medical Diagnosis: Recurrent low back pain    Visit # / Visits Authorized:  2 / 12  Insurance Authorization Period: 5/20/2025 to 7/19/2025  Date of Evaluation: 5/15/2025  Plan of Care Certification: 5/15/2025 to 8/15/2025     5/15/2025 to 8/15/2025      PT/PTA: PTA   Number of PTA visits since last PT visit:2  Time In: 1420   Time Out: 1515  Total Time (in minutes): 55   Total Billable Time (in minutes): 55    FOTO:  Intake Score:  %  Survey Score 2:  %  Survey Score 3:  %    Precautions:       Subjective   Pt states she is having pain in her neck today.  She stated she slept wrong...  Pain reported as 7/10.      Objective          ++All charges filed per Medicaid Guidelines++    Treatment:   Therapeutic exercises:  Nustep x 10 minutes, level 3  Standing lumbar extension 2 x 10 reps  Lower trunk rotation x 15 reps   Bridging 2 x 10 reps   Hip Abduction red theraband x 20 reps  Hip adduction x 20 reps red theraband   Standing lumbar extension 2 x 10 reps     Seated no monies x 20 reps red theraband   Seated upper thoracic stretch x 20 reps with roll  Shoulder rolls x 20 reps   Chin tucks x 20 reps       Time Entry(in minutes):  Therapeutic Exercise Time Entry: 55    Assessment & Plan   Assessment: Katiuska reports to PT feeling much better than she did last visit.  She completed her back exercises with only reporting discomfort with bridges the first few reps.  Additional exercises were added to address her postural deficits and strength.  Cuing for proper technique needed.  No dizziness today with exercises.  Evaluation/Treatment Tolerance: Patient tolerated  treatment well    Patient will continue to benefit from skilled outpatient physical therapy to address the deficits listed in the problem list box on initial evaluation, provide pt/family education and to maximize pt's level of independence in the home and community environment.     Patient's spiritual, cultural, and educational needs considered and patient agreeable to plan of care and goals.           Plan: Cont with therapeutic ex, therapeutic activities, and neuromuscular re-ed as tolerated to progress towards patients goals per POC.    Goals:   Active       Long term goals       - Pt will demonstrate increased lumbar MedX ROM by at least 6 degrees from initial ROM value, resulting in improved ability to perform functional forward bending while standing and sitting. Appropriate and Ongoing  (Progressing)       Start:  05/15/25    Expected End:  07/24/25            - Pt will demonstrate increased MedX average isometric strength value by 30% from initial test resulting in improved ability to perform bending, lifting, and carrying activities safely and confidently. Appropriate and Ongoing  (Progressing)       Start:  05/15/25    Expected End:  07/24/25            - Pt to demonstrate ability to independently control and reduce their pain through posture positioning and mechanical movements throughout a typical day. Appropriate and Ongoing  (Progressing)       Start:  05/15/25    Expected End:  07/24/25            - Pt will demonstrate reduced pain and improved functional outcomes as reported on the FOTO by reaching an intake score of >/= 58% functional ability in order to demonstrate subjective improvement in patient's condition. . Appropriate and Ongoing  (Progressing)       Start:  05/15/25    Expected End:  07/24/25            - Pt will demonstrate independence with the HEP at discharge. Appropriate and Ongoing  (Progressing)       Start:  05/15/25    Expected End:  07/24/25               Short Term Goals       -  Pt will demonstrate increased lumbar MedX ROM by at least 3 degrees from the initial ROM value with improvements noted in functional ROM and ability to perform ADLs. Appropriate and Ongoing  (Progressing)       Start:  05/15/25    Expected End:  06/19/25            - Pt will demonstrate increased MedX average isometric strength value by 15% from initial test resulting in improved ability to perform bending, lifting, and carrying activities safely, confidently. Appropriate and Ongoing  (Progressing)       Start:  05/15/25    Expected End:  06/19/25            - Pt will report a reduction in worst pain score by 1-2 points for improved tolerance for sitting and standing. Appropriate and Ongoing  (Progressing)       Start:  05/15/25    Expected End:  06/19/25            - Pt able to perform HEP correctly with minimal cueing or supervision from therapist to encourage independent management of symptoms. Appropriate and Ongoing  (Progressing)       Start:  05/15/25    Expected End:  06/19/25                Uma Pastrana, PTA

## 2025-05-26 NOTE — PROGRESS NOTES
Physical Therapy Visit    Patient Name: Katiuska Preston  MRN: [id number]  YOB: 1975  Encounter Date: 5/27/2025    Therapy Diagnosis:   Encounter Diagnoses   Name Primary?    Poor posture Yes    Decreased strength of lower extremity     Decreased range of motion of lumbar spine        Physician: Saadia Ybarra MD    Physician Orders: Eval and Treat  Medical Diagnosis: Recurrent low back pain    Visit # / Visits Authorized:  3 / 12  Insurance Authorization Period: 5/20/2025 to 7/19/2025  Date of Evaluation: 5/15/2025  Plan of Care Certification: 5/15/2025 to 8/15/2025     5/15/2025 to 8/15/2025      PT/PTA: PTA   Number of PTA visits since last PT visit:3  Time In: 1100   Time Out: 1155  Total Time (in minutes): 55   Total Billable Time (in minutes): 55    FOTO:  Intake Score:  %  Survey Score 2:  %  Survey Score 3:  %    Precautions:       Subjective   Pt reports she is having a little bit of pain today.  Her pain is mostly in her neck today..         Objective          ++All charges filed per Medicaid Guidelines++    Treatment:   Therapeutic exercises:  Nustep x 10 minutes, level 3    Lower trunk rotation x 15 reps   Bridging 2 x 10 reps   Hip Abduction red theraband x 20 reps  Hip adduction x 20 reps red theraband   Standing lumbar extension 2 x 10 reps   Sidelying open books x 10 reps  Sidelying clamshells red theraband x 20 reps   Seated lumbar flexion with large physioball x 20 reps   Seated no monies x 20 reps red theraband   Seated upper thoracic stretch x 20 reps with roll  Shoulder rolls x 20 reps   Chin tucks x 20 reps       Time Entry(in minutes):  Therapeutic Exercise Time Entry: 55    Assessment & Plan   Assessment: Katiuska reports more pain in her upper neck/back today.  She did well with exercises today and no adverse effects.  Added torso rotation today to address her core weakness.  Evaluation/Treatment Tolerance: Patient tolerated treatment well    Patient will continue to benefit  from skilled outpatient physical therapy to address the deficits listed in the problem list box on initial evaluation, provide pt/family education and to maximize pt's level of independence in the home and community environment.     Patient's spiritual, cultural, and educational needs considered and patient agreeable to plan of care and goals.           Plan: Cont with therapeutic ex, therapeutic activities, and neuromuscular re-ed as tolerated to progress towards patients goals per POC.    Goals:   Active       Long term goals       - Pt will demonstrate increased lumbar MedX ROM by at least 6 degrees from initial ROM value, resulting in improved ability to perform functional forward bending while standing and sitting. Appropriate and Ongoing  (Progressing)       Start:  05/15/25    Expected End:  07/24/25            - Pt will demonstrate increased MedX average isometric strength value by 30% from initial test resulting in improved ability to perform bending, lifting, and carrying activities safely and confidently. Appropriate and Ongoing  (Progressing)       Start:  05/15/25    Expected End:  07/24/25            - Pt to demonstrate ability to independently control and reduce their pain through posture positioning and mechanical movements throughout a typical day. Appropriate and Ongoing  (Progressing)       Start:  05/15/25    Expected End:  07/24/25            - Pt will demonstrate reduced pain and improved functional outcomes as reported on the FOTO by reaching an intake score of >/= 58% functional ability in order to demonstrate subjective improvement in patient's condition. . Appropriate and Ongoing  (Progressing)       Start:  05/15/25    Expected End:  07/24/25            - Pt will demonstrate independence with the HEP at discharge. Appropriate and Ongoing  (Progressing)       Start:  05/15/25    Expected End:  07/24/25               Short Term Goals       - Pt will demonstrate increased lumbar MedX ROM by  at least 3 degrees from the initial ROM value with improvements noted in functional ROM and ability to perform ADLs. Appropriate and Ongoing  (Progressing)       Start:  05/15/25    Expected End:  06/19/25            - Pt will demonstrate increased MedX average isometric strength value by 15% from initial test resulting in improved ability to perform bending, lifting, and carrying activities safely, confidently. Appropriate and Ongoing  (Progressing)       Start:  05/15/25    Expected End:  06/19/25            - Pt will report a reduction in worst pain score by 1-2 points for improved tolerance for sitting and standing. Appropriate and Ongoing  (Progressing)       Start:  05/15/25    Expected End:  06/19/25            - Pt able to perform HEP correctly with minimal cueing or supervision from therapist to encourage independent management of symptoms. Appropriate and Ongoing  (Progressing)       Start:  05/15/25    Expected End:  06/19/25                  Uma Pastrana PTA

## 2025-05-27 ENCOUNTER — CLINICAL SUPPORT (OUTPATIENT)
Dept: REHABILITATION | Facility: HOSPITAL | Age: 50
End: 2025-05-27
Payer: MEDICAID

## 2025-05-27 DIAGNOSIS — R29.898 DECREASED STRENGTH OF LOWER EXTREMITY: ICD-10-CM

## 2025-05-27 DIAGNOSIS — M53.86 DECREASED RANGE OF MOTION OF LUMBAR SPINE: ICD-10-CM

## 2025-05-27 DIAGNOSIS — R29.3 POOR POSTURE: Primary | ICD-10-CM

## 2025-05-27 PROCEDURE — 97110 THERAPEUTIC EXERCISES: CPT | Mod: PN,CQ

## 2025-05-28 ENCOUNTER — DOCUMENTATION ONLY (OUTPATIENT)
Dept: REHABILITATION | Facility: HOSPITAL | Age: 50
End: 2025-05-28
Payer: MEDICAID

## 2025-05-28 NOTE — PROGRESS NOTES
Physical Therapy Visit    Patient Name: Katiuska Preston  MRN: [id number]  YOB: 1975  Encounter Date: 5/29/2025    Therapy Diagnosis:   Encounter Diagnoses   Name Primary?    Poor posture Yes    Decreased strength of lower extremity     Decreased range of motion of lumbar spine          Physician: Saadia Ybarra MD    Physician Orders: Eval and Treat  Medical Diagnosis: Recurrent low back pain    Visit # / Visits Authorized:  4 / 12  Insurance Authorization Period: 5/20/2025 to 7/19/2025  Date of Evaluation: 5/15/2025  Plan of Care Certification: 5/15/2025 to 8/15/2025     5/15/2025 to 8/15/2025      PT/PTA: PTA   Number of PTA visits since last PT visit:4  Time In: 1302   Time Out: 1350  Total Time (in minutes): 48   Total Billable Time (in minutes): 53    FOTO:  Intake Score:  %  Survey Score 2:  %  Survey Score 3:  %    Precautions:       Subjective   Pt stated she had a lot of spasms in her back yesterday.  She stated she is still having them today but they are not as bad..  Pain reported as 5/10.      Objective          ++All charges filed per Medicaid Guidelines++    Treatment:   Therapeutic exercises:  Nustep x 10 minutes, level 3    Lower trunk rotation x 15 reps   Bridging 2 x 10 reps   Hip Abduction red theraband x 20 reps  Hip adduction x 20 reps with ball squeezes  Standing lumbar extension 2 x 10 reps   Sidelying open books x 20 reps  Sidelying clamshells red theraband x 20 reps   Seated lumbar flexion with large physioball x 20 reps   Seated no monies x 20 reps red theraband   Seated upper thoracic stretch x 20 reps with roll  Shoulder rolls x 20 reps   Chin tucks x 20 reps   Precor back extension 30# x 20 reps   Precor torso rotation 15# x 20 reps     Time Entry(in minutes):  Therapeutic Exercise Time Entry: 53    Assessment & Plan   Assessment: Pt reports having some spasms in her back today.  She did well with exercises and was able to tolerate additional Precor machines into her  exercise program today.  No complaints or increase in her symptoms reported.  Evaluation/Treatment Tolerance: Patient tolerated treatment well    Patient will continue to benefit from skilled outpatient physical therapy to address the deficits listed in the problem list box on initial evaluation, provide pt/family education and to maximize pt's level of independence in the home and community environment.     Patient's spiritual, cultural, and educational needs considered and patient agreeable to plan of care and goals.           Plan: Cont with therapeutic ex, therapeutic activities, and neuromuscular re-ed as tolerated to progress towards patients goals per POC.    Goals:   Active       Long term goals       - Pt will demonstrate increased lumbar MedX ROM by at least 6 degrees from initial ROM value, resulting in improved ability to perform functional forward bending while standing and sitting. Appropriate and Ongoing  (Progressing)       Start:  05/15/25    Expected End:  07/24/25            - Pt will demonstrate increased MedX average isometric strength value by 30% from initial test resulting in improved ability to perform bending, lifting, and carrying activities safely and confidently. Appropriate and Ongoing  (Progressing)       Start:  05/15/25    Expected End:  07/24/25            - Pt to demonstrate ability to independently control and reduce their pain through posture positioning and mechanical movements throughout a typical day. Appropriate and Ongoing  (Progressing)       Start:  05/15/25    Expected End:  07/24/25            - Pt will demonstrate reduced pain and improved functional outcomes as reported on the FOTO by reaching an intake score of >/= 58% functional ability in order to demonstrate subjective improvement in patient's condition. . Appropriate and Ongoing  (Progressing)       Start:  05/15/25    Expected End:  07/24/25            - Pt will demonstrate independence with the HEP at discharge.  Appropriate and Ongoing  (Progressing)       Start:  05/15/25    Expected End:  07/24/25               Short Term Goals       - Pt will demonstrate increased lumbar MedX ROM by at least 3 degrees from the initial ROM value with improvements noted in functional ROM and ability to perform ADLs. Appropriate and Ongoing  (Progressing)       Start:  05/15/25    Expected End:  06/19/25            - Pt will demonstrate increased MedX average isometric strength value by 15% from initial test resulting in improved ability to perform bending, lifting, and carrying activities safely, confidently. Appropriate and Ongoing  (Progressing)       Start:  05/15/25    Expected End:  06/19/25            - Pt will report a reduction in worst pain score by 1-2 points for improved tolerance for sitting and standing. Appropriate and Ongoing  (Progressing)       Start:  05/15/25    Expected End:  06/19/25            - Pt able to perform HEP correctly with minimal cueing or supervision from therapist to encourage independent management of symptoms. Appropriate and Ongoing  (Progressing)       Start:  05/15/25    Expected End:  06/19/25                    Uma Pastrana, PTA

## 2025-05-28 NOTE — PROGRESS NOTES
PT/PTA met face to face to discuss pt's treatment plan and progress towards established goals. Pt will be seen by a physical therapist minimally every 6th visit or every 30 days.      Uma Pastrana PTA

## 2025-05-29 ENCOUNTER — CLINICAL SUPPORT (OUTPATIENT)
Dept: REHABILITATION | Facility: HOSPITAL | Age: 50
End: 2025-05-29
Payer: MEDICAID

## 2025-05-29 DIAGNOSIS — M53.86 DECREASED RANGE OF MOTION OF LUMBAR SPINE: ICD-10-CM

## 2025-05-29 DIAGNOSIS — R29.898 DECREASED STRENGTH OF LOWER EXTREMITY: ICD-10-CM

## 2025-05-29 DIAGNOSIS — R29.3 POOR POSTURE: Primary | ICD-10-CM

## 2025-05-29 PROCEDURE — 97110 THERAPEUTIC EXERCISES: CPT | Mod: PN,CQ

## 2025-06-03 ENCOUNTER — CLINICAL SUPPORT (OUTPATIENT)
Dept: REHABILITATION | Facility: HOSPITAL | Age: 50
End: 2025-06-03
Payer: MEDICAID

## 2025-06-03 DIAGNOSIS — M53.86 DECREASED RANGE OF MOTION OF LUMBAR SPINE: ICD-10-CM

## 2025-06-03 DIAGNOSIS — R29.3 POOR POSTURE: Primary | ICD-10-CM

## 2025-06-03 DIAGNOSIS — R29.898 DECREASED STRENGTH OF LOWER EXTREMITY: ICD-10-CM

## 2025-06-03 PROCEDURE — 97110 THERAPEUTIC EXERCISES: CPT | Mod: PN

## 2025-06-05 ENCOUNTER — CLINICAL SUPPORT (OUTPATIENT)
Dept: REHABILITATION | Facility: HOSPITAL | Age: 50
End: 2025-06-05
Payer: MEDICAID

## 2025-06-05 DIAGNOSIS — R29.898 DECREASED STRENGTH OF LOWER EXTREMITY: ICD-10-CM

## 2025-06-05 DIAGNOSIS — R29.3 POOR POSTURE: Primary | ICD-10-CM

## 2025-06-05 DIAGNOSIS — M53.86 DECREASED RANGE OF MOTION OF LUMBAR SPINE: ICD-10-CM

## 2025-06-05 PROCEDURE — 97110 THERAPEUTIC EXERCISES: CPT | Mod: PN,CQ

## 2025-06-09 NOTE — PROGRESS NOTES
Physical Therapy Visit    Patient Name: Katiuska Preston  MRN: [id number]  YOB: 1975  Encounter Date: 6/10/2025    Therapy Diagnosis:   Encounter Diagnoses   Name Primary?    Poor posture Yes    Decreased strength of lower extremity     Decreased range of motion of lumbar spine          Physician: Saadia Ybarra MD    Physician Orders: Eval and Treat  Medical Diagnosis: Recurrent low back pain    Visit # / Visits Authorized:  7 / 12  Insurance Authorization Period: 5/20/2025 to 7/19/2025  Date of Evaluation: 3/27/2025  Plan of Care Certification: 5/15/2025 to 8/15/2025      PT/PTA: PTA   Number of PTA visits since last PT visit:3  Time In: 1302   Time Out: 1402  Total Time (in minutes): 60   Total Billable Time (in minutes): 60    FOTO:  Intake Score:  %  Survey Score 2:  %  Survey Score 3:  %    Precautions:       Subjective   Pt stated she was having some pain in her knees.  She stated she needs to get shots in her knees again..  Pain reported as 6/10. mid back and low back    Objective            Treatment:     Therapeutic Exercise - 60 minutes  nu step x 5 min L5  All precor machines today    standing extensions over EOM x 20 reps  bridging x 20 reps  SL external rotation clams red TB x 20 reps  SL open books x 20 reps   prone hip extensions x 20 reps each side  prone press ups 2 x 10 reps     Medx        6/10/2025     2:10 PM   [facility]   Visit Number 8   Lumbar Weight 50 lbs   Repetitions 20   Rating of Perceived Exertion 3         Time Entry(in minutes):  Therapeutic Exercise Time Entry: 60    Assessment & Plan   Assessment: Katiuska is doing well in PT.  She appears to have improvements in her lumbar extension exercises  Evaluation/Treatment Tolerance: Patient tolerated treatment well    The patient will continue to benefit from skilled outpatient physical therapy in order to address the deficits listed in the problem list on the initial evaluation, provide patient and family  education, and maximize the patients level of independence in the home and community environments.     The patient's spiritual, cultural, and educational needs were considered, and the patient is agreeable to the plan of care and goals.         Plan: Cont with therapeutic ex, therapeutic activities, and neuromuscular re-ed as tolerated to progress towards patients goals per POC.    Goals:   Active       Long term goals       - Pt will demonstrate increased lumbar MedX ROM by at least 6 degrees from initial ROM value, resulting in improved ability to perform functional forward bending while standing and sitting. Appropriate and Ongoing  (Progressing)       Start:  05/15/25    Expected End:  07/24/25            - Pt will demonstrate increased MedX average isometric strength value by 30% from initial test resulting in improved ability to perform bending, lifting, and carrying activities safely and confidently. Appropriate and Ongoing  (Progressing)       Start:  05/15/25    Expected End:  07/24/25            - Pt to demonstrate ability to independently control and reduce their pain through posture positioning and mechanical movements throughout a typical day. Appropriate and Ongoing  (Progressing)       Start:  05/15/25    Expected End:  07/24/25            - Pt will demonstrate reduced pain and improved functional outcomes as reported on the FOTO by reaching an intake score of >/= 58% functional ability in order to demonstrate subjective improvement in patient's condition. . Appropriate and Ongoing  (Progressing)       Start:  05/15/25    Expected End:  07/24/25            - Pt will demonstrate independence with the HEP at discharge. Appropriate and Ongoing  (Progressing)       Start:  05/15/25    Expected End:  07/24/25               Short Term Goals       - Pt will demonstrate increased lumbar MedX ROM by at least 3 degrees from the initial ROM value with improvements noted in functional ROM and ability to perform  ADLs. Appropriate and Ongoing  (Progressing)       Start:  05/15/25    Expected End:  06/19/25            - Pt will demonstrate increased MedX average isometric strength value by 15% from initial test resulting in improved ability to perform bending, lifting, and carrying activities safely, confidently. Appropriate and Ongoing  (Progressing)       Start:  05/15/25    Expected End:  06/19/25            - Pt will report a reduction in worst pain score by 1-2 points for improved tolerance for sitting and standing. Appropriate and Ongoing  (Progressing)       Start:  05/15/25    Expected End:  06/19/25            - Pt able to perform HEP correctly with minimal cueing or supervision from therapist to encourage independent management of symptoms. Appropriate and Ongoing  (Progressing)       Start:  05/15/25    Expected End:  06/19/25                    Uma Pastrana PTA

## 2025-06-10 ENCOUNTER — CLINICAL SUPPORT (OUTPATIENT)
Dept: REHABILITATION | Facility: HOSPITAL | Age: 50
End: 2025-06-10
Payer: MEDICAID

## 2025-06-10 DIAGNOSIS — R29.898 DECREASED STRENGTH OF LOWER EXTREMITY: ICD-10-CM

## 2025-06-10 DIAGNOSIS — R29.3 POOR POSTURE: Primary | ICD-10-CM

## 2025-06-10 DIAGNOSIS — M53.86 DECREASED RANGE OF MOTION OF LUMBAR SPINE: ICD-10-CM

## 2025-06-10 PROCEDURE — 97110 THERAPEUTIC EXERCISES: CPT | Mod: PN,CQ

## 2025-06-11 ENCOUNTER — TELEPHONE (OUTPATIENT)
Facility: CLINIC | Age: 50
End: 2025-06-11
Payer: MEDICAID

## 2025-06-11 NOTE — TELEPHONE ENCOUNTER
I left voice message for pt regarding confirming appt and completing labs prior to appt w/ Dr. Boston, on 6/18/2025. Left number for pt to contact the office, if they have any questions, would like to confirm/ reschedule. Also, I would have to schedule pt's lab appt @ a [facility], because they are no longer allowing walk ins.

## 2025-06-11 NOTE — PROGRESS NOTES
Physical Therapy Visit    Patient Name: Katiuska Preston  MRN: [id number]  YOB: 1975  Encounter Date: 6/12/2025    Therapy Diagnosis:   Encounter Diagnoses   Name Primary?    Poor posture Yes    Decreased strength of lower extremity     Decreased range of motion of lumbar spine            Physician: Saadia Ybarra MD    Physician Orders: Eval and Treat  Medical Diagnosis: Recurrent low back pain    Visit # / Visits Authorized:  8 / 12  Insurance Authorization Period: 5/20/2025 to 7/19/2025  Date of Evaluation: 3/27/2025  Plan of Care Certification: 5/15/2025 to 8/15/2025      PT/PTA: PTA   Number of PTA visits since last PT visit:4  Time In: 1400   Time Out: 1455  Total Time (in minutes): 55   Total Billable Time (in minutes): 55    FOTO:  Intake Score:  %  Survey Score 2:  %  Survey Score 3:  %    Precautions:       Subjective   Pt reports she is tired gtoday.  She said she is having a little bit of pain but she took a muscle relaxer prior to coming to PT.  Pain reported as 7/10. mid back and low back    Objective            Treatment:     Therapeutic Exercise - 55 minutes  nu step x 5 min L5  precor machines - torso rotation, chest press     standing extensions over EOM x 20 reps  bridging x 10 reps  SL external rotation clams red TB x 20 reps - np  SL open books x 20 reps - np   prone hip extensions x 20 reps each side  prone press ups 2 x 10 reps     Medx        6/12/2025     2:46 PM   [facility]   Visit Number 9   Lumbar Weight 55 lbs   Repetitions 20   Rating of Perceived Exertion 3            Time Entry(in minutes):  Therapeutic Exercise Time Entry: 55    Assessment & Plan   Assessment: Mariangel reports to PT reporting pain and being really tired.  Her increased pain is from going to [place] yesterday for a doctor's appt.  She had to sit in traffic 2 hours which caused her increase in pain.  Evaluation/Treatment Tolerance: Patient limited by fatigue    The patient will continue to  benefit from skilled outpatient physical therapy in order to address the deficits listed in the problem list on the initial evaluation, provide patient and family education, and maximize the patients level of independence in the home and community environments.     The patient's spiritual, cultural, and educational needs were considered, and the patient is agreeable to the plan of care and goals.         Plan: Cont with therapeutic ex, therapeutic activities, and neuromuscular re-ed as tolerated to progress towards patients goals per POC.    Goals:   Active       Long term goals       - Pt will demonstrate increased lumbar MedX ROM by at least 6 degrees from initial ROM value, resulting in improved ability to perform functional forward bending while standing and sitting. Appropriate and Ongoing  (Progressing)       Start:  05/15/25    Expected End:  07/24/25            - Pt will demonstrate increased MedX average isometric strength value by 30% from initial test resulting in improved ability to perform bending, lifting, and carrying activities safely and confidently. Appropriate and Ongoing  (Progressing)       Start:  05/15/25    Expected End:  07/24/25            - Pt to demonstrate ability to independently control and reduce their pain through posture positioning and mechanical movements throughout a typical day. Appropriate and Ongoing  (Progressing)       Start:  05/15/25    Expected End:  07/24/25            - Pt will demonstrate reduced pain and improved functional outcomes as reported on the FOTO by reaching an intake score of >/= 58% functional ability in order to demonstrate subjective improvement in patient's condition. . Appropriate and Ongoing  (Progressing)       Start:  05/15/25    Expected End:  07/24/25            - Pt will demonstrate independence with the HEP at discharge. Appropriate and Ongoing  (Progressing)       Start:  05/15/25    Expected End:  07/24/25               Short Term Goals        - Pt will demonstrate increased lumbar MedX ROM by at least 3 degrees from the initial ROM value with improvements noted in functional ROM and ability to perform ADLs. Appropriate and Ongoing  (Progressing)       Start:  05/15/25    Expected End:  06/19/25            - Pt will demonstrate increased MedX average isometric strength value by 15% from initial test resulting in improved ability to perform bending, lifting, and carrying activities safely, confidently. Appropriate and Ongoing  (Progressing)       Start:  05/15/25    Expected End:  06/19/25            - Pt will report a reduction in worst pain score by 1-2 points for improved tolerance for sitting and standing. Appropriate and Ongoing  (Progressing)       Start:  05/15/25    Expected End:  06/19/25            - Pt able to perform HEP correctly with minimal cueing or supervision from therapist to encourage independent management of symptoms. Appropriate and Ongoing  (Progressing)       Start:  05/15/25    Expected End:  06/19/25                Uma Pastrana, PTA

## 2025-06-12 ENCOUNTER — CLINICAL SUPPORT (OUTPATIENT)
Dept: REHABILITATION | Facility: HOSPITAL | Age: 50
End: 2025-06-12
Payer: MEDICAID

## 2025-06-12 DIAGNOSIS — R29.898 DECREASED STRENGTH OF LOWER EXTREMITY: ICD-10-CM

## 2025-06-12 DIAGNOSIS — R29.3 POOR POSTURE: Primary | ICD-10-CM

## 2025-06-12 DIAGNOSIS — M53.86 DECREASED RANGE OF MOTION OF LUMBAR SPINE: ICD-10-CM

## 2025-06-12 PROCEDURE — 97110 THERAPEUTIC EXERCISES: CPT | Mod: PN,CQ

## 2025-06-16 NOTE — PROGRESS NOTES
Physical Therapy Visit    Patient Name: Katiuska Preston  MRN: [id number]  YOB: 1975  Encounter Date: 6/17/2025    Therapy Diagnosis:   Encounter Diagnoses   Name Primary?    Poor posture Yes    Decreased strength of lower extremity     Decreased range of motion of lumbar spine              Physician: Saadia Ybarra MD    Physician Orders: Eval and Treat  Medical Diagnosis: Recurrent low back pain    Visit # / Visits Authorized:  9 / 12  Insurance Authorization Period: 5/20/2025 to 7/19/2025  Date of Evaluation: 3/27/2025  Plan of Care Certification: 5/15/2025 to 8/15/2025      PT/PTA: PTA   Number of PTA visits since last PT visit:4  Time In: 1300   Time Out: 1358  Total Time (in minutes): 58   Total Billable Time (in minutes): 58    FOTO:  Intake Score:  %  Survey Score 2:  %  Survey Score 3:  %    Precautions:       Subjective   Pt stated she is having pain in her knee.  Pain reported as 8/10. mid back and low back    Objective            Treatment:     Therapeutic Exercise - 55 minutes  nu step x 10 min L3  precor machines - torso rotation, chest press, rows, hip abd    standing extensions over EOM x 20 reps  bridging 2 x 10 reps  SL external rotation clams red TB x 20 reps   SL open books x 20 reps   Transverse abdominal sets forward and lateral x 20 reps (small orange physioball)  prone hip extensions x 20 reps each side  prone press ups 2 x 10 reps     Medx        6/17/2025     2:06 PM   [facility]   Visit Number 10   Lumbar Weight 55 lbs   Repetitions 20   Rating of Perceived Exertion 3         Time Entry(in minutes):  Therapeutic Exercise Time Entry: 58    Assessment & Plan   Assessment: Katiuska did not report back pain today but did have some knee pain.  She is doing well with her exercises.  She has not had any adverse effects when she does the exercises.    Evaluation/Treatment Tolerance: Patient tolerated treatment well    The patient will continue to benefit from skilled  outpatient physical therapy in order to address the deficits listed in the problem list on the initial evaluation, provide patient and family education, and maximize the patients level of independence in the home and community environments.     The patient's spiritual, cultural, and educational needs were considered, and the patient is agreeable to the plan of care and goals.         Plan: Cont with therapeutic ex, therapeutic activities, and neuromuscular re-ed as tolerated to progress towards patients goals per POC.    Goals:   Active       Long term goals       - Pt will demonstrate increased lumbar MedX ROM by at least 6 degrees from initial ROM value, resulting in improved ability to perform functional forward bending while standing and sitting. Appropriate and Ongoing  (Progressing)       Start:  05/15/25    Expected End:  07/24/25            - Pt will demonstrate increased MedX average isometric strength value by 30% from initial test resulting in improved ability to perform bending, lifting, and carrying activities safely and confidently. Appropriate and Ongoing  (Progressing)       Start:  05/15/25    Expected End:  07/24/25            - Pt to demonstrate ability to independently control and reduce their pain through posture positioning and mechanical movements throughout a typical day. Appropriate and Ongoing  (Progressing)       Start:  05/15/25    Expected End:  07/24/25            - Pt will demonstrate reduced pain and improved functional outcomes as reported on the FOTO by reaching an intake score of >/= 58% functional ability in order to demonstrate subjective improvement in patient's condition. . Appropriate and Ongoing  (Progressing)       Start:  05/15/25    Expected End:  07/24/25            - Pt will demonstrate independence with the HEP at discharge. Appropriate and Ongoing  (Progressing)       Start:  05/15/25    Expected End:  07/24/25               Short Term Goals       - Pt will  demonstrate increased lumbar MedX ROM by at least 3 degrees from the initial ROM value with improvements noted in functional ROM and ability to perform ADLs. Appropriate and Ongoing  (Progressing)       Start:  05/15/25    Expected End:  06/19/25            - Pt will demonstrate increased MedX average isometric strength value by 15% from initial test resulting in improved ability to perform bending, lifting, and carrying activities safely, confidently. Appropriate and Ongoing  (Progressing)       Start:  05/15/25    Expected End:  06/19/25            - Pt will report a reduction in worst pain score by 1-2 points for improved tolerance for sitting and standing. Appropriate and Ongoing  (Progressing)       Start:  05/15/25    Expected End:  06/19/25            - Pt able to perform HEP correctly with minimal cueing or supervision from therapist to encourage independent management of symptoms. Appropriate and Ongoing  (Progressing)       Start:  05/15/25    Expected End:  06/19/25                  Uma Pastrana, PTA

## 2025-06-17 ENCOUNTER — CLINICAL SUPPORT (OUTPATIENT)
Dept: REHABILITATION | Facility: HOSPITAL | Age: 50
End: 2025-06-17
Payer: MEDICAID

## 2025-06-17 DIAGNOSIS — R29.898 DECREASED STRENGTH OF LOWER EXTREMITY: ICD-10-CM

## 2025-06-17 DIAGNOSIS — R29.3 POOR POSTURE: Primary | ICD-10-CM

## 2025-06-17 DIAGNOSIS — M53.86 DECREASED RANGE OF MOTION OF LUMBAR SPINE: ICD-10-CM

## 2025-06-17 PROCEDURE — 97110 THERAPEUTIC EXERCISES: CPT | Mod: PN,CQ

## 2025-06-18 ENCOUNTER — LAB VISIT (OUTPATIENT)
Dept: LAB | Facility: HOSPITAL | Age: 50
End: 2025-06-18
Attending: INTERNAL MEDICINE
Payer: MEDICAID

## 2025-06-18 ENCOUNTER — OFFICE VISIT (OUTPATIENT)
Facility: CLINIC | Age: 50
End: 2025-06-18
Payer: MEDICAID

## 2025-06-18 VITALS
HEART RATE: 44 BPM | SYSTOLIC BLOOD PRESSURE: 147 MMHG | OXYGEN SATURATION: 99 % | HEIGHT: 63 IN | TEMPERATURE: 98 F | BODY MASS INDEX: 48.2 KG/M2 | DIASTOLIC BLOOD PRESSURE: 87 MMHG | RESPIRATION RATE: 16 BRPM | WEIGHT: 272 LBS

## 2025-06-18 DIAGNOSIS — M25.569 KNEE PAIN, UNSPECIFIED CHRONICITY, UNSPECIFIED LATERALITY: ICD-10-CM

## 2025-06-18 DIAGNOSIS — Z86.711 HISTORY OF PULMONARY EMBOLUS (PE): ICD-10-CM

## 2025-06-18 DIAGNOSIS — R79.1 ELEVATED FACTOR VIII LEVEL: Primary | ICD-10-CM

## 2025-06-18 DIAGNOSIS — I26.99 ACUTE PULMONARY EMBOLISM, UNSPECIFIED PULMONARY EMBOLISM TYPE, UNSPECIFIED WHETHER ACUTE COR PULMONALE PRESENT: ICD-10-CM

## 2025-06-18 DIAGNOSIS — D68.59 PROTEIN S DEFICIENCY: ICD-10-CM

## 2025-06-18 DIAGNOSIS — D50.0 IRON DEFICIENCY ANEMIA DUE TO CHRONIC BLOOD LOSS: ICD-10-CM

## 2025-06-18 DIAGNOSIS — D50.9 IRON DEFICIENCY ANEMIA, UNSPECIFIED IRON DEFICIENCY ANEMIA TYPE: Chronic | ICD-10-CM

## 2025-06-18 DIAGNOSIS — D68.59 ANTITHROMBIN III DEFICIENCY: ICD-10-CM

## 2025-06-18 LAB
ABSOLUTE EOSINOPHIL (SMH): 0.19 K/UL
ABSOLUTE MONOCYTE (SMH): 0.46 K/UL (ref 0.3–1)
ABSOLUTE NEUTROPHIL COUNT (SMH): 6.8 K/UL (ref 1.8–7.7)
ALBUMIN SERPL-MCNC: 3.8 G/DL (ref 3.5–5.2)
ALP SERPL-CCNC: 114 UNIT/L (ref 55–135)
ALT SERPL-CCNC: 18 UNIT/L (ref 10–44)
ANION GAP (SMH): 7 MMOL/L (ref 8–16)
AST SERPL-CCNC: 25 UNIT/L (ref 10–40)
BASOPHILS # BLD AUTO: 0.03 K/UL
BASOPHILS NFR BLD AUTO: 0.3 %
BILIRUB SERPL-MCNC: 0.4 MG/DL (ref 0.1–1)
BUN SERPL-MCNC: 18 MG/DL (ref 6–20)
CALCIUM SERPL-MCNC: 9.6 MG/DL (ref 8.7–10.5)
CHLORIDE SERPL-SCNC: 107 MMOL/L (ref 95–110)
CO2 SERPL-SCNC: 26 MMOL/L (ref 23–29)
CREAT SERPL-MCNC: 2 MG/DL (ref 0.5–1.4)
ERYTHROCYTE [DISTWIDTH] IN BLOOD BY AUTOMATED COUNT: 14.1 % (ref 11.5–14.5)
FERRITIN SERPL-MCNC: 48.7 NG/ML (ref 20–300)
GFR SERPLBLD CREATININE-BSD FMLA CKD-EPI: 30 ML/MIN/1.73/M2
GLUCOSE SERPL-MCNC: 121 MG/DL (ref 70–110)
HCT VFR BLD AUTO: 39.7 % (ref 37–48.5)
HGB BLD-MCNC: 12.6 GM/DL (ref 12–16)
IMM GRANULOCYTES # BLD AUTO: 0.03 K/UL (ref 0–0.04)
IMM GRANULOCYTES NFR BLD AUTO: 0.3 % (ref 0–0.5)
IRON SATN MFR SERPL: 22 % (ref 20–50)
IRON SERPL-MCNC: 76 UG/DL (ref 30–160)
LYMPHOCYTES # BLD AUTO: 1.95 K/UL (ref 1–4.8)
MCH RBC QN AUTO: 30.6 PG (ref 27–31)
MCHC RBC AUTO-ENTMCNC: 31.7 G/DL (ref 32–36)
MCV RBC AUTO: 96 FL (ref 82–98)
NUCLEATED RBC (/100WBC) (SMH): 0 /100 WBC
PLATELET # BLD AUTO: 307 K/UL (ref 150–450)
PMV BLD AUTO: 10.2 FL (ref 9.2–12.9)
POTASSIUM SERPL-SCNC: 4.6 MMOL/L (ref 3.5–5.1)
PROT SERPL-MCNC: 8.2 GM/DL (ref 6–8.4)
RBC # BLD AUTO: 4.12 M/UL (ref 4–5.4)
RELATIVE EOSINOPHIL (SMH): 2 % (ref 0–8)
RELATIVE LYMPHOCYTE (SMH): 20.5 % (ref 18–48)
RELATIVE MONOCYTE (SMH): 4.8 % (ref 4–15)
RELATIVE NEUTROPHIL (SMH): 72.1 % (ref 38–73)
SODIUM SERPL-SCNC: 140 MMOL/L (ref 136–145)
TIBC SERPL-MCNC: 344 UG/DL (ref 250–450)
TRANSFERRIN SERPL-MCNC: 246 MG/DL (ref 200–375)
WBC # BLD AUTO: 9.49 K/UL (ref 3.9–12.7)

## 2025-06-18 PROCEDURE — 85025 COMPLETE CBC W/AUTO DIFF WBC: CPT

## 2025-06-18 PROCEDURE — 99999 PR PBB SHADOW E&M-EST. PATIENT-LVL V: CPT | Mod: PBBFAC,,, | Performed by: INTERNAL MEDICINE

## 2025-06-18 PROCEDURE — 84466 ASSAY OF TRANSFERRIN: CPT

## 2025-06-18 PROCEDURE — 82728 ASSAY OF FERRITIN: CPT

## 2025-06-18 PROCEDURE — 84075 ASSAY ALKALINE PHOSPHATASE: CPT

## 2025-06-18 PROCEDURE — 99215 OFFICE O/P EST HI 40 MIN: CPT | Mod: PBBFAC,PN | Performed by: INTERNAL MEDICINE

## 2025-06-18 PROCEDURE — 36415 COLL VENOUS BLD VENIPUNCTURE: CPT

## 2025-06-18 NOTE — PROGRESS NOTES
[facility] Hematology/Oncology  Progress Note -   Follow-up Visit    Subjective:      Patient ID:   NAME: Katiuska Preston : 1975     50 y.o. female    Referring Doc: Justice  Other Physicians: Eduardo    Chief Complaint: pulm emboli f/u      HPI:    The patient returns for a regularly scheduled follow-up visit today to go over results of recently ordered tests, studies and/or labs. She is here by herself today    She saw her PCP in [place] last week.. She has been trying to lose weight and has lost over a 100lbs.     She had colonoscopy with Dr Linda bailey in 2025 which was clear per patient    She is followed by neurology,  Dr Gabriel Hall      She has some chronic right knee issues and pain.  She saw Dr Valerio with ortho in Oct 2024 and she had an injection. She needs referral to go back to see him.    No CP, HA's or N/V.   Breathing stable but requires O2 prn.      She is currently on Eliquis. No excessive bleeding or bruising.                     ROS:   GEN: normal without any fever, night sweats or weight loss; chronic knee issues  HEENT: normal with no HA's, sore throat, stiff neck, changes in vision;    CV: normal with no CP,    PULM: normal with no SOB, cough, hemoptysis, sputum or pleuritic pain  GI: normal with no abdominal pain, nausea, vomiting, constipation, diarrhea, melanotic stools, BRBPR, or hematemesis  : normal with no hematuria, dysuria  BREAST: normal with no mass, discharge, pain  SKIN: normal with no rash, erythema, bruising, or swelling;       Past Medical/Surgical History:  Past Medical History:   Diagnosis Date    Antithrombin III deficiency 2021    Asthma     Claustrophobia     Elevated factor VIII level 2021    Esophageal reflux     Gastroesophageal reflux    Generalized anxiety disorder     Anxiety, Generalized    Herniated disc     Hypertension     Iron deficiency anemia due to chronic blood loss 10/27/2021    Lower extremity weakness     Morbid obesity     Obesity      Protein S deficiency 2021    Pulmonary embolism     Upper extremity weakness      Past Surgical History:   Procedure Laterality Date     SECTION      CHOLECYSTECTOMY      TONSILLECTOMY           Allergies:  Review of patient's allergies indicates:  No Known Allergies    Social/Family History:  Social History     Socioeconomic History    Marital status:    Tobacco Use    Smoking status: Former     Current packs/day: 0.00     Types: Cigarettes, Cigars     Quit date: 2019     Years since quittin.4    Smokeless tobacco: Never   Substance and Sexual Activity    Alcohol use: Yes     Alcohol/week: 0.8 standard drinks of alcohol     Types: 1 Standard drinks or equivalent per week     Comment: rarely    Drug use: No    Sexual activity: Not Currently     Social Drivers of Health     Financial Resource Strain: Medium Risk (10/18/2022)    Overall Financial Resource Strain (CARDIA)     Difficulty of Paying Living Expenses: Somewhat hard   Food Insecurity: Food Insecurity Present (10/18/2022)    Hunger Vital Sign     Worried About Running Out of Food in the Last Year: Sometimes true     Ran Out of Food in the Last Year: Sometimes true   Transportation Needs: Unmet Transportation Needs (10/18/2022)    PRAPARE - Transportation     Lack of Transportation (Medical): Yes     Lack of Transportation (Non-Medical): Yes   Stress: No Stress Concern Present (10/18/2022)    [employer] - Occupational Stress Questionnaire     Feeling of Stress : Only a little   Housing Stability: Unknown (2025)    [facility]    Housing Stability Vital Sign     Unable to Pay for Housing in the Last Year: No     No family history on file.      Medications:    Current Outpatient Medications:     albuterol (PROVENTIL/VENTOLIN HFA) 90 mcg/actuation inhaler, Inhale 2 puffs into the lungs every 6 (six) hours as needed for Wheezing. Rescue, Disp: 18 g, Rfl: 5    amLODIPine (NORVASC) 10 MG  tablet, 1 tablet Orally Once a day for 30 days, Disp: , Rfl:     colloidal oatmeaL (EUCERIN ECZEMA RELIEF) 1 % Crea, Apply 1 Application topically 2 (two) times a day., Disp: 206 g, Rfl: 2    ELIQUIS 5 mg Tab, TAKE 1 TABLET BY MOUTH TWICE DAILY, Disp: 60 tablet, Rfl: 3    ferrous sulfate (FEROSUL) 325 mg (65 mg iron) Tab tablet, TAKE 1 TABLET BY MOUTH EVERY DAY, Disp: 30 tablet, Rfl: 6    fluticasone propionate (FLONASE) 50 mcg/actuation nasal spray, 2 sprays by Each Nostril route daily as needed for Rhinitis., Disp: , Rfl:     metoprolol succinate (TOPROL-XL) 200 MG 24 hr tablet, , Disp: , Rfl:     minocycline (MINOCIN,DYNACIN) 100 MG capsule, , Disp: , Rfl:     mupirocin (BACTROBAN) 2 % ointment, Apply topically., Disp: , Rfl:     NEXIUM 40 mg capsule, Take 40 mg by mouth once daily., Disp: , Rfl:     olmesartan-hydrochlorothiazide (BENICAR HCT) 40-25 mg per tablet, Take 1 tablet by mouth once daily., Disp: , Rfl:     sulfamethoxazole-trimethoprim 800-160mg (BACTRIM DS) 800-160 mg Tab, Take 1 tablet by mouth 2 (two) times daily., Disp: , Rfl:     topiramate (TOPAMAX) 25 MG tablet, Take 25 mg by mouth 2 (two) times daily., Disp: , Rfl:     trazodone (DESYREL) 100 MG tablet, Take 100 mg by mouth every evening., Disp: , Rfl:     aspirin (ECOTRIN) 81 MG EC tablet, Take 1 tablet (81 mg total) by mouth once daily., Disp: 30 tablet, Rfl: 0    atorvastatin (LIPITOR) 40 MG tablet, Take 1 tablet (40 mg total) by mouth every evening., Disp: 30 tablet, Rfl: 0    carvediloL (COREG) 25 MG tablet, Take 1 tablet (25 mg total) by mouth 2 (two) times daily., Disp: 60 tablet, Rfl: 0    cloNIDine (CATAPRES) 0.1 MG tablet, Take 1 tablet (0.1 mg total) by mouth every 8 (eight) hours as needed (SBP >180)., Disp: 30 tablet, Rfl: 0    fluticasone-salmeterol 230-21 mcg/dose (ADVAIR HFA) 230-21 mcg/actuation HFAA inhaler, Inhale 2 puffs into the lungs 2 (two) times daily. Controller, Disp: 12 g, Rfl: 6    Current Facility-Administered  "Medications:     bebtelovimab (EUA) 175 mg/2 mL (87.5 mg/mL) injection 175 mg, 175 mg, Intravenous, 1 time in Clinic/HOD, Sherrie Parker MD      Pathology:   Cancer Staging   No matching staging information was found for the patient.        Breast biopsy 5/30/2022:    1. BREAST, LEFT, UPPER OUTER MID CALCIFICATIONS, STEREOTACTIC GUIDED    CORE BIOPSY    - BENIGN BREAST PARENCHYMA AND BLOOD VESSELS     2. BREAST, LEFT, UPPER OUTER MID CALCIFICATIONS, STEREOTACTIC GUIDED    CORE BIOPSY   - MICROCALCIFICATIONS ASSOCIATED WITH FIBROADENOMATOID CHANGE AND    BENIGN BREAST PARENCHYMA    - USUAL DUCTAL HYPERPLASIA AND COLUMNAR CELL CHANGE      Objective:   Vitals:  Blood pressure (!) 147/87, pulse (!) 44, temperature 98.2 °F (36.8 °C), temperature source Temporal, resp. rate 16, height 5' 3" (1.6 m), weight 123.4 kg (272 lb), SpO2 99%.    Physical Examination:   GEN: no apparent distress, comfortable; AAOx3; morbidly overweight; poor personal hygiene   HEAD: atraumatic and normocephalic  EYES: no pallor, no icterus, PERRLA  ENT: OMM, no pharyngeal erythema, external ears WNL; no nasal discharge; no thrush  NECK: no masses, thyroid normal, trachea midline, no LAD/LN's, supple  CV: RRR with no murmur; normal pulse; normal S1 and S2; no pedal edema  CHEST: Normal respiratory effort; CTAB; normal breath sounds; no wheeze or crackles;    ABDOM: nontender and nondistended; soft; normal bowel sounds; no rebound/guarding  MUSC/Skeletal: LROM and crepitus right knee; no deformities or arthropathy  EXTREM: no clubbing, cyanosis, inflammation or swelling  SKIN: no rashes, lesions, ulcers, petechiae or subcutaneous nodules  : no diaz  NEURO: grossly intact; motor/sensory WNL; AAOx3; no tremors  PSYCH: normal mood, affect and behavior  LYMPH: normal cervical, supraclavicular, and groin LN's;       Labs:   Lab Results   Component Value Date    WBC 9.49 06/18/2025    HGB 12.6 06/18/2025    HCT 39.7 06/18/2025    MCV 96 " 06/18/2025     06/18/2025    CMP  Sodium   Date Value Ref Range Status   02/03/2025 139 136 - 145 mmol/L Final     Potassium   Date Value Ref Range Status   02/03/2025 4.0 3.5 - 5.1 mmol/L Final     Chloride   Date Value Ref Range Status   02/03/2025 106 95 - 110 mmol/L Final     CO2   Date Value Ref Range Status   02/03/2025 28 23 - 29 mmol/L Final     Glucose   Date Value Ref Range Status   02/03/2025 110 70 - 110 mg/dL Final     BUN   Date Value Ref Range Status   02/03/2025 13 6 - 20 mg/dL Final     Creatinine   Date Value Ref Range Status   02/03/2025 1.7 (H) 0.5 - 1.4 mg/dL Final     Calcium   Date Value Ref Range Status   02/03/2025 9.1 8.7 - 10.5 mg/dL Final     Total Protein   Date Value Ref Range Status   02/03/2025 7.4 6.0 - 8.4 g/dL Final     Albumin   Date Value Ref Range Status   02/03/2025 3.3 (L) 3.5 - 5.2 g/dL Final     Total Bilirubin   Date Value Ref Range Status   02/03/2025 0.4 0.1 - 1.0 mg/dL Final     Comment:     For infants and newborns, interpretation of results should be based  on gestational age, weight and in agreement with clinical  observations.    Premature Infant recommended reference ranges:  Up to 24 hours.............<8.0 mg/dL  Up to 48 hours............<12.0 mg/dL  3-5 days..................<15.0 mg/dL  6-29 days.................<15.0 mg/dL       Alkaline Phosphatase   Date Value Ref Range Status   02/03/2025 104 55 - 135 U/L Final     AST   Date Value Ref Range Status   02/03/2025 21 10 - 40 U/L Final     ALT   Date Value Ref Range Status   02/03/2025 16 10 - 44 U/L Final     Anion Gap   Date Value Ref Range Status   02/03/2025 5 (L) 8 - 16 mmol/L Final     eGFR if    Date Value Ref Range Status   07/18/2022 >60.0 >60 mL/min/1.73 m^2 Final     eGFR if non    Date Value Ref Range Status   07/18/2022 >60.0 >60 mL/min/1.73 m^2 Final     Comment:     Calculation used to obtain the estimated glomerular filtration  rate (eGFR) is the CKD-EPI  equation.                  Lab Results   Component Value Date    IRON 69 12/27/2024    TIBC 279 12/27/2024    FERRITIN 77.3 12/27/2024     Lab Results   Component Value Date    [id number] 452 05/02/2021     Lab Results   Component Value Date    FOLATE 5.9 05/02/2021                I have reviewed all available lab results and radiology reports.    Radiology/Diagnostic Studies:      MRI Pelvis 6/15/2022:    Impression:     1. Normal thickness of the endometrium.  2. Query adenomyosis.  3. Nonviable uterine fibroid.  4. Involuting follicle or cyst in the left ovary.              US Lower Extremity Veins Bilateral [[id number]] Collected: 05/01/21 1348   Order Status: Completed Updated: 05/01/21 1432   Narrative:     REASON: DVT     FINDINGS:     Grayscale, color and spectral Doppler analysis of the bilateral lower   extremity deep venous system was performed.     There is normal compressibility, color and spectral Doppler analysis,   and augmentation in the bilateral lower extremity deep venous system.     IMPRESSION:     1.  No DVT of the bilateral lower extremity veins.   2.  Bilateral distal superficial femoral veins were unable to be   assessed due to body habitus       CTA Chest Non-Coronary - PE Study [[id number]] Collected: 05/01/21 1143   Order Status: Completed Updated: 05/01/21 1225   Narrative:     CMS MANDATED QUALITY DATA - CT RADIATION - 436     All CT scans at this facility utilize dose modulation, iterative   reconstruction, and/or weight based dosing when appropriate to reduce   radiation dose to as low as reasonably achievable.         REASON: PE suspected, intermediate prob, positive D-dimer     TECHNIQUE: CT angiography of thorax with 100 mL Omnipaque 350.   Maximum intensity projection coronal reformations were created at a   separate workstation and stored in the patient's permanent medical   record.     COMPARISON: CTA chest October 14, 2019.     FINDINGS:     Limited evaluation due to body habitus  and inadequate bolus timing.   Filling defects identified in segmental and subsegmental artery   supplying the left lower lobe, consistent with pulmonary emboli.   Questionable filling defects versus artifact in the right mainstem   pulmonary artery and a few segmental arteries supplying the right   lower lobe may reflect pulmonary emboli. Heart size is at the upper   limits of normal. No mediastinal lymphadenopathy or mass.     The central tracheobronchial tree is patent. There is diffuse   groundglass lung opacities in the bilateral lungs with peripheral   wedge-shaped opacities. No pleural effusions.     The visualized abdominal viscera are unremarkable. No acute osseous   abnormality..     IMPRESSION:     1.  Limited evaluation due to body habitus and inadequate bolus   timing. Filling defects identified in segmental and subsegmental   artery supplying the left lower lobe, consistent with pulmonary   emboli. There are questionable pulmonary emboli in the right mainstem   pulmonary artery and segmental artery supplying the right lower lobe.   Findings reported to Dr.Lloyd Duffy at 5/1/2021.   2.  Bilateral diffuse groundglass lung opacities with peripheral   wedge-shaped opacities may reflect changes of poor inspiration and   atelectasis. Atypical infectious process could also have a similar   appearance the proper clinical setting.                 All lab results and imaging results have been reviewed and discussed with the patient    Assessment:   (1) 50 y.o. female with diagnosis of pulmonary emboli who was seen as a consult at [facility]  - patient with diagnosis of asthma who presented to the ED at [facility] on 5/1 with progressive SOB, postnasal drip and acute hypercapnia. CTA was a limited study due to body habitus but radiology reported presence of filling defects identified in segmental and subsegmental artery supplying the left lower lobe, consistent with pulmonary emboli. They also reported questionable  pulmonary emboli in the right mainstem pulmonary artery and segmental artery supplying the right lower lobe. Patient has been admitted to hospitalist service and is on Lovenox. She has no prior personal or family history of clots.      - doppler studies are negative     5/3/2021:  - patient was seen by Dr Clark with pulm yesterday; appreciate his evaluation  - options of anticoagulation include: Coumadin, Eliquis , Xarelto, or Pradaxa  - in patients with morbid obesity, one can have difficulties with therapeutic dosing with practically any type of oral anticoagulant     5/31/2021:  - She has O2 at home and is on portable today.   - She has not followed up with pulmonary since discharge.   - She is breathing fair but does have WINN.   - She has some sinus issues. She is currently on Eliquis.   - She saw Dr Rendon since discharge  - low ATIII and protein S levels which could be the etiology, but they will need to be repeated at later date to confirm  - Elevated factor VIII which also will need to be repeated to confirm    6/28/2021:  - she saw Dr Clark with pulmonary since last visit and has PFT scheduled for this coming July 1st  - she remains on portable O2    12/8/2021:  - she saw Dr Clark again on 11/2/2021  - she remains on O2 at home  - she remains on eliquis  - Repeat AT3 was normal, repeat Protein S was WNL  - repeat factor VIII was still a little elevated at 281 but better    2/15/2022:  - she sees pulmonary NP again next month    5/19/2022:  - she saw Idania Cason NP on 5/2/2022 with pulmonary    6/20/2022:  - remains on eliquis  - currently with no excessive bleeding or bruising    10/24/2022:  - continued on eliquis   - no excessive bleeding or bruising    2/27/2023:  - continued on eliquis but she is only taking it once per day    8/8/2023:  - continued on eliquis but only takes one a day    4/29/2024:  - She missed her appointment in march 2024  - She reports she had TIA/low grade stroke in April 2024.  MRI brain showed a small acute infarct in left corona radiata. MRA head showed no large vessel occlusion or high-grade stenosis. Bilateral carotid ultrasound with no significant carotid stenosis.   - She saw Dr Leiva with neurology in April 2024. She has repeat MRI planned for May 3rd 2024. Neurology suspects she has likely small-vessel disease. Dr Leiva recommended she continue the eliquis and started her on aspirin 81mg and lipitor.  - encouraged her to take the eliquis bid     8/26/2024:  - continued on eliquis po bid and baby aspirin  - no excessive bleeding or bruising  - she saw Dr Gabriel Hall in June 2024  - sees neurology again in Jan 2024 12/31/2024:  - she is continued on eliquis and aspirin  - no excessive bleeding or bruising     6/18/2025:  - she is continued on eliquis and aspirin  - no excessive bleeding or bruising         (2) Anemia with microcytic indices with current history of chronic menorrhagia - most likely has underlying iron deficiency due to chronic heavy menstrual cycles  - s/p two units of blood  - prior hgb at 8.2  - total bilirubin is WNL, so I do not suspect any hemolysis at this time     5/3/2021:  - hgb at 8.6 today  - iron and ferritin are both low with elevated TIBC  - she received dose of IV iron today    5/31/2021:  - she needs repeat labs incl iron panel  - will consider additional IV iron as needed  - she is on oral iron  - she needs repeat labs incl. Iron panel    6/28/2021:  - hgb currently 11.7  - iron panel is adequate at this time    10/26/2021:  - latest hgb at 10.6  - iron at 29  - she is on oral iron since May 2021  - she has heavy menstrual cycles  - discussed consideration for IV iron and she is agreeable; discussed general side-effects of iron infusions    12/8/2021:  - she has been on oral iron with little improvement in the labs  - she is starting IV iron this coming Friday    2/15/2022:  - latest labs from Jan 2022 with no anemia and iron panel was adequate  -  she had IV iron x 1 only    5/19/2022:  - latest hgb at 11.2  - ferritin was 28  - iron back down to 24  - continued GYN bleeding issues  - seen by Dr Pro with GYn-Onc on 4/20/2022 6/20/2022:  - awaiting better BP control inorder to resume IV iron  - seeing Dr Joseph later today for her HBP  - labs pending for today    7/25/2022:  - s/p two cycles of IV iron  - latest iron panel adequate and hgb WNL    10/24/2022:  - hgb currently WNL at 13.1  - iron at 84 and ferritin 262    2/27/2023:  - latest hgb and iron panel WNL    8/8/2023:  - latest labs from July 2023 with normal hgb    4/29/2024:  - no current anemia and iron panels adequate    8/26/2024:  - latest hgb practically WNL at 11.8  - iron panel currently adequate; ferritin WNL    12/21/2024:  - latest hgb at 11.6  - iron panel currently adequate    6/18/2025:  - today's CBC with Hgb WNL and no current anemia  - CMP and iron panel are in-process    (3) Abnormal mammogram:    5/19/2022:  - She had recent abnormal mammogram 5/6/2022 with cluster of calcifications 1 0'clock posittion on the left breast.   - she is scheduling US guided biopsy in near future  - discussed referral to [facility] for evaluation    6/20/2022:  - s/p breast biopsy on 5/30/2022 with pathology report coming back benign  - recommend repeat mammogram in at least 3 months    7/25/2022:  - refer to Dr Puentes for her breast issues    10/24/2022:  - she said she had repeat mammo in Aug 2022 but I do not see a report  - she is scheduled Dr Puentes in couple weeks    8/8/2023:  - repeat mammo was ordered by Dr Joseph  - she is overdo to see Dr joseph    4/29/2024:  - she is now seeing Dr Ybarra in [place]  - she reports no new mammogram issues    12/31/2024:  - She saw Dr Herndon with Gen-Surg in Nov 2024 for post-op f/u after having I&D of incision drainage of left breast abscess (which has since healed)    6/18/2025:  - mammo again in Sept 2025 - ordered by dr Rubio with GYN      (3)  Overweight/Obesity     (4) HTN     (5) GERD       (6) General anxiety disorder      (7) Migraine HA's - hospitalized in Dec 2022    VISIT DIAGNOSES:      1. Elevated factor VIII level    2. Antithrombin III deficiency    3. Acute pulmonary embolism, unspecified pulmonary embolism type, unspecified whether acute cor pulmonale present    4. Protein S deficiency    5. History of pulmonary embolus (PE)    6. Iron deficiency anemia, unspecified iron deficiency anemia type    7. Iron deficiency anemia due to chronic blood loss            Plan:     PLAN:  1. F/u with PCP, pulmonary, neurology as directed - continue the eliquis bid  2. F/u with neurology as directed  3. Check labs every 3 months  incl iron panel - set up up IV iron as needed    4. F/u with GYN and Dr Pro as directed - mammo ordered by GYN and due again in Sept 2025  5. Refill eliquis as needed  6. F/u with Gen-Surg as directed  7. F/u with Dr Valerio for the knee issues - needs new referral        RTC in 6 months    Fax note to  (new PCP), Inocencia Cason; Alexandra; Dhaval; Koi/Rafael              Elevated factor VIII level    Antithrombin III deficiency    Acute pulmonary embolism, unspecified pulmonary embolism type, unspecified whether acute cor pulmonale present    Protein S deficiency    History of pulmonary embolus (PE)    Iron deficiency anemia, unspecified iron deficiency anemia type    Iron deficiency anemia due to chronic blood loss      No follow-ups on file.    COVID-19 Discussion:    I had long discussion with patient and any applicable family about the COVID-19 coronavirus epidemic and the recommended precautions with regard to cancer and/or hematology patients. I have re-iterated the CDC recommendations for adequate hand washing, use of hand -like products, and coughing into elbow, etc. In addition, especially for our patients who are on chemotherapy and/or our otherwise immunocompromised patients, I have recommended  avoidance of crowds, including movie theaters, restaurants, churches, etc. I have recommended avoidance of any sick or symptomatic family members and/or friends. I have also recommended avoidance of any raw and unwashed food products, and general avoidance of food items that have not been prepared by themselves. The patient has been asked to call us immediately with any symptom developments, issues, questions or other general concerns.       Anticoagulation Discussion:    Discussed with patient and any applicable family members about the benefit and/or need for anticoagulation. I communicated about the risks of bleeding while on any anticoagulation, which could be serious and/or life-threatening, and which can occur at any time, regardless of degree of the level of anticoagulation. I expressed the need for compliance with any anticoagulation regimen and that failure to do so could potential lead to excessive bleeding, and risk to health and/or life. In particular, with patients on coumadin therapy, compliance with requested blood work is absolutely essential, as coumadin levels can vary from time to time, and failure to do so could potentially place the patient at risk for bleeding and/or clotting events which could be fatal. Patients on coumadin are encouraged to call the day after they have their levels drawn, as to obtain the appropriate instructions from my staff. Patients are aware that self-regulating or self-dosing of their medications is strictly prohibited.       I have explained and the patient understands all of  the current recommendation(s). I have answered all of their questions to the best of my ability and to their complete satisfaction.             Thank you for allowing me to participate in this pleasant patient's care. Please call with any questions or concerns.      Electronically signed Cisco Boston MD

## 2025-06-18 NOTE — PROGRESS NOTES
Physical Therapy Visit    Patient Name: Katiuska Preston  MRN: [id number]  YOB: 1975  Encounter Date: 6/19/2025    Therapy Diagnosis:   Encounter Diagnoses   Name Primary?    Poor posture Yes    Decreased strength of lower extremity     Decreased range of motion of lumbar spine        Physician: Saadia Ybarra MD    Physician Orders: Eval and Treat  Medical Diagnosis: Recurrent low back pain    Visit # / Visits Authorized:  10 / 12  Insurance Authorization Period: 5/20/2025 to 7/19/2025  Date of Evaluation: 3/27/2025  Plan of Care Certification: 5/15/2025 to 8/15/2025      PT/PTA: PTA   Number of PTA visits since last PT visit:5  Time In: 1300   Time Out: 1400  Total Time (in minutes): 60   Total Billable Time (in minutes): 60    FOTO:  Intake Score:  %  Survey Score 2:  %  Survey Score 3:  %    Precautions:       Subjective   Pt continues to report pain in her knees.  SHe stated she is going to see Dr. Valerio on July 1 ti get shots in her knees..  Pain reported as 7/10.      Objective          Treatment:     Therapeutic Exercise - 60 minutes  nu step x 10 min L3  precor machines - torso rotation, chest press, rows, hip abd    standing extensions over EOM x 20 reps  bridging 2 x 10 reps  SL external rotation clams red TB x 20 reps   SL open books x 20 reps   Transverse abdominal sets forward and lateral x 20 reps (small orange physioball)  prone hip extensions x 20 reps each side  prone press ups 2 x 10 reps     Medx        6/19/2025     1:54 PM   [facility]   Visit Number 11   Lumbar Weight 55 lbs   Repetitions 15   Rating of Perceived Exertion 5          Time Entry(in minutes):  Therapeutic Exercise Time Entry: 60    Assessment & Plan   Assessment: Pt's knee pain (Right > Left) limited her with doing exercises, especially Procor machines.  She was able to do them but her pain when she did.  She also felt nauseated when on the MedX.  She stated she has not eaten yet today.     Evaluation/Treatment Tolerance: Other (Comment) (feeling nauseated and knee pain)    The patient will continue to benefit from skilled outpatient physical therapy in order to address the deficits listed in the problem list on the initial evaluation, provide patient and family education, and maximize the patients level of independence in the home and community environments.     The patient's spiritual, cultural, and educational needs were considered, and the patient is agreeable to the plan of care and goals.         Plan: Cont with therapeutic ex, therapeutic activities, and neuromuscular re-ed as tolerated to progress towards patients goals per POC.    Goals:   Active       Long term goals       - Pt will demonstrate increased lumbar MedX ROM by at least 6 degrees from initial ROM value, resulting in improved ability to perform functional forward bending while standing and sitting. Appropriate and Ongoing  (Progressing)       Start:  05/15/25    Expected End:  07/24/25            - Pt will demonstrate increased MedX average isometric strength value by 30% from initial test resulting in improved ability to perform bending, lifting, and carrying activities safely and confidently. Appropriate and Ongoing  (Progressing)       Start:  05/15/25    Expected End:  07/24/25            - Pt to demonstrate ability to independently control and reduce their pain through posture positioning and mechanical movements throughout a typical day. Appropriate and Ongoing  (Progressing)       Start:  05/15/25    Expected End:  07/24/25            - Pt will demonstrate reduced pain and improved functional outcomes as reported on the FOTO by reaching an intake score of >/= 58% functional ability in order to demonstrate subjective improvement in patient's condition. . Appropriate and Ongoing  (Progressing)       Start:  05/15/25    Expected End:  07/24/25            - Pt will demonstrate independence with the HEP at discharge.  Appropriate and Ongoing  (Progressing)       Start:  05/15/25    Expected End:  07/24/25               Short Term Goals       - Pt will demonstrate increased lumbar MedX ROM by at least 3 degrees from the initial ROM value with improvements noted in functional ROM and ability to perform ADLs. Appropriate and Ongoing  (Progressing)       Start:  05/15/25    Expected End:  06/19/25            - Pt will demonstrate increased MedX average isometric strength value by 15% from initial test resulting in improved ability to perform bending, lifting, and carrying activities safely, confidently. Appropriate and Ongoing  (Progressing)       Start:  05/15/25    Expected End:  06/19/25            - Pt will report a reduction in worst pain score by 1-2 points for improved tolerance for sitting and standing. Appropriate and Ongoing  (Progressing)       Start:  05/15/25    Expected End:  06/19/25            - Pt able to perform HEP correctly with minimal cueing or supervision from therapist to encourage independent management of symptoms. Appropriate and Ongoing  (Progressing)       Start:  05/15/25    Expected End:  06/19/25                Uma Pastrana, PTA

## 2025-06-19 ENCOUNTER — CLINICAL SUPPORT (OUTPATIENT)
Dept: REHABILITATION | Facility: HOSPITAL | Age: 50
End: 2025-06-19
Payer: MEDICAID

## 2025-06-19 DIAGNOSIS — R29.898 DECREASED STRENGTH OF LOWER EXTREMITY: ICD-10-CM

## 2025-06-19 DIAGNOSIS — R29.3 POOR POSTURE: Primary | ICD-10-CM

## 2025-06-19 DIAGNOSIS — M53.86 DECREASED RANGE OF MOTION OF LUMBAR SPINE: ICD-10-CM

## 2025-06-19 PROCEDURE — 97110 THERAPEUTIC EXERCISES: CPT | Mod: PN,CQ

## 2025-06-24 ENCOUNTER — CLINICAL SUPPORT (OUTPATIENT)
Dept: REHABILITATION | Facility: HOSPITAL | Age: 50
End: 2025-06-24
Payer: MEDICAID

## 2025-06-24 DIAGNOSIS — R29.3 POOR POSTURE: Primary | ICD-10-CM

## 2025-06-24 DIAGNOSIS — R29.898 DECREASED STRENGTH OF LOWER EXTREMITY: ICD-10-CM

## 2025-06-24 DIAGNOSIS — M53.86 DECREASED RANGE OF MOTION OF LUMBAR SPINE: ICD-10-CM

## 2025-06-24 PROCEDURE — 97110 THERAPEUTIC EXERCISES: CPT | Mod: PN

## 2025-07-01 ENCOUNTER — OFFICE VISIT (OUTPATIENT)
Dept: ORTHOPEDICS | Facility: CLINIC | Age: 50
End: 2025-07-01
Payer: MEDICAID

## 2025-07-01 ENCOUNTER — HOSPITAL ENCOUNTER (OUTPATIENT)
Dept: RADIOLOGY | Facility: HOSPITAL | Age: 50
Discharge: HOME | End: 2025-07-01
Attending: ORTHOPAEDIC SURGERY
Payer: MEDICAID

## 2025-07-01 VITALS — WEIGHT: 260 LBS | BODY MASS INDEX: 46.07 KG/M2 | HEIGHT: 63 IN

## 2025-07-01 DIAGNOSIS — M17.12 PRIMARY OSTEOARTHRITIS OF LEFT KNEE: ICD-10-CM

## 2025-07-01 DIAGNOSIS — M17.11 PRIMARY OSTEOARTHRITIS OF RIGHT KNEE: Primary | ICD-10-CM

## 2025-07-01 PROCEDURE — 73562 X-RAY EXAM OF KNEE 3: CPT | Mod: 26,50,, | Performed by: RADIOLOGY

## 2025-07-01 PROCEDURE — 73562 X-RAY EXAM OF KNEE 3: CPT | Mod: TC,50,PN

## 2025-07-01 PROCEDURE — 20610 DRAIN/INJ JOINT/BURSA W/O US: CPT | Mod: PBBFAC,PN,LT | Performed by: ORTHOPAEDIC SURGERY

## 2025-07-01 PROCEDURE — 20610 DRAIN/INJ JOINT/BURSA W/O US: CPT | Mod: PBBFAC,PN,RT | Performed by: ORTHOPAEDIC SURGERY

## 2025-07-01 PROCEDURE — 99999 PR PBB SHADOW E&M-EST. PATIENT-LVL III: CPT | Mod: PBBFAC,,, | Performed by: ORTHOPAEDIC SURGERY

## 2025-07-01 PROCEDURE — 99213 OFFICE O/P EST LOW 20 MIN: CPT | Mod: PBBFAC,25,PN | Performed by: ORTHOPAEDIC SURGERY

## 2025-07-01 PROCEDURE — 99999PBSHW PR PBB SHADOW TECHNICAL ONLY FILED TO HB: Mod: PBBFAC,,,

## 2025-07-01 RX ORDER — OMEPRAZOLE 20 MG/1
CAPSULE, DELAYED RELEASE ORAL
COMMUNITY

## 2025-07-01 RX ORDER — LOSARTAN POTASSIUM 25 MG/1
TABLET ORAL
COMMUNITY

## 2025-07-01 RX ORDER — TRIAMCINOLONE ACETONIDE 40 MG/ML
40 INJECTION, SUSPENSION INTRA-ARTICULAR; INTRAMUSCULAR
Status: DISCONTINUED | OUTPATIENT
Start: 2025-07-01 | End: 2025-07-01 | Stop reason: HOSPADM

## 2025-07-01 RX ORDER — TOPIRAMATE 50 MG/1
50 TABLET, FILM COATED ORAL
COMMUNITY
Start: 2025-05-27

## 2025-07-01 RX ORDER — FUROSEMIDE 20 MG/1
TABLET ORAL
COMMUNITY

## 2025-07-01 RX ORDER — PHENTERMINE HYDROCHLORIDE 30 MG/1
30 CAPSULE ORAL
COMMUNITY
Start: 2025-06-11

## 2025-07-01 RX ORDER — METOPROLOL TARTRATE 25 MG/1
TABLET, FILM COATED ORAL
COMMUNITY

## 2025-07-01 RX ORDER — FOLIC ACID 1 MG/1
1000 TABLET ORAL
COMMUNITY
Start: 2025-06-23

## 2025-07-01 RX ORDER — ERGOCALCIFEROL 1.25 MG/1
1 CAPSULE ORAL
COMMUNITY

## 2025-07-01 RX ORDER — LISINOPRIL 20 MG/1
TABLET ORAL
COMMUNITY

## 2025-07-01 RX ORDER — RIMEGEPANT SULFATE 75 MG/75MG
TABLET, ORALLY DISINTEGRATING ORAL
COMMUNITY

## 2025-07-01 RX ADMIN — TRIAMCINOLONE ACETONIDE 40 MG: 40 INJECTION, SUSPENSION INTRA-ARTICULAR; INTRAMUSCULAR at 01:07

## 2025-07-01 NOTE — PROGRESS NOTES
[facility]    Subjective:     Chief Complaint:   Chief Complaint   Patient presents with    Left Knee - Pain     Last injected bilateral knees 10/10/24, she lost 40 pounds since last visit,still working towards her goal of 140lbs, knee pain is constant, no swelling, c/o popping and giving way, she is in PT due to the giving way    Right Knee - Pain       Past Medical History:   Diagnosis Date    Antithrombin III deficiency 2021    Asthma     Claustrophobia     Elevated factor VIII level 2021    Esophageal reflux     Gastroesophageal reflux    Generalized anxiety disorder     Anxiety, Generalized    Herniated disc     Hypertension     Iron deficiency anemia due to chronic blood loss 10/27/2021    Lower extremity weakness     Morbid obesity     Obesity     Protein S deficiency 2021    Pulmonary embolism     Upper extremity weakness        Past Surgical History:   Procedure Laterality Date     SECTION      CHOLECYSTECTOMY      TONSILLECTOMY         Current Outpatient Medications   Medication Sig    albuterol (PROVENTIL/VENTOLIN HFA) 90 mcg/actuation inhaler Inhale 2 puffs into the lungs every 6 (six) hours as needed for Wheezing. Rescue    amLODIPine (NORVASC) 10 MG tablet 1 tablet Orally Once a day for 30 days    aspirin (ECOTRIN) 81 MG EC tablet Take 1 tablet (81 mg total) by mouth once daily.    atorvastatin (LIPITOR) 40 MG tablet Take 1 tablet (40 mg total) by mouth every evening.    carvediloL (COREG) 25 MG tablet Take 1 tablet (25 mg total) by mouth 2 (two) times daily.    cloNIDine (CATAPRES) 0.1 MG tablet Take 1 tablet (0.1 mg total) by mouth every 8 (eight) hours as needed (SBP >180).    colloidal oatmeaL (EUCERIN ECZEMA RELIEF) 1 % Crea Apply 1 Application topically 2 (two) times a day.    ELIQUIS 5 mg Tab TAKE 1 TABLET BY MOUTH TWICE DAILY    ergocalciferol (ERGOCALCIFEROL) 50,000 unit Cap 1 capsule.    ferrous sulfate (FEROSUL) 325 mg (65 mg iron) Tab tablet TAKE 1 TABLET  BY MOUTH EVERY DAY    fluticasone propionate (FLONASE) 50 mcg/actuation nasal spray 2 sprays by Each Nostril route daily as needed for Rhinitis.    fluticasone-salmeterol 230-21 mcg/dose (ADVAIR HFA) 230-21 mcg/actuation HFAA inhaler Inhale 2 puffs into the lungs 2 (two) times daily. Controller    folic acid (FOLVITE) 1 MG tablet Take 1,000 mcg by mouth.    furosemide (LASIX) 20 MG tablet 1 tablet Orally Once a day    lisinopriL (PRINIVIL,ZESTRIL) 20 MG tablet 1 tablet Orally Once a day    losartan (COZAAR) 25 MG tablet 1 tablet Orally Once a day    metoprolol succinate (TOPROL-XL) 200 MG 24 hr tablet     metoprolol tartrate (LOPRESSOR) 25 MG tablet 1 tablet with food Orally Twice a day    minocycline (MINOCIN,DYNACIN) 100 MG capsule     mupirocin (BACTROBAN) 2 % ointment Apply topically.    NEXIUM 40 mg capsule Take 40 mg by mouth once daily.    olmesartan-hydrochlorothiazide (BENICAR HCT) 40-25 mg per tablet Take 1 tablet by mouth once daily.    omeprazole (PRILOSEC) 20 MG capsule 1 capsule 30 minutes before morning meal Orally Once a day    phentermine 30 MG Cap Take 30 mg by mouth.    rimegepant (NURTEC) 75 mg odt 1 tablet on the tongue and allow to dissolve Orally Once a day as needed for 30 day(s)    sulfamethoxazole-trimethoprim 800-160mg (BACTRIM DS) 800-160 mg Tab Take 1 tablet by mouth 2 (two) times daily.    topiramate (TOPAMAX) 50 MG tablet Take 50 mg by mouth.    trazodone (DESYREL) 100 MG tablet Take 100 mg by mouth every evening.     Current Facility-Administered Medications   Medication    bebtelovimab (EUA) 175 mg/2 mL (87.5 mg/mL) injection 175 mg       Review of patient's allergies indicates:  No Known Allergies    No family history on file.    Social History[1]    History of present illness:   Katiuska comes in today for follow-up for her bilateral knees.  She has known arthrosis in her knees.  We have been treating this with intermittent corticosteroid injections.  Last injections were about 8  months ago.  She has been working on weight loss.  She has lost nearly 140 lb.    Review of Systems:    Constitution: Negative for chills, fever, and sweats.  Negative for unexplained weight loss.    HENT:  Negative for headaches and blurry vision.    Cardiovascular:Negative for chest pain or irregular heart beat. Negative for hypertension.    Respiratory:  Negative for cough and shortness of breath.    Gastrointestinal: Negative for abdominal pain, heartburn, melena, nausea, and vomitting.    Genitourinary:  Negative bladder incontinence and dysuria.    Musculoskeletal:  See HPI for details.     Neurological: Negative for numbness.    Psychiatric/Behavioral: Negative for depression.  The patient is not nervous/anxious.      Endocrine: Negative for polyuria    Hematologic/Lymphatic: Negative for bleeding problem.  Does not bruise/bleed easily.    Skin: Negative for poor would healing and rash    Objective:      Physical Examination:    Vital Signs:  There were no vitals filed for this visit.    Body mass index is 46.06 kg/m².    This a well-developed, well nourished patient in no acute distress.  They are alert and oriented and cooperative to examination.        Bilateral knee exam:   no real change in knee exam from previous visits with us. Skin to bilateral knees is clean dry and intact. No erythema or ecchymosis bilaterally. No signs or symptoms of infection bilaterally. Bilateral knee range of motion 0-90 degrees. Both knees stable to varus and valgus stresses. She is neurovascularly intact throughout bilateral lower extremities. She can weightbear as tolerated on bilateral lower extremities.  Patient's BMI has improved from 55-46 with her weight loss.     Pertinent New Results:    XRAY Report / Interpretation:     Three views taken of bilateral knees today: AP, lateral, sunrise views.  No acute fractures or dislocations seen.  She has severe arthrosis with complete collapse of the medial compartment bilaterally  "with bone-on-bone deformity.    Assessment/Plan:        1. Bilateral knee osteoarthritis.      At her request, injected her bilateral knees today via an anterolateral approach with 40 mg of Kenalog and lidocaine.  She tolerated this well.  She will continue working on her weight loss as she has been very successful to date.  We will see her back in 3 months or on an as-needed basis for repeat injections.      Franklin Kevin, Physician Assistant, served in the capacity as a "scribe" for this patient encounter.  A "face-to-face" encounter occurred with Dr. Brayan Valerio on this date.  The treatment plan and medical decision-making is outlined above. Patient was seen and examined with a chaperone.       This note was created using Dragon voice recognition software that occasionally misinterpreted phrases or words.               [1]   Social History  Socioeconomic History    Marital status:    Tobacco Use    Smoking status: Former     Current packs/day: 0.00     Types: Cigarettes, Cigars     Quit date: 2019     Years since quittin.5    Smokeless tobacco: Never   Substance and Sexual Activity    Alcohol use: Yes     Alcohol/week: 0.8 standard drinks of alcohol     Types: 1 Standard drinks or equivalent per week     Comment: rarely    Drug use: No    Sexual activity: Not Currently     Social Drivers of Health     Financial Resource Strain: Medium Risk (10/18/2022)    Overall Financial Resource Strain (CARDIA)     Difficulty of Paying Living Expenses: Somewhat hard   Food Insecurity: Food Insecurity Present (10/18/2022)    Hunger Vital Sign     Worried About Running Out of Food in the Last Year: Sometimes true     Ran Out of Food in the Last Year: Sometimes true   Transportation Needs: Unmet Transportation Needs (10/18/2022)    PRAPARE - Transportation     Lack of Transportation (Medical): Yes     Lack of Transportation (Non-Medical): Yes   Stress: No Stress Concern Present (10/18/2022)    [place] [employer] - Occupational Stress Questionnaire     Feeling of Stress : Only a little   Housing Stability: Unknown (6/4/2025)    Received from [facility]    Housing Stability Vital Sign     Unable to Pay for Housing in the Last Year: No

## 2025-07-01 NOTE — PROCEDURES
Large Joint Aspiration/Injection: R knee    Date/Time: 7/1/2025 1:00 PM    Performed by: Brayan Valerio MD  Authorized by: Brayan Valerio MD    Consent Done?:  Yes (Verbal)  Indications:  Pain  Site marked: the procedure site was marked    Timeout: prior to procedure the correct patient, procedure, and site was verified    Prep: patient was prepped and draped in usual sterile fashion      Local anesthesia used?: Yes    Local anesthetic:  Lidocaine 1% without epinephrine    Details:  Needle Size:  25 G  Ultrasonic Guidance for needle placement?: No    Approach:  Anterolateral  Location:  Knee  Site:  R knee  Medications:  40 mg triamcinolone acetonide 40 mg/mL  Patient tolerance:  Patient tolerated the procedure well with no immediate complications

## 2025-07-01 NOTE — PROCEDURES
Large Joint Aspiration/Injection: L knee    Date/Time: 7/1/2025 1:00 PM    Performed by: Brayan Valerio MD  Authorized by: Brayan Valerio MD    Consent Done?:  Yes (Verbal)  Indications:  Pain  Site marked: the procedure site was marked    Timeout: prior to procedure the correct patient, procedure, and site was verified    Prep: patient was prepped and draped in usual sterile fashion      Local anesthesia used?: Yes    Local anesthetic:  Lidocaine 1% without epinephrine    Details:  Needle Size:  25 G  Ultrasonic Guidance for needle placement?: No    Approach:  Anterolateral  Location:  Knee  Site:  L knee  Medications:  40 mg triamcinolone acetonide 40 mg/mL  Patient tolerance:  Patient tolerated the procedure well with no immediate complications

## 2025-07-02 ENCOUNTER — CLINICAL SUPPORT (OUTPATIENT)
Dept: REHABILITATION | Facility: HOSPITAL | Age: 50
End: 2025-07-02
Payer: MEDICAID

## 2025-07-02 DIAGNOSIS — M53.86 DECREASED RANGE OF MOTION OF LUMBAR SPINE: ICD-10-CM

## 2025-07-02 DIAGNOSIS — R29.3 POOR POSTURE: Primary | ICD-10-CM

## 2025-07-02 DIAGNOSIS — R29.898 DECREASED STRENGTH OF LOWER EXTREMITY: ICD-10-CM

## 2025-07-02 PROCEDURE — 97110 THERAPEUTIC EXERCISES: CPT | Mod: PN

## 2025-07-02 NOTE — PROGRESS NOTES
Physical Therapy Progress Note : Updated Plan of Care    Patient Name: Katiuska Preston  MRN: [id number]  YOB: 1975  Encounter Date: 7/2/2025    Therapy Diagnosis:   Encounter Diagnoses   Name Primary?    Poor posture Yes    Decreased strength of lower extremity     Decreased range of motion of lumbar spine      Physician: Saadia Ybarra MD    Physician Orders: Eval and Treat  Medical Diagnosis: Recurrent low back pain  Surgical Diagnosis: Not applicable for this Episode   Surgical Date: Not applicable for this Episode  Days Since Last Surgery: Not applicable for this Episode    Visit # / Visits Authorized:  12 / 12  Insurance Authorization Period: 5/20/2025 to 7/19/2025  Date of Evaluation: 3/27/2025   Plan of Care Certification: 5/15/2025 to 8/15/2025      PT/PTA: PT   Number of PTA visits since last PT visit:0  Time In: 1108   Time Out: 1201  Total Time (in minutes): 53   Total Billable Time (in minutes): 53    FOTO:  Intake Score: 53%  Survey Score 2: 57%  Survey Score 3: 56%    Precautions:       Subjective   Arrives with complaints of knee pain. She reports she recieved injections yesterday but no relief yet. She also reports increased stress today with dealing with her daughter and her ..  Pain reported as 0/10. mid back and low back    Objective              Isometric Testing on Med X equipment: Testing administered by PT    Test Initial Midpoint Final   Spine area:lumbar spine       Date 6/3/2025 7/2/2025    ROM 0-42 0-48    Max Peak Torque 124 89    Min Peak Torque 54  27    Flex/Ext Ratio 2.3 3.3    % below normative data 35 53    % loss from initial test Not available visit 1 -32%           Treatment:  Therapeutic Exercise  TE 2: Medx machine  TE 3: half of peripheral machines  TE 4: standing extensions over EOM 10 reps  Therapeutic Activity  TA 1: education on trying to remove her self from stressful home situations to decrease her stress which is likely impacting her pain  TA 2:  Educated on deep breathing exercises and walking to decrease her stress in her life        7/2/2025    12:33 PM   [facility]   Visit Number 13   Lumbar Flexion 48   Lumbar Extension 0   Lumbar Peak Torque 89 ft. lbs.   Min Torque 27   Test Percent Below Normative Data 53 %     Time Entry(in minutes):  Therapeutic Exercise Time Entry: 53    Assessment & Plan   Assessment  Patient arrives with complaints of back and knee pain. She received B knee joint injections yesterday. She also arrives with increased stress due to home issues with daughter and boyfriend. Medx testing performed at beginning of tx session demonstrating improvement in her flexion range of motion by 6 deg but loss of strength based on initial testing. She continues to have a lot of stress in her life as evident by emotions during tx session and verbal reports. I think this might have skewed her results today. She got emotional after performing hip abduction and adduction machine today reporting her R knee popped. Physical Therapist educated patient on how stress can impact her pain as she was emotional throughout tx session. Physical Therapist also discussed with patient on trying to remove herself from stressful environments to improve her pain. She verbalized fair understanding stating she can only remove herself from others when she is in the hospital. She continues to demonstrate decreased low back strength and impaired range of motion of lumbar spine based on medx testing today.  She has met her range of motion goal but her strength goal remains unmet. She also continues to have functional limitations at home based on FOTO score.  She will continue to benefit from skilled Physical Therapist services to address remaining deficits stated above.   Evaluation/Treatment Tolerance: Patient tolerated treatment well  Plan  From a physical therapy perspective, the patient would benefit from: Skilled Rehab Services    Planned therapy  interventions include: Therapeutic exercise, Therapeutic activities, Neuromuscular re-education, Manual therapy, ADLs/IADLs, Aquatic therapy, and Gait training.            Visit Frequency: 2 times Per Week for 2 Weeks.       This plan was discussed with Patient.   Discussion participants: Agreed Upon Plan of Care             The patient will continue to benefit from skilled outpatient physical therapy in order to address the deficits listed in the problem list on the initial evaluation, provide patient and family education, and maximize the patients level of independence in the home and community environments.     The patient's spiritual, cultural, and educational needs were considered, and the patient is agreeable to the plan of care and goals.           Goals:   Active       Long term goals       - Pt will demonstrate increased lumbar MedX ROM by at least 6 degrees from initial ROM value, resulting in improved ability to perform functional forward bending while standing and sitting. Appropriate and Ongoing  (Met)       Start:  05/15/25    Expected End:  07/24/25    Resolved:  07/02/25         - Pt will demonstrate increased MedX average isometric strength value by 30% from initial test resulting in improved ability to perform bending, lifting, and carrying activities safely and confidently. Appropriate and Ongoing  (Progressing)       Start:  05/15/25    Expected End:  07/24/25            - Pt to demonstrate ability to independently control and reduce their pain through posture positioning and mechanical movements throughout a typical day. Appropriate and Ongoing  (Progressing)       Start:  05/15/25    Expected End:  07/24/25            - Pt will demonstrate reduced pain and improved functional outcomes as reported on the FOTO by reaching an intake score of >/= 58% functional ability in order to demonstrate subjective improvement in patient's condition. . Appropriate and Ongoing  (Progressing)       Start:   05/15/25    Expected End:  07/24/25            - Pt will demonstrate independence with the HEP at discharge. Appropriate and Ongoing  (Progressing)       Start:  05/15/25    Expected End:  07/24/25               Short Term Goals       - Pt will demonstrate increased lumbar MedX ROM by at least 3 degrees from the initial ROM value with improvements noted in functional ROM and ability to perform ADLs. Appropriate and Ongoing  (Met)       Start:  05/15/25    Expected End:  06/19/25    Resolved:  07/02/25         - Pt will demonstrate increased MedX average isometric strength value by 15% from initial test resulting in improved ability to perform bending, lifting, and carrying activities safely, confidently. Appropriate and Ongoing  (Progressing)       Start:  05/15/25    Expected End:  06/19/25            - Pt will report a reduction in worst pain score by 1-2 points for improved tolerance for sitting and standing. Appropriate and Ongoing  (Progressing)       Start:  05/15/25    Expected End:  06/19/25            - Pt able to perform HEP correctly with minimal cueing or supervision from therapist to encourage independent management of symptoms. Appropriate and Ongoing  (Progressing)       Start:  05/15/25    Expected End:  06/19/25                Omayra Clark, PT

## 2025-07-02 NOTE — Clinical Note
July 2, 2025  Saadia Ybarra MD  [address] [place] [address]      To whom it may concern,     The attached plan of care is being sent to you for review and reference.    You may indicate your approval by signing the document electronically, or by faxing/mailing a signed copy of the final page of this document back to the attention of Omayra Clark, PT:         Plan of Care 7/2/25   Effective from: 7/2/2025  Effective to: 9/2/2025    Active  Plan ID: [id number]           Participants as of 7/2/2025    Name Type Comments Contact Info    Saadia Ybarra MD PCP - General  [phone]      Last Plan Note     Author: Omayra Clark, PT Status: Signed Last edited: 7/2/2025 11:00 AM       Physical Therapy Progress Note : Updated Plan of Care    Patient Name: Katiuska Preston  MRN: [id number]  YOB: 1975  Encounter Date: 7/2/2025    Therapy Diagnosis:   Encounter Diagnoses   Name Primary?    Poor posture Yes    Decreased strength of lower extremity     Decreased range of motion of lumbar spine      Physician: Saadia Ybarra MD    Physician Orders: Eval and Treat  Medical Diagnosis: Recurrent low back pain  Surgical Diagnosis: Not applicable for this Episode   Surgical Date: Not applicable for this Episode  Days Since Last Surgery: Not applicable for this Episode    Visit # / Visits Authorized:  12 / 12  Insurance Authorization Period: 5/20/2025 to 7/19/2025  Date of Evaluation: 3/27/2025   Plan of Care Certification: 5/15/2025 to 8/15/2025      PT/PTA: PT   Number of PTA visits since last PT visit:0  Time In: 1108   Time Out: 1201  Total Time (in minutes): 53   Total Billable Time (in minutes): 53    FOTO:  Intake Score: 53%  Survey Score 2: 57%  Survey Score 3: 56%    Precautions:       Subjective   Arrives with complaints of knee pain. She reports she recieved injections yesterday but no relief yet. She also reports increased stress today with dealing with her daughter  and her ..  Pain reported as 0/10. mid back and low back    Objective              Isometric Testing on Med X equipment: Testing administered by PT    Test Initial Midpoint Final   Spine area:lumbar spine       Date 6/3/2025 7/2/2025    ROM 0-42 0-48    Max Peak Torque 124 89    Min Peak Torque 54  27    Flex/Ext Ratio 2.3 3.3    % below normative data 35 53    % loss from initial test Not available visit 1 -32%           Treatment:  Therapeutic Exercise  TE 2: Medx machine  TE 3: half of peripheral machines  TE 4: standing extensions over EOM 10 reps  Therapeutic Activity  TA 1: education on trying to remove her self from stressful home situations to decrease her stress which is likely impacting her pain  TA 2: Educated on deep breathing exercises and walking to decrease her stress in her life        7/2/2025    12:33 PM   [facility]   Visit Number 13   Lumbar Flexion 48   Lumbar Extension 0   Lumbar Peak Torque 89 ft. lbs.   Min Torque 27   Test Percent Below Normative Data 53 %     Time Entry(in minutes):  Therapeutic Exercise Time Entry: 53    Assessment & Plan   Assessment  Patient arrives with complaints of back and knee pain. She received B knee joint injections yesterday. She also arrives with increased stress due to home issues with daughter and boyfriend. Medx testing performed at beginning of tx session demonstrating improvement in her flexion range of motion by 6 deg but loss of strength based on initial testing. She continues to have a lot of stress in her life as evident by emotions during tx session and verbal reports. I think this might have skewed her results today. She got emotional after performing hip abduction and adduction machine today reporting her R knee popped. Physical Therapist educated patient on how stress can impact her pain as she was emotional throughout tx session. Physical Therapist also discussed with patient on trying to remove herself from stressful environments  to improve her pain. She verbalized fair understanding stating she can only remove herself from others when she is in the hospital. She continues to demonstrate decreased low back strength and impaired range of motion of lumbar spine based on medx testing today.  She has met her range of motion goal but her strength goal remains unmet. She also continues to have functional limitations at home based on FOTO score.  She will continue to benefit from skilled Physical Therapist services to address remaining deficits stated above.   Evaluation/Treatment Tolerance: Patient tolerated treatment well  Plan  From a physical therapy perspective, the patient would benefit from: Skilled Rehab Services    Planned therapy interventions include: Therapeutic exercise, Therapeutic activities, Neuromuscular re-education, Manual therapy, ADLs/IADLs, Aquatic therapy, and Gait training.            Visit Frequency: 2 times Per Week for 2 Weeks.       This plan was discussed with Patient.   Discussion participants: Agreed Upon Plan of Care             The patient will continue to benefit from skilled outpatient physical therapy in order to address the deficits listed in the problem list on the initial evaluation, provide patient and family education, and maximize the patients level of independence in the home and community environments.     The patient's spiritual, cultural, and educational needs were considered, and the patient is agreeable to the plan of care and goals.           Goals:   Active       Long term goals       - Pt will demonstrate increased lumbar MedX ROM by at least 6 degrees from initial ROM value, resulting in improved ability to perform functional forward bending while standing and sitting. Appropriate and Ongoing  (Met)       Start:  05/15/25    Expected End:  07/24/25    Resolved:  07/02/25         - Pt will demonstrate increased MedX average isometric strength value by 30% from initial test resulting in improved  ability to perform bending, lifting, and carrying activities safely and confidently. Appropriate and Ongoing  (Progressing)       Start:  05/15/25    Expected End:  07/24/25            - Pt to demonstrate ability to independently control and reduce their pain through posture positioning and mechanical movements throughout a typical day. Appropriate and Ongoing  (Progressing)       Start:  05/15/25    Expected End:  07/24/25            - Pt will demonstrate reduced pain and improved functional outcomes as reported on the FOTO by reaching an intake score of >/= 58% functional ability in order to demonstrate subjective improvement in patient's condition. . Appropriate and Ongoing  (Progressing)       Start:  05/15/25    Expected End:  07/24/25            - Pt will demonstrate independence with the HEP at discharge. Appropriate and Ongoing  (Progressing)       Start:  05/15/25    Expected End:  07/24/25               Short Term Goals       - Pt will demonstrate increased lumbar MedX ROM by at least 3 degrees from the initial ROM value with improvements noted in functional ROM and ability to perform ADLs. Appropriate and Ongoing  (Met)       Start:  05/15/25    Expected End:  06/19/25    Resolved:  07/02/25         - Pt will demonstrate increased MedX average isometric strength value by 15% from initial test resulting in improved ability to perform bending, lifting, and carrying activities safely, confidently. Appropriate and Ongoing  (Progressing)       Start:  05/15/25    Expected End:  06/19/25            - Pt will report a reduction in worst pain score by 1-2 points for improved tolerance for sitting and standing. Appropriate and Ongoing  (Progressing)       Start:  05/15/25    Expected End:  06/19/25            - Pt able to perform HEP correctly with minimal cueing or supervision from therapist to encourage independent management of symptoms. Appropriate and Ongoing  (Progressing)       Start:  05/15/25    Expected  End:  06/19/25                Omayra Clark PT                 Sincerely,      Omayra Clark PT  [facility]                                                            Dear Omayra Clark PT,    RE: Ms. Katiuska Preston, MRN: [id number]    I certify that I have reviewed the attached plan of care and agree to the details within.        ___________________________  ___________________________  Provider Printed Name   Provider Signed Name      ___________________________  Date and Time

## 2025-07-22 ENCOUNTER — LAB VISIT (OUTPATIENT)
Dept: LAB | Facility: HOSPITAL | Age: 50
End: 2025-07-22
Attending: INTERNAL MEDICINE
Payer: MEDICAID

## 2025-07-22 DIAGNOSIS — N18.9 CHRONIC KIDNEY DISEASE, UNSPECIFIED: ICD-10-CM

## 2025-07-22 DIAGNOSIS — N18.30 CHRONIC KIDNEY DISEASE, STAGE III (MODERATE): Primary | ICD-10-CM

## 2025-07-22 DIAGNOSIS — N25.81 SECONDARY HYPERPARATHYROIDISM OF RENAL ORIGIN: ICD-10-CM

## 2025-07-22 LAB
25(OH)D3+25(OH)D2 SERPL-MCNC: 42 NG/ML (ref 30–96)
ABSOLUTE EOSINOPHIL (SMH): 0.08 K/UL
ABSOLUTE MONOCYTE (SMH): 0.69 K/UL (ref 0.3–1)
ABSOLUTE NEUTROPHIL COUNT (SMH): 10.5 K/UL (ref 1.8–7.7)
ALBUMIN SERPL-MCNC: 3.7 G/DL (ref 3.5–5.2)
ANION GAP (SMH): 8 MMOL/L (ref 8–16)
BACTERIA #/AREA URNS AUTO: NORMAL /HPF
BASOPHILS # BLD AUTO: 0.04 K/UL
BASOPHILS NFR BLD AUTO: 0.3 %
BILIRUB UR QL STRIP.AUTO: NEGATIVE
BUN SERPL-MCNC: 32 MG/DL (ref 6–20)
CALCIUM SERPL-MCNC: 9.4 MG/DL (ref 8.7–10.5)
CHLORIDE SERPL-SCNC: 102 MMOL/L (ref 95–110)
CLARITY UR: ABNORMAL
CO2 SERPL-SCNC: 27 MMOL/L (ref 23–29)
COLOR UR AUTO: YELLOW
CREAT SERPL-MCNC: 2 MG/DL (ref 0.5–1.4)
CREAT UR-MCNC: 120 MG/DL (ref 15–325)
ERYTHROCYTE [DISTWIDTH] IN BLOOD BY AUTOMATED COUNT: 13.4 % (ref 11.5–14.5)
GFR SERPLBLD CREATININE-BSD FMLA CKD-EPI: 30 ML/MIN/1.73/M2
GLUCOSE SERPL-MCNC: 102 MG/DL (ref 70–110)
GLUCOSE UR QL STRIP: NEGATIVE
HCT VFR BLD AUTO: 38.7 % (ref 37–48.5)
HGB BLD-MCNC: 12.4 GM/DL (ref 12–16)
HGB UR QL STRIP: ABNORMAL
IMM GRANULOCYTES # BLD AUTO: 0.05 K/UL (ref 0–0.04)
IMM GRANULOCYTES NFR BLD AUTO: 0.4 % (ref 0–0.5)
KETONES UR QL STRIP: NEGATIVE
LEUKOCYTE ESTERASE UR QL STRIP: NEGATIVE
LYMPHOCYTES # BLD AUTO: 2.31 K/UL (ref 1–4.8)
MCH RBC QN AUTO: 30.8 PG (ref 27–31)
MCHC RBC AUTO-ENTMCNC: 32 G/DL (ref 32–36)
MCV RBC AUTO: 96 FL (ref 82–98)
MICROSCOPIC COMMENT: NORMAL
NITRITE UR QL STRIP: NEGATIVE
NUCLEATED RBC (/100WBC) (SMH): 0 /100 WBC
PH UR STRIP: 5 [PH]
PHOSPHATE SERPL-MCNC: 3.3 MG/DL (ref 2.7–4.5)
PLATELET # BLD AUTO: 292 K/UL (ref 150–450)
PMV BLD AUTO: 10.1 FL (ref 9.2–12.9)
POTASSIUM SERPL-SCNC: 3.9 MMOL/L (ref 3.5–5.1)
PROT UR QL STRIP: NEGATIVE
PROT UR-MCNC: 7 MG/DL
PTH-INTACT SERPL-MCNC: 107.6 PG/ML (ref 9–77)
RBC # BLD AUTO: 4.03 M/UL (ref 4–5.4)
RBC #/AREA URNS AUTO: 1 /HPF
RELATIVE EOSINOPHIL (SMH): 0.6 % (ref 0–8)
RELATIVE LYMPHOCYTE (SMH): 16.9 % (ref 18–48)
RELATIVE MONOCYTE (SMH): 5.1 % (ref 4–15)
RELATIVE NEUTROPHIL (SMH): 76.7 % (ref 38–73)
SODIUM SERPL-SCNC: 137 MMOL/L (ref 136–145)
SP GR UR STRIP: 1.02
SQUAMOUS #/AREA URNS AUTO: 12 /HPF
UROBILINOGEN UR STRIP-ACNC: NEGATIVE EU/DL
WBC # BLD AUTO: 13.65 K/UL (ref 3.9–12.7)
WBC #/AREA URNS AUTO: 1 /HPF

## 2025-07-22 PROCEDURE — 82306 VITAMIN D 25 HYDROXY: CPT

## 2025-07-22 PROCEDURE — 84156 ASSAY OF PROTEIN URINE: CPT

## 2025-07-22 PROCEDURE — 85025 COMPLETE CBC W/AUTO DIFF WBC: CPT

## 2025-07-22 PROCEDURE — 81003 URINALYSIS AUTO W/O SCOPE: CPT

## 2025-07-22 PROCEDURE — 36415 COLL VENOUS BLD VENIPUNCTURE: CPT

## 2025-07-22 PROCEDURE — 82570 ASSAY OF URINE CREATININE: CPT

## 2025-07-22 PROCEDURE — 83970 ASSAY OF PARATHORMONE: CPT

## 2025-07-22 PROCEDURE — 82040 ASSAY OF SERUM ALBUMIN: CPT

## 2025-07-29 DIAGNOSIS — N64.89 GALACTOCELE: Primary | ICD-10-CM

## 2025-08-01 PROBLEM — M53.86 DECREASED RANGE OF MOTION OF LUMBAR SPINE: Status: RESOLVED | Noted: 2025-05-15 | Resolved: 2025-08-01

## 2025-08-01 PROBLEM — R29.3 POOR POSTURE: Status: RESOLVED | Noted: 2025-05-15 | Resolved: 2025-08-01

## 2025-08-01 PROBLEM — R29.898 DECREASED STRENGTH OF LOWER EXTREMITY: Status: RESOLVED | Noted: 2025-05-15 | Resolved: 2025-08-01

## 2025-08-08 ENCOUNTER — HOSPITAL ENCOUNTER (OUTPATIENT)
Dept: RADIOLOGY | Facility: HOSPITAL | Age: 50
Discharge: HOME | End: 2025-08-08
Payer: MEDICAID

## 2025-08-08 DIAGNOSIS — N64.89 GALACTOCELE: ICD-10-CM

## 2025-08-08 PROCEDURE — 76642 ULTRASOUND BREAST LIMITED: CPT | Mod: TC,PO,LT

## 2025-08-08 PROCEDURE — 76642 ULTRASOUND BREAST LIMITED: CPT | Mod: 26,LT,, | Performed by: RADIOLOGY

## 2025-08-08 PROCEDURE — 77062 BREAST TOMOSYNTHESIS BI: CPT | Mod: 26,,, | Performed by: RADIOLOGY

## 2025-08-08 PROCEDURE — 77062 BREAST TOMOSYNTHESIS BI: CPT | Mod: TC,PO

## 2025-08-08 PROCEDURE — 77066 DX MAMMO INCL CAD BI: CPT | Mod: 26,,, | Performed by: RADIOLOGY

## 2025-08-11 ENCOUNTER — HOSPITAL ENCOUNTER (EMERGENCY)
Facility: HOSPITAL | Age: 50
Discharge: HOME | End: 2025-08-11
Attending: EMERGENCY MEDICINE
Payer: MEDICAID

## 2025-08-11 VITALS
RESPIRATION RATE: 20 BRPM | HEART RATE: 100 BPM | HEIGHT: 63 IN | BODY MASS INDEX: 46.07 KG/M2 | WEIGHT: 260 LBS | OXYGEN SATURATION: 100 % | DIASTOLIC BLOOD PRESSURE: 102 MMHG | SYSTOLIC BLOOD PRESSURE: 161 MMHG | TEMPERATURE: 98 F

## 2025-08-11 DIAGNOSIS — R07.9 CHEST PAIN: ICD-10-CM

## 2025-08-11 DIAGNOSIS — J18.9 PNEUMONIA OF LEFT LOWER LOBE DUE TO INFECTIOUS ORGANISM: Primary | ICD-10-CM

## 2025-08-11 LAB
ABSOLUTE EOSINOPHIL (SMH): 0.06 K/UL
ABSOLUTE MONOCYTE (SMH): 0.76 K/UL (ref 0.3–1)
ABSOLUTE NEUTROPHIL COUNT (SMH): 8.8 K/UL (ref 1.8–7.7)
ALBUMIN SERPL-MCNC: 3.2 G/DL (ref 3.5–5.2)
ALP SERPL-CCNC: 85 UNIT/L (ref 55–135)
ALT SERPL-CCNC: 14 UNIT/L (ref 10–44)
ANION GAP (SMH): 4 MMOL/L (ref 8–16)
AST SERPL-CCNC: 18 UNIT/L (ref 10–40)
BASOPHILS # BLD AUTO: 0.03 K/UL
BASOPHILS NFR BLD AUTO: 0.3 %
BILIRUB SERPL-MCNC: 0.3 MG/DL (ref 0.1–1)
BNP SERPL-MCNC: 31 PG/ML
BUN SERPL-MCNC: 27 MG/DL (ref 6–20)
CALCIUM SERPL-MCNC: 9 MG/DL (ref 8.7–10.5)
CHLORIDE SERPL-SCNC: 108 MMOL/L (ref 95–110)
CO2 SERPL-SCNC: 26 MMOL/L (ref 23–29)
CREAT SERPL-MCNC: 1.6 MG/DL (ref 0.5–1.4)
ERYTHROCYTE [DISTWIDTH] IN BLOOD BY AUTOMATED COUNT: 14.1 % (ref 11.5–14.5)
GFR SERPLBLD CREATININE-BSD FMLA CKD-EPI: 39 ML/MIN/1.73/M2
GLUCOSE SERPL-MCNC: 118 MG/DL (ref 70–110)
HCT VFR BLD AUTO: 35.2 % (ref 37–48.5)
HGB BLD-MCNC: 11.3 GM/DL (ref 12–16)
IMM GRANULOCYTES # BLD AUTO: 0.06 K/UL (ref 0–0.04)
IMM GRANULOCYTES NFR BLD AUTO: 0.5 % (ref 0–0.5)
LYMPHOCYTES # BLD AUTO: 1.8 K/UL (ref 1–4.8)
MAGNESIUM SERPL-MCNC: 1.7 MG/DL (ref 1.6–2.6)
MCH RBC QN AUTO: 31.7 PG (ref 27–31)
MCHC RBC AUTO-ENTMCNC: 32.1 G/DL (ref 32–36)
MCV RBC AUTO: 99 FL (ref 82–98)
NUCLEATED RBC (/100WBC) (SMH): 0 /100 WBC
OHS QRS DURATION: 80 MS
OHS QTC CALCULATION: 433 MS
PLATELET # BLD AUTO: 291 K/UL (ref 150–450)
PMV BLD AUTO: 9.8 FL (ref 9.2–12.9)
POTASSIUM SERPL-SCNC: 4.3 MMOL/L (ref 3.5–5.1)
PROT SERPL-MCNC: 6.8 GM/DL (ref 6–8.4)
RBC # BLD AUTO: 3.57 M/UL (ref 4–5.4)
RELATIVE EOSINOPHIL (SMH): 0.5 % (ref 0–8)
RELATIVE LYMPHOCYTE (SMH): 15.7 % (ref 18–48)
RELATIVE MONOCYTE (SMH): 6.6 % (ref 4–15)
RELATIVE NEUTROPHIL (SMH): 76.4 % (ref 38–73)
SODIUM SERPL-SCNC: 138 MMOL/L (ref 136–145)
TROPONIN HIGH SENSITIVE (SMH): 10.3 PG/ML
TROPONIN HIGH SENSITIVE (SMH): 7.9 PG/ML
WBC # BLD AUTO: 11.48 K/UL (ref 3.9–12.7)

## 2025-08-11 PROCEDURE — 93005 ELECTROCARDIOGRAM TRACING: CPT | Performed by: GENERAL PRACTICE

## 2025-08-11 PROCEDURE — 96374 THER/PROPH/DIAG INJ IV PUSH: CPT

## 2025-08-11 PROCEDURE — 84484 ASSAY OF TROPONIN QUANT: CPT | Performed by: EMERGENCY MEDICINE

## 2025-08-11 PROCEDURE — 85025 COMPLETE CBC W/AUTO DIFF WBC: CPT | Performed by: EMERGENCY MEDICINE

## 2025-08-11 PROCEDURE — 25000003 PHARM REV CODE 250: Performed by: EMERGENCY MEDICINE

## 2025-08-11 PROCEDURE — 83735 ASSAY OF MAGNESIUM: CPT | Performed by: EMERGENCY MEDICINE

## 2025-08-11 PROCEDURE — 93010 ELECTROCARDIOGRAM REPORT: CPT | Mod: ,,, | Performed by: GENERAL PRACTICE

## 2025-08-11 PROCEDURE — 82565 ASSAY OF CREATININE: CPT | Performed by: EMERGENCY MEDICINE

## 2025-08-11 PROCEDURE — 63600175 PHARM REV CODE 636 W HCPCS: Performed by: EMERGENCY MEDICINE

## 2025-08-11 PROCEDURE — 83880 ASSAY OF NATRIURETIC PEPTIDE: CPT | Performed by: EMERGENCY MEDICINE

## 2025-08-11 PROCEDURE — 99285 EMERGENCY DEPT VISIT HI MDM: CPT | Mod: 25

## 2025-08-11 RX ORDER — AMOXICILLIN AND CLAVULANATE POTASSIUM 875; 125 MG/1; MG/1
1 TABLET, FILM COATED ORAL 2 TIMES DAILY
Qty: 14 TABLET | Refills: 0 | Status: SHIPPED | OUTPATIENT
Start: 2025-08-11

## 2025-08-11 RX ORDER — AZITHROMYCIN 250 MG/1
250 TABLET, FILM COATED ORAL DAILY
Qty: 6 TABLET | Refills: 0 | Status: SHIPPED | OUTPATIENT
Start: 2025-08-11

## 2025-08-11 RX ORDER — ACETAMINOPHEN 500 MG
1000 TABLET ORAL
Status: COMPLETED | OUTPATIENT
Start: 2025-08-11 | End: 2025-08-11

## 2025-08-11 RX ORDER — CEFTRIAXONE 1 G/1
1 INJECTION, POWDER, FOR SOLUTION INTRAMUSCULAR; INTRAVENOUS
Status: DISCONTINUED | OUTPATIENT
Start: 2025-08-11 | End: 2025-08-11 | Stop reason: HOSPADM

## 2025-08-11 RX ORDER — MELOXICAM 7.5 MG/1
7.5 TABLET ORAL DAILY PRN
COMMUNITY
Start: 2025-07-14

## 2025-08-11 RX ADMIN — ACETAMINOPHEN 1000 MG: 500 TABLET ORAL at 04:08

## 2025-08-11 RX ADMIN — CEFTRIAXONE SODIUM 1 G: 1 INJECTION, POWDER, FOR SOLUTION INTRAMUSCULAR; INTRAVENOUS at 05:08
